# Patient Record
Sex: FEMALE | Race: WHITE | NOT HISPANIC OR LATINO | Employment: UNEMPLOYED | URBAN - METROPOLITAN AREA
[De-identification: names, ages, dates, MRNs, and addresses within clinical notes are randomized per-mention and may not be internally consistent; named-entity substitution may affect disease eponyms.]

---

## 2017-04-24 DIAGNOSIS — G25.81 RESTLESS LEG SYNDROME: Primary | ICD-10-CM

## 2017-04-24 PROBLEM — R79.0 LOW SERUM FERRITIN LEVEL: Status: ACTIVE | Noted: 2017-04-24

## 2017-04-28 ENCOUNTER — INFUSION THERAPY VISIT (OUTPATIENT)
Dept: INFUSION THERAPY | Facility: CLINIC | Age: 52
End: 2017-04-28
Attending: PSYCHIATRY & NEUROLOGY
Payer: MEDICAID

## 2017-04-28 VITALS
HEART RATE: 80 BPM | SYSTOLIC BLOOD PRESSURE: 132 MMHG | TEMPERATURE: 97.4 F | DIASTOLIC BLOOD PRESSURE: 62 MMHG | RESPIRATION RATE: 14 BRPM

## 2017-04-28 DIAGNOSIS — R79.0 LOW SERUM FERRITIN LEVEL: Primary | ICD-10-CM

## 2017-04-28 DIAGNOSIS — G25.81 RESTLESS LEG SYNDROME: ICD-10-CM

## 2017-04-28 PROCEDURE — 96366 THER/PROPH/DIAG IV INF ADDON: CPT

## 2017-04-28 PROCEDURE — 96361 HYDRATE IV INFUSION ADD-ON: CPT

## 2017-04-28 PROCEDURE — 25000128 H RX IP 250 OP 636: Performed by: PSYCHIATRY & NEUROLOGY

## 2017-04-28 PROCEDURE — 96376 TX/PRO/DX INJ SAME DRUG ADON: CPT

## 2017-04-28 PROCEDURE — 96365 THER/PROPH/DIAG IV INF INIT: CPT

## 2017-04-28 RX ADMIN — SODIUM CHLORIDE 250 ML: 9 INJECTION, SOLUTION INTRAVENOUS at 10:38

## 2017-04-28 RX ADMIN — IRON DEXTRAN 475 MG: 50 INJECTION INTRAMUSCULAR; INTRAVENOUS at 12:14

## 2017-04-28 RX ADMIN — IRON DEXTRAN 25 MG: 50 INJECTION INTRAMUSCULAR; INTRAVENOUS at 10:38

## 2017-04-28 ASSESSMENT — PAIN SCALES - GENERAL: PAINLEVEL: SEVERE PAIN (6)

## 2017-04-28 NOTE — PROGRESS NOTES
Infusion Nursing Note:  Hina A Fracisco presents today for Iron Dextran.    Patient seen by provider today: No   present during visit today: Not Applicable.    Note: N/A.    Intravenous Access:  Peripheral IV placed.    Treatment Conditions:  Not Applicable.      Post Infusion Assessment:  Patient tolerated infusion without incident.  Patient observed for 60 and 30 minutes post infed per protocol.  Blood return noted pre and post infusion.  Site patent and intact, free from redness, edema or discomfort.  No evidence of extravasations.  Access discontinued per protocol.    Discharge Plan:   Discharge instructions reviewed with: Patient.  Patient and/or family verbalized understanding of discharge instructions and all questions answered.  .  Patient will return prn for next appointment.  Patient discharged in stable condition accompanied by: self.  Departure Mode: Ambulatory.    Cherry Feliz RN

## 2017-04-28 NOTE — MR AVS SNAPSHOT
"              After Visit Summary   2017    Hina Yap    MRN: [id number]           Patient Information     Date Of Birth          1965        Visit Information        Provider Department      2017 10:00 AM  INFUSION CHAIR [facility]        Today's Diagnoses     Low serum ferritin level    -  1    Restless leg syndrome           Follow-ups after your visit        Who to contact     If you have questions or need follow up information about today's clinic visit or your schedule please contact [facility] directly at [phone].  Normal or non-critical lab and imaging results will be communicated to you by Hopscotchhart, letter or phone within 4 business days after the clinic has received the results. If you do not hear from us within 7 days, please contact the clinic through [facility] or phone. If you have a critical or abnormal lab result, we will notify you by phone as soon as possible.  Submit refill requests through Lumeta or call your pharmacy and they will forward the refill request to us. Please allow 3 business days for your refill to be completed.          Additional Information About Your Visit        MyChart Information     Lumeta lets you send messages to your doctor, view your test results, renew your prescriptions, schedule appointments and more. To sign up, go to [email or web address] . Click on \"Log in\" on the left side of the screen, which will take you to the Welcome page. Then click on \"Sign up Now\" on the right side of the page.     You will be asked to enter the access code listed below, as well as some personal information. Please follow the directions to create your username and password.     Your access code is: [email or web address]  Expires: 2017  2:51 PM     Your access code will  in 90 days. If you need help or a new code, please call your [place] clinic or [phone].        Care EveryWhere ID  "    This is your Care EveryWhere ID. This could be used by other organizations to access your [place] medical records  [id number]        Your Vitals Were     Pulse Temperature Respirations             80 97.4  F (36.3  C) (Oral) 14          Blood Pressure from Last 3 Encounters:   04/28/17 132/62   01/30/16 131/73   12/03/15 124/82    Weight from Last 3 Encounters:   01/29/16 55 kg (121 lb 3.2 oz)   12/03/15 52.4 kg (115 lb 9.6 oz)   11/30/15 48.3 kg (106 lb 7.7 oz)              We Performed the Following     Road Map Alert     Treatment Conditions     Vital signs        Primary Care Provider Office Phone # Fax #    BENNETT Marvin [phone]       [facility] [address] [place] [address]        Thank you!     Thank you for choosing [facility]  for your care. Our goal is always to provide you with excellent care. Hearing back from our patients is one way we can continue to improve our services. Please take a few minutes to complete the written survey that you may receive in the mail after your visit with us. Thank you!             Your Updated Medication List - Protect others around you: Learn how to safely use, store and throw away your medicines at www.disposemymeds.org.          This list is accurate as of: 4/28/17  2:51 PM.  Always use your most recent med list.                   Brand Name Dispense Instructions for use    ADDERALL 10 MG per tablet   Generic drug:  amphetamine-dextroamphetamine      Take 10 mg by mouth daily       albuterol 108 (90 BASE) MCG/ACT Inhaler    PROAIR HFA/PROVENTIL HFA/VENTOLIN HFA    1 Inhaler    Inhale 2 puffs into the lungs 4 times daily       ALPRAZolam 1 MG 24 hr tablet    XANAX XR     Take 1 mg by mouth 2 times daily as needed       aspirin-acetaminophen-caffeine 250-250-65 MG per tablet    EXCEDRIN MIGRAINE     Take 1 tablet by mouth every 6 hours as needed for headaches       baclofen  10 MG tablet    LIORESAL    90 tablet    Take 1 tablet (10 mg) by mouth 4 times daily       celecoxib 200 MG capsule    celeBREX    30 capsule    Take 1 capsule (200 mg) by mouth daily       esomeprazole 40 MG CR capsule    nexIUM    30 capsule    Take 1 capsule (40 mg) by mouth At Bedtime Take at night       fluticasone-salmeterol 250-50 MCG/DOSE diskus inhaler    ADVAIR    1 Inhaler    Inhale 1 puff into the lungs 2 times daily       guaiFENesin-codeine 100-10 MG/5ML Soln solution    ROBITUSSIN AC    120 mL    Take 5 mLs by mouth every 4 hours as needed for cough       ipratropium - albuterol 0.5 mg/2.5 mg/3 mL 0.5-2.5 (3) MG/3ML neb solution    DUONEB    30 vial    Take 1 vial (3 mLs) by nebulization every 6 hours as needed for shortness of breath / dyspnea or wheezing       multivitamin, therapeutic Tabs tablet      Take 1 tablet by mouth daily       * order for DME     1 Device    Equipment being ordered: Cane () Treatment Diagnosis: Multiple sclerosis       * order for DME     1 each    Equipment being ordered: Nebulizer       PERCOCET  MG per tablet   Generic drug:  oxyCODONE-acetaminophen      Take 1 tablet by mouth every 6 hours as needed.       predniSONE 10 MG tablet    DELTASONE    18 tablet    Take 30mg (iii tabs) po daily for 3 days, then 20mg (ii tabs) po daily for 3 days, then 10mg (i tab) po daily for 3 days then stop.       promethazine 25 MG tablet    PHENERGAN    56 tablet    Take 1 tablet (25 mg) by mouth every 6 hours as needed for nausea (takes with percocet to prevent nausea)       rOPINIRole 2 MG tablet    REQUIP    30 tablet    Take 1 tablet (2 mg) by mouth See Admin Instructions Take 1 tablet in the morning and 2 tablets at bedtime daily.       tiotropium 18 MCG capsule    SPIRIVA HANDIHALER    30 capsule    Inhale contents of one capsule daily.       * Notice:  This list has 2 medication(s) that are the same as other medications prescribed for you. Read the directions  carefully, and ask your doctor or other care provider to review them with you.

## 2017-05-25 ENCOUNTER — RADIANT APPOINTMENT (OUTPATIENT)
Dept: MAMMOGRAPHY | Facility: CLINIC | Age: 52
End: 2017-05-25
Attending: NURSE PRACTITIONER
Payer: MEDICAID

## 2017-05-25 ENCOUNTER — OFFICE VISIT (OUTPATIENT)
Dept: PEDIATRICS | Facility: CLINIC | Age: 52
End: 2017-05-25
Payer: MEDICAID

## 2017-05-25 VITALS
TEMPERATURE: 97.2 F | BODY MASS INDEX: 19.12 KG/M2 | HEART RATE: 72 BPM | WEIGHT: 121.8 LBS | DIASTOLIC BLOOD PRESSURE: 70 MMHG | OXYGEN SATURATION: 99 % | HEIGHT: 67 IN | SYSTOLIC BLOOD PRESSURE: 116 MMHG

## 2017-05-25 DIAGNOSIS — J44.0 CHRONIC OBSTRUCTIVE PULMONARY DISEASE WITH ACUTE LOWER RESPIRATORY INFECTION (H): ICD-10-CM

## 2017-05-25 DIAGNOSIS — Z72.0 TOBACCO ABUSE: ICD-10-CM

## 2017-05-25 DIAGNOSIS — N89.8 VAGINAL ODOR: ICD-10-CM

## 2017-05-25 DIAGNOSIS — R30.0 DYSURIA: ICD-10-CM

## 2017-05-25 DIAGNOSIS — Z00.00 ROUTINE HEALTH MAINTENANCE: Primary | ICD-10-CM

## 2017-05-25 DIAGNOSIS — Z00.00 PREVENTATIVE HEALTH CARE: ICD-10-CM

## 2017-05-25 DIAGNOSIS — J44.9 CHRONIC OBSTRUCTIVE PULMONARY DISEASE, UNSPECIFIED COPD TYPE (H): ICD-10-CM

## 2017-05-25 DIAGNOSIS — G35 MULTIPLE SCLEROSIS (H): ICD-10-CM

## 2017-05-25 DIAGNOSIS — F43.23 ADJUSTMENT DISORDER WITH MIXED ANXIETY AND DEPRESSED MOOD: ICD-10-CM

## 2017-05-25 DIAGNOSIS — N39.0 URINARY TRACT INFECTION WITHOUT HEMATURIA, SITE UNSPECIFIED: ICD-10-CM

## 2017-05-25 DIAGNOSIS — R06.02 SOB (SHORTNESS OF BREATH): ICD-10-CM

## 2017-05-25 LAB
ALBUMIN UR-MCNC: ABNORMAL MG/DL
APPEARANCE UR: ABNORMAL
BACTERIA #/AREA URNS HPF: ABNORMAL /HPF
BASOPHILS # BLD AUTO: 0.1 10E9/L (ref 0–0.2)
BASOPHILS NFR BLD AUTO: 0.4 %
BILIRUB UR QL STRIP: ABNORMAL
COLOR UR AUTO: YELLOW
DIFFERENTIAL METHOD BLD: ABNORMAL
EOSINOPHIL # BLD AUTO: 0.4 10E9/L (ref 0–0.7)
EOSINOPHIL NFR BLD AUTO: 2.9 %
ERYTHROCYTE [DISTWIDTH] IN BLOOD BY AUTOMATED COUNT: 13.8 % (ref 10–15)
GLUCOSE UR STRIP-MCNC: NEGATIVE MG/DL
HCT VFR BLD AUTO: 40.4 % (ref 35–47)
HGB BLD-MCNC: 13.2 G/DL (ref 11.7–15.7)
HGB UR QL STRIP: ABNORMAL
KETONES UR STRIP-MCNC: ABNORMAL MG/DL
LEUKOCYTE ESTERASE UR QL STRIP: NEGATIVE
LYMPHOCYTES # BLD AUTO: 2.3 10E9/L (ref 0.8–5.3)
LYMPHOCYTES NFR BLD AUTO: 19.2 %
MCH RBC QN AUTO: 30.7 PG (ref 26.5–33)
MCHC RBC AUTO-ENTMCNC: 32.7 G/DL (ref 31.5–36.5)
MCV RBC AUTO: 94 FL (ref 78–100)
MICRO REPORT STATUS: NORMAL
MONOCYTES # BLD AUTO: 0.7 10E9/L (ref 0–1.3)
MONOCYTES NFR BLD AUTO: 5.6 %
MUCOUS THREADS #/AREA URNS LPF: PRESENT /LPF
NEUTROPHILS # BLD AUTO: 8.6 10E9/L (ref 1.6–8.3)
NEUTROPHILS NFR BLD AUTO: 71.9 %
NITRATE UR QL: POSITIVE
NON-SQ EPI CELLS #/AREA URNS LPF: ABNORMAL /LPF
PH UR STRIP: 5.5 PH (ref 5–7)
PLATELET # BLD AUTO: 325 10E9/L (ref 150–450)
RBC # BLD AUTO: 4.3 10E12/L (ref 3.8–5.2)
RBC #/AREA URNS AUTO: ABNORMAL /HPF (ref 0–2)
SP GR UR STRIP: 1.02 (ref 1–1.03)
SPECIMEN SOURCE: NORMAL
URN SPEC COLLECT METH UR: ABNORMAL
UROBILINOGEN UR STRIP-ACNC: 0.2 EU/DL (ref 0.2–1)
WBC # BLD AUTO: 12 10E9/L (ref 4–11)
WBC #/AREA URNS AUTO: ABNORMAL /HPF (ref 0–2)
WET PREP SPEC: NORMAL

## 2017-05-25 PROCEDURE — 99213 OFFICE O/P EST LOW 20 MIN: CPT | Mod: 25 | Performed by: NURSE PRACTITIONER

## 2017-05-25 PROCEDURE — 80061 LIPID PANEL: CPT | Performed by: NURSE PRACTITIONER

## 2017-05-25 PROCEDURE — 81001 URINALYSIS AUTO W/SCOPE: CPT | Performed by: NURSE PRACTITIONER

## 2017-05-25 PROCEDURE — 80053 COMPREHEN METABOLIC PANEL: CPT | Performed by: NURSE PRACTITIONER

## 2017-05-25 PROCEDURE — 87086 URINE CULTURE/COLONY COUNT: CPT | Performed by: NURSE PRACTITIONER

## 2017-05-25 PROCEDURE — 90715 TDAP VACCINE 7 YRS/> IM: CPT | Performed by: NURSE PRACTITIONER

## 2017-05-25 PROCEDURE — 82728 ASSAY OF FERRITIN: CPT | Performed by: NURSE PRACTITIONER

## 2017-05-25 PROCEDURE — 87210 SMEAR WET MOUNT SALINE/INK: CPT | Performed by: NURSE PRACTITIONER

## 2017-05-25 PROCEDURE — 90471 IMMUNIZATION ADMIN: CPT | Performed by: NURSE PRACTITIONER

## 2017-05-25 PROCEDURE — 99396 PREV VISIT EST AGE 40-64: CPT | Mod: 25 | Performed by: NURSE PRACTITIONER

## 2017-05-25 PROCEDURE — 85025 COMPLETE CBC W/AUTO DIFF WBC: CPT | Performed by: NURSE PRACTITIONER

## 2017-05-25 PROCEDURE — 87624 HPV HI-RISK TYP POOLED RSLT: CPT | Performed by: NURSE PRACTITIONER

## 2017-05-25 PROCEDURE — G0145 SCR C/V CYTO,THINLAYER,RESCR: HCPCS | Performed by: NURSE PRACTITIONER

## 2017-05-25 PROCEDURE — G0202 SCR MAMMO BI INCL CAD: HCPCS | Mod: TC

## 2017-05-25 PROCEDURE — 87186 SC STD MICRODIL/AGAR DIL: CPT | Performed by: NURSE PRACTITIONER

## 2017-05-25 PROCEDURE — 36415 COLL VENOUS BLD VENIPUNCTURE: CPT | Performed by: NURSE PRACTITIONER

## 2017-05-25 PROCEDURE — 87088 URINE BACTERIA CULTURE: CPT | Performed by: NURSE PRACTITIONER

## 2017-05-25 PROCEDURE — 84443 ASSAY THYROID STIM HORMONE: CPT | Performed by: NURSE PRACTITIONER

## 2017-05-25 PROCEDURE — G0476 HPV COMBO ASSAY CA SCREEN: HCPCS | Performed by: NURSE PRACTITIONER

## 2017-05-25 RX ORDER — IPRATROPIUM BROMIDE AND ALBUTEROL SULFATE 2.5; .5 MG/3ML; MG/3ML
1 SOLUTION RESPIRATORY (INHALATION) EVERY 6 HOURS PRN
Qty: 30 VIAL | Refills: 1 | Status: ON HOLD | OUTPATIENT
Start: 2017-05-25 | End: 2018-12-05

## 2017-05-25 RX ORDER — NITROFURANTOIN 25; 75 MG/1; MG/1
100 CAPSULE ORAL 2 TIMES DAILY
Qty: 14 CAPSULE | Refills: 0 | Status: SHIPPED | OUTPATIENT
Start: 2017-05-25 | End: 2017-11-30

## 2017-05-25 RX ORDER — ALBUTEROL SULFATE 90 UG/1
2 AEROSOL, METERED RESPIRATORY (INHALATION) 4 TIMES DAILY
Qty: 1 INHALER | Refills: 1 | Status: ON HOLD | OUTPATIENT
Start: 2017-05-25 | End: 2018-12-05

## 2017-05-25 RX ORDER — TIOTROPIUM BROMIDE 18 UG/1
CAPSULE ORAL; RESPIRATORY (INHALATION)
Qty: 30 CAPSULE | Refills: 0 | Status: ON HOLD | OUTPATIENT
Start: 2017-05-25 | End: 2018-12-04

## 2017-05-25 ASSESSMENT — PATIENT HEALTH QUESTIONNAIRE - PHQ9
10. IF YOU CHECKED OFF ANY PROBLEMS, HOW DIFFICULT HAVE THESE PROBLEMS MADE IT FOR YOU TO DO YOUR WORK, TAKE CARE OF THINGS AT HOME, OR GET ALONG WITH OTHER PEOPLE: VERY DIFFICULT
SUM OF ALL RESPONSES TO PHQ QUESTIONS 1-9: 17
SUM OF ALL RESPONSES TO PHQ QUESTIONS 1-9: 17

## 2017-05-25 NOTE — MR AVS SNAPSHOT
After Visit Summary   5/25/2017    Hina Yap    MRN: [id number]           Patient Information     Date Of Birth          1965        Visit Information        Provider Department      5/25/2017 10:00 AM Sunshine Butterfield APRN [facility]        Today's Diagnoses     Routine health maintenance    -  1    Vaginal odor        Dysuria        Chronic obstructive pulmonary disease, unspecified COPD type (H)        Urinary tract infection without hematuria, site unspecified        Chronic obstructive pulmonary disease with acute lower respiratory infection (H)        SOB (shortness of breath)        Multiple sclerosis        Adjustment disorder with mixed anxiety and depressed mood          Care Instructions    -Antibiotic twice a day for 7 days  -Please see pulmonary asap  FHN: [facility] [phone]   [email or web address]  -Please send back poop test.    Preventive Health Recommendations  Female Ages 50 - 64    Yearly exam: See your health care provider every year in order to  o Review health changes.   o Discuss preventive care.    o Review your medicines if your doctor has prescribed any.      Get a Pap test every three years (unless you have an abnormal result and your provider advises testing more often).    If you get Pap tests with HPV test, you only need to test every 5 years, unless you have an abnormal result.     You do not need a Pap test if your uterus was removed (hysterectomy) and you have not had cancer.    You should be tested each year for STDs (sexually transmitted diseases) if you're at risk.     Have a mammogram every 1 to 2 years.    Have a colonoscopy at age 50, or have a yearly FIT test (stool test). These exams screen for colon cancer.      Have a cholesterol test every 5 years, or more often if advised.    Have a diabetes test (fasting glucose) every three years. If you are at risk for diabetes, you should have this test  more often.     If you are at risk for osteoporosis (brittle bone disease), think about having a bone density scan (DEXA).    Shots: Get a flu shot each year. Get a tetanus shot every 10 years.    Nutrition:     Eat at least 5 servings of fruits and vegetables each day.    Eat whole-grain bread, whole-wheat pasta and brown rice instead of white grains and rice.    Talk to your provider about Calcium and Vitamin D.     Lifestyle    Exercise at least 150 minutes a week (30 minutes a day, 5 days a week). This will help you control your weight and prevent disease.    Limit alcohol to one drink per day.    No smoking.     Wear sunscreen to prevent skin cancer.     See your dentist every six months for an exam and cleaning.    See your eye doctor every 1 to 2 years.            Follow-ups after your visit        Additional Services     PULMONARY MEDICINE REFERRAL       Your provider has referred you to: [facility]: [facility] [phone]   [email or web address]    Please be aware that coverage of these services is subject to the terms and limitations of your health insurance plan.  Call member services at your health plan with any benefit or coverage questions.      Please bring the following with you to your appointment:    (1) Any X-Rays, CTs or MRIs which have been performed.  Contact the facility where they were done to arrange for  prior to your scheduled appointment.    (2) List of current medications   (3) This referral request   (4) Any documents/labs given to you for this referral                  Future tests that were ordered for you today     Open Future Orders        Priority Expected Expires Ordered    Fecal colorectal cancer screen (FIT) Routine 6/15/2017 8/17/2017 5/25/2017            Who to contact     If you have questions or need follow up information about today's clinic visit or your schedule please contact [facility] directly at [phone].  Normal or  "non-critical lab and imaging results will be communicated to you by MyChart, letter or phone within 4 business days after the clinic has received the results. If you do not hear from us within 7 days, please contact the clinic through [employer] or phone. If you have a critical or abnormal lab result, we will notify you by phone as soon as possible.  Submit refill requests through Fengguo or call your pharmacy and they will forward the refill request to us. Please allow 3 business days for your refill to be completed.          Additional Information About Your Visit        Fengguo Information     Fengguo lets you send messages to your doctor, view your test results, renew your prescriptions, schedule appointments and more. To sign up, go to [email or web address] . Click on \"Log in\" on the left side of the screen, which will take you to the Welcome page. Then click on \"Sign up Now\" on the right side of the page.     You will be asked to enter the access code listed below, as well as some personal information. Please follow the directions to create your username and password.     Your access code is: [email or web address]  Expires: 2017  2:51 PM     Your access code will  in 90 days. If you need help or a new code, please call your [place] clinic or [phone].        Care EveryWhere ID     This is your Care EveryWhere ID. This could be used by other organizations to access your [place] medical records  [id number]        Your Vitals Were     Pulse Temperature Height Last Period Pulse Oximetry BMI (Body Mass Index)    72 97.2  F (36.2  C) (Tympanic) 5' 7\" (1.702 m) 2017 (Exact Date) 99% 19.08 kg/m2       Blood Pressure from Last 3 Encounters:   17 116/70   17 132/62   16 131/73    Weight from Last 3 Encounters:   17 121 lb 12.8 oz (55.2 kg)   16 121 lb 3.2 oz (55 kg)   12/03/15 115 lb 9.6 oz (52.4 kg)              We Performed the Following     *UA reflex to Microscopic and " Culture ([facility] (except [place] and [place])     CBC with platelets differential     Comprehensive metabolic panel     Ferritin     HPV High Risk Types DNA Cervical     LIPID REFLEX TO DIRECT LDL PANEL     Pap imaged thin layer screen with HPV - recommended age 30 - 65     PULMONARY MEDICINE REFERRAL     TDAP (ADACEL)     TSH with free T4 reflex     Urine Microscopic     VACCINE ADMINISTRATION, INITIAL     Wet prep          Today's Medication Changes          These changes are accurate as of: 5/25/17 10:44 AM.  If you have any questions, ask your nurse or doctor.               Start taking these medicines.        Dose/Directions    nitrofurantoin (macrocrystal-monohydrate) 100 MG capsule   Commonly known as:  MACROBID   Used for:  Urinary tract infection without hematuria, site unspecified   Started by:  Sunshine Butterfield APRN CNP        Dose:  100 mg   Take 1 capsule (100 mg) by mouth 2 times daily   Quantity:  14 capsule   Refills:  0         Stop taking these medicines if you haven't already. Please contact your care team if you have questions.     predniSONE 10 MG tablet   Commonly known as:  DELTASONE   Stopped by:  Sunshine Butterfield APRN CNP                Where to get your medicines      These medications were sent to [facility] D[facility] - [address] [place] [address] [address]     Phone:  [phone]     albuterol 108 (90 BASE) MCG/ACT Inhaler    fluticasone-salmeterol 250-50 MCG/DOSE diskus inhaler    ipratropium - albuterol 0.5 mg/2.5 mg/3 mL 0.5-2.5 (3) MG/3ML neb solution    nitrofurantoin (macrocrystal-monohydrate) 100 MG capsule    tiotropium 18 MCG capsule                Primary Care Provider Office Phone # Fax #    BENNETT Marvin [phone]       [facility] [place] [address]        Thank you!     Thank you for choosing [facility]  for your care. Our goal is always to provide you with excellent care. Hearing back from our patients is one way we can continue to improve our services. Please take a few minutes to complete the written survey that you may receive in the mail after your visit with us. Thank you!             Your Updated Medication List - Protect others around you: Learn how to safely use, store and throw away your medicines at www.disposemymeds.org.          This list is accurate as of: 5/25/17 10:44 AM.  Always use your most recent med list.                   Brand Name Dispense Instructions for use    ADDERALL 10 MG per tablet   Generic drug:  amphetamine-dextroamphetamine      Take 10 mg by mouth daily       albuterol 108 (90 BASE) MCG/ACT Inhaler    PROAIR HFA/PROVENTIL HFA/VENTOLIN HFA    1 Inhaler    Inhale 2 puffs into the lungs 4 times daily       ALPRAZolam 1 MG 24 hr tablet    XANAX XR     Take 1 mg by mouth 2 times daily as needed       aspirin-acetaminophen-caffeine 250-250-65 MG per tablet    EXCEDRIN MIGRAINE     Take 1 tablet by mouth every 6 hours as needed for headaches       baclofen 10 MG tablet    LIORESAL    90 tablet    Take 1 tablet (10 mg) by mouth 4 times daily       celecoxib 200 MG capsule    celeBREX    30 capsule    Take 1 capsule (200 mg) by mouth daily       esomeprazole 40 MG CR capsule    nexIUM    30 capsule    Take 1 capsule (40 mg) by mouth At Bedtime Take at night       fluticasone-salmeterol 250-50 MCG/DOSE diskus inhaler    ADVAIR    1 Inhaler    Inhale 1 puff into the lungs 2 times daily       ipratropium - albuterol 0.5 mg/2.5 mg/3 mL 0.5-2.5 (3) MG/3ML neb solution    DUONEB    30 vial    Take 1 vial (3 mLs) by nebulization every 6 hours as needed for shortness of breath / dyspnea or wheezing       multivitamin, therapeutic Tabs tablet      Take 1 tablet by mouth daily       nitrofurantoin (macrocrystal-monohydrate) 100 MG capsule    MACROBID    14 capsule    Take 1 capsule  (100 mg) by mouth 2 times daily       * order for DME     1 Device    Equipment being ordered: Cane () Treatment Diagnosis: Multiple sclerosis       * order for DME     1 each    Equipment being ordered: Nebulizer       PERCOCET  MG per tablet   Generic drug:  oxyCODONE-acetaminophen      Take 1 tablet by mouth every 6 hours as needed.       promethazine 25 MG tablet    PHENERGAN    56 tablet    Take 1 tablet (25 mg) by mouth every 6 hours as needed for nausea (takes with percocet to prevent nausea)       rOPINIRole 2 MG tablet    REQUIP    30 tablet    Take 1 tablet (2 mg) by mouth See Admin Instructions Take 1 tablet in the morning and 2 tablets at bedtime daily.       tiotropium 18 MCG capsule    SPIRIVA HANDIHALER    30 capsule    Inhale contents of one capsule daily.       * Notice:  This list has 2 medication(s) that are the same as other medications prescribed for you. Read the directions carefully, and ask your doctor or other care provider to review them with you.
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
## 2017-05-25 NOTE — NURSING NOTE
"Chief Complaint   Patient presents with     Physical       Initial /70 (Cuff Size: Adult Regular)  Pulse 72  Temp 97.2  F (36.2  C) (Tympanic)  Ht 5' 7\" (1.702 m)  Wt 121 lb 12.8 oz (55.2 kg)  LMP 05/25/2017 (Exact Date)  SpO2 99%  BMI 19.08 kg/m2 Estimated body mass index is 19.08 kg/(m^2) as calculated from the following:    Height as of this encounter: 5' 7\" (1.702 m).    Weight as of this encounter: 121 lb 12.8 oz (55.2 kg).  Medication Reconciliation: complete   Sasha Cerda CMA    "

## 2017-05-25 NOTE — LETTER
June 6, 2017    Hina Yap  [address]     [place] [address]    Dear Hina,  We are happy to inform you that your PAP smear result from 05/25/17 is normal.  We are now able to do a follow up test on PAP smears. The DNA test is for HPV (Human Papilloma Virus). Cervical cancer is closely linked with certain types of HPV. Your result showed no evidence of high risk HPV.  Therefore we recommend you return in 5 years for your next pap smear and HPV test.  You will still need to return to the clinic every year for an annual exam and other preventive tests.  Please contact the clinic at [phone] with any questions.  Sincerely,    Sunshine Butterfield, BENNETT CNP/es

## 2017-05-25 NOTE — PATIENT INSTRUCTIONS
-Antibiotic twice a day for 7 days  -Please see pulmonary asap  FHN: [facility] [place] [phone]   [email or web address]  -Please send back poop test.    Preventive Health Recommendations  Female Ages 50 - 64    Yearly exam: See your health care provider every year in order to  o Review health changes.   o Discuss preventive care.    o Review your medicines if your doctor has prescribed any.      Get a Pap test every three years (unless you have an abnormal result and your provider advises testing more often).    If you get Pap tests with HPV test, you only need to test every 5 years, unless you have an abnormal result.     You do not need a Pap test if your uterus was removed (hysterectomy) and you have not had cancer.    You should be tested each year for STDs (sexually transmitted diseases) if you're at risk.     Have a mammogram every 1 to 2 years.    Have a colonoscopy at age 50, or have a yearly FIT test (stool test). These exams screen for colon cancer.      Have a cholesterol test every 5 years, or more often if advised.    Have a diabetes test (fasting glucose) every three years. If you are at risk for diabetes, you should have this test more often.     If you are at risk for osteoporosis (brittle bone disease), think about having a bone density scan (DEXA).    Shots: Get a flu shot each year. Get a tetanus shot every 10 years.    Nutrition:     Eat at least 5 servings of fruits and vegetables each day.    Eat whole-grain bread, whole-wheat pasta and brown rice instead of white grains and rice.    Talk to your provider about Calcium and Vitamin D.     Lifestyle    Exercise at least 150 minutes a week (30 minutes a day, 5 days a week). This will help you control your weight and prevent disease.    Limit alcohol to one drink per day.    No smoking.     Wear sunscreen to prevent skin cancer.     See your dentist every six months for an exam and cleaning.    See your eye doctor every 1 to 2  years.

## 2017-05-26 LAB
ALBUMIN SERPL-MCNC: 4.1 G/DL (ref 3.4–5)
ALP SERPL-CCNC: 55 U/L (ref 40–150)
ALT SERPL W P-5'-P-CCNC: 27 U/L (ref 0–50)
ANION GAP SERPL CALCULATED.3IONS-SCNC: 12 MMOL/L (ref 3–14)
AST SERPL W P-5'-P-CCNC: 37 U/L (ref 0–45)
BILIRUB SERPL-MCNC: 0.4 MG/DL (ref 0.2–1.3)
BUN SERPL-MCNC: 15 MG/DL (ref 7–30)
CALCIUM SERPL-MCNC: 8.8 MG/DL (ref 8.5–10.1)
CHLORIDE SERPL-SCNC: 107 MMOL/L (ref 94–109)
CHOLEST SERPL-MCNC: 165 MG/DL
CO2 SERPL-SCNC: 23 MMOL/L (ref 20–32)
CREAT SERPL-MCNC: 0.76 MG/DL (ref 0.52–1.04)
FERRITIN SERPL-MCNC: 118 NG/ML (ref 8–252)
GFR SERPL CREATININE-BSD FRML MDRD: 79 ML/MIN/1.7M2
GLUCOSE SERPL-MCNC: 91 MG/DL (ref 70–99)
HDLC SERPL-MCNC: 53 MG/DL
LDLC SERPL CALC-MCNC: 89 MG/DL
NONHDLC SERPL-MCNC: 112 MG/DL
POTASSIUM SERPL-SCNC: 3.7 MMOL/L (ref 3.4–5.3)
PROT SERPL-MCNC: 6.9 G/DL (ref 6.8–8.8)
SODIUM SERPL-SCNC: 142 MMOL/L (ref 133–144)
TRIGL SERPL-MCNC: 113 MG/DL
TSH SERPL DL<=0.005 MIU/L-ACNC: 2.77 MU/L (ref 0.4–4)

## 2017-05-26 ASSESSMENT — PATIENT HEALTH QUESTIONNAIRE - PHQ9: SUM OF ALL RESPONSES TO PHQ QUESTIONS 1-9: 17

## 2017-05-28 LAB
BACTERIA SPEC CULT: ABNORMAL
MICRO REPORT STATUS: ABNORMAL
MICROORGANISM SPEC CULT: ABNORMAL
SPECIMEN SOURCE: ABNORMAL

## 2017-05-30 LAB
COPATH REPORT: NORMAL
PAP: NORMAL

## 2017-06-01 LAB
FINAL DIAGNOSIS: NORMAL
HPV HR 12 DNA CVX QL NAA+PROBE: NEGATIVE
HPV16 DNA SPEC QL NAA+PROBE: NEGATIVE
HPV18 DNA SPEC QL NAA+PROBE: NEGATIVE
SPECIMEN DESCRIPTION: NORMAL

## 2017-06-20 ENCOUNTER — TRANSFERRED RECORDS (OUTPATIENT)
Dept: HEALTH INFORMATION MANAGEMENT | Facility: CLINIC | Age: 52
End: 2017-06-20

## 2017-11-30 ENCOUNTER — OFFICE VISIT (OUTPATIENT)
Dept: PEDIATRICS | Facility: CLINIC | Age: 52
End: 2017-11-30
Payer: MEDICAID

## 2017-11-30 DIAGNOSIS — R00.1 BRADYCARDIA: ICD-10-CM

## 2017-11-30 DIAGNOSIS — R63.4 LOSS OF WEIGHT: Primary | ICD-10-CM

## 2017-11-30 DIAGNOSIS — M62.81 GENERALIZED MUSCLE WEAKNESS: ICD-10-CM

## 2017-11-30 DIAGNOSIS — R30.0 DYSURIA: ICD-10-CM

## 2017-11-30 DIAGNOSIS — R11.0 NAUSEA: ICD-10-CM

## 2017-11-30 DIAGNOSIS — F43.23 ADJUSTMENT DISORDER WITH MIXED ANXIETY AND DEPRESSED MOOD: ICD-10-CM

## 2017-11-30 LAB
ALBUMIN UR-MCNC: NEGATIVE MG/DL
APPEARANCE UR: CLEAR
BASOPHILS # BLD AUTO: 0 10E9/L (ref 0–0.2)
BASOPHILS NFR BLD AUTO: 0.4 %
BILIRUB UR QL STRIP: NEGATIVE
COLOR UR AUTO: YELLOW
DIFFERENTIAL METHOD BLD: NORMAL
EOSINOPHIL # BLD AUTO: 0.4 10E9/L (ref 0–0.7)
EOSINOPHIL NFR BLD AUTO: 3.9 %
ERYTHROCYTE [DISTWIDTH] IN BLOOD BY AUTOMATED COUNT: 13.3 % (ref 10–15)
GLUCOSE UR STRIP-MCNC: NEGATIVE MG/DL
HCT VFR BLD AUTO: 41.2 % (ref 35–47)
HGB BLD-MCNC: 13.9 G/DL (ref 11.7–15.7)
HGB UR QL STRIP: ABNORMAL
KETONES UR STRIP-MCNC: NEGATIVE MG/DL
LEUKOCYTE ESTERASE UR QL STRIP: NEGATIVE
LIPASE SERPL-CCNC: 163 U/L (ref 73–393)
LYMPHOCYTES # BLD AUTO: 3 10E9/L (ref 0.8–5.3)
LYMPHOCYTES NFR BLD AUTO: 30.7 %
MCH RBC QN AUTO: 30.9 PG (ref 26.5–33)
MCHC RBC AUTO-ENTMCNC: 33.7 G/DL (ref 31.5–36.5)
MCV RBC AUTO: 92 FL (ref 78–100)
MONOCYTES # BLD AUTO: 0.6 10E9/L (ref 0–1.3)
MONOCYTES NFR BLD AUTO: 6.5 %
NEUTROPHILS # BLD AUTO: 5.8 10E9/L (ref 1.6–8.3)
NEUTROPHILS NFR BLD AUTO: 58.5 %
NITRATE UR QL: NEGATIVE
NON-SQ EPI CELLS #/AREA URNS LPF: NORMAL /LPF
PH UR STRIP: 7 PH (ref 5–7)
PLATELET # BLD AUTO: 335 10E9/L (ref 150–450)
RBC # BLD AUTO: 4.5 10E12/L (ref 3.8–5.2)
RBC #/AREA URNS AUTO: NORMAL /HPF
SOURCE: ABNORMAL
SP GR UR STRIP: 1.01 (ref 1–1.03)
SPECIMEN SOURCE: NORMAL
UROBILINOGEN UR STRIP-ACNC: 0.2 EU/DL (ref 0.2–1)
WBC # BLD AUTO: 9.8 10E9/L (ref 4–11)
WBC #/AREA URNS AUTO: NORMAL /HPF
WET PREP SPEC: NORMAL

## 2017-11-30 PROCEDURE — 84134 ASSAY OF PREALBUMIN: CPT | Performed by: NURSE PRACTITIONER

## 2017-11-30 PROCEDURE — 87210 SMEAR WET MOUNT SALINE/INK: CPT | Performed by: NURSE PRACTITIONER

## 2017-11-30 PROCEDURE — 87389 HIV-1 AG W/HIV-1&-2 AB AG IA: CPT | Performed by: NURSE PRACTITIONER

## 2017-11-30 PROCEDURE — 80050 GENERAL HEALTH PANEL: CPT | Performed by: NURSE PRACTITIONER

## 2017-11-30 PROCEDURE — 36415 COLL VENOUS BLD VENIPUNCTURE: CPT | Performed by: NURSE PRACTITIONER

## 2017-11-30 PROCEDURE — 93000 ELECTROCARDIOGRAM COMPLETE: CPT | Performed by: NURSE PRACTITIONER

## 2017-11-30 PROCEDURE — 83690 ASSAY OF LIPASE: CPT | Performed by: NURSE PRACTITIONER

## 2017-11-30 PROCEDURE — 82728 ASSAY OF FERRITIN: CPT | Performed by: NURSE PRACTITIONER

## 2017-11-30 PROCEDURE — G0472 HEP C SCREEN HIGH RISK/OTHER: HCPCS | Performed by: NURSE PRACTITIONER

## 2017-11-30 PROCEDURE — 99214 OFFICE O/P EST MOD 30 MIN: CPT | Performed by: NURSE PRACTITIONER

## 2017-11-30 PROCEDURE — 81001 URINALYSIS AUTO W/SCOPE: CPT | Performed by: NURSE PRACTITIONER

## 2017-11-30 ASSESSMENT — PATIENT HEALTH QUESTIONNAIRE - PHQ9: SUM OF ALL RESPONSES TO PHQ QUESTIONS 1-9: 20

## 2017-11-30 NOTE — LETTER
[facility]  [address] [place] [address]  [place] [address]                  [phone]   December 1, 2017    Hina Yap  [address]  [place] [address]        Bret Santamaria,     I can't find any cause of your weight loss. I think it is most likely due to your anxiety and depression but I want you to follow up closely.     I'd like you to have an endoscopy and also send in your stool test. Gastrointestinal cancer is a common cause of weight loss and I'd like to rule this out.   Please also see your psychiatrist asap.     Prealbumin (nutrition level) is fine.   HIV, hep C, urine, kidney liver, electrolytes, thyroid, iron, pancreas function, blood cells all normal.     Your wet prep is normal. I'm not sure why Sasha had you do this test. If you are having vaginal symptoms or painful urination please return to the clinic.     Please follow up with me in 1-2 months.     Sunshine Hernandez, FNP-DNP.       Results for orders placed or performed in visit on 11/30/17   HIV Antigen Antibody Combo   Result Value Ref Range    HIV Antigen Antibody Combo Nonreactive NR^Nonreactive       Hepatitis C antibody   Result Value Ref Range    Hepatitis C Antibody Nonreactive NR^Nonreactive   Comprehensive metabolic panel   Result Value Ref Range    Sodium 142 133 - 144 mmol/L    Potassium 3.8 3.4 - 5.3 mmol/L    Chloride 105 94 - 109 mmol/L    Carbon Dioxide 30 20 - 32 mmol/L    Anion Gap 7 3 - 14 mmol/L    Glucose 99 70 - 99 mg/dL    Urea Nitrogen 17 7 - 30 mg/dL    Creatinine 0.91 0.52 - 1.04 mg/dL    GFR Estimate 65 >60 mL/min/1.7m2    GFR Estimate If Black 79 >60 mL/min/1.7m2    Calcium 8.8 8.5 - 10.1 mg/dL    Bilirubin Total 0.3 0.2 - 1.3 mg/dL    Albumin 4.0 3.4 - 5.0 g/dL    Protein Total 7.1 6.8 - 8.8 g/dL    Alkaline Phosphatase 58 40 - 150 U/L    ALT 19 0 - 50 U/L    AST 21 0 - 45 U/L   CBC with platelets differential   Result Value Ref Range    WBC 9.8 4.0 - 11.0 10e9/L     RBC Count 4.50 3.8 - 5.2 10e12/L    Hemoglobin 13.9 11.7 - 15.7 g/dL    Hematocrit 41.2 35.0 - 47.0 %    MCV 92 78 - 100 fl    MCH 30.9 26.5 - 33.0 pg    MCHC 33.7 31.5 - 36.5 g/dL    RDW 13.3 10.0 - 15.0 %    Platelet Count 335 150 - 450 10e9/L    Diff Method Automated Method     % Neutrophils 58.5 %    % Lymphocytes 30.7 %    % Monocytes 6.5 %    % Eosinophils 3.9 %    % Basophils 0.4 %    Absolute Neutrophil 5.8 1.6 - 8.3 10e9/L    Absolute Lymphocytes 3.0 0.8 - 5.3 10e9/L    Absolute Monocytes 0.6 0.0 - 1.3 10e9/L    Absolute Eosinophils 0.4 0.0 - 0.7 10e9/L    Absolute Basophils 0.0 0.0 - 0.2 10e9/L   Ferritin   Result Value Ref Range    Ferritin 38 8 - 252 ng/mL   TSH with free T4 reflex   Result Value Ref Range    TSH 3.46 0.40 - 4.00 mU/L   Lipase   Result Value Ref Range    Lipase 163 73 - 393 U/L   Prealbumin   Result Value Ref Range    Prealbumin 32 15 - 45 mg/dL   *UA reflex to Microscopic and Culture ([place] and [facility] (except [place] and [place])   Result Value Ref Range    Color Urine Yellow     Appearance Urine Clear     Glucose Urine Negative NEG^Negative mg/dL    Bilirubin Urine Negative NEG^Negative    Ketones Urine Negative NEG^Negative mg/dL    Specific Gravity Urine 1.010 1.003 - 1.035    Blood Urine Trace (A) NEG^Negative    pH Urine 7.0 5.0 - 7.0 pH    Protein Albumin Urine Negative NEG^Negative mg/dL    Urobilinogen Urine 0.2 0.2 - 1.0 EU/dL    Nitrite Urine Negative NEG^Negative    Leukocyte Esterase Urine Negative NEG^Negative    Source Midstream Urine    Urine Microscopic   Result Value Ref Range    WBC Urine O - 2 OTO2^O - 2 /HPF    RBC Urine O - 2 OTO2^O - 2 /HPF    Squamous Epithelial /LPF Urine Few FEW^Few /LPF   Wet prep   Result Value Ref Range    Specimen Description Vagina     Wet Prep No yeast seen     Wet Prep No Trichomonas seen     Wet Prep No clue cells seen     Wet Prep SELF COLLECT

## 2017-11-30 NOTE — PROGRESS NOTES
"  SUBJECTIVE:   Hina Yap is a 52 year old female who presents to clinic today for the following health issues:    Patient here today with multiple concerns: weight loss, dehydration, states she is having a lot of issues. Wondering about hormonal problem.    Weight loss  Dehydration - states she does not feel like drinking anything      Duration: 2-3 months    Description (location/character/radiation): patient states she thinks she has not been at a decent weight for months    Intensity:  moderate    Accompanying signs and symptoms: lack of appetite, nausea after eating    History (similar episodes/previous evaluation): None    Precipitating or alleviating factors: None    Therapies tried and outcome: None     Hasn't been taking Advair or Spriva for a few months. States breathing is fine. Has been doing nebs here and there.    Denies chest pain or shortness of breath. Has a history of a silent MI. Echo from 2015 showed EF ofr 45-50% with apical and lateral akinesis.      Anxiety has been awful some moving from her home recently. Hasn't been able to get Alprazolam because she has failed to follow up with neuro, who prescribes it for her. Sees him this upcoming January. The lamotrigine doesn't help with her anxiety-has been on it for over a year. 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    Nausea and weight loss started around the time she had to move. Denies any belly pain or bloating. Reports early satiety. No stool changes or blood in the stool. No vomiting. Drinks alcohol very rarely. No street drugs. Uses ibuprofen about 10 ibuprofen tabs (2000mg most days).    Not sexually active for over a year.     ROS: const/gi/heent/resp/cv/endo/psych/gu/gyn otherwise negative     OBJECTIVE:  /88 (Cuff Size: Adult Small)  Pulse (!) 42  Temp 97.5  F (36.4  C) (Tympanic)  Ht 5' 7\" (1.702 m)  Wt 112 lb 3.2 oz (50.9 kg)  SpO2 99%  BMI 17.57 kg/m2  CONSTITUTIONAL: Alert, very thin, well-groomed, NAD  RESP: Lungs " CTA. No wheeze, rhonchi, rales.  CV: HRRR S1 S2 No MRG. No peripheral edema  PSYCH: Flat affect, appropriate mentation and speech.  GI: Abdomen flat. BS x 4. No TTP. No HSM or masses. No CVAT      ASSESSMENT/PLAN:  (R63.4) Loss of weight  (primary encounter diagnosis)  Comment: Patient with nausea and weight loss (7.5%) ever since moving from her apartment where she lived for 14 years. It is my impression that her anxiety and depression have worsened to the point that it is affecting her appetite. However, cannot rule out organic cause. Her prealbumin is normal. HIV, hep C, liver, kidney, blood counts, lipase, TSH testing all within normal limits. No fever, night sweats, or lymphadenopathy to suggest lymphoma. While I do think she should have a colonoscopy for screening and to evaluate weight loss, she states she will not do this. I advised her to do a FIT test at the very least and complete the endoscopy.   Plan: HIV Antigen Antibody Combo, Hepatitis C         antibody, Comprehensive metabolic panel, CBC         with platelets differential, Ferritin, TSH with        free T4 reflex, Lipase, Fecal colorectal cancer        screen (FIT), GASTROENTEROLOGY ADULT REF         PROCEDURE ONLY, Prealbumin, Urine Microscopic        Encouraged her to schedule endoscopy and colonoscopy (or FIT).        Encouraged her to f/u with psych asap.        F/U with me 1-2 months.     (R11.0) Nausea  Plan: Lipase, GASTROENTEROLOGY ADULT REF PROCEDURE         ONLY      (R00.1) Bradycardia  Comment: Patient with bradycardia in the 40s noted with both manual and automatic checks today. However, when EKG was done she was not bradycardic but did have frequent PVCs. Patient denies any recent chest pain, shortness of breath, dizziness, or palpitations. She does have a history of hypertension and coronary spasm. She did have a normal angiogram in 2015 but was found to have a slightly reduced EF with some apical and lateral wall akinesis. She has  used nitro in the past for chest pain, but has not needed to use this in over a year. Not on any medications that could cause heart block.   Plan: EKG 12-lead complete w/read - Clinics, Holter         Monitor 48 hour - Adult, Echocardiogram         Complete        Asked her to get heart monitor and echo set up today.     (F43.23) Adjustment disorder with mixed anxiety and depressed mood  Comment: Patient seems to have severe depression and anxiety, which I think is affecting her appetite. No SI or HI. Her neurologist has been prescribing her mental health medications, which seems less than ideal given the severity and duration of her symptoms.   Plan: MENTAL HEALTH REFERRAL  - Adult; Psychiatry and        Medication Management; Psychiatry; [facility] [place]         [facility] -         [place], [place] [phone]          Medication management & future refills will be         returned to G PCP upon compl...        Referred to psych and therapy. She declines to change medications today.     (R30.0) Dysuria  Comment: Wet prep and UA negative. We did not have time to discuss this further today in clinic. She will return to discuss if sx continue.   Plan: Wet prep, *UA reflex to Microscopic and Culture        ([place] [facility] [place] [facility]        and [place])            40 minutes spent with patient, over 1/2 in counseling and coordination of care.       Sunshine Butterfield, TRIP-MARY.

## 2017-11-30 NOTE — NURSING NOTE
"Chief Complaint   Patient presents with     Weight Loss     Dehydration       Initial /88 (Cuff Size: Adult Small)  Temp 97.5  F (36.4  C) (Tympanic)  Ht 5' 7\" (1.702 m)  Wt 112 lb 3.2 oz (50.9 kg)  SpO2 99%  BMI 17.57 kg/m2 Estimated body mass index is 17.57 kg/(m^2) as calculated from the following:    Height as of this encounter: 5' 7\" (1.702 m).    Weight as of this encounter: 112 lb 3.2 oz (50.9 kg).  Medication Reconciliation: complete   Sasha Cerda, SANDIP    "

## 2017-11-30 NOTE — MR AVS SNAPSHOT
After Visit Summary   11/30/2017    Hina Yap    MRN: [id number]           Patient Information     Date Of Birth          1965        Visit Information        Provider Department      11/30/2017 11:40 AM Sunshine Butterfield APRN [facility]        Today's Diagnoses     Loss of weight    -  1    Nausea        Bradycardia        Generalized muscle weakness        Adjustment disorder with mixed anxiety and depressed mood          Care Instructions    1. Cardiac testing: Echocardiogram (ultrasound) and 48 hour heart monitor. [phone]  2. I strongly recommend an endoscopy. They will call you to set it up.   3. Please see psychiatry. It is not appropriate to receive these meds from your neurologist without also having a psychiatrist. Number below.   3. I'll get back to you about labs. Please send in your stool test  4. Please re-start your inhalers.           Follow-ups after your visit        Additional Services     GASTROENTEROLOGY ADULT REF PROCEDURE ONLY       Last Lab Result: Creatinine (mg/dL)       Date                     Value                 05/25/2017               0.76             ----------  Body mass index is 17.57 kg/(m^2).      Patient will be contacted to schedule procedure.     Please be aware that coverage of these services is subject to the terms and limitations of your health insurance plan.  Call member services at your health plan with any benefit or coverage questions.  Any procedures must be performed at a [facility] facility OR coordinated by your clinic's referral office.    Please bring the following with you to your appointment:    (1) Any X-Rays, CTs or MRIs which have been performed.  Contact the facility where they were done to arrange for  prior to your scheduled appointment.    (2) List of current medications   (3) This referral request   (4) Any documents/labs given to you for this referral            MENTAL HEALTH REFERRAL  -  Adult; Psychiatry and Medication Management; Psychiatry; [facility]: Collaborative Care Psychiatry Service   [place] [phone]  Medication management & future refills will be returned to [facility] PCP upon compl...       All scheduling is subject to the client's specific insurance plan & benefits, provider/location availability, and provider clinical specialities.  Please arrive 15 minutes early for your first appointment and bring your completed paperwork.    Please be aware that coverage of these services is subject to the terms and limitations of your health insurance plan.  Call member services at your health plan with any benefit or coverage questions.                            Future tests that were ordered for you today     Open Future Orders        Priority Expected Expires Ordered    Holter Monitor 48 hour - Adult Routine  1/14/2018 11/30/2017    Echocardiogram Complete Routine  11/30/2018 11/30/2017    Fecal colorectal cancer screen (FIT) Routine 12/21/2017 2/22/2018 11/30/2017            Who to contact     If you have questions or need follow up information about today's clinic visit or your schedule please contact [facility] directly at [phone].  Normal or non-critical lab and imaging results will be communicated to you by Paxfirehart, letter or phone within 4 business days after the clinic has received the results. If you do not hear from us within 7 days, please contact the clinic through Paxfirehart or phone. If you have a critical or abnormal lab result, we will notify you by phone as soon as possible.  Submit refill requests through [employer]works or call your pharmacy and they will forward the refill request to us. Please allow 3 business days for your refill to be completed.          Additional Information About Your Visit        Paxfirehart Information     [employer]imeworks lets you send messages to your doctor, view your test results, renew your prescriptions, schedule appointments and more. To  "sign up, go to [email or web address] . Click on \"Log in\" on the left side of the screen, which will take you to the Welcome page. Then click on \"Sign up Now\" on the right side of the page.     You will be asked to enter the access code listed below, as well as some personal information. Please follow the directions to create your username and password.     Your access code is: [email or web address]  Expires: 2018 12:25 PM     Your access code will  in 90 days. If you need help or a new code, please call your [facility] clinic or [phone].        Care EveryWhere ID     This is your Care EveryWhere ID. This could be used by other organizations to access your [facility] medical records  [id number]        Your Vitals Were     Temperature Height Pulse Oximetry BMI (Body Mass Index)          97.5  F (36.4  C) (Tympanic) 5' 7\" (1.702 m) 99% 17.57 kg/m2         Blood Pressure from Last 3 Encounters:   17 138/88   17 116/70   17 132/62    Weight from Last 3 Encounters:   17 112 lb 3.2 oz (50.9 kg)   17 121 lb 12.8 oz (55.2 kg)   16 121 lb 3.2 oz (55 kg)              We Performed the Following     CBC with platelets differential     Comprehensive metabolic panel     EKG 12-lead complete w/read - Clinics     Ferritin     GASTROENTEROLOGY ADULT REF PROCEDURE ONLY     Hepatitis C antibody     HIV Antigen Antibody Combo     Lipase     MENTAL HEALTH REFERRAL  - Adult; Psychiatry and Medication Management; Psychiatry; [facility]: Collaborative Care Psychiatry Service   [place], [place] [phone]  Medication management & future refills will be returned to [facility] PCP upon compl...     Prealbumin     TSH with free T4 reflex        Primary Care Provider Office Phone # Fax #    BENNETT Marvin [phone] [address] [place] [id number]        Equal Access to Services     TOBY HADDAD AH: Delia Castañeda, uri cohen, chavezybta " barry [place]en[place] chiquimejia james mora ah. Renee [facility] [phone].    ATENCIÓN: Si raymond roth, tiene a feliz disposición servicios gratuitos de asistencia lingüística. Senthil al [phone].    We comply with applicable federal civil rights laws and [place] laws. We do not discriminate on the basis of race, color, national origin, age, disability, sex, sexual orientation, or gender identity.            Thank you!     Thank you for choosing [facility] [place]  for your care. Our goal is always to provide you with excellent care. Hearing back from our patients is one way we can continue to improve our services. Please take a few minutes to complete the written survey that you may receive in the mail after your visit with us. Thank you!             Your Updated Medication List - Protect others around you: Learn how to safely use, store and throw away your medicines at www.disposemymeds.org.          This list is accurate as of: 11/30/17 12:25 PM.  Always use your most recent med list.                   Brand Name Dispense Instructions for use Diagnosis    ADDERALL 10 MG per tablet   Generic drug:  amphetamine-dextroamphetamine      Take 10 mg by mouth daily        albuterol 108 (90 BASE) MCG/ACT Inhaler    PROAIR HFA/PROVENTIL HFA/VENTOLIN HFA    1 Inhaler    Inhale 2 puffs into the lungs 4 times daily    Chronic obstructive pulmonary disease with acute lower respiratory infection (H)       ALPRAZolam 1 MG 24 hr tablet    XANAX XR     Take 1 mg by mouth 2 times daily as needed        aspirin-acetaminophen-caffeine 250-250-65 MG per tablet    EXCEDRIN MIGRAINE     Take 1 tablet by mouth every 6 hours as needed for headaches        baclofen 10 MG tablet    LIORESAL    90 tablet    Take 1 tablet (10 mg) by mouth 4 times daily    Multiple sclerosis (H)       celecoxib 200 MG capsule    celeBREX    30 capsule    Take 1 capsule (200 mg) by mouth daily    Multiple sclerosis (H)       esomeprazole 40 MG  CR capsule    nexIUM    30 capsule    Take 1 capsule (40 mg) by mouth At Bedtime Take at night    Gastroesophageal reflux disease, esophagitis presence not specified       FERROUS FUMARATE PO           fluticasone-salmeterol 250-50 MCG/DOSE diskus inhaler    ADVAIR    1 Inhaler    Inhale 1 puff into the lungs 2 times daily    Chronic obstructive pulmonary disease with acute lower respiratory infection (H)       ipratropium - albuterol 0.5 mg/2.5 mg/3 mL 0.5-2.5 (3) MG/3ML neb solution    DUONEB    30 vial    Take 1 vial (3 mLs) by nebulization every 6 hours as needed for shortness of breath / dyspnea or wheezing    SOB (shortness of breath)       LAMOTRIGINE PO           multivitamin, therapeutic Tabs tablet      Take 1 tablet by mouth daily        * order for DME     1 Device    Equipment being ordered: Cane () Treatment Diagnosis: Multiple sclerosis    Multiple sclerosis (H)       * order for DME     1 each    Equipment being ordered: Nebulizer    SOB (shortness of breath)       PERCOCET  MG per tablet   Generic drug:  oxyCODONE-acetaminophen      Take 1 tablet by mouth every 6 hours as needed.        promethazine 25 MG tablet    PHENERGAN    56 tablet    Take 1 tablet (25 mg) by mouth every 6 hours as needed for nausea (takes with percocet to prevent nausea)    Multiple sclerosis (H)       rOPINIRole 2 MG tablet    REQUIP    30 tablet    Take 1 tablet (2 mg) by mouth See Admin Instructions Take 1 tablet in the morning and 2 tablets at bedtime daily.    Multiple sclerosis (H)       tiotropium 18 MCG capsule    SPIRIVA HANDIHALER    30 capsule    Inhale contents of one capsule daily.    Chronic obstructive pulmonary disease with acute lower respiratory infection (H)       varenicline 0.5 MG X 11 & 1 MG X 42 tablet    CHANTIX STARTING MONTH BREEZY    53 tablet    Take 0.5 mg tab daily for 3 days, then 0.5 mg tab twice daily for 4 days, then 1 mg twice daily.    Tobacco abuse       * Notice:  This list has 2  medication(s) that are the same as other medications prescribed for you. Read the directions carefully, and ask your doctor or other care provider to review them with you.

## 2017-11-30 NOTE — PATIENT INSTRUCTIONS
1. Cardiac testing: Echocardiogram (ultrasound) and 48 hour heart monitor. [phone]  2. I strongly recommend an endoscopy. They will call you to set it up.   3. Please see psychiatry. It is not appropriate to receive these meds from your neurologist without also having a psychiatrist. Number below.   3. I'll get back to you about labs. Please send in your stool test  4. Please re-start your inhalers.

## 2017-12-01 VITALS
WEIGHT: 112.2 LBS | OXYGEN SATURATION: 99 % | SYSTOLIC BLOOD PRESSURE: 138 MMHG | DIASTOLIC BLOOD PRESSURE: 88 MMHG | HEIGHT: 67 IN | HEART RATE: 42 BPM | BODY MASS INDEX: 17.61 KG/M2 | TEMPERATURE: 97.5 F

## 2017-12-01 LAB
ALBUMIN SERPL-MCNC: 4 G/DL (ref 3.4–5)
ALP SERPL-CCNC: 58 U/L (ref 40–150)
ALT SERPL W P-5'-P-CCNC: 19 U/L (ref 0–50)
ANION GAP SERPL CALCULATED.3IONS-SCNC: 7 MMOL/L (ref 3–14)
AST SERPL W P-5'-P-CCNC: 21 U/L (ref 0–45)
BILIRUB SERPL-MCNC: 0.3 MG/DL (ref 0.2–1.3)
BUN SERPL-MCNC: 17 MG/DL (ref 7–30)
CALCIUM SERPL-MCNC: 8.8 MG/DL (ref 8.5–10.1)
CHLORIDE SERPL-SCNC: 105 MMOL/L (ref 94–109)
CO2 SERPL-SCNC: 30 MMOL/L (ref 20–32)
CREAT SERPL-MCNC: 0.91 MG/DL (ref 0.52–1.04)
FERRITIN SERPL-MCNC: 38 NG/ML (ref 8–252)
GFR SERPL CREATININE-BSD FRML MDRD: 65 ML/MIN/1.7M2
GLUCOSE SERPL-MCNC: 99 MG/DL (ref 70–99)
HCV AB SERPL QL IA: NONREACTIVE
HIV 1+2 AB+HIV1 P24 AG SERPL QL IA: NONREACTIVE
POTASSIUM SERPL-SCNC: 3.8 MMOL/L (ref 3.4–5.3)
PREALB SERPL IA-MCNC: 32 MG/DL (ref 15–45)
PROT SERPL-MCNC: 7.1 G/DL (ref 6.8–8.8)
SODIUM SERPL-SCNC: 142 MMOL/L (ref 133–144)
TSH SERPL DL<=0.005 MIU/L-ACNC: 3.46 MU/L (ref 0.4–4)

## 2018-06-05 ENCOUNTER — TELEPHONE (OUTPATIENT)
Dept: PEDIATRICS | Facility: CLINIC | Age: 53
End: 2018-06-05

## 2018-06-05 NOTE — TELEPHONE ENCOUNTER
6/5/2018    Call Regarding Preventive Health Screening Colonoscopy    Attempt 1    Message on voicemail     Comments:       Outreach   CC

## 2018-08-25 ENCOUNTER — HOSPITAL ENCOUNTER (EMERGENCY)
Facility: CLINIC | Age: 53
Discharge: HOME | End: 2018-08-25
Attending: EMERGENCY MEDICINE | Admitting: EMERGENCY MEDICINE
Payer: MEDICAID

## 2018-08-25 ENCOUNTER — APPOINTMENT (OUTPATIENT)
Dept: CT IMAGING | Facility: CLINIC | Age: 53
End: 2018-08-25
Attending: EMERGENCY MEDICINE
Payer: MEDICAID

## 2018-08-25 VITALS
OXYGEN SATURATION: 98 % | DIASTOLIC BLOOD PRESSURE: 70 MMHG | RESPIRATION RATE: 18 BRPM | SYSTOLIC BLOOD PRESSURE: 110 MMHG | HEART RATE: 70 BPM | TEMPERATURE: 97.5 F

## 2018-08-25 DIAGNOSIS — R06.02 SHORTNESS OF BREATH: ICD-10-CM

## 2018-08-25 DIAGNOSIS — R07.9 CHEST PAIN, UNSPECIFIED TYPE: ICD-10-CM

## 2018-08-25 DIAGNOSIS — J44.1 COPD EXACERBATION (H): ICD-10-CM

## 2018-08-25 LAB
ALBUMIN SERPL-MCNC: 4.4 G/DL (ref 3.4–5)
ALP SERPL-CCNC: 70 U/L (ref 40–150)
ALT SERPL W P-5'-P-CCNC: 22 U/L (ref 0–50)
ANION GAP SERPL CALCULATED.3IONS-SCNC: 12 MMOL/L (ref 3–14)
AST SERPL W P-5'-P-CCNC: 21 U/L (ref 0–45)
BASOPHILS # BLD AUTO: 0.1 10E9/L (ref 0–0.2)
BASOPHILS NFR BLD AUTO: 0.9 %
BILIRUB SERPL-MCNC: 0.5 MG/DL (ref 0.2–1.3)
BUN SERPL-MCNC: 23 MG/DL (ref 7–30)
CALCIUM SERPL-MCNC: 9.8 MG/DL (ref 8.5–10.1)
CHLORIDE SERPL-SCNC: 104 MMOL/L (ref 94–109)
CO2 SERPL-SCNC: 23 MMOL/L (ref 20–32)
CREAT SERPL-MCNC: 1.03 MG/DL (ref 0.52–1.04)
DIFFERENTIAL METHOD BLD: NORMAL
EOSINOPHIL # BLD AUTO: 0.2 10E9/L (ref 0–0.7)
EOSINOPHIL NFR BLD AUTO: 2 %
ERYTHROCYTE [DISTWIDTH] IN BLOOD BY AUTOMATED COUNT: 12.9 % (ref 10–15)
GFR SERPL CREATININE-BSD FRML MDRD: 56 ML/MIN/1.7M2
GLUCOSE SERPL-MCNC: 100 MG/DL (ref 70–99)
HCT VFR BLD AUTO: 46.4 % (ref 35–47)
HGB BLD-MCNC: 15.7 G/DL (ref 11.7–15.7)
IMM GRANULOCYTES # BLD: 0 10E9/L (ref 0–0.4)
IMM GRANULOCYTES NFR BLD: 0.2 %
INR PPP: 0.95 (ref 0.86–1.14)
LIPASE SERPL-CCNC: 125 U/L (ref 73–393)
LYMPHOCYTES # BLD AUTO: 3.6 10E9/L (ref 0.8–5.3)
LYMPHOCYTES NFR BLD AUTO: 35.9 %
MCH RBC QN AUTO: 30.5 PG (ref 26.5–33)
MCHC RBC AUTO-ENTMCNC: 33.8 G/DL (ref 31.5–36.5)
MCV RBC AUTO: 90 FL (ref 78–100)
MONOCYTES # BLD AUTO: 0.6 10E9/L (ref 0–1.3)
MONOCYTES NFR BLD AUTO: 5.6 %
NEUTROPHILS # BLD AUTO: 5.6 10E9/L (ref 1.6–8.3)
NEUTROPHILS NFR BLD AUTO: 55.4 %
NRBC # BLD AUTO: 0 10*3/UL
NRBC BLD AUTO-RTO: 0 /100
NT-PROBNP SERPL-MCNC: 227 PG/ML (ref 0–900)
PLATELET # BLD AUTO: 380 10E9/L (ref 150–450)
POTASSIUM SERPL-SCNC: 3.7 MMOL/L (ref 3.4–5.3)
PROT SERPL-MCNC: 8.3 G/DL (ref 6.8–8.8)
RBC # BLD AUTO: 5.15 10E12/L (ref 3.8–5.2)
SODIUM SERPL-SCNC: 139 MMOL/L (ref 133–144)
TROPONIN I BLD-MCNC: 0.02 UG/L (ref 0–0.1)
TROPONIN I SERPL-MCNC: <0.015 UG/L (ref 0–0.04)
WBC # BLD AUTO: 10.2 10E9/L (ref 4–11)

## 2018-08-25 PROCEDURE — 94640 AIRWAY INHALATION TREATMENT: CPT

## 2018-08-25 PROCEDURE — 71260 CT THORAX DX C+: CPT

## 2018-08-25 PROCEDURE — 96372 THER/PROPH/DIAG INJ SC/IM: CPT | Mod: 59

## 2018-08-25 PROCEDURE — 96361 HYDRATE IV INFUSION ADD-ON: CPT

## 2018-08-25 PROCEDURE — 85610 PROTHROMBIN TIME: CPT | Performed by: EMERGENCY MEDICINE

## 2018-08-25 PROCEDURE — 84484 ASSAY OF TROPONIN QUANT: CPT | Performed by: EMERGENCY MEDICINE

## 2018-08-25 PROCEDURE — 36415 COLL VENOUS BLD VENIPUNCTURE: CPT | Performed by: EMERGENCY MEDICINE

## 2018-08-25 PROCEDURE — 83690 ASSAY OF LIPASE: CPT | Performed by: EMERGENCY MEDICINE

## 2018-08-25 PROCEDURE — 96360 HYDRATION IV INFUSION INIT: CPT

## 2018-08-25 PROCEDURE — 84484 ASSAY OF TROPONIN QUANT: CPT

## 2018-08-25 PROCEDURE — 85025 COMPLETE CBC W/AUTO DIFF WBC: CPT | Performed by: EMERGENCY MEDICINE

## 2018-08-25 PROCEDURE — 25000128 H RX IP 250 OP 636: Performed by: EMERGENCY MEDICINE

## 2018-08-25 PROCEDURE — 25000125 ZZHC RX 250: Performed by: EMERGENCY MEDICINE

## 2018-08-25 PROCEDURE — 99285 EMERGENCY DEPT VISIT HI MDM: CPT | Mod: 25

## 2018-08-25 PROCEDURE — 93005 ELECTROCARDIOGRAM TRACING: CPT

## 2018-08-25 PROCEDURE — 87040 BLOOD CULTURE FOR BACTERIA: CPT | Performed by: EMERGENCY MEDICINE

## 2018-08-25 PROCEDURE — 96374 THER/PROPH/DIAG INJ IV PUSH: CPT

## 2018-08-25 PROCEDURE — 80053 COMPREHEN METABOLIC PANEL: CPT | Performed by: EMERGENCY MEDICINE

## 2018-08-25 PROCEDURE — 40000275 ZZH STATISTIC RCP TIME EA 10 MIN

## 2018-08-25 PROCEDURE — 83880 ASSAY OF NATRIURETIC PEPTIDE: CPT | Performed by: EMERGENCY MEDICINE

## 2018-08-25 PROCEDURE — 25000132 ZZH RX MED GY IP 250 OP 250 PS 637: Performed by: EMERGENCY MEDICINE

## 2018-08-25 RX ORDER — KETOROLAC TROMETHAMINE 30 MG/ML
30 INJECTION, SOLUTION INTRAMUSCULAR; INTRAVENOUS ONCE
Status: COMPLETED | OUTPATIENT
Start: 2018-08-25 | End: 2018-08-25

## 2018-08-25 RX ORDER — ASPIRIN 81 MG/1
324 TABLET, CHEWABLE ORAL ONCE
Status: COMPLETED | OUTPATIENT
Start: 2018-08-25 | End: 2018-08-25

## 2018-08-25 RX ORDER — IPRATROPIUM BROMIDE AND ALBUTEROL SULFATE 2.5; .5 MG/3ML; MG/3ML
3 SOLUTION RESPIRATORY (INHALATION)
Status: DISPENSED | OUTPATIENT
Start: 2018-08-25 | End: 2018-08-25

## 2018-08-25 RX ORDER — KETOROLAC TROMETHAMINE 30 MG/ML
30 INJECTION, SOLUTION INTRAMUSCULAR; INTRAVENOUS ONCE
Status: DISCONTINUED | OUTPATIENT
Start: 2018-08-25 | End: 2018-08-25 | Stop reason: CLARIF

## 2018-08-25 RX ORDER — ACETAMINOPHEN AND CODEINE PHOSPHATE 300; 30 MG/1; MG/1
1 TABLET ORAL EVERY 6 HOURS PRN
Qty: 8 TABLET | Refills: 0 | Status: ON HOLD | OUTPATIENT
Start: 2018-08-25 | End: 2018-12-04

## 2018-08-25 RX ORDER — SODIUM CHLORIDE 9 MG/ML
1000 INJECTION, SOLUTION INTRAVENOUS CONTINUOUS
Status: DISCONTINUED | OUTPATIENT
Start: 2018-08-25 | End: 2018-08-25

## 2018-08-25 RX ORDER — IOPAMIDOL 755 MG/ML
500 INJECTION, SOLUTION INTRAVASCULAR ONCE
Status: COMPLETED | OUTPATIENT
Start: 2018-08-25 | End: 2018-08-25

## 2018-08-25 RX ORDER — IPRATROPIUM BROMIDE AND ALBUTEROL SULFATE 2.5; .5 MG/3ML; MG/3ML
3 SOLUTION RESPIRATORY (INHALATION)
Status: COMPLETED | OUTPATIENT
Start: 2018-08-25 | End: 2018-08-25

## 2018-08-25 RX ORDER — AZITHROMYCIN 250 MG/1
TABLET, FILM COATED ORAL
Qty: 6 TABLET | Refills: 0 | Status: SHIPPED | OUTPATIENT
Start: 2018-08-25 | End: 2018-08-30

## 2018-08-25 RX ORDER — PREDNISONE 20 MG/1
TABLET ORAL
Qty: 10 TABLET | Refills: 0 | Status: SHIPPED | OUTPATIENT
Start: 2018-08-25 | End: 2018-12-03

## 2018-08-25 RX ADMIN — IPRATROPIUM BROMIDE AND ALBUTEROL SULFATE 3 ML: .5; 3 SOLUTION RESPIRATORY (INHALATION) at 15:58

## 2018-08-25 RX ADMIN — IOPAMIDOL 55 ML: 755 INJECTION, SOLUTION INTRAVENOUS at 15:17

## 2018-08-25 RX ADMIN — SODIUM CHLORIDE 1000 ML: 9 INJECTION, SOLUTION INTRAVENOUS at 14:17

## 2018-08-25 RX ADMIN — IPRATROPIUM BROMIDE AND ALBUTEROL SULFATE 3 ML: .5; 3 SOLUTION RESPIRATORY (INHALATION) at 14:00

## 2018-08-25 RX ADMIN — ASPIRIN 81 MG 324 MG: 81 TABLET ORAL at 14:17

## 2018-08-25 RX ADMIN — KETOROLAC TROMETHAMINE 30 MG: 30 INJECTION, SOLUTION INTRAMUSCULAR at 17:11

## 2018-08-25 RX ADMIN — IPRATROPIUM BROMIDE AND ALBUTEROL SULFATE 3 ML: .5; 3 SOLUTION RESPIRATORY (INHALATION) at 13:59

## 2018-08-25 RX ADMIN — SODIUM CHLORIDE 72 ML: 900 INJECTION, SOLUTION INTRAVENOUS at 15:17

## 2018-08-25 RX ADMIN — IPRATROPIUM BROMIDE AND ALBUTEROL SULFATE 3 ML: .5; 3 SOLUTION RESPIRATORY (INHALATION) at 13:40

## 2018-08-25 ASSESSMENT — ENCOUNTER SYMPTOMS
FEVER: 0
NAUSEA: 0
DIARRHEA: 0
VOMITING: 0
SHORTNESS OF BREATH: 1
COUGH: 0
ABDOMINAL PAIN: 0

## 2018-08-25 NOTE — ED AVS SNAPSHOT
[facility] Emergency Department    [address] [place] [address]    Phone:  [phone]    Fax:  [phone]                                       Hina Yap   MRN: [id number]    Department:  [facility] Emergency Department   Date of Visit:  8/25/2018           Patient Information     Date Of Birth          1965        Your diagnoses for this visit were:     Shortness of breath     Chest pain, unspecified type     COPD exacerbation (H)        You were seen by Anson Bernstein MD.      Follow-up Information     Follow up with Sunshine Butterfield APRN CNP. Schedule an appointment as soon as possible for a visit in 3 days.    Specialty:  Nurse Practitioner    Contact information:    [facility] [place] [address]  [phone]          Discharge Instructions       Discharge Instructions  Chest Pain    You have been seen today for chest pain or discomfort.  At this time, your doctor has found no signs that your chest pain is due to a serious or life-threatening condition, (or you have declined more testing and/or admission to the hospital). However, sometimes there is a serious problem that does not show up right away. Your evaluation today may not be complete and you may need further testing and evaluation.     You need to follow-up with your regular doctor within 3 days.    Return to the Emergency Department if:    Your chest pain changes, gets worse, starts to happen more often, or comes with less activity.    You are short of breath.    You get very weak or tired.    You pass out or faint.    You have any new symptoms, like fever, cough, numb legs, or you cough up blood.    You have anything else that worries you.    Until you follow-up with your regular doctor please do the following:    Take one aspirin daily unless you have an allergy or are told not to by your doctor.    If a stress test appointment has been made, go to the  appointment.    If you have questions, contact your regular doctor.    If your doctor today has told you to follow-up with your regular doctor, it is very important that you make an appointment with your clinic and go to the appointment.  If you do not follow-up with your primary doctor, it may result in missing an important development which could result in permanent injury or disability and/or lasting pain.  If there is any problem keeping your appointment, call your doctor or return to the Emergency Department.    If you were given a prescription for medicine here today, be sure to read all of the information (including the package insert) that comes with your prescription.  This will include important information about the medicine, its side effects, and any warnings that you need to know about.  The pharmacist who fills the prescription can provide more information and answer questions you may have about the medicine.  If you have questions or concerns that the pharmacist cannot address, please call or return to the Emergency Department.     Opioid Medication Information    Pain medications are among the most commonly prescribed medicines, so we are including this information for all our patients. If you did not receive pain medication or get a prescription for pain medicine, you can ignore it.     You may have been given a prescription for an opioid (narcotic) pain medicine and/or have received a pain medicine while here in the Emergency Department. These medicines can make you drowsy or impaired. You must not drive, operate dangerous equipment, or engage in any other dangerous activities while taking these medications. If you drive while taking these medications, you could be arrested for DUI, or driving under the influence. Do not drink any alcohol while you are taking these medications.     Opioid pain medications can cause addiction. If you have a history of chemical dependency of any type, you are at a  higher risk of becoming addicted to pain medications.  Only take these prescribed medications to treat your pain when all other options have been tried. Take it for as short a time and as few doses as possible. Store your pain pills in a secure place, as they are frequently stolen and provide a dangerous opportunity for children or visitors in your house to start abusing these powerful medications. We will not replace any lost or stolen medicine.  As soon as your pain is better, you should flush all your remaining medication.     Many prescription pain medications contain Tylenol  (acetaminophen), including Vicodin , Tylenol #3 , Norco , Lortab , and Percocet .  You should not take any extra pills of Tylenol  if you are using these prescription medications or you can get very sick.  Do not ever take more than 3000 mg of acetaminophen in any 24 hour period.    All opioids tend to cause constipation. Drink plenty of water and eat foods that have a lot of fiber, such as fruits, vegetables, prune juice, apple juice and high fiber cereal.  Take a laxative if you don t move your bowels at least every other day. Miralax , Milk of Magnesia, Colace , or Senna  can be used to keep you regular.      Remember that you can always come back to the Emergency Department if you are not able to see your regular doctor in the amount of time listed above, if you get any new symptoms, or if there is anything that worries you.          24 Hour Appointment Hotline       To make an appointment at any [facility], call [phone] ([phone]). If you don't have a family doctor or clinic, we will help you find one. [place] clinics are conveniently located to serve the needs of you and your family.             Review of your medicines      START taking        Dose / Directions Last dose taken    acetaminophen-codeine 300-30 MG per tablet   Commonly known as:  TYLENOL WITH CODEINE #3   Dose:  1 tablet   Quantity:  8 tablet         Take 1 tablet by mouth every 6 hours as needed for pain   Refills:  0        azithromycin 250 MG tablet   Commonly known as:  ZITHROMAX Z-BREEZY   Quantity:  6 tablet        Two tablets on the first day, then one tablet daily for the next 4 days   Refills:  0        predniSONE 20 MG tablet   Commonly known as:  DELTASONE   Quantity:  10 tablet        Take two tablets (= 40mg) each day for 5 (five) days   Refills:  0          Our records show that you are taking the medicines listed below. If these are incorrect, please call your family doctor or clinic.        Dose / Directions Last dose taken    ADDERALL 10 MG per tablet   Dose:  10 mg   Generic drug:  amphetamine-dextroamphetamine        Take 10 mg by mouth daily   Refills:  0        albuterol 108 (90 Base) MCG/ACT inhaler   Commonly known as:  PROAIR HFA/PROVENTIL HFA/VENTOLIN HFA   Dose:  2 puff   Quantity:  1 Inhaler        Inhale 2 puffs into the lungs 4 times daily   Refills:  1        ALPRAZolam 1 MG 24 hr tablet   Commonly known as:  XANAX XR   Dose:  1 mg        Take 1 mg by mouth 2 times daily as needed   Refills:  0        aspirin-acetaminophen-caffeine 250-250-65 MG per tablet   Commonly known as:  EXCEDRIN MIGRAINE   Dose:  1 tablet        Take 1 tablet by mouth every 6 hours as needed for headaches   Refills:  0        baclofen 10 MG tablet   Commonly known as:  LIORESAL   Dose:  10 mg   Quantity:  90 tablet        Take 1 tablet (10 mg) by mouth 4 times daily   Refills:  0        celecoxib 200 MG capsule   Commonly known as:  celeBREX   Dose:  200 mg   Quantity:  30 capsule        Take 1 capsule (200 mg) by mouth daily   Refills:  0        esomeprazole 40 MG CR capsule   Commonly known as:  nexIUM   Dose:  40 mg   Quantity:  30 capsule        Take 1 capsule (40 mg) by mouth At Bedtime Take at night   Refills:  0        FERROUS FUMARATE PO        Refills:  0        fluticasone-salmeterol 250-50 MCG/DOSE diskus inhaler   Commonly known as:  ADVAIR   Dose:   1 puff   Quantity:  1 Inhaler        Inhale 1 puff into the lungs 2 times daily   Refills:  2        ipratropium - albuterol 0.5 mg/2.5 mg/3 mL 0.5-2.5 (3) MG/3ML neb solution   Commonly known as:  DUONEB   Dose:  1 vial   Quantity:  30 vial        Take 1 vial (3 mLs) by nebulization every 6 hours as needed for shortness of breath / dyspnea or wheezing   Refills:  1        LAMOTRIGINE PO        Refills:  0        multivitamin, therapeutic Tabs tablet   Dose:  1 tablet        Take 1 tablet by mouth daily   Refills:  0        * order for DME   Quantity:  1 Device        Equipment being ordered: Cane () Treatment Diagnosis: Multiple sclerosis   Refills:  0        * order for DME   Quantity:  1 each        Equipment being ordered: Nebulizer   Refills:  0        PERCOCET  MG per tablet   Dose:  1 tablet   Generic drug:  oxyCODONE-acetaminophen        Take 1 tablet by mouth every 6 hours as needed.   Refills:  0        promethazine 25 MG tablet   Commonly known as:  PHENERGAN   Dose:  25 mg   Quantity:  56 tablet        Take 1 tablet (25 mg) by mouth every 6 hours as needed for nausea (takes with percocet to prevent nausea)   Refills:  0        rOPINIRole 2 MG tablet   Commonly known as:  REQUIP   Dose:  2 mg   Quantity:  30 tablet        Take 1 tablet (2 mg) by mouth See Admin Instructions Take 1 tablet in the morning and 2 tablets at bedtime daily.   Refills:  0        tiotropium 18 MCG capsule   Commonly known as:  SPIRIVA HANDIHALER   Quantity:  30 capsule        Inhale contents of one capsule daily.   Refills:  0        varenicline 0.5 MG X 11 & 1 MG X 42 tablet   Commonly known as:  CHANTIX STARTING MONTH BREEZY   Quantity:  53 tablet        Take 0.5 mg tab daily for 3 days, then 0.5 mg tab twice daily for 4 days, then 1 mg twice daily.   Refills:  0        * Notice:  This list has 2 medication(s) that are the same as other medications prescribed for you. Read the directions carefully, and ask your doctor or  other care provider to review them with you.            Information about OPIOIDS     PRESCRIPTION OPIOIDS: WHAT YOU NEED TO KNOW   We gave you an opioid (narcotic) pain medicine. It is important to manage your pain, but opioids are not always the best choice. You should first try all the other options your care team gave you. Take this medicine for as short a time (and as few doses) as possible.    Some activities can increase your pain, such as bandage changes or therapy sessions. It may help to take your pain medicine 30 to 60 minutes before these activities. Reduce your stress by getting enough sleep, working on hobbies you enjoy and practicing relaxation or meditation. Talk to your care team about ways to manage your pain beyond prescription opioids.    These medicines have risks:    DO NOT drive when on new or higher doses of pain medicine. These medicines can affect your alertness and reaction times, and you could be arrested for driving under the influence (DUI). If you need to use opioids long-term, talk to your care team about driving.    DO NOT operate heavy machinery    DO NOT do any other dangerous activities while taking these medicines.    DO NOT drink any alcohol while taking these medicines.     If the opioid prescribed includes acetaminophen, DO NOT take with any other medicines that contain acetaminophen. Read all labels carefully. Look for the word  acetaminophen  or  Tylenol.  Ask your pharmacist if you have questions or are unsure.    You can get addicted to pain medicines, especially if you have a history of addiction (chemical, alcohol or substance dependence). Talk to your care team about ways to reduce this risk.    All opioids tend to cause constipation. Drink plenty of water and eat foods that have a lot of fiber, such as fruits, vegetables, prune juice, apple juice and high-fiber cereal. Take a laxative (Miralax, milk of magnesia, Colace, Senna) if you don t move your bowels at least  every other day. Other side effects include upset stomach, sleepiness, dizziness, throwing up, tolerance (needing more of the medicine to have the same effect), physical dependence and slowed breathing.    Store your pills in a secure place, locked if possible. We will not replace any lost or stolen medicine. If you don t finish your medicine, please throw away (dispose) as directed by your pharmacist. The [place] [employer] has more information about safe disposal: https://www.pca.[email or web address].[place].[email or web address]/living-green/[email or web address]-unwanted-medications        Prescriptions were sent or printed at these locations (3 Prescriptions)                   Other Prescriptions                Printed at Department/Unit printer (3 of 3)         acetaminophen-codeine (TYLENOL WITH CODEINE #3) 300-30 MG per tablet               azithromycin (ZITHROMAX Z-BREEZY) 250 MG tablet               predniSONE (DELTASONE) 20 MG tablet                Procedures and tests performed during your visit     Procedure/Test Number of Times Performed    Blood culture 2    CBC with platelets differential 1    CT Chest Pulmonary Embolism w Contrast 1    Cardiac Continuous Monitoring 1    Comprehensive metabolic panel 1    EKG 12 lead 1    INR 1    ISTAT troponin nursing POCT 1    Lipase 1    Nt probnp inpatient (BNP) 1    Peripheral IV: Standard 1    Pulse oximetry nursing 1    Review medications with patient 1    Troponin I (now) 1    Troponin POCT 1    Vital signs 1      Orders Needing Specimen Collection     None      Pending Results     Date and Time Order Name Status Description    8/25/2018 1412 Blood culture In process     8/25/2018 1340 Blood culture Preliminary     8/25/2018 1318 EKG 12 lead Preliminary             Pending Culture Results     Date and Time Order Name Status Description    8/25/2018 1412 Blood culture In process     8/25/2018 1340 Blood culture Preliminary             Pending Results Instructions     If you had any lab results  that were not finalized at the time of your Discharge, you can call the ED Lab Result RN at [phone]. You will be contacted by this team for any positive Lab results or changes in treatment. The nurses are available 7 days a week from 10A to 6:30P.  You can leave a message 24 hours per day and they will return your call.        Test Results From Your Hospital Stay        8/25/2018  2:26 PM      Component Results     Component Value Ref Range & Units Status    WBC 10.2 4.0 - 11.0 10e9/L Final    RBC Count 5.15 3.8 - 5.2 10e12/L Final    Hemoglobin 15.7 11.7 - 15.7 g/dL Final    Hematocrit 46.4 35.0 - 47.0 % Final    MCV 90 78 - 100 fl Final    MCH 30.5 26.5 - 33.0 pg Final    MCHC 33.8 31.5 - 36.5 g/dL Final    RDW 12.9 10.0 - 15.0 % Final    Platelet Count 380 150 - 450 10e9/L Final    Diff Method Automated Method  Final    % Neutrophils 55.4 % Final    % Lymphocytes 35.9 % Final    % Monocytes 5.6 % Final    % Eosinophils 2.0 % Final    % Basophils 0.9 % Final    % Immature Granulocytes 0.2 % Final    Nucleated RBCs 0 0 /100 Final    Absolute Neutrophil 5.6 1.6 - 8.3 10e9/L Final    Absolute Lymphocytes 3.6 0.8 - 5.3 10e9/L Final    Absolute Monocytes 0.6 0.0 - 1.3 10e9/L Final    Absolute Eosinophils 0.2 0.0 - 0.7 10e9/L Final    Absolute Basophils 0.1 0.0 - 0.2 10e9/L Final    Abs Immature Granulocytes 0.0 0 - 0.4 10e9/L Final    Absolute Nucleated RBC 0.0  Final         8/25/2018  2:34 PM      Component Results     Component Value Ref Range & Units Status    INR 0.95 0.86 - 1.14 Final         8/25/2018  2:43 PM      Component Results     Component Value Ref Range & Units Status    Sodium 139 133 - 144 mmol/L Final    Potassium 3.7 3.4 - 5.3 mmol/L Final    Chloride 104 94 - 109 mmol/L Final    Carbon Dioxide 23 20 - 32 mmol/L Final    Anion Gap 12 3 - 14 mmol/L Final    Glucose 100 (H) 70 - 99 mg/dL Final    Urea Nitrogen 23 7 - 30 mg/dL Final    Creatinine 1.03 0.52 - 1.04 mg/dL Final    GFR Estimate 56 (L)  >60 mL/min/1.7m2 Final    Non  GFR Calc    GFR Estimate If Black 68 >60 mL/min/1.7m2 Final    African American GFR Calc    Calcium 9.8 8.5 - 10.1 mg/dL Final    Bilirubin Total 0.5 0.2 - 1.3 mg/dL Final    Albumin 4.4 3.4 - 5.0 g/dL Final    Protein Total 8.3 6.8 - 8.8 g/dL Final    Alkaline Phosphatase 70 40 - 150 U/L Final    ALT 22 0 - 50 U/L Final    AST 21 0 - 45 U/L Final         8/25/2018  2:43 PM      Component Results     Component Value Ref Range & Units Status    Lipase 125 73 - 393 U/L Final         8/25/2018  2:43 PM      Component Results     Component Value Ref Range & Units Status    N-Terminal Pro BNP Inpatient 227 0 - 900 pg/mL Final       Reference range shown and results flagged as abnormal are suggested inpatient   cut points for confirming diagnosis if CHF in an acute setting. Establishing a   baseline value for each individual patient is useful for follow-up. An   inpatient or emergency department NT-proPBNP <300 pg/mL effectively rules out   acute CHF, with 99% negative predictive value.  The outpatient non-acute reference range for ruling out CHF is:   0-125 pg/mL (age 18 to less than 75)   0-450 pg/mL (age 75 yrs and older)           8/25/2018  4:05 PM      Narrative     CT CHEST PULMONARY EMBOLISM WITH CONTRAST  8/25/2018 3:27 PM     HISTORY: Chest pain and shortness of breath for 2 to 3 days.    TECHNIQUE: Helical axial scans from lung apices through lung bases  with 55 mL Isovue-370 IV contrast. Radiation dose for this scan was  reduced using automated exposure control, adjustment of the mA and/or  kV according to patient size, or iterative reconstruction technique.    COMPARISON: 11/25/2015.    FINDINGS: There is no CT evidence for pulmonary embolism or other  acute vascular abnormality of the chest. Mild vascular calcifications  are seen. The mediastinal and hilar structures are unremarkable.  Minimal platelike atelectasis lung bases bilaterally. Lungs are  otherwise  clear. Visualized upper abdomen shows no abnormality.        Impression     IMPRESSION:  1. No evidence for pulmonary embolism.  2. Mild vascular calcifications.    DAMEON FUNK MD         8/25/2018  4:43 PM      Component Results     Component    Specimen Description    Blood Right Arm    Special Requests    Aerobic and anaerobic bottles received    Culture Micro    PENDING         8/25/2018  3:20 PM         8/25/2018  2:17 PM      Component Results     Component Value Ref Range & Units Status    Troponin I 0.02 0.00 - 0.10 ug/L Final         8/25/2018  5:12 PM      Component Results     Component Value Ref Range & Units Status    Troponin I ES <0.015 0.000 - 0.045 ug/L Final    The 99th percentile for upper reference range is 0.045 ug/L.  Troponin values   in the range of 0.045 - 0.120 ug/L may be associated with risks of adverse   clinical events.                  Clinical Quality Measure: Blood Pressure Screening     Your blood pressure was checked while you were in the emergency department today. The last reading we obtained was  BP: 106/78 . Please read the guidelines below about what these numbers mean and what you should do about them.  If your systolic blood pressure (the top number) is less than 120 and your diastolic blood pressure (the bottom number) is less than 80, then your blood pressure is normal. There is nothing more that you need to do about it.  If your systolic blood pressure (the top number) is 120-139 or your diastolic blood pressure (the bottom number) is 80-89, your blood pressure may be higher than it should be. You should have your blood pressure rechecked within a year by a primary care provider.  If your systolic blood pressure (the top number) is 140 or greater or your diastolic blood pressure (the bottom number) is 90 or greater, you may have high blood pressure. High blood pressure is treatable, but if left untreated over time it can put you at risk for heart attack, stroke, or  "kidney failure. You should have your blood pressure rechecked by a primary care provider within the next 4 weeks.  If your provider in the emergency department today gave you specific instructions to follow-up with your doctor or provider even sooner than that, you should follow that instruction and not wait for up to 4 weeks for your follow-up visit.        Thank you for choosing [facility]       Thank you for choosing [facility] for your care. Our goal is always to provide you with excellent care. Hearing back from our patients is one way we can continue to improve our services. Please take a few minutes to complete the written survey that you may receive in the mail after you visit with us. Thank you!        [employer] Information     [employer] lets you send messages to your doctor, view your test results, renew your prescriptions, schedule appointments and more. To sign up, go to [email or web address] . Click on \"Log in\" on the left side of the screen, which will take you to the Welcome page. Then click on \"Sign up Now\" on the right side of the page.     You will be asked to enter the access code listed below, as well as some personal information. Please follow the directions to create your username and password.     Your access code is: [email or web address]  Expires: 2018  5:31 PM     Your access code will  in 90 days. If you need help or a new code, please call your [facility] clinic or [phone].        Care EveryWhere ID     This is your Care EveryWhere ID. This could be used by other organizations to access your [facility] medical records  [id number]        Equal Access to Services     [facility] AH: Hadii haris andersono Sokurtisali, waaxda luqadaha, qaybta kaalmada adeegyarichard, barry arambula. So [facility] [phone].    ATENCIÓN: Si habla español, tiene a feliz disposición servicios gratuitos de asistencia lingüística. Llame al [phone].    We comply with applicable federal civil rights " laws and [place] laws. We do not discriminate on the basis of race, color, national origin, age, disability, sex, sexual orientation, or gender identity.            After Visit Summary       This is your record. Keep this with you and show to your community pharmacist(s) and doctor(s) at your next visit.

## 2018-08-25 NOTE — ED PROVIDER NOTES
History     Chief Complaint:  Shortness of Breath      HPI   Hina Yap is a 52 year old female who presents with shortness of breath. The has been experiencing increasing left-sided chest pain, along with shortness of breath for the past 2-3 days. The patient has pain with deep breathing. Today the pain is the worse. The patient denies any cough, nausea or vomiting, diarrhea, fever, abdominal pain, or recent travels. The pain is not positional and there is no known trauma.    Allergies:  Sulfa Drugs  Adhesive Tape  Wellbutrin [Bupropion Hydrobromide]     Medications:    Albuterol   Xanax XR  Amphetamine-dextroamphetamine   Baclofen   Celecoxib   Esomeprazole  Advair  Duoneb  Lamotrigine PO  Oxycodone-acetaminophen  Promethazine   ropinirole  Tiotropium   Varenicline     Past Medical History:    Anxiety   COPD (chronic obstructive pulmonary disease) (H)   GERD (gastroesophageal reflux disease)   Neuromuscular disorder (H)   Restless leg syndrome   Tobacco use disorder     Past Surgical History:    The patient does not have any pertinent past surgical history.    Family History:    No past pertinent family history.    Social History:  Marital Status:  Single [1]  Smoking status: Former Smoker  Alcohol use: No    Review of Systems   Constitutional: Negative for fever.   Respiratory: Positive for shortness of breath. Negative for cough.    Cardiovascular: Positive for chest pain.   Gastrointestinal: Negative for abdominal pain, diarrhea, nausea and vomiting.   All other systems reviewed and are negative.        Physical Exam   First Vitals:  BP: 132/87  Pulse: 86  Heart Rate: 84  Temp: 97.5  F (36.4  C)  Resp: 26  SpO2: 92 %      Physical Exam  General: The patient has moderate respiratory distress.    HENT: Mucous membranes moist.    Cardiovascular: Regular rate and rhythm. Good pulses in all four extremities. Normal capillary refill and skin turgor.     Respiratory: Lungs are clear. No nasal flaring. No  "retractions. No wheezing, no crackles. Tachypnic . Has a \"breathy\" voice.    Gastrointestinal: Abdomen soft. No guarding, no rebound. No palpable hernias. No abdominal tenderness.    Musculoskeletal: No gross deformity.     Skin: No rashes or petechiae.     Neurologic: The patient is alert and oriented x3. GCS 15. No testable cranial nerve deficit. Follows commands with clear and appropriate speech. Gives appropriate answers. Good strength in all extremities. No gross neurologic deficit. Gross sensation intact. Pupils are round and reactive. No meningismus.     Lymphatic: No cervical adenopathy. No lower extremity swelling.    Psychiatric: The patient is non-tearful.      Emergency Department Course   ECG:  Indication: shortness of breath  Time: 1323  Vent. Rate 96 bpm. DE interval 122. QRS duration 94. QT/QTc 380/480. P-R-T axis 74 71 -41. Normal sinus rhythm. T wave inversion. Inferior lateral present in 2016.   Read time: 1326    Imaging:  Radiographic findings were communicated with the patient who voiced understanding of the findings.  CT Chest w/ IV contrast - PE protocol:   1. No evidence for pulmonary embolism.  2. Mild vascular calcifications. As per radiology.     Laboratory:  Blood culture: (In process)    Blood culture: (In process)    CBC: WBC: 10.2, HGB: 15.7, PLT: 380    CMP: Glucose 100, GFR Estimate 56, o/w WNL (Creatinine: 1.03)    INR: 0.95    Lipase: 125    Nt probnp inpatient:227    1406 Troponin POCT: 0.02    1637 Troponin: <0.015    Interventions:  1400 Duoneb 3 mL nebulization   1340 Duoneb 3 mL nebulization  1359 Duoneb 3 mL nebulization  1558 Duoneb 3 mL nebulization   1711 Toradol 30 mg IV  1417 Aspirin 324 mg PO  1417 NS 1L IV Bolus   1517 NS 72 mL IV Bolus     Emergency Department Course:  Nursing notes and vitals reviewed.     1331 I performed an exam of the patient as documented above.     IV inserted. Medicine administered as documented above.     Blood drawn. This was sent to the lab " for further testing, results above.    EKG obtained in the ED, see results above.     The patient was sent for a chest CT while in the emergency department, findings above.     1449 I rechecked the patient and discussed the results of her workup thus far. Nebulizer helped    1640 I rechecked the patient and discussed the results of her workup thus far. Still ha some chest pain.    Findings and plan explained to the patient. Patient discharged home with instructions regarding supportive care, medications, and reasons to return. The importance of close follow-up was reviewed. The patient was prescribed Acetaminophen-codeine, Zithromax, and prednisone.     I personally reviewed the laboratory results with the patient and answered all related questions prior to discharge.       Impression & Plan      Medical Decision Making:  This patient presented complaining of shortness of breath. She has a history of COPD. I was concerned because she was complaining of left-side chest pain. I considered a broad differential including pneumothorax, dissection, pneumonia, PE, ACS, amongst other. However her symptoms have been going on since yesterday and her EKG and both troponins are both negative. Her chest CT was negative. The patient responded to nebs, though she didn't have a large amount of wheezing. The patient received Toradol for pain. This will help her as well. Partly I think this is chest wall pain, which is likely secondary to bronchospasm. I didn't discharge her with an amount of pain reliever. I ordered steroids, nebs, and antibiotic. She was discharged home in god condition. I didn't find any current signs of cardiac anemia      Diagnosis:    ICD-10-CM   1. Shortness of breath R06.02   2. Chest pain, unspecified type R07.9   3. COPD exacerbation (H) J44.1       Disposition:  Discharged to home.    Discharge Medications:   Details   acetaminophen-codeine (TYLENOL WITH CODEINE #3) 300-30 MG per tablet Take 1 tablet by  mouth every 6 hours as needed for pain, Disp-8 tablet, R-0, Local Print      azithromycin (ZITHROMAX Z-BREEZY) 250 MG tablet Two tablets on the first day, then one tablet daily for the next 4 days, Disp-6 tablet, R-0, Local Print      prednisone (DELTASONE) 20 MG tablet Take two tablets (= 40mg) each day for 5 (five) days, Disp-10 tablet, R-0, Local Print     I, Valentino Miner, am serving as a scribe on 8/25/2018 at 1:31 PM to personally document services performed by Anson Bernstein MD based on my observations and the provider's statements to me.       Valentino Miner  8/25/2018   [facility] EMERGENCY DEPARTMENT       Anson Bernstein MD  08/26/18 [id number]

## 2018-08-25 NOTE — ED TRIAGE NOTES
Patient presents to ER with SOB and chest pain on the left side. Patient breathing fast and labored. ABC's intact.

## 2018-08-25 NOTE — ED AVS SNAPSHOT
[facility] Emergency Department    [address]    [facility] [address]    Phone:  [phone]    Fax:  [phone]                                       Hina Yap   MRN: [id number]    Department:  [facility] Emergency Department   Date of Visit:  8/25/2018           After Visit Summary Signature Page     I have received my discharge instructions, and my questions have been answered. I have discussed any challenges I see with this plan with the nurse or doctor.    ..........................................................................................................................................  Patient/Patient Representative Signature      ..........................................................................................................................................  Patient Representative Print Name and Relationship to Patient    ..................................................               ................................................  Date                                            Time    ..........................................................................................................................................  Reviewed by Signature/Title    ...................................................              ..............................................  Date                                                            Time          22EPIC Rev 08/18

## 2018-08-26 LAB — INTERPRETATION ECG - MUSE: NORMAL

## 2018-08-28 ENCOUNTER — PATIENT OUTREACH (OUTPATIENT)
Dept: CARE COORDINATION | Facility: CLINIC | Age: 53
End: 2018-08-28

## 2018-08-28 NOTE — PROGRESS NOTES
Clinic Care Coordination Contact  [facility]/Voicemail    Referral Source: ED Follow-Up  Clinical Data: Care Coordinator Outreach  Outreach attempted x 1.  Left message on voicemail with call back information and requested return call.  Plan:RN CC will outreach within one week, will be available sooner if needed. Contact information given.   Azalia Pandya RN  Clinic Care Coordinator  Mobile: [phone]  [email or web address]

## 2018-08-31 LAB
BACTERIA SPEC CULT: NO GROWTH
BACTERIA SPEC CULT: NO GROWTH
Lab: NORMAL
Lab: NORMAL
SPECIMEN SOURCE: NORMAL
SPECIMEN SOURCE: NORMAL

## 2018-12-03 ENCOUNTER — HOSPITAL ENCOUNTER (INPATIENT)
Facility: CLINIC | Age: 53
LOS: 1 days | Discharge: HOME | End: 2018-12-05
Attending: EMERGENCY MEDICINE | Admitting: INTERNAL MEDICINE
Payer: MEDICAID

## 2018-12-03 DIAGNOSIS — R05.9 COUGH: ICD-10-CM

## 2018-12-03 DIAGNOSIS — J44.1 COPD EXACERBATION (H): ICD-10-CM

## 2018-12-03 DIAGNOSIS — F17.200 TOBACCO DEPENDENCE SYNDROME: Primary | ICD-10-CM

## 2018-12-03 DIAGNOSIS — R06.02 SOB (SHORTNESS OF BREATH): ICD-10-CM

## 2018-12-03 DIAGNOSIS — J44.0 CHRONIC OBSTRUCTIVE PULMONARY DISEASE WITH ACUTE LOWER RESPIRATORY INFECTION (H): ICD-10-CM

## 2018-12-03 PROCEDURE — 25000125 ZZHC RX 250

## 2018-12-03 PROCEDURE — 99285 EMERGENCY DEPT VISIT HI MDM: CPT | Mod: 25

## 2018-12-03 PROCEDURE — 94640 AIRWAY INHALATION TREATMENT: CPT

## 2018-12-03 RX ORDER — IPRATROPIUM BROMIDE AND ALBUTEROL SULFATE 2.5; .5 MG/3ML; MG/3ML
SOLUTION RESPIRATORY (INHALATION)
Status: COMPLETED
Start: 2018-12-03 | End: 2018-12-03

## 2018-12-03 RX ORDER — METHYLPREDNISOLONE SODIUM SUCCINATE 125 MG/2ML
125 INJECTION, POWDER, LYOPHILIZED, FOR SOLUTION INTRAMUSCULAR; INTRAVENOUS ONCE
Status: COMPLETED | OUTPATIENT
Start: 2018-12-03 | End: 2018-12-04

## 2018-12-03 RX ORDER — IPRATROPIUM BROMIDE AND ALBUTEROL SULFATE 2.5; .5 MG/3ML; MG/3ML
3 SOLUTION RESPIRATORY (INHALATION)
Status: COMPLETED | OUTPATIENT
Start: 2018-12-03 | End: 2018-12-04

## 2018-12-03 RX ADMIN — IPRATROPIUM BROMIDE AND ALBUTEROL SULFATE 3 ML: .5; 3 SOLUTION RESPIRATORY (INHALATION) at 23:55

## 2018-12-03 ASSESSMENT — ENCOUNTER SYMPTOMS
SORE THROAT: 1
COUGH: 1

## 2018-12-03 NOTE — LETTER
Transition Communication Hand-off for Care Transitions to Next Level of Care Provider    Name: Hina Yap  : 1965  MRN #: [id number]  Primary Care Provider: Sunshine Butterfield  Primary Care MD Name: REGLA Butterfield  Primary Clinic: [address] [place] [address] [address] [place] [address] [id number]  [facility]   Reason for Hospitalization:  Cough [R05]  COPD exacerbation (H) [J44.1]  Admit Date/Time: 12/3/2018 11:43 PM  Discharge Date: 18  Payor Source: Payor[place] / Plan: [place] / Product Type: Medicaid /     Readmission Assessment Measure (SVETLANA) Risk Score/category: AVERAGE           Reason for Communication Hand-off Referral: Admission diagnoses: COPD  Multiple providers/specialties  Non-adherence to plan of care related to:  Other COPD management    Discharge Plan:       Concern for non-adherence with plan of care:   YES  Discharge Needs Assessment:  Needs       Most Recent Value    # of Referrals Placed by [facility] Care Coordination, Specialty Providers, Scheduled Follow-up appointments          Already enrolled in Tele-monitoring program and name of program:  na  Follow-up specialty is recommended: Yes    Follow-up plan:  Future Appointments  Date Time Provider Department Center   2018 10:40 AM Sunshine Butterfield, APRN CNP [facility]       Any outstanding tests or procedures:                Reason for your hospital stay       COPD exacerbation, group A strep pharyngitis                 [facility]-Dr Nash- at 2:45  [address] [facility], [place]     [phone]    Key Recommendations:: Pt is admitted with a COPD exacerbation.  We did discuss the COPD Action care plan.  She said she occasionally sees her primary care provider.  She prefers to schedule this appt on her own.  However, she used to see a pulmonologist, but couldn't remember their name.  Pt saw Dr Nash and Dr Rodriguez with [facility] in the past. (2017).  She  would like to f/u with them.  I scheduled pt with Dr Nash in [place] as Dr Rodriguez isn't available till Feb 1st in [place].  She said in the past they had changed her medications for her COPD and then when she started her new medications she only took them sporadically.  She then had a lapse in her follow up and then no longer had any refills.  She still smokes a 1/2 PPD.  She is in contemplation about quitting.  However, she has been at this phase for awhile. She has tried chantix and other smoking cessation methods.  She would like to try nicotine gum. She has a neb machine, but she said the tubing is kinked so it's not working well.  I paged RT to give her tubing to go home with for machine.  Please verify that she received new tubing for her neb machine.  She is on RA currently and hasn't had home 02 in the past.  She has MS.  She follows with Dr Miko Brown with the [facility].            Aditi Knox    AVS/Discharge Summary is the source of truth; this is a helpful guide for improved communication of patient story

## 2018-12-03 NOTE — IP AVS SNAPSHOT
MRN:[id number]                      After Visit Summary   12/3/2018    Hina Yap    MRN: [id number]           Thank you!     Thank you for choosing [facility] for your care. Our goal is always to provide you with excellent care. Hearing back from our patients is one way we can continue to improve our services. Please take a few minutes to complete the written survey that you may receive in the mail after you visit. If you would like to speak to someone directly about your visit please contact Patient Relations at [phone]. Thank you!          Patient Information     Date Of Birth          1965        About your hospital stay     You were admitted on:  December 4, 2018 You last received care in the:  [place] [address] [facility]    You were discharged on:  December 5, 2018        Reason for your hospital stay       COPD exacerbation, group A strep pharyngitis                  Who to Call     For medical emergencies, please call 911.  For non-urgent questions about your medical care, please call your primary care provider or clinic, [phone]          Attending Provider     Provider Specialty    Yang Jimenez MD Emergency Medicine    Nehemiah, Neelam Lantigua MD Internal Medicine       Primary Care Provider Office Phone # Fax #    BENNETT [facility] [phone]      After Care Instructions     Activity       Your activity upon discharge: activity as tolerated            Diet       Follow this diet upon discharge: Orders Placed This Encounter      Regular Diet Adult                  Follow-up Appointments     Follow-up and recommended labs and tests        Follow up with primary care provider, Sunshine Butterfield, within 7 days for hospital follow- up.                  Further instructions from your care team       [facility]-Dr Nash-Monday January 7th at 2:45  [address], [place]     [phone]  [phone]      Pending Results     No orders found from 2018 to 2018.            Statement of Approval     Ordered          18 4721  I have reviewed and agree with all the recommendations and orders detailed in this document.  EFFECTIVE NOW     Approved and electronically signed by:  Osvadlo Heck MD             Admission Information     Date & Time Department Dept. Phone    12/3/2018 [place] [address] [facility] [phone]      Your Vitals Were     Blood Pressure Pulse Temperature Respirations Height Weight    129/79 (BP Location: Right arm) 72 96.7  F (35.9  C) (Oral) 20 1.702 m (5' 7\") 56.8 kg (125 lb 3.5 oz)    Pulse Oximetry BMI (Body Mass Index)                99% 19.61 kg/m2          MyChart Information     [facility] lets you send messages to your doctor, view your test results, renew your prescriptions, schedule appointments and more. To sign up, go to [email or web address] . Click on \"Log in\" on the left side of the screen, which will take you to the Welcome page. Then click on \"Sign up Now\" on the right side of the page.     You will be asked to enter the access code listed below, as well as some personal information. Please follow the directions to create your username and password.     Your access code is: [email or web address]  Expires: 3/5/2019  3:40 PM     Your access code will  in 90 days. If you need help or a new code, please call your [place] clinic or [phone].        Care EveryWhere ID     This is your Care EveryWhere ID. This could be used by other organizations to access your [place] medical records  [id number]        Equal Access to Services     [facility] : Hadii haris Castañeda, uri cohen, barry hopson. So [facility] [phone].    ATENCIÓN: Si habla español, tiene a feliz disposición servicios gratuitos de asistencia lingüística. Llame al [phone].    We comply with applicable federal " civil rights laws and [place] laws. We do not discriminate on the basis of race, color, national origin, age, disability, sex, sexual orientation, or gender identity.               Review of your medicines      START taking        Dose / Directions    azithromycin 250 MG tablet   Commonly known as:  ZITHROMAX   Indication:  COPD Exacerbation   Used for:  COPD exacerbation (H)        Dose:  250 mg   Take 1 tablet (250 mg) by mouth At Bedtime   Quantity:  3 tablet   Refills:  0       guaiFENesin 100 MG/5ML Syrp   Commonly known as:  COUGH SYRUP   Used for:  COPD exacerbation (H), Cough        Dose:  10 mL   Take 10 mLs by mouth every 4 hours as needed for cough   Quantity:  560 mL   Refills:  0       nicotine 2 MG gum   Commonly known as:  NICORETTE   Used for:  Tobacco dependence syndrome        Dose:  2 mg   Place 1 each (2 mg) inside cheek every hour as needed for smoking cessation   Quantity:  30 tablet   Refills:  1       predniSONE 10 MG tablet   Commonly known as:  DELTASONE   Used for:  COPD exacerbation (H)        4 tabs daily for 2 days, then 3 tabs daily for 2 days, then 2 tabs daily for 2 days, then 1 tab daily for 2 days, then stop   Quantity:  20 tablet   Refills:  0         CONTINUE these medicines which have NOT CHANGED        Dose / Directions    ADDERALL 10 MG tablet   Generic drug:  amphetamine-dextroamphetamine        Dose:  10 mg   Take 10 mg by mouth daily   Refills:  0       albuterol 108 (90 Base) MCG/ACT inhaler   Commonly known as:  PROAIR HFA/PROVENTIL HFA/VENTOLIN HFA   Used for:  Chronic obstructive pulmonary disease with acute lower respiratory infection (H)        Dose:  2 puff   Inhale 2 puffs into the lungs 4 times daily   Quantity:  1 Inhaler   Refills:  1       ALPRAZolam 1 MG 24 hr tablet   Commonly known as:  XANAX XR        Dose:  1 mg   Take 1 mg by mouth 2 times daily   Refills:  0       aspirin-acetaminophen-caffeine 250-250-65 MG tablet   Commonly known as:  EXCEDRIN  MIGRAINE        Dose:  1 tablet   Take 1 tablet by mouth every 6 hours as needed for headaches   Refills:  0       baclofen 20 MG tablet   Commonly known as:  LIORESAL        Dose:  20 mg   Take 20 mg by mouth 4 times daily   Refills:  0       esomeprazole 40 MG DR capsule   Commonly known as:  nexIUM   Used for:  Gastroesophageal reflux disease, esophagitis presence not specified        Dose:  40 mg   Take 1 capsule (40 mg) by mouth At Bedtime Take at night   Quantity:  30 capsule   Refills:  0       ferrous sulfate 325 (65 Fe) MG tablet   Commonly known as:  FEROSUL        Dose:  325 mg   Take 325 mg by mouth daily as needed   Refills:  0       ipratropium - albuterol 0.5 mg/2.5 mg/3 mL 0.5-2.5 (3) MG/3ML neb solution   Commonly known as:  DUONEB   Used for:  SOB (shortness of breath)        Dose:  1 vial   Take 1 vial (3 mLs) by nebulization every 6 hours as needed for shortness of breath / dyspnea or wheezing   Quantity:  30 vial   Refills:  1       meloxicam 7.5 MG tablet   Commonly known as:  MOBIC        Dose:  7.5 mg   Take 7.5 mg by mouth 2 times daily   Refills:  0       multivitamin, therapeutic Tabs tablet        Dose:  1 tablet   Take 1 tablet by mouth daily   Refills:  0       * order for DME   Used for:  Multiple sclerosis (H)        Equipment being ordered: Cane () Treatment Diagnosis: Multiple sclerosis   Quantity:  1 Device   Refills:  0       * order for DME   Used for:  SOB (shortness of breath)        Equipment being ordered: Nebulizer   Quantity:  1 each   Refills:  0       PERCOCET  MG per tablet   Generic drug:  oxyCODONE-acetaminophen        Dose:  1 tablet   Take 1 tablet by mouth every 6 hours as needed.   Refills:  0       promethazine 25 MG tablet   Commonly known as:  PHENERGAN   Used for:  Multiple sclerosis (H)        Dose:  25 mg   Take 1 tablet (25 mg) by mouth every 6 hours as needed for nausea (takes with percocet to prevent nausea)   Quantity:  56 tablet   Refills:  0        * rOPINIRole 2 MG tablet   Commonly known as:  REQUIP        Dose:  2 mg   Take 2 mg by mouth every morning   Refills:  0       * rOPINIRole 2 MG tablet   Commonly known as:  REQUIP        Dose:  4 mg   Take 4 mg by mouth At Bedtime   Refills:  0       tiotropium-olodaterol 2.5-2.5 MCG/ACT Aers        Dose:  2 puff   Inhale 2 puffs into the lungs daily   Refills:  0       * Notice:  This list has 4 medication(s) that are the same as other medications prescribed for you. Read the directions carefully, and ask your doctor or other care provider to review them with you.         Where to get your medicines      These medications were sent to [place], [place] - [address] [facility]  [address] [facility] [address]     Phone:  [phone]     albuterol 108 (90 Base) MCG/ACT inhaler    azithromycin 250 MG tablet    guaiFENesin 100 MG/5ML Syrp    ipratropium - albuterol 0.5 mg/2.5 mg/3 mL 0.5-2.5 (3) MG/3ML neb solution    nicotine 2 MG gum    predniSONE 10 MG tablet                Protect others around you: Learn how to safely use, store and throw away your medicines at www.disposemymeds.org.        ANTIBIOTIC INSTRUCTION     You've Been Prescribed an Antibiotic - Now What?  Your healthcare team thinks that you or your loved one might have an infection. Some infections can be treated with antibiotics, which are powerful, life-saving drugs. Like all medications, antibiotics have side effects and should only be used when necessary. There are some important things you should know about your antibiotic treatment.      Your healthcare team may run tests before you start taking an antibiotic.    Your team may take samples (e.g., from your blood, urine or other areas) to run tests to look for bacteria. These test can be important to determine if you need an antibiotic at all and, if you do, which antibiotic will work best.      Within a few days, your healthcare team might change  or even stop your antibiotic.    Your team may start you on an antibiotic while they are working to find out what is making you sick.    Your team might change your antibiotic because test results show that a different antibiotic would be better to treat your infection.    In some cases, once your team has more information, they learn that you do not need an antibiotic at all. They may find out that you don't have an infection, or that the antibiotic you're taking won't work against your infection. For example, an infection caused by a virus can't be treated with antibiotics. Staying on an antibiotic when you don't need it is more likely to be harmful than helpful.      You may experience side effects from your antibiotic.    Like all medications, antibiotics have side effects. Some of these can be serious.    Let you healthcare team know if you have any known allergies when you are admitted to the hospital.    One significant side effect of nearly all antibiotics is the risk of severe and sometimes deadly diarrhea caused by Clostridium difficile (C. Difficile). This occurs when a person takes antibiotics because some good germs are destroyed. Antibiotic use allows C. diificile to take over, putting patients at high risk for this serious infection.    As a patient or caregiver, it is important to understand your or your loved one's antibiotic treatment. It is especially important for caregivers to speak up when patients can't speak for themselves. Here are some important questions to ask your healthcare team.    What infection is this antibiotic treating and how do you know I have that infection?    What side effects might occur from this antibiotic?    How long will I need to take this antibiotic?    Is it safe to take this antibiotic with other medications or supplements (e.g., vitamins) that I am taking?     Are there any special directions I need to know about taking this antibiotic? For example, should I take  it with food?    How will I be monitored to know whether my infection is responding to the antibiotic?    What tests may help to make sure the right antibiotic is prescribed for me?      Information provided by:  www.cdc.gov/getsmart  U.S. Department of Health and Human Services  Centers for disease Control and Prevention  National Center for Emerging and Zoonotic Infectious Diseases  Division of Healthcare Quality Promotion             Medication List: This is a list of all your medications and when to take them. Check marks below indicate your daily home schedule. Keep this list as a reference.      Medications           Morning Afternoon Evening Bedtime As Needed    ADDERALL 10 MG tablet   Take 10 mg by mouth daily   Last time this was given:  10 mg on 12/5/2018  9:27 AM   Generic drug:  amphetamine-dextroamphetamine   Next Dose Due:  Take tomorrow morning 12/6/18                                albuterol 108 (90 Base) MCG/ACT inhaler   Commonly known as:  PROAIR HFA/PROVENTIL HFA/VENTOLIN HFA   Inhale 2 puffs into the lungs 4 times daily                                ALPRAZolam 1 MG 24 hr tablet   Commonly known as:  XANAX XR   Take 1 mg by mouth 2 times daily   Last time this was given:  1 mg on 12/5/2018  9:26 AM   Next Dose Due:  Take this evening                                  aspirin-acetaminophen-caffeine 250-250-65 MG tablet   Commonly known as:  EXCEDRIN MIGRAINE   Take 1 tablet by mouth every 6 hours as needed for headaches   Last time this was given:  1 tablet on 12/5/2018  9:29 AM                                azithromycin 250 MG tablet   Commonly known as:  ZITHROMAX   Take 1 tablet (250 mg) by mouth At Bedtime   Last time this was given:  250 mg on 12/4/2018 10:03 PM   Next Dose Due:  Take this evening 12/5/18                                baclofen 20 MG tablet   Commonly known as:  LIORESAL   Take 20 mg by mouth 4 times daily   Last time this was given:  20 mg on 12/5/2018  1:33 PM   Next Dose  Due:  Take this evening 12/5/18                                esomeprazole 40 MG DR capsule   Commonly known as:  nexIUM   Take 1 capsule (40 mg) by mouth At Bedtime Take at night   Next Dose Due:  Resume as previously prescribed                                ferrous sulfate 325 (65 Fe) MG tablet   Commonly known as:  FEROSUL   Take 325 mg by mouth daily as needed   Next Dose Due:  Resume as previously prescribed                                guaiFENesin 100 MG/5ML Syrp   Commonly known as:  COUGH SYRUP   Take 10 mLs by mouth every 4 hours as needed for cough                                ipratropium - albuterol 0.5 mg/2.5 mg/3 mL 0.5-2.5 (3) MG/3ML neb solution   Commonly known as:  DUONEB   Take 1 vial (3 mLs) by nebulization every 6 hours as needed for shortness of breath / dyspnea or wheezing   Last time this was given:  3 mLs on 12/5/2018  3:22 PM                                meloxicam 7.5 MG tablet   Commonly known as:  MOBIC   Take 7.5 mg by mouth 2 times daily   Next Dose Due:  Resume as previously prescribed                                multivitamin, therapeutic Tabs tablet   Take 1 tablet by mouth daily   Last time this was given:  1 tablet on 12/5/2018  9:29 AM   Next Dose Due:  Take tomorrow morning 12/6/18                                nicotine 2 MG gum   Commonly known as:  NICORETTE   Place 1 each (2 mg) inside cheek every hour as needed for smoking cessation   Last time this was given:  2 mg on 12/5/2018  1:36 PM                                * order for DME   Equipment being ordered: Cane () Treatment Diagnosis: Multiple sclerosis                                * order for DME   Equipment being ordered: Nebulizer                                PERCOCET  MG per tablet   Take 1 tablet by mouth every 6 hours as needed.   Generic drug:  oxyCODONE-acetaminophen                                predniSONE 10 MG tablet   Commonly known as:  DELTASONE   4 tabs daily for 2 days, then 3  tabs daily for 2 days, then 2 tabs daily for 2 days, then 1 tab daily for 2 days, then stop   Next Dose Due:  Take 4 tabs 12/6-12/7  Take 3 tabs 12/8-12/9  Take 2 tabs 12/10-12/11  Take 1 tab 12/12-12/13                                promethazine 25 MG tablet   Commonly known as:  PHENERGAN   Take 1 tablet (25 mg) by mouth every 6 hours as needed for nausea (takes with percocet to prevent nausea)   Last time this was given:  25 mg on 12/5/2018  9:28 AM                                * rOPINIRole 2 MG tablet   Commonly known as:  REQUIP   Take 2 mg by mouth every morning   Last time this was given:  2 mg on 12/5/2018  9:27 AM   Next Dose Due:  Take tomorrow morning 12/6/18                                * rOPINIRole 2 MG tablet   Commonly known as:  REQUIP   Take 4 mg by mouth At Bedtime   Last time this was given:  2 mg on 12/5/2018  9:27 AM   Next Dose Due:  Take at bedtime tonight                                tiotropium-olodaterol 2.5-2.5 MCG/ACT Aers   Inhale 2 puffs into the lungs daily                                * Notice:  This list has 4 medication(s) that are the same as other medications prescribed for you. Read the directions carefully, and ask your doctor or other care provider to review them with you.

## 2018-12-03 NOTE — LETTER
Key Recommendations:: Pt is admitted with a COPD exacerbation.  We did discuss the COPD Action care plan.  She said she occasionally sees her primary care provider.  She prefers to schedule this appt on her own.  However, she used to see a pulmonologist, but couldn't remember their name.  Pt saw Dr Nash and Dr Rodriguez with [facility] in the past. (June 2017).  She would like to f/u with them.  I scheduled pt with Dr Nash in [place] as Dr Rodriguez isn't available till Feb 1st in [place].  She said in the past they had changed her medications for her COPD and then when she started her new medications she only took them sporadically.  She then had a lapse in her follow up and then no longer had any refills.  She still smokes a 1/2 PPD.  She is in contemplation about quitting.  However, she has been at this phase for awhile. She has tried chantix and other smoking cessation methods.  She would like to try nicotine gum. She has a neb machine, but she said the tubing is kinked so it's not working well.  I paged RT to give her tubing to go home with for machine.  She is on RA currently and hasn't had home 02 in the past.  She has MS.  She follows with Dr Miko Brown with the [facility].            Aditi Knox    AVS/Discharge Summary is the source of truth; this is a helpful guide for improved communication of patient story

## 2018-12-03 NOTE — IP AVS SNAPSHOT
[place] [address] [facility]    [address]    [facility] [address]    Phone:  [phone]    Fax:  [phone]                                       After Visit Summary   12/3/2018    Hina Yap    MRN: [id number]           After Visit Summary Signature Page     I have received my discharge instructions, and my questions have been answered. I have discussed any challenges I see with this plan with the nurse or doctor.    ..........................................................................................................................................  Patient/Patient Representative Signature      ..........................................................................................................................................  Patient Representative Print Name and Relationship to Patient    ..................................................               ................................................  Date                                   Time    ..........................................................................................................................................  Reviewed by Signature/Title    ...................................................              ..............................................  Date                                               Time          22EPIC Rev 08/18

## 2018-12-04 ENCOUNTER — APPOINTMENT (OUTPATIENT)
Dept: CARDIOLOGY | Facility: CLINIC | Age: 53
End: 2018-12-04
Attending: INTERNAL MEDICINE
Payer: MEDICAID

## 2018-12-04 ENCOUNTER — APPOINTMENT (OUTPATIENT)
Dept: GENERAL RADIOLOGY | Facility: CLINIC | Age: 53
End: 2018-12-04
Attending: EMERGENCY MEDICINE
Payer: MEDICAID

## 2018-12-04 LAB
ANION GAP SERPL CALCULATED.3IONS-SCNC: 8 MMOL/L (ref 3–14)
BASE EXCESS BLDV CALC-SCNC: 2.9 MMOL/L
BASOPHILS # BLD AUTO: 0.1 10E9/L (ref 0–0.2)
BASOPHILS NFR BLD AUTO: 0.8 %
BUN SERPL-MCNC: 25 MG/DL (ref 7–30)
CALCIUM SERPL-MCNC: 8.7 MG/DL (ref 8.5–10.1)
CHLORIDE SERPL-SCNC: 108 MMOL/L (ref 94–109)
CO2 SERPL-SCNC: 25 MMOL/L (ref 20–32)
CREAT SERPL-MCNC: 0.99 MG/DL (ref 0.52–1.04)
DEPRECATED S PYO AG THROAT QL EIA: ABNORMAL
DIFFERENTIAL METHOD BLD: NORMAL
EOSINOPHIL # BLD AUTO: 0.7 10E9/L (ref 0–0.7)
EOSINOPHIL NFR BLD AUTO: 6.9 %
ERYTHROCYTE [DISTWIDTH] IN BLOOD BY AUTOMATED COUNT: 12.9 % (ref 10–15)
FLUAV+FLUBV AG SPEC QL: NEGATIVE
FLUAV+FLUBV AG SPEC QL: NEGATIVE
GFR SERPL CREATININE-BSD FRML MDRD: 59 ML/MIN/1.7M2
GLUCOSE BLDC GLUCOMTR-MCNC: 125 MG/DL (ref 70–99)
GLUCOSE BLDC GLUCOMTR-MCNC: 128 MG/DL (ref 70–99)
GLUCOSE BLDC GLUCOMTR-MCNC: 139 MG/DL (ref 70–99)
GLUCOSE BLDC GLUCOMTR-MCNC: 156 MG/DL (ref 70–99)
GLUCOSE BLDC GLUCOMTR-MCNC: 197 MG/DL (ref 70–99)
GLUCOSE SERPL-MCNC: 113 MG/DL (ref 70–99)
HCO3 BLDV-SCNC: 24 MMOL/L (ref 21–28)
HCT VFR BLD AUTO: 42.3 % (ref 35–47)
HGB BLD-MCNC: 14.1 G/DL (ref 11.7–15.7)
IMM GRANULOCYTES # BLD: 0 10E9/L (ref 0–0.4)
IMM GRANULOCYTES NFR BLD: 0.2 %
INTERPRETATION ECG - MUSE: NORMAL
LYMPHOCYTES # BLD AUTO: 2.6 10E9/L (ref 0.8–5.3)
LYMPHOCYTES NFR BLD AUTO: 24.9 %
MCH RBC QN AUTO: 30.8 PG (ref 26.5–33)
MCHC RBC AUTO-ENTMCNC: 33.3 G/DL (ref 31.5–36.5)
MCV RBC AUTO: 92 FL (ref 78–100)
MONOCYTES # BLD AUTO: 0.7 10E9/L (ref 0–1.3)
MONOCYTES NFR BLD AUTO: 6.2 %
MRSA DNA SPEC QL NAA+PROBE: NEGATIVE
NEUTROPHILS # BLD AUTO: 6.4 10E9/L (ref 1.6–8.3)
NEUTROPHILS NFR BLD AUTO: 61 %
NRBC # BLD AUTO: 0 10*3/UL
NRBC BLD AUTO-RTO: 0 /100
O2/TOTAL GAS SETTING VFR VENT: ABNORMAL %
OXYHGB MFR BLDV: 91 %
PCO2 BLDV: 27 MM HG (ref 40–50)
PH BLDV: 7.56 PH (ref 7.32–7.43)
PLATELET # BLD AUTO: 324 10E9/L (ref 150–450)
PO2 BLDV: 58 MM HG (ref 25–47)
POTASSIUM SERPL-SCNC: 3.6 MMOL/L (ref 3.4–5.3)
RBC # BLD AUTO: 4.58 10E12/L (ref 3.8–5.2)
SODIUM SERPL-SCNC: 141 MMOL/L (ref 133–144)
SPECIMEN SOURCE: ABNORMAL
SPECIMEN SOURCE: NORMAL
SPECIMEN SOURCE: NORMAL
TROPONIN I SERPL-MCNC: <0.015 UG/L (ref 0–0.04)
WBC # BLD AUTO: 10.4 10E9/L (ref 4–11)

## 2018-12-04 PROCEDURE — 96365 THER/PROPH/DIAG IV INF INIT: CPT

## 2018-12-04 PROCEDURE — 93005 ELECTROCARDIOGRAM TRACING: CPT

## 2018-12-04 PROCEDURE — 94660 CPAP INITIATION&MGMT: CPT

## 2018-12-04 PROCEDURE — 20000003 ZZH R&B ICU

## 2018-12-04 PROCEDURE — 25000132 ZZH RX MED GY IP 250 OP 250 PS 637: Performed by: INTERNAL MEDICINE

## 2018-12-04 PROCEDURE — 84484 ASSAY OF TROPONIN QUANT: CPT | Performed by: EMERGENCY MEDICINE

## 2018-12-04 PROCEDURE — 87804 INFLUENZA ASSAY W/OPTIC: CPT | Performed by: EMERGENCY MEDICINE

## 2018-12-04 PROCEDURE — 40000264 ECHO COMPLETE WITH OPTISON

## 2018-12-04 PROCEDURE — 93306 TTE W/DOPPLER COMPLETE: CPT | Mod: 26 | Performed by: INTERNAL MEDICINE

## 2018-12-04 PROCEDURE — 94640 AIRWAY INHALATION TREATMENT: CPT

## 2018-12-04 PROCEDURE — 40000275 ZZH STATISTIC RCP TIME EA 10 MIN

## 2018-12-04 PROCEDURE — 25000125 ZZHC RX 250: Performed by: INTERNAL MEDICINE

## 2018-12-04 PROCEDURE — 71046 X-RAY EXAM CHEST 2 VIEWS: CPT

## 2018-12-04 PROCEDURE — 00000146 ZZHCL STATISTIC GLUCOSE BY METER IP

## 2018-12-04 PROCEDURE — 80048 BASIC METABOLIC PNL TOTAL CA: CPT | Performed by: EMERGENCY MEDICINE

## 2018-12-04 PROCEDURE — 25000128 H RX IP 250 OP 636: Performed by: EMERGENCY MEDICINE

## 2018-12-04 PROCEDURE — 87880 STREP A ASSAY W/OPTIC: CPT | Performed by: INTERNAL MEDICINE

## 2018-12-04 PROCEDURE — 25000132 ZZH RX MED GY IP 250 OP 250 PS 637

## 2018-12-04 PROCEDURE — 25500064 ZZH RX 255 OP 636: Performed by: INTERNAL MEDICINE

## 2018-12-04 PROCEDURE — 25000125 ZZHC RX 250: Performed by: EMERGENCY MEDICINE

## 2018-12-04 PROCEDURE — 25000128 H RX IP 250 OP 636: Performed by: INTERNAL MEDICINE

## 2018-12-04 PROCEDURE — 99223 1ST HOSP IP/OBS HIGH 75: CPT | Mod: AI | Performed by: INTERNAL MEDICINE

## 2018-12-04 PROCEDURE — 82805 BLOOD GASES W/O2 SATURATION: CPT | Performed by: EMERGENCY MEDICINE

## 2018-12-04 PROCEDURE — 40000281 ZZH STATISTIC TRANSPORT TIME EA 15 MIN

## 2018-12-04 PROCEDURE — 94640 AIRWAY INHALATION TREATMENT: CPT | Mod: 76

## 2018-12-04 PROCEDURE — 85025 COMPLETE CBC W/AUTO DIFF WBC: CPT | Performed by: EMERGENCY MEDICINE

## 2018-12-04 PROCEDURE — 96375 TX/PRO/DX INJ NEW DRUG ADDON: CPT

## 2018-12-04 PROCEDURE — 87641 MR-STAPH DNA AMP PROBE: CPT | Performed by: INTERNAL MEDICINE

## 2018-12-04 PROCEDURE — 99207 ZZC APP CREDIT; MD BILLING SHARED VISIT: CPT | Performed by: INTERNAL MEDICINE

## 2018-12-04 PROCEDURE — 87640 STAPH A DNA AMP PROBE: CPT | Performed by: INTERNAL MEDICINE

## 2018-12-04 RX ORDER — ROPINIROLE 2 MG/1
2 TABLET, FILM COATED ORAL EVERY MORNING
COMMUNITY
End: 2022-09-17

## 2018-12-04 RX ORDER — ROPINIROLE 1 MG/1
2 TABLET, FILM COATED ORAL EVERY MORNING
Status: DISCONTINUED | OUTPATIENT
Start: 2018-12-04 | End: 2018-12-05 | Stop reason: HOSPADM

## 2018-12-04 RX ORDER — METHYLPREDNISOLONE SODIUM SUCCINATE 125 MG/2ML
60 INJECTION, POWDER, LYOPHILIZED, FOR SOLUTION INTRAMUSCULAR; INTRAVENOUS EVERY 12 HOURS
Status: DISCONTINUED | OUTPATIENT
Start: 2018-12-04 | End: 2018-12-05 | Stop reason: HOSPADM

## 2018-12-04 RX ORDER — CELECOXIB 100 MG/1
100 CAPSULE ORAL 2 TIMES DAILY
Status: DISCONTINUED | OUTPATIENT
Start: 2018-12-04 | End: 2018-12-05 | Stop reason: HOSPADM

## 2018-12-04 RX ORDER — FERROUS SULFATE 325(65) MG
325 TABLET ORAL DAILY PRN
COMMUNITY
End: 2020-08-10

## 2018-12-04 RX ORDER — ROPINIROLE 1 MG/1
4 TABLET, FILM COATED ORAL AT BEDTIME
Status: DISCONTINUED | OUTPATIENT
Start: 2018-12-04 | End: 2018-12-05 | Stop reason: HOSPADM

## 2018-12-04 RX ORDER — MULTIVITAMIN,THERAPEUTIC
1 TABLET ORAL DAILY
Status: DISCONTINUED | OUTPATIENT
Start: 2018-12-04 | End: 2018-12-05 | Stop reason: HOSPADM

## 2018-12-04 RX ORDER — ALPRAZOLAM 1 MG/1
1 TABLET, EXTENDED RELEASE ORAL 2 TIMES DAILY
Status: DISCONTINUED | OUTPATIENT
Start: 2018-12-04 | End: 2018-12-04 | Stop reason: CLARIF

## 2018-12-04 RX ORDER — OXYCODONE AND ACETAMINOPHEN 10; 325 MG/1; MG/1
1 TABLET ORAL EVERY 6 HOURS PRN
Status: DISCONTINUED | OUTPATIENT
Start: 2018-12-04 | End: 2018-12-05 | Stop reason: HOSPADM

## 2018-12-04 RX ORDER — MELOXICAM 7.5 MG/1
7.5 TABLET ORAL 2 TIMES DAILY
COMMUNITY
End: 2020-08-10

## 2018-12-04 RX ORDER — ONDANSETRON 2 MG/ML
4 INJECTION INTRAMUSCULAR; INTRAVENOUS EVERY 6 HOURS PRN
Status: DISCONTINUED | OUTPATIENT
Start: 2018-12-04 | End: 2018-12-05 | Stop reason: HOSPADM

## 2018-12-04 RX ORDER — DEXTROAMPHETAMINE SACCHARATE, AMPHETAMINE ASPARTATE, DEXTROAMPHETAMINE SULFATE AND AMPHETAMINE SULFATE 2.5; 2.5; 2.5; 2.5 MG/1; MG/1; MG/1; MG/1
10 TABLET ORAL DAILY
Status: DISCONTINUED | OUTPATIENT
Start: 2018-12-04 | End: 2018-12-05 | Stop reason: HOSPADM

## 2018-12-04 RX ORDER — NALOXONE HYDROCHLORIDE 0.4 MG/ML
.1-.4 INJECTION, SOLUTION INTRAMUSCULAR; INTRAVENOUS; SUBCUTANEOUS
Status: DISCONTINUED | OUTPATIENT
Start: 2018-12-04 | End: 2018-12-05 | Stop reason: HOSPADM

## 2018-12-04 RX ORDER — PROMETHAZINE HYDROCHLORIDE 25 MG/1
25 TABLET ORAL EVERY 6 HOURS PRN
Status: DISCONTINUED | OUTPATIENT
Start: 2018-12-04 | End: 2018-12-05 | Stop reason: HOSPADM

## 2018-12-04 RX ORDER — ALBUTEROL SULFATE 90 UG/1
2 AEROSOL, METERED RESPIRATORY (INHALATION) 4 TIMES DAILY
Status: DISCONTINUED | OUTPATIENT
Start: 2018-12-04 | End: 2018-12-05

## 2018-12-04 RX ORDER — ALPRAZOLAM 0.5 MG/1
1 TABLET, EXTENDED RELEASE ORAL 2 TIMES DAILY
Status: DISCONTINUED | OUTPATIENT
Start: 2018-12-04 | End: 2018-12-05 | Stop reason: HOSPADM

## 2018-12-04 RX ORDER — ALBUTEROL SULFATE 0.83 MG/ML
2.5 SOLUTION RESPIRATORY (INHALATION) ONCE
Status: COMPLETED | OUTPATIENT
Start: 2018-12-04 | End: 2018-12-04

## 2018-12-04 RX ORDER — IPRATROPIUM BROMIDE AND ALBUTEROL SULFATE 2.5; .5 MG/3ML; MG/3ML
1 SOLUTION RESPIRATORY (INHALATION) EVERY 6 HOURS PRN
Status: DISCONTINUED | OUTPATIENT
Start: 2018-12-04 | End: 2018-12-05 | Stop reason: HOSPADM

## 2018-12-04 RX ORDER — BACLOFEN 10 MG/1
20 TABLET ORAL 4 TIMES DAILY
Status: DISCONTINUED | OUTPATIENT
Start: 2018-12-04 | End: 2018-12-05 | Stop reason: HOSPADM

## 2018-12-04 RX ORDER — BACLOFEN 20 MG/1
20 TABLET ORAL 4 TIMES DAILY
COMMUNITY
End: 2020-08-10

## 2018-12-04 RX ORDER — ONDANSETRON 4 MG/1
4 TABLET, ORALLY DISINTEGRATING ORAL EVERY 6 HOURS PRN
Status: DISCONTINUED | OUTPATIENT
Start: 2018-12-04 | End: 2018-12-05 | Stop reason: HOSPADM

## 2018-12-04 RX ORDER — PROCHLORPERAZINE MALEATE 5 MG
10 TABLET ORAL EVERY 6 HOURS PRN
Status: DISCONTINUED | OUTPATIENT
Start: 2018-12-04 | End: 2018-12-05 | Stop reason: HOSPADM

## 2018-12-04 RX ORDER — IPRATROPIUM BROMIDE AND ALBUTEROL SULFATE 2.5; .5 MG/3ML; MG/3ML
3 SOLUTION RESPIRATORY (INHALATION)
Status: DISCONTINUED | OUTPATIENT
Start: 2018-12-04 | End: 2018-12-05 | Stop reason: HOSPADM

## 2018-12-04 RX ORDER — PANTOPRAZOLE SODIUM 40 MG/1
40 TABLET, DELAYED RELEASE ORAL AT BEDTIME
Status: DISCONTINUED | OUTPATIENT
Start: 2018-12-04 | End: 2018-12-05 | Stop reason: HOSPADM

## 2018-12-04 RX ORDER — FERROUS SULFATE 325(65) MG
325 TABLET ORAL DAILY PRN
Status: DISCONTINUED | OUTPATIENT
Start: 2018-12-04 | End: 2018-12-05 | Stop reason: HOSPADM

## 2018-12-04 RX ORDER — AZITHROMYCIN 250 MG/1
250 TABLET, FILM COATED ORAL AT BEDTIME
Status: DISCONTINUED | OUTPATIENT
Start: 2018-12-04 | End: 2018-12-05 | Stop reason: HOSPADM

## 2018-12-04 RX ORDER — ROPINIROLE 2 MG/1
4 TABLET, FILM COATED ORAL AT BEDTIME
COMMUNITY
End: 2020-08-10

## 2018-12-04 RX ORDER — PROCHLORPERAZINE 25 MG
25 SUPPOSITORY, RECTAL RECTAL EVERY 12 HOURS PRN
Status: DISCONTINUED | OUTPATIENT
Start: 2018-12-04 | End: 2018-12-05 | Stop reason: HOSPADM

## 2018-12-04 RX ORDER — LORAZEPAM 2 MG/ML
0.5 INJECTION INTRAMUSCULAR EVERY 6 HOURS PRN
Status: DISCONTINUED | OUTPATIENT
Start: 2018-12-04 | End: 2018-12-05 | Stop reason: HOSPADM

## 2018-12-04 RX ADMIN — PROMETHAZINE HYDROCHLORIDE 25 MG: 25 TABLET ORAL at 21:44

## 2018-12-04 RX ADMIN — METHYLPREDNISOLONE SODIUM SUCCINATE 62.5 MG: 125 INJECTION, POWDER, FOR SOLUTION INTRAMUSCULAR; INTRAVENOUS at 08:31

## 2018-12-04 RX ADMIN — IPRATROPIUM BROMIDE AND ALBUTEROL SULFATE 3 ML: .5; 3 SOLUTION RESPIRATORY (INHALATION) at 15:27

## 2018-12-04 RX ADMIN — AZITHROMYCIN 250 MG: 250 TABLET, FILM COATED ORAL at 22:03

## 2018-12-04 RX ADMIN — AZITHROMYCIN MONOHYDRATE 500 MG: 500 INJECTION, POWDER, LYOPHILIZED, FOR SOLUTION INTRAVENOUS at 02:04

## 2018-12-04 RX ADMIN — BACLOFEN 20 MG: 10 TABLET ORAL at 20:45

## 2018-12-04 RX ADMIN — IPRATROPIUM BROMIDE AND ALBUTEROL SULFATE 3 ML: .5; 3 SOLUTION RESPIRATORY (INHALATION) at 00:09

## 2018-12-04 RX ADMIN — PROMETHAZINE HYDROCHLORIDE 25 MG: 25 TABLET ORAL at 11:23

## 2018-12-04 RX ADMIN — METHYLPREDNISOLONE SODIUM SUCCINATE 125 MG: 125 INJECTION, POWDER, FOR SOLUTION INTRAMUSCULAR; INTRAVENOUS at 00:09

## 2018-12-04 RX ADMIN — DEXTROAMPHETAMINE SACCHARATE, AMPHETAMINE ASPARTATE, DEXTROAMPHETAMINE SULFATE AND AMPHETAMINE SULFATE 10 MG: 2.5; 2.5; 2.5; 2.5 TABLET ORAL at 11:23

## 2018-12-04 RX ADMIN — BACLOFEN 20 MG: 10 TABLET ORAL at 10:45

## 2018-12-04 RX ADMIN — BACLOFEN 20 MG: 10 TABLET ORAL at 18:29

## 2018-12-04 RX ADMIN — ENOXAPARIN SODIUM 40 MG: 40 INJECTION SUBCUTANEOUS at 08:31

## 2018-12-04 RX ADMIN — IPRATROPIUM BROMIDE AND ALBUTEROL SULFATE 3 ML: .5; 3 SOLUTION RESPIRATORY (INHALATION) at 07:21

## 2018-12-04 RX ADMIN — ROPINIROLE HYDROCHLORIDE 4 MG: 1 TABLET, FILM COATED ORAL at 20:45

## 2018-12-04 RX ADMIN — PANTOPRAZOLE SODIUM 40 MG: 40 TABLET, DELAYED RELEASE ORAL at 20:46

## 2018-12-04 RX ADMIN — OXYCODONE AND ACETAMINOPHEN 1 TABLET: 10; 325 TABLET ORAL at 21:44

## 2018-12-04 RX ADMIN — ROPINIROLE HYDROCHLORIDE 2 MG: 1 TABLET, FILM COATED ORAL at 10:45

## 2018-12-04 RX ADMIN — IPRATROPIUM BROMIDE AND ALBUTEROL SULFATE 3 ML: .5; 3 SOLUTION RESPIRATORY (INHALATION) at 19:39

## 2018-12-04 RX ADMIN — HUMAN ALBUMIN MICROSPHERES AND PERFLUTREN 2 ML: 10; .22 INJECTION, SOLUTION INTRAVENOUS at 08:22

## 2018-12-04 RX ADMIN — IPRATROPIUM BROMIDE AND ALBUTEROL SULFATE 3 ML: .5; 3 SOLUTION RESPIRATORY (INHALATION) at 00:04

## 2018-12-04 RX ADMIN — ACETAMINOPHEN, ASPIRIN AND CAFFEINE 1 TABLET: 250; 250; 65 TABLET, FILM COATED ORAL at 13:30

## 2018-12-04 RX ADMIN — CELECOXIB 100 MG: 100 CAPSULE ORAL at 20:47

## 2018-12-04 RX ADMIN — METHYLPREDNISOLONE SODIUM SUCCINATE 62.5 MG: 125 INJECTION, POWDER, FOR SOLUTION INTRAMUSCULAR; INTRAVENOUS at 20:46

## 2018-12-04 RX ADMIN — THERA TABS 1 TABLET: TAB at 10:45

## 2018-12-04 RX ADMIN — OXYCODONE AND ACETAMINOPHEN 1 TABLET: 10; 325 TABLET ORAL at 11:23

## 2018-12-04 RX ADMIN — ALBUTEROL SULFATE 2.5 MG: 2.5 SOLUTION RESPIRATORY (INHALATION) at 01:09

## 2018-12-04 RX ADMIN — ALPRAZOLAM 1 MG: 0.5 TABLET, EXTENDED RELEASE ORAL at 20:46

## 2018-12-04 RX ADMIN — CELECOXIB 100 MG: 100 CAPSULE ORAL at 11:24

## 2018-12-04 RX ADMIN — ALPRAZOLAM 1 MG: 0.5 TABLET, EXTENDED RELEASE ORAL at 11:23

## 2018-12-04 ASSESSMENT — ENCOUNTER SYMPTOMS
CHILLS: 0
FEVER: 0

## 2018-12-04 ASSESSMENT — PAIN DESCRIPTION - DESCRIPTORS
DESCRIPTORS: SORE
DESCRIPTORS: ACHING

## 2018-12-04 ASSESSMENT — ACTIVITIES OF DAILY LIVING (ADL)
ADLS_ACUITY_SCORE: 16

## 2018-12-04 NOTE — PROGRESS NOTES
Pt is resting on BIPAP settings with RR 26-34 and diminished breath sounds bilaterally. Pt has infrequent cough but it is non-productive so far. No significant changes in pt status but tachypnea appears to be closer to normal range than before placed on BIPAP. Respiratory will continue to follow.

## 2018-12-04 NOTE — PHARMACY-ADMISSION MEDICATION HISTORY
Admission medication history interview status for this patient is complete. See [facility] admission navigator for allergy information, prior to admission medications and immunization status.     Medication history interview source(s):Patient  Medication history resources (including written lists, pill bottles, clinic record):[facility] list  Primary pharmacy:[facility]    Changes made to \[facility] [place] [facility]\"" medication list:  Added: mobic, stiolto  Deleted: lamotrigine, spiriva, advair  Changed: baclofen, requip    Actions taken by pharmacist (provider contacted, etc):called [facility]     Additional medication history information:None    Medication reconciliation/reorder completed by provider prior to medication history? No    Do you take OTC medications (eg tylenol, ibuprofen, fish oil, eye/ear drops, etc)? Y(Y/N)    For patients on insulin therapy: N (Y/N)      Prior to Admission medications    Medication Sig Last Dose Taking? Auth Provider   albuterol (PROAIR HFA/PROVENTIL HFA/VENTOLIN HFA) 108 (90 BASE) MCG/ACT Inhaler Inhale 2 puffs into the lungs 4 times daily 12/3/2018 at Unknown time Yes Sunshine Butterfield APRN CNP   ALPRAZolam (XANAX XR) 1 MG 24 hr tablet Take 1 mg by mouth 2 times daily  12/3/2018 at Unknown time Yes Reported, Patient   amphetamine-dextroamphetamine (ADDERALL) 10 MG tablet Take 10 mg by mouth daily  12/3/2018 at Unknown time Yes Reported, Patient   aspirin-acetaminophen-caffeine (EXCEDRIN MIGRAINE) 250-250-65 MG per tablet Take 1 tablet by mouth every 6 hours as needed for headaches  Yes Unknown, Entered By History   baclofen (LIORESAL) 20 MG tablet Take 20 mg by mouth 4 times daily 12/3/2018 at Unknown time Yes Unknown, Entered By History   esomeprazole (NEXIUM) 40 MG capsule Take 1 capsule (40 mg) by mouth At Bedtime Take at night 12/2/2018 at Unknown time Yes Lui Vora MD   ipratropium - albuterol 0.5 mg/2.5 mg/3 mL (DUONEB) 0.5-2.5 (3) MG/3ML neb solution Take 1 vial (3 mLs) by nebulization  every 6 hours as needed for shortness of breath / dyspnea or wheezing 12/3/2018 at Unknown time Yes Sunshine Butterfield APRN CNP   meloxicam (MOBIC) 7.5 MG tablet Take 7.5 mg by mouth 2 times daily Past Week at Unknown time Yes Unknown, Entered By History   multivitamin, therapeutic (THERA-VIT) TABS Take 1 tablet by mouth daily 12/3/2018 at Unknown time Yes Unknown, Entered By History   oxyCODONE-acetaminophen (PERCOCET)  MG per tablet Take 1 tablet by mouth every 6 hours as needed. 12/3/2018 at Unknown time Yes Reported, Patient   promethazine (PHENERGAN) 25 MG tablet Take 1 tablet (25 mg) by mouth every 6 hours as needed for nausea (takes with percocet to prevent nausea) 12/3/2018 at Unknown time Yes Lui Vora MD   rOPINIRole (REQUIP) 2 MG tablet Take 2 mg by mouth every morning 12/3/2018 at Unknown time Yes Unknown, Entered By History   rOPINIRole (REQUIP) 2 MG tablet Take 4 mg by mouth At Bedtime 12/2/2018 at Unknown time Yes Unknown, Entered By History   tiotropium-olodaterol 2.5-2.5 MCG/ACT AERS Inhale 2 puffs into the lungs daily  Yes Unknown, Entered By History   order for DME Equipment being ordered: Nebulizer  Patient not taking: Reported on 11/30/2017   Sunshine Butterfield APRN CNP   order for DME Equipment being ordered: Cane ()  Treatment Diagnosis: Multiple sclerosis   Lui Vora MD

## 2018-12-04 NOTE — ED TRIAGE NOTES
Pt presents with cough and SOB for 2 days worsening significantly in past 2 hours per pt and family. Hx of COPD, no nebs today.

## 2018-12-04 NOTE — PROGRESS NOTES
Pt has been transported from ED to ICU room 365 on BIPAP. SPO2 has been maintained at 100% throughout transport. No complications were encountered. Respiratory will continue to follow.

## 2018-12-04 NOTE — PROGRESS NOTES
"[facility]  Hospitalist Progress Note  Osvaldo Heck MD 12/04/2018    Reason for Stay (Diagnosis): Respiratory failure         Assessment and Plan:      Summary of Stay: Hina Yap is a 53 year old female with history of COPD, anxiety, multiple sclerosis who presented with worsening shortness of breath and cough found to have increased work of breathing in the emergency room and placed on BiPAP, admitted to intensive care unit on 12/3/2018.    Problem List:   1. Acute respiratory failure secondary to COPD exacerbation.  -Continue bronchodilators, steroids and antibiotics Zithromax  -Patient throat swab came back positive for group A strep, Zithromax will cover.  -She is off BiPAP currently not requiring oxygen  -Ambulate the patient as tolerated.  -Smoking cessation counseled  2. Multiple sclerosis no exacerbation.  -Patient ambulatory at the baseline  -Continue her home medications as ordered  3. Anxiety depression  -Continue her PTA home medications  4. Restless leg syndrome.  -Continue her home medications    DVT Prophylaxis: Enoxaparin (Lovenox) SQ  Code Status: Full Code  Discharge Dispo: Be transferred to general medical floor  Estimated Disch Date / # of Days until Disch: 2 more days in the hospital.        Interval History (Subjective):      Patient seen and examined, admitted early this morning, assumed care today, H&P reviewed, no new issues, feels better, breathing improved, continue to have cough                  Physical Exam:      Last Vital Signs:  /80  Temp 98.1  F (36.7  C) (Oral)  Resp 28  Ht 1.702 m (5' 7\")  Wt 56.8 kg (125 lb 3.5 oz)  SpO2 99%  BMI 19.61 kg/m2       Wt Readings from Last 1 Encounters:   12/04/18 56.8 kg (125 lb 3.5 oz)     Current Facility-Administered Medications   Medication     albuterol (PROAIR HFA/PROVENTIL HFA/VENTOLIN HFA) 108 (90 Base) MCG/ACT inhaler 2 puff     ALPRAZolam (XANAX XR) 24 hr tablet 1 mg     " amphetamine-dextroamphetamine (ADDERALL) per tablet 10 mg     aspirin-acetaminophen-caffeine (EXCEDRIN MIGRAINE) per tablet 1 tablet     azithromycin (ZITHROMAX) tablet 250 mg     baclofen (LIORESAL) tablet 20 mg     celecoxib (celeBREX) capsule 100 mg     enoxaparin (LOVENOX) injection 40 mg     esomeprazole (nexIUM) CR capsule 40 mg     ferrous sulfate (FEROSUL) tablet 325 mg     ipratropium - albuterol 0.5 mg/2.5 mg/3 mL (DUONEB) neb solution 3 mL     ipratropium - albuterol 0.5 mg/2.5 mg/3 mL (DUONEB) neb solution 3 mL     LORazepam (ATIVAN) injection 0.5 mg     methylPREDNISolone sodium succinate (solu-MEDROL) injection 62.5 mg     multivitamin, therapeutic (THERA-VIT) tablet 1 tablet     naloxone (NARCAN) injection 0.1-0.4 mg     ondansetron (ZOFRAN-ODT) ODT tab 4 mg    Or     ondansetron (ZOFRAN) injection 4 mg     oxyCODONE-acetaminophen (PERCOCET)  MG per tablet 1 tablet     prochlorperazine (COMPAZINE) injection 10 mg    Or     prochlorperazine (COMPAZINE) tablet 10 mg    Or     prochlorperazine (COMPAZINE) Suppository 25 mg     promethazine (PHENERGAN) tablet 25 mg     rOPINIRole (REQUIP) tablet 2 mg     rOPINIRole (REQUIP) tablet 4 mg     umeclidinium-vilanterol (ANORO ELLIPTA) 62.5-25 MCG/INH oral inhaler 1 puff       Constitutional: Awake, alert, cooperative, no apparent distress   Respiratory:  Good air entry bilaterally, scattered wheezing.   Cardiovascular: Regular rate and rhythm, normal S1 and S2, and no murmur noted   Abdomen: Normal bowel sounds, soft, non-distended, non-tender   Skin: No rashes, no cyanosis, dry to touch   Neuro: Alert and oriented x3, no weakness, numbness, memory loss   Extremities: No edema, normal range of motion   Other(s):HEENT  Pink, nonicteric, moist oral mucosa       All other systems: Negative          Medications:      All current medications were reviewed with changes reflected in problem list.         Data:      All new lab and imaging data was reviewed.    Labs:    Recent Labs  Lab 12/04/18  0002      POTASSIUM 3.6   CHLORIDE 108   CO2 25   ANIONGAP 8   *   BUN 25   CR 0.99   GFRESTIMATED 59*   GFRESTBLACK 71   MARIO 8.7       Recent Labs  Lab 12/04/18  0002   WBC 10.4   HGB 14.1   HCT 42.3   MCV 92         Imaging:   Results for orders placed or performed during the hospital encounter of 12/03/18   XR Chest 2 Views    Narrative    XR CHEST 2 VW  12/4/2018 1:04 AM     HISTORY: Cough, shortness of breath.    COMPARISON: 1/26/2016.    FINDINGS: The heart size is normal. The lungs are clear. No  pneumothorax or pleural effusion.      Impression    IMPRESSION: No acute abnormality.    RON RUIZ MD

## 2018-12-04 NOTE — PLAN OF CARE
Problem: Patient Care Overview  Goal: Plan of Care/Patient Progress Review  Outcome: No Change  .ICU End of Shift Summary.  For vital signs and complete assessments, please see documentation flowsheets.     Pertinent assessments: New admission from ED around 0330.  Patient able to stand from cart to bed.  Patient was on bipap 50% FiO2.  LS diminished; some wheezes along with nonproductive cough.  Patient is alert/oriented; seems anxious at first but patient was able to relax after getting her settled.  VSS.  Right hand PIV S.L.  Patient is getting scheduled nebs along with IV Solumedrol and Zithromax.  Currently is NPO.  Unable to do a full skin assessment as patient did not want to take her jeans off yet.  Patient was able to sleep since admission.    Major Shift Events: O2 weaned down to 40% on Bipap.    Plan (Upcoming Events): Patient concerned about home medications.    Discharge/Transfer Needs: TBD.      Bedside Shift Report Completed :   Bedside Safety Check Completed:

## 2018-12-04 NOTE — ED PROVIDER NOTES
History     Chief Complaint:  Cough    HPI   Hina Yap is a 53 year old female with a history of COPD exacerbations and acute respiratory failure with hypoxia who presents with concern for cough. The patient reports that she has been congested and wheezing for about one week. She denies any sputum or productive cough. She also notes sore throat, sneezing and ear pain over this time. She notes that over the last two days, her cough and chest discomfort have increased. She states that this chest pain has worsened and is constant. Her cough was worsening tonight and prompted her evaluation in the emergency department. She denies any sick contacts, recent travel, or receiving a flu vaccination. She states she smokes a half a pack per day.    Allergies:  Sulfa drugs  Wellbutrin    Medications:    Chantix  Spiriva  Requip  Phenergan  Deltasone  Percocet  Lamotrigine  Nexium  Celebrex  Lioresal  Adderall  Xanax  Albuterol  Duoneb    Past Medical History:    Anxiety  Anemia  Acute respiratory failure with hypoxia  Restless leg  GERD  MS  COPD    Past Surgical History:    The patient does not have any pertinent past surgical history.    Family History:    No past pertinent family history.    Social History:    Marital Status:  Single [1]  Former smoker: 0.5 ppd, 35 years, quit 2013  Positive for alcohol use.     Review of Systems   Constitutional: Negative for chills and fever.   HENT: Positive for congestion, ear pain, sneezing and sore throat.    Respiratory: Positive for cough.    Cardiovascular: Positive for chest pain.   All other systems reviewed and are negative.        Physical Exam     Patient Vitals for the past 24 hrs:   BP Temp Temp src Heart Rate Resp SpO2   12/04/18 0200 125/73 - - 73 28 99 %   12/04/18 0154 - - - 72 28 100 %   12/04/18 0145 127/84 - - 72 18 -   12/04/18 0130 122/68 - - 75 (!) 36 -   12/04/18 0116 - - - 75 (!) 38 -   12/04/18 0115 140/85 - - - - -   12/04/18 0046 - - - - - 95 %    12/04/18 0045 132/74 - - 70 (!) 35 -   12/04/18 0036 - - - - (!) 31 -   12/04/18 0030 136/78 - - - - 95 %   12/04/18 0015 139/82 - - - - 94 %   12/03/18 2349 (!) 151/128 97.7  F (36.5  C) Oral 91 24 98 %         Physical Exam    General:                        Well-nourished                        Speaking in full sentences                        Intermittent, harsh sounding cough             Tachypneic  Eyes:                        Conjunctiva without injection or scleral icterus  ENT:                        Moist mucous membranes                        Nares patent                        Pinnae normal  Neck:                        Full ROM                        No stiffness appreciated  Resp:                        Diffuse expiratory wheezing                        Prolonged expiratory phase                        No focal crackles  CV:                                        Normal rate, regular rhythm                        S1 and S2 present                        No murmur, gallop or rub  GI:                        BS present                        Abdomen soft without distention                        Non-tender to light and deep palpation                        No guarding or rebound tenderness  Skin:                        Warm, dry, well perfused                        No rashes or open wounds on exposed skin  MSK:                        Moves all extremities                        No focal deformities or swelling                        No calf tenderness  Neuro:                        Alert                        Answers questions appropriately                        Moves all extremities equally                        Gait stable  Psych:                        Normal affect, normal mood    Emergency Department Course   ECG:  Indication: cough  Time: 0005  Vent. Rate 81 bpm. NH interval 138. QRS duration 94. QT/QTc 416/483. P-R-T axis 80 63 35.  Normal sinus rhythm. Nonspecific St and T wave abnormality.  Abnormal ECG. Read time: 0000      Imaging:  Radiographic findings were communicated with the patient who voiced understanding of the findings.    XR Chest 2 views:   No acute abnormality As per radiology.     Laboratory:    CBC: WBC: 10.4, HGB: 14.1, PLT: 324  BMP: Glucose 113 (H), GFR: 59 (L), o/w WNL (Creatinine: 0.99)    0002 Troponin: <0.015  Influenza: Negative    Interventions:    0009 Solu-Medrol 125 mg IV   DUONEB 3 mL nebulization  0109 Proventil 2.5 mg Nebulization  0204 Zithromax 500 mg IV    Emergency Department Course:    Nursing notes and vitals reviewed. (2350) I performed an exam of the patient as documented above.     IV inserted. Medicine administered as documented above. Blood drawn. This was sent to the lab for further testing, results above.    The patient was sent for a CXR while in the emergency department, findings above.     (0049) I rechecked the patient and discussed the results of her workup thus far.     (0052) Order for BIPAP given.    (0133) Recheck.    (0206)  I consulted with Dr. Cerna of the hospitalist services. They are in agreement to accept the patient for admission.    Findings and plan explained to the Patient who consents to admission. Discussed the patient with Dr. Cerna, who will admit the patient to a ICU bed for further monitoring, evaluation, and treatment.      Impression & Plan      Medical Decision Making:  Hina Yap is a 53-year-old female with a past medical history significant for COPD, presenting to the emergency department for evaluation of a cough and shortness of breath.  Her symptoms are present are most consistent with acute bronchitis, and COPD exacerbation.  Her initial pulmonary exam revealed tachypnea, diffuse expiratory wheezing, and prolonged expiratory phase.  She was provided IV Solu-Medrol, 3 duo nebs, and a single albuterol neb with improvement in her degree of wheezing.  Influenza swab returned negative.  Of note, patient's work of  breathing was noted to be increased with tachypnea.  She was placed on BiPAP, with further improvement in her symptoms.  VBG reveals mild alkalosis, with low PCO2 which is likely 2/2 hyperventilation.  Chest x-ray is negative for acute infiltrate, though given patient's acute exacerbation of underlying bronchitis, she was provided 1 dose of azithromycin.  I do not feel her current presentation is consistent with PE nor ACS.  EKG demonstrates sinus rhythm with nonspecific ST-T wave changes.  Troponin is undetectable.  No other focal evidence of acute otitis media, otitis externa or mastoiditis.  Patient will be admitted to the hospitalist service for management of her COPD exacerbation.  Questions answered prior to admission.    Diagnosis:    ICD-10-CM    1. Cough R05 CBC with platelets differential     Basic metabolic panel     Troponin I     Influenza A/B antigen     Blood gas venous and oxyhgb   2. COPD exacerbation (H) J44.1        Disposition:  Admitted to ICU bed under care of Dr. Cerna.    Scribe Disclosure:  ILawrence, am serving as a scribe on 12/3/2018 at 11:45 PM to personally document services performed by Yang Jimenez MD based on my observations and the provider's statements to me.       Lawrence Newell  12/3/2018   [facility] EMERGENCY DEPARTMENT       Yang Jimenez MD  12/04/18 0701

## 2018-12-04 NOTE — ED NOTES
Pt placed on Bipap. Still with harsh cough frequently. Pt states her fingers are becoming numb. Educated pt that it is due to rapid breathing. RR 36-40.

## 2018-12-04 NOTE — PLAN OF CARE
Problem: Chronic Obstructive Pulmonary Disease (Adult)  Goal: Signs and Symptoms of Listed Potential Problems Will be Absent, Minimized or Managed (Chronic Obstructive Pulmonary Disease)  Signs and symptoms of listed potential problems will be absent, minimized or managed by discharge/transition of care (reference Chronic Obstructive Pulmonary Disease (Adult) CPG).   Outcome: Improving  ICU End of Shift Summary.  For vital signs and complete assessments, please see documentation flowsheets.     Pertinent assessments: pt was very anxious and tachypneic on bipap.  bipap removed. Pt doing well on RA. No SOB. Lungs dim. Nebs helped a lot pt states. IV steroids given. Pt positive for Strep A. On Zithromax PO. Does complain of sore throat. Non productive cough occasionally. Afebrile. Pt feeling much better.   Pt doing much better.  Major Shift Events:  none  Plan (Upcoming Events): continue nebs and abx.   Discharge/Transfer Needs:  Care coordinator ordered.     Bedside Shift Report Completed : yes  Bedside Safety Check Completed: yes

## 2018-12-04 NOTE — H&P
[facility]    History and Physical  Hospitalist    Name: Hina Yap    MRN: [id number]  YOB: 1965    Age: 53 year old  Primary care provider: Sunshine Butterfield       Date of Admission:  12/3/2018  Date of Service (when I saw the patient): 12/04/18    Assessment & Plan   Hina Yap is a 53 year old female with a past medical history significant for COPD, anxiety, multiple sclerosis who presents with shortness of breath.  She was found to have increased work of breathing in the ER and tachypnea, placed on BiPAP and being admitted to the ICU.    #Acute respiratory failure requiring BiPAP treatment.  Admit the patient to ICU.  Suspect this is due to COPD exacerbation.  No obvious signs of pneumonia.  Appears quite comfortable at present.  Continue supportive cares, BiPAP.  Hopefully she could be weaned off of that soon.    #Concern for COPD exacerbation.  Her description of symptoms suggest a recent viral URI. Patient used to be on Spiriva and Advair, although states she has not taken her inhalers for many months now.  She continues to smoke cigarettes as well.  We will continue her on scheduled nebulizers, IV steroids.  Will use azithromycin which was started in the ER.  No evidence of pneumonia.  Probably benefit from follow-up with pulmonary medicine.  She did have some signs of bronchiectasis seen on previous imaging.  Counseled on the importance of smoking cessation    #Previous history of cardiomyopathy.  Patient had an echo in 2015 which showed EF of 45-50% and some wall motion abnormalities.  She had an angiogram which was reportedly negative for significant ischemic disease.  There was some thought that this was due to vasospastic episode, or stress cardiomyopathy.  Her current symptoms do not appear to be of cardiac etiology, however I will proceed with a echocardiogram to further evaluate.    #Multiple sclerosis.  Appears to be stable, no acute issues at  present.  #Anxiety and depression  #Restless leg syndrome  #Esophageal reflux    Patient's baseline home medications will need to be resumed in the morning once the medication list has been confirmed by the pharmacy    DVT Prophylaxis: Enoxaparin (Lovenox) SQ  Code Status: Full Code    Disposition: Expected discharge in 3 days once respiratory status improved     Plan of care was discussed with the patient in great detail. All of the questions were answered extensively. Patient is comfortable with the plan and agrees with it.     Neelam Cerna    Chief Complaint   SOB    History is obtained from the patient and her SO at bedside    Case discussed with ER provider Dr. Jimenez    History of Present Illness   Hina Yap is a 53 year old female who presents with cough and shortness of breath.  Patient has been having symptoms present for about 1 week now.  She has been having feeling of congestion.  Also has been having some wheezing.  Cough is not much productive.  Denies any sick contacts.  Also has been having some throat pain.  With worsening cough she is also noted some chest discomfort.  Her cough became a lot more worse today and she came to the ER.  In the ER she was noticed to have a frequent harsh cough and increased respiratory rate.  It does not seem like she was markedly hypoxemic.  She was placed on BiPAP therapy with improvement.  At the time of my exam she appears comfortable and able to speak in sentences, does not like the BiPAP mask     Past Medical History    I have reviewed this patient's medical history and updated it with pertinent information if needed.   Past Medical History:   Diagnosis Date     Anxiety      COPD (chronic obstructive pulmonary disease) (H)      GERD (gastroesophageal reflux disease)      Neuromuscular disorder (H)      Restless leg syndrome      Tobacco use disorder        Past Surgical History   I have reviewed this patient's surgical history and updated it with  pertinent information if needed.  No past surgical history on file.    Prior to Admission Medications   Prior to Admission Medications   Prescriptions Last Dose Informant Patient Reported? Taking?   ALPRAZolam (XANAX XR) 1 MG 24 hr tablet  Self Yes No   Sig: Take 1 mg by mouth 2 times daily as needed    FERROUS FUMARATE PO   Yes No   LAMOTRIGINE PO   Yes No   acetaminophen-codeine (TYLENOL WITH CODEINE #3) 300-30 MG per tablet   No No   Sig: Take 1 tablet by mouth every 6 hours as needed for pain   albuterol (PROAIR HFA/PROVENTIL HFA/VENTOLIN HFA) 108 (90 BASE) MCG/ACT Inhaler   No No   Sig: Inhale 2 puffs into the lungs 4 times daily   amphetamine-dextroamphetamine (ADDERALL) 10 MG tablet  Self Yes No   Sig: Take 10 mg by mouth daily    aspirin-acetaminophen-caffeine (EXCEDRIN MIGRAINE) 250-250-65 MG per tablet  Self Yes No   Sig: Take 1 tablet by mouth every 6 hours as needed for headaches   baclofen (LIORESAL) 10 MG tablet  Self No No   Sig: Take 1 tablet (10 mg) by mouth 4 times daily   celecoxib (CELEBREX) 200 MG capsule  Self No No   Sig: Take 1 capsule (200 mg) by mouth daily   esomeprazole (NEXIUM) 40 MG capsule  Self No No   Sig: Take 1 capsule (40 mg) by mouth At Bedtime Take at night   fluticasone-salmeterol (ADVAIR) 250-50 MCG/DOSE diskus inhaler   No No   Sig: Inhale 1 puff into the lungs 2 times daily   Patient not taking: Reported on 11/30/2017   ipratropium - albuterol 0.5 mg/2.5 mg/3 mL (DUONEB) 0.5-2.5 (3) MG/3ML neb solution   No No   Sig: Take 1 vial (3 mLs) by nebulization every 6 hours as needed for shortness of breath / dyspnea or wheezing   multivitamin, therapeutic (THERA-VIT) TABS  Self Yes No   Sig: Take 1 tablet by mouth daily   order for DME   No No   Sig: Equipment being ordered: Cane ()  Treatment Diagnosis: Multiple sclerosis   order for DME   No No   Sig: Equipment being ordered: Nebulizer   Patient not taking: Reported on 11/30/2017   oxyCODONE-acetaminophen (PERCOCET)   MG per tablet  Self Yes No   Sig: Take 1 tablet by mouth every 6 hours as needed.   promethazine (PHENERGAN) 25 MG tablet  Self No No   Sig: Take 1 tablet (25 mg) by mouth every 6 hours as needed for nausea (takes with percocet to prevent nausea)   rOPINIRole (REQUIP) 2 MG tablet  Self No No   Sig: Take 1 tablet (2 mg) by mouth See Admin Instructions Take 1 tablet in the morning and 2 tablets at bedtime daily.   tiotropium (SPIRIVA HANDIHALER) 18 MCG capsule   No No   Sig: Inhale contents of one capsule daily.   Patient not taking: Reported on 11/30/2017      Facility-Administered Medications: None     Allergies   Allergies   Allergen Reactions     Sulfa Drugs Rash     Adhesive Tape Rash     Reacts to adhesive on fentanyl patch     Wellbutrin [Bupropion Hydrobromide] Rash       Social History   I have reviewed this patient's social history and updated it with pertinent information if needed.   Social History     Social History     Marital status: Single     Spouse name: N/A     Number of children: N/A     Years of education: N/A     Occupational History     Not on file.     Social History Main Topics     Smoking status: Current Every Day Smoker     Packs/day: 0.50     Years: 35.00     Types: Cigarettes     Last attempt to quit: 7/8/2013     Smokeless tobacco: Never Used     Alcohol use 0.0 oz/week     0 Standard drinks or equivalent per week      Comment: 1 time per week, 3 per time     Drug use: No     Sexual activity: Not Currently     Other Topics Concern     Not on file     Social History Narrative         Family History   Reviewed and non-contributory of admission     Review of Systems   The 10 point Review of Systems is negative other than noted in the HPI or here.     Physical Exam   Temp: 96.9  F (36.1  C) Temp src: Axillary BP: 123/81   Heart Rate: 70 Resp: 28 SpO2: 100 % O2 Device: BiPAP/CPAP    Vital Signs with Ranges  Temp:  [96.9  F (36.1  C)-97.7  F (36.5  C)] 96.9  F (36.1  C)  Heart Rate:  [70-91]  70  Resp:  [18-38] 28  BP: (110-151)/() 123/81  FiO2 (%):  [50 %] 50 %  SpO2:  [94 %-100 %] 100 %  125 lbs 3.54 oz    GENERAL:  Awake and alert, Comfortable appearing, No acute distress. BiPAP mask on face  PSYCH: Cooperative, no hallucinations   EYES: EOMI, conjunctiva clear  HEENT:  Head is normocephalic, atraumatic, Neck is Supple, trachea is midline   CARDIOVASCULAR: Regular rate and rhythm, Normal S1, S2, no loud murmurs, no rubs or gallops.   PULMONARY:  Decent air entry, mild wheezing   CHEST: Good inspiratory effort bilaterally   GI: Abdomen is soft, non tender, non-distended, no masses palpated, normal bowel sounds. No rebound or guarding   SKIN:  Dry, warm to touch. No obvious exanthems on exposed areas  EXTREMITIES:  Good capillary refill with signs of adequate peripheral perfusion. No peripheral edema   Neuro: Awake and oriented x 3. Moving all extremities   MSK: Good range of motion in all major joints of upper and lower extremity, No acute joint synovitis of the major joints is noted.     Data   Data reviewed today:  I personally reviewed .    All lab data and imaging results from today have been reviewed.       Recent Labs  Lab 12/04/18  0002   WBC 10.4   HGB 14.1   MCV 92         POTASSIUM 3.6   CHLORIDE 108   CO2 25   BUN 25   CR 0.99   ANIONGAP 8   MARIO 8.7   *   TROPI <0.015       Recent Results (from the past 24 hour(s))   XR Chest 2 Views    Narrative    XR CHEST 2 VW  12/4/2018 1:04 AM     HISTORY: Cough, shortness of breath.    COMPARISON: 1/26/2016.    FINDINGS: The heart size is normal. The lungs are clear. No  pneumothorax or pleural effusion.      Impression    IMPRESSION: No acute abnormality.

## 2018-12-05 VITALS
DIASTOLIC BLOOD PRESSURE: 79 MMHG | BODY MASS INDEX: 19.65 KG/M2 | SYSTOLIC BLOOD PRESSURE: 129 MMHG | OXYGEN SATURATION: 99 % | HEART RATE: 72 BPM | TEMPERATURE: 96.7 F | WEIGHT: 125.22 LBS | RESPIRATION RATE: 20 BRPM | HEIGHT: 67 IN

## 2018-12-05 LAB
GLUCOSE BLDC GLUCOMTR-MCNC: 127 MG/DL (ref 70–99)
GLUCOSE BLDC GLUCOMTR-MCNC: 131 MG/DL (ref 70–99)
GLUCOSE BLDC GLUCOMTR-MCNC: 172 MG/DL (ref 70–99)
GLUCOSE BLDC GLUCOMTR-MCNC: 88 MG/DL (ref 70–99)

## 2018-12-05 PROCEDURE — 25000132 ZZH RX MED GY IP 250 OP 250 PS 637: Performed by: INTERNAL MEDICINE

## 2018-12-05 PROCEDURE — 90682 RIV4 VACC RECOMBINANT DNA IM: CPT | Performed by: INTERNAL MEDICINE

## 2018-12-05 PROCEDURE — 94640 AIRWAY INHALATION TREATMENT: CPT | Mod: 76

## 2018-12-05 PROCEDURE — 99239 HOSP IP/OBS DSCHRG MGMT >30: CPT | Performed by: INTERNAL MEDICINE

## 2018-12-05 PROCEDURE — 25000125 ZZHC RX 250: Performed by: INTERNAL MEDICINE

## 2018-12-05 PROCEDURE — 25000128 H RX IP 250 OP 636: Performed by: INTERNAL MEDICINE

## 2018-12-05 PROCEDURE — 94640 AIRWAY INHALATION TREATMENT: CPT

## 2018-12-05 PROCEDURE — 00000146 ZZHCL STATISTIC GLUCOSE BY METER IP

## 2018-12-05 PROCEDURE — 25000132 ZZH RX MED GY IP 250 OP 250 PS 637

## 2018-12-05 PROCEDURE — 40000275 ZZH STATISTIC RCP TIME EA 10 MIN

## 2018-12-05 RX ORDER — ALBUTEROL SULFATE 90 UG/1
2 AEROSOL, METERED RESPIRATORY (INHALATION) 4 TIMES DAILY
Qty: 1 INHALER | Refills: 1 | Status: SHIPPED | OUTPATIENT
Start: 2018-12-05 | End: 2020-04-06

## 2018-12-05 RX ORDER — PREDNISONE 10 MG/1
TABLET ORAL
Qty: 20 TABLET | Refills: 0 | Status: SHIPPED | OUTPATIENT
Start: 2018-12-05 | End: 2018-12-13

## 2018-12-05 RX ORDER — IPRATROPIUM BROMIDE AND ALBUTEROL SULFATE 2.5; .5 MG/3ML; MG/3ML
1 SOLUTION RESPIRATORY (INHALATION) EVERY 6 HOURS PRN
Qty: 30 VIAL | Refills: 1 | Status: SHIPPED | OUTPATIENT
Start: 2018-12-05 | End: 2020-04-06

## 2018-12-05 RX ORDER — AZITHROMYCIN 250 MG/1
250 TABLET, FILM COATED ORAL AT BEDTIME
Qty: 3 TABLET | Refills: 0 | Status: SHIPPED | OUTPATIENT
Start: 2018-12-05 | End: 2018-12-13

## 2018-12-05 RX ORDER — ALBUTEROL SULFATE 90 UG/1
2 AEROSOL, METERED RESPIRATORY (INHALATION) EVERY 4 HOURS PRN
Status: DISCONTINUED | OUTPATIENT
Start: 2018-12-05 | End: 2018-12-05 | Stop reason: HOSPADM

## 2018-12-05 RX ADMIN — METHYLPREDNISOLONE SODIUM SUCCINATE 62.5 MG: 125 INJECTION, POWDER, FOR SOLUTION INTRAMUSCULAR; INTRAVENOUS at 08:30

## 2018-12-05 RX ADMIN — BACLOFEN 20 MG: 10 TABLET ORAL at 13:33

## 2018-12-05 RX ADMIN — ALPRAZOLAM 1 MG: 0.5 TABLET, EXTENDED RELEASE ORAL at 09:26

## 2018-12-05 RX ADMIN — OXYCODONE AND ACETAMINOPHEN 1 TABLET: 10; 325 TABLET ORAL at 09:28

## 2018-12-05 RX ADMIN — INFLUENZA A VIRUS A/MICHIGAN/45/2015 (H1N1) RECOMBINANT HEMAGGLUTININ ANTIGEN, INFLUENZA A VIRUS A/SINGAPORE/INFIMH-16-0019/2016 (H3N2) RECOMBINANT HEMAGGLUTININ ANTIGEN, INFLUENZA B VIRUS B/MARYLAND/15/2016 RECOMBINANT HEMAGGLUTININ ANTIGEN, AND INFLUENZA B VIRUS B/PHUKET/3073/2013 RECOMBINANT HEMAGGLUTININ ANTIGEN 0.5 ML: 45; 45; 45; 45 INJECTION INTRAMUSCULAR at 17:10

## 2018-12-05 RX ADMIN — NICOTINE POLACRILEX 2 MG: 2 GUM, CHEWING ORAL at 13:36

## 2018-12-05 RX ADMIN — IPRATROPIUM BROMIDE AND ALBUTEROL SULFATE 3 ML: .5; 3 SOLUTION RESPIRATORY (INHALATION) at 11:15

## 2018-12-05 RX ADMIN — ACETAMINOPHEN, ASPIRIN AND CAFFEINE 1 TABLET: 250; 250; 65 TABLET, FILM COATED ORAL at 09:29

## 2018-12-05 RX ADMIN — ROPINIROLE HYDROCHLORIDE 2 MG: 1 TABLET, FILM COATED ORAL at 09:27

## 2018-12-05 RX ADMIN — THERA TABS 1 TABLET: TAB at 09:29

## 2018-12-05 RX ADMIN — PROMETHAZINE HYDROCHLORIDE 25 MG: 25 TABLET ORAL at 09:28

## 2018-12-05 RX ADMIN — CELECOXIB 100 MG: 100 CAPSULE ORAL at 09:28

## 2018-12-05 RX ADMIN — IPRATROPIUM BROMIDE AND ALBUTEROL SULFATE 3 ML: .5; 3 SOLUTION RESPIRATORY (INHALATION) at 15:22

## 2018-12-05 RX ADMIN — Medication 1 LOZENGE: at 08:44

## 2018-12-05 RX ADMIN — IPRATROPIUM BROMIDE AND ALBUTEROL SULFATE 3 ML: .5; 3 SOLUTION RESPIRATORY (INHALATION) at 05:50

## 2018-12-05 RX ADMIN — DEXTROAMPHETAMINE SACCHARATE, AMPHETAMINE ASPARTATE, DEXTROAMPHETAMINE SULFATE AND AMPHETAMINE SULFATE 10 MG: 2.5; 2.5; 2.5; 2.5 TABLET ORAL at 09:27

## 2018-12-05 RX ADMIN — ENOXAPARIN SODIUM 40 MG: 40 INJECTION SUBCUTANEOUS at 09:30

## 2018-12-05 RX ADMIN — BACLOFEN 20 MG: 10 TABLET ORAL at 09:27

## 2018-12-05 ASSESSMENT — ACTIVITIES OF DAILY LIVING (ADL)
ADLS_ACUITY_SCORE: 16

## 2018-12-05 NOTE — PROGRESS NOTES
Discharge Planner   Discharge Plans in progress: yes  Barriers to discharge plan: none  Follow up plan: scheduled for f/u with [facility].        Entered by: Aditi Knox 12/05/2018 10:58 AM

## 2018-12-05 NOTE — PROGRESS NOTES
Care Transition Initial Assessment - RN    Reason For Consult: care coordination/care conference, discharge planning   Met with: Patient.  DATA   Active Problems:    COPD exacerbation (H)       Cognitive Status: awake, alert and oriented.  [facility]   Primary Care MD Name: REGLA [place]  Contact information and PCP information verified: Yes  Lives With: significant other                    Insurance concerns: No Insurance issues identified  ASSESSMENT  Patient currently receives the following services:  none        Identified issues/concerns regarding health management: Pt is admitted with a COPD exacerbation.  We did discuss the COPD Action care plan.  She said she occasionally sees her primary care provider.  She prefers to schedule this appt on her own.  However, she used to see a pulmonologist, but couldn't remember their name.  Pt saw Dr Nash and Dr Rodriguez with [facility] in the past. (June 2017).  She would like to f/u with them.  I scheduled pt with Dr Nash in [place] as Dr Rodriguez isn't available till Feb 1st in [place].  She said in the past they had changed her medications for her COPD and then when she started her new medications she only took them sporadically.  She then had a lapse in her follow up and then no longer had any refills.  She still smokes a 1/2 PPD.  She is in contemplation about quitting.  However, she has been at this phase for awhile. She has tried chantix and other smoking cessation methods.  She would like to try nicotine gum.  Bedside RN notified.  She has a neb machine, but she said the tubing is kinked so it's not working well.  I paged RT to give her tubing to go home with for machine.  She is on RA currently and hasn't had home 02 in the past.  She has MS.  She follows with Dr Miko Brown with the [facility].        PLAN  Patient given options and choices for discharge YES .  Patient/family is agreeable to the plan?  Yes:   Patient anticipates  discharging to home .        Patient anticipates needs for home equipment: Yes, nebulizer machine tubing.  RT notified of request  Plan/Disposition: Home   Appointments:  She prefers to make her own f/u appt with primary.      [facility]-Dr Nash-Monday January 7th at 2:45  [address], [place]     [phone]  Care  (CTS) will continue to follow as needed.    Sarah PRADO CTS [id number]

## 2018-12-05 NOTE — DISCHARGE INSTRUCTIONS
[facility]-Dr Nash-Monday January 7th at 2:45  [address], [place]     [phone]

## 2018-12-05 NOTE — DISCHARGE SUMMARY
Admit Date:     12/03/2018   Discharge Date:     12/05/2018      PRIMARY CARE PHYSICIAN:  Sunshine [place].      DISCHARGE DIAGNOSES:   1.  Acute respiratory failure secondary to chronic obstructive pulmonary disease exacerbation.   2.  Multiple sclerosis without flare.   3.  Anxiety.   4.  Restless leg syndrome.   5.  Group A Strep pharyngitis.      DISPOSITION:  Home.      DISCHARGE MEDICATIONS:  Reviewed and reconciled as in electronic medical record.        Review of your medicines      START taking      Dose / Directions   nicotine 2 MG gum  Commonly known as:  NICORETTE  Used for:  Tobacco dependence syndrome      Dose:  2 mg  Place 1 each (2 mg) inside cheek every hour as needed for smoking cessation  Quantity:  30 tablet  Refills:  1        CONTINUE these medicines which have NOT CHANGED      Dose / Directions   ADDERALL 10 MG tablet  Generic drug:  amphetamine-dextroamphetamine      Dose:  10 mg  Take 10 mg by mouth daily  Refills:  0     albuterol 108 (90 Base) MCG/ACT inhaler  Commonly known as:  PROAIR HFA/PROVENTIL HFA/VENTOLIN HFA  Used for:  Chronic obstructive pulmonary disease with acute lower respiratory infection (H)      Dose:  2 puff  Inhale 2 puffs into the lungs 4 times daily  Quantity:  1 Inhaler  Refills:  1     ALPRAZolam 1 MG 24 hr tablet  Commonly known as:  XANAX XR      Dose:  1 mg  Take 1 mg by mouth 2 times daily  Refills:  0     baclofen 20 MG tablet  Commonly known as:  LIORESAL      Dose:  20 mg  Take 20 mg by mouth 4 times daily  Refills:  0     esomeprazole 40 MG DR capsule  Commonly known as:  nexIUM  Used for:  Gastroesophageal reflux disease, esophagitis presence not specified      Dose:  40 mg  Take 1 capsule (40 mg) by mouth At Bedtime Take at night  Quantity:  30 capsule  Refills:  0     ferrous sulfate 325 (65 Fe) MG tablet  Commonly known as:  FEROSUL      Dose:  325 mg  Take 325 mg by mouth daily as needed  Refills:  0     ipratropium - albuterol 0.5 mg/2.5 mg/3 mL  0.5-2.5 (3) MG/3ML neb solution  Commonly known as:  DUONEB  Used for:  SOB (shortness of breath)      Dose:  1 vial  Take 1 vial (3 mLs) by nebulization every 6 hours as needed for shortness of breath / dyspnea or wheezing  Quantity:  30 vial  Refills:  1     meloxicam 7.5 MG tablet  Commonly known as:  MOBIC      Dose:  7.5 mg  Take 7.5 mg by mouth 2 times daily  Refills:  0     multivitamin, therapeutic Tabs tablet      Dose:  1 tablet  Take 1 tablet by mouth daily  Refills:  0     * order for DME  Used for:  Multiple sclerosis      Equipment being ordered: Cane ()  Treatment Diagnosis: Multiple sclerosis  Quantity:  1 Device  Refills:  0     * order for DME  Used for:  SOB (shortness of breath)      Equipment being ordered: Nebulizer  Quantity:  1 each  Refills:  0     PERCOCET  MG per tablet  Generic drug:  oxyCODONE-acetaminophen      Dose:  1 tablet  Take 1 tablet by mouth every 6 hours as needed.  Refills:  0     promethazine 25 MG tablet  Commonly known as:  PHENERGAN  Used for:  Multiple sclerosis      Dose:  25 mg  Take 1 tablet (25 mg) by mouth every 6 hours as needed for nausea (takes with percocet to prevent nausea)  Quantity:  56 tablet  Refills:  0     * rOPINIRole 2 MG tablet  Commonly known as:  REQUIP      Dose:  2 mg  Take 2 mg by mouth every morning  Refills:  0     * rOPINIRole 2 MG tablet  Commonly known as:  REQUIP      Dose:  4 mg  Take 4 mg by mouth At Bedtime  Refills:  0     tiotropium-olodaterol 2.5-2.5 MCG/ACT Aers      Dose:  2 puff  Inhale 2 puffs into the lungs daily  Refills:  0         * This list has 4 medication(s) that are the same as other medications prescribed for you. Read the directions carefully, and ask your doctor or other care provider to review them with you.               Where to get your medicines      These medications were sent to [facility] [place], [place] - [address] [address]  [address] [address] [address]     Phone:  [phone]     albuterol 108 (90 Base) MCG/ACT inhaler    ipratropium - albuterol 0.5 mg/2.5 mg/3 mL 0.5-2.5 (3) MG/3ML neb solution    nicotine 2 MG gum       DISCHARGE CONDITION:  Stable.      Reason for your hospital stay    COPD exacerbation, group A strep pharyngitis     Follow-up and recommended labs and tests     Follow up with primary care provider, Sunshine [place], within 7 days for hospital follow- up.     Activity    Your activity upon discharge: activity as tolerated     Full Code     Diet    Follow this diet upon discharge: Orders Placed This Encounter      Regular Diet Adult     DISCHARGE VITAL SIGNS:  Blood pressure 120/70, pulse is 77, temperature 96, oxygen saturation 99% on room air.   HEENT:  Pink, nonicteric.  Extraocular muscle movement intact.   NECK:  Supple, no JVD, no thyromegaly.   CHEST:  Good air entry bilaterally.  No wheezing, crackles or rales.   CARDIOVASCULAR:  S1, S2 were heard.  No gallop or murmur.   ABDOMEN:  Soft, nontender, nondistended, positive bowel sounds.   EXTREMITIES:  No edema, cyanosis or clubbing.   NEUROLOGIC:  No focal neurologic deficit.  Cranial nerves grossly intact.   PSYCHIATRIC:  Normal mood and affect, a little anxious sometimes, not in distress.      PROCEDURES DONE:  None.      CONSULTATIONS:  None.      DISCHARGE DIET:  Regular.      DISCHARGE ACTIVITY:  As tolerated.      DISCHARGE FOLLOWUP:  Primary care provider in 1 week.  The patient has followup with pulmonologist as an outpatient already scheduled for January.      BRIEF HISTORY AND HOSPITAL COURSE:  Hina Yap is a 53-year-old female with past medical history of chronic obstructive pulmonary disease, anxiety, multiple sclerosis without complication or flare who presented with worsening shortness of breath, cough and found to have increased work of breathing in the emergency room, placed on BiPAP and admitted to the Intensive Care Unit on 12/03/2018.  After arrival to ICU, the  patient was weaned off BiPAP to nasal cannula and subsequently also weaned off nasal cannula.  The patient is on bronchodilators, steroids, Solu-Medrol 40 twice a day and also started on Zithromax.  The patient's throat swab was done, which came back positive for group A strep.  The patient was already on Zithromax, which she will continue.  The patient advised on smoking cessation.  She will be on her bronchodilators and nebulizers.  She was discharged on tapering dose of steroids.  All her questions and concerns addressed, showed understanding and being discharged.      Total time spent coordinating her discharge face-to-face was over 30 minutes.         JUANA HERRERA MD             D: 2018   T: 2018   MT: SHAWN      Name:     REY TENORIO   MRN:      [id number]        Account:        [id number]   :      1965           Admit Date:     2018                                  Discharge Date: 2018      Document: [id number]       cc: Sunshine GUAJARDO CNP

## 2018-12-05 NOTE — PLAN OF CARE
Problem: Patient Care Overview  Goal: Plan of Care/Patient Progress Review  Outcome: Adequate for Discharge Date Met: 12/05/18  Pt hospitalized for strep and COPD exacerbation.  Discharge education provided to patient and patient verbalized understanding of medications and orders.  Medications filled at [facility] pharmacy.   Pt verbalized will follow up with primary care provider and lung doctor, appointment scheduled.  Patient discharging to home  And transportation provided by boyfriend. No further questions at this time.

## 2018-12-05 NOTE — PLAN OF CARE
Problem: Patient Care Overview  Goal: Plan of Care/Patient Progress Review  Pt transferred from ICU at 0545.   Vital signs stable.  Pt c\o SOB, O2 sats 88-90% on RA, currently on 2L NC and PRN neb given.   Up with SBA, A&O, calls approp.  PIV saline locked. Iv solu-medrol and oral zithromax.  Pt c/o chest pain, not new pain, declined pain medication at that time.   Possible discharge home today.

## 2018-12-06 ENCOUNTER — PATIENT OUTREACH (OUTPATIENT)
Dept: CARE COORDINATION | Facility: CLINIC | Age: 53
End: 2018-12-06

## 2018-12-06 ENCOUNTER — TELEPHONE (OUTPATIENT)
Dept: PEDIATRICS | Facility: CLINIC | Age: 53
End: 2018-12-06

## 2018-12-06 ASSESSMENT — ACTIVITIES OF DAILY LIVING (ADL): DEPENDENT_IADLS:: INDEPENDENT

## 2018-12-06 NOTE — TELEPHONE ENCOUNTER
Please contact patient for In-patient follow up.  [phone] (home) NONE (work)    Visit date: 12/3 - D/C'd 12/5/2018  Diagnosis listed:Cough, Tobacco Dependence Syndrome  Number of visits in past 12 months:ED 1/ IP 1

## 2018-12-06 NOTE — PROGRESS NOTES
"Clinic Care Coordination Contact    Clinic Care Coordination Contact  OUTREACH    Referral Information:  Referral Source: IP Handoff  RN CC received and reviewed hand off communication letter from inpatient Care Transitions Team:  \"Key Recommendations:: Pt is admitted with a COPD exacerbation.  We did discuss the COPD Action care plan.  She said she occasionally sees her primary care provider.  She prefers to schedule this appt on her own.  However, she used to see a pulmonologist, but couldn't remember their name.  Pt saw Dr Nash and Dr Rodriguez with [facility] in the past. (June 2017).  She would like to f/u with them.  I scheduled pt with Dr aNsh in [place] as Dr Rodriguez isn't available till Feb 1st in [place].  She said in the past they had changed her medications for her COPD and then when she started her new medications she only took them sporadically.  She then had a lapse in her follow up and then no longer had any refills.  She still smokes a 1/2 PPD.  She is in contemplation about quitting.  However, she has been at this phase for awhile. She has tried chantix and other smoking cessation methods.  She would like to try nicotine gum. She has a neb machine, but she said the tubing is kinked so it's not working well.  I paged RT to give her tubing to go home with for machine.  She is on RA currently and hasn't had home 02 in the past. She has MS.  She follows with Dr Miko Brown with the [facility].      Aditi Knox\"    Primary Diagnosis: COPD    Chief Complaint   Patient presents with     Clinic Care Coordination - Post Hospital     COPD Exacerbation        Universal Utilization: No Utilization Concerns  Clinic Utilization  Difficulty keeping appointments: No  Compliance Concerns: Yes  No-Show Concerns: No  No PCP office visit in Past Year: No  Utilization    Last refreshed: 12/6/2018  8:20 AM:  No Show Count (past year) 0       Last refreshed: 12/6/2018  8:20 AM:  ED visits 1       Last refreshed: " "12/6/2018  8:20 AM:  Hospital admissions 1          Current as of: 12/6/2018  8:20 AM             Clinical Concerns:  Current Medical Concerns:  Patient was admitted to inpatient 12/03/18-12/05/18 for acute respiratory failure (requiring ICU stay and BiPap care); also positive strep pharyngitis.  Today, the patient has a notable harsh, productive sounding cough; she reports she just used her nebulizer prior to our outreach.  She offers that she was feeling \"pretty good\" the day of discharge, but has since started to feel a bit more congested again: RN CC reviewed and encouraged patient to use her inhalers and nebulizer regimen as prescribed.     Current Behavioral Concerns:  Alert, oriented; a good historian of recent hospital discharge recommendations; no behavioral concerns identified this outreach encounter        Clinical Pathway: Clinic Care Coordination COPD Assessment  Symptom Review:  How have you been feeling now that you are home? I was feeling better the day that I went home but now I feel a little more short of breath again  Are you having any increased shortness of breath? No  Symptoms  Anxiety: Yes  Chest pain: : No  Chills: No  Cough: Yes  Is your cough:: Productive  Fever: No  Fatigue: Yes  Increased sputum: No  Night sweats: No  Weight change: : No  Wheezing: No  COPD symptoms limiting activities?: Yes  What COPD zone are you currently in?: Yellow  Taking COPD medications as prescribed: : Yes  Took steroids (by mouth) for COPD: Yes      Medication Management:  RN CC reviewed entire medication list; patient reports she is has filled and is taking all of her medications, no new questions or concerns     Functional Status:  Dependent ADLs: Independent  Dependent IADLs: Independent  Bed or wheelchair confined: No  Mobility Status: Independent    Living Situation:  Current living arrangement: I live alone  Patient lives alone, but reports her son and her significant other live nearby and are helpful if " needed    Diet/Exercise/Sleep:  Food Insecurity: No  Tube Feeding: No  Inadequate activity/exercise (GOAL): No    Transportation:  Transportation concerns (GOAL): No  Transportation means:Regular car  If not feeling up to driving, will     Psychosocial:  Mental health DX: Yes  Mental health DX how managed: Medication  Informal Support system:Children, Significant other     Financial/Insurance:   Financial/Insurance concerns (GOAL): No       Resources and Interventions:  Current Resources:      Community Resources: None  Supplies used at home: None  Equipment Currently Used at Home: none    Advance Care Plan/Directive  Advanced Care Plans/Directives on file: No    Referrals Placed: None (discussed pulmonary rehabiliation) RN CC discussed Pulmonary Rehabilitation with patient; she is interested in learning more about this, but does not wish to have the referral placed at this time.         Patient/Caregiver understanding: Patient verbalized understanding, engaged in AIDET communication behavior during encounter.  RN CC noted no post-hospital follow up with PCP was made, and patient does not have pulmonology follow up until 01/07/19; RN CC offered to assist in this, patient agreeable-RN CC transferred patient to [facility] to make follow up with PCP.           Plan:   RN CC will mail patient Introduction to Care Coordination letter; will include supplemental information regarding COPD as well as pulmonary rehabilitation.     RN CC will outreach to patient in follow up in 3-4 weeks.     Ariana Sol RN  Clinic Care Coordinator  [phone]

## 2018-12-06 NOTE — PROGRESS NOTES
Transition Communication Hand-off for Care Transitions to Next Level of Care Provider    Name: Hina Yap  : 1965  MRN #: [id number]  Primary Care Provider: Sunshine [place]  Primary Care MD Name: REGLA [place]  Primary Clinic: [address] [place] [address] [address] [address]  [facility]   Reason for Hospitalization:  Cough [R05]  COPD exacerbation (H) [J44.1]  Admit Date/Time: 12/3/2018 11:43 PM  Discharge Date: 18  Payor Source: Payor: MEDICAID [place] / Plan: [place]ICA[place] / Product Type: Medicaid /     Readmission Assessment Measure (SVETLANA) Risk Score/category: AVERAGE           Reason for Communication Hand-off Referral: Admission diagnoses: COPD  Multiple providers/specialties  Non-adherence to plan of care related to:  Other COPD management    Discharge Plan:       Concern for non-adherence with plan of care:   YES  Discharge Needs Assessment:  Needs       Most Recent Value    # of Referrals Placed by [facility] Internal Clinic Care Coordination, Specialty Providers, Scheduled Follow-up appointments          Already enrolled in Tele-monitoring program and name of program:  na  Follow-up specialty is recommended: Yes    Follow-up plan:  Future Appointments  Date Time Provider Department Center   2018 10:40 AM Sunshine [place], APRN CNP [facility]       Any outstanding tests or procedures:                Reason for your hospital stay       COPD exacerbation, group A strep pharyngitis                 [facility]-Dr Nash- at 2:45  [address] [facility] [place]     [phone]    Key Recommendations:: Pt is admitted with a COPD exacerbation.  We did discuss the COPD Action care plan.  She said she occasionally sees her primary care provider.  She prefers to schedule this appt on her own.  However, she used to see a pulmonologist, but couldn't remember their name.  Pt saw Dr Nahs and Dr Rodriguez with [facility] in the past. (2017).  She  would like to f/u with them.  I scheduled pt with Dr Nash in [place] as Dr Rodriguez isn't available till Feb 1st in [place].  She said in the past they had changed her medications for her COPD and then when she started her new medications she only took them sporadically.  She then had a lapse in her follow up and then no longer had any refills.  She still smokes a 1/2 PPD.  She is in contemplation about quitting.  However, she has been at this phase for awhile. She has tried chantix and other smoking cessation methods.  She would like to try nicotine gum. She has a neb machine, but she said the tubing is kinked so it's not working well.  I paged RT to give her tubing to go home with for machine.  Please verify that she received new tubing for her neb machine.  She is on RA currently and hasn't had home 02 in the past.  She has MS.  She follows with Dr Miko Brown with the [facility].            Aditi Knox    AVS/Discharge Summary is the source of truth; this is a helpful guide for improved communication of patient story

## 2018-12-06 NOTE — TELEPHONE ENCOUNTER
"Pt has an upcoming appointment with pcp on 12/13.     ED Notes:  DISCHARGE FOLLOWUP:  With primary care provider in 1 week.  The patient has followup with pulmonologist as an outpatient already scheduled for January.     ED/Discharge Protocol    \"Hi, my name is Vanessa Healy, a registered nurse, and I am calling on behalf of Sunshine [place] NP's office at [facility].  I am calling to follow up and see how things are going for you after your recent visit.\"    \"I see that you were in the (ER/UC/IP) on 12/4/18.    How are you doing now that you are home?\" Feeling little better, continuing abx & trying to push fluids. Denies any new concerns or trouble breathing.     Is patient experiencing symptoms that may require a hospital visit?  No    Discharge Instructions    \"Let's review your discharge instructions.  What is/are the follow-up recommendations?  Pt. Response: f/u with pcp     \"Were you instructed to make a follow-up appointment?\"  Pt. Response: Yes.  Has appointment been made?   Yes      \"When you see the provider, I would recommend that you bring your discharge instructions with you.    Medications    \"How many new medications are you on since your hospitalization/ED visit?\"    0-1  \"How many of your current medicines changed (dose, timing, name, etc.) while you were in the hospital/ED visit?\"   0-1  \"Do you have questions about your medications?\"   No  \"Were you newly diagnosed with heart failure, COPD, diabetes or did you have a heart attack?\"   No  For patients on insulin: \"Did you start on insulin in the hospital or did you have your insulin dose changed?\"   No    Medication reconciliation completed? Yes    Was MTM referral placed (*Make sure to put transitions as reason for referral)?   No    Call Summary    \"Do you have any questions or concerns about your condition or care plan at the moment?\"    No  Triage nurse advice given: Call us back with any questions.    Patient was in ER 1 in the past year " "(assess appropriateness of ER visits.)      \"If you have questions or things don't continue to improve, we encourage you contact us through the main clinic number,  [phone].  Even if the clinic is not open, triage nurses are available 24/7 to help you.     We would like you to know that our clinic has extended hours (provide information).  We also have urgent care (provide details on closest location and hours/contact info)\"      \"Thank you for your time and take care!\"    Yesenia, RN  Triage Nurse                  "

## 2018-12-06 NOTE — LETTER
[facility] CARE COORDINATION  [address] [place] [address]  December 6, 2018    Hina Yap  [address]  [place] [address]      Dear Hina,    I am a clinic care coordinator who works with Sunshine [place], APRN CNP.I wanted to thank you for spending the time to talk with me.  I wanted to introduce myself and provide you with my contact information so that you can call me with questions or concerns about your health care. Below is a description of clinic care coordination and how I can further assist you.     The clinic care coordinator is a registered nurse and/or  who understand the health care system. The goal of clinic care coordination is to help you manage your health and improve access to the [facility] system in the most efficient manner. The registered nurse can assist you in meeting your health care goals by providing education, coordinating services, and strengthening the communication among your providers. The  can assist you with financial, behavioral, psychosocial, chemical dependency, counseling, and/or psychiatric resources.    Please feel free to contact me at [phone], with any questions or concerns. We at [facility] are focused on providing you with the highest-quality healthcare experience possible and that all starts with you.     Sincerely,     Ariana Sol    Enclosed: I have enclosed a copy of a 24 Hour Access Plan. This has helpful phone numbers for you to call when needed. Please keep this in an easy to access place to use as needed. and I have enclosed helpful educational material. Please review and call me with any questions.

## 2018-12-06 NOTE — LETTER
Health Care Home - Access Care Plan    About Me  Patient Name:  Hina Yap    YOB: 1965  Age:                             53 year old   Tashi MRN:            [id number] Telephone Information:     Home Phone [phone]   Mobile [phone]       Address:    [address] [place] [address] Email address:  No e-mail address on record      Emergency Contact(s)  Name Relationship Lgl Grd Work Phone Home Phone Mobile Phone   SUE SNIDER Mother No   [phone]             Health Maintenance:      My Access Plan  Medical Emergency 911   Questions or concerns during clinic hours Primary Clinic Line, I will call the clinic directly: [facility] [place] - [phone]   24 Hour Appointment Line [phone] or  [phone] [facility] [phone]) (toll free)   24 Hour Nurse Line [phone] (toll free)   Questions or concerns outside clinic hours 24 Hour Appointment Line, I will call the after-hours on-call line:   [facility] [phone] or [phone]) (toll-free)   Preferred Urgent Care [facility] [place], [phone]   Preferred Hospital [facility]  [phone]   Preferred Pharmacy [facility] [place] - [address]     Behavioral Health Crisis Line The National Suicide Prevention Lifeline at 1-950.204.6085 or 911     My Care Team Members  Patient Care Team       Relationship Specialty Notifications Start End    Sunshine Butterfield APRN CNP PCP - General Nurse Practitioner  12/1/15     Phone: [phone] Fax: [phone] [address] [place] [address]    Ariana Sol, RN Clinic Care Coordinator Primary Care - CC  12/6/18            My Medical and Care Information  Problem List   Patient Active Problem List   Diagnosis     Multiple sclerosis (H)     Iron deficiency anemia     Acute respiratory failure, unspecified whether with hypoxia or hypercapnia (H)      Acute respiratory failure with hypoxia (H)     Stress-induced cardiomyopathy     COPD exacerbation (H)     Esophageal reflux     Anxiety     Low serum ferritin level     Restless leg syndrome      Current Medications and Allergies:  See printed Medication Report

## 2018-12-13 ENCOUNTER — OFFICE VISIT (OUTPATIENT)
Dept: PEDIATRICS | Facility: CLINIC | Age: 53
End: 2018-12-13
Payer: MEDICAID

## 2018-12-13 VITALS
WEIGHT: 128 LBS | DIASTOLIC BLOOD PRESSURE: 62 MMHG | TEMPERATURE: 97.3 F | OXYGEN SATURATION: 98 % | SYSTOLIC BLOOD PRESSURE: 100 MMHG | BODY MASS INDEX: 20.09 KG/M2 | HEART RATE: 88 BPM | HEIGHT: 67 IN

## 2018-12-13 DIAGNOSIS — J44.1 COPD EXACERBATION (H): Primary | ICD-10-CM

## 2018-12-13 DIAGNOSIS — R53.83 OTHER FATIGUE: ICD-10-CM

## 2018-12-13 DIAGNOSIS — J02.9 SORE THROAT: ICD-10-CM

## 2018-12-13 DIAGNOSIS — K21.9 GASTROESOPHAGEAL REFLUX DISEASE, ESOPHAGITIS PRESENCE NOT SPECIFIED: ICD-10-CM

## 2018-12-13 DIAGNOSIS — G35 MULTIPLE SCLEROSIS (H): ICD-10-CM

## 2018-12-13 DIAGNOSIS — F33.1 MODERATE EPISODE OF RECURRENT MAJOR DEPRESSIVE DISORDER (H): ICD-10-CM

## 2018-12-13 DIAGNOSIS — R52 PAIN: ICD-10-CM

## 2018-12-13 DIAGNOSIS — R05.9 COUGH: ICD-10-CM

## 2018-12-13 LAB
DEPRECATED S PYO AG THROAT QL EIA: NORMAL
SPECIMEN SOURCE: NORMAL

## 2018-12-13 PROCEDURE — 99495 TRANSJ CARE MGMT MOD F2F 14D: CPT | Performed by: NURSE PRACTITIONER

## 2018-12-13 PROCEDURE — 87880 STREP A ASSAY W/OPTIC: CPT | Performed by: NURSE PRACTITIONER

## 2018-12-13 PROCEDURE — 87081 CULTURE SCREEN ONLY: CPT | Performed by: NURSE PRACTITIONER

## 2018-12-13 RX ORDER — PREDNISONE 10 MG/1
10 TABLET ORAL DAILY
Qty: 3 TABLET | Refills: 0 | Status: SHIPPED | OUTPATIENT
Start: 2018-12-13 | End: 2018-12-16

## 2018-12-13 RX ORDER — ESOMEPRAZOLE MAGNESIUM 40 MG/1
40 CAPSULE, DELAYED RELEASE ORAL AT BEDTIME
Qty: 30 CAPSULE | Refills: 0 | Status: CANCELLED | OUTPATIENT
Start: 2018-12-13

## 2018-12-13 ASSESSMENT — MIFFLIN-ST. JEOR: SCORE: 1218.23

## 2018-12-13 ASSESSMENT — ANXIETY QUESTIONNAIRES
2. NOT BEING ABLE TO STOP OR CONTROL WORRYING: NEARLY EVERY DAY
3. WORRYING TOO MUCH ABOUT DIFFERENT THINGS: NEARLY EVERY DAY
IF YOU CHECKED OFF ANY PROBLEMS ON THIS QUESTIONNAIRE, HOW DIFFICULT HAVE THESE PROBLEMS MADE IT FOR YOU TO DO YOUR WORK, TAKE CARE OF THINGS AT HOME, OR GET ALONG WITH OTHER PEOPLE: VERY DIFFICULT
6. BECOMING EASILY ANNOYED OR IRRITABLE: SEVERAL DAYS
7. FEELING AFRAID AS IF SOMETHING AWFUL MIGHT HAPPEN: NOT AT ALL
1. FEELING NERVOUS, ANXIOUS, OR ON EDGE: NEARLY EVERY DAY
5. BEING SO RESTLESS THAT IT IS HARD TO SIT STILL: NEARLY EVERY DAY
GAD7 TOTAL SCORE: 16

## 2018-12-13 ASSESSMENT — PATIENT HEALTH QUESTIONNAIRE - PHQ9
SUM OF ALL RESPONSES TO PHQ QUESTIONS 1-9: 17
5. POOR APPETITE OR OVEREATING: NEARLY EVERY DAY

## 2018-12-13 NOTE — PROGRESS NOTES
"  SUBJECTIVE:   Hina Yap is a 53 year old female who presents to clinic today for the following health issues:    Hospital Follow-up Visit:    Hospital/Nursing Home/IP Rehab Facility: [facility]  Date of Admission: 12/3/18  Date of Discharge: 12/5/18  Reason(s) for Admission: COPD exacerbation            Problems taking medications regularly:  None       Medication changes since discharge: None       Problems adhering to non-medication therapy:  None  Patient states finished antibiotics and took last prednisone this morning    Summary of hospitalization:  [facility] discharge summary reviewed  Diagnostic Tests/Treatments reviewed.  Follow up needed: none  Other Healthcare Providers Involved in Patient s Care:         None  Update since discharge: fluctuating course.     Post Discharge Medication Reconciliation: discharge medications reconciled, continue medications without change.  Plan of care communicated with patient     Coding guidelines for this visit:  Type of Medical   Decision Making Face-to-Face Visit       within 7 Days of discharge Face-to-Face Visit        within 14 days of discharge   Moderate Complexity 94131 [id number]   High Complexity 58259 [id number]          Has never really tried tiotropium-olodaterol  ROS: const/msk/resp/heent/psych/neuro otherwise negative     OBJECTIVE:  /62 (BP Location: Right arm, Cuff Size: Adult Regular)   Pulse 88   Temp 97.3  F (36.3  C) (Tympanic)   Ht 1.702 m (5' 7\")   Wt 58.1 kg (128 lb)   SpO2 98%   BMI 20.05 kg/m    CONSTITUTIONAL: Alert, well-nourished, well-groomed, NAD  RESP: Lungs CTA. No wheeze, rhonchi, rales.  CV: HRRR S1 S2 No MRG. No peripheral edema  PSYCH: Flat affect. Appropriate mentation and speech.       ASSESSMENT/PLAN:  (J44.1) COPD exacerbation (H)  (primary encounter diagnosis)  Comment: Patient with a history of COPD, current smoker, and MS. Most recently saw pulm 6/17 and PFTs showed a mild obstructive pattern. " She was very symptomatic. Stopped previous meds and started on tiotropium-olodaterol. Has not been taking this and continues to be symptomatic. Since her hospitalization she reports feeling overall better, but breathing is slightly worse again today.  Feels the prednisone worked well. This was the last day of steroid. No signs of PE such as tachycardia or hypoxia, and no risk factors for PE.   Plan: guaiFENesin (COUGH SYRUP) 100 MG/5ML SYRP,         DISCONTINUED: guaiFENesin (COUGH SYRUP) 100         MG/5ML SYRP          -Agrees to start tiotropium-olodaterol  -Albuterol prn-discussed dosing  -Extended small amount of prednisone for th next 3 days  -Agrees to see pulm  -See me tomorrow if worsening. Discussed reasons to seek care urgently.       J02.9Sore Throat  Comment: Still having sore throat, likely from cough but will re-check for strep. Completed course of azith.   Plan: Strep, Rapid Screen, predniSONE (DELTASONE) 10         MG tablet, guaiFENesin (COUGH SYRUP) 100 MG/5ML        SYRP, Beta strep group A culture, DISCONTINUED:        guaiFENesin (COUGH SYRUP) 100 MG/5ML SYRP            (K21.9) Gastroesophageal reflux disease, esophagitis presence not specified  Comment: Ongoing, hasn't had EGD  Plan:   -Discussed risks of PPI. Declines to switch  -Declines EGD. Discussed risks of not investigating further with EGD    (F33.1) Moderate episode of recurrent major depressive disorder (H)  Comment: Likely contributes to her poor compliance.   Plan:   -Discussion regarding mutual expectations  -She agrees to see me once a year excluding any acute visits so I can better care for her  -Discussed other options for depression including TMS, other meds, therapy, etc. She declines for now      (G35) Multiple sclerosis (H)  Comment: Stable, early. Her neurologist has her on adderall for MS related fatigue, oxycodone for MS related pain, and Alprazolam for her anxiety. Discussed my concerns with being on oxycodone and  benzos, especially in combination.  Plan:   -C2C signed for her neurologist  -LVM for neurologist asking for a call back. I'd like to discuss oxy and alprazolam. MS neurologists managing depression is common, but at this point I feel this is enabling her to avoid fully addressing the depression, and I'm concerned that the alprazolam and oxycodone could be contributing to mental health issues.     (R53.83) Other fatigue  Comment: On adderall per neuro      (R52) Pain  Comment: Reportedly has pain from the MS. She seems to have very early stage MS so I'm not sure if the oxycodone is appropriate. In addition, I think that the combination with benzos is dangerous. I think she would be better treated through pain clinic plus psychiatry/therapy as her pain is inseparable from her depression, and could potentially benefit from a more comprehensive plan. In addition, opioids not generally a good option for chronic pain. That being said, I'm sure her neurologist knows her much better than I do and has much more experience treating the pain and depression of MS patients. Would like to discuss further with neuro.   Plan:   -LVM for neurology to call me back.     50 minutes spent with patient, over 1/2 in counseling and coordination of care regarding pain, cough, MS, anxiety, depression      RTIP Barajas-MARY.

## 2018-12-13 NOTE — PATIENT INSTRUCTIONS
-To the ER for worsening shortness of breath  -3 more days of prednisone ordered  -Start daily inhaler and follow up with pulmonology

## 2018-12-13 NOTE — LETTER
My Depression Action Plan  Name: Hina Yap   Date of Birth 1965  Date: 12/13/2018    My doctor: Sunshine Butterfield   My clinic: [address]  [address]  [facility] [address] [phone]          GREEN    ZONE   Good Control    What it looks like:     Things are going generally well. You have normal up s and down s. You may even feel depressed from time to time, but bad moods usually last less than a day.   What you need to do:  1. Continue to care for yourself (see self care plan)  2. Check your depression survival kit and update it as needed  3. Follow your physician s recommendations including any medication.  4. Do not stop taking medication unless you consult with your physician first.           YELLOW         ZONE Getting Worse    What it looks like:     Depression is starting to interfere with your life.     It may be hard to get out of bed; you may be starting to isolate yourself from others.    Symptoms of depression are starting to last most all day and this has happened for several days.     You may have suicidal thoughts but they are not constant.   What you need to do:     1. Call your care team, your response to treatment will improve if you keep your care team informed of your progress. Yellow periods are signs an adjustment may need to be made.     2. Continue your self-care, even if you have to fake it!    3. Talk to someone in your support network    4. Open up your depression survival kit           RED    ZONE Medical Alert - Get Help    What it looks like:     Depression is seriously interfering with your life.     You may experience these or other symptoms: You can t get out of bed most days, can t work or engage in other necessary activities, you have trouble taking care of basic hygiene, or basic responsibilities, thoughts of suicide or death that will not go away, self-injurious behavior.     What you need to  do:  1. Call your care team and request a same-day appointment. If they are not available (weekends or after hours) call your local crisis line, emergency room or 911.            Depression Self Care Plan / Survival Kit    Self-Care for Depression  Here s the deal. Your body and mind are really not as separate as most people think.  What you do and think affects how you feel and how you feel influences what you do and think. This means if you do things that people who feel good do, it will help you feel better.  Sometimes this is all it takes.  There is also a place for medication and therapy depending on how severe your depression is, so be sure to consult with your medical provider and/ or Behavioral Health Consultant if your symptoms are worsening or not improving.     In order to better manage my stress, I will:    Exercise  Get some form of exercise, every day. This will help reduce pain and release endorphins, the  feel good  chemicals in your brain. This is almost as good as taking antidepressants!  This is not the same as joining a gym and then never going! (they count on that by the way ) It can be as simple as just going for a walk or doing some gardening, anything that will get you moving.      Hygiene   Maintain good hygiene (Get out of bed in the morning, Make your bed, Brush your teeth, Take a shower, and Get dressed like you were going to work, even if you are unemployed).  If your clothes don't fit try to get ones that do.    Diet  I will strive to eat foods that are good for me, drink plenty of water, and avoid excessive sugar, caffeine, alcohol, and other mood-altering substances.  Some foods that are helpful in depression are: complex carbohydrates, B vitamins, flaxseed, fish or fish oil, fresh fruits and vegetables.    Psychotherapy  I agree to participate in Individual Therapy (if recommended).    Medication  If prescribed medications, I agree to take them.  Missing doses can result in serious  side effects.  I understand that drinking alcohol, or other illicit drug use, may cause potential side effects.  I will not stop my medication abruptly without first discussing it with my provider.    Staying Connected With Others  I will stay in touch with my friends, family members, and my primary care provider/team.    Use your imagination  Be creative.  We all have a creative side; it doesn t matter if it s oil painting, sand castles, or mud pies! This will also kick up the endorphins.    Witness Beauty  (AKA stop and smell the roses) Take a look outside, even in mid-winter. Notice colors, textures. Watch the squirrels and birds.     Service to others  Be of service to others.  There is always someone else in need.  By helping others we can  get out of ourselves  and remember the really important things.  This also provides opportunities for practicing all the other parts of the program.    Humor  Laugh and be silly!  Adjust your TV habits for less news and crime-drama and more comedy.    Control your stress  Try breathing deep, massage therapy, biofeedback, and meditation. Find time to relax each day.     My support system    Clinic Contact:  Phone number:    Contact 1:  Phone number:    Contact 2:  Phone number:    [facility]/:  Phone number:    Therapist:  Phone number:    Local crisis center:    Phone number:    Other community support:  Phone number:

## 2018-12-14 PROBLEM — F33.1 MODERATE EPISODE OF RECURRENT MAJOR DEPRESSIVE DISORDER (H): Chronic | Status: ACTIVE | Noted: 2018-12-14

## 2018-12-14 PROBLEM — F33.1 MODERATE EPISODE OF RECURRENT MAJOR DEPRESSIVE DISORDER (H): Status: ACTIVE | Noted: 2018-12-14

## 2018-12-14 LAB
BACTERIA SPEC CULT: NORMAL
SPECIMEN SOURCE: NORMAL

## 2018-12-14 ASSESSMENT — ANXIETY QUESTIONNAIRES: GAD7 TOTAL SCORE: 16

## 2019-01-16 ENCOUNTER — PATIENT OUTREACH (OUTPATIENT)
Dept: CARE COORDINATION | Facility: CLINIC | Age: 54
End: 2019-01-16

## 2019-01-16 NOTE — PROGRESS NOTES
Clinic Care Coordination Contact  [facility]/Voicemail    RN CC follow-up outreach: patient was recently admitted to the hospital for COPD exacerbation; RN CC initially outreached 12/06/2018.  Patient agreed to 1 month follow up (was scheduled to see pulmonologist in early January, and willing to consider further discussions about smoking cessation and pulmonary rehabilitation at that time.     Clinical Data: Care Coordinator Outreach  Outreach attempted x 1.  Left message on voicemail with call back information and requested return call.  Plan: Care Coordinator will try to reach patient again in 3-5 business days.    Ariana Sol RN   Clinic Care Coordinator-[facility]  PH: [phone]

## 2019-01-16 NOTE — LETTER
[place] CARE COORDINATION  [address] [place] [address]  January 25, 2019    Hina Yap  [address]  [place] [address]      Dear Hina,    I am a clinic care coordinator who works with BENNETT Marvin CNP. I recently tried to call and was unable to reach you. I wanted to introduce myself and provide you with my contact information so that you can call me with questions or concerns about your health care. Below is a description of clinic care coordination and how I can further assist you.     The clinic care coordinator is a registered nurse and/or  who understand the health care system. The goal of clinic care coordination is to help you manage your health and improve access to the [facility] system in the most efficient manner. The registered nurse can assist you in meeting your health care goals by providing education, coordinating services, and strengthening the communication among your providers. The  can assist you with financial, behavioral, psychosocial, chemical dependency, counseling, and/or psychiatric resources.    Please feel free to contact me at [phone], with any questions or concerns. We at [facility] are focused on providing you with the highest-quality healthcare experience possible and that all starts with you.     Sincerely,     Ariana Sol RN   Clinic Care Coordinator-[facility]  PH: [phone]

## 2019-01-25 NOTE — PROGRESS NOTES
Clinic Care Coordination Contact  [facility]/Voicemail       Clinical Data: Care Coordinator Outreach  Outreach attempted x 2.  Left message on voicemail with call back information and requested return call.  Plan: Care Coordinator will mail out care coordination introduction letter with care coordinator contact information and explanation of care coordination services. Care Coordinator will do no further outreaches at this time.    Ariana Sol RN   Clinic Care Coordinator-[facility]  PH: [phone]

## 2019-02-06 ENCOUNTER — TRANSFERRED RECORDS (OUTPATIENT)
Dept: HEALTH INFORMATION MANAGEMENT | Facility: CLINIC | Age: 54
End: 2019-02-06

## 2019-04-01 ENCOUNTER — TRANSFERRED RECORDS (OUTPATIENT)
Dept: HEALTH INFORMATION MANAGEMENT | Facility: CLINIC | Age: 54
End: 2019-04-01

## 2019-04-04 ENCOUNTER — TRANSFERRED RECORDS (OUTPATIENT)
Dept: HEALTH INFORMATION MANAGEMENT | Facility: CLINIC | Age: 54
End: 2019-04-04

## 2019-06-17 ENCOUNTER — HOSPITAL ENCOUNTER (EMERGENCY)
Facility: CLINIC | Age: 54
Discharge: HOME | End: 2019-06-17
Attending: PHYSICIAN ASSISTANT | Admitting: PHYSICIAN ASSISTANT
Payer: MEDICAID

## 2019-06-17 ENCOUNTER — APPOINTMENT (OUTPATIENT)
Dept: CT IMAGING | Facility: CLINIC | Age: 54
End: 2019-06-17
Attending: PHYSICIAN ASSISTANT
Payer: MEDICAID

## 2019-06-17 VITALS
TEMPERATURE: 97.9 F | SYSTOLIC BLOOD PRESSURE: 116 MMHG | HEART RATE: 57 BPM | RESPIRATION RATE: 16 BRPM | DIASTOLIC BLOOD PRESSURE: 86 MMHG | OXYGEN SATURATION: 99 %

## 2019-06-17 DIAGNOSIS — M54.81 OCCIPITAL NEURALGIA OF LEFT SIDE: ICD-10-CM

## 2019-06-17 PROCEDURE — 96374 THER/PROPH/DIAG INJ IV PUSH: CPT

## 2019-06-17 PROCEDURE — 99285 EMERGENCY DEPT VISIT HI MDM: CPT | Mod: 25

## 2019-06-17 PROCEDURE — 96375 TX/PRO/DX INJ NEW DRUG ADDON: CPT

## 2019-06-17 PROCEDURE — 96376 TX/PRO/DX INJ SAME DRUG ADON: CPT

## 2019-06-17 PROCEDURE — 70450 CT HEAD/BRAIN W/O DYE: CPT

## 2019-06-17 PROCEDURE — 25000128 H RX IP 250 OP 636: Performed by: PHYSICIAN ASSISTANT

## 2019-06-17 RX ORDER — GABAPENTIN 300 MG/1
300 CAPSULE ORAL 2 TIMES DAILY
Qty: 6 CAPSULE | Refills: 0 | Status: SHIPPED | OUTPATIENT
Start: 2019-06-17 | End: 2020-02-26

## 2019-06-17 RX ORDER — METOCLOPRAMIDE HYDROCHLORIDE 5 MG/ML
10 INJECTION INTRAMUSCULAR; INTRAVENOUS ONCE
Status: COMPLETED | OUTPATIENT
Start: 2019-06-17 | End: 2019-06-17

## 2019-06-17 RX ORDER — GABAPENTIN 300 MG/1
300 CAPSULE ORAL 2 TIMES DAILY
Qty: 6 CAPSULE | Refills: 0 | Status: SHIPPED | OUTPATIENT
Start: 2019-06-17 | End: 2019-06-17

## 2019-06-17 RX ORDER — HYDROMORPHONE HYDROCHLORIDE 1 MG/ML
0.5 INJECTION, SOLUTION INTRAMUSCULAR; INTRAVENOUS; SUBCUTANEOUS ONCE
Status: COMPLETED | OUTPATIENT
Start: 2019-06-17 | End: 2019-06-17

## 2019-06-17 RX ORDER — DIPHENHYDRAMINE HYDROCHLORIDE 50 MG/ML
25 INJECTION INTRAMUSCULAR; INTRAVENOUS ONCE
Status: COMPLETED | OUTPATIENT
Start: 2019-06-17 | End: 2019-06-17

## 2019-06-17 RX ORDER — KETOROLAC TROMETHAMINE 15 MG/ML
15 INJECTION, SOLUTION INTRAMUSCULAR; INTRAVENOUS ONCE
Status: COMPLETED | OUTPATIENT
Start: 2019-06-17 | End: 2019-06-17

## 2019-06-17 RX ADMIN — KETOROLAC TROMETHAMINE 15 MG: 15 INJECTION, SOLUTION INTRAMUSCULAR; INTRAVENOUS at 22:33

## 2019-06-17 RX ADMIN — DIPHENHYDRAMINE HYDROCHLORIDE 25 MG: 50 INJECTION, SOLUTION INTRAMUSCULAR; INTRAVENOUS at 20:29

## 2019-06-17 RX ADMIN — HYDROMORPHONE HYDROCHLORIDE 0.5 MG: 1 INJECTION, SOLUTION INTRAMUSCULAR; INTRAVENOUS; SUBCUTANEOUS at 20:29

## 2019-06-17 RX ADMIN — HYDROMORPHONE HYDROCHLORIDE 0.5 MG: 1 INJECTION, SOLUTION INTRAMUSCULAR; INTRAVENOUS; SUBCUTANEOUS at 22:34

## 2019-06-17 RX ADMIN — METOCLOPRAMIDE 10 MG: 5 INJECTION, SOLUTION INTRAMUSCULAR; INTRAVENOUS at 20:29

## 2019-06-17 RX ADMIN — HYDROMORPHONE HYDROCHLORIDE 0.5 MG: 1 INJECTION, SOLUTION INTRAMUSCULAR; INTRAVENOUS; SUBCUTANEOUS at 21:39

## 2019-06-17 ASSESSMENT — ENCOUNTER SYMPTOMS
NAUSEA: 0
CONFUSION: 0
VOMITING: 0
HEADACHES: 1

## 2019-06-17 NOTE — ED AVS SNAPSHOT
[facility] Emergency Department  [address]  [facility] [address]  Phone:  [phone]  Fax:  [phone]                                    Hina Yap   MRN: [id number]    Department:  [facility] Emergency Department   Date of Visit:  6/17/2019           After Visit Summary Signature Page    I have received my discharge instructions, and my questions have been answered. I have discussed any challenges I see with this plan with the nurse or doctor.    ..........................................................................................................................................  Patient/Patient Representative Signature      ..........................................................................................................................................  Patient Representative Print Name and Relationship to Patient    ..................................................               ................................................  Date                                   Time    ..........................................................................................................................................  Reviewed by Signature/Title    ...................................................              ..............................................  Date                                               Time          22EPIC Rev 08/18

## 2019-06-18 NOTE — ED TRIAGE NOTES
Patient presents with headache that started this morning which has been getting worse. Patient feels like something stabbing her in the head. Patient has not taken anything for pain.

## 2019-06-18 NOTE — ED PROVIDER NOTES
"  History     Chief Complaint:  Headache    HPI   Hina Yap is a 53 year old female with a history of MS and COPD who presents with left sided headache. The patient reports that the pain started this morning and describes the pain as \"sharp\" and like \"someone keeps poking you.\" She reports taking multiple Excedrin doses today, which has provided her no relief. The patient denies any nausea, vomiting, confusion, vision changes, or history of headaches.     Allergies:  Sulfa drugs  Adhesive Tape  Wellbutrin     Medications:    Albuterol inhaler  Xanax  Adderall  Lioresal  Nexium  Ferosul  Duoneb  Mobic  Nicorette Gum  Requip    Past Medical History:    Anxiety  COPD  GERD  Neuromuscular disorder  Restless leg syndrome  Tobacco use disorder  Depression  Multiple Sclerosis    Past Surgical History:    The patient does not have any pertinent past surgical history.    Family History:    No past pertinent family history.    Social History:  The patient was accompanied to the ED by significant other.  Smoking Status: Current every day smoker, 0.5 ppd  Smokeless Tobacco: Negative  Alcohol Use: Positive, 3 drinks/week  Drug Use: Negative  Marital Status:  Single [1]     Review of Systems   Eyes: Negative for visual disturbance.   Gastrointestinal: Negative for nausea and vomiting.   Neurological: Positive for headaches.   Psychiatric/Behavioral: Negative for confusion.   All other systems reviewed and are negative.      Physical Exam     Patient Vitals for the past 24 hrs:   BP Temp Temp src Pulse Resp SpO2   06/17/19 2147 99/61 -- -- 60 16 93 %   06/17/19 1918 129/89 97.9  F (36.6  C) Temporal 90 20 99 %       Physical Exam  General: Alert, interactive. GCS 15  Head:  Scalp is atraumatic.  Eyes:  EOM intact. The pupils are equal, round, and reactive to light. No scleral icterus.  ENT:                                      Ears:  The external ears are normal.  Nose:  The external nose is normal.  Throat:  The " oropharynx is normal. Mucus membranes are moist.                 Neck:  Normal range of motion. There is no rigidity.   CV:  Regular rate and rhythm. No murmur. 2+ radial pulses  Resp:  Breath sounds are clear bilaterally. Non-labored, no retractions or accessory muscle use.  GI:  Abdomen is soft, no distension, no tenderness.   MS:  Normal range of motion. Unable to reproduce the pain with palpation to the occipital region.   Skin:  Warm and dry.   Neuro:  Cranial nerves 2-12 intact; 5/5 strength throughout the upper and lower extremities; sensation intact to light touch throughout the upper and lower extremities;  2+ DTRs to the bilateral upper and lower extremities (biceps, BRs, patellar, achilles); normal finger to nose; normal gait, No meningismus   Psych:  Awake. Alert & orientedx3.  Appropriate interactions.     Emergency Department Course   Imaging:  Radiographic findings were communicated with the patient who voiced understanding of the findings.    CT Head w/o contrast:   1. No acute abnormality.  2. Mild white matter changes in the cerebral hemispheres. These are  likely related to multiple sclerosis given the history per radiology.    Interventions:  2019 Dilaudid 0.5 mg IV   Reglan 10 mg IV   Benadryl 25 mg IV  2139 Dilaudid 0.5 IV  2233 Toradol 15 mg IV  2234 Dilaudid 0.5 mg IV    Emergency Department Course:  Nursing notes and vitals reviewed. (1959) I performed an exam of the patient as documented above.      IV inserted. Medicine administered as documented above.      The patient was sent for a CT head while in the emergency department, findings above.      (2110) I rechecked the patient and discussed the results of her workup thus far.     (2133) I rechecked the patient as her pain has not seemed to improved.    (2213) I rechecked the pain status of the patient, which has still not improved.     (2241) I rechecked the patient and discussed the plan for discharge. Pain is improved and she feels  appropriate for discharge home.      Findings and plan explained to the Patient. Patient discharged home with instructions regarding supportive care, medications, and reasons to return. The importance of close follow-up was reviewed. The patient was prescribed gabapentin.     I personally reviewed the imaging results with the Patient and answered all related questions prior to discharge.      Impression & Plan      Medical Decision Making:  Hina Yap is a 53 year old female with medical history including multiple sclerosis who presents with left sided occipital headache described as intermittent shooting/electric pain.  I considered a broad differential diagnosis for this patient including tension, migraine, analgesic rebound, occipital neuralgia, etc.  I also considered other less common but serious causes considered included meningitis, encephalitis, subarachnoid bleed, temporal arteritis, stroke, tumor, etc.  The patient has no signs of serious headache etiologies at this point.  CT completed as the patient describes worse headache of her life and has no history of similar headaches.  Fortunately, this was negative for acute findings.      Signs and symptoms most consistent with this occipital neuralgia.  Patient's pain was eventually controlled in the emergency department and she felt appropriate for discharge home.  Discussed with patient that if she should have return of severe pain, or other focal neurologic symptoms including fevers, numbness/tingling, confusion, seizures, or any other new/concerning symptoms she is needs to return to the emergency department.  Otherwise, can follow-up with [facility] tomorrow morning as occipital nerve block may be indicated.  Prescribed short prescription of gabapentin for nerve pain and recommended ibuprofen every 6 hours.  Patient and  agree with this plan all questions and concerns addressed prior to discharge home.    Diagnosis:    ICD-10-CM    1.  Occipital neuralgia of left side M54.81        Disposition:  discharged to home    Discharge Medications:     Medication List      Started    gabapentin 300 MG capsule  Commonly known as:  NEURONTIN  300 mg, Oral, 2 TIMES DAILY          Scribe Disclosure:  I, Dominique Hutson, am serving as a scribe on 6/17/2019 at 7:25 PM to personally document services performed by Sasha Boss PA-C based on my observations and the provider's statements to me.     Dominique Hutson  6/17/2019   [facility] EMERGENCY DEPARTMENT       Sasha Boss PA-C  06/17/19 3261

## 2019-06-18 NOTE — DISCHARGE INSTRUCTIONS
*You may resume diet and activities as tolerated.  Drink plenty of fluids and get plenty of rest.  *Take ibuprofen 600 mg 3x per day with food. This will provide both pain control and fight against inflammation. Gabapentin as prescribed.     *Follow-up with your doctor or neurologist for a recheck in 1-2 days.  *Return if you develop fever, neck stiffness, sudden onset headache, numbness, weakness, problems with speech/vision/coordination, faint or feel like you will faint or become worse in any way.       Discharge Instructions  Headache    You were seen today for a headache. Headaches may be caused by many different things such as muscle tension, sinus inflammation, anxiety and stress, having too little sleep, too much alcohol, some medical conditions or injury. You may have a migraine, which is caused by changes in the blood vessels in your head.  At this time your provider does not find that your headache is a sign of anything dangerous or life-threatening.  However, sometimes the signs of serious illness do not show up right away.      Generally, every Emergency Department visit should have a follow-up clinic visit with either a primary or a specialty clinic/provider. Please follow-up as instructed by your emergency provider today.    Return to the Emergency Department if:  You get a new fever of 100.4 F or higher.  Your headache gets much worse.  You get a stiff neck with your headache.  You get a new headache that is significantly different or worse than headaches you have had before.  You are vomiting (throwing up) and cannot keep food or water down.  You have blurry or double vision or other problems with your eyes.  You have a new weakness on one side of your body.  You have difficulty with balance which is new.  You or your family thinks you are confused.  You have a seizure.    What can I do to help myself?  Pain medications - You may take a pain medication such as Tylenol  (acetaminophen), Advil ,  Motrin  (ibuprofen) or Aleve  (naproxen).  Take a pain reliever as soon as you notice symptoms.  Starting medications as soon as you start to have symptoms may lessen the amount of pain you have.  Relaxing in a quiet, dark room may help.  Get enough sleep and eat meals regularly.  You may need to watch for certain foods or other things which may trigger your headaches.  Keeping a journal of your headaches and possible triggers may help you and your primary provider to identify things which you should avoid which may be causing your headaches.  If you were given a prescription for medicine here today, be sure to read all of the information (including the package insert) that comes with your prescription.  This will include important information about the medicine, its side effects, and any warnings that you need to know about.  The pharmacist who fills the prescription can provide more information and answer questions you may have about the medicine.  If you have questions or concerns that the pharmacist cannot address, please call or return to the Emergency Department.   Remember that you can always come back to the Emergency Department if you are not able to see your regular provider in the amount of time listed above, if you get any new symptoms, or if there is anything that worries you.

## 2019-10-23 ENCOUNTER — TRANSFERRED RECORDS (OUTPATIENT)
Dept: HEALTH INFORMATION MANAGEMENT | Facility: CLINIC | Age: 54
End: 2019-10-23

## 2019-11-13 ENCOUNTER — TELEPHONE (OUTPATIENT)
Dept: PEDIATRICS | Facility: CLINIC | Age: 54
End: 2019-11-13

## 2019-11-13 NOTE — TELEPHONE ENCOUNTER
Reason for call:  Other   Patient called regarding (reason for call): prescription  Additional comments: Pt calling to speak with PCP or nurse. Pt needs refill (asap) for pain meds/medical marijuana that were prescribed per Neurologist. Pt's neurologist is no longer available (retiring) to refill medications.     Pt would like to speak and meet with PCP to discuss treatment follow up and prescription refills. There were no appt available soon.     Pt is running out of meds.     Phone number to reach patient:  Home number on file [phone] (home)    Best Time:  ANY TIME    Can we leave a detailed message on this number?  YES

## 2019-11-13 NOTE — TELEPHONE ENCOUNTER
Left message for patient to call back.    Please transfer to any available RN    RN's: Please find out what medications patient is looking to refill. Please route to provider accordingly.   Aneta SESAY RN, BSN

## 2019-11-13 NOTE — TELEPHONE ENCOUNTER
"Chad Murillo called back. Her neurologist Dr. Miko Brown (at [facility]) has left his practice. She said they told her no one there would be filling her medications. They told her she needed to contact someone else for refills.     oxyCODONE-acetaminophen 325mg tablets 5 tabs daily    amphetamine-dextroamphetamine XR 30mg daily    alprazolam 10mg twice daily    Medical marijuana she has not used this yet. She never went through the pharmacy to get it. She was \"certified\" but never used it. She needs to get certified again.     What do you recommend patient do about getting these meds?  Eboni Mulligan RN on 11/13/2019 at 3:01 PM          "

## 2019-11-14 NOTE — TELEPHONE ENCOUNTER
Called and spoke with patient.  Scheduled for next Thursday 11/21/19 for appt to discuss medications.  Patient was told provider will not do medical marijuana certification.  Sasha Cerda, CMA

## 2019-11-14 NOTE — TELEPHONE ENCOUNTER
I do not certify for medical marijuana. Pls let patient know.    Sasha, please find slot for patient. Could do 20 minute phone visit.

## 2019-11-14 NOTE — TELEPHONE ENCOUNTER
Triage: I am not comfortable with this regimen.    I will take over the medications until she gets another neurologist but I will be weaning them quickly. I will only be prescribing them in order to avoid withdrawal. It is possible that her new neurologist would want her on this regimen but I doubt it.     She also needs to establish with a new neurologist at that clinic immediately.     Triage, please relay this message to her and ask exactly what the dosing is (10mg alprazolam was listed but that's not an accurate dose).     MA/TC: Please help find her an appointment with me asap

## 2019-11-14 NOTE — TELEPHONE ENCOUNTER
Checked  for correct dosage and last fill dates:    Alprazolam 1 Mg Tablet  Last filled: 11/04/19 for Qty 50 for 25 day supply    Dextroamp-Amphet Er 30 Mg Cap  Last filled: 11/01/19 for Qty 30 for 30 day supply    Oxycodone-Acetaminophen   Last filled: 11/01/19 for Qty 150 for 30 day supply    Consistent fill dates, Filled by 4 different providers at two different locations since August. Filled by one provider consistently before August.    Patient not due for a refill on Alprazolam until 11/29/19    Adderall and Oxycodone due December 1st.     Also spoke to patient and she needs to be certified before the end of the month for the medical marijuana. Please advise of next steps for patient. She is requesting a call back in regards to this. Thanks!  Aneta SESAY RN, BSN

## 2019-11-18 ENCOUNTER — TELEPHONE (OUTPATIENT)
Dept: PEDIATRICS | Facility: CLINIC | Age: 54
End: 2019-11-18

## 2019-11-18 NOTE — TELEPHONE ENCOUNTER
Panel Management Review      Patient has the following on her problem list:     Depression / Dysthymia review    Measure:  Needs PHQ-9 score of 4 or less during index window.  Administer PHQ-9 and if score is 5 or more, send encounter to provider for next steps.    5 - 7 month window range: last seen 12/13/18    PHQ-9 SCORE 5/25/2017 11/30/2017 12/13/2018   PHQ-9 Total Score MyChart 17 (Moderately severe depression) - -   PHQ-9 Total Score 17 20 17       If PHQ-9 recheck is 5 or more, route to provider for next steps.    Patient is due for:  PHQ9     COPD  Diagnosis date: 2015?   Is this diagnosis new within the last year?   NO   Was spirometry completed?  YES      Composite cancer screening  Chart review shows that this patient is due/due soon for the following Mammogram and Colonoscopy  Summary:    Patient is due/failing the following:   COPD f/u, COLONOSCOPY, MAMMOGRAM, PHQ9 and PHYSICAL    Action needed:   Patient needs office visit for phys, mammo, colon, COPD f/u. and Patient needs to do PHQ9.    Type of outreach:    Sent [employer] message.    Questions for provider review:    None                                                                                                                                  Sasha Cerda CMA    Chart routed to Care Team .

## 2019-11-19 NOTE — PROGRESS NOTES
"Subjective     Hina Yap is a 54 year old female who presents to clinic today for the following health issues:    HPI   Patient here today to discuss having PCP take over neurology medications.    Medication Followup of oxycodone, adderall, xanax    Taking Medication as prescribed: yes    Side Effects:  None    Medication Helping Symptoms:  yes     ROS: const/psych/msk/neuro otherwise negative     OBJECTIVE:  /64 (BP Location: Right arm, Cuff Size: Adult Regular)   Pulse 87   Temp 97.8  F (36.6  C) (Tympanic)   Resp 14   Ht 1.702 m (5' 7\")   Wt 53.8 kg (118 lb 9.6 oz)   SpO2 98%   BMI 18.58 kg/m    CONSTITUTIONAL: Alert, well-nourished, well-groomed, NAD  RESP: Lungs CTA. No wheeze, rhonchi, rales.  CV: HRRR S1 S2 No MRG. No peripheral edema  PSYCH: Bright affect. Appropriate mentation and speech.       ASSESSMENT/PLAN:  (G35) MS (multiple sclerosis) (H)  Comment: Patient with a history of MS. Previously seeing neurology who was prescribing Adderall for MS related fatigue. That provider is on a sudden indefinite leave and has to see a new provider.   Plan: amphetamine-dextroamphetamine (ADDERALL) 30 MG         tablet, amphetamine-dextroamphetamine         (ADDERALL) 30 MG tablet,         amphetamine-dextroamphetamine (ADDERALL) 30 MG         tablet          -I agreed to start taking this over. Would prefer if it came from neuro but she isn't sure if her neuro in the future will agree to this.     (G89.29) Other chronic pain  Comment: Has chronic low back pain with radiculopathy and multiple joint pain. Per patient, saw [facility] pain clinic years ago and saw a pain psychologist. Has tried many medications. Has not tried Lyrica due to insurance issues. Her neurologist has been prescribing her Percocet 10/325 150 tabs per month along with medical marijuana (patient never actually picked up the marijuana) for pain and xanax for anxiety.  I have not evaluated her for the source of her pain.  However, I'm " concerned about the doses she is on as well as the use of Xanax with these medications. I believe her depression influences per pain and vice verse and she doesn't seem to have addressed the depression/anxiety piece. She states that nothing has helped, including many SSRIs.   Plan: PAIN MANAGEMENT REFERRAL,         oxyCODONE-acetaminophen (PERCOCET)  MG         per tablet, naloxone (NARCAN) 4 MG/0.1ML nasal         spray          -I agreed to take over oxycodone for now but only if she establishes with pain clinic. I will follow their recommendations.  -I told her I'm not willing to prescribe both oxycodone and alprazolam long term. Will initiate a taper.   -Narcan ordered and risk of overdose with oxy/alprazolam discussed    (F41.9) Anxiety  (F32.89) Other depression  (G47.00) Insomnia, unspecified type  Comment: Ongoing for many years. Poorly controlled. Has tried multiple SSRIs without benefit per patient but is interested in [employer]. She doesn't seem to acknowledge that her mood could influence her pain. Doesn't feel therapy has been helpful.   Plan: mirtazapine (REMERON) 15 MG tablet, ALPRAZolam         (XANAX) 0.5 MG tablet  -Pt requesting [employer]. I contacted the company and got set up to be a certified provider. They will send me a kit. I will call her when the kit comes in. Will need lab only appt to get swab (diego alvarado, Sasha Cerda, or Yudith can administer this). A patient facesheet will need to be mailed with the kit. I will have to place the order ahead of time on the [employer] website.   -Start Remeron at 15, up to 30 after 1 week. Could go as high as 45  -Wean alprazolam. Currently at 1mg BID. Will taper to 0.75mg BID. Next month will do 0.5 morning, 0.75 evening (70, 0.5mg tabs December 31 or January 1)      (N18.2) CKD (chronic kidney disease) stage 2, GFR 60-89 ml/min  Comment: Will monitor at physical. Avoid NSAIDs.     50 minutes spent with patient, over 1/2 in counseling and  coordination of care regarding the above diagnoses    Sunshine Butterfield, FNP-DNP.

## 2019-11-21 ENCOUNTER — OFFICE VISIT (OUTPATIENT)
Dept: PEDIATRICS | Facility: CLINIC | Age: 54
End: 2019-11-21
Payer: MEDICAID

## 2019-11-21 ENCOUNTER — TELEPHONE (OUTPATIENT)
Dept: PEDIATRICS | Facility: CLINIC | Age: 54
End: 2019-11-21

## 2019-11-21 VITALS
HEIGHT: 67 IN | HEART RATE: 87 BPM | BODY MASS INDEX: 18.62 KG/M2 | WEIGHT: 118.6 LBS | OXYGEN SATURATION: 98 % | DIASTOLIC BLOOD PRESSURE: 64 MMHG | RESPIRATION RATE: 14 BRPM | TEMPERATURE: 97.8 F | SYSTOLIC BLOOD PRESSURE: 108 MMHG

## 2019-11-21 DIAGNOSIS — G89.29 OTHER CHRONIC PAIN: ICD-10-CM

## 2019-11-21 DIAGNOSIS — N18.2 CKD (CHRONIC KIDNEY DISEASE) STAGE 2, GFR 60-89 ML/MIN: ICD-10-CM

## 2019-11-21 DIAGNOSIS — G35 MS (MULTIPLE SCLEROSIS) (H): Chronic | ICD-10-CM

## 2019-11-21 DIAGNOSIS — G47.00 INSOMNIA, UNSPECIFIED TYPE: ICD-10-CM

## 2019-11-21 DIAGNOSIS — F41.9 ANXIETY: Primary | ICD-10-CM

## 2019-11-21 DIAGNOSIS — F41.9 ANXIETY: ICD-10-CM

## 2019-11-21 DIAGNOSIS — F32.89 OTHER DEPRESSION: ICD-10-CM

## 2019-11-21 DIAGNOSIS — Z23 NEED FOR PROPHYLACTIC VACCINATION AND INOCULATION AGAINST INFLUENZA: Primary | ICD-10-CM

## 2019-11-21 DIAGNOSIS — G35 MULTIPLE SCLEROSIS (H): ICD-10-CM

## 2019-11-21 PROCEDURE — 90682 RIV4 VACC RECOMBINANT DNA IM: CPT | Performed by: NURSE PRACTITIONER

## 2019-11-21 PROCEDURE — 96127 BRIEF EMOTIONAL/BEHAV ASSMT: CPT | Mod: 59 | Performed by: NURSE PRACTITIONER

## 2019-11-21 PROCEDURE — 90471 IMMUNIZATION ADMIN: CPT | Performed by: NURSE PRACTITIONER

## 2019-11-21 PROCEDURE — 99215 OFFICE O/P EST HI 40 MIN: CPT | Mod: 25 | Performed by: NURSE PRACTITIONER

## 2019-11-21 RX ORDER — HYDROXYZINE HYDROCHLORIDE 25 MG/1
25-50 TABLET, FILM COATED ORAL 3 TIMES DAILY PRN
Qty: 60 TABLET | Refills: 0 | Status: SHIPPED | OUTPATIENT
Start: 2019-11-21 | End: 2020-02-13

## 2019-11-21 RX ORDER — DEXTROAMPHETAMINE SACCHARATE, AMPHETAMINE ASPARTATE, DEXTROAMPHETAMINE SULFATE AND AMPHETAMINE SULFATE 7.5; 7.5; 7.5; 7.5 MG/1; MG/1; MG/1; MG/1
30 TABLET ORAL DAILY
Qty: 30 TABLET | Refills: 0 | Status: SHIPPED | OUTPATIENT
Start: 2020-01-29 | End: 2020-02-26

## 2019-11-21 RX ORDER — MIRTAZAPINE 15 MG/1
TABLET, FILM COATED ORAL
Qty: 173 TABLET | Refills: 0 | Status: SHIPPED | OUTPATIENT
Start: 2019-11-21 | End: 2020-02-17

## 2019-11-21 RX ORDER — DEXTROAMPHETAMINE SACCHARATE, AMPHETAMINE ASPARTATE, DEXTROAMPHETAMINE SULFATE AND AMPHETAMINE SULFATE 7.5; 7.5; 7.5; 7.5 MG/1; MG/1; MG/1; MG/1
30 TABLET ORAL DAILY
Qty: 30 TABLET | Refills: 0 | Status: SHIPPED | OUTPATIENT
Start: 2019-12-30 | End: 2020-02-26

## 2019-11-21 RX ORDER — ALPRAZOLAM 0.5 MG
TABLET ORAL
Qty: 90 TABLET | Refills: 0 | Status: SHIPPED | OUTPATIENT
Start: 2019-12-01 | End: 2019-12-31

## 2019-11-21 RX ORDER — DEXTROAMPHETAMINE SACCHARATE, AMPHETAMINE ASPARTATE, DEXTROAMPHETAMINE SULFATE AND AMPHETAMINE SULFATE 7.5; 7.5; 7.5; 7.5 MG/1; MG/1; MG/1; MG/1
30 TABLET ORAL DAILY
Qty: 30 TABLET | Refills: 0 | Status: SHIPPED | OUTPATIENT
Start: 2019-12-01 | End: 2020-02-26

## 2019-11-21 RX ORDER — OXYCODONE AND ACETAMINOPHEN 10; 325 MG/1; MG/1
1 TABLET ORAL EVERY 6 HOURS PRN
Qty: 150 TABLET | Refills: 0 | Status: SHIPPED | OUTPATIENT
Start: 2019-12-01 | End: 2019-12-31

## 2019-11-21 ASSESSMENT — PATIENT HEALTH QUESTIONNAIRE - PHQ9: SUM OF ALL RESPONSES TO PHQ QUESTIONS 1-9: 19

## 2019-11-21 ASSESSMENT — MIFFLIN-ST. JEOR: SCORE: 1170.6

## 2019-11-21 NOTE — LETTER
November 21, 2019      Hina Yap  [address]  [place] [address]          Bret Santamaria,    Good to see you today. I'm sorry you're going through this.    I wanted to let you know I've ordered hydroxyzine. It's a medication for anxiety. You use it the same way you use xanax but it is not addictive and is safer. It may not be quite as strong but it can definitely take the edge off. You can use this in place of the Xanax as we wean it.    I also think you will have less anxiety and trouble sleeping once you are on the Remeron for a month or so.    The next step of weaning the xanxax will start January 1. We will be doing 0.5mg (1 tab) morning, 0.75mg evening.    I will see you at your already scheduled appointment on 2/13/2020 at 9:50 am.    Sunshine Hernandez, SHYP-DNP.

## 2019-11-21 NOTE — PATIENT INSTRUCTIONS
Please re-schedule with Naty  See pain clinic  Remeron (depression, anxiety, sleep). 1 per night for a week then 2.  Decrease xanax dose but still ok to take twice daily  I ordered Adderall and will continue that.

## 2019-11-21 NOTE — TELEPHONE ENCOUNTER
Bret Santamaria,    Good to see you today. I'm sorry you're going through this.    I wanted to let you know I've ordered hydroxyzine. It's a medication for anxiety. You use it the same way you use xanax but it is not addictive and is safer. It may not be quite as strong but it can definitely take the edge off. You can use this in place of the Xanax as we wean it.    I also think you will have less anxiety and trouble sleeping once you are on the Remeron for a month or so.    The next step of weaning the xanxax will start January 1. We will be doing 0.5mg (1 tab) morning, 0.75mg evening.    I will see you in February.    Sunshine Hernandez, FNP-DNP.

## 2019-11-22 ENCOUNTER — MYC MEDICAL ADVICE (OUTPATIENT)
Dept: PEDIATRICS | Facility: CLINIC | Age: 54
End: 2019-11-22

## 2019-11-22 ENCOUNTER — TELEPHONE (OUTPATIENT)
Dept: PEDIATRICS | Facility: CLINIC | Age: 54
End: 2019-11-22

## 2019-11-22 NOTE — TELEPHONE ENCOUNTER
Please call patient.  I called the med marijuana program  She does NOT need to worry about her certification lapsing. It's not true that she cannot recertify if her certification has lapsed.    I'd like her to let it lapse  I'd like her to cancel her appointment with the other clinic she scheduled with on her own  I'd like her to get certified for med marijuana with pain clinic instead.

## 2019-11-22 NOTE — TELEPHONE ENCOUNTER
"Called the pt. No answer. LVM to call back.    Sent [employer] message as well with messaging, awaiting for response.    - Tim \"Graham\" JOHAN Stewart  [facility]  "

## 2019-11-22 NOTE — TELEPHONE ENCOUNTER
"Sent pt [employer] message.    Referred to last OV notes:  (G89.29) Other chronic pain  Comment: Has chronic low back pain with radiculopathy and multiple joint pain. Per patient, saw [facility] pain clinic years ago and saw a pain psychologist. Has tried many medications. Has not tried Lyrica due to insurance issues. Her neurologist has been prescribing her Percocet 10/325 150 tabs per month along with medical marijuana (patient never actually picked up the marijuana) for pain and xanax for anxiety.  I have not evaluated her for the source of her pain.  However, I'm concerned about the doses she is on as well as the use of Xanax with these medications. I believe her depression influences per pain and vice verse and she doesn't seem to have addressed the depression/anxiety piece. She states that nothing has helped, including many SSRIs.   Plan: PAIN MANAGEMENT REFERRAL,         oxyCODONE-acetaminophen (PERCOCET)  MG         per tablet, naloxone (NARCAN) 4 MG/0.1ML nasal         spray          -I agreed to take over oxycodone for now but only if she establishes with pain clinic. I will follow their recommendations.  -I told her I'm not willing to prescribe both oxycodone and alprazolam long term. Will initiate a taper.   -Narcan ordered and risk of overdose with oxy/alprazolam discussed    - Tim \"Graham\" JOHAN Stewart  Triage [facility]    "

## 2019-11-25 ENCOUNTER — TELEPHONE (OUTPATIENT)
Dept: PEDIATRICS | Facility: CLINIC | Age: 54
End: 2019-11-25

## 2019-11-25 NOTE — TELEPHONE ENCOUNTER
Hi there,    Regarding genesight (the test to find out which antidepressants work for you): I called the company to get signed up to administer this test. They should be e-mailing me within the next few weeks to let me know that I'm officially certified. Then they will mail me testing kits. As soon as I hear back from them I'll let you know. If you haven't heard back from me in 2 weeks please reach out to me.    Sunshine Hernandez, FNP-DNP.

## 2019-11-25 NOTE — LETTER
November 25, 2019      Hina Yap  [address]  [place] [address]          Hi there,    Regarding genesight (the test to find out which antidepressants work for you): I called the company to get signed up to administer this test. They should be e-mailing me within the next few weeks to let me know that I'm officially certified. Then they will mail me testing kits. As soon as I hear back from them I'll let you know. If you haven't heard back from me in 2 weeks please reach out to me.    Sunshine Hernandez, FNP-DNP.

## 2019-11-26 ENCOUNTER — TELEPHONE (OUTPATIENT)
Dept: PEDIATRICS | Facility: CLINIC | Age: 54
End: 2019-11-26

## 2019-11-26 ENCOUNTER — MEDICAL CORRESPONDENCE (OUTPATIENT)
Dept: HEALTH INFORMATION MANAGEMENT | Facility: CLINIC | Age: 54
End: 2019-11-26

## 2019-11-26 DIAGNOSIS — R53.83 OTHER FATIGUE: Primary | ICD-10-CM

## 2019-11-26 NOTE — TELEPHONE ENCOUNTER
Current kits in clinic are . Angela is checking on this.  Please do not call patient until we have new kits in.  Sasha Cerda CMA

## 2019-11-26 NOTE — TELEPHONE ENCOUNTER
Please let patient know we are now ready to do genesight testing    1. Please call her and schedule nurse visit on a day that Sunshine/Sasha are here (in case someone has questions)  2. Please double check with Angela Rios that we have non- kits  3. Please look to see if I, personally, need to sign the envelope or if the MA doing the swab can do it.       When pt comes in will need to photocopy a face sheet/copy of insurance with it.

## 2019-12-02 ENCOUNTER — MYC REFILL (OUTPATIENT)
Dept: PEDIATRICS | Facility: CLINIC | Age: 54
End: 2019-12-02

## 2019-12-02 DIAGNOSIS — G89.29 OTHER CHRONIC PAIN: ICD-10-CM

## 2019-12-02 DIAGNOSIS — F41.9 ANXIETY: ICD-10-CM

## 2019-12-02 DIAGNOSIS — G35 MS (MULTIPLE SCLEROSIS) (H): Chronic | ICD-10-CM

## 2019-12-02 RX ORDER — OXYCODONE AND ACETAMINOPHEN 10; 325 MG/1; MG/1
1 TABLET ORAL EVERY 6 HOURS PRN
Qty: 150 TABLET | Refills: 0 | Status: CANCELLED | OUTPATIENT
Start: 2019-12-02

## 2019-12-02 RX ORDER — ALPRAZOLAM 0.5 MG
TABLET ORAL
Qty: 90 TABLET | Refills: 0 | Status: CANCELLED | OUTPATIENT
Start: 2019-12-02

## 2019-12-02 RX ORDER — DEXTROAMPHETAMINE SACCHARATE, AMPHETAMINE ASPARTATE, DEXTROAMPHETAMINE SULFATE AND AMPHETAMINE SULFATE 7.5; 7.5; 7.5; 7.5 MG/1; MG/1; MG/1; MG/1
30 TABLET ORAL DAILY
Qty: 30 TABLET | Refills: 0 | Status: CANCELLED | OUTPATIENT
Start: 2019-12-02

## 2019-12-03 RX ORDER — DEXTROAMPHETAMINE SACCHARATE, AMPHETAMINE ASPARTATE MONOHYDRATE, DEXTROAMPHETAMINE SULFATE AND AMPHETAMINE SULFATE 7.5; 7.5; 7.5; 7.5 MG/1; MG/1; MG/1; MG/1
30 CAPSULE, EXTENDED RELEASE ORAL DAILY
Qty: 30 CAPSULE | Refills: 0 | Status: SHIPPED | OUTPATIENT
Start: 2019-12-03 | End: 2020-02-03

## 2019-12-03 NOTE — TELEPHONE ENCOUNTER
New kit received.  Called and spoke with patient.     Patient wants to have gene site testing done for all her medications not just depressant meds. Is this possible?    Also - Adderall Rx sent at appt was for immediate release - should have been XR per patient. Can you please change Rx to XR. Pended med.    Patient coming Friday at 11:30 for Gene site testing.    Sasha Cerda, CMA

## 2019-12-03 NOTE — TELEPHONE ENCOUNTER
Duplicate- sent - removed rx's  Azalia LYNNRN  [facility]  [phone]    Requested Prescriptions   Pending Prescriptions Disp Refills     oxyCODONE-acetaminophen (PERCOCET)  MG per tablet- Duplicate  Last Written Prescription Date:  12/1/2019  Last Fill Quantity: 150,  # refills: 0   Last office visit: 11/21/2019 with prescribing provider:     Future Office Visit:   Next 5 appointments (look out 90 days)    Dec 06, 2019 11:30 AM CST  Nurse Only with [facility] [address] [place] [address]  [address]  [facility] [address]  [phone]   Feb 13, 2020 10:15 AM CST  (Arrive by 9:50 AM)  Annual Wellness Visit with BENNETT Marvin CNP  [facility] [address] [place] [address]  [address]  [facility] [address]  [phone]          150 tablet 0     Sig: Take 1 tablet by mouth every 6 hours as needed for severe pain       There is no refill protocol information for this order        amphetamine-dextroamphetamine (ADDERALL) 30 MG tablet   30 tablet 0     Sig: Take 1 tablet (30 mg) by mouth daily       There is no refill protocol information for this order        ALPRAZolam (XANAX) 0.5 MG tablet 90 tablet 0     Sig: Take 0.75mg (1.5 tabs) twice daily as needed for anxiety       There is no refill protocol information for this order        Controlled Substance Refill Request for see above  Problem List Complete:  Yes  Noted:  11/21/2019   Priority:  Medium   Overview Addendum 11/21/2019 10:29 AM by Sunshine Butterfield APRN CNP   CURRENTLY IN THE PROCESS OF TRANSFERRING TO PAIN CLINIC. I inherited her from her neurologist who suddenly quit. He was prescribing her adderall, medical marijuana, phenergan, xanax, and percocet.     Currently at 1mg BID. Will taper to 0.75mg BID. Next month will do 0.5 morning, 0.75 evening (70, 0.5mg tabs December 31 or January 1, 2020)     Patient is followed by Sunshine  BENNETT Yeager CNP for ongoing prescription of pain medication.  All refills should only be approved by this provider, or covering partner.     Medication(s): Percocet 10/325.   Maximum quantity per month: 150 tabs  Clinic visit frequency required: Q 6  months      Controlled substance agreement:  Encounter-Level CSA:    There are no encounter-level csa.      Patient-Level CSA:    There are no patient-level csa.         Pain Clinic evaluation in the past: Yes       Date/Location:    [facility]. Over 10 years ago  DIRE Total Score(s):  No flowsheet data found.     Last [facility] website verification:  done on 11/21/19   [email or web address]        checked in past 3 months?  Yes 11/21/19

## 2019-12-06 ENCOUNTER — TRANSFERRED RECORDS (OUTPATIENT)
Dept: HEALTH INFORMATION MANAGEMENT | Facility: CLINIC | Age: 54
End: 2019-12-06

## 2019-12-06 ENCOUNTER — ALLIED HEALTH/NURSE VISIT (OUTPATIENT)
Dept: NURSING | Facility: CLINIC | Age: 54
End: 2019-12-06
Payer: MEDICAID

## 2019-12-06 DIAGNOSIS — F32.89 OTHER DEPRESSION: Primary | ICD-10-CM

## 2019-12-06 PROCEDURE — 99207 ZZC NO CHARGE NURSE ONLY: CPT

## 2019-12-06 NOTE — PROGRESS NOTES
Gene Sight testing done by Angela Rios CMA and   Sasha Cerda CMA.  [employer] Tracking number [id number].  Called [employer] to  by 9 pm today.    Sasha Cerda CMA

## 2019-12-10 ENCOUNTER — TRANSFERRED RECORDS (OUTPATIENT)
Dept: HEALTH INFORMATION MANAGEMENT | Facility: CLINIC | Age: 54
End: 2019-12-10

## 2019-12-16 ENCOUNTER — HEALTH MAINTENANCE LETTER (OUTPATIENT)
Age: 54
End: 2019-12-16

## 2019-12-19 ENCOUNTER — TELEPHONE (OUTPATIENT)
Dept: PEDIATRICS | Facility: CLINIC | Age: 54
End: 2019-12-19

## 2019-12-19 NOTE — TELEPHONE ENCOUNTER
Please send report which is placed in [employer].        Hi there,    Here is your genesight. Please bring this to psychiatry to help interpret for you.     All the best,      KARI Barajas.

## 2019-12-19 NOTE — LETTER
[facility]  [address] [place] [address]  D[place]OTHY Babin [address]  [phone]      December 19, 2019    Hina Yap                                                                                                                                                       [address]  [place] [address]              Dear Hina,      Here is your genesight. Please bring this to psychiatry to help interpret for you.         Sincerely,  Sunshine Butterfield, CNP

## 2019-12-23 ENCOUNTER — OFFICE VISIT (OUTPATIENT)
Dept: PALLIATIVE MEDICINE | Facility: CLINIC | Age: 54
End: 2019-12-23
Payer: MEDICAID

## 2019-12-23 VITALS — DIASTOLIC BLOOD PRESSURE: 73 MMHG | OXYGEN SATURATION: 98 % | HEART RATE: 78 BPM | SYSTOLIC BLOOD PRESSURE: 146 MMHG

## 2019-12-23 DIAGNOSIS — G89.29 OTHER CHRONIC PAIN: ICD-10-CM

## 2019-12-23 DIAGNOSIS — M54.2 CHRONIC NECK PAIN: Primary | ICD-10-CM

## 2019-12-23 DIAGNOSIS — M54.50 CHRONIC BILATERAL LOW BACK PAIN WITHOUT SCIATICA: ICD-10-CM

## 2019-12-23 DIAGNOSIS — G89.29 CHRONIC NECK PAIN: Primary | ICD-10-CM

## 2019-12-23 DIAGNOSIS — G89.29 CHRONIC BILATERAL LOW BACK PAIN WITHOUT SCIATICA: ICD-10-CM

## 2019-12-23 PROCEDURE — 99244 OFF/OP CNSLTJ NEW/EST MOD 40: CPT | Performed by: PHYSICAL MEDICINE & REHABILITATION

## 2019-12-23 ASSESSMENT — PAIN SCALES - GENERAL: PAINLEVEL: EXTREME PAIN (8)

## 2019-12-23 NOTE — PROGRESS NOTES
[facility] Consultation    Date of visit: 12/23/2019    Reason for consultation:    Hina Yap is a 54 year old female who is seen in consultation today at the request of her primary care provider, Sunshine Butterfield.     Consultation and Evaluation for: chronic pain    Please see the [facility] health questionnaire which the patient completed and reviewed with me in detail.    Review of [place] Prescription Monitoring Program (): No concern for abuse or misuse of controlled medications based on this report.     Pain medications are being prescribed by PCP.     Hina Yap has been seen at a pain clinic in the past.  She was seen at [facility] years ago.     Chief Complaint:    Chief Complaint   Patient presents with     Pain     Pain history:  Hina Yap is a 54 year old female who presents for initial evaluation of chief pain complaint of multiple areas of pain. She has a hx of MS and states she has long standing hx of bilateral arm and leg pain which has been going on for about 20 years. This is the most painful areas for her. The pain is constant, sharp and shooting in quality. Reports having some weakness in the arms and legs due to MS. She was being seen by Neurology for her MS and her pain was managed with oxycodone  mg, 5 tabs per day for many years. Along with this she was prescribed xanax 1 mg BID for her anxiety. Her neurologist recently was on extended leave and her medicine is now being prescribed by PCP. She is going to establish with a new neurologist but doesn't plan on doing that for about 6 months because she prefers not to drive that far to the clinic during the winter months.      She also reports having chronic neck and low back pain. The neck pain is more intermittent. She has difficulty describing the quality of the pain. Low back pain is greater than the neck pain. It is located in the back and  buttocks. Pain is fairly constant. She is unsure what aggravates the pain or helps relieve it.     Overall severity/Intensity: 7/10 at best, 9/10 at worst, 9/10 on average    Pain Treatments:  (H--helped; HI--Helped initially; SWH--Somewhat helpful; NH--No help; W--worse; SE--side effects; ?--Unsure if helpful)   1. Medications:       Current pain medications:   Percocet  mg q 6 hours prn - was taking 5 tabs per day, 4 tabs per day now.    Baclofen 20 mg QID - H   Has been certified for medical cannabis but has not completed the registration process yet   Ibuprofen 1600 to 2,400 mg per day - ?     Xanax 0.75 mg BID prn - H  Narcan    Current calculated MME:     1. Previous Pain Relevant Medications:  NOTE: This medication information taken from patient's intake form, not medical records.    Opiates: tylenol #3 - NH, fentanyl patches - NH, hydrocodone - NH, hydromorphone - NH, methadone - NH, morphine - NH, tramadol - NH   NSAIDS: celebrex - NH, toradol - NH, relafen - NH, aleve- NH, daypro- NH, sulindac - NH    Muscle Relaxants: methocarbamol - NH, norflex - NH, tizanidine - NH   Anti-migraine mediations: none   Anti-depressants: amitriptyline - NH, Cymbalta - NH, Nortriptyline - NH, venlafaxine - NH, savella - NH   Sleep aids:    Anxiolytics: valium - H, klonopin - NH, lorazepam - NH   Neuropathics:  Gabapentin - NH, Tompax - NH   Topicals: none   Other medications not covered above: Tylenol - NH    2. Physical Therapy: Yes - states can't have more PT due to insurance.   3. Pain Psychology: None  4. Surgery: None  5. Injections: Has had neck injections but unsure of specifics. Done at [facility]. Unsure how long ago.   6. Chiropractic: Yes - many years ago  7. Acupuncture: No  8. TENS Unit: Yes - ?  9. Other: none    Diagnostic tests:  Has had MRI of neck and potentially low back through an outside facility.    EMG/Testing:  Does not remember if she has had one done.     Labs:   No recent labs    Past Medical  History:  Past Medical History:   Diagnosis Date     Anxiety      COPD (chronic obstructive pulmonary disease) (H)      GERD (gastroesophageal reflux disease)      Neuromuscular disorder (H)      Restless leg syndrome      Tobacco use disorder      Past Surgical History:  No past surgical history on file.    Medications:  Current Outpatient Medications   Medication Sig Dispense Refill     albuterol (PROAIR HFA/PROVENTIL HFA/VENTOLIN HFA) 108 (90 Base) MCG/ACT inhaler Inhale 2 puffs into the lungs 4 times daily 1 Inhaler 1     ALPRAZolam (XANAX XR) 1 MG 24 hr tablet Take 1 mg by mouth 2 times daily        ALPRAZolam (XANAX) 0.5 MG tablet Take 0.75mg (1.5 tabs) twice daily as needed for anxiety 90 tablet 0     amphetamine-dextroamphetamine (ADDERALL XR) 30 MG 24 hr capsule Take 1 capsule (30 mg) by mouth daily 30 capsule 0     amphetamine-dextroamphetamine (ADDERALL) 10 MG tablet Take 10 mg by mouth daily        amphetamine-dextroamphetamine (ADDERALL) 30 MG tablet Take 1 tablet (30 mg) by mouth daily 30 tablet 0     [START ON 12/30/2019] amphetamine-dextroamphetamine (ADDERALL) 30 MG tablet Take 1 tablet (30 mg) by mouth daily 30 tablet 0     [START ON 1/29/2020] amphetamine-dextroamphetamine (ADDERALL) 30 MG tablet Take 1 tablet (30 mg) by mouth daily 30 tablet 0     baclofen (LIORESAL) 20 MG tablet Take 20 mg by mouth 4 times daily       esomeprazole (NEXIUM) 40 MG capsule Take 1 capsule (40 mg) by mouth At Bedtime Take at night 30 capsule 0     ferrous sulfate (FEROSUL) 325 (65 Fe) MG tablet Take 325 mg by mouth daily as needed       gabapentin (NEURONTIN) 300 MG capsule Take 1 capsule (300 mg) by mouth 2 times daily 6 capsule 0     guaiFENesin (COUGH SYRUP) 100 MG/5ML SYRP Take 10 mLs by mouth every 4 hours as needed for cough 560 mL 0     hydrOXYzine (ATARAX) 25 MG tablet Take 1-2 tablets (25-50 mg) by mouth 3 times daily as needed for anxiety 60 tablet 0     ipratropium - albuterol 0.5 mg/2.5 mg/3 mL (DUONEB)  0.5-2.5 (3) MG/3ML neb solution Take 1 vial (3 mLs) by nebulization every 6 hours as needed for shortness of breath / dyspnea or wheezing 30 vial 1     meloxicam (MOBIC) 7.5 MG tablet Take 7.5 mg by mouth 2 times daily       mirtazapine (REMERON) 15 MG tablet Take 1 tablet (15 mg) by mouth At Bedtime for 7 days, THEN 2 tablets (30 mg) At Bedtime. 173 tablet 0     multivitamin, therapeutic (THERA-VIT) TABS Take 1 tablet by mouth daily       naloxone (NARCAN) 4 MG/0.1ML nasal spray Spray 1 spray (4 mg) into one nostril alternating nostrils as needed for opioid reversal every 2-3 minutes until assistance arrives 0.2 mL 11     nicotine (NICORETTE) 2 MG gum Place 1 each (2 mg) inside cheek every hour as needed for smoking cessation 30 tablet 1     order for DME Equipment being ordered: Nebulizer 1 each 0     order for DME Equipment being ordered: Cane ()  Treatment Diagnosis: Multiple sclerosis 1 Device 0     oxyCODONE-acetaminophen (PERCOCET)  MG per tablet Take 1 tablet by mouth every 6 hours as needed for severe pain 150 tablet 0     oxyCODONE-acetaminophen (PERCOCET)  MG per tablet Take 1 tablet by mouth every 6 hours as needed.       promethazine (PHENERGAN) 25 MG tablet Take 1 tablet (25 mg) by mouth every 6 hours as needed for nausea (takes with percocet to prevent nausea) 56 tablet 0     rOPINIRole (REQUIP) 2 MG tablet Take 2 mg by mouth every morning       rOPINIRole (REQUIP) 2 MG tablet Take 4 mg by mouth At Bedtime       tiotropium-olodaterol 2.5-2.5 MCG/ACT AERS Inhale 2 puffs into the lungs daily         Allergies:     Allergies   Allergen Reactions     Sulfa Drugs Rash     Adhesive Tape Rash     Reacts to adhesive on fentanyl patch     Wellbutrin [Bupropion Hydrobromide] Rash       Social History:  Home situation: Lives [place], [place].   Occupation/Schooling: Not working  Tobacco use: 0.5 ppd  Drug use: none  Alcohol use: none    Family history:  No family history on file.    Review of  Systems:    POSTIVE IN BOLD  GENERAL: fever/chills, fatigue, general unwell feeling, weight gain/loss.  HEAD/EYES:  headache, dizziness, or vision changes.    EARS/NOSE/THROAT:  Nosebleeds, hearing loss, sinus infection, earache, tinnitus.  IMMUNE:  Allergies, cancer, immune deficiency, or infections.  SKIN:  Urticaria, rash, hives  HEME/Lymphatic:   anemia, easy bruising, easy bleeding.  RESPIRATORY:  cough, wheezing, or shortness of breath  CARDIOVASCULAR/Circulation:  Extremity edema, syncope, hypertension, tachycardia, or angina.  GASTROINTESTINAL:  abdominal pain, nausea/emesis, diarrhea, constipation,  hematochezia, or melena.  ENDOCRINE:  Diabetes, steroid use,  thyroid disease or osteoporosis.  MUSCULOSKELETAL: neck pain, back pain, arthralgia, arthritis, or gout.  GENITOURINARY:  frequency, urgency, dysuria, difficulty voiding, hematuria or incontinence.  NEUROLOGIC:  weakness, numbness, paresthesias, seizure, tremor, stroke or memory loss.  PSYCHIATRIC:  depression, anxiety, stress, suicidal thoughts or mood swings.     Physical Exam:  Vitals:    12/23/19 1443   BP: (!) 146/73   Pulse: 78   SpO2: 98%     Exam:  Constitutional: Very thin appearing. appears stated age. Appears comfortable upon entering exam room.   HEENT: Head atraumatic, normocephalic. Eyes without conjunctival injection or jaundice. Oropharynx clear. Neck supple. No obvious neck masses.  Respiratory: Breathing unlabored on room air.    Gastrointestinal: Abdomen soft, non-tender, without guarding/rebound.  Skin: No rash, lesions, or petechiae of exposed skin.   Extremities:  No clubbing, cyanosis, or edema.  Psychiatric/mental status: Alert, without lethargy or stupor. Speech fluent. Appropriate affect. Mood normal. Able to follow commands without difficulty.     Musculoskeletal exam:  Gait/Station/Posture: Walks with an antalgic gait which is improved when using a SEC.   Decreased generalized muscle bulk. Normal tone noted. Unremarkable  spinal curvature.     Cervical spine:  Range of motion mildly restricted with lateral rotation to both sides.   Myofascial tenderness: Bilateral cervical paraspinals and trapezius tenderness  No focal spinous process tenderness.   Spurling's negative bilaterally.      Lumbar spine:  Range of motion moderately restricted in flexion and mildly restricted with extension. Lateral rotation within normal limits  Myofascial tenderness:  There is hypertonicity and pain with palpation of the bilateral lumbar paraspinals. There is pain with palpation of gluteal musculature bilaterally.    SLR: negative b/l    Hip exam:   normal internal and external range of motion bilaterally. YESICA negative. Scour negative.     Shoulder exam:   No shoulder girdle wasting or scapular dyskinesis. No palmar muscle wasting.. Shoulder range of motion within normal limits.     Neurologic exam:  CN:  Cranial nerves 2-12 are grossly intact  Motor Strength:  5/5 symmetric UE and LE strength    Reflexes:       Biceps C5:     R:  2/4 L: 2/4   Brachioradialis  C6: R:  2/4 L: 2/4   Triceps C7:  R:  2/4 L: 2/4   Patella L4:  R:  2/4 L: 2/4   Achilles S1:  R:  1/4 L: 1/4     No ankle clonus    Sensory:  (upper and lower extremities):   Light touch: normal    Allodynia: absent    Hyperalgesia: absent     DIRE Score for ongoing opioid management is calculated as follows:   Diagnosis = 2 pts (slowly progressive; moderate pain/objective findings)   Intractability = 1 pt (few therapies tried; passive patient role)   Risk    Psych = 2 pts (personality dysfunction/mental illness that moderately interferes with care)    Chem Hlth = 2 pts (use of medications to cope with stress; chemical dependency in remission)   Reliability = 2 pts (occasional difficulties with compliance; generally reliable)   Social = 2 pts (reduction in some relationships/life rolls)   (Psych + Chem hlth + Reliability + Social) = 11     Efficacy = 1 pt (poor function; minimal pain relief  despite mod/high med dose)         DIRE Score = 12        7-13: likely NOT suitable candidate for long-term opioid analgesia       14-21: may be a suitable candidate for long-term opioid analgesia     Assessment:  Hina Yap is a 54 year old female with a past medical history significant for MS, stress-induced cardiomyopathy, restless leg syndrome, CDK stage 2, depression, anxiety who presents with complaints of multiple areas of pain.     1. Pain in both arms and legs: Etiology unclear. She reports this is due to MS. No neurology records available to review. On exam there is no spasticity noted.     2. Chronic neck pain: No imaging available. No red flag symptoms. Neuro exam is normal. Differential includes cervical spondylosis, facet arthropathy, myofascial pain.     3. Chronic low back pain: No imaging available. No red flag symptoms. Neuro exam is normal. Differential includes deconditioning, lumbar spondylosis, facet arthropathy, myofascial pain.     4. Mental Health - the patient's mental health concerns, specifically depression and anxiety, affect her experience of pain and contribute to her clinically significant distress.    5. Chronic opioid use: She has been on 75 OME for many years. Prescriptions were through her previous neurologist. This was in addition to being on a benzodiazepine as well.     Plan:  The following recommendations were given to the patient. Diagnosis, treatment options, risks, benefits, and alternatives were discussed, and all questions were answered. The patient expressed understanding of the plan for management.     I am recommending a multidisciplinary treatment plan to help this patient better manage her pain. She has not participated a multidisciplinary program previously. At this time, however, she does not have interest in participating and feels that it will not be helpful for her.     1. Therapy: Recommend participating in pain PT. She is currently not doing any  physical activity. Physical Therapy at the [facility] focuses on 3 main things.  The first is exercise--consistency vs intensity with a home exercise program consisting of stretching, strengthening exercises, and aerobic activity. The second is self-care --focusing on learning how to pace your activities, energy conservation, relaxation, stress management, mindfulness. The last one is body awareness--which includes looking at posture and body mechanics. Focuses on education and things that you can do to take care of yourself and assist you with managing your pain.   2. Clinical Health Pain Psychologist: Recommend starting pain psychology. Therapy can help reduce physical and psychosocial triggers or reinforcers of pain by adapting thoughts, feelings and behaviors to reduce symptoms and increase quality of life.  Evidence indicates that the practice of relaxation, meditation, and mindfulness techniques can significantly affect pain levels and overall well being. In addition to this recommend that she establish with a general mental health provider to address depression and anxiety. Previously this was attempted to be managed through medications by her neurologist.      3. Diagnostic Studies: There is no imaging available to review today. She states she has had imaging done by her neurologist. Would need to request information.     4. Medication Management:   We discussed the use of long term opioids for chronic pain. We talked about the development of tolerance over time, opioid induced hyperalgesia, sedation and cognitive impairment, sleep disordered breathing and risk of unintentional overdose and death. It is concerning that she is on both opioids and benzodiazepines. She has been on moderately high doses for many years. In terms of efficacy she is not getting good pain relief as evidenced by her poor function and ongoing severity of pain. Discussed that I agree with her PCP that she should  not be on both benzodiazepines and opioids. Recommend tapering off of xanax and seeing a mental health provider for better management of her mood. She is currently on 60 OME and not having good pain relief. Recommend tapering down on opioids since it is not providing significant pain relief or improvement in function (DIRE score is 12). Recommend tapering down to at least 30 OME and if able to tapering off. This can be done slowly.  Medical cannabis is a good option to help manage her pain. She has already been certified for this but has not completed the registration process.   5. Further procedures recommended: None  6. Discussed that acupuncture is a good option to consider.   7. Follow up: None scheduled. If she is interested in participating in our program she may schedule a follow up with me.     Review of Electronic Chart: Today I have also reviewed available medical information in the patient's medical record at [place] ([facility] including relevant provider notes, laboratory work, and imaging.     I spent 60 minutes of time face to face with the patient.  Greater than 50% of this time was spent in patient counseling and/or coordination of care regarding principles of multidisciplinary care, medication management, and treatment options as discussed above.     Rehana Ferrrai MD  [facility]

## 2019-12-23 NOTE — PATIENT INSTRUCTIONS
Thank you for coming to [facility] for your care. It is my goal to partner with you to help you reach your optimal state of health.     You were seen today for your chronic pain. As discussed during your visit these are the recommendations:    1. Medications:   - Do not recommend opioids to manage chronic pain, especially, when used in combination with benzodiazepines. Recommend tapering down on dose of opioids. Ideally would be able to taper off but would at least want to get below 30 oral morphine equivalents.  - Medical cannabis is a reasonable option to try     2. . Physical Therapy at the [facility] focuses on 3 main things.      1. Exercise--consistency vs intensity with a home exercise program consisting of stretching, strengthening exercises, and aerobic activity.   2. Self-care is important--focusing on learning how to pace your activities, energy conservation, relaxation, stress management, mindfulness.   3. The last one is body awareness--which includes looking at posture and body mechanics. Focuses on education and things that you can do to take care of yourself and assist you with managing your pain.     3. Therapy can help reduce physical and psychosocial triggers or reinforcers of pain by adapting thoughts, feelings and behaviors to reduce symptoms and increase quality of life.  Evidence indicates that the practice of relaxation, meditation, and mindfulness techniques can significantly affect pain levels and overall well being.    4. Acupuncture can be a reasonable option to help manage pain.     5. Follow up: No follow up scheduled with me today. If you are interested in trying some of the therapies listed above you can schedule follow up with me.     ----------------------------------------------------------------  Clinic Number:  [phone]     Call with any questions about your care and for scheduling assistance.     Calls are returned Monday through  Friday between 8 AM and 4:30 PM. We usually get back to you within 2 business days depending on the issue/request.    If we are prescribing your medications:    For opioid medication refills, call the clinic or send a [employer] message 7 days in advance.  Please include:    Name of requested medication    Name of the pharmacy.    For non-opioid medications, call your pharmacy directly to request a refill. Please allow 3-4 days to be processed.     Per [place] State Law:    All controlled substance prescriptions must be filled within 30 days of being written.      For those controlled substances allowing refills, pickup must occur within 30 days of last fill.      We believe regular attendance is key to your success in our program!      Any time you are unable to keep your appointment we ask that you call us at least 24 hours in advance to cancel.This will allow us to offer the appointment time to another patient.     Multiple missed appointments may lead to dismissal from the clinic.

## 2020-02-11 RX ORDER — OXYCODONE AND ACETAMINOPHEN 10; 325 MG/1; MG/1
1 TABLET ORAL EVERY 6 HOURS PRN
Qty: 150 TABLET | Refills: 0 | Status: CANCELLED | OUTPATIENT
Start: 2020-02-11

## 2020-02-11 NOTE — PATIENT INSTRUCTIONS
1. Xanax will be going down to 0.5mg twice a day. We can keep it there for a couple of months if needed.    2. Percocet (10mg oxycodone plus tylenol) is being switched to 5mg oxycodone tabs so that we can wean at smaller intervals. We will go down by 10% this month. We will go down by 10% next month as well.     3. Adderall: We can keep the adderall where it is. Ideally neurology would take this over but I can continue to fill it if they aren't comfortable with this.     4. Need to re-establish with a neurologist.      5. Colonoscopy or stool test, mammogram    6. Shingles vaccine      Preventive Health Recommendations  Female Ages 50 - 64    Yearly exam: See your health care provider every year in order to  o Review health changes.   o Discuss preventive care.    o Review your medicines if your doctor has prescribed any.      Get a Pap test every three years (unless you have an abnormal result and your provider advises testing more often).    If you get Pap tests with HPV test, you only need to test every 5 years, unless you have an abnormal result.     You do not need a Pap test if your uterus was removed (hysterectomy) and you have not had cancer.    You should be tested each year for STDs (sexually transmitted diseases) if you're at risk.     Have a mammogram every 1 to 2 years.    Have a colonoscopy at age 50, or have a yearly FIT test (stool test). These exams screen for colon cancer.      Have a cholesterol test every 5 years, or more often if advised.    Have a diabetes test (fasting glucose) every three years. If you are at risk for diabetes, you should have this test more often.     If you are at risk for osteoporosis (brittle bone disease), think about having a bone density scan (DEXA).    Shots: Get a flu shot each year. Get a tetanus shot every 10 years.    Nutrition:     Eat at least 5 servings of fruits and vegetables each day.    Eat whole-grain bread, whole-wheat pasta and brown rice instead of  white grains and rice.    Get adequate Calcium and Vitamin D.     Lifestyle    Exercise at least 150 minutes a week (30 minutes a day, 5 days a week). This will help you control your weight and prevent disease.    Limit alcohol to one drink per day.    No smoking.     Wear sunscreen to prevent skin cancer.     See your dentist every six months for an exam and cleaning.    See your eye doctor every 1 to 2 years.

## 2020-02-11 NOTE — PROGRESS NOTES
SUBJECTIVE:   CC: Hina Yap is an 54 year old woman who presents for preventive health visit.     Healthy Habits:     Getting at least 3 servings of Calcium per day:  NO    Bi-annual eye exam:  NO    Dental care twice a year:  NO    Sleep apnea or symptoms of sleep apnea:  Daytime drowsiness    Diet:  Regular (no restrictions)    Frequency of exercise:  4-5 days/week    Duration of exercise:  15-30 minutes    Taking medications regularly:  Yes    Medication side effects:  Not applicable    PHQ-2 Total Score: 1    Additional concerns today:  No    Concerns today: Pt states she saw on Mychart that she has kidney disease and she was not aware   Follow up of issues  Discuss medication refill expectations      Please find out why she was only given #135 when the script was for 150  Suboxone  Treatment resistant depression clinic  Blood pressure        -------------------------------------    Today's PHQ-2 Score:   PHQ-2 (  [employer]) 2020   Q1: Little interest or pleasure in doing things 0   Q2: Feeling down, depressed or hopeless 1   PHQ-2 Score 1   Q1: Little interest or pleasure in doing things Not at all   Q2: Feeling down, depressed or hopeless Several days   PHQ-2 Score 1   Answers for HPI/ROS submitted by the patient on 2020   If you checked off any problems, how difficult have these problems made it for you to do your work, take care of things at home, or get along with other people?: Very difficult  PHQ9 TOTAL SCORE: 16  KAMALA 7 TOTAL SCORE: 15    Abuse: Current or Past(Physical, Sexual or Emotional)- No  Do you feel safe in your environment? Yes    Social History     Tobacco Use     Smoking status: Current Every Day Smoker     Packs/day: 0.50     Years: 35.00     Pack years: 17.50     Types: Cigarettes     Last attempt to quit: 2013     Years since quittin.6     Smokeless tobacco: Never Used   Substance Use Topics     Alcohol use: Yes     Alcohol/week: 0.0 standard drinks      Comment: 1 time per week, 3 per time       Alcohol Use 2/13/2020   Prescreen: >3 drinks/day or >7 drinks/week? Not Applicable   Prescreen: >3 drinks/day or >7 drinks/week? -     Reviewed orders with patient.  Reviewed health maintenance and updated orders accordingly - Yes  Lab work is in process    Mammogram Screening: Patient over age 50, mutual decision to screen reflected in health maintenance.    Pertinent mammograms are reviewed under the imaging tab.  History of abnormal Pap smear: NO - age 30-65 PAP every 5 years with negative HPV co-testing recommended  PAP / HPV Latest Ref Rng & Units 5/25/2017   PAP - NIL   HPV 16 DNA NEG Negative   HPV 18 DNA NEG Negative   OTHER HR HPV NEG Negative     Reviewed and updated as needed this visit by clinical staff  Tobacco  Allergies  Med Hx  Surg Hx  Fam Hx  Soc Hx        Reviewed and updated as needed this visit by Provider            Review of Systems   Constitutional: Negative for chills and fever.   HENT: Positive for congestion and hearing loss. Negative for ear pain and sore throat.    Eyes: Negative for pain.   Respiratory: Negative for cough and shortness of breath.    Cardiovascular: Negative for chest pain, palpitations and peripheral edema.   Gastrointestinal: Positive for constipation, heartburn and nausea. Negative for abdominal pain, diarrhea and hematochezia.   Breasts:  Negative for tenderness, breast mass and discharge.   Genitourinary: Positive for urgency. Negative for dysuria, frequency, genital sores, hematuria, pelvic pain, vaginal bleeding and vaginal discharge.   Musculoskeletal: Positive for arthralgias, joint swelling and myalgias.   Skin: Negative for rash.   Neurological: Positive for weakness, headaches and paresthesias. Negative for dizziness.   Psychiatric/Behavioral: Positive for mood changes. The patient is nervous/anxious.         OBJECTIVE:   BP (!) 142/78 (BP Location: Right arm, Patient Position: Sitting, Cuff Size: Adult Regular)  "  Pulse 83   Temp 97.7  F (36.5  C) (Tympanic)   Resp 18   Ht 1.676 m (5' 6\")   Wt 53.3 kg (117 lb 6.4 oz)   SpO2 98%   BMI 18.95 kg/m    Physical Exam  GENERAL APPEARANCE: healthy, alert and no distress  EYES: Eyes grossly normal to inspection, PERRL and conjunctivae and sclerae normal  HENT: ear canals and TM's normal, nose and mouth without ulcers or lesions, oropharynx clear and oral mucous membranes moist  NECK: no adenopathy, no asymmetry, masses, or scars and thyroid normal to palpation  RESP: lungs clear to auscultation - no rales, rhonchi or wheezes  BREAST: normal without masses, tenderness or nipple discharge and no palpable axillary masses or adenopathy  CV: regular rate and rhythm, normal S1 S2, no S3 or S4, no murmur, click or rub, no peripheral edema and peripheral pulses strong  ABDOMEN: soft, nontender, no hepatosplenomegaly, no masses and bowel sounds normal  MS: no musculoskeletal defects are noted and gait is age appropriate without ataxia  SKIN: no suspicious lesions or rashes  NEURO: Normal strength and tone, sensory exam grossly normal, mentation intact and speech normal  PSYCH: mentation appears normal and affect normal/bright    Diagnostic Test Results:  Labs reviewed in Epic    ASSESSMENT/PLAN:   1. Routine general medical examination at a health care facility  - *MA Screening Digital Bilateral; Future  - Fecal colorectal cancer screen (FIT); Future  - **Comprehensive metabolic panel FUTURE 1yr  - Lipid panel reflex to direct LDL Fasting  - Vitamin D Deficiency  - CBC with platelets differential  - Ferritin    2. Visit for screening mammogram  Has scheduled    3. Screening for colon cancer  FIT ordered    4. Recurrent major depression resistant to treatment (H)  Ongoing. Chronic pain plays a role. States she has \"tried everything\" including multiple antidepressants.   - MENTAL HEALTH REFERRAL  - Adult; Psychiatry and Medication Management; Psychiatry; [facility]: Psychiatry Clinic [phone] " [phone]; We will contact you to schedule the appointment or please call with any questions  -Declines therapy but I did refer her to the treatment resistant depression clinic.  -I do hope that being on less opioids helps with depression. However, we discussed that sometimes weaning opioids makes people feel hopeless and need to watch for worsening depression or any SI/HI    5. Anxiety  Ongoing. Weaning alprazolam because the neurologist who prescribed it to her suddenly stopped practicing, and I do not agree with the high doses. States hydroxyzine did not work for her.   -Declined therapy  -Referred her to the treatment resistant depression clinic.  -Slow alprazolam wean. May need to switch to equivalent valium to reduce withdrawal sx.   - ALPRAZolam (XANAX) 0.5 MG tablet; Take 1 tablet (0.5 mg) by mouth 2 times daily  Dispense: 60 tablet; Refill: 0    6. Other insomnia  Ongoing. States related to pain. I believe depression/anxiety are contributing.  -Start trazodone to aid with sleep. It's possible this may also help with depression. Reviewed r/b/se  - traZODone (DESYREL) 50 MG tablet; Take 1-3 tablets ( mg) by mouth At Bedtime  Dispense: 90 tablet; Refill: 3    7. Other chronic pain  See previous visit notes. Patient is very upset that we are weaning but I have been clear with her that this is non negotiable if she is seeing me. She has declined to work with the pain clinic or any of their ancillary services. I touched based with addiction medicine, who doesn't believe that she would qualify for suboxone from an addiction perspective since she is physically dependent but not showing addictive behaviors.   -Slow wean. We are switching to straight oxycodone and switching to smaller mg tabs so that we can wean more slowly. The plan is to decrease by 10% every 1-2 months.   - oxyCODONE (ROXICODONE) 5 MG tablet; Take 1-2 tabs at a time, up to 5 times a day. No more than 9 tabs per day. Must give at least 4 hours  between dosing.  Dispense: 270 tablet; Refill: 0  -Reduce by 10% every 1-2 months  -Again reviewed the interplay of chronic pain and depression. Referred to the treatment resistant depression clinic  -Again encouraged participation in pain program, but she declines  -Discussed watching for worsening depression/SI with weaning opioids. I'm concerned because she seems very frustrated and resistant to the wean. However, she denies any active SI/HI  -Will have close f/u    8. Other fatigue  Was apparently prescribed by her neurologist for fatigue related to MS.   - amphetamine-dextroamphetamine (ADDERALL XR) 30 MG 24 hr capsule; Take 1 capsule (30 mg) by mouth daily  Dispense: 30 capsule; Refill: 0  - amphetamine-dextroamphetamine (ADDERALL) 10 MG tablet; Take 1 tablet (10 mg) by mouth daily  Dispense: 30 tablet; Refill: 0  -I am ok continuing this as long as there aren't any dose changes.     9. Tobacco abuse  Ongoing. Chantix has worked for her in the past without any adverse effects. She understands risk of increased suicidality.   - varenicline (CHANTIX BREEZY) 0.5 MG X 11 & 1 MG X 42 tablet; Take 0.5 mg tab daily for 3 days, THEN 0.5 mg tab twice daily for 4 days, THEN 1 mg twice daily.  Dispense: 53 tablet; Refill: 0  - varenicline (CHANTIX CONTINUING MONTH BREEZY) 1 MG tablet; Take 1 tablet (1 mg) by mouth 2 times daily  Dispense: 60 tablet; Refill: 1  - nicotine polacrilex (NICORETTE) 4 MG gum; Place 1 each (4 mg) inside cheek as needed for smoking cessation  Dispense: 100 each; Refill: 11    10. CKD (chronic kidney disease) stage 2, GFR 60-89 ml/min  Stable.     11. Elevated blood pressure reading without diagnosis of hypertension  Did not notice when she was in clinic today. No CHEST PAIN or shortness of breath.   -I mycharted her asking her to come into the pharmacy for recheck.       COUNSELING:  Reviewed preventive health counseling, as reflected in patient instructions    Estimated body mass index is 18.95 kg/m   "as calculated from the following:    Height as of this encounter: 1.676 m (5' 6\").    Weight as of this encounter: 53.3 kg (117 lb 6.4 oz).         reports that she has been smoking cigarettes. She has a 17.50 pack-year smoking history. She has never used smokeless tobacco.  Tobacco Cessation Action Plan: See notes    Counseling Resources:  ATP IV Guidelines  Pooled Cohorts Equation Calculator  Breast Cancer Risk Calculator  FRAX Risk Assessment  ICSI Preventive Guidelines  Dietary Guidelines for Americans, 2010  USDA's MyPlate  ASA Prophylaxis  Lung CA Screening    Sunshine Butterfield, BENNETT [facility] SANDEEP      "

## 2020-02-13 ENCOUNTER — ANCILLARY PROCEDURE (OUTPATIENT)
Dept: MAMMOGRAPHY | Facility: CLINIC | Age: 55
End: 2020-02-13
Payer: MEDICAID

## 2020-02-13 ENCOUNTER — OFFICE VISIT (OUTPATIENT)
Dept: PEDIATRICS | Facility: CLINIC | Age: 55
End: 2020-02-13
Payer: MEDICAID

## 2020-02-13 VITALS
HEART RATE: 83 BPM | DIASTOLIC BLOOD PRESSURE: 78 MMHG | OXYGEN SATURATION: 98 % | SYSTOLIC BLOOD PRESSURE: 142 MMHG | TEMPERATURE: 97.7 F | BODY MASS INDEX: 18.87 KG/M2 | RESPIRATION RATE: 18 BRPM | HEIGHT: 66 IN | WEIGHT: 117.4 LBS

## 2020-02-13 DIAGNOSIS — R53.83 OTHER FATIGUE: ICD-10-CM

## 2020-02-13 DIAGNOSIS — Z12.31 VISIT FOR SCREENING MAMMOGRAM: ICD-10-CM

## 2020-02-13 DIAGNOSIS — G47.09 OTHER INSOMNIA: ICD-10-CM

## 2020-02-13 DIAGNOSIS — F33.9 RECURRENT MAJOR DEPRESSION RESISTANT TO TREATMENT (H): ICD-10-CM

## 2020-02-13 DIAGNOSIS — F41.9 ANXIETY: ICD-10-CM

## 2020-02-13 DIAGNOSIS — N18.2 CKD (CHRONIC KIDNEY DISEASE) STAGE 2, GFR 60-89 ML/MIN: ICD-10-CM

## 2020-02-13 DIAGNOSIS — G89.29 OTHER CHRONIC PAIN: ICD-10-CM

## 2020-02-13 DIAGNOSIS — Z72.0 TOBACCO ABUSE: ICD-10-CM

## 2020-02-13 DIAGNOSIS — R03.0 ELEVATED BLOOD PRESSURE READING WITHOUT DIAGNOSIS OF HYPERTENSION: ICD-10-CM

## 2020-02-13 DIAGNOSIS — Z00.00 ROUTINE GENERAL MEDICAL EXAMINATION AT A HEALTH CARE FACILITY: Primary | ICD-10-CM

## 2020-02-13 DIAGNOSIS — Z12.11 SCREENING FOR COLON CANCER: ICD-10-CM

## 2020-02-13 LAB
BASOPHILS # BLD AUTO: 0 10E9/L (ref 0–0.2)
BASOPHILS NFR BLD AUTO: 0.4 %
DIFFERENTIAL METHOD BLD: NORMAL
EOSINOPHIL # BLD AUTO: 0.2 10E9/L (ref 0–0.7)
EOSINOPHIL NFR BLD AUTO: 3 %
ERYTHROCYTE [DISTWIDTH] IN BLOOD BY AUTOMATED COUNT: 13.2 % (ref 10–15)
HCT VFR BLD AUTO: 38.3 % (ref 35–47)
HGB BLD-MCNC: 12.5 G/DL (ref 11.7–15.7)
LYMPHOCYTES # BLD AUTO: 2.6 10E9/L (ref 0.8–5.3)
LYMPHOCYTES NFR BLD AUTO: 33.4 %
MCH RBC QN AUTO: 29.8 PG (ref 26.5–33)
MCHC RBC AUTO-ENTMCNC: 32.6 G/DL (ref 31.5–36.5)
MCV RBC AUTO: 91 FL (ref 78–100)
MONOCYTES # BLD AUTO: 0.7 10E9/L (ref 0–1.3)
MONOCYTES NFR BLD AUTO: 8.6 %
NEUTROPHILS # BLD AUTO: 4.2 10E9/L (ref 1.6–8.3)
NEUTROPHILS NFR BLD AUTO: 54.6 %
PLATELET # BLD AUTO: 280 10E9/L (ref 150–450)
RBC # BLD AUTO: 4.2 10E12/L (ref 3.8–5.2)
WBC # BLD AUTO: 7.8 10E9/L (ref 4–11)

## 2020-02-13 PROCEDURE — 99396 PREV VISIT EST AGE 40-64: CPT | Performed by: NURSE PRACTITIONER

## 2020-02-13 PROCEDURE — 80061 LIPID PANEL: CPT | Performed by: NURSE PRACTITIONER

## 2020-02-13 PROCEDURE — 36415 COLL VENOUS BLD VENIPUNCTURE: CPT | Performed by: NURSE PRACTITIONER

## 2020-02-13 PROCEDURE — 85025 COMPLETE CBC W/AUTO DIFF WBC: CPT | Performed by: NURSE PRACTITIONER

## 2020-02-13 PROCEDURE — 77067 SCR MAMMO BI INCL CAD: CPT | Mod: TC

## 2020-02-13 PROCEDURE — 80053 COMPREHEN METABOLIC PANEL: CPT | Performed by: NURSE PRACTITIONER

## 2020-02-13 PROCEDURE — 99214 OFFICE O/P EST MOD 30 MIN: CPT | Mod: 25 | Performed by: NURSE PRACTITIONER

## 2020-02-13 PROCEDURE — 82306 VITAMIN D 25 HYDROXY: CPT | Performed by: NURSE PRACTITIONER

## 2020-02-13 PROCEDURE — 82728 ASSAY OF FERRITIN: CPT | Performed by: NURSE PRACTITIONER

## 2020-02-13 RX ORDER — OXYCODONE HYDROCHLORIDE 5 MG/1
TABLET ORAL
Qty: 270 TABLET | Refills: 0 | Status: SHIPPED | OUTPATIENT
Start: 2020-03-04 | End: 2020-03-04

## 2020-02-13 RX ORDER — ALPRAZOLAM 0.5 MG
0.5 TABLET ORAL 2 TIMES DAILY
Qty: 60 TABLET | Refills: 0 | Status: SHIPPED | OUTPATIENT
Start: 2020-03-04 | End: 2020-04-09

## 2020-02-13 RX ORDER — VARENICLINE TARTRATE 1 MG/1
1 TABLET, FILM COATED ORAL 2 TIMES DAILY
Qty: 60 TABLET | Refills: 1 | Status: SHIPPED | OUTPATIENT
Start: 2020-03-13 | End: 2020-07-09

## 2020-02-13 RX ORDER — DEXTROAMPHETAMINE SACCHARATE, AMPHETAMINE ASPARTATE, DEXTROAMPHETAMINE SULFATE AND AMPHETAMINE SULFATE 2.5; 2.5; 2.5; 2.5 MG/1; MG/1; MG/1; MG/1
10 TABLET ORAL DAILY
Qty: 30 TABLET | Refills: 0 | Status: SHIPPED | OUTPATIENT
Start: 2020-03-04 | End: 2020-04-09

## 2020-02-13 RX ORDER — TRAZODONE HYDROCHLORIDE 50 MG/1
50-150 TABLET, FILM COATED ORAL AT BEDTIME
Qty: 90 TABLET | Refills: 3 | Status: SHIPPED | OUTPATIENT
Start: 2020-02-13 | End: 2020-04-21

## 2020-02-13 RX ORDER — DEXTROAMPHETAMINE SACCHARATE, AMPHETAMINE ASPARTATE MONOHYDRATE, DEXTROAMPHETAMINE SULFATE AND AMPHETAMINE SULFATE 7.5; 7.5; 7.5; 7.5 MG/1; MG/1; MG/1; MG/1
30 CAPSULE, EXTENDED RELEASE ORAL DAILY
Qty: 30 CAPSULE | Refills: 0 | Status: SHIPPED | OUTPATIENT
Start: 2020-03-04 | End: 2020-04-09

## 2020-02-13 ASSESSMENT — ANXIETY QUESTIONNAIRES
GAD7 TOTAL SCORE: 15
7. FEELING AFRAID AS IF SOMETHING AWFUL MIGHT HAPPEN: NOT AT ALL
1. FEELING NERVOUS, ANXIOUS, OR ON EDGE: NEARLY EVERY DAY
6. BECOMING EASILY ANNOYED OR IRRITABLE: NOT AT ALL
5. BEING SO RESTLESS THAT IT IS HARD TO SIT STILL: NEARLY EVERY DAY
GAD7 TOTAL SCORE: 15
2. NOT BEING ABLE TO STOP OR CONTROL WORRYING: NEARLY EVERY DAY
7. FEELING AFRAID AS IF SOMETHING AWFUL MIGHT HAPPEN: NOT AT ALL
3. WORRYING TOO MUCH ABOUT DIFFERENT THINGS: NEARLY EVERY DAY
4. TROUBLE RELAXING: NEARLY EVERY DAY
GAD7 TOTAL SCORE: 15

## 2020-02-13 ASSESSMENT — MIFFLIN-ST. JEOR: SCORE: 1149.27

## 2020-02-13 ASSESSMENT — ENCOUNTER SYMPTOMS
FREQUENCY: 1
HEARTBURN: 1
SORE THROAT: 0
PALPITATIONS: 0
FEVER: 0
ARTHRALGIAS: 1
SHORTNESS OF BREATH: 1
DYSURIA: 0
HEADACHES: 1
COUGH: 1
ABDOMINAL PAIN: 0
PARESTHESIAS: 1
HEMATOCHEZIA: 0
WEAKNESS: 1
NERVOUS/ANXIOUS: 1
DIZZINESS: 0
CHILLS: 0
EYE PAIN: 0
NAUSEA: 1
MYALGIAS: 1
CONSTIPATION: 1
DIARRHEA: 0
JOINT SWELLING: 1
HEMATURIA: 0
BREAST MASS: 0

## 2020-02-13 ASSESSMENT — PATIENT HEALTH QUESTIONNAIRE - PHQ9
SUM OF ALL RESPONSES TO PHQ QUESTIONS 1-9: 16
SUM OF ALL RESPONSES TO PHQ QUESTIONS 1-9: 16
10. IF YOU CHECKED OFF ANY PROBLEMS, HOW DIFFICULT HAVE THESE PROBLEMS MADE IT FOR YOU TO DO YOUR WORK, TAKE CARE OF THINGS AT HOME, OR GET ALONG WITH OTHER PEOPLE: VERY DIFFICULT

## 2020-02-14 LAB
ALBUMIN SERPL-MCNC: 3.7 G/DL (ref 3.4–5)
ALP SERPL-CCNC: 86 U/L (ref 40–150)
ALT SERPL W P-5'-P-CCNC: 27 U/L (ref 0–50)
ANION GAP SERPL CALCULATED.3IONS-SCNC: 7 MMOL/L (ref 3–14)
AST SERPL W P-5'-P-CCNC: 21 U/L (ref 0–45)
BILIRUB SERPL-MCNC: 0.2 MG/DL (ref 0.2–1.3)
BUN SERPL-MCNC: 32 MG/DL (ref 7–30)
CALCIUM SERPL-MCNC: 8.8 MG/DL (ref 8.5–10.1)
CHLORIDE SERPL-SCNC: 108 MMOL/L (ref 94–109)
CHOLEST SERPL-MCNC: 190 MG/DL
CO2 SERPL-SCNC: 27 MMOL/L (ref 20–32)
CREAT SERPL-MCNC: 0.75 MG/DL (ref 0.52–1.04)
FERRITIN SERPL-MCNC: 136 NG/ML (ref 8–252)
GFR SERPL CREATININE-BSD FRML MDRD: >90 ML/MIN/{1.73_M2}
GLUCOSE SERPL-MCNC: 94 MG/DL (ref 70–99)
HDLC SERPL-MCNC: 60 MG/DL
LDLC SERPL CALC-MCNC: 110 MG/DL
NONHDLC SERPL-MCNC: 130 MG/DL
POTASSIUM SERPL-SCNC: 3.9 MMOL/L (ref 3.4–5.3)
PROT SERPL-MCNC: 6.8 G/DL (ref 6.8–8.8)
SODIUM SERPL-SCNC: 142 MMOL/L (ref 133–144)
TRIGL SERPL-MCNC: 102 MG/DL

## 2020-02-14 ASSESSMENT — PATIENT HEALTH QUESTIONNAIRE - PHQ9: SUM OF ALL RESPONSES TO PHQ QUESTIONS 1-9: 16

## 2020-02-14 ASSESSMENT — ANXIETY QUESTIONNAIRES: GAD7 TOTAL SCORE: 15

## 2020-02-16 LAB — DEPRECATED CALCIDIOL+CALCIFEROL SERPL-MC: 79 UG/L (ref 20–75)

## 2020-02-18 ENCOUNTER — TELEPHONE (OUTPATIENT)
Dept: PEDIATRICS | Facility: CLINIC | Age: 55
End: 2020-02-18

## 2020-02-18 NOTE — TELEPHONE ENCOUNTER
Please call this patient's pharmacy.   I prescribed oxycodone #150 tabs and the patient states she was told she could only get #135, not this fill, but the last 2 fills.

## 2020-02-18 NOTE — TELEPHONE ENCOUNTER
[facility] and spoke with pharmacist.  Inquired about medication refills - see below.  Was told insurance restricts how many can be given.  Amount exceeds what is needed per directions - 1 tab every 6 hours for 30 days - Max is 135 pills per insurance.    Sasha Cerda, CMA

## 2020-02-19 ASSESSMENT — ENCOUNTER SYMPTOMS
SHORTNESS OF BREATH: 0
FREQUENCY: 0
COUGH: 0

## 2020-02-26 ENCOUNTER — OFFICE VISIT (OUTPATIENT)
Dept: URGENT CARE | Facility: URGENT CARE | Age: 55
End: 2020-02-26
Payer: MEDICAID

## 2020-02-26 VITALS
OXYGEN SATURATION: 99 % | HEART RATE: 78 BPM | WEIGHT: 117 LBS | BODY MASS INDEX: 18.88 KG/M2 | DIASTOLIC BLOOD PRESSURE: 91 MMHG | TEMPERATURE: 98 F | SYSTOLIC BLOOD PRESSURE: 130 MMHG | RESPIRATION RATE: 20 BRPM

## 2020-02-26 DIAGNOSIS — K21.9 GASTROESOPHAGEAL REFLUX DISEASE, ESOPHAGITIS PRESENCE NOT SPECIFIED: ICD-10-CM

## 2020-02-26 DIAGNOSIS — L08.9 FINGER INFECTION: Primary | ICD-10-CM

## 2020-02-26 PROCEDURE — 99213 OFFICE O/P EST LOW 20 MIN: CPT | Performed by: PHYSICIAN ASSISTANT

## 2020-02-26 RX ORDER — CEPHALEXIN 500 MG/1
500 CAPSULE ORAL 3 TIMES DAILY
Qty: 21 CAPSULE | Refills: 0 | Status: SHIPPED | OUTPATIENT
Start: 2020-02-26 | End: 2020-04-09

## 2020-02-26 NOTE — PROGRESS NOTES
SUBJECTIVE:  Hina Yap is a 54 year old female who presents complaining of right fifth finger pain.  She has noted some redness and swelling along the DIP joint and then scratched it got worse.  Stated that there was a small raised area that poked and had some clear drainage came out.  Soaked in peroxide.  Still with red area that is a little tender and worried that still infected.  Symptoms began 3-4 days ago.   Severity: mild..  No fevers or chills noted.  No migration of redness or swelling proximally.    Past Medical History:   Diagnosis Date     Anxiety      COPD (chronic obstructive pulmonary disease) (H)      GERD (gastroesophageal reflux disease)      Neuromuscular disorder (H)      Restless leg syndrome      Tobacco use disorder      Current Outpatient Medications   Medication Sig Dispense Refill     albuterol (PROAIR HFA/PROVENTIL HFA/VENTOLIN HFA) 108 (90 Base) MCG/ACT inhaler Inhale 2 puffs into the lungs 4 times daily 1 Inhaler 1     [START ON 3/4/2020] ALPRAZolam (XANAX) 0.5 MG tablet Take 1 tablet (0.5 mg) by mouth 2 times daily 60 tablet 0     [START ON 3/4/2020] amphetamine-dextroamphetamine (ADDERALL XR) 30 MG 24 hr capsule Take 1 capsule (30 mg) by mouth daily 30 capsule 0     [START ON 3/4/2020] amphetamine-dextroamphetamine (ADDERALL) 10 MG tablet Take 1 tablet (10 mg) by mouth daily 30 tablet 0     baclofen (LIORESAL) 20 MG tablet Take 20 mg by mouth 4 times daily       cephALEXin (KEFLEX) 500 MG capsule Take 1 capsule (500 mg) by mouth 3 times daily for 7 days 21 capsule 0     ipratropium - albuterol 0.5 mg/2.5 mg/3 mL (DUONEB) 0.5-2.5 (3) MG/3ML neb solution Take 1 vial (3 mLs) by nebulization every 6 hours as needed for shortness of breath / dyspnea or wheezing 30 vial 1     meloxicam (MOBIC) 7.5 MG tablet Take 7.5 mg by mouth 2 times daily       mirtazapine (REMERON) 15 MG tablet Take 2 tablets (30 mg) by mouth At Bedtime 180 tablet 0     multivitamin, therapeutic (THERA-VIT) TABS  Take 1 tablet by mouth daily       nicotine (NICORETTE) 2 MG gum Place 1 each (2 mg) inside cheek every hour as needed for smoking cessation 30 tablet 1     nicotine polacrilex (NICORETTE) 4 MG gum Place 1 each (4 mg) inside cheek as needed for smoking cessation 100 each 11     order for DME Equipment being ordered: Nebulizer 1 each 0     order for DME Equipment being ordered: Cane ()  Treatment Diagnosis: Multiple sclerosis 1 Device 0     [START ON 3/4/2020] oxyCODONE (ROXICODONE) 5 MG tablet Take 1-2 tabs at a time, up to 5 times a day. No more than 9 tabs per day. Must give at least 4 hours between dosing. 270 tablet 0     oxyCODONE-acetaminophen (PERCOCET)  MG per tablet Take 1 tablet by mouth every 6 hours as needed for severe pain May fill on or after 1/2/20 150 tablet 0     oxyCODONE-acetaminophen (PERCOCET)  MG per tablet Take 1 tablet by mouth every 6 hours as needed.       promethazine (PHENERGAN) 25 MG tablet Take 1 tablet (25 mg) by mouth every 6 hours as needed for nausea (takes with percocet to prevent nausea) 56 tablet 0     rOPINIRole (REQUIP) 2 MG tablet Take 2 mg by mouth every morning       rOPINIRole (REQUIP) 2 MG tablet Take 4 mg by mouth At Bedtime       tiotropium-olodaterol 2.5-2.5 MCG/ACT AERS Inhale 2 puffs into the lungs daily       traZODone (DESYREL) 50 MG tablet Take 1-3 tablets ( mg) by mouth At Bedtime 90 tablet 3     [START ON 3/13/2020] varenicline (CHANTIX CONTINUING MONTH BREEZY) 1 MG tablet Take 1 tablet (1 mg) by mouth 2 times daily 60 tablet 1     varenicline (CHANTIX BREEZY) 0.5 MG X 11 & 1 MG X 42 tablet Take 0.5 mg tab daily for 3 days, THEN 0.5 mg tab twice daily for 4 days, THEN 1 mg twice daily. 53 tablet 0     ferrous sulfate (FEROSUL) 325 (65 Fe) MG tablet Take 325 mg by mouth daily as needed       Social History     Tobacco Use     Smoking status: Current Every Day Smoker     Packs/day: 0.50     Years: 35.00     Pack years: 17.50     Types: Cigarettes      Last attempt to quit: 2013     Years since quittin.6     Smokeless tobacco: Never Used   Substance Use Topics     Alcohol use: Yes     Alcohol/week: 0.0 standard drinks     Comment: 1 time per week, 3 per time       ROS:  Review of Systems  Complete ROS negative except as stated above    OBJECTIVE:  BP (!) 130/91   Pulse 78   Temp 98  F (36.7  C) (Tympanic)   Resp 20   Wt 53.1 kg (117 lb)   SpO2 99%   BMI 18.88 kg/m    Hand exam:  examination of fifth finger reveals with redness, tenderness and swelling along DIP joint and small puncture wound noted where she poked.  No spreading of redness and no abscess or drainage noted. No streaking  Tendon function intact     assessment/plan:  (L08.9) Finger infection  (primary encounter diagnosis)  Comment:   Plan: cephALEXin (KEFLEX) 500 MG capsule         Continue to soak and OTC med for pain and sx relief.  Keflex as directed and if not improved to Follow-up with PCP  Signs of spreading infection discussed  Patient with MS and Follow-up as needed

## 2020-03-04 DIAGNOSIS — G89.29 OTHER CHRONIC PAIN: ICD-10-CM

## 2020-03-04 RX ORDER — OXYCODONE HYDROCHLORIDE 5 MG/1
TABLET ORAL
Qty: 270 TABLET | Refills: 0 | Status: SHIPPED | OUTPATIENT
Start: 2020-03-11 | End: 2020-04-09

## 2020-03-04 RX ORDER — OXYCODONE AND ACETAMINOPHEN 10; 325 MG/1; MG/1
1 TABLET ORAL EVERY 6 HOURS PRN
Qty: 63 TABLET | Refills: 0 | Status: SHIPPED | OUTPATIENT
Start: 2020-03-04 | End: 2020-03-05

## 2020-03-04 NOTE — TELEPHONE ENCOUNTER
Nicole from [facility] calling stating that patient's insurance will only allow 7 days of oxyCODONE (ROXICODONE) 5 MG tablet the first time. So we will have to send over another prescription. Please call Nicole with questions at [phone].    Sarah Gunn,

## 2020-03-04 NOTE — TELEPHONE ENCOUNTER
**Provider, see note below. Pended medication. Please review/sign if appropriate.  Thanks!  Aneta SESAY RN, BSN

## 2020-03-04 NOTE — TELEPHONE ENCOUNTER
I have sent a script for 1 week (63 tabs) to the pharmacy.    I have also sent a script, future dated for 1 week from now, to the pharmacy for 1 month (270 tabs).    Please let the patient know that, since this is technically a med change (from percocet to oxycodone), this new medicare rule applies. Please be sure she knows she will get one a script for 1 week then a week later, will get script for 1 month.

## 2020-03-05 RX ORDER — OXYCODONE HYDROCHLORIDE 5 MG/1
TABLET ORAL
Qty: 63 TABLET | Refills: 0 | Status: SHIPPED | OUTPATIENT
Start: 2020-03-05 | End: 2020-04-09

## 2020-03-05 NOTE — TELEPHONE ENCOUNTER
I believe the new scripts are correct. One for a week, the next for a month. Pls be sure pharmacy is aware of the accidental percocet script. Pls also let pt know what is going on.

## 2020-03-05 NOTE — TELEPHONE ENCOUNTER
"Called pt. No answer. LVMTCB. Also sent a  [employer] message.    If the pt calls back: Please review the [employer] message that was sent to them. Purpose of call was to clarify pt's refill of oxycodone (insurance problem). Issue has been resolved, but pt will have to  a 7-day refill, then on 03/11/20 the patient can  a 30 day refill.      - Tim \"Graham\" JOHAN Stewart - Patient Advocate Liason (PAL)  [facility]    "

## 2020-03-05 NOTE — TELEPHONE ENCOUNTER
"Routing back to PCP to review refill of Percocet.    - Tim \"Graham\" Terry, JOHAN - Patient Advocate Liason (PAL)  [facility]    "

## 2020-04-06 ENCOUNTER — APPOINTMENT (OUTPATIENT)
Dept: GENERAL RADIOLOGY | Facility: CLINIC | Age: 55
End: 2020-04-06
Attending: EMERGENCY MEDICINE
Payer: MEDICAID

## 2020-04-06 ENCOUNTER — HOSPITAL ENCOUNTER (EMERGENCY)
Facility: CLINIC | Age: 55
Discharge: HOME | End: 2020-04-06
Attending: EMERGENCY MEDICINE | Admitting: EMERGENCY MEDICINE
Payer: MEDICAID

## 2020-04-06 VITALS
WEIGHT: 117 LBS | RESPIRATION RATE: 8 BRPM | DIASTOLIC BLOOD PRESSURE: 100 MMHG | TEMPERATURE: 98.4 F | HEART RATE: 64 BPM | OXYGEN SATURATION: 97 % | SYSTOLIC BLOOD PRESSURE: 142 MMHG | BODY MASS INDEX: 18.88 KG/M2

## 2020-04-06 DIAGNOSIS — J44.1 COPD EXACERBATION (H): ICD-10-CM

## 2020-04-06 DIAGNOSIS — R06.02 SOB (SHORTNESS OF BREATH): ICD-10-CM

## 2020-04-06 DIAGNOSIS — R06.02 SHORTNESS OF BREATH: ICD-10-CM

## 2020-04-06 DIAGNOSIS — R07.9 CHEST PAIN, UNSPECIFIED TYPE: ICD-10-CM

## 2020-04-06 DIAGNOSIS — J44.0 CHRONIC OBSTRUCTIVE PULMONARY DISEASE WITH ACUTE LOWER RESPIRATORY INFECTION (H): ICD-10-CM

## 2020-04-06 LAB
ANION GAP SERPL CALCULATED.3IONS-SCNC: 4 MMOL/L (ref 3–14)
BASOPHILS # BLD AUTO: 0.1 10E9/L (ref 0–0.2)
BASOPHILS NFR BLD AUTO: 0.6 %
BUN SERPL-MCNC: 32 MG/DL (ref 7–30)
CALCIUM SERPL-MCNC: 9 MG/DL (ref 8.5–10.1)
CHLORIDE SERPL-SCNC: 105 MMOL/L (ref 94–109)
CO2 SERPL-SCNC: 28 MMOL/L (ref 20–32)
CREAT SERPL-MCNC: 0.97 MG/DL (ref 0.52–1.04)
D DIMER PPP FEU-MCNC: 0.3 UG/ML FEU (ref 0–0.5)
DIFFERENTIAL METHOD BLD: NORMAL
EOSINOPHIL # BLD AUTO: 0.3 10E9/L (ref 0–0.7)
EOSINOPHIL NFR BLD AUTO: 3.4 %
ERYTHROCYTE [DISTWIDTH] IN BLOOD BY AUTOMATED COUNT: 12.2 % (ref 10–15)
GFR SERPL CREATININE-BSD FRML MDRD: 66 ML/MIN/{1.73_M2}
GLUCOSE SERPL-MCNC: 103 MG/DL (ref 70–99)
HCT VFR BLD AUTO: 41.4 % (ref 35–47)
HGB BLD-MCNC: 13.3 G/DL (ref 11.7–15.7)
IMM GRANULOCYTES # BLD: 0 10E9/L (ref 0–0.4)
IMM GRANULOCYTES NFR BLD: 0.2 %
LYMPHOCYTES # BLD AUTO: 3.7 10E9/L (ref 0.8–5.3)
LYMPHOCYTES NFR BLD AUTO: 37.2 %
MCH RBC QN AUTO: 28.9 PG (ref 26.5–33)
MCHC RBC AUTO-ENTMCNC: 32.1 G/DL (ref 31.5–36.5)
MCV RBC AUTO: 90 FL (ref 78–100)
MONOCYTES # BLD AUTO: 0.6 10E9/L (ref 0–1.3)
MONOCYTES NFR BLD AUTO: 6.5 %
NEUTROPHILS # BLD AUTO: 5.1 10E9/L (ref 1.6–8.3)
NEUTROPHILS NFR BLD AUTO: 52.1 %
NRBC # BLD AUTO: 0 10*3/UL
NRBC BLD AUTO-RTO: 0 /100
PLATELET # BLD AUTO: 411 10E9/L (ref 150–450)
POTASSIUM SERPL-SCNC: 3.7 MMOL/L (ref 3.4–5.3)
RBC # BLD AUTO: 4.6 10E12/L (ref 3.8–5.2)
SODIUM SERPL-SCNC: 137 MMOL/L (ref 133–144)
T4 FREE SERPL-MCNC: 1.26 NG/DL (ref 0.76–1.46)
TROPONIN I SERPL-MCNC: <0.015 UG/L (ref 0–0.04)
TSH SERPL DL<=0.005 MIU/L-ACNC: 5.98 MU/L (ref 0.4–4)
WBC # BLD AUTO: 9.8 10E9/L (ref 4–11)

## 2020-04-06 PROCEDURE — 94640 AIRWAY INHALATION TREATMENT: CPT

## 2020-04-06 PROCEDURE — 96361 HYDRATE IV INFUSION ADD-ON: CPT

## 2020-04-06 PROCEDURE — 84439 ASSAY OF FREE THYROXINE: CPT | Performed by: EMERGENCY MEDICINE

## 2020-04-06 PROCEDURE — 93005 ELECTROCARDIOGRAM TRACING: CPT

## 2020-04-06 PROCEDURE — 25000131 ZZH RX MED GY IP 250 OP 636 PS 637: Performed by: EMERGENCY MEDICINE

## 2020-04-06 PROCEDURE — 25000132 ZZH RX MED GY IP 250 OP 250 PS 637: Performed by: EMERGENCY MEDICINE

## 2020-04-06 PROCEDURE — 99285 EMERGENCY DEPT VISIT HI MDM: CPT | Mod: 25

## 2020-04-06 PROCEDURE — 25800030 ZZH RX IP 258 OP 636: Performed by: EMERGENCY MEDICINE

## 2020-04-06 PROCEDURE — 80048 BASIC METABOLIC PNL TOTAL CA: CPT | Performed by: EMERGENCY MEDICINE

## 2020-04-06 PROCEDURE — 71045 X-RAY EXAM CHEST 1 VIEW: CPT

## 2020-04-06 PROCEDURE — 96360 HYDRATION IV INFUSION INIT: CPT

## 2020-04-06 PROCEDURE — 84484 ASSAY OF TROPONIN QUANT: CPT | Performed by: EMERGENCY MEDICINE

## 2020-04-06 PROCEDURE — 85025 COMPLETE CBC W/AUTO DIFF WBC: CPT | Performed by: EMERGENCY MEDICINE

## 2020-04-06 PROCEDURE — 85379 FIBRIN DEGRADATION QUANT: CPT | Performed by: EMERGENCY MEDICINE

## 2020-04-06 PROCEDURE — 84443 ASSAY THYROID STIM HORMONE: CPT | Performed by: EMERGENCY MEDICINE

## 2020-04-06 RX ORDER — AZITHROMYCIN 250 MG/1
250 TABLET, FILM COATED ORAL DAILY
Qty: 4 TABLET | Refills: 0 | Status: SHIPPED | OUTPATIENT
Start: 2020-04-07 | End: 2020-04-22

## 2020-04-06 RX ORDER — PREDNISONE 20 MG/1
60 TABLET ORAL ONCE
Status: COMPLETED | OUTPATIENT
Start: 2020-04-06 | End: 2020-04-06

## 2020-04-06 RX ORDER — ALBUTEROL SULFATE 90 UG/1
2 AEROSOL, METERED RESPIRATORY (INHALATION) EVERY 4 HOURS PRN
Qty: 1 INHALER | Refills: 1 | Status: SHIPPED | OUTPATIENT
Start: 2020-04-06 | End: 2021-06-08

## 2020-04-06 RX ORDER — IPRATROPIUM BROMIDE AND ALBUTEROL SULFATE 2.5; .5 MG/3ML; MG/3ML
1 SOLUTION RESPIRATORY (INHALATION) EVERY 6 HOURS PRN
Qty: 30 VIAL | Refills: 1 | Status: SHIPPED | OUTPATIENT
Start: 2020-04-06 | End: 2021-06-08

## 2020-04-06 RX ORDER — AZITHROMYCIN 250 MG/1
500 TABLET, FILM COATED ORAL ONCE
Status: COMPLETED | OUTPATIENT
Start: 2020-04-06 | End: 2020-04-06

## 2020-04-06 RX ORDER — ALBUTEROL SULFATE 90 UG/1
6 AEROSOL, METERED RESPIRATORY (INHALATION) ONCE
Status: COMPLETED | OUTPATIENT
Start: 2020-04-06 | End: 2020-04-06

## 2020-04-06 RX ORDER — PREDNISONE 20 MG/1
TABLET ORAL
Qty: 10 TABLET | Refills: 0 | Status: SHIPPED | OUTPATIENT
Start: 2020-04-06 | End: 2020-04-22

## 2020-04-06 RX ADMIN — SODIUM CHLORIDE 1000 ML: 9 INJECTION, SOLUTION INTRAVENOUS at 19:57

## 2020-04-06 RX ADMIN — AZITHROMYCIN MONOHYDRATE 500 MG: 250 TABLET ORAL at 22:38

## 2020-04-06 RX ADMIN — PREDNISONE 60 MG: 20 TABLET ORAL at 22:38

## 2020-04-06 RX ADMIN — ALBUTEROL SULFATE 6 PUFF: 90 AEROSOL, METERED RESPIRATORY (INHALATION) at 22:39

## 2020-04-06 ASSESSMENT — ENCOUNTER SYMPTOMS
FEVER: 0
WHEEZING: 0
COUGH: 1
APPETITE CHANGE: 1
CHILLS: 1
SORE THROAT: 1
NAUSEA: 1
FATIGUE: 1
DIARRHEA: 0
VOMITING: 0
FREQUENCY: 1
WEAKNESS: 1
MYALGIAS: 0
RHINORRHEA: 0
SHORTNESS OF BREATH: 1
NERVOUS/ANXIOUS: 1

## 2020-04-07 LAB — INTERPRETATION ECG - MUSE: NORMAL

## 2020-04-07 NOTE — ED TRIAGE NOTES
54 year old patient complains of cough, SOB, anxiety, fever/chills x1 week. ABCs intact without need for intervention at this time.

## 2020-04-07 NOTE — ED PROVIDER NOTES
"  History     Chief Complaint:  Shortness of Breath    HPI   Hina Yap is a 54 year old female with a history of chronic obstructive pulmonary disease, stress induced cardiomyopathy, chronic kidney disease, and multiple sclerosis who presents to the emergency department today for evaluation of shortness of breath. The patient reports approximately two weeks of chills, fatigue, a decreased appetite, nausea, weakness, difficulty thinking straight, and \"frequent emotional breakdowns,\" as well as 4-5 days of a cough, sore throat, shortness of breath, and intermittent right upper chest pain. In addition to the above symptoms, the patient endorses urinary urgency and frequency but states this is baseline for her. She denies any fever, wheezing, rhinorrhea, body aches, vomiting, diarrhea, and known sick contacts.     Cardiac/PE/DVT Risk Factors:  History of hypertension - negative  History of hyperlipidemia - negative  History of diabetes - negative  History of smoking - positive  Family history of heart complications - negative    Allergies:  Sulfa Drugs  Adhesive tape  Wellbutrin     Medications:    Albuterol  Xanax  Adderall  Lioresal  Ferosul  Duoneb  Mobic  Remeron  Oxycodone  Phenergan  Requip  Desyrel  Tiotropium olodaterol  Chantix    Past Medical History:    Anxiety  Chronic obstructive pulmonary disease  Gastroesophageal reflux disease  Neuromuscular disorder  Restless leg syndrome  Tobacco use disorder  Multiple sclerosis  Iron deficiency anemia  Stress induced cardiomyopathy  Recurrent major depressive disorder  Chronic pain  Chronic kidney disease  Insomnia     Past Surgical History:    Surgical history reviewed. No pertinent surgical history.    Family History:    Family history reviewed. No pertinent family history.    Social History:  The patient presents to the ED alone.  Smoking Status: Current Every Day Smoker   Packs per day: 0.50   Years: 35   Types: Cigarettes  Smokeless Tobacco: Never " Used  Alcohol Use: Positive  Drug Use: Negative  PCP: Sunshine Butterfield  Marital Status:  Single      Review of Systems   Constitutional: Positive for appetite change, chills and fatigue. Negative for fever.   HENT: Positive for sore throat. Negative for rhinorrhea.    Respiratory: Positive for cough and shortness of breath. Negative for wheezing.    Cardiovascular: Positive for chest pain.   Gastrointestinal: Positive for nausea. Negative for diarrhea and vomiting.   Genitourinary: Positive for frequency and urgency.   Musculoskeletal: Negative for myalgias.   Neurological: Positive for weakness.   Psychiatric/Behavioral: The patient is nervous/anxious.         Difficulty thinking straight   All other systems reviewed and are negative.      Physical Exam     Patient Vitals for the past 24 hrs:   BP Temp Temp src Pulse Heart Rate Resp SpO2 Weight   04/06/20 2245 -- -- -- -- 67 8 -- --   04/06/20 2230 (!) 142/100 -- -- 64 64 11 97 % --   04/06/20 2215 (!) 144/94 -- -- 58 65 16 99 % --   04/06/20 2200 (!) 141/92 -- -- 61 60 15 99 % --   04/06/20 2145 112/77 -- -- 67 75 29 96 % --   04/06/20 2130 137/79 -- -- 64 58 14 97 % --   04/06/20 2115 (!) 134/91 -- -- 58 57 8 99 % --   04/06/20 2100 127/88 -- -- 61 64 17 100 % --   04/06/20 2045 138/84 -- -- 53 57 12 100 % --   04/06/20 2030 (!) 143/88 -- -- 51 54 18 100 % --   04/06/20 2015 (!) 143/88 -- -- 53 56 13 99 % --   04/06/20 2000 127/79 -- -- 58 56 13 99 % --   04/06/20 1945 (!) 143/107 -- -- 74 66 19 96 % --   04/06/20 1930 (!) 133/90 -- -- 72 67 15 99 % --   04/06/20 1915 (!) 133/101 -- -- -- -- -- 98 % --   04/06/20 1913 (!) 133/101 98.4  F (36.9  C) Oral 81 -- 20 98 % 53.1 kg (117 lb)     Physical Exam  General: Alert, no acute distress; nontoxic appearing; speaking in full sentences  HEENT:  Moist mucous membranes. Conjunctiva normal.   CV:  RRR, no m/r/g, skin warm and well perfused  Pulm:  Faint expiratory wheeze; no rales. No acute distress, breathing  comfortably  GI:  Soft, nontender, nondistended.  No rebound or guarding.  Normal bowel sounds  MSK:  Moving all extremities.  No focal areas of edema, erythema, or tenderness  Skin:  WWP, no rashes, no lower extremity edema, skin color normal, no diaphoresis  Psych:  Well-appearing, normal affect, regular speech    Emergency Department Course     ECG:  ECG taken at 1924  Sinus rhythm with occasional premature ventricular complexes  Otherwise normal ECG  Rate 68 bpm. NY interval 140 ms. QRS duration 100 ms. QT/QTc 430/457 ms. P-R-T axes 69 69 51.    Imaging:  Radiology findings were communicated with the patient who voiced understanding of the findings.    XR Chest Port 1 View  ET tube on prior removed. Lungs clear. Remainder stable.   Reading per radiology    Laboratory:  Laboratory findings were communicated with the patient who voiced understanding of the findings.    CBC: WBC 9.8, HGB 13.3,   BMP: Glucose 103 (H), BUN 32 (H) o/w WNL (Creatinine 0.97)  Troponin (Collected: 1928): <0.015  D Dimer Quantitative: 0.3  TSH with Free T4 Reflex: 5.98  T4 Free: 1.26     Interventions:  1957 NS 1000 ml IV    Emergency Department Course:    1909 Nursing notes and vitals reviewed.    1924 EKG obtained as noted above.    1928 IV was inserted and blood was drawn for laboratory testing, results above.    1944 I performed an exam of the patient as documented above.     2146 The patient was sent for a portable chest xray while in the emergency department, results above.     Findings and plan explained to the patient. Patient discharged home with instructions regarding supportive care, medications, and reasons to return. The importance of close follow-up was reviewed. The patient was prescribed Zithromax and prednisone.     Impression & Plan      Medical Decision Making:  Hina Yap is a 54 year old female who presents to the emergency department today for evaluation of cough, shortness of breath, fatigue.  She  denies any fevers and she is afebrile and otherwise hemodynamically stable here.  Exam remarkable for faint expiratory wheeze, otherwise patient not in any respiratory distress and is not hypoxic.  Broad differential was considered including ACS, PE, pneumonia, CHF, anemia, hypothyroidism.  Work-up thus far is reassuring.  EKG shows no acute ischemic changes.  Troponin is within normal limits given symptoms have been ongoing for greater than 6 hours, I do not feel any further work-up is indicated.  She does not appear fluid overloaded and therefore I have low suspicion for decompensated heart failure.  CBC, BMP are normal.  No signs of hypothyroidism.  She is low risk for PE and d-dimer is negative.  Chest x-ray shows no pneumonia, pneumothorax, effusion.  Suspect mild COPD exacerbation causing her symptoms given exam findings.  I will start her on steroid, Z-Chandu and refill her rescue inhaler and DuoNeb which she ran out of.  I do not feel that she requires hospital admission at this time and she agrees.  I considered the possibility of COVID19 given pandemic, and recommend she self isolate/left quarantine until she is 3 days symptom-free with no coughing or fevers.  She was agreeable to this.  She understands the limitations and guidelines currently for testing.  I feel she is safe to discharge home.  Follow-up closely with primary care doctor was discussed.  I also discussed signs symptoms of prompt her urgent ER return.  All questions were answered.    Diagnosis:    ICD-10-CM    1. Shortness of breath  R06.02    2. COPD exacerbation (H)  J44.1    3. SOB (shortness of breath)  R06.02 ipratropium - albuterol 0.5 mg/2.5 mg/3 mL (DUONEB) 0.5-2.5 (3) MG/3ML neb solution   4. Chronic obstructive pulmonary disease with acute lower respiratory infection (H)  J44.0 albuterol (PROAIR HFA/PROVENTIL HFA/VENTOLIN HFA) 108 (90 Base) MCG/ACT inhaler   5. Chest pain, unspecified type  R07.9      Disposition:   The patient is  discharged to home.    Discharge Medications:  Discharge Medication List as of 4/6/2020 10:31 PM      START taking these medications    Details   azithromycin (ZITHROMAX) 250 MG tablet Take 1 tablet (250 mg) by mouth daily for 4 days, Disp-4 tablet,R-0, Local Print      predniSONE (DELTASONE) 20 MG tablet Take two tablets (= 40mg) each day for 5 (five) days, Disp-10 tablet,R-0, Local Print           Scribe Disclosure:  I, Annabel Russ, am serving as a scribe at 7:17 PM on 4/6/2020 to document services personally performed by Luis Ferrari MD based on my observations and the provider's statements to me.    [facility] EMERGENCY DEPARTMENT       Luis Ferrari MD  04/07/20 0026

## 2020-04-17 ENCOUNTER — TELEPHONE (OUTPATIENT)
Dept: PSYCHIATRY | Facility: CLINIC | Age: 55
End: 2020-04-17

## 2020-04-17 ENCOUNTER — VIRTUAL VISIT (OUTPATIENT)
Dept: PEDIATRICS | Facility: CLINIC | Age: 55
End: 2020-04-17
Payer: MEDICAID

## 2020-04-17 DIAGNOSIS — R10.84 ABDOMINAL PAIN, GENERALIZED: ICD-10-CM

## 2020-04-17 DIAGNOSIS — F33.1 MODERATE EPISODE OF RECURRENT MAJOR DEPRESSIVE DISORDER (H): ICD-10-CM

## 2020-04-17 DIAGNOSIS — F41.9 ANXIETY: ICD-10-CM

## 2020-04-17 DIAGNOSIS — G89.29 OTHER CHRONIC PAIN: ICD-10-CM

## 2020-04-17 DIAGNOSIS — N39.41 URGE INCONTINENCE OF URINE: ICD-10-CM

## 2020-04-17 DIAGNOSIS — G47.00 INSOMNIA, UNSPECIFIED TYPE: ICD-10-CM

## 2020-04-17 DIAGNOSIS — R06.02 SOB (SHORTNESS OF BREATH): Primary | ICD-10-CM

## 2020-04-17 PROCEDURE — 99214 OFFICE O/P EST MOD 30 MIN: CPT | Mod: 95 | Performed by: NURSE PRACTITIONER

## 2020-04-17 NOTE — TELEPHONE ENCOUNTER
"PSYCHIATRY CLINIC PHONE INTAKE     SERVICES REQUESTED / INTERESTED IN          Med Management    Presenting Problem and Brief History                              What would you like to be seen for? Anxiety and Depression     Patient reports that she had suffered from depression all her life.  She has tried multiple medications in the past but feels like they just do not work.  Patient states that she feels like she is \"cracking up.\"  Patient reports that she has not been taking psychiatric medications for a couple of years.  Patient is interested in discussing alternative options for treatment  Is there any history of developmental delay?  No     Do you have current thoughts of self-harm?      Do you currently have thoughts of harming others?           Treatment History     Are you currently seeing a mental health provider?  No            Who / month last seen:    Do you have mental health records elsewhere?  No  Contact information:   Do you have a current Primary Care Provider? Yes  Who/Location?  Sunshine Butterfield APRN, CNP     Substance Use History     Do you have any history of alcohol / illicit drug use?    Describe:    Have you ever received treatment for this?      Describe:       Social History     Does the patient have a guardian?  No    Name / number: NA  Have you had an ACT team in last 12 months?    Describe:    Do you have any current or past legal issues?    Describe:    OK to leave a detailed voicemail?  Yes     Medications             Current Outpatient Medications   Medication Sig Dispense Refill     albuterol (PROAIR HFA/PROVENTIL HFA/VENTOLIN HFA) 108 (90 Base) MCG/ACT inhaler Inhale 2 puffs into the lungs every 4 hours as needed for shortness of breath / dyspnea or wheezing 1 Inhaler 1     ALPRAZolam (XANAX) 0.5 MG tablet Take 1 tablet (0.5 mg) by mouth 2 times daily 60 tablet 0     amphetamine-dextroamphetamine (ADDERALL XR) 30 MG 24 hr capsule Take 1 capsule (30 mg) by mouth daily 30 " capsule 0     amphetamine-dextroamphetamine (ADDERALL) 10 MG tablet Take 1 tablet (10 mg) by mouth daily 30 tablet 0     baclofen (LIORESAL) 20 MG tablet Take 20 mg by mouth 4 times daily       ferrous sulfate (FEROSUL) 325 (65 Fe) MG tablet Take 325 mg by mouth daily as needed       ipratropium - albuterol 0.5 mg/2.5 mg/3 mL (DUONEB) 0.5-2.5 (3) MG/3ML neb solution Take 1 vial (3 mLs) by nebulization every 6 hours as needed for shortness of breath / dyspnea or wheezing 30 vial 1     meloxicam (MOBIC) 7.5 MG tablet Take 7.5 mg by mouth 2 times daily       mirtazapine (REMERON) 15 MG tablet Take 2 tablets (30 mg) by mouth At Bedtime 180 tablet 0     multivitamin, therapeutic (THERA-VIT) TABS Take 1 tablet by mouth daily       nicotine (NICORETTE) 2 MG gum Place 1 each (2 mg) inside cheek every hour as needed for smoking cessation 30 tablet 1     order for DME Equipment being ordered: Nebulizer 1 each 0     order for DME Equipment being ordered: Cane ()  Treatment Diagnosis: Multiple sclerosis 1 Device 0     oxyCODONE (ROXICODONE) 5 MG tablet Take 1-2 tabs at a time, up to 5 times a day. No more than 9 tabs per day. Must give at least 4 hours between dosing. 270 tablet 0     oxyCODONE-acetaminophen (PERCOCET)  MG per tablet Take 1 tablet by mouth every 6 hours as needed.       predniSONE (DELTASONE) 20 MG tablet Take two tablets (= 40mg) each day for 5 (five) days 10 tablet 0     promethazine (PHENERGAN) 25 MG tablet Take 1 tablet (25 mg) by mouth every 6 hours as needed for nausea (takes with percocet to prevent nausea) 56 tablet 0     rOPINIRole (REQUIP) 2 MG tablet Take 2 mg by mouth every morning       rOPINIRole (REQUIP) 2 MG tablet Take 4 mg by mouth At Bedtime       tiotropium-olodaterol 2.5-2.5 MCG/ACT AERS Inhale 2 puffs into the lungs daily       traZODone (DESYREL) 50 MG tablet Take 1-3 tablets ( mg) by mouth At Bedtime 90 tablet 3     varenicline (CHANTIX CONTINUING MONTH BREEZY) 1 MG tablet  Take 1 tablet (1 mg) by mouth 2 times daily 60 tablet 1     varenicline (CHANTIX BREEZY) 0.5 MG X 11 & 1 MG X 42 tablet Take 0.5 mg tab daily for 3 days, THEN 0.5 mg tab twice daily for 4 days, THEN 1 mg twice daily. 53 tablet 0     varenicline (CHANTIX) 1 MG tablet Take 1 tablet (1 mg) by mouth 2 times daily 60 tablet 3         DISPOSITION      Began phone screen with patient.  Based on patient's answers, and information from Mental Health Referral, patient was likely referred to the Treatment Resistant Depression clinic at [facility].  Writer reached out to that location and was told a member of their intake staff would contact the patient. Relayed message to patient and provided direct number to that location: [phone].  Phone screen left partially complete for reference.    Amna Stephenson,

## 2020-04-17 NOTE — PROGRESS NOTES
Scheduled pt for phone appt Tuesday 4/21/2020 for 45 mins.    Reiterated information below that I would call her Monday to f/u with psych and pain clinic appts. Pt agreed and verbalized understanding.    Reji Rahman, EMT at 11:29 AM on April 17, 2020   [facility] Guide   [phone]

## 2020-04-17 NOTE — PROGRESS NOTES
"Hina Yap is a 54 year old female who is being evaluated via a billable telephone visit.       The patient has been notified of following:      \"This telephone visit will be conducted via a call between you and your physician/provider. We have found that certain health care needs can be provided without the need for a physical exam.  This service lets us provide the care you need with a short phone conversation.  If a prescription is necessary we can send it directly to your pharmacy.  If lab work is needed we can place an order for that and you can then stop by our lab to have the test done at a later time.     Telephone visits are billed at different rates depending on your insurance coverage. During this emergency period, for some insurers they may be billed the same as an in-person visit.  Please reach out to your insurance provider with any questions.     If during the course of the call the physician/provider feels a telephone visit is not appropriate, you will not be charged for this service.\"     Patient has given verbal consent for Telephone visit?  Yes     How would you like to obtain your AVS? Jelani Freed         Hina Yap is a 54 year old female who presents to clinic today for the following health issues:     Recheck on everything. Patient does not think she needs any medications.    Mental health:  States she came to the realization that all of her meds (referring to oxycodone and xanax) are making the mental health issues worse. States she has \"a problem.\" Doesn't think she is abusing them, but thinks they have been masking the mental health issues.      Was having anxiety attacks twice a day. \"My whole mind and body\" were going crazy. States that thinking about weaning xanax and oxy is increasing stress.     Trazodone hasn't helped much. Helping \"a little.\" Has been taking 3 tabs.    GI issues:   -Worse with stress. Ongoing for a few months, worse in the last few weeks. " "Feels it is near her belly button, both sides. No diarrhea or blood in the stool.   -Has a hard time describing the character of the pain: Feels like a \"cutting\" pain sometimes. Seems to come and go multiple ties a day with emotional panic attacks.  -Doesn't eat much in general, but this has gotten worse lately which she attributes to stress.   -No vomiting but feels nauseated.   -Used to have heartburn and take nexium. Stopped taking it because she was worried about side effects. Hasn't had heartburn anymore. Not temporally related to eating.     Incontinence  -Has baseline stress incontinence but has started having more urge incontinence  -No dysuria, but sometimes feels urine smells foul.    -------------------------------------         Patient Active Problem List   Diagnosis     MS (multiple sclerosis) (H)     Iron deficiency anemia     Stress-induced cardiomyopathy     Esophageal reflux     Anxiety     Low serum ferritin level     Restless leg syndrome     Moderate episode of recurrent major depressive disorder (H)     Other chronic pain     Other depression     CKD (chronic kidney disease) stage 2, GFR 60-89 ml/min     Insomnia, unspecified type     SOB (shortness of breath)     Past Surgical History   History reviewed. No pertinent surgical history.       Social History            Tobacco Use     Smoking status: Current Every Day Smoker       Packs/day: 0.50       Years: 35.00       Pack years: 17.50       Types: Cigarettes       Last attempt to quit: 2013       Years since quittin.7     Smokeless tobacco: Never Used   Substance Use Topics     Alcohol use: Yes       Alcohol/week: 0.0 standard drinks       Comment: 1 time per week, 3 per time     Family History   History reviewed. No pertinent family history.             Reviewed and updated as needed this visit by Provider          Review of Systems   ROS COMP: Constitutional, HEENT, cardiovascular, pulmonary, gi and gu systems are negative, except as " "otherwise noted.        Objective:  Does not sound short of breath over the phone.     Assessment/Plan:  1. SOB (shortness of breath)  Feels that this was all related to \"emotional breakdowns.\" States she felt that the ER was overly focused on your breathing but her main issue was the emotional breakdown. Feels her breathing is actually fine, but when she has a panic attack she breathes fast. See below for anxiety  -Monitor for worsening breathing sx. Discussed reasons to seek care in the ED. I think her panic attacks are causing breathing sx, but we discussed the possibility of the breathing issues causing panic, but she is sure this isn't the case.     2. Moderate episode of recurrent major depressive disorder (H)  Severe. No SI/HI. This has been ongoing for years. Feels it has worsened since COVID and since starting to wean the Alprazolam and Oxycocone. Feels like she already had a hard time coping with pain and anxiety, and doesn't know how she will cope once those are taken away. Does feel she has become more open to the fact that the meds contribute to the depression, and that the depression contributes to the pain.   -She wants to seek care at the pain clinic again. Understands they will also recommend continuing to wean the xanax and oxycodone  -Agrees to set up with treatment resistant depression clinic  -Consider therapy referral at next visit (touching base with her next week)      3. Anxiety  As above.   -Continue xanax wean  -Treatment resistant depression clinic, pain clinic    4. Insomnia, unspecified type  Trazodone only helping minimally.  -Treatment resistant depression clinic and pain clinic    5. Other chronic pain  As above. See previous notes. More open now to going back to the [facility] and exploring how her mood impacts her pain.   -treatment resistant depression clinic  -pain clinic    6. Urge incontinence of urine  Has baseline stress incontinence, but now having urge.  - *UA reflex to " "Microscopic and Culture ([facility] (except [place] and [place]); Future  -If normal will consider meds for urge incontinence. Could consider urology/pelvic floor PT post COVID    (R10.84) Abdominal pain, generalized  Comment: Reports intermittent jackeline-umbilical abdominal pain that comes when she has her emotional \"breakdowns.: States it is not temporally related to eating. No stool changes or weight loss, although I cannot confirm this today. It sounds like possible IBS or panic sx. Did not have time to fully address today. Will discuss at appt next week.          No follow-ups on file.        Phone call duration: 47  minutes     BENNETT Marvin CNP      "

## 2020-04-17 NOTE — PROGRESS NOTES
Please schedule 45 minute phone visit next Tuesday. Please call patient to schedule. She may be resistant to scheduling, but please attempt    Please also call her Monday (I've switched her to SB4) and ask her if she has scheduled with both psych and pain cilnic. Please ask her the date and time of those appointments, and follow up the day after each to be sure she went.

## 2020-04-20 ENCOUNTER — PATIENT OUTREACH (OUTPATIENT)
Dept: PEDIATRICS | Facility: CLINIC | Age: 55
End: 2020-04-20

## 2020-04-20 NOTE — TELEPHONE ENCOUNTER
Called pt. No answer, LVM to call back on my personal extension.    If pt calls back:  Remind her of phone appt schedule tomorrow 4/21.  Ask if she has scheduled with pain clinic and psychiatry - if yes, when? (Record and f/up with pt the day after those appts to make sure she went)    If pt doesn't call back - can ask these questions during rooming of visit on 4/21/2020.    Reji Rahman, EMT at 10:00 AM on April 20, 2020   [facility] Health Guide   [phone]

## 2020-04-20 NOTE — PROGRESS NOTES
"Hina Yap is a 54 year old female who is being evaluated via a billable telephone visit.      The patient has been notified of following:     \"This telephone visit will be conducted via a call between you and your physician/provider. We have found that certain health care needs can be provided without the need for a physical exam.  This service lets us provide the care you need with a short phone conversation.  If a prescription is necessary we can send it directly to your pharmacy.  If lab work is needed we can place an order for that and you can then stop by our lab to have the test done at a later time.    Telephone visits are billed at different rates depending on your insurance coverage. During this emergency period, for some insurers they may be billed the same as an in-person visit.  Please reach out to your insurance provider with any questions.    If during the course of the call the physician/provider feels a telephone visit is not appropriate, you will not be charged for this service.\"    Patient has given verbal consent for Telephone visit?  Yes    How would you like to obtain your AVS? Jelani Freed     Hina Yap is a 54 year old female who presents to clinic today for the following health issues:    Depression and Anxiety Follow-Up    How are you doing with your depression since your last visit? No change    How are you doing with your anxiety since your last visit?  Worsened    Are you having other symptoms that might be associated with depression or anxiety? Yes:  shortness of breath, panic/anxiety attacks, headaches, nausea    Have you had a significant life event? Relationship Concerns     Do you have any concerns with your use of alcohol or other drugs? No    Pt does not like being put on the spot she notes.    Psych med history   Effexor  Lexapro  Prozac  Zoloft  Celexa  Paxil  Cymbalta    Pristique-Unknown  Wellbutrin-allergic  Seroquel-maybe      Social History "     Tobacco Use     Smoking status: Current Every Day Smoker     Packs/day: 0.50     Years: 35.00     Pack years: 17.50     Types: Cigarettes     Last attempt to quit: 2013     Years since quittin.7     Smokeless tobacco: Never Used   Substance Use Topics     Alcohol use: Yes     Alcohol/week: 0.0 standard drinks     Comment: 1 time per week, 3 per time     Drug use: No     PHQ 2019   PHQ-9 Total Score 19 16 19   Q9: Thoughts of better off dead/self-harm past 2 weeks Not at all Not at all Not at all     KAMALA-7 SCORE 2018   Total Score - 15 (severe anxiety) -   Total Score 16 15 18     Last PHQ-9 2020   1.  Little interest or pleasure in doing things 3   2.  Feeling down, depressed, or hopeless 1   3.  Trouble falling or staying asleep, or sleeping too much 3   4.  Feeling tired or having little energy 3   5.  Poor appetite or overeating 3   6.  Feeling bad about yourself 0   7.  Trouble concentrating 3   8.  Moving slowly or restless 3   Q9: Thoughts of better off dead/self-harm past 2 weeks 0   PHQ-9 Total Score 19   Difficulty at work, home, or with people Very difficult     In the past two weeks have you had thoughts of suicide or self-harm?  No.    Do you have concerns about your personal safety or the safety of others?   No    Suicide Assessment Five-step Evaluation and Treatment (SAFE-T)      How many servings of fruits and vegetables do you eat daily?  0-1    On average, how many sweetened beverages do you drink each day (Examples: soda, juice, sweet tea, etc.  Do NOT count diet or artificially sweetened beverages)?   0    How many days per week do you exercise enough to make your heart beat faster? 3 or less    How many minutes a day do you exercise enough to make your heart beat faster? 9 or less  How many days per week do you miss taking your medication? Here and there    What makes it hard for you to take your medications?  remembering to  "take      Reviewed and updated as needed this visit by Provider         Review of Systems   ROS COMP: Constitutional, HEENT, cardiovascular, pulmonary, gi and gu systems are negative, except as otherwise noted.         Assessment/Plan:  1. Depression, unspecified depression type  4. Anxiety  Clearly very poor controlled. Has been having \"emotional breakdowns\" ever since I started weaning her Xanax and Oxycodone. I think this is partially due to the uncertainty of the wean and how she will tolerate it, but also due to withdrawal symptoms and the fact that these meds have been covering up her emotions for years. At last visit, she showed some insight and volunteered the thought that these meds are a problem and acknowledged that her mood contributes to her low quality of life and pain.  But this visit she sounds more hopeless and feels victimized by me for taking away her meds, which are working for her. She did reference some suicidal thinking but states she hasn't had any recently and doesn't plan to harm herself. She does worry that she will have these thoughts if she can't cope with the wean.  -She did schedule with the treatment resistant depression clinic. [facility] is working on getting med list from her insurance company to see which meds she has taken in the past. I have also listed some meds above.   -Declines therapy. I did recommend this strongly.   - mirtazapine (REMERON) 15 MG tablet; Take 3 tablets (45 mg) by mouth At Bedtime  Dispense: 180 tablet; Refill: 3  -Trazodone is only helping mildly. Reduce trazodone from 150 to 100 and increase Remeron from 30 to 45, as this may improve sleep and will have added benefit of increasing appetite and possibly improving depression. Reviewed risk of serotonin syndrome with MTM, who thought this combination would be fine as long as she reduced trazodone to 100. I discussed the possible interaction with the patient.   -Crisis resources discussed. She denies any active " "suicidal thoughts.   -[facility] to reach out to her in 4 weeks to assess how she is doing. She declines f/u with me right now as she has too many appointments to keep track of.     2. Other chronic pain  Worsened with weaning.   -See opioid weaning schedule on problem list. Will go about 10% per week, sometimes less  -She has f/u scheduled with pain clinic tomorrow  -I also think seeing psychiatry will be beneficial to her pain if they can improve her depression.    3. Abdominal pain, generalized  See previous notes. Again discussed today. Patient feels this is anxiety related as it only comes when she is having an \"emotional breakdown.\" She says her weight is stable  -Will monitor weight at next in person visit  -She is to call me if worsening or happening outside of panic moments. Did not want further intervention today.       No follow-ups on file.    SB4 pal to f/u 4 weeks including suicidality assessment    Phone call duration: 40 minutes    BENNETT Marvin CNP    "

## 2020-04-21 ENCOUNTER — TELEPHONE (OUTPATIENT)
Dept: PEDIATRICS | Facility: CLINIC | Age: 55
End: 2020-04-21

## 2020-04-21 ENCOUNTER — VIRTUAL VISIT (OUTPATIENT)
Dept: PEDIATRICS | Facility: CLINIC | Age: 55
End: 2020-04-21
Payer: MEDICAID

## 2020-04-21 ENCOUNTER — PATIENT OUTREACH (OUTPATIENT)
Dept: PEDIATRICS | Facility: CLINIC | Age: 55
End: 2020-04-21

## 2020-04-21 DIAGNOSIS — G89.29 OTHER CHRONIC PAIN: ICD-10-CM

## 2020-04-21 DIAGNOSIS — G47.09 OTHER INSOMNIA: ICD-10-CM

## 2020-04-21 DIAGNOSIS — F32.89 OTHER DEPRESSION: ICD-10-CM

## 2020-04-21 DIAGNOSIS — G47.00 INSOMNIA, UNSPECIFIED TYPE: ICD-10-CM

## 2020-04-21 DIAGNOSIS — F41.9 ANXIETY: ICD-10-CM

## 2020-04-21 DIAGNOSIS — F32.A DEPRESSION, UNSPECIFIED DEPRESSION TYPE: Primary | ICD-10-CM

## 2020-04-21 DIAGNOSIS — R10.84 ABDOMINAL PAIN, GENERALIZED: ICD-10-CM

## 2020-04-21 PROCEDURE — 99443 ZZC PHYSICIAN TELEPHONE EVALUATION 21-30 MIN: CPT | Performed by: NURSE PRACTITIONER

## 2020-04-21 RX ORDER — TRAZODONE HYDROCHLORIDE 50 MG/1
100 TABLET, FILM COATED ORAL AT BEDTIME
Qty: 90 TABLET | Refills: 3 | COMMUNITY
Start: 2020-04-21 | End: 2020-06-05

## 2020-04-21 RX ORDER — MIRTAZAPINE 15 MG/1
45 TABLET, FILM COATED ORAL AT BEDTIME
Qty: 180 TABLET | Refills: 3 | Status: SHIPPED | OUTPATIENT
Start: 2020-04-21 | End: 2020-09-09

## 2020-04-21 ASSESSMENT — ANXIETY QUESTIONNAIRES
7. FEELING AFRAID AS IF SOMETHING AWFUL MIGHT HAPPEN: NOT AT ALL
GAD7 TOTAL SCORE: 18
1. FEELING NERVOUS, ANXIOUS, OR ON EDGE: NEARLY EVERY DAY
IF YOU CHECKED OFF ANY PROBLEMS ON THIS QUESTIONNAIRE, HOW DIFFICULT HAVE THESE PROBLEMS MADE IT FOR YOU TO DO YOUR WORK, TAKE CARE OF THINGS AT HOME, OR GET ALONG WITH OTHER PEOPLE: VERY DIFFICULT
2. NOT BEING ABLE TO STOP OR CONTROL WORRYING: NEARLY EVERY DAY
5. BEING SO RESTLESS THAT IT IS HARD TO SIT STILL: NEARLY EVERY DAY
6. BECOMING EASILY ANNOYED OR IRRITABLE: NEARLY EVERY DAY
3. WORRYING TOO MUCH ABOUT DIFFERENT THINGS: NEARLY EVERY DAY

## 2020-04-21 ASSESSMENT — PATIENT HEALTH QUESTIONNAIRE - PHQ9
SUM OF ALL RESPONSES TO PHQ QUESTIONS 1-9: 19
5. POOR APPETITE OR OVEREATING: NEARLY EVERY DAY

## 2020-04-21 NOTE — TELEPHONE ENCOUNTER
Letter printed and sent.     Reji Rahman, EMT at 11:24 AM on April 21, 2020   [facility]   [phone]

## 2020-04-21 NOTE — TELEPHONE ENCOUNTER
Called pt. No answer. LVM to call back on my personal extension.    If pt calls back:  Inform that I am in touch with her psychiatrist and that I faxed her [facility] report to them as well as informed them of their historical medications from the pharmacy and as given today. Working on getting more historical medications from her pharmacy.    Also inform pt the pharmacist gave us the ok to go up to 45mg on the Remeron (3 tabs), but she should only take 100mg trazodone (2 tabs).     Reji Rahman, EMT at 2:04 PM on April 21, 2020   [facility] Guide   [phone]

## 2020-04-21 NOTE — TELEPHONE ENCOUNTER
Please mail as a letter:    Dear Hina,    I wanted to send you a copy of my plan for weaning your medications so that you know what to expect.     My plan is NOT to wean the xanax any further right now. We can let psychiatry decide if and how they want to continue weaning. I will continue to prescribe 1 tablet (0.5mg) twice a day for anxiety.  It looks like you're not seeing the psychiatrist himself until 5/20. I don't think he will necessarily change your xanax at that time, but it's possible. I think one likely possibility is that he will switch you from Xanax to something that stays in your system longer like Clonazepam. It's a very similar medication, but it lasts longer. This will make it more effective and also easier to wean because it stays in your body longer.     For the oxycodone. Here is the plan.    5/9: Take 8 tabs daily  6/8: Take 7 tabs daily  7/8: 6.5 tabs daily  8/7: Take 6 tabs daily  9/6: Take 5.5 tabs daily  10/6: Take 5 tabs daily  11/5: Take 4.5 tabs daily  5/5 : Take 4 tabs daily and HOLD.    I don't believe it's realistic to get you completely off of the oxycodone, but I think taking 20mg per day (4, 5mg tabs per day) is reasonable. I'm fine keeping you there forever.    I want you to know that I deeply care about your wellbeing. I can't imagine how hard this is for you. I have a lot of hope that you will find a lot of medications and other tools to help you feel less anxious, less depressed, and more able to cope with pain. It's understandable that you don't have a lot of hope right now because you have tried many things in the past that have not helped. Please do your best to remain somewhat open minded about this plan even though you feel like it's being done against your will. I feel confident that you will find a lot of these things beneficial and that you will have an overall better quality of life.    Please don't hesitate to reach out to either [place] or myself.    Thank you!    Sunshine  KARI Butterfield.

## 2020-04-21 NOTE — PROGRESS NOTES
Hi there,  This patient is on 150mg of trazodone. I'd like to increase her Remeron to 45. Do you think this is too high in terms of concern for serotonin syndrome?

## 2020-04-21 NOTE — LETTER
[facility]  [address]  [place] [address]  [phone]      April 21, 2020    Hina Yap                                                                                                                                                       [id number]  [place] [address]              Dear Hina,         I wanted to send you a copy of my plan for weaning your medications so that you know what to expect.      My plan is NOT to wean the xanax any further right now. We can let psychiatry decide if and how they want to continue weaning. I will continue to prescribe 1 tablet (0.5mg) twice a day for anxiety.  It looks like you're not seeing the psychiatrist himself until 5/20. I don't think he will necessarily change your xanax at that time, but it's possible. I think one likely possibility is that he will switch you from Xanax to something that stays in your system longer like Clonazepam. It's a very similar medication, but it lasts longer. This will make it more effective and also easier to wean because it stays in your body longer.      For the oxycodone. Here is the plan.     5/9: Take 8 tabs daily  6/8: Take 7 tabs daily  7/8: 6.5 tabs daily  8/7: Take 6 tabs daily  9/6: Take 5.5 tabs daily  10/6: Take 5 tabs daily  11/5: Take 4.5 tabs daily  5/5 : Take 4 tabs daily and HOLD.     I don't believe it's realistic to get you completely off of the oxycodone, but I think taking 20mg per day (4, 5mg tabs per day) is reasonable. I'm fine keeping you there forever.     I want you to know that I deeply care about your wellbeing. I can't imagine how hard this is for you. I have a lot of hope that you will find a lot of medications and other tools to help you feel less anxious, less depressed, and more able to cope with pain. It's understandable that you don't have a lot of hope right now because you have tried many things in the past that have not helped. Please do your  best to remain somewhat open minded about this plan even though you feel like it's being done against your will. I feel confident that you will find a lot of these things beneficial and that you will have an overall better quality of life.     Please don't hesitate to reach out to either [place] or myself.         Thank you!     Sunshine Butterfield, TRIP-MARY and Reji Rahman, [facility] Guide   [phone]

## 2020-04-22 ENCOUNTER — VIRTUAL VISIT (OUTPATIENT)
Dept: PALLIATIVE MEDICINE | Facility: CLINIC | Age: 55
End: 2020-04-22
Payer: MEDICAID

## 2020-04-22 DIAGNOSIS — M54.50 CHRONIC BILATERAL LOW BACK PAIN WITHOUT SCIATICA: ICD-10-CM

## 2020-04-22 DIAGNOSIS — G89.29 OTHER CHRONIC PAIN: ICD-10-CM

## 2020-04-22 DIAGNOSIS — G89.29 CHRONIC NECK PAIN: Primary | ICD-10-CM

## 2020-04-22 DIAGNOSIS — G89.29 CHRONIC BILATERAL LOW BACK PAIN WITHOUT SCIATICA: ICD-10-CM

## 2020-04-22 DIAGNOSIS — M54.2 CHRONIC NECK PAIN: Primary | ICD-10-CM

## 2020-04-22 PROCEDURE — 99442 ZZC PHYSICIAN TELEPHONE EVALUATION 11-20 MIN: CPT | Performed by: PHYSICAL MEDICINE & REHABILITATION

## 2020-04-22 RX ORDER — GABAPENTIN 100 MG/1
300 CAPSULE ORAL 3 TIMES DAILY
Qty: 270 CAPSULE | Refills: 0 | Status: SHIPPED | OUTPATIENT
Start: 2020-04-22 | End: 2020-05-27

## 2020-04-22 ASSESSMENT — PAIN SCALES - GENERAL: PAINLEVEL: MODERATE PAIN (5)

## 2020-04-22 ASSESSMENT — ANXIETY QUESTIONNAIRES: GAD7 TOTAL SCORE: 18

## 2020-04-22 NOTE — PROGRESS NOTES
"Hina Yap is a 54 year old female who is being evaluated via a billable telephone visit.      The patient has been notified of following:     \"This telephone visit will be conducted via a call between you and your physician/provider. We have found that certain health care needs can be provided without the need for a physical exam.  This service lets us provide the care you need with a short phone conversation.  If a prescription is necessary we can send it directly to your pharmacy.  If lab work is needed we can place an order for that and you can then stop by our lab to have the test done at a later time.    Telephone visits are billed at different rates depending on your insurance coverage. During this emergency period, for some insurers they may be billed the same as an in-person visit.  Please reach out to your insurance provider with any questions.    If during the course of the call the physician/provider feels a telephone visit is not appropriate, you will not be charged for this service.\"    Patient has given verbal consent for Telephone visit?  Yes    How would you like to obtain your AVS? Jelani James, [facility]   Pain Management Center    Chief Complaint: Pain    Interval History:  Last seen on 12/23/2019 .        Recommendations/plan at the last visit included:  1. Therapy: Recommend participating in pain PT. She is currently not doing any physical activity. Physical Therapy at the [facility] focuses on 3 main things.  The first is exercise--consistency vs intensity with a home exercise program consisting of stretching, strengthening exercises, and aerobic activity. The second is self-care --focusing on learning how to pace your activities, energy conservation, relaxation, stress management, mindfulness. The last one is body awareness--which includes looking at posture and body mechanics. Focuses on education and things that you can do to take care of " yourself and assist you with managing your pain.   2. Clinical Health Pain Psychologist: Recommend starting pain psychology. Therapy can help reduce physical and psychosocial triggers or reinforcers of pain by adapting thoughts, feelings and behaviors to reduce symptoms and increase quality of life.  Evidence indicates that the practice of relaxation, meditation, and mindfulness techniques can significantly affect pain levels and overall well being. In addition to this recommend that she establish with a general mental health provider to address depression and anxiety. Previously this was attempted to be managed through medications by her neurologist.      3. Diagnostic Studies: There is no imaging available to review today. She states she has had imaging done by her neurologist. Would need to request information.      4. Medication Management:   1. We discussed the use of long term opioids for chronic pain. We talked about the development of tolerance over time, opioid induced hyperalgesia, sedation and cognitive impairment, sleep disordered breathing and risk of unintentional overdose and death. It is concerning that she is on both opioids and benzodiazepines. She has been on moderately high doses for many years. In terms of efficacy she is not getting good pain relief as evidenced by her poor function and ongoing severity of pain. Discussed that I agree with her PCP that she should not be on both benzodiazepines and opioids. Recommend tapering off of xanax and seeing a mental health provider for better management of her mood. She is currently on 60 OME and not having good pain relief. Recommend tapering down on opioids since it is not providing significant pain relief or improvement in function (DIRE score is 12). Recommend tapering down to at least 30 OME and if able to tapering off. This can be done slowly.  2. Medical cannabis is a good option to help manage her pain. She has already been certified for this  but has not completed the registration process.   5. Further procedures recommended: None  6. Discussed that acupuncture is a good option to consider.   7. Follow up: None scheduled. If she is interested in participating in our program she may schedule a follow up with me.     Since her last visit, Hina SALAZAR Fracisco reports:  - Pain has been about the same since the last visit. Still located in neck, back, arms and legs.  - Mood has been worse. She does have appointments set up with mental health provider.   - She does not have a regular exercise routine but does try to walk some everyday.   - She is working on tapering oxycodone with her PCP. This is stressful for her since she has been on these medications for a long time. She is currently at 67.5 OME. She does state that she does not have pain relief with this.      Current Relevant Pain Medications:       Oxycodone 5 mg 1-2 tabs up to 5 times per day, max #9 tabs per day. Taper plan given by PCP. - [place]              Baclofen 20 mg QID - [facility]       Meloxicam 7.5 mg BID - ?   Bengay - [place]   Lidocaine patches - [facility]    Other Medications:  Mirtazapine  Trazodone  Xanax 0.5 mg BID  Adderall  Chantix  Ropinirole    Narcan      Previous Medications: ([facility]--helped; [facility]--Helped initially; [facility]-- somewhat helpful, [facility]--No help; W--worse; SE--side effects)     Opiates: tylenol #3 - [facility], fentanyl patches - [facility], hydrocodone - NH, hydromorphone - NH,  methadone - NH, morphine - NH, tramadol - [facility]              NSAIDS: celebrex - NH, toradol - NH, relafen - NH, aleve- NH, daypro- NH, sulindac - NH              Muscle Relaxants: methocarbamol - [facility], norflex - NH, tizanidine - [facility]              Anti-migraine mediations: none              Anti-depressants: amitriptyline - NH, Cymbalta - NH, Nortriptyline - NH, venlafaxine - [facility],  savella - [facility]              Sleep aids:               Anxiolytics: valium - H, klonopin - [facility], lorazepam - [facility]              Neuropathics:  Gabapentin - [facility], Tompax - [facility]               Topicals: none              Other medications not covered above: Tylenol - [place], medical cannabis - briefly tried but was too expensive - ?    Past Pain Treatments:  1. Physical Therapy: Yes - [facility]  2. Pain Psychology: None  3. Surgery: None  4. Injections: Has had neck injections but unsure of specifics. Done at [facility]. Unsure how long ago.   5. Chiropractic: Yes - many years ago  6. Acupuncture: No  7. TENS Unit: Yes - ?  8. Other: none    [place] Board of Pharmacy Data Base Reviewed:    YES; As expected, no concern for misuse/abuse of controlled medications based on this report.    Is Narcan prescribed for opiate use >50 MME daily? YES    Daily MME: 67.5    Medications:  Current Outpatient Medications   Medication Sig Dispense Refill     ALPRAZolam (XANAX) 0.5 MG tablet Take 1 tablet (0.5 mg) by mouth 2 times daily 60 tablet 0     amphetamine-dextroamphetamine (ADDERALL XR) 30 MG 24 hr capsule Take 1 capsule (30 mg) by mouth daily 30 capsule 0     amphetamine-dextroamphetamine (ADDERALL) 10 MG tablet Take 1 tablet (10 mg) by mouth daily 30 tablet 0     baclofen (LIORESAL) 20 MG tablet Take 20 mg by mouth 4 times daily       meloxicam (MOBIC) 7.5 MG tablet Take 7.5 mg by mouth 2 times daily       mirtazapine (REMERON) 15 MG tablet Take 3 tablets (45 mg) by mouth At Bedtime 180 tablet 3     oxyCODONE-acetaminophen (PERCOCET)  MG per tablet Take 1 tablet by mouth every 6 hours as needed.       varenicline (CHANTIX BREEZY) 0.5 MG X 11 & 1 MG X 42 tablet Take 0.5 mg tab daily for 3 days, THEN 0.5 mg tab twice daily for 4 days, THEN 1 mg twice daily. 53 tablet 0     varenicline (CHANTIX) 1 MG tablet Take 1 tablet (1 mg) by mouth 2 times daily 60 tablet 3     albuterol (PROAIR HFA/PROVENTIL HFA/VENTOLIN HFA) 108 (90 Base) MCG/ACT inhaler Inhale 2 puffs into the lungs every 4 hours as needed for shortness of breath / dyspnea or wheezing 1 Inhaler 1     ferrous sulfate (FEROSUL) 325 (65 Fe) MG tablet Take 325 mg by  mouth daily as needed       ipratropium - albuterol 0.5 mg/2.5 mg/3 mL (DUONEB) 0.5-2.5 (3) MG/3ML neb solution Take 1 vial (3 mLs) by nebulization every 6 hours as needed for shortness of breath / dyspnea or wheezing 30 vial 1     multivitamin, therapeutic (THERA-VIT) TABS Take 1 tablet by mouth daily       nicotine (NICORETTE) 2 MG gum Place 1 each (2 mg) inside cheek every hour as needed for smoking cessation 30 tablet 1     order for DME Equipment being ordered: Nebulizer 1 each 0     order for DME Equipment being ordered: Cane ()  Treatment Diagnosis: Multiple sclerosis 1 Device 0     oxyCODONE (ROXICODONE) 5 MG tablet Take 1-2 tabs at a time, up to 5 times a day. No more than 9 tabs per day. Must give at least 4 hours between dosing. 270 tablet 0     promethazine (PHENERGAN) 25 MG tablet Take 1 tablet (25 mg) by mouth every 6 hours as needed for nausea (takes with percocet to prevent nausea) 56 tablet 0     rOPINIRole (REQUIP) 2 MG tablet Take 2 mg by mouth every morning       rOPINIRole (REQUIP) 2 MG tablet Take 4 mg by mouth At Bedtime       tiotropium-olodaterol 2.5-2.5 MCG/ACT AERS Inhale 2 puffs into the lungs daily       traZODone (DESYREL) 50 MG tablet Take 2 tablets (100 mg) by mouth At Bedtime 90 tablet 3     varenicline (CHANTIX CONTINUING MONTH BREEZY) 1 MG tablet Take 1 tablet (1 mg) by mouth 2 times daily (Patient not taking: Reported on 4/21/2020) 60 tablet 1       Review of Systems: A 10-point review of systems was negative, with the exception of chronic pain issues and depressed mood and anxiety.    Assessment:  Hina Yap is a 54 year old female with a past medical history significant for MS, stress-induced cardiomyopathy, restless leg syndrome, CDK stage 2, depression, anxiety who presents with complaints of multiple areas of pain.      1. Pain in both arms and legs: Etiology unclear. She reports this is due to MS. No neurology records available to review. On exam there is no  spasticity noted. She will be establishing care with a new Neurologist.     2. Chronic neck pain: No imaging available. No red flag symptoms. Neuro exam is normal. Differential includes cervical spondylosis, facet arthropathy, myofascial pain.      3. Chronic low back pain: No imaging available. No red flag symptoms. Neuro exam is normal. Differential includes deconditioning, lumbar spondylosis, facet arthropathy, myofascial pain.      4. Mental Health - the patient's mental health concerns, specifically depression and anxiety, affect her experience of pain and contribute to her clinically significant distress.     5. Chronic opioid use: Currently on 67.5 OME, previously on 75 OME. Prescriptions were through her previous neurologist. This was in addition to being on a benzodiazepine as well. Currently, prescriptions are through PCP who is working on tapering down on the dose.    Plan:  1. Therapy: Order placed to start physical therapy through the pain clinic.    2. Clinical Health Pain Psychologist: Order placed to start pain psychology. Therapy can help reduce physical and psychosocial triggers or reinforcers of pain by adapting thoughts, feelings and behaviors to reduce symptoms and increase quality of life.  Evidence indicates that the practice of relaxation, meditation, and mindfulness techniques can significantly affect pain levels and overall well being. This can also be helpful as she continues to wean down on dose on oxycodone. She will also be establishing with a general mental health provider.     3. Diagnostic Studies: None at this time.      4. Medication Management:   1. Agree with tapering down on opioids. She has been on moderately high doses for many years. In terms of efficacy she is not getting good pain relief as evidenced by her poor function and ongoing severity of pain. Agree with the taper plan that has been established by PCP.   2. Start Gabapentin 100 mg q hs and titrate up to 300 mg TID if  well tolerated. Instructions for titration listed in AVS. This is a reasonable option to re-try since Hina does not remember being on this before and is not sure what dose she got to.   5. Further procedures recommended: None  6. Acupuncture is an option to consider in the future.   7. Discussed purchasing a TENS unit online to see if that provides some benefit.   8. Follow up: 4-6 weeks    Rehana Ferrari MD  [facility]     Phone call duration: 20  minutes

## 2020-04-22 NOTE — PATIENT INSTRUCTIONS
1. Try over the counter CBD/Hemp products to see if they provide some benefit. Some places to look are are [employer], [employer], [employer], [employer]. This is not an all-inclusive list but gives you a starting point.    2. Start Gabapentin following the schedule listed here:  Start gabapentin as follows (slow titration)   Gabapentin 1 tab= 100mg    AM   PM   Bedtime  0   0   100mg (1 tab).  After 3-5 days, increase as tolerated   100mg (1 tab)  0   100mg (1 tab).  After 3-5 days, increase as tolerated   100mg (1 tab)  100mg (1 tab)  100mg (1 tab).  After 3-5 days, increase as tolerated   100mg (1 tab)  100mg (1 tab)  200mg (2 tabs). After 3-5 days, increase as tolerated   200mg (2 tabs)  100mg (1 tab)  200mg (2 tabs). After 3-5 days, increase as tolerated   200mg (2 tabs)  200mg (2 tabs)  200mg (2 tabs). After 3-5 days, increase as tolerated   200mg (2 tabs)  200mg (2 tabs)  300mg (3 tabs). After 3-5 days, increase as tolerated   300mg (3 tabs)  200mg (2 tabs)  300mg (3 tabs). After 3-5 days, increase as tolerated   300mg (3 tabs)  300mg (3 tabs)  300mg (3 tabs).     Call with any problems.  Caution for sedation.    Do not drive until you know how the medication affects you.   You can go slower if you need to or increasing only one dose at a time.  Do not stop abruptly once at higher doses.  This medication must be tapered off.    3. Orders placed for pain psychology and physical therapy through the pain clinic.     4. Consider purchasing a TENS unit online to see if that can provide some benefit with neck and back pain.     5. Follow up with me in 4-6 weeks.  ----------------------------------------------------------------  Clinic Number:  [phone]     Call with any questions about your care and for scheduling assistance.     Calls are returned Monday through Friday between 8 AM and 4:30 PM. We usually get back to you within 2 business days depending on the issue/request.    If we are prescribing  your medications:    For opioid medication refills, call the clinic or send a [employer]t message 7 days in advance.  Please include:    Name of requested medication    Name of the pharmacy.    For non-opioid medications, call your pharmacy directly to request a refill. Please allow 3-4 days to be processed.     Per [place] State Law:    All controlled substance prescriptions must be filled within 30 days of being written.      For those controlled substances allowing refills, pickup must occur within 30 days of last fill.      We believe regular attendance is key to your success in our program!      Any time you are unable to keep your appointment we ask that you call us at least 24 hours in advance to cancel.This will allow us to offer the appointment time to another patient.     Multiple missed appointments may lead to dismissal from the clinic.

## 2020-04-23 ENCOUNTER — MYC MEDICAL ADVICE (OUTPATIENT)
Dept: PALLIATIVE MEDICINE | Facility: CLINIC | Age: 55
End: 2020-04-23

## 2020-04-23 NOTE — TELEPHONE ENCOUNTER
"Will leave encounter open for patient response/chart review by nursing.       Clarissa Santamaria,   I can see it was attached in your after visit summary. If that did not come through to you let me know and I can copy the whole thing for you. Below is how you will start the Gabapentin. Please let me know if this does not make sense. You will start with the bedtime dose of 1 tab/100mg. If you tolerate this dose well after 3-5 days, you will increase by starting a morning dose of one tab/100mg as well. You would take a morning and nighttime dose for 3-5 days and if tolerated increase again by adding a mid day dose of one tab/100mg. You would continue this pattern until you get to the end dosing of 300mg three times a day.  Once you are to that dosing we can provide a prescription for 300mg capsules so that you would be taking less quantity of pills at a time but still get the same dose. Common side effects would be sleepiness/grogginess or \"foggy\" thinking but as you take this and your body adjusts it should subside.  This is why you have some flexibility in increasing by 3-5 days. Please let us know if you have any questions/concerns along the way!    Start gabapentin as follows (slow titration)   Gabapentin 1 tab= 100mg     AM                                       PM                                       Bedtime  0                                           0                                           100mg (1 tab).  After 3-5 days, increase as tolerated   100mg (1 tab)                      0                                           100mg (1 tab).  After 3-5 days, increase as tolerated   100mg (1 tab)                      100mg (1 tab)                      100mg (1 tab).  After 3-5 days, increase as tolerated   100mg (1 tab)                      100mg (1 tab)                      200mg (2 tabs). After 3-5 days, increase as tolerated   200mg (2 tabs)                    100mg (1 tab)                      200mg (2 tabs). " After 3-5 days, increase as tolerated   200mg (2 tabs)                    200mg (2 tabs)                    200mg (2 tabs). After 3-5 days, increase as tolerated   200mg (2 tabs)                    200mg (2 tabs)                    300mg (3 tabs). After 3-5 days, increase as tolerated   300mg (3 tabs)                    200mg (2 tabs)                    300mg (3 tabs). After 3-5 days, increase as tolerated   300mg (3 tabs)                    300mg (3 tabs)                    300mg (3 tabs).      Call with any problems.  Caution for sedation.    Do not drive until you know how the medication affects you.   You can go slower if you need to or increasing only one dose at a time.  Do not stop abruptly once at higher doses.  This medication must be tapered off.        Sarah HOLT, RN Care Coordinator  [facility]  Pain Management

## 2020-04-23 NOTE — TELEPHONE ENCOUNTER
Called pt. Informed of the list of past medications being sent to the psychiatrist, the [employer] report being mailed to her and faxed to them as well as the note from the pharmacist: the pharmacist gave us the ok to go up to 45mg on the Remeron (3 tabs), but she should only take 100mg trazodone (2 tabs).     Also discussed pain management appt yesterday. She did attend. Pt notes that it sounds like it is starting of a process to figure out what is covered with her insurance. She has a f/u appt scheduled in May.    Encouraged pt to reach out with any questions and concerns. Provided personal extension for pt to call. Pt verbalized understanding.    Reji Rahman, EMT at 10:16 AM on April 23, 2020   [facility] Health Guide   [phone]

## 2020-04-24 ENCOUNTER — PATIENT OUTREACH (OUTPATIENT)
Dept: PEDIATRICS | Facility: CLINIC | Age: 55
End: 2020-04-24

## 2020-04-24 NOTE — TELEPHONE ENCOUNTER
Called pt to remind her of upcoming appt at 10 AM on Monday 4/27/2020. Also sent pt MyChart regarding video visits and instructed pt to download AW touchpoint romelia. Encouraged pt to reach out with any questions or concerns. Pt verbalized understanding.    Reji Rahman, EMT at 9:37 AM on April 24, 2020   [facility] Health Guide   [phone]

## 2020-04-27 ENCOUNTER — VIRTUAL VISIT (OUTPATIENT)
Dept: PSYCHIATRY | Facility: CLINIC | Age: 55
End: 2020-04-27
Payer: MEDICAID

## 2020-04-27 DIAGNOSIS — F33.2 SEVERE EPISODE OF RECURRENT MAJOR DEPRESSIVE DISORDER, WITHOUT PSYCHOTIC FEATURES (H): Primary | ICD-10-CM
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
## 2020-04-28 ASSESSMENT — ANXIETY QUESTIONNAIRES
7. FEELING AFRAID AS IF SOMETHING AWFUL MIGHT HAPPEN: NOT AT ALL
6. BECOMING EASILY ANNOYED OR IRRITABLE: NEARLY EVERY DAY
3. WORRYING TOO MUCH ABOUT DIFFERENT THINGS: NEARLY EVERY DAY
1. FEELING NERVOUS, ANXIOUS, OR ON EDGE: NEARLY EVERY DAY
2. NOT BEING ABLE TO STOP OR CONTROL WORRYING: NEARLY EVERY DAY
5. BEING SO RESTLESS THAT IT IS HARD TO SIT STILL: NEARLY EVERY DAY
GAD7 TOTAL SCORE: 18

## 2020-04-28 ASSESSMENT — PATIENT HEALTH QUESTIONNAIRE - PHQ9
SUM OF ALL RESPONSES TO PHQ QUESTIONS 1-9: 25
5. POOR APPETITE OR OVEREATING: NEARLY EVERY DAY

## 2020-04-29 ENCOUNTER — TELEPHONE (OUTPATIENT)
Dept: PALLIATIVE MEDICINE | Facility: CLINIC | Age: 55
End: 2020-04-29

## 2020-04-29 ASSESSMENT — ANXIETY QUESTIONNAIRES: GAD7 TOTAL SCORE: 18

## 2020-04-29 NOTE — TELEPHONE ENCOUNTER
Pt reviewed message 4/23.     SERGEI LiuN, RN-BC  Patient Care Supervisor  [facility] [place]

## 2020-04-29 NOTE — TELEPHONE ENCOUNTER
Pt declined to schedule New Ph.D. Evaluation: chronic pain syndrome, chronic neck pain, chronic back pain, hx of MS. Pt states she already sees a psychologist and did not recall discussing this with Dr. Ferrari.    Shaneka BROWN    [facility]

## 2020-05-07 ENCOUNTER — TELEPHONE (OUTPATIENT)
Dept: PALLIATIVE MEDICINE | Facility: CLINIC | Age: 55
End: 2020-05-07

## 2020-05-07 ENCOUNTER — VIRTUAL VISIT (OUTPATIENT)
Dept: PALLIATIVE MEDICINE | Facility: CLINIC | Age: 55
End: 2020-05-07
Payer: MEDICAID

## 2020-05-07 ENCOUNTER — MYC MEDICAL ADVICE (OUTPATIENT)
Dept: PEDIATRICS | Facility: CLINIC | Age: 55
End: 2020-05-07

## 2020-05-07 DIAGNOSIS — G89.29 CHRONIC NECK PAIN: Primary | ICD-10-CM

## 2020-05-07 DIAGNOSIS — M54.50 CHRONIC BILATERAL LOW BACK PAIN WITHOUT SCIATICA: ICD-10-CM

## 2020-05-07 DIAGNOSIS — M54.2 CHRONIC NECK PAIN: Primary | ICD-10-CM

## 2020-05-07 DIAGNOSIS — G89.29 OTHER CHRONIC PAIN: ICD-10-CM

## 2020-05-07 DIAGNOSIS — G89.29 CHRONIC BILATERAL LOW BACK PAIN WITHOUT SCIATICA: ICD-10-CM

## 2020-05-07 PROCEDURE — 98968 PH1 ASSMT&MGMT NQHP 21-30: CPT | Performed by: PSYCHOLOGIST

## 2020-05-07 NOTE — TELEPHONE ENCOUNTER
LVM to schedule New PT Evaluation and Treat: Chronic widespread pain due to chronic neck pain, chronic back pain and MS related pain as a virtual visit.    Shaneka BROWN    [facility]
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
## 2020-05-14 NOTE — TELEPHONE ENCOUNTER
Pt submitted separate refill requests. Closing encounter.    Reji Rahman, EMT at 9:30 AM on May 14, 2020   [facility] Guide   [phone]

## 2020-05-14 NOTE — TELEPHONE ENCOUNTER
They can prescribe antibiotics if needed.    Hatchet Flap Text: The defect edges were debeveled with a #15 scalpel blade.  Given the location of the defect, shape of the defect and the proximity to free margins a hatchet flap was deemed most appropriate.  Using a sterile surgical marker, an appropriate hatchet flap was drawn incorporating the defect and placing the expected incisions within the relaxed skin tension lines where possible.    The area thus outlined was incised deep to adipose tissue with a #15 scalpel blade.  The skin margins were undermined to an appropriate distance in all directions utilizing iris scissors.

## 2020-05-14 NOTE — TELEPHONE ENCOUNTER
Pt scheduled 5/29 for video visit.    Shaneka BROWN    [facility]

## 2020-05-14 NOTE — TELEPHONE ENCOUNTER
Informed pt of below. Encouraged pt to reach out with further questions or concerns. Pt verbalized understanding.    Reji Rahman, EMT at 9:57 AM on May 14, 2020   [facility] Health Guide   [phone]

## 2020-05-15 ENCOUNTER — VIRTUAL VISIT (OUTPATIENT)
Dept: PSYCHIATRY | Facility: CLINIC | Age: 55
End: 2020-05-15
Payer: MEDICAID

## 2020-05-15 DIAGNOSIS — F33.2 SEVERE EPISODE OF RECURRENT MAJOR DEPRESSIVE DISORDER, WITHOUT PSYCHOTIC FEATURES (H): Primary | ICD-10-CM

## 2020-05-15 NOTE — PROGRESS NOTES
"Hina Yap is a 54 year old female who is being evaluated via a billable video visit.      The patient has been notified of following:     \"This video visit will be conducted via a call between you and your physician/provider. We have found that certain health care needs can be provided without the need for an in-person physical exam.  This service lets us provide the care you need with a video conversation.  If a prescription is necessary we can send it directly to your pharmacy.  If lab work is needed we can place an order for that and you can then stop by our lab to have the test done at a later time.    Video visits are billed at different rates depending on your insurance coverage.  Please reach out to your insurance provider with any questions.    If during the course of the call the physician/provider feels a video visit is not appropriate, you will not be charged for this service.\"    Patient has given verbal consent for Video visit? Yes    How would you like to obtain your AVS? [facility]    Patient would like the video invitation sent by: Send link via text [phone]  Will anyone else be joining your video visit? No        Video-Visit Details    Type of service:  Video Visit    Video Start Time: 10:21 am  Video End Time: 11:00 am    Originating Location (pt. Location): Home    Distant Location (provider location):  [facility]     Platform used for Video Visit: [facility]      "

## 2020-05-19 NOTE — TELEPHONE ENCOUNTER
Called pt. Reminded pt of psychiatry appt tomorrow. Pt is aware of appt.    Pt notes she saw psychiatry on 5/15 which went well and she also saw dental too; pt notes she got her tooth pulled. No other updates per pt.    Encouraged pt to reach out with any further questions or concerns. Pt verbalized understanding.    Reji Rahman, EMT at 2:13 PM on May 19, 2020   [facility] Health Guide   [phone]
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
## 2020-05-20 ENCOUNTER — VIRTUAL VISIT (OUTPATIENT)
Dept: PSYCHIATRY | Facility: CLINIC | Age: 55
End: 2020-05-20
Payer: MEDICAID

## 2020-05-20 DIAGNOSIS — F33.2 SEVERE EPISODE OF RECURRENT MAJOR DEPRESSIVE DISORDER, WITHOUT PSYCHOTIC FEATURES (H): Primary | ICD-10-CM

## 2020-05-20 ASSESSMENT — PATIENT HEALTH QUESTIONNAIRE - PHQ9: SUM OF ALL RESPONSES TO PHQ QUESTIONS 1-9: 22
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
## 2020-05-26 ENCOUNTER — VIRTUAL VISIT (OUTPATIENT)
Dept: PSYCHIATRY | Facility: CLINIC | Age: 55
End: 2020-05-26
Payer: MEDICAID

## 2020-05-26 DIAGNOSIS — F33.2 SEVERE EPISODE OF RECURRENT MAJOR DEPRESSIVE DISORDER, WITHOUT PSYCHOTIC FEATURES (H): Primary | ICD-10-CM

## 2020-05-26 NOTE — PROGRESS NOTES
Social Work Consultation  [facility]      Patient Name:  Hina Yap  /Age:  1965 (54 year old)    Presenting SW Need(s)/ Reason for visit:  Patient would benefit from a referral to DBT group.    Collaborated With:    -Patient    Intervention:  Writer and patient discussed Dr. Tai's recommendation for her to do DBT to help her mood and chronic suicidal ideation.    Resources Provided:  -Writer described the theory and objectives of DBT  -Writer provided referrals to clinics near patient's home  -Writer provided some motivational interviewing around patient's willingness to follow up.    Social Work Assessment:  Patient would likely benefit from DBT. She struggles with low mood, emotional dysregulation, and chronic suicidal ideation without plan. She assured of safety today.    Plan:    Patient will follow up with referrals, and writer will reach out to her on  to follow up.    Will route to patient's psychiatric provider(s) as an FYI.    Yanira Baez, [facility]    This is a non-billable encounter as it was solely for the purposes of outreach and/or care coordination.

## 2020-05-26 NOTE — PROGRESS NOTES
The patient has been notified of following:     This telephone visit will be conducted via a call between you and your provider. We have found that certain health care needs can be provided without the need for a physical exam.  This service lets us provide the care you need with a phone conversation.  If a prescription is necessary we can send it directly to your pharmacy.  If lab work is needed we can place an order for that and you can then stop by our lab to have the test done at a later time. This is a billable service but we do not know the cost at this time.     Patient has given verbal consent for Telephone visit?  Yes    Patient would like AVS sent to [facility]    [facility]  [place]    Chief Complaint: Back pain    Interval History:  Last seen on 4/22/2020.        Recommendations/plan at the last visit included:  1. Therapy: Order placed to start physical therapy through the pain clinic.     2. Clinical Health Pain Psychologist: Order placed to start pain psychology. Therapy can help reduce physical and psychosocial triggers or reinforcers of pain by adapting thoughts, feelings and behaviors to reduce symptoms and increase quality of life.  Evidence indicates that the practice of relaxation, meditation, and mindfulness techniques can significantly affect pain levels and overall well being. This can also be helpful as she continues to wean down on dose on oxycodone. She will also be establishing with a general mental health provider.      3. Diagnostic Studies: None at this time.      4. Medication Management:   1. Agree with tapering down on opioids. She has been on moderately high doses for many years. In terms of efficacy she is not getting good pain relief as evidenced by her poor function and ongoing severity of pain. Agree with the taper plan that has been established by PCP.   2. Start Gabapentin 100 mg q hs and titrate up to 300 mg TID if well  tolerated. Instructions for titration listed in AVS. This is a reasonable option to re-try since Hina does not remember being on this before and is not sure what dose she got to.   5. Further procedures recommended: None  6. Acupuncture is an option to consider in the future.   7. Discussed purchasing a TENS unit online to see if that provides some benefit.   8. Follow up: 4-6 weeks     Since her last visit, Hina Yap reports:  - No change in pain since the last visit.   - She reports having a lot of stress and anxiety related to tapering down on her pain medications. This in turn contributes to her depressed mood.   - She had one appointment with pain psychology but has recently started working with a new mental health provider.   - She did not start pain PT yet. Has an appointment scheduled for 5/29.   - She established care with a new neurologist.   - She is currently taking gabapentin 300 mg TID but has not noticed any benefit. No side effects from the medication.     Current Relevant Pain Medications:       Oxycodone 5 mg 1-2 tabs up to 5 times per day, max #8 tabs per day. Taper plan given by PCP. - [facility]              Baclofen 20 mg QID - [facility]                  Meloxicam 7.5 mg BID - ?              Bengay - [place]              Lidocaine patches - [facility]    Other Medications:  Mirtazapine  Trazodone  Xanax 0.5 mg BID  Adderall  Chantix  Ropinirole    Narcan      Current PainTreatments:  - Mental health treatment  - starting pain PT on 5/29    Previous Medications: (H--helped; [facility]--Helped initially; [facility]-- somewhat helpful, [facility]--No help; W--worse; SE--side effects)        Opiates: tylenol #3 - [facility], fentanyl patches - [facility], hydrocodone - NH, hydromorphone - NH, methadone - NH,  morphine - NH, tramadol - [facility]              NSAIDS: celebrex - NH, toradol - NH, relafen - NH, aleve- NH, daypro- NH, sulindac - NH              Muscle Relaxants: methocarbamol - NH, norflex - NH, tizanidine - NH              Anti-migraine  mediations: none              Anti-depressants: amitriptyline - [facility], Cymbalta - NH, Nortriptyline - NH, venlafaxine - [facility],     savella - [facility]              Sleep aids: na              Anxiolytics: valium - H, klonopin - NH, lorazepam - [facility]              Neuropathics:  Gabapentin - [facility], Tompax - [facility]              Topicals: none              Other medications not covered above: Tylenol - [place], medical cannabis - briefly tried but was too expensive - ?    Past Pain Treatments:  1. Physical Therapy: Yes - [facility]  2. Pain Psychology: None  3. Surgery: None  4. Injections: Has had neck injections but unsure of specifics. Done at [facility]. Unsure how long ago.   5. Chiropractic: Yes - many years ago  6. Acupuncture: No  7. TENS Unit: Yes - ?  8. Other: none    [place] Board of Pharmacy Data Base Reviewed:    YES; As expected, no concern for misuse/abuse of controlled medications based on this report.    THE 4 As OF OPIOID MAINTENANCE ANALGESIA    Analgesia: Is pain relief clinically significant? NO   Activity: Is patient functional and able to perform Activities of Daily Living? YES   Adverse effects: Is patient free from adverse side effects from opiates? NO   Adherence to Rx protocol: Is patient adhering to Controlled Substance Agreement and taking medications ONLY as ordered? YES     Is Narcan prescribed for opiate use >50 MME daily? YES    Daily MME: 60    Medications:  Current Outpatient Medications   Medication Sig Dispense Refill     albuterol (PROAIR HFA/PROVENTIL HFA/VENTOLIN HFA) 108 (90 Base) MCG/ACT inhaler Inhale 2 puffs into the lungs every 4 hours as needed for shortness of breath / dyspnea or wheezing 1 Inhaler 1     ALPRAZolam (XANAX) 0.5 MG tablet Take 1 tablet (0.5 mg) by mouth 2 times daily 60 tablet 0     amphetamine-dextroamphetamine (ADDERALL XR) 30 MG 24 hr capsule Take 1 capsule (30 mg) by mouth daily 30 capsule 0     amphetamine-dextroamphetamine (ADDERALL) 10 MG tablet Take 1 tablet (10 mg) by mouth daily 30  tablet 0     baclofen (LIORESAL) 20 MG tablet Take 20 mg by mouth 4 times daily       ferrous sulfate (FEROSUL) 325 (65 Fe) MG tablet Take 325 mg by mouth daily as needed       gabapentin (NEURONTIN) 100 MG capsule Take 3 capsules (300 mg) by mouth 3 times daily (start with dose listed in clinic instructions) 270 capsule 0     ipratropium - albuterol 0.5 mg/2.5 mg/3 mL (DUONEB) 0.5-2.5 (3) MG/3ML neb solution Take 1 vial (3 mLs) by nebulization every 6 hours as needed for shortness of breath / dyspnea or wheezing 30 vial 1     meloxicam (MOBIC) 7.5 MG tablet Take 7.5 mg by mouth 2 times daily       mirtazapine (REMERON) 15 MG tablet Take 3 tablets (45 mg) by mouth At Bedtime 180 tablet 3     multivitamin, therapeutic (THERA-VIT) TABS Take 1 tablet by mouth daily       nicotine (NICORETTE) 2 MG gum Place 1 each (2 mg) inside cheek every hour as needed for smoking cessation 30 tablet 1     order for DME Equipment being ordered: Nebulizer 1 each 0     order for DME Equipment being ordered: Cane ()  Treatment Diagnosis: Multiple sclerosis 1 Device 0     oxyCODONE (ROXICODONE) 5 MG tablet Take 1-2 tabs at a time, up to 5 times a day. No more than 8 tabs per day. Must give at least 4 hours between dosing. 240 tablet 0     oxyCODONE-acetaminophen (PERCOCET)  MG per tablet Take 1 tablet by mouth every 6 hours as needed.       promethazine (PHENERGAN) 25 MG tablet Take 1 tablet (25 mg) by mouth every 6 hours as needed for nausea (takes with percocet to prevent nausea) 56 tablet 0     rOPINIRole (REQUIP) 2 MG tablet Take 2 mg by mouth every morning Patient is taking 1 tab in the morning and 2 tab at night       rOPINIRole (REQUIP) 2 MG tablet Take 4 mg by mouth At Bedtime       tiotropium-olodaterol 2.5-2.5 MCG/ACT AERS Inhale 2 puffs into the lungs daily       traZODone (DESYREL) 50 MG tablet Take 2 tablets (100 mg) by mouth At Bedtime 90 tablet 3     varenicline (CHANTIX CONTINUING MONTH BREEZY) 1 MG tablet Take 1  tablet (1 mg) by mouth 2 times daily (Patient not taking: Reported on 5/20/2020) 60 tablet 1     varenicline (CHANTIX BREEZY) 0.5 MG X 11 & 1 MG X 42 tablet Take 0.5 mg tab daily for 3 days, THEN 0.5 mg tab twice daily for 4 days, THEN 1 mg twice daily. (Patient not taking: Reported on 5/15/2020) 53 tablet 0     varenicline (CHANTIX) 1 MG tablet Take 1 tablet (1 mg) by mouth 2 times daily (Patient not taking: Reported on 5/20/2020) 60 tablet 3       Review of Systems: A 10-point review of systems was negative, with the exception of chronic pain issues.      DIRE Score for ongoing opioid management is calculated as follows:    Diagnosis = 2 pts (slowly progressive; moderate pain/objective findings)    Intractability = 1 pt (few therapies tried; passive patient role)    Risk        Psych = 1 pt (serious personality dysfunction/mental illness that significantly interferes with care)         Chem Hlth = 2 pts (use of medications to cope with stress; chemical dependency in remission)       Reliability = 2 pts (occasional difficulties with compliance; generally reliable)       Social = 2 pts (reduction in some relationships/life rolls)       (Psych + Chem hlth + Reliability + Social) = 10    Efficacy = 1 pt (poor function; minimal pain relief despite mod/high med dose)      DIRE Score = 11        7-13: likely NOT suitable candidate for long-term opioid analgesia       14-21: may be a suitable candidate for long-term opioid analgesia       DIAGNOSTIC TESTS:  Imaging Studies:   MRI of Cervical spine completed on 2/6/2019 at [facility]:     Assessment:  Hina Yap is a 54 year old female with a past medical history significant for MS, stress-induced cardiomyopathy, restless leg syndrome, CDK stage 2, depression, anxiety who presents with complaints of multiple areas of pain.      1. Pain in both arms and legs: Etiology unclear. She reports this is due to MS. No neurology records available to review. On exam there is no  spasticity noted. She will be establishing care with a new Neurologist.     2. Chronic neck pain: No imaging available. No red flag symptoms. Neuro exam is normal. Differential includes cervical spondylosis, facet arthropathy, myofascial pain.      3. Chronic low back pain: No imaging available. No red flag symptoms. Neuro exam is normal. Differential includes deconditioning, lumbar spondylosis, facet arthropathy, myofascial pain.      4. Mental Health - the patient's mental health concerns, specifically depression and anxiety, affect her experience of pain and contribute to her clinically significant distress.     5. Chronic opioid use: Currently on 67.5 OME, previously on 75 OME. Prescriptions were through her previous neurologist. This was in addition to being on a benzodiazepine as well. Currently, prescriptions are through PCP who is working on tapering down on the dose.     Plan:  Diagnosis reviewed, treatment option addressed, and risk/benifits discussed.  Self-care instructions given.  I am recommending a multidisciplinary treatment plan to help this patient better manage pain.      1. Therapy: Order placed to start physical therapy through the pain clinic. First appointment scheduled for 5/29.     2. Clinical Health Pain Psychologist: Will hold on pain psychology at this time. She is starting to work with a mental health provider. Can resume pain psychology when overall mental health is improving.      3. Diagnostic Studies: None at this time.      4. Medication Management:   1. Agree with tapering down on opioids. She has been on moderately high doses for many years. In terms of efficacy she is not getting good pain relief as evidenced by her poor function and ongoing severity of pain. The current taper plan is causing her a lot of anxiety and stress. It would be reasonable to slow down the taper while she is starting to work with mental health and physical therapy. Will discuss with her PCP who is  prescribing.    2. Increase Gabapentin to 600 mg TID.  Instructions for titration listed in AVS. At initial visit we discussed that this is a reasonable option to re-try since Hina does not remember being on this before and is not sure what dose she got to.   3. Try OTC CBD/hemp products to see if they provide some benefit.   5. Further procedures recommended: None  6. Acupuncture is an option to consider in the future if finances allow   7. At last visit discussed purchasing a TENS unit online to see if that provides some benefit.   8. Follow up: 6 weeks     Rehana Ferrari MD  [facility]    Phone call duration: 20 minutes

## 2020-05-27 ENCOUNTER — VIRTUAL VISIT (OUTPATIENT)
Dept: PALLIATIVE MEDICINE | Facility: CLINIC | Age: 55
End: 2020-05-27
Payer: MEDICAID

## 2020-05-27 DIAGNOSIS — G89.4 CHRONIC PAIN SYNDROME: Primary | ICD-10-CM

## 2020-05-27 DIAGNOSIS — G89.29 CHRONIC BILATERAL LOW BACK PAIN WITHOUT SCIATICA: ICD-10-CM

## 2020-05-27 DIAGNOSIS — G89.29 CHRONIC NECK PAIN: ICD-10-CM

## 2020-05-27 DIAGNOSIS — G89.29 OTHER CHRONIC PAIN: ICD-10-CM

## 2020-05-27 DIAGNOSIS — M54.50 CHRONIC BILATERAL LOW BACK PAIN WITHOUT SCIATICA: ICD-10-CM

## 2020-05-27 DIAGNOSIS — M54.2 CHRONIC NECK PAIN: ICD-10-CM

## 2020-05-27 PROCEDURE — 99442: CPT | Performed by: PHYSICAL MEDICINE & REHABILITATION

## 2020-05-27 RX ORDER — GABAPENTIN 300 MG/1
600 CAPSULE ORAL 3 TIMES DAILY
Qty: 180 CAPSULE | Refills: 1 | Status: SHIPPED | OUTPATIENT
Start: 2020-05-27 | End: 2020-08-14

## 2020-05-27 ASSESSMENT — PAIN SCALES - GENERAL: PAINLEVEL: SEVERE PAIN (6)

## 2020-05-27 NOTE — PATIENT INSTRUCTIONS
1. Start physical therapy with the pain clinic.    2. Continue working with mental health provider. Since you are seeing a new mental health provider you do not need to continue with pain psychology.     3. Increase gabapentin as follows:  1 tab= 300mg    AM   PM   Bedtime  300mg (1 tabs)  300mg (1 tabs)  600mg (2 tabs).  After 5 days, increase as tolerated to the next line  600mg (2 tabs)  300mg (1 tab)  600mg (2 tabs).  After 5 days, increase as tolerated to the next line  600mg (2 tabs)  600 mg (2 tabs)  600 mg (2 tabs).  Call with any problems or when you are at this dose.    Caution for sedation.    Do not drive until you know how the medication affects you. Avoid activities that require mental alertness or coordination until you realize the effects of the medication as it can cause dizziness, sleepiness, fatigue and incoordination.    You can go slower if you need to or increasing only one dose at a time.    Do not stop abruptly once at higher doses.  This medication must be tapered off. Speak with your pain provider prior to discontinuing    Although very uncommon, If you experience changes in your mood, personality, worsening depression, suicidal or homicidal thoughts seek medical care immediately.    Avoid use of alcohol with this medication as it can cause worsening sedation.    4. You can also try over the counter CBD/Hemp products to see if they provide some benefit. Some placed to look at are Nothing But Hemp, [employer], [employer], Rejuv. You can also look at [employer]. This is not an all inclusive list.    5. Follow up with me in 6 weeks.     -------------------------------------------------  Clinic Number:  [phone]     Call with any questions about your care and for scheduling assistance.     Calls are returned Monday through Friday between 8 AM and 4:30 PM. We usually get back to you within 2 business days depending on the issue/request.    If we are prescribing your medications:    For  opioid medication refills, call the clinic or send a [facility] message 7 days in advance.  Please include:    Name of requested medication    Name of the pharmacy.    For non-opioid medications, call your pharmacy directly to request a refill. Please allow 3-4 days to be processed.     Per [place] State Law:    All controlled substance prescriptions must be filled within 30 days of being written.      For those controlled substances allowing refills, pickup must occur within 30 days of last fill.      We believe regular attendance is key to your success in our program!      Any time you are unable to keep your appointment we ask that you call us at least 24 hours in advance to cancel.This will allow us to offer the appointment time to another patient.     Multiple missed appointments may lead to dismissal from the clinic.

## 2020-05-27 NOTE — Clinical Note
I saw Hina in follow up today. We have not made progress thus far. She is going to start PT in our clinic on the 29th. Since she is starting to work with mental health we are pausing pain psychology until she gets some stabilization of her mental health overall. She has been having a lot of stress and anxiety relating to the opioid taper. I wanted to discuss with you if you were comfortable pausing the taper for now and letting her really start engaging in mental health and PT services. I am also re-trying gabapentin to see if it can provide some benefit since she did not know what dose she tried in the past.     I am out of the office until 6/2. Please let me know if you want to discuss further.     Rehana

## 2020-05-28 ENCOUNTER — PATIENT OUTREACH (OUTPATIENT)
Dept: PEDIATRICS | Facility: CLINIC | Age: 55
End: 2020-05-28

## 2020-05-28 DIAGNOSIS — G89.29 OTHER CHRONIC PAIN: ICD-10-CM

## 2020-05-28 DIAGNOSIS — R53.83 OTHER FATIGUE: ICD-10-CM

## 2020-05-28 DIAGNOSIS — N39.41 URGE INCONTINENCE OF URINE: Primary | ICD-10-CM

## 2020-05-28 DIAGNOSIS — F41.9 ANXIETY: ICD-10-CM

## 2020-05-28 NOTE — TELEPHONE ENCOUNTER
Called pt. No answer, LVM to call back on my personal extension.    If pt calls back:  See how she is doing with the appointments and see how she is sleeping (we made some med changes)     Reji Rahman, EMT at 11:39 AM on May 28, 2020   [facility] Health Guide   [phone]

## 2020-05-29 ENCOUNTER — VIRTUAL VISIT (OUTPATIENT)
Dept: PSYCHIATRY | Facility: CLINIC | Age: 55
End: 2020-05-29
Payer: MEDICAID

## 2020-05-29 ENCOUNTER — VIRTUAL VISIT (OUTPATIENT)
Dept: PALLIATIVE MEDICINE | Facility: CLINIC | Age: 55
End: 2020-05-29
Payer: MEDICAID

## 2020-05-29 DIAGNOSIS — F32.9 MDD (MAJOR DEPRESSIVE DISORDER): Primary | ICD-10-CM

## 2020-05-29 DIAGNOSIS — M54.50 CHRONIC BILATERAL LOW BACK PAIN WITHOUT SCIATICA: ICD-10-CM

## 2020-05-29 DIAGNOSIS — M54.2 CHRONIC NECK PAIN: Primary | ICD-10-CM

## 2020-05-29 DIAGNOSIS — G89.29 CHRONIC NECK PAIN: Primary | ICD-10-CM

## 2020-05-29 DIAGNOSIS — G89.29 OTHER CHRONIC PAIN: ICD-10-CM

## 2020-05-29 DIAGNOSIS — G89.29 CHRONIC BILATERAL LOW BACK PAIN WITHOUT SCIATICA: ICD-10-CM

## 2020-05-29 DIAGNOSIS — G89.4 CHRONIC PAIN SYNDROME: ICD-10-CM

## 2020-05-29 PROCEDURE — 97110 THERAPEUTIC EXERCISES: CPT | Mod: 95 | Performed by: PHYSICAL THERAPIST

## 2020-05-29 PROCEDURE — 97162 PT EVAL MOD COMPLEX 30 MIN: CPT | Mod: 95 | Performed by: PHYSICAL THERAPIST

## 2020-05-29 NOTE — PROGRESS NOTES
"PHYSICAL THERAPY INITIAL EVALUATION and PLAN OF CARE    Patient Name: Hina Yap     : 1965    MRN: [id number]   Pain Management Provider:  Rehana Ferrari MD    Diagnosis:    Chronic neck pain  Chronic bilateral low back pain without sciatica  Other chronic pain  Chronic pain syndrome    The patient has been notified of the following:  \"This virtual visit will be conducted between you and your provider.  We have found that certain health care needs can be provided without the need for physical presence.  This service lets us provide the care you need with a virtual visit.\"    Due to external, as well as internal [facility] management of the COVID 19 Virus patient was not seen in our clinic.  As a substitution, we implemented a virtual visit to manage this patient's condition utilizing the [employer] virtual visit platform.  The provider reviewed the patient's chart and spoke with the patient to determine the following telemedicine visit is appropriate and effective for the patient's care.    The following type of visit was completed:  Video Visit:  The [employer] platform uses a synchronous HIPAA compliant video stream for this encounter. Patient has given verbal consent for video visit? Yes    SUBJECTIVE:    PRESENTATION AND ETIOLOGY    Chief Complaint: multiple areas of pain, nerve pain in legs and arms, neck and back pain  Per provider note: Hina Yap is a 54 year old female with a past medical history significant for MS, stress-induced cardiomyopathy, restless leg syndrome, CDK stage 2, depression, anxiety who presents with complaints of multiple areas of pain.   1. Pain in both arms and legs: Etiology unclear. She reports this is due to MS. No neurology records available to review. On exam there is no spasticity noted. She will be establishing care with a new Neurologist.   2. Chronic neck pain: No imaging available. No red flag symptoms. Neuro exam is normal. Differential includes " cervical spondylosis, facet arthropathy, myofascial pain.   3. Chronic low back pain: No imaging available. No red flag symptoms. Neuro exam is normal. Differential includes deconditioning, lumbar spondylosis, facet arthropathy, myofascial pain.      Onset / Etiology: 20 years  Pain is described as constant, sharp, shooting   Intensity: Average 9/10  Fatigue: 10/10 level    LEVEL OF FUNCTION AT START OF CARE  Walking tolerance: 10-15'   Sitting tolerance: 15'   Standing tolerance: 5'  Housework tolerance: 10-15'  Sleep: struggles with sleep due to insomnia; not more than 3 hours  HEP: does stretching using a ball and theraband for arms  Current Aggrevating Activities / Functional Limitations: nothing makes legs/arms worse; not sure what makes back and neck worse    CURRENT / PREVIOUS INTERVENTION(S):   Relieving Activities / Self Care: medication helps nerve pain in arms/legs; heat, laying down helps neck and back  Previous / Current therapies for current chief complaint: PT: at [facility] pain clinic about 10 yrs ago, not helpful    DEMOGRAPHICS  Employment Status: not working, lives in [place]  Pertinent Medical  / Surgical History: Epic Snapshot Reviewed, See provider's note    Patient's goals for physical therapy: none stated;     ===============================================================    ===============================================================  Today's Treatment:  Initial evaluation  Therapeutic activity:   For 20 minutes including Pt educated on the concept of the nervous system as a hypersensitive and hyperactive alarm system and the role of physical therapy in desensitizing the nerves and reducing pain.  Pt states she learned the needed tools to manage her pain while attending PT at [facility].  She does regular stretching using a ball and theraband strengthening.  Discussed with patient to consider adding a daily walking program, pacing skills and strategies for sensory calming. Pt stated she will be  starting to work with a psychologist for her depression and will plan to focus on sensory calming tools during those sessions.  Plan to hold on Pain PT at this time allowing patient time to focus on mental health/depression.  Instructed patient to schedule follow up appointment with Dr. Ferrari and will consider readiness for Pain PT at that time   ===============================================================  ASSESSMENT:  Physical Therapy Diagnosis:Impaired Posture and Impaired Muscle Performance    Patient requires PT intervention for the following impairments: Limited knowledge of condition and / or self care - inability to control symptoms, Impaired functional mobility, Pain and Deconditioning    Anticipated Goals and Expected Outcomes:  8 weeks  Patient will report the use of 2 self care practices during their day.  Patient will report the participation in 20 minutes of aerobic activity daily and practicing stretching daily.  Patient will demonstrate the ability to find core strength in neutral posture.  Patient will demonstrate the ability to relax muscle group before stretching.  16 weeks  Patient will be independent with a home exercise program.  Patient will be independent with posture correction.  Patient will report independence with a self care/flare management program.  Patient will demonstrate improved functional strength and endurance as reports by increased tolerance for IADLs and more consistent participation in daily activity.     Rehab potential for achieving goals: fair due to mental health/depression.    ===============================================================  PLAN:   Patient will benefit from skilled physical therapy consisting of:  neuromuscular reeducation of: kinesthetic sense and posture for sitting and/or standing activities, education in self care / home management training to include instruction in: symptom control techniques, therapeutic activities to achieve improved  functional performance in: daily actvities and therapeutic exercise to develop: strength and endurance, flexibility and core stability    Assessment will be ongoing with changes in treatment as indicated.  Benefits/risks/alternatives to treatment have been reviewed and the patient has been instructed to contact this office if they have any questions or concerns.  This plan of care has been discussed with the patient and the patient is in agreement.     Frequency / Duration:  Patient will be seen for a total of 2-4 visits; 16 weeks    Total Visit Time: 40  minutes            Shanel Hernandez, PT, PT                                      Date:  5/29/2020      =====================================================  **  Referring Provider Certification: Referring Provider reviewing certifies that the above treatment / plan of care is required and authorized, and that the patient's plan will be reviewed every thirty (30) days **.   ======================================================     PRESENT:  NA    MULTIDISCIPLINARY PATIENT / FAMILY EDUCATION RECORD  Department:  Physical Therapy    Readiness to Learn: Ability to understand verbal instructions, Ability to understand written instructions, Knowledge of educational needs / treatment plan  Specific Barriers to Learning: None  Referrals: None  Learning Needs: Rehabilitation techniques to improve functional independence Pain management education to improve daily activity tolerance.  Who: Patient  How: Demonstration, Verbal instructions, Written instructions  Response: Appropriate verbal response, Asked questions, Demonstrated ability, Verbalized recall / understanding

## 2020-06-05 DIAGNOSIS — G47.09 OTHER INSOMNIA: ICD-10-CM

## 2020-06-05 RX ORDER — TRAZODONE HYDROCHLORIDE 50 MG/1
TABLET, FILM COATED ORAL
Qty: 90 TABLET | Refills: 3 | Status: SHIPPED | OUTPATIENT
Start: 2020-06-05 | End: 2020-08-14

## 2020-06-10 NOTE — TELEPHONE ENCOUNTER
"Called pt.    Pt notes she was able to attend her appointments. She notes the pain clinic said they couldn't really do too much for her.    Pt notes has been sleeping poorly about 3-4 hours of sleep a night.    She has been taking her medications as prescribed without side effects with minimal relief.    She is wondering if she can get a medication for \"losing her urine\" that she noted you had discussed with her before.    Pt is also wondering if she is able to take Oxycodone with Tylenol.    Reji Rahman, EMT at 2:08 PM on Amanda 10, 2020   [facility] Health Guide   [phone]  "

## 2020-06-11 RX ORDER — OXYBUTYNIN CHLORIDE 5 MG/1
5 TABLET, EXTENDED RELEASE ORAL DAILY
Qty: 90 TABLET | Refills: 3 | Status: SHIPPED | OUTPATIENT
Start: 2020-06-11 | End: 2021-07-06

## 2020-06-11 NOTE — TELEPHONE ENCOUNTER
Called pt. No answer, LVM to call back on my personal extension.    If pt calls back:  Read recommendations below verbatim to pt.    Reji Rahman, EMT at 9:03 AM on June 11, 2020   [facility] Health Guide   [phone]

## 2020-06-11 NOTE — TELEPHONE ENCOUNTER
Oxybutynin prescribed. Start with one pill. If that doesn't help increase to 2 tabs.     Watch for dizziness and dry mouth. Rare but possible side effects.    Please check in with her in 2-3 weeks to see if it was effective.     She can take up to 2 extra strength tylenol, 3 times a day. This boosts the effectiveness of the oxycodone.

## 2020-06-12 ENCOUNTER — TELEPHONE (OUTPATIENT)
Dept: PEDIATRICS | Facility: CLINIC | Age: 55
End: 2020-06-12

## 2020-06-12 DIAGNOSIS — G89.29 OTHER CHRONIC PAIN: ICD-10-CM

## 2020-06-12 RX ORDER — DEXTROAMPHETAMINE SACCHARATE, AMPHETAMINE ASPARTATE, DEXTROAMPHETAMINE SULFATE AND AMPHETAMINE SULFATE 2.5; 2.5; 2.5; 2.5 MG/1; MG/1; MG/1; MG/1
10 TABLET ORAL DAILY
Qty: 30 TABLET | Refills: 0 | Status: SHIPPED | OUTPATIENT
Start: 2020-06-12 | End: 2020-07-09

## 2020-06-12 RX ORDER — ALPRAZOLAM 0.5 MG
0.5 TABLET ORAL 2 TIMES DAILY
Qty: 60 TABLET | Refills: 0 | Status: SHIPPED | OUTPATIENT
Start: 2020-06-12 | End: 2020-07-09

## 2020-06-12 RX ORDER — OXYCODONE HYDROCHLORIDE 5 MG/1
TABLET ORAL
Qty: 240 TABLET | Refills: 0 | Status: CANCELLED
Start: 2020-06-12

## 2020-06-12 RX ORDER — OXYCODONE AND ACETAMINOPHEN 5; 325 MG/1; MG/1
TABLET ORAL
Qty: 240 TABLET | Refills: 0 | Status: SHIPPED | OUTPATIENT
Start: 2020-06-12 | End: 2020-06-12

## 2020-06-12 RX ORDER — OXYCODONE AND ACETAMINOPHEN 5; 325 MG/1; MG/1
TABLET ORAL
Qty: 240 TABLET | Refills: 0 | Status: SHIPPED | OUTPATIENT
Start: 2020-06-19 | End: 2020-07-10

## 2020-06-12 RX ORDER — DEXTROAMPHETAMINE SACCHARATE, AMPHETAMINE ASPARTATE MONOHYDRATE, DEXTROAMPHETAMINE SULFATE AND AMPHETAMINE SULFATE 7.5; 7.5; 7.5; 7.5 MG/1; MG/1; MG/1; MG/1
30 CAPSULE, EXTENDED RELEASE ORAL DAILY
Qty: 30 CAPSULE | Refills: 0 | Status: SHIPPED | OUTPATIENT
Start: 2020-06-12 | End: 2020-07-09

## 2020-06-12 NOTE — TELEPHONE ENCOUNTER
Called pt. Relayed recommendations below verbatim to pt.    Pt wanting to know if she is able to get prescribed Percocet instead of the Oxycodone as she notes it was more effective. If not she notes she is out of the Oxycodone and would need a refill.    She also needs refill on the:  Alprazolam (Xanax)  Adderall XR 30 mg  Adderall 10 mg    Pended medications and pharmacy.    Reji Rahman, EMT at 10:39 AM on June 12, 2020   [facility] Health Guide   [phone]

## 2020-06-12 NOTE — TELEPHONE ENCOUNTER
ASYAI, the pharmacy was calling and they stated that because the pt hasn't been on Percocet within the last 90 days she can only initial get a week fill of 56(The pt did receive 56 today) and then another rx would need to be sent in next week for the remaining 184 tablets.  Aury Foote on 6/12/2020 at 12:53 PM

## 2020-06-12 NOTE — TELEPHONE ENCOUNTER
Please help the patient understand why the pharmacy is doing this (new rules where new opioids only get 1 week for the first week, then a normal rx)    Pls inform the pharmacy I just sent in 1 month for starting for 1 week from now, instead of a partial script.

## 2020-06-12 NOTE — TELEPHONE ENCOUNTER
Called pt and informed of below. Pt agreeable to plan and verbalized understanding.    Reji Rahman, EMT at 11:27 AM on June 12, 2020   [facility] Health Guide   [phone]

## 2020-06-12 NOTE — TELEPHONE ENCOUNTER
Please let pt know, as promised, we are weaning the xanax any further right now. We will let psychiatry eventually decide what to do with the xanax. Hopefully they can get her on other meds that will get her feeling bettter so she doesn't need the xanax in the future, but same dose for now.    Same oxy dose this month, will decrease next month from 8 tabs per day to 7.

## 2020-06-12 NOTE — TELEPHONE ENCOUNTER
Called pt to inform. Pt verbalized understanding.    Called pharmacy to inform message below verbatim. Staff member verbalized understanding.    Reji Rahman, EMT at 2:21 PM on June 12, 2020   [facility] Health Guide   [phone]

## 2020-06-15 ENCOUNTER — TELEPHONE (OUTPATIENT)
Dept: PEDIATRICS | Facility: CLINIC | Age: 55
End: 2020-06-15

## 2020-06-15 NOTE — TELEPHONE ENCOUNTER
Prior Authorization Retail Medication Request    Medication/Dose: oxyCODONE-acetaminophen (PERCOCET) 5-325 MG tablet   ICD code (if different than what is on RX): Other chronic pain [G89.29]   Previously Tried and Failed: NA  Rationale: NA    Insurance Name: Medicaid [place]  Insurance ID: [id number]     Pharmacy Information (if different than what is on RX)  Name: [facility]   Phone: [phone]

## 2020-06-15 NOTE — TELEPHONE ENCOUNTER
Central Prior Authorization Team  Phone: [phone]    Hello,   1) Please provide a copy of PDMP   2) A copy of pain contract.   Please route encounter back to pa team. Thanks

## 2020-06-22 NOTE — TELEPHONE ENCOUNTER
Central Prior Authorization Team  Phone: [phone]    PA Initiation    Medication: oxyCODONE-acetaminophen (PERCOCET) 5-325 MG tablet   Insurance Company: [place] Medicaid ([facility]) - Phone [phone] Fax [phone]  Pharmacy Filling the Rx: [facility] #[id number] [place], [place] - [address] AT [facility] [address]  Filling Pharmacy Phone: [phone]  Filling Pharmacy Fax:    Start Date: 6/22/2020

## 2020-06-22 NOTE — TELEPHONE ENCOUNTER
No pa needed. This medication is covered without needing a pa and pharmacy received a paid claim (full 240 tablets on 06/19).

## 2020-06-22 NOTE — TELEPHONE ENCOUNTER
See copy of PDMP below.  No pain contract on file.  Have not been able to complete since we are doing virtual visits.     6/22/2020   Hina Yap                                    Risk Indicators   NARX SCORES   Narcotic   571   Sedative   511   Stimulant   282   Explanation and Guidance   OVERDOSE RISK SCORE   450   (Range 000-999)   Explanation and Guidance   ADDITIONAL RISK INDICATORS ( 2 )   >= 5 opioid or sedative providers in any year in the last 2 years   > 100 MME total and 40 MME/day average   Explanation and Guidance   This NarxCare report is based on search criteria supplied and the data entered by the dispensing pharmacy. For more information about any prescription, please contact the dispensing pharmacy or the prescriber. NarxCare scores and reports are intended to aid, not replace, medical decision making. None of the information presented should be used as sole justification for providing or refusing to provide medications. The information on this report is not warranted as accurate or complete.   Graphs   RX GRAPH   Narcotic Buprenorphine Sedative Stimulant Other   All PrescribersPrescribers8 - Sumaya Mann, Cn7 - Sunshine Butterfield, Dn6 - Rehana Ferrari, M5 - Floyd Romero4 - Matthias Cintron3 - Luis M Mcginnis MD2 - Miko Lynn1 - Juan HOSKINS [id number]                       Buprenorphine mg   [id number]/[id number]   Morphine MgEq (MME)   [id number]/[id number]   Lorazepam MgEq (LME)   [id number]/[id number]   *Per CDC guidance, the MME conversion factors prescribed or provided as part of the medication-assisted treatment for opioid use disorder should not be used to benchmark against dosage thresholds meant for opioids prescribed for pain. Buprenorphine products have no agreed upon morphine equivalency, and as partial opioid agonists, are not expected to be associated with overdose risk in the same dose-dependent manner as doses for full agonist opioids. MME =  morphine milligram equivalents. LME = Lorazepam milligram equivalents. mg = dose in milligrams.   Summary   Summary  Total Prescriptions: 45   Total Prescribers: 8   Total Pharmacies: 2   Narcotics* (excluding Buprenorphine)  Current Qty: 208   Current MME/day: 60.00   30 Day Avg MME/day: 56.00   Sedatives*  Current Qty: 38   Current LME/day: 2.00   30 Day Avg LME/day: 1.87   Buprenorphine*  Current Qty: 0   Current mg/day: 0.00   30 Day Avg mg/day: 0.00   Rx Data   PRESCRIPTIONS   Total Prescriptions: 45   Total Private Pay: 0     Fill Date ID Written Drug Qty Days Prescriber Rx # Pharmacy Refill Daily Dose * Pymt Type    06/19/2020  2  06/12/2020  Oxycodone-Acetaminophen 5-325    240.00 30 An Mol  [id number]  [facility] ([facility]90)  0 60.00 [place]   06/12/2020  2  06/12/2020  Dextroamp-Amphet Er 30 Mg Cap    30.00 30 An Mol  [id number]  [facility] (4590)  0  [place]   06/12/2020  2  06/12/2020  Oxycodone-Acetaminophen 5-325    56.00 7 An Mol  [id number]  [facility] (4590)  0 60.[place]   06/12/2020  2  06/12/2020  Alprazolam 0.5 Mg Tablet    60.00 30 An Mol  [id number]  [facility] (4590)  0 2.00 [place]   06/12/2020  2  06/12/2020  Dextroamp-Amphetamin 10 Mg Tab    30.00 30 An Mol  [id number]  [facility] (4590)  0  [place]   05/27/2020  2  05/27/2020  Gabapentin 300 Mg Capsule    180.00 30 Ru Aus  [id number]  [facility] (4590)  0  [place]   05/15/2020  2  05/15/2020  Acetaminophen-Cod #3 Tablet    10.00 3 Am She  [id number]  [facility] (7009)  0 15.00 [place]   05/11/2020  2  05/11/2020  Oxycodone Hcl 5 Mg Tablet    240.00 30 An Mol  [id number]  [facility] (4590)  0 60.[place]   05/11/2020  2  05/11/2020  Dextroamp-Amphet Er 30 Mg Cap    30.00 30 An Mol  [id number]  [facility] (4590)  0  [place]   05/11/2020  2  05/11/2020  Alprazolam 0.5 Mg Tablet    60.00 30 An Mol  [id number]  [facility] (0810)  0 2.00 E [place]   05/11/2020  2  05/11/2020  Dextroamp-Amphetamin 10 Mg Tab    30.00 30 An Mol  [id number]  [facility] (3904)  0  [place]    04/22/2020  2  04/22/2020  Gabapentin 100 Mg Capsule    270.00 30 Ru Aus  [id number]  [facility] (4590)  0  [place]   04/12/2020  2  04/09/2020  Oxycodone Hcl 5 Mg Tablet    270.00 30 An Mol  [id number]  [facility] (4590)  0 67.50 [place]   04/10/2020  2  04/09/2020  Dextroamp-Amphetamin 10 Mg Tab    30.00 30 An Mol  [id number]  [facility] (4590)  0  [place]   04/09/2020  2  04/09/2020  Alprazolam 0.5 Mg Tablet    60.00 30 An Mol  [id number]  [facility] (4590)  [place] 2.00 [place]   04/09/2020  2  04/09/2020  Dextroamp-Amphet Er 30 Mg Cap    30.00 30 An Mol  [id number]  [facility] (4590)  0  [place]   03/16/2020  2  03/04/2020  Oxycodone Hcl 5 Mg Tablet    270.00 30 An Mol  [id number]  [facility] (4590)  0 67.50 [place]   03/10/2020  2  03/05/2020  Oxycodone Hcl 5 Mg Tablet    63.00 7 An Mol  [id number]  [facility] (4590)  0 67.50 [place]   03/06/2020  2  02/13/2020  Dextroamp-Amphet Er 30 Mg Cap    30.00 30 An Mol  [id number]  [facility] (4590)  0  [place]   03/04/2020  2  03/04/2020  Oxycodone-Acetaminophen     63.00 15 An Mol  [id number]  [facility] (4590)  [place] 63.00 [place]   03/04/2020  2  02/13/2020  Alprazolam 0.5 Mg Tablet    60.00 30 An Mol  [id number]  [facility] (4590)  [place] 2.[place]   03/04/2020  2  02/13/2020  Oxycodone Hcl 5 Mg Tablet    63.00 7 An Mol  [id number]  [facility] (4590)  0 67.50 [place]   03/04/2020  2  02/13/2020  Dextroamp-Amphetamin 10 Mg Tab    30.00 30 An Mol  [id number]  [facility] (4590)  0  [place]   02/03/2020  2  02/03/2020  Alprazolam 0.5 Mg Tablet    75.00 30 An Mol  [id number]  [facility] (4590)  0 2.50 [place]   02/03/2020  2  02/03/2020  Oxycodone-Acetaminophen     136.00 34 An Mol  [id number]  [facility] (1844)  [place] 60.00 [place]   02/03/2020  2  02/03/2020  Dextroamp-Amphet Er 30 Mg Cap    30.00 30 An Mol  [id number]  [facility] (7025)  0  [place]   02/03/2020  2  02/03/2020  Dextroamp-Amphetamin 10 Mg Tab    30.00 30 An Mol  [id number]  [facility] (0390)  0  [place]   01/04/2020  2  09/03/2019   Dextroamp-Amphet Er 30 Mg Cap    30.00 30 Ch Britney  [id number]  [facility] (4590)  0  [place]   01/02/2020  2  12/31/2019  Oxycodone-Acetaminophen     136.00 34 Br Ker  [id number]  [facility] (4590)  0 60.00 [place]   01/02/2020  2  12/31/2019  Alprazolam 0.5 Mg Tablet    75.00 30 Br Ker  [id number]  [facility] (4590)  0 2.50 [place]   12/03/2019  2  12/03/2019  Dextroamp-Amphet Er 30 Mg Cap    30.00 30 An Mol  [id number]  [facility] (4590)  0  [place]   12/03/2019  2  11/21/2019  Oxycodone-Acetaminophen     136.00 34 An Mol  [id number]  [facility] (4590)  [place] 60.[place]   12/03/2019  2  11/21/2019  Alprazolam 0.5 Mg Tablet    90.00 30 An Mol  [id number]  [facility] (4590)  [place] 3.00 [place]   11/04/2019  1  11/04/2019  Alprazolam 1 Mg Tablet    50.00 25 Ch Britney  [id number]  [facility] (4590)  0 4.00 [place]   11/01/2019  1  11/01/2019  Oxycodone-Acetaminophen     150.00 30 Da Ashok  [id number]  [facility] (4590)  0 75.00 [place]   11/01/2019  1  11/01/2019  Dextroamp-Amphet Er 30 Mg Cap    30.00 30 Da Ashok  [id number]  [facility] (4590)  0  [place]   10/01/2019  1  10/01/2019  Dextroamp-Amphet Er 30 Mg Cap    30.00 30 Ro Shane  [id number]  [facility] (4590)  0  [place]   10/01/2019  1  10/01/2019  Oxycodone-Acetaminophen     150.00 30 Ro Shane  [id number]  [facility] (4590)  0 75.00 [place]   09/03/2019  1  08/06/2019  Dextroamp-Amphet Er 30 Mg Cap    30.00 30 Ta Fri  [id number]  [facility] (4590)  0  [place]   09/03/2019  1  08/06/2019  Oxycodone-Acetaminophen     150.00 30 Ta Fri  [id number]  [facility] (4590)  0 75.00 [place]   08/02/2019  1  08/02/2019  Alprazolam 1 Mg Tablet    60.00 30 Ch Britney  [id number]  [facility] (2982)  0 4.00 [place]   08/01/2019  1  07/09/2019  Dextroamp-Amphet Er 30 Mg Cap    30.00 30 Ta Fri  [id number]  [facility] (6841)  0  [place]   08/01/2019  1  07/09/2019  Oxycodone-Acetaminophen     150.00 30 Ta Fri  [id number]  [facility] (5699)  0 75.00 [place]   07/02/2019  1  06/11/2019  Dextroamp-Amphet Er  30 Mg Cap    30.00 30 Ro Shane  [id number]  [facility] (7990)  0  [place]   07/02/2019  1  06/11/2019  Oxycodone-Acetaminophen     150.00 30 Ro Shane  [id number]  [facility] (4590)  0 75.00 [place]   *Per CDC guidance, the MME conversion factors prescribed or provided as part of the medication-assisted treatment for opioid use disorder should not be used to benchmark against dosage thresholds meant for opioids prescribed for pain. Buprenorphine products have no agreed upon morphine equivalency, and as partial opioid agonists, are not expected to be associated with overdose risk in the same dose-dependent manner as doses for full agonist opioids. MME = morphine milligram equivalents. LME = Lorazepam milligram equivalents. mg = dose in milligrams.   Providers  Total Providers: 8   Name  Address  City  State  Zipcode  Phone    Juan HOSKINS FriMD mekhi [address]  [facility] [phone] -   Miko Brown MD [address]  [facility] [phone] -   Luis M Mcginnis MD [address]  [place] [phone] -   Matthias Cintron [address]  [place] [address] -   Floyd Romero [address]  [facility] [address] -   Rehana Ferrari  [facility] [address] -   Sunshine Butterfield, Dnp [address] [place] [address] -   Sumaya Mann, Cnp [address] [facility] [address] -   Pharmacies  Total Pharmacies: 2   Name  Address  City  State  Zipcode  Phone    [employer] [phone]  [facility] [address] [phone]   [facility] [employer] [phone]) [address]  [facility] [address] [phone]     : 6/22/2020   Hina Yap                                    Risk Indicators   NARX SCORES   Narcotic   571   Sedative   511   Stimulant   282   Explanation and Guidance   OVERDOSE RISK SCORE   450   (Range 000-999)   Explanation and Guidance   ADDITIONAL RISK INDICATORS ( 2 )   >= 5 opioid or sedative providers in any year in the last 2  years   > 100 MME total and 40 MME/day average   Explanation and Guidance   This NarxCare report is based on search criteria supplied and the data entered by the dispensing pharmacy. For more information about any prescription, please contact the dispensing pharmacy or the prescriber. NarxCare scores and reports are intended to aid, not replace, medical decision making. None of the information presented should be used as sole justification for providing or refusing to provide medications. The information on this report is not warranted as accurate or complete.   Graphs   RX GRAPH   Narcotic Buprenorphine Sedative Stimulant Other   All PrescribersPrescribers8 - Sumaya Mann, Cn7 - Sunshine Butterfield, Dn6 - Rehana Ferrari, M5 - Floyd Romero4 - Matthias Cintron3 - Luis M Mcginnis MD2 - Miko Lynn1 - Juan HOSKINS [id number]                       Buprenorphine mg   [id number]/[id number]   Morphine MgEq (MME)   [id number]   Lorazepam MgEq (LME)   [id number]/198g2p4u3f   *Per CDC guidance, the MME conversion factors prescribed or provided as part of the medication-assisted treatment for opioid use disorder should not be used to benchmark against dosage thresholds meant for opioids prescribed for pain. Buprenorphine products have no agreed upon morphine equivalency, and as partial opioid agonists, are not expected to be associated with overdose risk in the same dose-dependent manner as doses for full agonist opioids. MME = morphine milligram equivalents. LME = Lorazepam milligram equivalents. mg = dose in milligrams.   Summary   Summary  Total Prescriptions: 45   Total Prescribers: 8   Total Pharmacies: 2   Narcotics* (excluding Buprenorphine)  Current Qty: 208   Current MME/day: 60.00   30 Day Avg MME/day: 56.00   Sedatives*  Current Qty: 38   Current LME/day: 2.00   30 Day Avg LME/day: 1.87   Buprenorphine*  Current Qty: 0   Current mg/day: 0.00   30 Day Avg mg/day: 0.00   Rx  Data   PRESCRIPTIONS   Total Prescriptions: 45   Total Private Pay: 0     Fill Date ID Written Drug Qty Days Prescriber Rx # Pharmacy Refill Daily Dose * Pymt Type    06/19/2020  2  06/12/2020  Oxycodone-Acetaminophen 5-325    240.00 30 An Mol  [id number]  [facility] ([facility]90)  [place] 60.[place]   06/12/2020  2  06/12/2020  Dextroamp-Amphet Er 30 Mg Cap    30.00 30 An Mol  [id number]  [facility] (4590)  0  [place]   06/12/2020  2  06/12/2020  Oxycodone-Acetaminophen 5-325    56.00 7 An Mol  [id number]  [facility] (4590)  0 60.00 [place]   06/12/2020  2  06/12/2020  Alprazolam 0.5 Mg Tablet    60.00 30 An Mol  [id number]  [facility] (4590)  0 2.00 [place]   06/12/2020  2  06/12/2020  Dextroamp-Amphetamin 10 Mg Tab    30.00 30 An Mol  [id number]  [facility] (4590)  [place]   05/27/2020  2  05/27/2020  Gabapentin 300 Mg Capsule    180.00 30 Ru Aus  [id number]  [facility] (4590)  [place]   05/15/2020  2  05/15/2020  Acetaminophen-Cod #3 Tablet    10.00 3 Am She  [id number]  [facility] (6057)  0 15.00 [place]   05/11/2020  2  05/11/2020  Oxycodone Hcl 5 Mg Tablet    240.00 30 An Mol  [id number]  [facility] (4590)  0 60.[place]   05/11/2020  2  05/11/2020  Dextroamp-Amphet Er 30 Mg Cap    30.00 30 An Mol  [id number]  [facility] (4590)  [place]   05/11/2020  2  05/11/2020  Alprazolam 0.5 Mg Tablet    60.00 30 An Mol  [id number]  [facility] (4590)  0 2.00 [place]   05/11/2020  2  05/11/2020  Dextroamp-Amphetamin 10 Mg Tab    30.00 30 An Mol  [id number]  [facility] (4590)  [place]   04/22/2020  2  04/22/2020  Gabapentin 100 Mg Capsule    270.00 30 Ru Aus  [id number]  [facility] (4590)  [place]   04/12/2020  2  04/09/2020  Oxycodone Hcl 5 Mg Tablet    270.00 30 An Mol  [id number]  [facility] (4357)  0 67.50 [place]   04/10/2020  2  04/09/2020  Dextroamp-Amphetamin 10 Mg Tab    30.00 30 An Mol  [id number]  [facility] (1580)  0  [place]   04/09/2020  2  04/09/2020  Alprazolam 0.5 Mg Tablet    60.00 30 An Mol  [id number]  [facility] (5419)  0 2.[place]    04/09/2020  2  04/09/2020  Dextroamp-Amphet Er 30 Mg Cap    30.00 30 An Mol  [id number]  [facility] (4590)  [place]   03/16/2020  2  03/04/2020  Oxycodone Hcl 5 Mg Tablet    270.00 30 An Mol  [id number]  [facility] (4590)  0 67.50 [place]   03/10/2020  2  03/05/2020  Oxycodone Hcl 5 Mg Tablet    63.00 7 An Mol  [id number]  [facility] (4590)  0 67.50 [place]   03/06/2020  2  02/13/2020  Dextroamp-Amphet Er 30 Mg Cap    30.00 30 An Mol  [id number]  [facility] (4590)  [place]   03/04/2020  2  03/04/2020  Oxycodone-Acetaminophen     63.00 15 An Mol  [id number]  [facility] (4590)  0 63.[place]   03/04/2020  2  02/13/2020  Alprazolam 0.5 Mg Tablet    60.00 30 An Mol  [id number]  [facility] (4590)  [place] 2.[place]   03/04/2020  2  02/13/2020  Oxycodone Hcl 5 Mg Tablet    63.00 7 An Mol  [id number]  [facility] (4590)  0 67.50 [place]   03/04/2020  2  02/13/2020  Dextroamp-Amphetamin 10 Mg Tab    30.00 30 An Mol  [id number]  [facility] (4590)  [place]   02/03/2020  2  02/03/2020  Alprazolam 0.5 Mg Tablet    75.00 30 An Mol  [id number]  [facility] (4590)  0 2.50 [place]   02/03/2020  2  02/03/2020  Oxycodone-Acetaminophen     136.00 34 An Mol  [id number]  [facility] (4590)  0 60.[place]   02/03/2020  2  02/03/2020  Dextroamp-Amphet Er 30 Mg Cap    30.00 30 An Mol  [id number]  [facility] (4590)  [place]   02/03/2020  2  02/03/2020  Dextroamp-Amphetamin 10 Mg Tab    30.00 30 An Mol  [id number]  [facility] (4590)  [place]   01/04/2020  2  09/03/2019  Dextroamp-Amphet Er 30 Mg Cap    30.00 30 Ch Britney  [id number]  [facility] (6951)  0  [place]   01/02/2020  2  12/31/2019  Oxycodone-Acetaminophen     136.00 34 Br Ker  [id number]  [facility] (6066)  0 60.00 [place]   01/02/2020  2  12/31/2019  Alprazolam 0.5 Mg Tablet    75.00 30 Br Ker  [id number]  [facility] (4286)  0 2.50 [place]   12/03/2019  2  12/03/2019  Dextroamp-Amphet Er 30 Mg Cap    30.00 30 An Mol  [id number]  [facility] (0046)  [place]   12/03/2019  2  11/21/2019   Oxycodone-Acetaminophen     136.00 34 An Mol  [id number]  [facility] (4590)  [place] 60.[place]   12/03/2019  2  11/21/2019  Alprazolam 0.5 Mg Tablet    90.00 30 An Mol  [id number]  [facility] (4590)  0 3.00 [place]   11/04/2019  1  11/04/2019  Alprazolam 1 Mg Tablet    50.00 25 Ch Britney  [id number]  [facility] (4590)  0 4.00 [place]   11/01/2019  1  11/01/2019  Oxycodone-Acetaminophen     150.00 30 Da Ashok  [id number]  [facility] (4590)  0 75.00 [place]   11/01/2019  1  11/01/2019  Dextroamp-Amphet Er 30 Mg Cap    30.00 30 Da Ashok  [id number]  [facility] (4590)  [place]   10/01/2019  1  10/01/2019  Dextroamp-Amphet Er 30 Mg Cap    30.00 30 Ro Shane  [id number]  [facility] (4590)  [place]   10/01/2019  1  10/01/2019  Oxycodone-Acetaminophen     150.00 30 Ro Shane  [id number]  [facility] (4590)  0 75.[place]   09/03/2019  1  08/06/2019  Dextroamp-Amphet Er 30 Mg Cap    30.00 30 Ta Fri  [id number]  [facility] (4590)  [place]   09/03/2019  1  08/06/2019  Oxycodone-Acetaminophen     150.00 30 Ta Fri  [id number]  [facility] (4590)  0 75.00 [place]   08/02/2019  1  08/02/2019  Alprazolam 1 Mg Tablet    60.00 30 Ch Britney  [id number]  [facility] (4590)  0 4.00 [place]   08/01/2019  1  07/09/2019  Dextroamp-Amphet Er 30 Mg Cap    30.00 30 Ta Fri  [id number]  [facility] (4590)  0  [place]   08/01/2019  1  07/09/2019  Oxycodone-Acetaminophen     150.00 30 Ta Fri  [id number]  [facility] (4590)  0 75.00 [place]   07/02/2019  1  06/11/2019  Dextroamp-Amphet Er 30 Mg Cap    30.00 30 Ro Shane  [id number]  [facility] (4590)  [place]  [place]   07/02/2019  1  06/11/2019  Oxycodone-Acetaminophen     150.00 30 Allie Lynn  [id number]  [facility] (4590)  0 75.00 MME [place]   *Per CDC guidance, the MME conversion factors prescribed or provided as part of the medication-assisted treatment for opioid use disorder should not be used to benchmark against dosage thresholds meant for opioids prescribed for pain. Buprenorphine products have no agreed upon  morphine equivalency, and as partial opioid agonists, are not expected to be associated with overdose risk in the same dose-dependent manner as doses for full agonist opioids. MME = morphine milligram equivalents. LME = Lorazepam milligram equivalents. mg = dose in milligrams.   Providers  Total Providers: 8   Name  Address  City  State  Zipcode  Phone    Juan HOSKINS Friday, MD [address] [facility] [address]  [facility] [phone] -   Miko Brown MD [address]  [facility] [phone] -   Luis M Mcginnis MD [address]  [facility] [phone] -   Matthias Cintron [address]  [facility] [address] -   Floyd Romero [address] [facility] [address] [facility] [address] -   Rehana Ferrari  [facility] [address] -   Sunshine Butterfield, Dnp [address] [place] [address] -   Sumaya Mann, Cnp [address] [facility] [address] -   Pharmacies  Total Pharmacies: 2   Name  Address  City  State  Zipcode  Phone    [facility] [phone] [facility] [address] [phone]   [facility] [phone]) [phone] [facility] [address] [phone]

## 2020-06-26 NOTE — PROGRESS NOTES
Social Work Consultation  [facility]    This visit took place via phone due to Covid-19 risks.  Patient Name:  Hina Yap  /Age:  1965 (54 year old)    Presenting SW Need(s)/ Reason for visit:  Patient's psychiatrist recommended that she start a DBT program.    Collaborated With:    -Patient    Intervention:  Writer spoke to patient about the referral and followed up with her about whether she called any of the clinics that writer referred to.    Writer provided psychoeducation about DBT.    Writer provided some motivational interviewing.    Resources Provided:  -psychoeducation and motivational interviewing    Social Work Assessment:  She would likely benefit from a course of DBT. Patient remains anxious and ambivalent about calling a clinic for an assessment.     Plan:  Patient will contact writer if she needs additional referrals or support to call DBT providers.      Will route to patient's psychiatric provider(s) as an FYI.    Yanira Baez, [facility]    This is a non-billable encounter as it was solely for the purposes of outreach and/or care coordination.

## 2020-07-20 ENCOUNTER — HOSPITAL ENCOUNTER (EMERGENCY)
Facility: CLINIC | Age: 55
Discharge: HOME | End: 2020-07-20
Attending: EMERGENCY MEDICINE | Admitting: EMERGENCY MEDICINE
Payer: MEDICAID

## 2020-07-20 ENCOUNTER — TELEPHONE (OUTPATIENT)
Dept: PEDIATRICS | Facility: CLINIC | Age: 55
End: 2020-07-20

## 2020-07-20 ENCOUNTER — APPOINTMENT (OUTPATIENT)
Dept: GENERAL RADIOLOGY | Facility: CLINIC | Age: 55
End: 2020-07-20
Attending: EMERGENCY MEDICINE
Payer: MEDICAID

## 2020-07-20 VITALS
OXYGEN SATURATION: 100 % | SYSTOLIC BLOOD PRESSURE: 136 MMHG | TEMPERATURE: 98 F | HEART RATE: 62 BPM | RESPIRATION RATE: 20 BRPM | DIASTOLIC BLOOD PRESSURE: 90 MMHG

## 2020-07-20 DIAGNOSIS — J44.9 CHRONIC OBSTRUCTIVE PULMONARY DISEASE, UNSPECIFIED COPD TYPE (H): ICD-10-CM

## 2020-07-20 DIAGNOSIS — R11.0 NAUSEA: ICD-10-CM

## 2020-07-20 DIAGNOSIS — R63.0 ANOREXIA: ICD-10-CM

## 2020-07-20 LAB
ANION GAP SERPL CALCULATED.3IONS-SCNC: 8 MMOL/L (ref 3–14)
BUN SERPL-MCNC: 17 MG/DL (ref 7–30)
CALCIUM SERPL-MCNC: 9.5 MG/DL (ref 8.5–10.1)
CHLORIDE SERPL-SCNC: 104 MMOL/L (ref 94–109)
CO2 SERPL-SCNC: 26 MMOL/L (ref 20–32)
CREAT SERPL-MCNC: 0.77 MG/DL (ref 0.52–1.04)
ERYTHROCYTE [DISTWIDTH] IN BLOOD BY AUTOMATED COUNT: 12.8 % (ref 10–15)
GFR SERPL CREATININE-BSD FRML MDRD: 87 ML/MIN/{1.73_M2}
GLUCOSE SERPL-MCNC: 119 MG/DL (ref 70–99)
HCT VFR BLD AUTO: 41.4 % (ref 35–47)
HGB BLD-MCNC: 13.8 G/DL (ref 11.7–15.7)
MCH RBC QN AUTO: 29.1 PG (ref 26.5–33)
MCHC RBC AUTO-ENTMCNC: 33.3 G/DL (ref 31.5–36.5)
MCV RBC AUTO: 87 FL (ref 78–100)
PLATELET # BLD AUTO: 342 10E9/L (ref 150–450)
POTASSIUM SERPL-SCNC: 3.5 MMOL/L (ref 3.4–5.3)
RBC # BLD AUTO: 4.75 10E12/L (ref 3.8–5.2)
SODIUM SERPL-SCNC: 138 MMOL/L (ref 133–144)
WBC # BLD AUTO: 7.5 10E9/L (ref 4–11)

## 2020-07-20 PROCEDURE — 96374 THER/PROPH/DIAG INJ IV PUSH: CPT

## 2020-07-20 PROCEDURE — 85027 COMPLETE CBC AUTOMATED: CPT | Performed by: EMERGENCY MEDICINE

## 2020-07-20 PROCEDURE — 25800030 ZZH RX IP 258 OP 636: Performed by: EMERGENCY MEDICINE

## 2020-07-20 PROCEDURE — 25000128 H RX IP 250 OP 636: Performed by: EMERGENCY MEDICINE

## 2020-07-20 PROCEDURE — 80048 BASIC METABOLIC PNL TOTAL CA: CPT | Performed by: EMERGENCY MEDICINE

## 2020-07-20 PROCEDURE — U0003 INFECTIOUS AGENT DETECTION BY NUCLEIC ACID (DNA OR RNA); SEVERE ACUTE RESPIRATORY SYNDROME CORONAVIRUS 2 (SARS-COV-2) (CORONAVIRUS DISEASE [COVID-19]), AMPLIFIED PROBE TECHNIQUE, MAKING USE OF HIGH THROUGHPUT TECHNOLOGIES AS DESCRIBED BY CMS-2020-01-R: HCPCS | Performed by: EMERGENCY MEDICINE

## 2020-07-20 PROCEDURE — 71045 X-RAY EXAM CHEST 1 VIEW: CPT

## 2020-07-20 PROCEDURE — 99284 EMERGENCY DEPT VISIT MOD MDM: CPT | Mod: 25

## 2020-07-20 PROCEDURE — C9803 HOPD COVID-19 SPEC COLLECT: HCPCS

## 2020-07-20 PROCEDURE — 96361 HYDRATE IV INFUSION ADD-ON: CPT

## 2020-07-20 RX ORDER — ONDANSETRON 4 MG/1
4 TABLET, ORALLY DISINTEGRATING ORAL EVERY 8 HOURS PRN
Qty: 9 TABLET | Refills: 0 | Status: SHIPPED | OUTPATIENT
Start: 2020-07-20 | End: 2020-08-10

## 2020-07-20 RX ORDER — ONDANSETRON 2 MG/ML
4 INJECTION INTRAMUSCULAR; INTRAVENOUS EVERY 30 MIN PRN
Status: DISCONTINUED | OUTPATIENT
Start: 2020-07-20 | End: 2020-07-20 | Stop reason: HOSPADM

## 2020-07-20 RX ORDER — SODIUM CHLORIDE 9 MG/ML
INJECTION, SOLUTION INTRAVENOUS CONTINUOUS
Status: DISCONTINUED | OUTPATIENT
Start: 2020-07-20 | End: 2020-07-20 | Stop reason: HOSPADM

## 2020-07-20 RX ADMIN — SODIUM CHLORIDE 1000 ML: 9 INJECTION, SOLUTION INTRAVENOUS at 11:25

## 2020-07-20 RX ADMIN — ONDANSETRON 4 MG: 2 INJECTION INTRAMUSCULAR; INTRAVENOUS at 11:25

## 2020-07-20 ASSESSMENT — ENCOUNTER SYMPTOMS
APPETITE CHANGE: 1
FEVER: 0
SHORTNESS OF BREATH: 1
COUGH: 1
DIARRHEA: 0

## 2020-07-20 NOTE — LETTER
July 23, 2020        Hina Mckeongemmaelvira  [address]  [place] [address]    This letter provides a written record that you were tested for COVID-19 on 7/20/20.       Your result was negative. This means that we didn t find the virus that causes COVID-19 in your sample. A test may show negative when you do actually have the virus. This can happen when the virus is in the early stages of infection, before you feel illness symptoms.    If you have symptoms   Stay home and away from others (self-isolate) until you meet ALL of the guidelines below:    You ve had no fever--and no medicine that reduces fever--for 3 full days (72 hours). And      Your other symptoms have gotten better. For example, your cough or breathing has improved. And     At least 10 days have passed since your symptoms started.    During this time:    Stay home. Don t go to work, school or anywhere else.     Stay in your own room, including for meals. Use your own bathroom if you can.    Stay away from others in your home. No hugging, kissing or shaking hands. No visitors.    Clean  high touch  surfaces often (doorknobs, counters, handles, etc.). Use a household cleaning spray or wipes. You can find a full list on the EPA website at www.epa.gov/pesticide-registration/list-n-disinfectants-use-against-sars-cov-2.    Cover your mouth and nose with a mask, tissue or washcloth to avoid spreading germs.    Wash your hands and face often with soap and water.    Going back to work  Check with your employer for any guidelines to follow for going back to work.    Employers: This document serves as formal notice that your employee tested negative for COVID-19, as of the testing date shown above.

## 2020-07-20 NOTE — ED PROVIDER NOTES
History     Chief Complaint:  Cough      The history is provided by the patient.      Hina Yap is a 54 year old female who presents with shortness of breath and cough for the last several weeks or so, which began to worsen on Friday 7/17. The patient reports that it is hard for her to catch her breath, and she has pain in her chest. She also mentions that her appetite has dropped drastically, and though she tried to get herself to eat here and there, she has not been eating much. The patient denies any fever or diarrhea. She lives alone but reports regular visits to her boyfriend's house.      Allergies:  Sulfa drugs  Wellbutrin  Adhesive tape    Medications:    Albuterol inhaler  Alprazolam  Adderall  Baclofen  Gabapentin  Meloxicam  Mirtazapine  Oxybutynin  Promethazine  Ropinirole  Tiotropium-olodaterol  Trazodone    Past Medical History:    Shortness of breath  Chronic pain  Chronic Kidney Disease  Depression  Insomnia  Restless Legs Syndrome  Esophageal reflux  Iron deficiency anemia  Stress-induced cardiomyopathy  Multiple Sclerosis  Anxiety  Low serum ferritin level  Chronic Obstructive Pulmonary Disease  Neuromuscular disorder  Restless Legs Syndrome  Tobacco use disorder    Past Surgical History:    History reviewed. No pertinent surgical history.    Family History:    History reviewed. No pertinent family history.     Social History:  The patient was not accompanied to the ED.  Smoking Status: Current every day smoker  Smokeless Tobacco: Never used  Alcohol Use: Not currently  Drug Use: No  Marital Status: Single    Review of Systems   Constitutional: Positive for appetite change (decreased appetite). Negative for fever.   Respiratory: Positive for cough and shortness of breath.    Cardiovascular: Positive for chest pain.   Gastrointestinal: Negative for diarrhea.   All other systems reviewed and are negative.    Physical Exam     Patient Vitals for the past 24 hrs:   BP Temp Temp src Pulse Resp  SpO2   07/20/20 1050 (!) 144/95 98  F (36.7  C) Oral 87 20 99 %       Physical Exam    General: Patient is alert and cooperative.  HENT:  Normal nose, oropharynx. Moist oral mucosa.  Eyes: EOMI. Normal conjunctiva.  Neck:  Normal range of motion and appearance.   Cardiovascular:  Normal rate, regular rhythm and normal heart sounds.   Pulmonary/Chest:  Effort normal. No wheezing or crackles.  Abdominal: Soft. No distension or tenderness.     Musculoskeletal: Normal range of motion. No edema or tenderness.   Neurological: oriented, normal strength, sensation, and coordination.   Skin: Warm and dry. No rash or bruising.   Psychiatric: Normal mood and affect. Normal behavior and judgement.    Emergency Department Course     Imaging:  Radiology findings were communicated with the patient who voiced understanding of the findings.    XR Chest Port 1 View:  Negative exam.  Report per radiology.    Laboratory:  Laboratory findings were communicated with the patient who voiced understanding of the findings.    CBC: WBC 7.5, HGB 13.8,   BMP: Glucose 119 (H), o/w WNL (Creatinine 0.77)    COVID-19 Virus (Coronavirus) by Nasopharyngeal (NP) Swab: Pending     Procedures  None.    Interventions:  1125 NS 1L IV Bolus  1125 Zofran 4 mg IV    Medications   0.9% sodium chloride BOLUS (1,000 mLs Intravenous New Bag 7/20/20 1125)     Followed by   sodium chloride 0.9% infusion (has no administration in time range)   ondansetron (ZOFRAN) injection 4 mg (4 mg Intravenous Given 7/20/20 1125)       Emergency Department Course:  Past medical records, nursing notes, and vitals reviewed.    1106 I performed an exam of the patient as documented above.     IV was inserted and blood was drawn for laboratory testing, results above.    The patient was swabbed for COVID-19, noted above.    The patient had a  Chest X-ray while in the emergency department, results above.     1246 I rechecked the patient and discussed the results of her workup  thus far.     Findings and plan explained to the Patient. Patient discharged home with instructions regarding supportive care, medications, and reasons to return. The importance of close follow-up was reviewed. The patient was prescribed Zofran.    I personally reviewed the laboratory and imaging results with the Patient and answered all related questions prior to discharge.     Impression & Plan     Covid-19  Hina Yap was evaluated during a global COVID-19 pandemic, which necessitated consideration that the patient might be at risk for infection with the SARS-CoV-2 virus that causes COVID-19.   Applicable protocols for evaluation were followed during the patient's care.   COVID-19 was considered as part of the patient's evaluation. A test was obtained during this visit.    Medical Decision Making:  Afebrile 54-year-old female with a history of tobacco abuse and COPD presents with complaints of several weeks of nonproductive cough and some shortness of breath.  She is also describing some anorexia and nausea.  She said no vomiting or diarrhea.  She has no known COVID-19 ill contacts.  Her physical examination is unremarkable.  She has a normal cardiopulmonary examination, respiratory rate, and oxygen saturations.  There is no detectable wheezing.  Emergency department testing has been reassuring as well.  This is included a normal portable chest x-ray and unremarkable screening laboratory tests.  Her white blood cell count of 7.5.  COVID-19 PCR test was ordered and pending.  She was hydrated with some normal saline and treated with Zofran with improvement in her nausea.  Suspicion for COVID 19 is low.  There is no evidence of pneumonia.  Even with her COPD I do not believe this represents a true bronchitis picture.  There is no role for antibiotics.  One of her main complaints is that of nausea and difficulty eating and drinking.  Therefore she will be prescribed some Zofran and I have recommended that she  follow-up in a clinic setting for recheck if symptoms do not gradually improve.  Diagnosis:    ICD-10-CM    1. Nausea  R11.0    2. Anorexia  R63.0    3. Chronic obstructive pulmonary disease, unspecified COPD type (H)  J44.9        Disposition:  Discharged to home.    Discharge Medications:  New Prescriptions    ONDANSETRON (ZOFRAN ODT) 4 MG ODT TAB    Take 1 tablet (4 mg) by mouth every 8 hours as needed for nausea       Scribe Disclosure:  Jumana PALMER, am serving as a scribe at 11:06 AM on 7/20/2020 to document services personally performed by Yang Andino MD based on my observations and the provider's statements to me.       Jumana Haas  7/20/2020   [facility] EMERGENCY DEPARTMENT       Yang Andino MD  07/23/20 0932

## 2020-07-20 NOTE — TELEPHONE ENCOUNTER
Per PCP, called pt to schedule ED follow up. Attempt #1. No answer, LVM to call back on my personal extension or to call the clinic back.    If pt calls back:  Schedule VV ED follow up with PCP. First available 8/7/2020 in the morning AM OKayed taking huddle time to make longer visit.    Reji Rahman, EMT at 2:05 PM on July 20, 2020   Clinic Health Guide   [phone]

## 2020-07-20 NOTE — ED AVS SNAPSHOT
[facility] Emergency Department  [address]  [facility] [address]  Phone:  [phone]  Fax:  [phone]                                    Hina Yap   MRN: [id number]    Department:  [facility] Emergency Department   Date of Visit:  7/20/2020           After Visit Summary Signature Page    I have received my discharge instructions, and my questions have been answered. I have discussed any challenges I see with this plan with the nurse or doctor.    ..........................................................................................................................................  Patient/Patient Representative Signature      ..........................................................................................................................................  Patient Representative Print Name and Relationship to Patient    ..................................................               ................................................  Date                                   Time    ..........................................................................................................................................  Reviewed by Signature/Title    ...................................................              ..............................................  Date                                               Time          22EPIC Rev 08/18

## 2020-07-20 NOTE — ED NOTES
Bed: ED10  Expected date: 7/20/20  Expected time: 10:37 AM  Means of arrival: Ambulance  Comments:  [id number]

## 2020-07-20 NOTE — ED TRIAGE NOTES
Presents to the ED reporting intermittent episodes of SOB over the past few weeks. States has also had a cough. Additionally reports poor PO intake due to lack of appetite.

## 2020-07-21 LAB
SARS-COV-2 RNA SPEC QL NAA+PROBE: NOT DETECTED
SPECIMEN SOURCE: NORMAL

## 2020-07-27 NOTE — TELEPHONE ENCOUNTER
Called pt. Scheduled VV ED follow up with PCP on 8/11/2020.    Encouraged pt to reach out with any questions or concerns. Pt verbalized understanding.    Reji Rahman, EMT at 11:45 AM on July 27, 2020   [facility] Health Guide   [phone]

## 2020-08-10 NOTE — PROGRESS NOTES
Depression/Anxiety:  -In May psych recommended DBT and starting TMS. Consider MAOI if needing alternate agent. Has not followed up  --Trazodone is only helping mildly. Reduce trazodone from 150 to 100 and increase Remeron from 30 to 45, as this may improve sleep and will have added benefit of increasing appetite and possibly improving depression. Reviewed risk of serotonin syndrome with MTM, who thought this combination would be fine as long as she reduced trazodone to 100. I discussed the possible interaction with the patient.   -Crisis resources discussed. She denies any active suicidal thoughts.     Nausea/Anorexia/SOB  -Seen in ED 7/20/2020  -COVID negative.

## 2020-08-11 ENCOUNTER — TELEPHONE (OUTPATIENT)
Dept: PEDIATRICS | Facility: CLINIC | Age: 55
End: 2020-08-11

## 2020-08-11 ENCOUNTER — VIRTUAL VISIT (OUTPATIENT)
Dept: PEDIATRICS | Facility: CLINIC | Age: 55
End: 2020-08-11
Payer: MEDICAID

## 2020-08-11 DIAGNOSIS — F41.9 ANXIETY: ICD-10-CM

## 2020-08-11 DIAGNOSIS — G35 MULTIPLE SCLEROSIS (H): ICD-10-CM

## 2020-08-11 DIAGNOSIS — R63.0 ANOREXIA: ICD-10-CM

## 2020-08-11 DIAGNOSIS — G89.29 OTHER CHRONIC PAIN: ICD-10-CM

## 2020-08-11 DIAGNOSIS — R11.0 NAUSEA: Primary | ICD-10-CM

## 2020-08-11 DIAGNOSIS — F32.89 OTHER DEPRESSION: ICD-10-CM

## 2020-08-11 PROCEDURE — 99214 OFFICE O/P EST MOD 30 MIN: CPT | Mod: GT | Performed by: NURSE PRACTITIONER

## 2020-08-11 RX ORDER — ONDANSETRON 4 MG/1
4 TABLET, ORALLY DISINTEGRATING ORAL EVERY 8 HOURS PRN
Qty: 30 TABLET | Refills: 1 | Status: SHIPPED | OUTPATIENT
Start: 2020-08-11 | End: 2020-11-30 | Stop reason: ALTCHOICE

## 2020-08-11 RX ORDER — OXYCODONE AND ACETAMINOPHEN 5; 325 MG/1; MG/1
TABLET ORAL
Qty: 210 TABLET | Refills: 0 | Status: SHIPPED | OUTPATIENT
Start: 2020-08-11 | End: 2020-09-09

## 2020-08-11 NOTE — TELEPHONE ENCOUNTER
Pal 4     Please reach out to patient Monday 17 Aug and assist her in calling at least 1 DBT program recommended by psych to see if they have availably. Also pls help her call insurance to see which DBT programs they cover of the ones listed by psych. This may take a lot of time.     From check-note on 8/11/2020.    Briana Anthony, EMT at 10:59 AM on August 11, 2020  [facility] Guide   [phone]

## 2020-08-11 NOTE — PROGRESS NOTES
"Hina Yap is a 54 year old female who is being evaluated via a billable video visit.      The patient has been notified of following:     \"This video visit will be conducted via a call between you and your physician/provider. We have found that certain health care needs can be provided without the need for an in-person physical exam.  This service lets us provide the care you need with a video conversation.  If a prescription is necessary we can send it directly to your pharmacy.  If lab work is needed we can place an order for that and you can then stop by our lab to have the test done at a later time.    Video visits are billed at different rates depending on your insurance coverage.  Please reach out to your insurance provider with any questions.    If during the course of the call the physician/provider feels a video visit is not appropriate, you will not be charged for this service.\"    Patient has given verbal consent for Video visit? Yes  How would you like to obtain your AVS? MyChart  If you are dropped from the video visit, the video invite should be resent to: Send to e-mail at: [email or web address]  Will anyone else be joining your video visit? No    Subjective     Hina Yap is a 54 year old female who presents today via video visit for the following health issues:    HPI    ED/UC Followup:  Facility:  [facility]  Date of visit: 07/20/2020  Reason for visit: SOB, Cough and loss of appetite   Current Status: improvement  of some symptoms.    Video Start Time: 8:29 AM    When she gets upset she has low appetite and sometimes nausea.    Reviewed and updated as needed this visit by Provider       Review of Systems   Constitutional, HEENT, cardiovascular, pulmonary, gi and gu systems are negative, except as otherwise noted.      Objective       Physical Exam   GENERAL: Healthy, alert and no distress  RESP: Occasional cough but doesn't appear dyspneic  PSYCH: affect flat, tearful and judgement and " insight intact    Assessment/Plan:  (R11.0) Nausea  (primary encounter diagnosis)  (R63.0) Anorexia  Comment: Seems to be secondary to anxiety. She thinks the whole ED visit was a panic attack. States it's common for her not to eat when she is anxious.  Plan:  -Start Zofran. Had some after ED visit and was helpful  -Encouraged her small meals/bites q1-2 hours. She agrees  -Will reach out to psych.     (G89.29) Other chronic pain  Comment: Worsened pain with our oxy wean. This seems to be destabilizing her mental health  Plan:  -Continue with pain clinic  -Hold on oxy wean for now. She is certainly down to safer levels    (F41.9) Anxiety  (F32.89) Other depression  Comment: Severely uncontrolled. No current SI is passive. No plan or intent to harm herself. Feels the oxy and xanax wean have destabilized her already poor mental health. Doesn't feel the pain clinic is helping, but hasn't followed through on their programs. Is seeing psych, but is on TMS waiting list. Hasn't reached out to DBT clinics because phone calls worsen her anxiety.   Plan:   -Hold on oxy/xanax wean until March 2021  -I reached out to psych to reach out to her  -I'll have pal help her call DBT clinics  -Contracted for safety, crisis resources discussed.       Video-Visit Details  Type of service:  Video Visit    Video End Time:8:54 AM    Originating Location (pt. Location): Home    Distant Location (provider location):  [facility]     Platform used for Video Visit: BENNETT Henry CNP

## 2020-08-11 NOTE — PROGRESS NOTES
"Hina Yap is a 54 year old female who is being evaluated via a billable video visit.      The patient has been notified of following:     \"This video visit will be conducted via a call between you and your physician/provider. We have found that certain health care needs can be provided without the need for an in-person physical exam.  This service lets us provide the care you need with a video conversation.  If a prescription is necessary we can send it directly to your pharmacy.  If lab work is needed we can place an order for that and you can then stop by our lab to have the test done at a later time.    Video visits are billed at different rates depending on your insurance coverage.  Please reach out to your insurance provider with any questions.    If during the course of the call the physician/provider feels a video visit is not appropriate, you will not be charged for this service.\"    Patient has given verbal consent for Video visit? Yes  How would you like to obtain your AVS? MyChart  If you are dropped from the video visit, the video invite should be resent to: Send to e-mail at: [email or web address]  Will anyone else be joining your video visit? No  {If patient encounters technical issues they should call [phone] :696906}    Subjective     Hina Yap is a 54 year old female who presents today via video visit for the following health issues:    HPI      Hospital Follow-up Visit:    Hospital/Nursing Home/IP Rehab Facility: {INPT AND SNF DISCHARGE:833035}  Date of Admission: ***  Date of Discharge: ***  Reason(s) for Admission: ***      Was your hospitalization related to COVID-19? {Yes add questions_No:661755}  Problems taking medications regularly:  {NONE DEFAULTED:634932::\"None\"}  Medication changes since discharge: {NONE DEFAULTED:245227::\"None\"}  Problems adhering to non-medication therapy:  {NONE DEFAULTED:327882::\"None\"}    Summary of hospitalization:  {HOSPITAL DISCHARGE " "SUMMARY INFO:980080::\"[facility] discharge summary reviewed\"}  Diagnostic Tests/Treatments reviewed.  Follow up needed: {NONE DEFAULTED:562160::\"none\"}  Other Healthcare Providers Involved in Patient s Care:         {those currently involved after discharge:413544::\"None\"}  Update since discharge: {IMPROVED DEFAULT:952002::\"improved.\"} {TIP  Include information from family/caregivers, SNF, Care Coordination :577374}      Post Discharge Medication Reconciliation: {ACO Med Rec (Provider):675577}.  Plan of care communicated with {Communicate Plan to:540443::\"patient\"}     {Reference  Coding guidelines- Moderate Complexity F2F/Video within 7 - 14 days of discharge 10194, High Complexity F2F/Video within 7 days 15682 or [id number] days 80623 :739170}         {PEDS Chronic and Acute Problems (Optional):974062}     Video Start Time: {video visit start/end time for provider to select:077515}    {additonal problems for provider to add (Optional):115251}    {HIST REVIEW/ LINKS 2 (Optional):481182}    Reviewed and updated as needed this visit by Provider         Review of Systems   {ROS COMP (Optional):642035}      Objective             Physical Exam     {video visit exam brief selected:251828::\"GENERAL: Healthy, alert and no distress\",\"EYES: Eyes grossly normal to inspection.  No discharge or erythema, or obvious scleral/conjunctival abnormalities.\",\"RESP: No audible wheeze, cough, or visible cyanosis.  No visible retractions or increased work of breathing.  \",\"SKIN: Visible skin clear. No significant rash, abnormal pigmentation or lesions.\",\"NEURO: Cranial nerves grossly intact.  Mentation and speech appropriate for age.\",\"PSYCH: Mentation appears normal, affect normal/bright, judgement and insight intact, normal speech and appearance well-groomed.\"}      {Diagnostic Test Results (Optional):860410::\"Diagnostic Test Results:\",\"Labs reviewed in Epic\"}        {PROVIDER CHARTING PREFERENCE:346813}      Video-Visit " "Details    Type of service:  Video Visit    Video End Time:{video visit start/end time for provider to select:669873}    Originating Location (pt. Location): {video visit patient location:140135::\"Home\"}    Distant Location (provider location):  [facility]     Platform used for Video Visit: {Virtual Visit Platforms:284499::\"JustBook\"}    No follow-ups on file.       {signature options:269547}            "

## 2020-08-11 NOTE — Clinical Note
Hi Dr. Tai,  I saw Jayla today and I think her mental health is out of control. She is so overwhelmed that she has not been able to reach out to DBT programs. We will help her with this, unless you have a care coordinator who can do so. We have decided to hold on further weaning the opioids until her mental health is under better control.    My question for you is, could you reach out to her to discuss possible med changes? You had mentioned MAOI in your notes. I think she could also use something for panic, although hydroxyzine didn't work at all for her.    Please let me know your thoughts. I'm at a total loss here.    Thanks!    Sunshine Butterfield, FNP-DNP.

## 2020-08-14 ENCOUNTER — MYC REFILL (OUTPATIENT)
Dept: PALLIATIVE MEDICINE | Facility: CLINIC | Age: 55
End: 2020-08-14

## 2020-08-14 DIAGNOSIS — G89.29 CHRONIC NECK PAIN: ICD-10-CM

## 2020-08-14 DIAGNOSIS — M54.50 CHRONIC BILATERAL LOW BACK PAIN WITHOUT SCIATICA: ICD-10-CM

## 2020-08-14 DIAGNOSIS — G89.29 OTHER CHRONIC PAIN: ICD-10-CM

## 2020-08-14 DIAGNOSIS — G89.29 CHRONIC BILATERAL LOW BACK PAIN WITHOUT SCIATICA: ICD-10-CM

## 2020-08-14 DIAGNOSIS — M54.2 CHRONIC NECK PAIN: ICD-10-CM

## 2020-08-14 RX ORDER — GABAPENTIN 300 MG/1
600 CAPSULE ORAL 3 TIMES DAILY
Qty: 180 CAPSULE | Refills: 1 | Status: SHIPPED | OUTPATIENT
Start: 2020-08-14 | End: 2020-11-05

## 2020-08-14 RX ORDER — GABAPENTIN 300 MG/1
600 CAPSULE ORAL 3 TIMES DAILY
Qty: 180 CAPSULE | Refills: 1 | Status: CANCELLED | OUTPATIENT
Start: 2020-08-14

## 2020-08-14 NOTE — TELEPHONE ENCOUNTER
Received request from patient requesting refill(s) for gabapentin (NEURONTIN) 300 MG capsule    Last refilled on 06/28/20    Pt last seen on 05/27/20-virtual visit  Next appt scheduled for : none    Will facilitate refill.

## 2020-08-14 NOTE — TELEPHONE ENCOUNTER
Signed Prescriptions:                        Disp   Refills    gabapentin (NEURONTIN) 300 MG capsule      180 ca*1        Sig: Take 2 capsules (600 mg) by mouth 3 times daily           (start with dose listed in clinic instructions)  Authorizing Provider: SANIYA FERRARI      Patient has not followed up with me or scheduled further pain PT visits. Please call to see if she is still interested in following up? If not she should discuss further gabapentin prescriptions with PCP.    Saniya Ferrari MD  [facility] Pain Management

## 2020-08-17 NOTE — TELEPHONE ENCOUNTER
Called Jolanta Zamorano informing her we did fill her gabapentin this time, but she has not been seen in clinic for a follow up with Dr Ferrari and she has not followed up with physical therapy.  If she is still interested in being a part of our program, she needs to call and set up a follow up with Dr Ferrari, but if she is not interested she can follow up with her PCP to take over refilling the gabapentin for her.    Sharri Stephenson RN  Care Coordinator  [facility] Pain Management

## 2020-08-21 NOTE — TELEPHONE ENCOUNTER
Called pt. Attempt #1. No answer, LVM to call back on my personal extension.    If pt calls back:  Work to help pt call DBT programs recommended by psych to see availability.    Reji Rahman, EMT at 2:28 PM on August 21, 2020   [facility] Health Guide   [phone]

## 2020-09-09 ENCOUNTER — MYC REFILL (OUTPATIENT)
Dept: PEDIATRICS | Facility: CLINIC | Age: 55
End: 2020-09-09

## 2020-09-09 DIAGNOSIS — F32.89 OTHER DEPRESSION: ICD-10-CM

## 2020-09-09 DIAGNOSIS — G47.00 INSOMNIA, UNSPECIFIED TYPE: ICD-10-CM

## 2020-09-09 DIAGNOSIS — F41.9 ANXIETY: ICD-10-CM

## 2020-09-09 DIAGNOSIS — R53.83 OTHER FATIGUE: ICD-10-CM

## 2020-09-09 DIAGNOSIS — N39.41 URGE INCONTINENCE OF URINE: ICD-10-CM

## 2020-09-09 DIAGNOSIS — G89.29 OTHER CHRONIC PAIN: ICD-10-CM

## 2020-09-10 RX ORDER — DEXTROAMPHETAMINE SACCHARATE, AMPHETAMINE ASPARTATE, DEXTROAMPHETAMINE SULFATE AND AMPHETAMINE SULFATE 2.5; 2.5; 2.5; 2.5 MG/1; MG/1; MG/1; MG/1
10 TABLET ORAL DAILY
Qty: 30 TABLET | Refills: 0 | Status: SHIPPED | OUTPATIENT
Start: 2020-09-12 | End: 2020-10-09

## 2020-09-10 RX ORDER — DEXTROAMPHETAMINE SACCHARATE, AMPHETAMINE ASPARTATE MONOHYDRATE, DEXTROAMPHETAMINE SULFATE AND AMPHETAMINE SULFATE 7.5; 7.5; 7.5; 7.5 MG/1; MG/1; MG/1; MG/1
30 CAPSULE, EXTENDED RELEASE ORAL DAILY
Qty: 30 CAPSULE | Refills: 0 | Status: SHIPPED | OUTPATIENT
Start: 2020-09-12 | End: 2020-10-09

## 2020-09-10 RX ORDER — MIRTAZAPINE 15 MG/1
45 TABLET, FILM COATED ORAL AT BEDTIME
Qty: 180 TABLET | Refills: 0 | Status: SHIPPED | OUTPATIENT
Start: 2020-09-10 | End: 2020-10-09

## 2020-09-10 RX ORDER — OXYBUTYNIN CHLORIDE 5 MG/1
5 TABLET, EXTENDED RELEASE ORAL DAILY
Qty: 90 TABLET | Refills: 3 | OUTPATIENT
Start: 2020-09-10

## 2020-09-10 RX ORDER — OXYCODONE AND ACETAMINOPHEN 5; 325 MG/1; MG/1
TABLET ORAL
Qty: 210 TABLET | Refills: 0 | Status: SHIPPED | OUTPATIENT
Start: 2020-09-12 | End: 2020-10-09

## 2020-09-10 NOTE — TELEPHONE ENCOUNTER
Last filled 8/14.  Refill tomorrow.  Will need to wait for PCP for benzo given on narcotic.    PDMP Review       Value Time User    State PDMP site checked  Yes 9/10/2020  1:27 PM Katie Nunes MD           PAl - please follow-up with patient to make sure continues with therapy.  Should schedule follow-up with PCP virtually in October.

## 2020-09-10 NOTE — TELEPHONE ENCOUNTER
Called pt. No answer, LVM to call back on my personal extension.    If pt calls back:  Ask if she is continuing with therapy  Schedule follow up with PCP virtually in October.    Reji Rahman, EMT at 1:50 PM on September 10, 2020   [facility] Health Guide   [phone]

## 2020-09-10 NOTE — TELEPHONE ENCOUNTER
Routing refill request to provider for review/approval because:  Drug not on the [facility] refill protocol   Labs out of range:  PHQ    PHQ 4/21/2020 4/28/2020 5/20/2020   PHQ-9 Total Score 19 25 22   Q9: Thoughts of better off dead/self-harm past 2 weeks Not at all Several days Several days     Solomon VILLA RN, BSN

## 2020-09-10 NOTE — TELEPHONE ENCOUNTER
Called pt. Attempt #2. No answer, LVM to call back on my personal extension.     If pt calls back:  Work to help pt call DBT programs recommended by psych to see availability.    Reji Rahman, EMT at 1:52 PM on September 10, 2020   [facility] Health Guide   [phone]

## 2020-09-13 ENCOUNTER — TELEPHONE (OUTPATIENT)
Dept: PEDIATRICS | Facility: CLINIC | Age: 55
End: 2020-09-13

## 2020-09-13 RX ORDER — ALPRAZOLAM 0.5 MG
0.5 TABLET ORAL 2 TIMES DAILY
Qty: 60 TABLET | Refills: 0 | Status: SHIPPED | OUTPATIENT
Start: 2020-09-13 | End: 2020-10-09

## 2020-09-13 NOTE — TELEPHONE ENCOUNTER
Patient is overdue for the following follow up:  -Pain clinic  -Pain physical therapy  -Psychiatry  -Signing up for a DBT program.    I have agreed to halt her oxy and xanax weaning. But please let her know that her end of the bargain is to be engaged in her care. I cannot continue to fill meds if she doesn't continue with psych and pain interventions. Pls let her know that I understand that her depression/anxiety makes it difficult, and that we are here to help.     Also should schedule f/u visit with me, at least 30 minutes early November.     If you cannot get ahold of her by phone, please send information via letter and [employer]. Please include ALL details.

## 2020-09-13 NOTE — LETTER
"[facility]  [address]  [place] [address]  [phone]      September 18, 2020    Hina Yap                                                                                                                                                       [address]  [place] [address]              Dear Hina,        Your provider, Sunshine Butterfield has the following message for you:    \"You are overdue for the following follow up:  -Pain clinic  -Pain physical therapy  -Psychiatry  -Signing up for a DBT program.    I have agreed to halt your oxy and xanax weaning. But please know that your end of the bargain is to be engaged in your care. I cannot continue to fill meds if you don't continue with psych and pain interventions. Please know that I understand that your depression/anxiety makes it difficult, and that we are here to help.\"     She would also like you to schedule a follow up with her in early November. I can schedule you through [employer] or you can give me a call at [phone].    Let us know what questions or concerns you have. Thank you for choosing [facility]!        Sincerely,    Reji Rahman, EMT at 2:28 PM on September 18, 2020   Clinic Health Guide   [phone]  "

## 2020-09-18 ENCOUNTER — MYC MEDICAL ADVICE (OUTPATIENT)
Dept: PEDIATRICS | Facility: CLINIC | Age: 55
End: 2020-09-18

## 2020-09-18 NOTE — TELEPHONE ENCOUNTER
Called pt. No answer, LVM to call back on my personal extension. Sent [employer] message. Also sent letter.    If pt calls back:  Go over information below.  Schedule follow up visit with AM for at least 30 mins in early November.    Reji Rahman, EMT at 2:24 PM on September 18, 2020   [facility] Health Guide   [phone]

## 2020-09-18 NOTE — TELEPHONE ENCOUNTER
Called pt. No answer, LVM to call back on my personal extension. Also sent [employer] message and sent letter. No further outreaches will be made at this time.    If pt calls back:  Ask if she is continuing with therapy  Schedule follow up with PCP virtually in October/early November.    Reji Rahman, EMT at 2:33 PM on September 18, 2020   [facility] Health Guide   [phone]

## 2020-09-18 NOTE — TELEPHONE ENCOUNTER
Called pt. Attempt #3. No answer, LVM to call back on my personal extension. Sent [employer] message and sent letter. No further outreaches will be made at this time.    If pt calls back:  Work to help pt call DBT programs recommended by psych to see availability.    Reji Rahman, EMT at 2:31 PM on September 18, 2020   [facility] Health Guide   [phone]

## 2020-09-21 NOTE — TELEPHONE ENCOUNTER
Called pt. Attempt #2. No answer, LVM to call back on my personal extension.    Pt was sent letter on 9/18/2020 with the below information.    If pt calls back:  Go over information below: To schedule with psych with Dr. Tai [phone]; to schedule with pain and Dr. Ferrari [phone]. Ask if pt has found DBT (Dialectical Behavior Therapy) program  Schedule follow up visit with AM for at least 30 minutes in early November.    Reji Rahman, EMT at 8:45 AM on September 21, 2020   Clinic Health Guide   [phone]

## 2020-10-09 ENCOUNTER — MYC REFILL (OUTPATIENT)
Dept: PEDIATRICS | Facility: CLINIC | Age: 55
End: 2020-10-09

## 2020-10-09 DIAGNOSIS — F32.89 OTHER DEPRESSION: ICD-10-CM

## 2020-10-09 DIAGNOSIS — N39.41 URGE INCONTINENCE OF URINE: ICD-10-CM

## 2020-10-09 DIAGNOSIS — F41.9 ANXIETY: ICD-10-CM

## 2020-10-09 DIAGNOSIS — R53.83 OTHER FATIGUE: ICD-10-CM

## 2020-10-09 DIAGNOSIS — Z72.0 TOBACCO ABUSE: ICD-10-CM

## 2020-10-09 DIAGNOSIS — G47.09 OTHER INSOMNIA: ICD-10-CM

## 2020-10-09 DIAGNOSIS — G47.00 INSOMNIA, UNSPECIFIED TYPE: ICD-10-CM

## 2020-10-09 DIAGNOSIS — G89.29 OTHER CHRONIC PAIN: ICD-10-CM

## 2020-10-09 RX ORDER — OXYBUTYNIN CHLORIDE 5 MG/1
5 TABLET, EXTENDED RELEASE ORAL DAILY
Qty: 90 TABLET | Refills: 3 | Status: CANCELLED | OUTPATIENT
Start: 2020-10-09

## 2020-10-09 RX ORDER — DEXTROAMPHETAMINE SACCHARATE, AMPHETAMINE ASPARTATE, DEXTROAMPHETAMINE SULFATE AND AMPHETAMINE SULFATE 2.5; 2.5; 2.5; 2.5 MG/1; MG/1; MG/1; MG/1
10 TABLET ORAL DAILY
Qty: 30 TABLET | Refills: 0 | Status: SHIPPED | OUTPATIENT
Start: 2020-10-09 | End: 2020-11-11

## 2020-10-09 RX ORDER — MIRTAZAPINE 15 MG/1
45 TABLET, FILM COATED ORAL AT BEDTIME
Qty: 180 TABLET | Refills: 0 | Status: SHIPPED | OUTPATIENT
Start: 2020-10-09 | End: 2020-11-05

## 2020-10-09 RX ORDER — DEXTROAMPHETAMINE SACCHARATE, AMPHETAMINE ASPARTATE MONOHYDRATE, DEXTROAMPHETAMINE SULFATE AND AMPHETAMINE SULFATE 7.5; 7.5; 7.5; 7.5 MG/1; MG/1; MG/1; MG/1
30 CAPSULE, EXTENDED RELEASE ORAL DAILY
Qty: 30 CAPSULE | Refills: 0 | Status: SHIPPED | OUTPATIENT
Start: 2020-10-09 | End: 2020-11-11

## 2020-10-09 RX ORDER — OXYCODONE AND ACETAMINOPHEN 5; 325 MG/1; MG/1
TABLET ORAL
Qty: 210 TABLET | Refills: 0 | Status: SHIPPED | OUTPATIENT
Start: 2020-10-09 | End: 2020-11-05

## 2020-10-09 RX ORDER — VARENICLINE TARTRATE 1 MG/1
1 TABLET, FILM COATED ORAL 2 TIMES DAILY
Qty: 60 TABLET | Refills: 1 | Status: SHIPPED | OUTPATIENT
Start: 2020-10-09 | End: 2020-11-05

## 2020-10-09 RX ORDER — TRAZODONE HYDROCHLORIDE 50 MG/1
TABLET, FILM COATED ORAL
Qty: 90 TABLET | Refills: 3 | Status: CANCELLED | OUTPATIENT
Start: 2020-10-09

## 2020-10-09 NOTE — TELEPHONE ENCOUNTER
Routing refill request to provider for review/approval because:  Drug not on the [facility] refill protocol   Failing PHQ9    Tamia Biggs RN

## 2020-10-09 NOTE — TELEPHONE ENCOUNTER
chekced, last adderall fill 10 mg #30 filled 9/14, adderall 30 mg ER #30 filled 9/12.     Last oxycodone filled 5/325 #30 on 9/12. prob list notes: Currenly at 35mg per day (210, 5mg tabs per month) (7, 5mg oxycodone-acetaminophen) tabs per day. We are HOLDING until March 2021 as she is now at safer doses, and the wean was destabilizing her mental health.    All prescription's filled

## 2020-10-09 NOTE — TELEPHONE ENCOUNTER
Routing refill request to provider for review/approval because:  Drug not on the [facility] refill protocol   Failing bp    Tamia Biggs RN    Trazodone and Oxybutynin have refills.   Tamia Biggs RN

## 2020-10-09 NOTE — TELEPHONE ENCOUNTER
Is on prob list for #60 xanax/mo and last visit noted a hold on the wean.  checked and last prescription fill for xanax was 9/13/20.    Ok to wait for xanax fill on Monday. chantix filled today

## 2020-10-12 RX ORDER — ALPRAZOLAM 0.5 MG
0.5 TABLET ORAL 2 TIMES DAILY
Qty: 60 TABLET | Refills: 0 | Status: SHIPPED | OUTPATIENT
Start: 2020-10-12 | End: 2020-11-11

## 2020-10-21 ENCOUNTER — MYC MEDICAL ADVICE (OUTPATIENT)
Dept: PEDIATRICS | Facility: CLINIC | Age: 55
End: 2020-10-21

## 2020-10-21 ENCOUNTER — TRANSFERRED RECORDS (OUTPATIENT)
Dept: HEALTH INFORMATION MANAGEMENT | Facility: CLINIC | Age: 55
End: 2020-10-21

## 2020-10-29 ENCOUNTER — TELEPHONE (OUTPATIENT)
Dept: PEDIATRICS | Facility: CLINIC | Age: 55
End: 2020-10-29

## 2020-10-29 ENCOUNTER — HOSPITAL ENCOUNTER (EMERGENCY)
Facility: CLINIC | Age: 55
Discharge: HOME | End: 2020-10-29
Attending: EMERGENCY MEDICINE | Admitting: EMERGENCY MEDICINE
Payer: MEDICAID

## 2020-10-29 ENCOUNTER — APPOINTMENT (OUTPATIENT)
Dept: GENERAL RADIOLOGY | Facility: CLINIC | Age: 55
End: 2020-10-29
Attending: EMERGENCY MEDICINE
Payer: MEDICAID

## 2020-10-29 VITALS
RESPIRATION RATE: 16 BRPM | SYSTOLIC BLOOD PRESSURE: 144 MMHG | HEART RATE: 78 BPM | WEIGHT: 110 LBS | BODY MASS INDEX: 17.75 KG/M2 | TEMPERATURE: 97.4 F | OXYGEN SATURATION: 99 % | DIASTOLIC BLOOD PRESSURE: 84 MMHG

## 2020-10-29 DIAGNOSIS — R94.31 PROLONGED Q-T INTERVAL ON ECG: ICD-10-CM

## 2020-10-29 DIAGNOSIS — J44.1 COPD EXACERBATION (H): ICD-10-CM

## 2020-10-29 LAB
ALBUMIN SERPL-MCNC: 3.8 G/DL (ref 3.4–5)
ALP SERPL-CCNC: 65 U/L (ref 40–150)
ALT SERPL W P-5'-P-CCNC: 22 U/L (ref 0–50)
ANION GAP SERPL CALCULATED.3IONS-SCNC: 5 MMOL/L (ref 3–14)
AST SERPL W P-5'-P-CCNC: 17 U/L (ref 0–45)
BASOPHILS # BLD AUTO: 0.1 10E9/L (ref 0–0.2)
BASOPHILS NFR BLD AUTO: 1 %
BILIRUB SERPL-MCNC: 0.4 MG/DL (ref 0.2–1.3)
BUN SERPL-MCNC: 16 MG/DL (ref 7–30)
CALCIUM SERPL-MCNC: 8.6 MG/DL (ref 8.5–10.1)
CHLORIDE SERPL-SCNC: 113 MMOL/L (ref 94–109)
CO2 SERPL-SCNC: 25 MMOL/L (ref 20–32)
CREAT SERPL-MCNC: 0.75 MG/DL (ref 0.52–1.04)
DIFFERENTIAL METHOD BLD: NORMAL
EOSINOPHIL # BLD AUTO: 0.4 10E9/L (ref 0–0.7)
EOSINOPHIL NFR BLD AUTO: 6.3 %
ERYTHROCYTE [DISTWIDTH] IN BLOOD BY AUTOMATED COUNT: 13.4 % (ref 10–15)
GFR SERPL CREATININE-BSD FRML MDRD: 89 ML/MIN/{1.73_M2}
GLUCOSE SERPL-MCNC: 83 MG/DL (ref 70–99)
HCT VFR BLD AUTO: 40 % (ref 35–47)
HGB BLD-MCNC: 13.1 G/DL (ref 11.7–15.7)
IMM GRANULOCYTES # BLD: 0 10E9/L (ref 0–0.4)
IMM GRANULOCYTES NFR BLD: 0.2 %
INTERPRETATION ECG - MUSE: NORMAL
LACTATE BLD-SCNC: 1.2 MMOL/L (ref 0.7–2)
LYMPHOCYTES # BLD AUTO: 2.4 10E9/L (ref 0.8–5.3)
LYMPHOCYTES NFR BLD AUTO: 39.3 %
MCH RBC QN AUTO: 29.4 PG (ref 26.5–33)
MCHC RBC AUTO-ENTMCNC: 32.8 G/DL (ref 31.5–36.5)
MCV RBC AUTO: 90 FL (ref 78–100)
MONOCYTES # BLD AUTO: 0.4 10E9/L (ref 0–1.3)
MONOCYTES NFR BLD AUTO: 7.3 %
NEUTROPHILS # BLD AUTO: 2.8 10E9/L (ref 1.6–8.3)
NEUTROPHILS NFR BLD AUTO: 45.9 %
NRBC # BLD AUTO: 0 10*3/UL
NRBC BLD AUTO-RTO: 0 /100
PLATELET # BLD AUTO: 284 10E9/L (ref 150–450)
POTASSIUM SERPL-SCNC: 4.4 MMOL/L (ref 3.4–5.3)
PROT SERPL-MCNC: 7.1 G/DL (ref 6.8–8.8)
RBC # BLD AUTO: 4.45 10E12/L (ref 3.8–5.2)
SODIUM SERPL-SCNC: 143 MMOL/L (ref 133–144)
TROPONIN I SERPL-MCNC: <0.015 UG/L (ref 0–0.04)
WBC # BLD AUTO: 6 10E9/L (ref 4–11)

## 2020-10-29 PROCEDURE — 258N000003 HC RX IP 258 OP 636: Performed by: EMERGENCY MEDICINE

## 2020-10-29 PROCEDURE — 96374 THER/PROPH/DIAG INJ IV PUSH: CPT

## 2020-10-29 PROCEDURE — 99285 EMERGENCY DEPT VISIT HI MDM: CPT | Mod: 25

## 2020-10-29 PROCEDURE — 36415 COLL VENOUS BLD VENIPUNCTURE: CPT | Performed by: EMERGENCY MEDICINE

## 2020-10-29 PROCEDURE — 94640 AIRWAY INHALATION TREATMENT: CPT

## 2020-10-29 PROCEDURE — 83605 ASSAY OF LACTIC ACID: CPT | Performed by: EMERGENCY MEDICINE

## 2020-10-29 PROCEDURE — 80053 COMPREHEN METABOLIC PANEL: CPT | Performed by: EMERGENCY MEDICINE

## 2020-10-29 PROCEDURE — 85025 COMPLETE CBC W/AUTO DIFF WBC: CPT | Performed by: EMERGENCY MEDICINE

## 2020-10-29 PROCEDURE — 999N000157 HC STATISTIC RCP TIME EA 10 MIN

## 2020-10-29 PROCEDURE — 250N000011 HC RX IP 250 OP 636: Performed by: EMERGENCY MEDICINE

## 2020-10-29 PROCEDURE — 71045 X-RAY EXAM CHEST 1 VIEW: CPT

## 2020-10-29 PROCEDURE — 93005 ELECTROCARDIOGRAM TRACING: CPT

## 2020-10-29 PROCEDURE — 96361 HYDRATE IV INFUSION ADD-ON: CPT

## 2020-10-29 PROCEDURE — 84484 ASSAY OF TROPONIN QUANT: CPT | Performed by: EMERGENCY MEDICINE

## 2020-10-29 PROCEDURE — 250N000009 HC RX 250: Performed by: EMERGENCY MEDICINE

## 2020-10-29 RX ORDER — METHYLPREDNISOLONE SODIUM SUCCINATE 125 MG/2ML
125 INJECTION, POWDER, LYOPHILIZED, FOR SOLUTION INTRAMUSCULAR; INTRAVENOUS ONCE
Status: COMPLETED | OUTPATIENT
Start: 2020-10-29 | End: 2020-10-29

## 2020-10-29 RX ORDER — SODIUM CHLORIDE 9 MG/ML
INJECTION, SOLUTION INTRAVENOUS CONTINUOUS
Status: DISCONTINUED | OUTPATIENT
Start: 2020-10-29 | End: 2020-10-29 | Stop reason: HOSPADM

## 2020-10-29 RX ORDER — IPRATROPIUM BROMIDE AND ALBUTEROL SULFATE 2.5; .5 MG/3ML; MG/3ML
6 SOLUTION RESPIRATORY (INHALATION) ONCE
Status: COMPLETED | OUTPATIENT
Start: 2020-10-29 | End: 2020-10-29

## 2020-10-29 RX ORDER — PREDNISONE 20 MG/1
TABLET ORAL
Qty: 10 TABLET | Refills: 0 | Status: SHIPPED | OUTPATIENT
Start: 2020-10-29 | End: 2021-03-22

## 2020-10-29 RX ORDER — ALBUTEROL SULFATE 0.83 MG/ML
2.5 SOLUTION RESPIRATORY (INHALATION) EVERY 4 HOURS PRN
Qty: 1 BOX | Refills: 0 | Status: SHIPPED | OUTPATIENT
Start: 2020-10-29 | End: 2021-07-06

## 2020-10-29 RX ORDER — CODEINE PHOSPHATE AND GUAIFENESIN 10; 100 MG/5ML; MG/5ML
5 SOLUTION ORAL EVERY 4 HOURS PRN
Qty: 118 ML | Refills: 0 | Status: SHIPPED | OUTPATIENT
Start: 2020-10-29 | End: 2020-11-03

## 2020-10-29 RX ORDER — IPRATROPIUM BROMIDE AND ALBUTEROL SULFATE 2.5; .5 MG/3ML; MG/3ML
3 SOLUTION RESPIRATORY (INHALATION) ONCE
Status: COMPLETED | OUTPATIENT
Start: 2020-10-29 | End: 2020-10-29

## 2020-10-29 RX ADMIN — METHYLPREDNISOLONE SODIUM SUCCINATE 125 MG: 125 INJECTION, POWDER, FOR SOLUTION INTRAMUSCULAR; INTRAVENOUS at 12:54

## 2020-10-29 RX ADMIN — IPRATROPIUM BROMIDE AND ALBUTEROL SULFATE 3 ML: .5; 3 SOLUTION RESPIRATORY (INHALATION) at 15:12

## 2020-10-29 RX ADMIN — SODIUM CHLORIDE 1000 ML: 9 INJECTION, SOLUTION INTRAVENOUS at 12:53

## 2020-10-29 RX ADMIN — IPRATROPIUM BROMIDE AND ALBUTEROL SULFATE 6 ML: .5; 3 SOLUTION RESPIRATORY (INHALATION) at 14:07

## 2020-10-29 ASSESSMENT — ENCOUNTER SYMPTOMS
HEADACHES: 1
CHILLS: 1
SHORTNESS OF BREATH: 1
COUGH: 1
FATIGUE: 1

## 2020-10-29 NOTE — TELEPHONE ENCOUNTER
Hi there,    I see this pt is in the ED.  Could you postpone this message for a few days and be sure she schedules follow-up. I've been trying to get ahold of her for quite some times. In addition, she has new prolonged QT based on EKG and I'd like to make sure an RN goes over this with her after her ED visit and also encourages her to schedule.    Thanks!    Sunshine Butterfield, FNP-DNP.

## 2020-10-29 NOTE — ED TRIAGE NOTES
Patient is here for shortness of breath, cough, chills, fatigue, and headache.  She states the these symptoms have been worsening over the past two weeks.  History of COPD and MS.  ABCs intact.  Patient is alert and oriented x3.

## 2020-10-29 NOTE — ED PROVIDER NOTES
History   Chief Complaint  Shortness of Breath, Headache, and Fatigue    The history is provided by the patient.      Hina Yap is a 55 year old female with a history of COPD, MS, and CKD who presents for evaluation of worsening shortness of breath, cough, chills, fatigue, and headache the past two days. She has been using her inhaler every 30 minutes but states that it is  and may not be working. No chest pain. She feels like her current symptoms are similar to prevous COPD exacerbations in character, onset, and associated symptoms. No known COVID contact.     Allergies  Sulfa Drugs  Adhesive Tape  Wellbutrin [Bupropion Hydrobromide]    Medications  Albuterol inhaler  Adderall   Gabapentin  Duoneb  Remeron  Requip  Trazodone     Past Medical History:    Chronic pain  Chronic Kidney Disease  Depression  Insomnia  Restless Legs Syndrome  Esophageal reflux  Iron deficiency anemia  Stress-induced cardiomyopathy  Multiple Sclerosis  Anxiety  Low serum ferritin level  Chronic Obstructive Pulmonary Disease  Neuromuscular disorder  Restless Legs Syndrome  Tobacco use disorder     Past Surgical History:    History reviewed. No pertinent surgical history.     Family History:    History reviewed. No pertinent family history.      Social History:  The patient was not accompanied to the ED.  Smoking Status: Current every day smoker  Smokeless Tobacco: Never used  Alcohol Use: Not currently  Drug Use: No  Marital Status: Single    Review of Systems   Constitutional: Positive for chills and fatigue.   Respiratory: Positive for cough and shortness of breath.    Neurological: Positive for headaches.   All other systems reviewed and are negative.      Physical Exam     Patient Vitals for the past 24 hrs:   BP Temp Temp src Pulse Resp SpO2 Weight   10/29/20 1430 (!) 142/92 -- -- 80 -- 94 % --   10/29/20 1415 135/83 -- -- 80 -- 97 % --   10/29/20 1407 -- -- -- 84 16 97 % --   10/29/20 1400 (!) 138/94 -- -- 76 -- 97 %  --   10/29/20 1315 (!) 150/100 -- -- 73 -- 98 % --   10/29/20 1300 (!) 144/104 -- -- 71 -- 93 % --   10/29/20 1130 (!) 157/111 97.4  F (36.3  C) Temporal 97 20 97 % --   10/29/20 1129 -- -- -- -- -- -- 49.9 kg (110 lb)       Physical Exam  Constitutional: Alert, frequent dry cough, SpO2 98% on room air.  HENT:    Nose: Nose normal.    Mouth/Throat: Oropharynx is clear, mucous membranes are moist  Eyes: EOM are normal. Pupils are equal, round, and reactive to light.   CV: Regular rate and rhythm, no murmurs, rubs or gallops.  Resp: Diffuse expiratory wheezes. Normal respiratory effort.   GI: Soft, non-distended. There is no tenderness. No rebound or guarding.   MSK: Normal range of motion. No deformity.   Neurological:   A/Ox3  5/5 strength is symmetric to the upper and lower extremities;   Sensation intact to light touch throughout the upper and lower extremities;   Skin: Skin is warm and dry.    Emergency Department Course   ECG:  ECG taken at 1155, ECG read at 1200  Normal sinus rhythm with PVCs  Prolonged QT  Abnormal ECG  No ST elevation or depression   No T wave inversion   Rate 88 bpm. NM interval 132 ms. QRS duration 96 ms. QT/QTc 404/488 ms. P-R-T axes 76 66 56.     Imaging:  Radiology findings were communicated with the patient who voiced understanding of the findings.    XR chest port 1 view:  IMPRESSION: Normal single view of the chest.  Readings per Radiology    Laboratory:  Laboratory findings were communicated with the patient who voiced understanding of the findings.    Troponin: < 0.015  Lactic acid whole blood: 1.2  CMP: Chloride 113 high, o/w WNL (Creatinine 0.75)  CBC: AWNL (WBC 6.0, HGB 13.1, )    Interventions:  1253 NS 1L IV Bolus  1254 sol-Medrol 125 mg IV  Duoneb 6 ml  Duoneb 3 ml    Emergency Department Course:  Past medical records, nursing notes, and vitals reviewed.    1140 I physically examined the patient as documented above.    EKG obtained in the ED, see results above.     IV  "was inserted and blood was drawn for laboratory testing, results above.    The patient was sent for radiographs while in the emergency department, results above.     1450 I rechecked the patient and discussed the findings of their workup thus far.     Findings and plan explained to the Patient. Patient discharged home with instructions regarding supportive care, medications, and reasons to return. The importance of close follow-up was reviewed.    I personally reviewed the laboratory and imaging results with the Patient and answered all related questions prior to discharge.     Impression & Plan   Covid-19  Hina Yap was evaluated during a global COVID-19 pandemic, which necessitated consideration that the patient might be at risk for infection with the SARS-CoV-2 virus that causes COVID-19.   Applicable protocols for evaluation were followed during the patient's care.   COVID-19 was considered as part of the patient's evaluation. Patient declined COVID testing.    Medical Decision Makin year old female who comes in with increasing shortness of breath. Exam as above. Labs and CXR obtained. CXR without opacity. White count normal. Duoneb x 9 mL given and patient had resolution of symptoms and was requesting discharge home. Scripts written for prednisone and robitussin AC, refills of albuterol solution as well as her tiotropium inhaler. Findings at this time are most consistent with acute COPD exacerbation. Clinical suspicion of ACS, dissection, PE, pneumonia is low at this time. Patient declined COVID testing stating \"I definitely dont have that\".    Diagnosis:    ICD-10-CM    1. COPD exacerbation (H)  J44.1        Disposition:  Discharged to home.    Discharge Medications:  New Prescriptions    ALBUTEROL (PROVENTIL) (2.5 MG/3ML) 0.083% NEB SOLUTION    Take 1 vial (2.5 mg) by nebulization every 4 hours as needed for shortness of breath / dyspnea    GUAIFENESIN-CODEINE (ROBITUSSIN AC) 100-10 MG/5ML " SOLUTION    Take 5 mLs by mouth every 4 hours as needed for cough    PREDNISONE (DELTASONE) 20 MG TABLET    Take two tablets (= 40mg) each day for 5 (five) days    TIOTROPIUM-OLODATEROL 2.5-2.5 MCG/ACT AERS    Inhale 2 puffs into the lungs daily for 14 days       Scribe Disclosure:  I, Terrie Wells, am serving as a scribe at 11:40 AM on 10/29/2020 to document services personally performed by Luis M Akbar DO based on my observations and the provider's statements to me.      Luis M Akbar DO  10/29/20 0564

## 2020-10-29 NOTE — ED AVS SNAPSHOT
[facility] Emergency Dept  [address]  [facility] [address]  Phone: [phone]  Fax: [phone]                                    Hina Yap   MRN: [id number]    Department: [facility] Emergency Dept   Date of Visit: 10/29/2020           After Visit Summary Signature Page    I have received my discharge instructions, and my questions have been answered. I have discussed any challenges I see with this plan with the nurse or doctor.    ..........................................................................................................................................  Patient/Patient Representative Signature      ..........................................................................................................................................  Patient Representative Print Name and Relationship to Patient    ..................................................               ................................................  Date                                   Time    ..........................................................................................................................................  Reviewed by Signature/Title    ...................................................              ..............................................  Date                                               Time          22EPIC Rev 08/18

## 2020-11-02 NOTE — TELEPHONE ENCOUNTER
Prolonged QT was seen on her EKG.  This means that the electricity is moving slowly through a certain part of her heart. It typically doesn't cause problems, but can occasionally cause a dangerous electricity pattern/arrhythmia in the heart. It can be caused by certain medications, so we should review her meds at the visit to see which meds could be contributing then repeat EKG.

## 2020-11-02 NOTE — TELEPHONE ENCOUNTER
I think cards appt would be appropriate. Referral signed.     But moreso wanting to be sure she follows up so I can let her know about it and maybe get a repeat EKG. I was afraid she wouldn't schedule follow-up and nobody would then tell her about the EKG, thus falling through the cracks. Please also let her know we can't do med refills until she calls us back!

## 2020-11-02 NOTE — TELEPHONE ENCOUNTER
Bret Gonsalez-    Can you let nursing know what to relay to pt about QT? Follow up with Cardiology?     I did send a [employer] message to her to contact us and left her a voicemail as well. Eboni Mulligan RN on 11/2/2020 at 9:23 AM

## 2020-11-02 NOTE — TELEPHONE ENCOUNTER
Routing back to Sunshine to review.     Please provide specific feedback on abnormal EKG as the Pt will need education on this. RN can relay this information to the Pt as well as referral information.     Amna Yarbrough RN   [employer] -- Triage Nurse

## 2020-11-02 NOTE — TELEPHONE ENCOUNTER
Message sent for pt to call us. Voicemail was left earlier. Eboni Mulligan RN on 11/2/2020 at 11:05 AM

## 2020-11-03 NOTE — TELEPHONE ENCOUNTER
"Called and spoke with patient. She states that she has scheduled with Sunshine Izaguirreitor on 11/5/20. Patient has also scheduled with Cardiology.     Reviewed new prolonged QT on EKG in the ED. 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

## 2020-11-05 ENCOUNTER — TELEPHONE (OUTPATIENT)
Dept: PEDIATRICS | Facility: CLINIC | Age: 55
End: 2020-11-05

## 2020-11-05 ENCOUNTER — VIRTUAL VISIT (OUTPATIENT)
Dept: PEDIATRICS | Facility: CLINIC | Age: 55
End: 2020-11-05
Payer: MEDICAID

## 2020-11-05 DIAGNOSIS — G89.29 OTHER CHRONIC PAIN: ICD-10-CM

## 2020-11-05 DIAGNOSIS — F32.89 OTHER DEPRESSION: ICD-10-CM

## 2020-11-05 DIAGNOSIS — J44.1 COPD EXACERBATION (H): Primary | ICD-10-CM

## 2020-11-05 DIAGNOSIS — F41.9 ANXIETY: ICD-10-CM

## 2020-11-05 DIAGNOSIS — G47.00 INSOMNIA, UNSPECIFIED TYPE: ICD-10-CM

## 2020-11-05 PROCEDURE — 99443 PR PHYSICIAN TELEPHONE EVALUATION 21-30 MIN: CPT | Performed by: NURSE PRACTITIONER

## 2020-11-05 RX ORDER — OXYCODONE AND ACETAMINOPHEN 5; 325 MG/1; MG/1
TABLET ORAL
Qty: 210 TABLET | Refills: 0 | Status: SHIPPED | OUTPATIENT
Start: 2020-11-06 | End: 2020-12-08

## 2020-11-05 RX ORDER — MIRTAZAPINE 15 MG/1
45 TABLET, FILM COATED ORAL AT BEDTIME
Qty: 180 TABLET | Refills: 3 | Status: SHIPPED | OUTPATIENT
Start: 2020-11-05 | End: 2021-03-22

## 2020-11-05 RX ORDER — DEXTROAMPHETAMINE SACCHARATE, AMPHETAMINE ASPARTATE MONOHYDRATE, DEXTROAMPHETAMINE SULFATE AND AMPHETAMINE SULFATE 7.5; 7.5; 7.5; 7.5 MG/1; MG/1; MG/1; MG/1
30 CAPSULE, EXTENDED RELEASE ORAL DAILY
Qty: 30 CAPSULE | Refills: 0 | Status: CANCELLED | OUTPATIENT
Start: 2020-11-05

## 2020-11-05 RX ORDER — GABAPENTIN 300 MG/1
600 CAPSULE ORAL 3 TIMES DAILY
Qty: 180 CAPSULE | Refills: 1 | Status: SHIPPED | OUTPATIENT
Start: 2020-11-05 | End: 2021-01-21

## 2020-11-05 RX ORDER — ALPRAZOLAM 0.5 MG
0.5 TABLET ORAL 2 TIMES DAILY
Qty: 60 TABLET | Refills: 0 | Status: CANCELLED | OUTPATIENT
Start: 2020-11-05

## 2020-11-05 RX ORDER — DEXTROAMPHETAMINE SACCHARATE, AMPHETAMINE ASPARTATE, DEXTROAMPHETAMINE SULFATE AND AMPHETAMINE SULFATE 2.5; 2.5; 2.5; 2.5 MG/1; MG/1; MG/1; MG/1
10 TABLET ORAL DAILY
Qty: 30 TABLET | Refills: 0 | Status: CANCELLED | OUTPATIENT
Start: 2020-11-05

## 2020-11-05 RX ORDER — PREDNISONE 20 MG/1
TABLET ORAL
Qty: 20 TABLET | Refills: 0 | Status: SHIPPED | OUTPATIENT
Start: 2020-11-05 | End: 2021-03-22

## 2020-11-05 ASSESSMENT — ANXIETY QUESTIONNAIRES
GAD7 TOTAL SCORE: 14
4. TROUBLE RELAXING: NEARLY EVERY DAY
7. FEELING AFRAID AS IF SOMETHING AWFUL MIGHT HAPPEN: NOT AT ALL
1. FEELING NERVOUS, ANXIOUS, OR ON EDGE: MORE THAN HALF THE DAYS
3. WORRYING TOO MUCH ABOUT DIFFERENT THINGS: MORE THAN HALF THE DAYS
GAD7 TOTAL SCORE: 14
5. BEING SO RESTLESS THAT IT IS HARD TO SIT STILL: NEARLY EVERY DAY
7. FEELING AFRAID AS IF SOMETHING AWFUL MIGHT HAPPEN: NOT AT ALL
GAD7 TOTAL SCORE: 14
2. NOT BEING ABLE TO STOP OR CONTROL WORRYING: MORE THAN HALF THE DAYS
6. BECOMING EASILY ANNOYED OR IRRITABLE: MORE THAN HALF THE DAYS

## 2020-11-05 ASSESSMENT — PATIENT HEALTH QUESTIONNAIRE - PHQ9
SUM OF ALL RESPONSES TO PHQ QUESTIONS 1-9: 16
SUM OF ALL RESPONSES TO PHQ QUESTIONS 1-9: 16
10. IF YOU CHECKED OFF ANY PROBLEMS, HOW DIFFICULT HAVE THESE PROBLEMS MADE IT FOR YOU TO DO YOUR WORK, TAKE CARE OF THINGS AT HOME, OR GET ALONG WITH OTHER PEOPLE: SOMEWHAT DIFFICULT

## 2020-11-05 NOTE — TELEPHONE ENCOUNTER
Per checkout note from [facility] on 11/5/20:    Please call her neurologist and get the information for which pain clinic they referred her to. Please get her that info through [employer].     Please check in in 2 weeks to be sure she has scheduled with psych.     Sasha Cerda, CMA

## 2020-11-05 NOTE — PROGRESS NOTES
"Hina Yap is a 55 year old female who is being evaluated via a billable telephone visit.      The patient has been notified of following:     \"This telephone visit will be conducted via a call between you and your physician/provider. We have found that certain health care needs can be provided without the need for a physical exam.  This service lets us provide the care you need with a short phone conversation.  If a prescription is necessary we can send it directly to your pharmacy.  If lab work is needed we can place an order for that and you can then stop by our lab to have the test done at a later time.    Telephone visits are billed at different rates depending on your insurance coverage. During this emergency period, for some insurers they may be billed the same as an in-person visit.  Please reach out to your insurance provider with any questions.    If during the course of the call the physician/provider feels a telephone visit is not appropriate, you will not be charged for this service.\"    Patient has given verbal consent for Telephone visit?  Yes    What phone number would you like to be contacted at? [phone]    How would you like to obtain your AVS? Jelani Freed     Hina Yap is a 55 year old female who presents via phone visit today for the following health issues:    History of Present Illness       Back Pain:  She presents for follow up of back pain. Patient's back pain is a chronic problem.          She eats 0-1 servings of fruits and vegetables daily.She consumes 0 sweetened beverage(s) daily.She exercises with enough effort to increase her heart rate 30 to 60 minutes per day.  She exercises with enough effort to increase her heart rate 7 days per week. She is missing 1 dose(s) of medications per week.  She is not taking prescribed medications regularly due to remembering to take.       Go over test results from per triage telephone encounter on 11-2-20.        Review " of Systems   Constitutional, HEENT, cardiovascular, pulmonary, gi and gu systems are negative, except as otherwise noted.       Objective          Resp: Did not sound shortness of breath over the phone.         Assessment/Plan:    Assessment & Plan     Other chronic pain  Ongoing. We are currently on a pause with weaning her Oxycodone until she can get her mental health under control. However, she has failed to follow-up with psych or therapy. In addition, she has stopped seeing the pain clinic  - oxyCODONE-acetaminophen (PERCOCET) 5-325 MG tablet; Take 1-2 tabs at a time, up to 5 times a day. No more than 7 tabs per day. Must give at least 4 hours between dosing. Will hold at this dose until at least March 2021.  - gabapentin (NEURONTIN) 300 MG capsule; Take 2 capsules (600 mg) by mouth 3 times daily (start with dose listed in clinic instructions)  -Asked her to re-engaged with psych and therapy. She is going to consider TMS for depression  -PAL to follow-up on this in a few weeks.   -If not engaging with psych, will continue to wean the oxy  -States her new neurologist said she should be on the pain meds. I told her I'm happy to speak with her neurologist if she wants to reach out to me. Will need C2C    Anxiety  Other depression  Insomnia, unspecified type  Poor control, but better than this summer. No SI/HI. Sounds birghter.   - mirtazapine (REMERON) 15 MG tablet; Take 3 tablets (45 mg) by mouth At Bedtime  -Re-engaged with psych and therapy  -SB4 pal to follow-up in this.       COPD exacerbation (H)  See ED note. Seen in ED for shortness of breath. CXR normal. WBC normal. No signs of PNA. Declined covid test in the ED. No major risk factors for PE. No chest pain, diaphoresis, increase in fatigue to suggest ACS. Troponin negative. Likely COPD exacerbation  - predniSONE (DELTASONE) 20 MG tablet; Take 3 tabs by mouth daily x 3 days, then 2 tabs daily x 3 days, then 1 tab daily x 3 days, then 1/2 tab daily x 3  days.  -Asked her to follow-up with pulm asap given recent exacerbations  -Call if not improving by Monday significantly  -Discussed reasons to seek care urgently.   -Again declined covid test. Discussed isolation.         Return in about 6 months (around 5/5/2021) for pain FU.    BENNETT Marvin CNP  [facility]    Phone call duration: 31 minutes

## 2020-11-05 NOTE — LETTER
[facility]  [address]  [place] [address]  [phone]      November 25, 2020    Hina Yap                                                                                                                                                       [address]  [place] [address]              Dear Hina,         We are concerned about your health. Your provider, Sunshine Butterfield wanted to make sure you got scheduled with pain clinic and with psychiatry.      To schedule with [facility], please call [phone].  To schedule with psychiatry, please call [phone]       Let us know if you have further questions or concerns. Thank you for choosing [facility]!         Sincerely,        Reji Rahman, EMT at 1:20 PM on November 25, 2020   Clinic Health Guide   [phone]

## 2020-11-06 ASSESSMENT — ANXIETY QUESTIONNAIRES: GAD7 TOTAL SCORE: 14

## 2020-11-06 ASSESSMENT — PATIENT HEALTH QUESTIONNAIRE - PHQ9: SUM OF ALL RESPONSES TO PHQ QUESTIONS 1-9: 16

## 2020-11-06 NOTE — TELEPHONE ENCOUNTER
Called neurologist's office.     Her pain referral was for [facility]: [phone]    Sent via [employer] as requested. Will postpone this encounter 2 weeks to follow up to see if pt scheduled with psych.    Reji Rahman, EMT at 10:14 AM on November 6, 2020   [facility] Health Guide   [phone]

## 2020-11-09 ENCOUNTER — TELEPHONE (OUTPATIENT)
Dept: PEDIATRICS | Facility: CLINIC | Age: 55
End: 2020-11-09

## 2020-11-09 NOTE — TELEPHONE ENCOUNTER
===View-only below this line===  ----- Message -----  From: Sunshine Butterfield APRN CNP  Sent: 11/9/2020   3:10 PM CST  To: Raz Sb2/3/4 Team A (Gloria)    Please let pt know pain clinic does think she would benefit from pain PT (only went to one appt. I know she's tried PT in the past but not with this team), TENS, acupuncutre, and possibly injections. He wants to see her back in clinic for injection eval. Needs to be in person.    Pls remind her that she has to be actively engaged with psych and pain (either with this team or another one) as part of our agreement in me prescribing her meds  ----- Message -----  From: Rehana Ferrari MD  Sent: 11/6/2020   3:38 PM CST  To: BENNETT Marvin CNP

## 2020-11-12 ENCOUNTER — TELEPHONE (OUTPATIENT)
Dept: PEDIATRICS | Facility: CLINIC | Age: 55
End: 2020-11-12

## 2020-11-12 NOTE — TELEPHONE ENCOUNTER
Central Prior Authorization Team   Phone: [phone]      PA Initiation    Medication: adderall xr-Initiated  Insurance Company: [place] Medicaid ([facility]) - Phone [phone] Fax [phone]  Pharmacy Filling the Rx: [id number] [place], [place] - [address] [place] [facility] [id number]  Filling Pharmacy Phone: [phone]  Filling Pharmacy Fax:    Start Date: 11/12/2020

## 2020-11-12 NOTE — TELEPHONE ENCOUNTER
Pt has been receiving this med for years , Sunshine said you could also use MS as part of the reasoning, due to her MS is what causes her fatigue. Francisca Hanks LPN

## 2020-11-12 NOTE — TELEPHONE ENCOUNTER
Prior Authorization Retail Medication Request    Medication/Dose:Adderall 10mg and Adderall XR 30mg  ICD code (if different than what is on RX):    Previously Tried and Failed:  Been on medication for quite a while  Rationale:  Pt has MS    Insurance Name:    Coverage information:     Subscriber: [id number] REY TENORIO     Rel to sub: 01 - Self     Member ID: [id number]     Payor: 16-[employer]ICAID [place] Ph: [phone]     Benefit plan: [facility]9-[place]ICAID [place] Ph: [phone]     Group number: Not given     Member effective dates: from 02/01/14              Pharmacy Information (if different than what is on RX)  Name:  [facility]  Phone:  [phone]

## 2020-11-12 NOTE — TELEPHONE ENCOUNTER
Central Prior Authorization Team   Phone: [phone]      PA Initiation    Medication: adderall IR-Initiated  Insurance Company: [place] Medicaid ([facility]) - Phone [phone] Fax [phone]  Pharmacy Filling the Rx: [id number] - [place], [place] - [address] AT [facility] [address]  Filling Pharmacy Phone: [phone]  Filling Pharmacy Fax:    Start Date: 11/12/2020

## 2020-11-13 ENCOUNTER — TELEPHONE (OUTPATIENT)
Dept: PEDIATRICS | Facility: CLINIC | Age: 55
End: 2020-11-13

## 2020-11-13 ENCOUNTER — TELEPHONE (OUTPATIENT)
Dept: CARDIOLOGY | Facility: CLINIC | Age: 55
End: 2020-11-13

## 2020-11-13 DIAGNOSIS — G35 MULTIPLE SCLEROSIS (H): Primary | ICD-10-CM

## 2020-11-13 DIAGNOSIS — R53.83 OTHER FATIGUE: ICD-10-CM

## 2020-11-13 DIAGNOSIS — R94.31 PROLONGED Q-T INTERVAL ON ECG: Primary | ICD-10-CM

## 2020-11-13 RX ORDER — DEXTROAMPHETAMINE SACCHARATE, AMPHETAMINE ASPARTATE, DEXTROAMPHETAMINE SULFATE AND AMPHETAMINE SULFATE 2.5; 2.5; 2.5; 2.5 MG/1; MG/1; MG/1; MG/1
10 TABLET ORAL DAILY
Qty: 30 TABLET | Refills: 0 | Status: SHIPPED | OUTPATIENT
Start: 2020-11-13 | End: 2020-12-12

## 2020-11-13 RX ORDER — DEXTROAMPHETAMINE SACCHARATE, AMPHETAMINE ASPARTATE MONOHYDRATE, DEXTROAMPHETAMINE SULFATE AND AMPHETAMINE SULFATE 7.5; 7.5; 7.5; 7.5 MG/1; MG/1; MG/1; MG/1
30 CAPSULE, EXTENDED RELEASE ORAL DAILY
Qty: 30 CAPSULE | Refills: 0 | Status: SHIPPED | OUTPATIENT
Start: 2020-11-13 | End: 2020-12-12

## 2020-11-13 NOTE — TELEPHONE ENCOUNTER
Order placed for EKG, appointment note updated for 11/30/20 to reflect need for EKG on arrival.       ----- Message from Carin Escamilla RN sent at 11/13/2020  9:58 AM CST -----    ----- Message -----  From: Rashaad Modi MD  Sent: 11/12/2020   5:34 PM CST  To: [facility] Heart Ep Nurse    Please make sure she has an ECG on arrival  ----- Message -----  From: Sunshine Butterfield APRN CNP  Sent: 11/5/2020   3:43 PM CST  To: Rashaad Modi MD    Hi,    Hina plans to see you for general cardiology check in.  She has an appointment scheduled. Could you also address her prolonged QT syndrome? It was just noted in the ED.    Thanks!    BENNETT Marvin CNP on 11/5/2020 at 3:44 PM

## 2020-11-13 NOTE — TELEPHONE ENCOUNTER
Patient does not have that diagnosis to my knowledge.  Can you look into this? Please ask the pt if she has adhd. Please also call insurance. It has been covered for years, so not sure why it's not covered now. Also, I've re-sent the med with the diagnosis of MS, which is why she takes it (fatigue due to MS). Please ask if that will work as well.

## 2020-11-13 NOTE — TELEPHONE ENCOUNTER
Prior Authorization Not Needed per Insurance    Medication: adderall IR-PA NOT NEEDED  Insurance Company: [place] Medicaid ([facility]) - Phone [phone] Fax [phone]  Expected CoPay:      Pharmacy Filling the Rx: [id number] [place], [place] - [address] AT [facility] [address]  Pharmacy Notified: Yes  Patient Notified: No    Called pharmacy, they will try to call the help desk since they are having a problem with the ICD 10 code.  They will call back if still having issues.    Pharmacy called back, they need new scripts with different ICD 10 code.

## 2020-11-13 NOTE — TELEPHONE ENCOUNTER
Call placed to pharmacy.  Spoke with [email or web address]  She states the medication has been approved by the pt's insurance and will be filled today    Thank you  Abel Salmeron RN on 11/13/2020 at 10:14 AM

## 2020-11-13 NOTE — TELEPHONE ENCOUNTER
Prior Authorization Not Needed per Insurance    Medication: adderall xr-PA NOT NEEDED  Insurance Company: [place] Medicaid ([facility]) - Phone [phone] Fax [phone]  Expected CoPay:      Pharmacy Filling the Rx: [id number] - [place], [place] - [address] AT [facility] [address]  Pharmacy Notified: Yes  Patient Notified: No    Called pharmacy, they will try to call the help desk since they are having a problem with the ICD 10 code.  They will call back if still having issues.

## 2020-11-13 NOTE — TELEPHONE ENCOUNTER
The pharmacy was calling and the pt's insurance won't cover the two rx's for Adderall for the pt with the dx code of Other fatigue [R53.83] . It needs to be resent with the ADD/ADHD dx code or it won't be covered for the pt. Thank you.   Aury Foote on 11/13/2020 at 9:00 AM

## 2020-11-16 ENCOUNTER — PRE VISIT (OUTPATIENT)
Dept: CARDIOLOGY | Facility: CLINIC | Age: 55
End: 2020-11-16

## 2020-11-17 NOTE — TELEPHONE ENCOUNTER
See message below, Adderall is not being covered with diagnosis codes of MS and fatigue, advise on how to proceed.

## 2020-11-17 NOTE — TELEPHONE ENCOUNTER
MTM    Do you have any idea how to manage this? Patient is on adderall for MS and fatigue. Adderall has been covered for years by her insurance, and is now getting denied. She does not have ADHD so I can't use that code.    Any ideas?      MA/TC: The letter says that there is either missing or inaccurate information. Please call them and find out what information is missing. Please also ask them why this is no longer covered, when it was covered for the last several years? Please ask as many questions as possible so we can get to the bottom of this.

## 2020-11-20 NOTE — TELEPHONE ENCOUNTER
Pt read [employer] message on 11/11/2020.    Called pt. Outreach attempt #2. No answer, LVM to call back on my personal extension or if I am unavailable to call clinic back. Also noted pt could respond via [employer] message.    If pt calls back:  Ask if she has been able to schedule with pain and with psych    Reji Rahman, EMT at 9:31 AM on November 20, 2020   Clinic Health Guide   [phone]

## 2020-11-25 NOTE — TELEPHONE ENCOUNTER
Called pt. Outreach attempt #3. No answer, LVM to call back on my personal extension or if I am unavailable to call clinic back. Also noted pt could respond via [employer] message. Sent letter. No further outreaches will be made at this time.     If pt calls back:  Ask if she has been able to schedule with pain and with psych    Reji Rahman, EMT at 1:18 PM on November 25, 2020   [facility] Health Guide   [phone]

## 2020-11-30 ENCOUNTER — OFFICE VISIT (OUTPATIENT)
Dept: CARDIOLOGY | Facility: CLINIC | Age: 55
End: 2020-11-30
Payer: MEDICAID

## 2020-11-30 VITALS
HEART RATE: 86 BPM | WEIGHT: 114 LBS | SYSTOLIC BLOOD PRESSURE: 143 MMHG | BODY MASS INDEX: 18.4 KG/M2 | DIASTOLIC BLOOD PRESSURE: 91 MMHG

## 2020-11-30 DIAGNOSIS — I10 ESSENTIAL HYPERTENSION: ICD-10-CM

## 2020-11-30 DIAGNOSIS — I25.5 ISCHEMIC CARDIOMYOPATHY: Primary | ICD-10-CM

## 2020-11-30 DIAGNOSIS — R94.31 PROLONGED Q-T INTERVAL ON ECG: ICD-10-CM

## 2020-11-30 DIAGNOSIS — I49.3 PVC'S (PREMATURE VENTRICULAR CONTRACTIONS): ICD-10-CM

## 2020-11-30 DIAGNOSIS — R07.2 PRECORDIAL PAIN: ICD-10-CM

## 2020-11-30 DIAGNOSIS — I34.0 NONRHEUMATIC MITRAL VALVE REGURGITATION: ICD-10-CM

## 2020-11-30 PROCEDURE — 99204 OFFICE O/P NEW MOD 45 MIN: CPT | Performed by: INTERNAL MEDICINE

## 2020-11-30 PROCEDURE — 93000 ELECTROCARDIOGRAM COMPLETE: CPT | Performed by: INTERNAL MEDICINE

## 2020-11-30 RX ORDER — AMLODIPINE BESYLATE 2.5 MG/1
2.5 TABLET ORAL DAILY
Qty: 30 TABLET | Refills: 11 | Status: SHIPPED | OUTPATIENT
Start: 2020-11-30 | End: 2022-04-22

## 2020-11-30 RX ORDER — MELOXICAM 7.5 MG/1
7.5 TABLET ORAL DAILY
COMMUNITY
End: 2021-03-22

## 2020-11-30 RX ORDER — PROMETHAZINE HYDROCHLORIDE 25 MG/1
25 TABLET ORAL EVERY 6 HOURS PRN
COMMUNITY

## 2020-11-30 RX ORDER — BACLOFEN 20 MG/1
20 TABLET ORAL 4 TIMES DAILY
COMMUNITY

## 2020-11-30 RX ORDER — TIZANIDINE 2 MG/1
2 TABLET ORAL 3 TIMES DAILY
Status: ON HOLD | COMMUNITY
End: 2021-07-23

## 2020-11-30 NOTE — LETTER
11/30/2020      Sunshine Butterfield, APRN CNP  [address] [place] [address]      RE: Hina A Fracisco       Dear Colleague,    I had the pleasure of seeing Hina Yap in the [facility].    Service Date: 11/30/2020      HISTORY OF PRESENT ILLNESS:  Hina is a very nice 55-year-old woman who I have seen intermittently over the years; last time 10 years ago.  Previous to that 8 years prior.      Her past medical history is significant for mitral valve prolapse, PVCs, multiple sclerosis, mild mitral regurgitation.  I met her in 2001 when she was referred because of a new anterior wall motion abnormality noted on echocardiogram.  A nuclear scan also appeared to demonstrate a fixed anterior defect.  We brought her to the Cardiac Catheterization Lab, where she was found to have normal coronary arteries.  As this was a persistent defect, I did not think it was takotsubo cardiomyopathy.  Ejection fraction was initially 35% and did improve to as high as 50%-55%.  My suspicion was that it was a vasospastic heart attack.      More recently in 2015, she was hospitalized at the [place] after a COPD exacerbation, where she was intubated.  She had a mild troponin rise of 1.9.  Echocardiogram again demonstrated a distal anterior and inferior wall motion abnormality.  She was taken to the Cath Lab, where she was again found to have normal coronary arteries and her diagnosis at that time was a stress cardiomyopathy.      Her most recent evaluation of her heart was an echocardiogram in 2018 that describes an ejection fraction of 50%-55% with mild inferolateral hypokinesia, normal pulmonary pressures, mild mitral regurgitation with mitral valve thickening.      She unfortunately smokes less than a half a pack of cigarettes.  She does try to exercise on a regular basis.  She does not know her family history very well, but does not think she has any cardiac history.  Review of  the chart shows that she has had elevated blood pressure on 4 of her last 5 visits.  She does not have hyperlipidemia.      She also suffers with depression and has had suicidal ideation; she states as recently as yesterday.  She does not have a plan.  She states she has promised her neurologist it would never be by a pill overdose.  She states she is feeling quite good today.      She is now referred after an ER visit where EKG demonstrated a prolonged QT.  Review of the chart shows the vast majority of her QTs are not prolonged.  Clearly, she was having quite a bit of breathing problems that day and frequent PVCs.  To her knowledge, she has no family history of sudden death.  There is also no family history of cerebrovascular accidents.      Hina does state she has chest discomfort from time to time.  She states it is most closely associated with stress.  She states she recently had a very acrimonious breakup with her partner and this has caused some of her depression.  She states stress causes the chest pain.  She never gets the chest pain with activity of daily living, carrying groceries, walking, climbing stairs, etc.  She also states that she feels cold most of the time and this exacerbates her multiple sclerosis and she states she tends to have pain all over when exposed to cold.        ASSESSMENT AND PLAN:  I think the Hina's chest discomfort is not ischemic in origin, but most likely related to stress.      Her echocardiogram from 12/2018 shows only mild mitral regurgitation, a mildly decreased left ventricular function, possibly on an ischemic basis from a distant myocardial infarction.  Her heart failure appears to be very well compensated.      A 12-lead EKG demonstrates a normal sinus rhythm without PVCs and a normal QT interval.  I do not think she has a prolonged QT syndrome.  I suspect she was hyperventilating in the emergency room and this caused prolongation of her QT temporarily.  She does  not appear to be on any medicines that are contributing.      Her main issue today is her blood pressure at 143/91 and as stated, in looking at the chart, she is fairly consistent hypertensive.  I will start her on amlodipine 2.5 mg.  This may help with her cold extremities.  I have told her to watch for ankle swelling.  I think it is important that we get her blood pressure down.  Given the fact that she is thin and there is no obvious reason, I think we should consider a renal ultrasound.  I will have her follow up with my ELIDA in 2 weeks to see how she is tolerating the amlodipine.  We can set up her renal ultrasound at that time to look for renal artery stenosis.      Fasting lipid profile from 2020 shows total cholesterol 190, HDL 60,  and triglycerides 102.  This is excellent given her normal coronary arteries on her 2015 angiogram.      Electrolytes are within normal limits with a potassium of 4.4, creatinine 0.75, BUN of 16 from 10/2020.        I will see her back in 6 months to follow up on her blood pressure.  Down the road, we can relook at her mitral regurgitation, although at mild, I do not think we need to follow this closely.      Thank you for allowing me to participate in her care.         JOSE DAVID PAGAN MD, [facility]             D: 2020   T: 2020   MT: [place]      Name:     REY TENORIO   MRN:      [id number]        Account:      [id number]   :      1965           Service Date: 2020      Document: [id number]      Outpatient Encounter Medications as of 2020   Medication Sig Dispense Refill     ALPRAZolam (XANAX) 0.5 MG tablet Take 1 tablet (0.5 mg) by mouth 2 times daily 60 tablet 0     amLODIPine (NORVASC) 2.5 MG tablet Take 1 tablet (2.5 mg) by mouth daily 30 tablet 11     amphetamine-dextroamphetamine (ADDERALL XR) 30 MG 24 hr capsule Take 1 capsule (30 mg) by mouth daily 30 capsule 0     amphetamine-dextroamphetamine (ADDERALL) 10 MG tablet Take 1  tablet (10 mg) by mouth daily 30 tablet 0     baclofen (LIORESAL) 20 MG tablet Take 20 mg by mouth 3 times daily       gabapentin (NEURONTIN) 300 MG capsule Take 2 capsules (600 mg) by mouth 3 times daily (start with dose listed in clinic instructions) 180 capsule 1     ipratropium - albuterol 0.5 mg/2.5 mg/3 mL (DUONEB) 0.5-2.5 (3) MG/3ML neb solution Take 1 vial (3 mLs) by nebulization every 6 hours as needed for shortness of breath / dyspnea or wheezing 30 vial 1     meloxicam (MOBIC) 7.5 MG tablet Take 7.5 mg by mouth daily       OXcarbazepine ER 24hour (OXTELLAR XR) 150 MG 24 hr tablet Take by mouth daily Do not cut, crush, or chew the tablets before swallowing       oxybutynin ER (DITROPAN-XL) 5 MG 24 hr tablet Take 1 tablet (5 mg) by mouth daily 90 tablet 3     oxyCODONE-acetaminophen (PERCOCET) 5-325 MG tablet Take 1-2 tabs at a time, up to 5 times a day. No more than 7 tabs per day. Must give at least 4 hours between dosing. Will hold at this dose until at least March 2021. 210 tablet 0     promethazine (PHENERGAN) 25 MG tablet Take 25 mg by mouth every 6 hours as needed for nausea       rOPINIRole (REQUIP) 2 MG tablet Take 2 mg by mouth every morning Patient is taking 1 tab in the morning and 2 tab at night       tiotropium-olodaterol 2.5-2.5 MCG/ACT AERS Inhale 2 puffs into the lungs daily       tiZANidine (ZANAFLEX) 2 MG tablet Take 2 mg by mouth 3 times daily       traZODone (DESYREL) 50 MG tablet TAKE 1 TO 3 TABLETS(50  MG) BY MOUTH AT BEDTIME 90 tablet 3     albuterol (PROAIR HFA/PROVENTIL HFA/VENTOLIN HFA) 108 (90 Base) MCG/ACT inhaler Inhale 2 puffs into the lungs every 4 hours as needed for shortness of breath / dyspnea or wheezing (Patient not taking: Reported on 11/30/2020) 1 Inhaler 1     albuterol (PROVENTIL) (2.5 MG/3ML) 0.083% neb solution Take 1 vial (2.5 mg) by nebulization every 4 hours as needed for shortness of breath / dyspnea 1 Box 0     mirtazapine (REMERON) 15 MG tablet Take 3  tablets (45 mg) by mouth At Bedtime (Patient not taking: Reported on 11/30/2020) 180 tablet 3     predniSONE (DELTASONE) 20 MG tablet Take 3 tabs by mouth daily x 3 days, then 2 tabs daily x 3 days, then 1 tab daily x 3 days, then 1/2 tab daily x 3 days. (Patient not taking: Reported on 11/30/2020) 20 tablet 0     predniSONE (DELTASONE) 20 MG tablet Take two tablets (= 40mg) each day for 5 (five) days (Patient not taking: Reported on 11/30/2020) 10 tablet 0     [DISCONTINUED] ondansetron (ZOFRAN ODT) 4 MG ODT tab Take 1 tablet (4 mg) by mouth every 8 hours as needed for nausea 30 tablet 1     No facility-administered encounter medications on file as of 11/30/2020.        Again, thank you for allowing me to participate in the care of your patient.      Sincerely,    Rashaad Modi MD     [facility]

## 2020-11-30 NOTE — LETTER
11/30/2020    Sunshine Butterfield, APRN CNP  [address] [place] [address]    RE: Hina Yap       Dear Colleague,    I had the pleasure of seeing Hina Yap in the [facility].    HPI and Plan:   See dictation    Orders Placed This Encounter   Procedures     Basic metabolic panel     Follow-Up with Cardiac Advanced Practice Provider     Follow-Up with Cardiologist     EKG 12-lead complete w/read - Clinics (performed today)       Orders Placed This Encounter   Medications     baclofen (LIORESAL) 20 MG tablet     Sig: Take 20 mg by mouth 3 times daily     promethazine (PHENERGAN) 25 MG tablet     Sig: Take 25 mg by mouth every 6 hours as needed for nausea     OXcarbazepine ER 24hour (OXTELLAR XR) 150 MG 24 hr tablet     Sig: Take by mouth daily Do not cut, crush, or chew the tablets before swallowing     meloxicam (MOBIC) 7.5 MG tablet     Sig: Take 7.5 mg by mouth daily     tiZANidine (ZANAFLEX) 2 MG tablet     Sig: Take 2 mg by mouth 3 times daily     amLODIPine (NORVASC) 2.5 MG tablet     Sig: Take 1 tablet (2.5 mg) by mouth daily     Dispense:  30 tablet     Refill:  11       Medications Discontinued During This Encounter   Medication Reason     ondansetron (ZOFRAN ODT) 4 MG ODT tab Alternate therapy         Encounter Diagnoses   Name Primary?     Ischemic cardiomyopathy Yes     Prolonged Q-T interval on ECG      Essential hypertension      PVC's (premature ventricular contractions)      Nonrheumatic mitral valve regurgitation        CURRENT MEDICATIONS:  Current Outpatient Medications   Medication Sig Dispense Refill     ALPRAZolam (XANAX) 0.5 MG tablet Take 1 tablet (0.5 mg) by mouth 2 times daily 60 tablet 0     amLODIPine (NORVASC) 2.5 MG tablet Take 1 tablet (2.5 mg) by mouth daily 30 tablet 11     amphetamine-dextroamphetamine (ADDERALL XR) 30 MG 24 hr capsule Take 1 capsule (30 mg) by mouth daily 30 capsule 0     amphetamine-dextroamphetamine  (ADDERALL) 10 MG tablet Take 1 tablet (10 mg) by mouth daily 30 tablet 0     baclofen (LIORESAL) 20 MG tablet Take 20 mg by mouth 3 times daily       gabapentin (NEURONTIN) 300 MG capsule Take 2 capsules (600 mg) by mouth 3 times daily (start with dose listed in clinic instructions) 180 capsule 1     ipratropium - albuterol 0.5 mg/2.5 mg/3 mL (DUONEB) 0.5-2.5 (3) MG/3ML neb solution Take 1 vial (3 mLs) by nebulization every 6 hours as needed for shortness of breath / dyspnea or wheezing 30 vial 1     meloxicam (MOBIC) 7.5 MG tablet Take 7.5 mg by mouth daily       OXcarbazepine ER 24hour (OXTELLAR XR) 150 MG 24 hr tablet Take by mouth daily Do not cut, crush, or chew the tablets before swallowing       oxybutynin ER (DITROPAN-XL) 5 MG 24 hr tablet Take 1 tablet (5 mg) by mouth daily 90 tablet 3     oxyCODONE-acetaminophen (PERCOCET) 5-325 MG tablet Take 1-2 tabs at a time, up to 5 times a day. No more than 7 tabs per day. Must give at least 4 hours between dosing. Will hold at this dose until at least March 2021. 210 tablet 0     promethazine (PHENERGAN) 25 MG tablet Take 25 mg by mouth every 6 hours as needed for nausea       rOPINIRole (REQUIP) 2 MG tablet Take 2 mg by mouth every morning Patient is taking 1 tab in the morning and 2 tab at night       tiotropium-olodaterol 2.5-2.5 MCG/ACT AERS Inhale 2 puffs into the lungs daily       tiZANidine (ZANAFLEX) 2 MG tablet Take 2 mg by mouth 3 times daily       traZODone (DESYREL) 50 MG tablet TAKE 1 TO 3 TABLETS(50  MG) BY MOUTH AT BEDTIME 90 tablet 3     albuterol (PROAIR HFA/PROVENTIL HFA/VENTOLIN HFA) 108 (90 Base) MCG/ACT inhaler Inhale 2 puffs into the lungs every 4 hours as needed for shortness of breath / dyspnea or wheezing (Patient not taking: Reported on 11/30/2020) 1 Inhaler 1     albuterol (PROVENTIL) (2.5 MG/3ML) 0.083% neb solution Take 1 vial (2.5 mg) by nebulization every 4 hours as needed for shortness of breath / dyspnea 1 Box 0     mirtazapine  (REMERON) 15 MG tablet Take 3 tablets (45 mg) by mouth At Bedtime (Patient not taking: Reported on 2020) 180 tablet 3     predniSONE (DELTASONE) 20 MG tablet Take 3 tabs by mouth daily x 3 days, then 2 tabs daily x 3 days, then 1 tab daily x 3 days, then 1/2 tab daily x 3 days. (Patient not taking: Reported on 2020) 20 tablet 0     predniSONE (DELTASONE) 20 MG tablet Take two tablets (= 40mg) each day for 5 (five) days (Patient not taking: Reported on 2020) 10 tablet 0       ALLERGIES     Allergies   Allergen Reactions     Sulfa Drugs Rash     Adhesive Tape Rash     Reacts to adhesive on fentanyl patch     Wellbutrin [Bupropion Hydrobromide] Rash       PAST MEDICAL HISTORY:  Past Medical History:   Diagnosis Date     Anxiety      COPD (chronic obstructive pulmonary disease) (H)      GERD (gastroesophageal reflux disease)      Neuromuscular disorder (H)      Restless leg syndrome      Tobacco use disorder        PAST SURGICAL HISTORY:  History reviewed. No pertinent surgical history.    FAMILY HISTORY:  History reviewed. No pertinent family history.    SOCIAL HISTORY:  Social History     Socioeconomic History     Marital status: Single     Spouse name: None     Number of children: None     Years of education: None     Highest education level: None   Occupational History     None   Social Needs     Financial resource strain: None     Food insecurity     Worry: None     Inability: None     Transportation needs     Medical: None     Non-medical: None   Tobacco Use     Smoking status: Current Every Day Smoker     Packs/day: 0.50     Years: 35.00     Pack years: 17.50     Types: Cigarettes     Last attempt to quit: 2013     Years since quittin.4     Smokeless tobacco: Never Used   Substance and Sexual Activity     Alcohol use: Not Currently     Alcohol/week: 0.0 standard drinks     Drug use: No     Sexual activity: Not Currently   Lifestyle     Physical activity     Days per week: None      Minutes per session: None     Stress: None   Relationships     Social connections     Talks on phone: None     Gets together: None     Attends Anabaptism service: None     Active member of club or organization: None     Attends meetings of clubs or organizations: None     Relationship status: None     Intimate partner violence     Fear of current or ex partner: None     Emotionally abused: None     Physically abused: None     Forced sexual activity: None   Other Topics Concern     Parent/sibling w/ CABG, MI or angioplasty before 65F 55M? Not Asked   Social History Narrative     None       Review of Systems:  Skin:  Negative       Eyes:  Negative      ENT:  Negative      Respiratory:  Positive for dyspnea on exertion;shortness of breath COPD   Cardiovascular:  Negative;palpitations chest pain;fatigue;Positive for    Gastroenterology: Negative poor appetite doesn't want to eat  Genitourinary:  not assessed      Musculoskeletal:  not assessed      Neurologic:  Positive for headaches has MS  Psychiatric:  Positive for sleep disturbances;depression;excessive stress insomnia  Heme/Lymph/Imm:  Negative      Endocrine:  not assessed        Physical Exam:  Vitals: BP (!) 143/91   Pulse 86   Wt 51.7 kg (114 lb)   BMI 18.40 kg/m      Constitutional:  cooperative, alert and oriented, well developed, well nourished, in no acute distress thin      Skin:  warm and dry to the touch, no apparent skin lesions or masses noted          Head:  normocephalic, no masses or lesions        Eyes:  pupils equal and round, conjunctivae and lids unremarkable, sclera white, no xanthalasma, EOMS intact, no nystagmus        Lymph:      ENT:  no pallor or cyanosis, dentition good        Neck:  carotid pulses are full and equal bilaterally;no carotid bruit        Respiratory:  normal breath sounds, clear to auscultation, normal A-P diameter, normal symmetry, normal respiratory excursion, no use of accessory muscles         Cardiac: regular  rhythm;normal S1 and S2;no murmurs, gallops or rubs detected                                                         GI:           Extremities and Muscular Skeletal:  no edema;no spinal abnormalities noted;normal muscle strength and tone              Neurological:  no gross motor deficits        Psych:  affect appropriate, oriented to time, person and place          CC  BENNETT Marvin [facility]  [address] [place] [address]                      Thank you for allowing me to participate in the care of your patient.      Sincerely,     Rashaad Modi MD     [facility]    cc:   BENNETT Marvin [facility]  [address] [place] [address]

## 2020-11-30 NOTE — PROGRESS NOTES
HPI and Plan:   See dictation    Orders Placed This Encounter   Procedures     Basic metabolic panel     Follow-Up with Cardiac Advanced Practice Provider     Follow-Up with Cardiologist     EKG 12-lead complete w/read - Clinics (performed today)       Orders Placed This Encounter   Medications     baclofen (LIORESAL) 20 MG tablet     Sig: Take 20 mg by mouth 3 times daily     promethazine (PHENERGAN) 25 MG tablet     Sig: Take 25 mg by mouth every 6 hours as needed for nausea     OXcarbazepine ER 24hour (OXTELLAR XR) 150 MG 24 hr tablet     Sig: Take by mouth daily Do not cut, crush, or chew the tablets before swallowing     meloxicam (MOBIC) 7.5 MG tablet     Sig: Take 7.5 mg by mouth daily     tiZANidine (ZANAFLEX) 2 MG tablet     Sig: Take 2 mg by mouth 3 times daily     amLODIPine (NORVASC) 2.5 MG tablet     Sig: Take 1 tablet (2.5 mg) by mouth daily     Dispense:  30 tablet     Refill:  11       Medications Discontinued During This Encounter   Medication Reason     ondansetron (ZOFRAN ODT) 4 MG ODT tab Alternate therapy         Encounter Diagnoses   Name Primary?     Ischemic cardiomyopathy Yes     Prolonged Q-T interval on ECG      Essential hypertension      PVC's (premature ventricular contractions)      Nonrheumatic mitral valve regurgitation        CURRENT MEDICATIONS:  Current Outpatient Medications   Medication Sig Dispense Refill     ALPRAZolam (XANAX) 0.5 MG tablet Take 1 tablet (0.5 mg) by mouth 2 times daily 60 tablet 0     amLODIPine (NORVASC) 2.5 MG tablet Take 1 tablet (2.5 mg) by mouth daily 30 tablet 11     amphetamine-dextroamphetamine (ADDERALL XR) 30 MG 24 hr capsule Take 1 capsule (30 mg) by mouth daily 30 capsule 0     amphetamine-dextroamphetamine (ADDERALL) 10 MG tablet Take 1 tablet (10 mg) by mouth daily 30 tablet 0     baclofen (LIORESAL) 20 MG tablet Take 20 mg by mouth 3 times daily       gabapentin (NEURONTIN) 300 MG capsule Take 2 capsules (600 mg) by mouth 3 times daily (start  with dose listed in clinic instructions) 180 capsule 1     ipratropium - albuterol 0.5 mg/2.5 mg/3 mL (DUONEB) 0.5-2.5 (3) MG/3ML neb solution Take 1 vial (3 mLs) by nebulization every 6 hours as needed for shortness of breath / dyspnea or wheezing 30 vial 1     meloxicam (MOBIC) 7.5 MG tablet Take 7.5 mg by mouth daily       OXcarbazepine ER 24hour (OXTELLAR XR) 150 MG 24 hr tablet Take by mouth daily Do not cut, crush, or chew the tablets before swallowing       oxybutynin ER (DITROPAN-XL) 5 MG 24 hr tablet Take 1 tablet (5 mg) by mouth daily 90 tablet 3     oxyCODONE-acetaminophen (PERCOCET) 5-325 MG tablet Take 1-2 tabs at a time, up to 5 times a day. No more than 7 tabs per day. Must give at least 4 hours between dosing. Will hold at this dose until at least March 2021. 210 tablet 0     promethazine (PHENERGAN) 25 MG tablet Take 25 mg by mouth every 6 hours as needed for nausea       rOPINIRole (REQUIP) 2 MG tablet Take 2 mg by mouth every morning Patient is taking 1 tab in the morning and 2 tab at night       tiotropium-olodaterol 2.5-2.5 MCG/ACT AERS Inhale 2 puffs into the lungs daily       tiZANidine (ZANAFLEX) 2 MG tablet Take 2 mg by mouth 3 times daily       traZODone (DESYREL) 50 MG tablet TAKE 1 TO 3 TABLETS(50  MG) BY MOUTH AT BEDTIME 90 tablet 3     albuterol (PROAIR HFA/PROVENTIL HFA/VENTOLIN HFA) 108 (90 Base) MCG/ACT inhaler Inhale 2 puffs into the lungs every 4 hours as needed for shortness of breath / dyspnea or wheezing (Patient not taking: Reported on 11/30/2020) 1 Inhaler 1     albuterol (PROVENTIL) (2.5 MG/3ML) 0.083% neb solution Take 1 vial (2.5 mg) by nebulization every 4 hours as needed for shortness of breath / dyspnea 1 Box 0     mirtazapine (REMERON) 15 MG tablet Take 3 tablets (45 mg) by mouth At Bedtime (Patient not taking: Reported on 11/30/2020) 180 tablet 3     predniSONE (DELTASONE) 20 MG tablet Take 3 tabs by mouth daily x 3 days, then 2 tabs daily x 3 days, then 1 tab  daily x 3 days, then 1/2 tab daily x 3 days. (Patient not taking: Reported on 2020) 20 tablet 0     predniSONE (DELTASONE) 20 MG tablet Take two tablets (= 40mg) each day for 5 (five) days (Patient not taking: Reported on 2020) 10 tablet 0       ALLERGIES     Allergies   Allergen Reactions     Sulfa Drugs Rash     Adhesive Tape Rash     Reacts to adhesive on fentanyl patch     Wellbutrin [Bupropion Hydrobromide] Rash       PAST MEDICAL HISTORY:  Past Medical History:   Diagnosis Date     Anxiety      COPD (chronic obstructive pulmonary disease) (H)      GERD (gastroesophageal reflux disease)      Neuromuscular disorder (H)      Restless leg syndrome      Tobacco use disorder        PAST SURGICAL HISTORY:  History reviewed. No pertinent surgical history.    FAMILY HISTORY:  History reviewed. No pertinent family history.    SOCIAL HISTORY:  Social History     Socioeconomic History     Marital status: Single     Spouse name: None     Number of children: None     Years of education: None     Highest education level: None   Occupational History     None   Social Needs     Financial resource strain: None     Food insecurity     Worry: None     Inability: None     Transportation needs     Medical: None     Non-medical: None   Tobacco Use     Smoking status: Current Every Day Smoker     Packs/day: 0.50     Years: 35.00     Pack years: 17.50     Types: Cigarettes     Last attempt to quit: 2013     Years since quittin.4     Smokeless tobacco: Never Used   Substance and Sexual Activity     Alcohol use: Not Currently     Alcohol/week: 0.0 standard drinks     Drug use: No     Sexual activity: Not Currently   Lifestyle     Physical activity     Days per week: None     Minutes per session: None     Stress: None   Relationships     Social connections     Talks on phone: None     Gets together: None     Attends [employer] service: None     Active member of club or organization: None     Attends meetings of clubs  or organizations: None     Relationship status: None     Intimate partner violence     Fear of current or ex partner: None     Emotionally abused: None     Physically abused: None     Forced sexual activity: None   Other Topics Concern     Parent/sibling w/ CABG, MI or angioplasty before 65F 55M? Not Asked   Social History Narrative     None       Review of Systems:  Skin:  Negative       Eyes:  Negative      ENT:  Negative      Respiratory:  Positive for dyspnea on exertion;shortness of breath COPD   Cardiovascular:  Negative;palpitations chest pain;fatigue;Positive for    Gastroenterology: Negative poor appetite doesn't want to eat  Genitourinary:  not assessed      Musculoskeletal:  not assessed      Neurologic:  Positive for headaches has MS  Psychiatric:  Positive for sleep disturbances;depression;excessive stress insomnia  Heme/Lymph/Imm:  Negative      Endocrine:  not assessed        Physical Exam:  Vitals: BP (!) 143/91   Pulse 86   Wt 51.7 kg (114 lb)   BMI 18.40 kg/m      Constitutional:  cooperative, alert and oriented, well developed, well nourished, in no acute distress thin      Skin:  warm and dry to the touch, no apparent skin lesions or masses noted          Head:  normocephalic, no masses or lesions        Eyes:  pupils equal and round, conjunctivae and lids unremarkable, sclera white, no xanthalasma, EOMS intact, no nystagmus        Lymph:      ENT:  no pallor or cyanosis, dentition good        Neck:  carotid pulses are full and equal bilaterally;no carotid bruit        Respiratory:  normal breath sounds, clear to auscultation, normal A-P diameter, normal symmetry, normal respiratory excursion, no use of accessory muscles         Cardiac: regular rhythm;normal S1 and S2;no murmurs, gallops or rubs detected                                                         GI:           Extremities and Muscular Skeletal:  no edema;no spinal abnormalities noted;normal muscle strength and tone               Neurological:  no gross motor deficits        Psych:  affect appropriate, oriented to time, person and place          CC  Sunshine Butterfield, BENNETT CNP  [address] [address]

## 2020-11-30 NOTE — PROGRESS NOTES
Service Date: 11/30/2020      HISTORY OF PRESENT ILLNESS:  Hina is a very nice 55-year-old woman who I have seen intermittently over the years; last time 10 years ago.  Previous to that 8 years prior.      Her past medical history is significant for mitral valve prolapse, PVCs, multiple sclerosis, mild mitral regurgitation.  I met her in 2001 when she was referred because of a new anterior wall motion abnormality noted on echocardiogram.  A nuclear scan also appeared to demonstrate a fixed anterior defect.  We brought her to the Cardiac Catheterization Lab, where she was found to have normal coronary arteries.  As this was a persistent defect, I did not think it was takotsubo cardiomyopathy.  Ejection fraction was initially 35% and did improve to as high as 50%-55%.  My suspicion was that it was a vasospastic heart attack.      More recently in 2015, she was hospitalized at the [facility] after a COPD exacerbation, where she was intubated.  She had a mild troponin rise of 1.9.  Echocardiogram again demonstrated a distal anterior and inferior wall motion abnormality.  She was taken to the Cath Lab, where she was again found to have normal coronary arteries and her diagnosis at that time was a stress cardiomyopathy.      Her most recent evaluation of her heart was an echocardiogram in 2018 that describes an ejection fraction of 50%-55% with mild inferolateral hypokinesia, normal pulmonary pressures, mild mitral regurgitation with mitral valve thickening.      She unfortunately smokes less than a half a pack of cigarettes.  She does try to exercise on a regular basis.  She does not know her family history very well, but does not think she has any cardiac history.  Review of the chart shows that she has had elevated blood pressure on 4 of her last 5 visits.  She does not have hyperlipidemia.      She also suffers with depression and has had suicidal ideation; she states as recently as yesterday.  She does not have a  plan.  She states she has promised her neurologist it would never be by a pill overdose.  She states she is feeling quite good today.      She is now referred after an ER visit where EKG demonstrated a prolonged QT.  Review of the chart shows the vast majority of her QTs are not prolonged.  Clearly, she was having quite a bit of breathing problems that day and frequent PVCs.  To her knowledge, she has no family history of sudden death.  There is also no family history of cerebrovascular accidents.      Hina does state she has chest discomfort from time to time.  She states it is most closely associated with stress.  She states she recently had a very acrimonious breakup with her partner and this has caused some of her depression.  She states stress causes the chest pain.  She never gets the chest pain with activity of daily living, carrying groceries, walking, climbing stairs, etc.  She also states that she feels cold most of the time and this exacerbates her multiple sclerosis and she states she tends to have pain all over when exposed to cold.        ASSESSMENT AND PLAN:  I think the Hina's chest discomfort is not ischemic in origin, but most likely related to stress.      Her echocardiogram from 12/2018 shows only mild mitral regurgitation, a mildly decreased left ventricular function, possibly on an ischemic basis from a distant myocardial infarction.  Her heart failure appears to be very well compensated.      A 12-lead EKG demonstrates a normal sinus rhythm without PVCs and a normal QT interval.  I do not think she has a prolonged QT syndrome.  I suspect she was hyperventilating in the emergency room and this caused prolongation of her QT temporarily.  She does not appear to be on any medicines that are contributing.      Her main issue today is her blood pressure at 143/91 and as stated, in looking at the chart, she is fairly consistent hypertensive.  I will start her on amlodipine 2.5 mg.  This may  help with her cold extremities.  I have told her to watch for ankle swelling.  I think it is important that we get her blood pressure down.  Given the fact that she is thin and there is no obvious reason, I think we should consider a renal ultrasound.  I will have her follow up with my ELIDA in 2 weeks to see how she is tolerating the amlodipine.  We can set up her renal ultrasound at that time to look for renal artery stenosis.      Fasting lipid profile from 2020 shows total cholesterol 190, HDL 60,  and triglycerides 102.  This is excellent given her normal coronary arteries on her 2015 angiogram.      Electrolytes are within normal limits with a potassium of 4.4, creatinine 0.75, BUN of 16 from 10/2020.        I will see her back in 6 months to follow up on her blood pressure.  Down the road, we can relook at her mitral regurgitation, although at mild, I do not think we need to follow this closely.      Thank you for allowing me to participate in her care.         JOSE DAVID PAGAN MD, [facility]             D: 2020   T: 2020   MT: [place]      Name:     REY TENORIO   MRN:      [id number]        Account:      [id number]   :      1965           Service Date: 2020      Document: [id number]

## 2020-12-08 ENCOUNTER — MYC REFILL (OUTPATIENT)
Dept: PEDIATRICS | Facility: CLINIC | Age: 55
End: 2020-12-08

## 2020-12-08 DIAGNOSIS — G89.29 OTHER CHRONIC PAIN: ICD-10-CM

## 2020-12-08 RX ORDER — OXYCODONE AND ACETAMINOPHEN 5; 325 MG/1; MG/1
TABLET ORAL
Qty: 210 TABLET | Refills: 0 | Status: SHIPPED | OUTPATIENT
Start: 2020-12-08 | End: 2021-01-13

## 2020-12-08 NOTE — TELEPHONE ENCOUNTER
Routing refill request to provider for review/approval because:  Drug not on the [facility] refill protocol     Amna Yarbrough RN   [facility] -- Triage Nurse

## 2020-12-11 ENCOUNTER — OFFICE VISIT (OUTPATIENT)
Dept: CARDIOLOGY | Facility: CLINIC | Age: 55
End: 2020-12-11
Attending: INTERNAL MEDICINE
Payer: MEDICAID

## 2020-12-11 VITALS
WEIGHT: 114 LBS | SYSTOLIC BLOOD PRESSURE: 108 MMHG | HEART RATE: 92 BPM | HEIGHT: 67 IN | OXYGEN SATURATION: 100 % | DIASTOLIC BLOOD PRESSURE: 74 MMHG | BODY MASS INDEX: 17.89 KG/M2

## 2020-12-11 DIAGNOSIS — I10 ESSENTIAL HYPERTENSION: ICD-10-CM

## 2020-12-11 PROCEDURE — 99213 OFFICE O/P EST LOW 20 MIN: CPT | Performed by: PHYSICIAN ASSISTANT

## 2020-12-11 RX ORDER — INTERFERON BETA-1A 30MCG/.5ML
30 KIT INTRAMUSCULAR
COMMUNITY
Start: 2020-11-12

## 2020-12-11 ASSESSMENT — MIFFLIN-ST. JEOR: SCORE: 1144.73

## 2020-12-11 NOTE — LETTER
12/11/2020    Sunshine Butterfield, APRN CNP  [address] [place] [address]    RE: Hina Yap       Dear Colleague,    I had the pleasure of seeing Hina Yap in the [facility].    Primary Cardiologist: Dr. Modi     Reason for Visit: follow up after starting amlodipine    History of Present Illness:   Hina is a very pleasant 55 year old female who was referred to Dr. Modi recently with concerns for possible prolonged QT syndrome but this was felt to be not a true prolongation. She was noted to be hypertensive during his appointment and he added amlodipine 2.5 mg daily. She uses tobacco, has been dealing with depression with suicidal ideation but no attempt (has not seen her psychiatrist; she knows she needs to call them; she reports they did not offer phone visit which is something she could do but they only do video visit; in the notes I see that there were attempts to contact patient to schedule follow up; she tells me at one point they are discussing ECT therapy for the patient- she is not sure if she is interested in this).     Assessment and Plan:  Hina is a very pleasant 55 year old female with history of hypertension and significant depression. She denies attempt so I did not think she needed to be committed in the ED. She does have frequent suicidal ideation. I emphasized importance in following up with her psychiatric team. Her blood pressure is better with amlodipine. She tells me she does not eat very much as she has no appetite. If she has lightheadedness, she will call us and we will stop amlodipine. She will see us in cardiology clinic as needed.       This note was completed in part using Dragon voice recognition software. Although reviewed after completion, some word and grammatical errors may occur.    Orders this Visit:  No orders of the defined types were placed in this encounter.    Orders Placed This Encounter    Medications     AVONEX PEN 30 MCG/0.5ML auto-injector kit     There are no discontinued medications.      Encounter Diagnosis   Name Primary?     Essential hypertension        CURRENT MEDICATIONS:  Current Outpatient Medications   Medication Sig Dispense Refill     albuterol (PROAIR HFA/PROVENTIL HFA/VENTOLIN HFA) 108 (90 Base) MCG/ACT inhaler Inhale 2 puffs into the lungs every 4 hours as needed for shortness of breath / dyspnea or wheezing 1 Inhaler 1     ALPRAZolam (XANAX) 0.5 MG tablet Take 1 tablet (0.5 mg) by mouth 2 times daily 60 tablet 0     amLODIPine (NORVASC) 2.5 MG tablet Take 1 tablet (2.5 mg) by mouth daily 30 tablet 11     amphetamine-dextroamphetamine (ADDERALL XR) 30 MG 24 hr capsule Take 1 capsule (30 mg) by mouth daily 30 capsule 0     amphetamine-dextroamphetamine (ADDERALL) 10 MG tablet Take 1 tablet (10 mg) by mouth daily 30 tablet 0     AVONEX PEN 30 MCG/0.5ML auto-injector kit        baclofen (LIORESAL) 20 MG tablet Take 20 mg by mouth 3 times daily       gabapentin (NEURONTIN) 300 MG capsule Take 2 capsules (600 mg) by mouth 3 times daily (start with dose listed in clinic instructions) 180 capsule 1     ipratropium - albuterol 0.5 mg/2.5 mg/3 mL (DUONEB) 0.5-2.5 (3) MG/3ML neb solution Take 1 vial (3 mLs) by nebulization every 6 hours as needed for shortness of breath / dyspnea or wheezing 30 vial 1     meloxicam (MOBIC) 7.5 MG tablet Take 7.5 mg by mouth daily       OXcarbazepine ER 24hour (OXTELLAR XR) 150 MG 24 hr tablet Take by mouth daily Do not cut, crush, or chew the tablets before swallowing       oxybutynin ER (DITROPAN-XL) 5 MG 24 hr tablet Take 1 tablet (5 mg) by mouth daily 90 tablet 3     oxyCODONE-acetaminophen (PERCOCET) 5-325 MG tablet Take 1-2 tabs at a time, up to 5 times a day. No more than 7 tabs per day. Must give at least 4 hours between dosing. Will hold at this dose until at least March 2021. 210 tablet 0     promethazine (PHENERGAN) 25 MG tablet Take 25 mg by  mouth every 6 hours as needed for nausea       rOPINIRole (REQUIP) 2 MG tablet Take 2 mg by mouth every morning Patient is taking 1 tab in the morning and 2 tab at night       tiotropium-olodaterol 2.5-2.5 MCG/ACT AERS Inhale 2 puffs into the lungs daily       tiZANidine (ZANAFLEX) 2 MG tablet Take 2 mg by mouth 3 times daily       traZODone (DESYREL) 50 MG tablet TAKE 1 TO 3 TABLETS(50  MG) BY MOUTH AT BEDTIME 90 tablet 3     albuterol (PROVENTIL) (2.5 MG/3ML) 0.083% neb solution Take 1 vial (2.5 mg) by nebulization every 4 hours as needed for shortness of breath / dyspnea 1 Box 0     mirtazapine (REMERON) 15 MG tablet Take 3 tablets (45 mg) by mouth At Bedtime (Patient not taking: Reported on 11/30/2020) 180 tablet 3     predniSONE (DELTASONE) 20 MG tablet Take 3 tabs by mouth daily x 3 days, then 2 tabs daily x 3 days, then 1 tab daily x 3 days, then 1/2 tab daily x 3 days. (Patient not taking: Reported on 11/30/2020) 20 tablet 0     predniSONE (DELTASONE) 20 MG tablet Take two tablets (= 40mg) each day for 5 (five) days (Patient not taking: Reported on 11/30/2020) 10 tablet 0       ALLERGIES     Allergies   Allergen Reactions     Sulfa Drugs Rash     Adhesive Tape Rash     Reacts to adhesive on fentanyl patch     Wellbutrin [Bupropion Hydrobromide] Rash       PAST MEDICAL HISTORY:  Past Medical History:   Diagnosis Date     Anxiety      COPD (chronic obstructive pulmonary disease) (H)      GERD (gastroesophageal reflux disease)      Neuromuscular disorder (H)      Restless leg syndrome      Tobacco use disorder        PAST SURGICAL HISTORY:  No past surgical history on file.    FAMILY HISTORY:  No family history on file.    SOCIAL HISTORY:  Social History     Socioeconomic History     Marital status: Single     Spouse name: None     Number of children: None     Years of education: None     Highest education level: None   Occupational History     None   Social Needs     Financial resource strain: None      "Food insecurity     Worry: None     Inability: None     Transportation needs     Medical: None     Non-medical: None   Tobacco Use     Smoking status: Current Every Day Smoker     Packs/day: 0.50     Years: 35.00     Pack years: 17.50     Types: Cigarettes     Last attempt to quit: 2013     Years since quittin.4     Smokeless tobacco: Never Used   Substance and Sexual Activity     Alcohol use: Not Currently     Alcohol/week: 0.0 standard drinks     Drug use: No     Sexual activity: Not Currently   Lifestyle     Physical activity     Days per week: None     Minutes per session: None     Stress: None   Relationships     Social connections     Talks on phone: None     Gets together: None     Attends Anabaptism service: None     Active member of club or organization: None     Attends meetings of clubs or organizations: None     Relationship status: None     Intimate partner violence     Fear of current or ex partner: None     Emotionally abused: None     Physically abused: None     Forced sexual activity: None   Other Topics Concern     Parent/sibling w/ CABG, MI or angioplasty before 65F 55M? Not Asked   Social History Narrative     None       Review of Systems:  Skin:  Negative     Eyes:  Negative    ENT:  Negative    Respiratory:  Positive for dyspnea on exertion;shortness of breath  Cardiovascular:  Negative;palpitations chest pain;fatigue;Positive for  Gastroenterology: Negative poor appetite  Genitourinary:  not assessed    Musculoskeletal:  not assessed    Neurologic:  Positive for headaches  Psychiatric:  Positive for sleep disturbances;excessive stress;anxiety;depression  Heme/Lymph/Imm:  Negative    Endocrine:  not assessed      Physical Exam:  Vitals: /74 (BP Location: Left arm, Patient Position: Sitting, Cuff Size: Adult Regular)   Pulse 92   Ht 1.702 m (5' 7\")   Wt 51.7 kg (114 lb)   SpO2 100%   BMI 17.85 kg/m       GEN:  NAD. Very thin.  NECK: No JVD  C/V:  Regular rate and rhythm, no " murmur, rub or gallop.  RESP: Clear to auscultation bilaterally without wheezing, rales, or rhonchi.  GI: Abdomen soft, nontender, nondistended.   EXTREM: No LE edema.   NEURO: Alert and oriented, cooperative. No obvious focal deficits.   PSYCH: Slowed psychomotor activity. Flat affect.   SKIN: Warm and dry.       Recent Lab Results:  LIPID RESULTS:  Lab Results   Component Value Date    CHOL 190 02/13/2020    HDL 60 02/13/2020     (H) 02/13/2020    TRIG 102 02/13/2020    CHOLHDLRATIO 3.7 12/07/2010       LIVER ENZYME RESULTS:  Lab Results   Component Value Date    AST 17 10/29/2020    ALT 22 10/29/2020       CBC RESULTS:  Lab Results   Component Value Date    WBC 6.0 10/29/2020    RBC 4.45 10/29/2020    HGB 13.1 10/29/2020    HCT 40.0 10/29/2020    MCV 90 10/29/2020    MCH 29.4 10/29/2020    MCHC 32.8 10/29/2020    RDW 13.4 10/29/2020     10/29/2020       BMP RESULTS:  Lab Results   Component Value Date     10/29/2020    POTASSIUM 4.4 10/29/2020    CHLORIDE 113 (H) 10/29/2020    CO2 25 10/29/2020    ANIONGAP 5 10/29/2020    GLC 83 10/29/2020    BUN 16 10/29/2020    CR 0.75 10/29/2020    GFRESTIMATED 89 10/29/2020    GFRESTBLACK >90 10/29/2020    MARIO 8.6 10/29/2020        A1C RESULTS:  Lab Results   Component Value Date    A1C 5.2 09/29/2015       INR RESULTS:  Lab Results   Component Value Date    INR 0.95 08/25/2018           Malik Lee PA-C  December 11, 2020         Thank you for allowing me to participate in the care of your patient.      Sincerely,     Malik Lee PA-C     [facility]    cc:   Sunshine Butterfield, APRN CNP  [address] [place] [address]

## 2020-12-11 NOTE — PATIENT INSTRUCTIONS
Today's Plan:   1) You are doing well from heart standpoint.   2) Please call your psychiatrist for further evaluation and therapy. Maybe see if they can offer phone visit.     If you have questions or concerns please call my nurse team at [phone].     Scheduling phone number: [phone]  Reminder: Please bring in all current medications, over the counter supplements and vitamin bottles to your next appointment.    It was a pleasure seeing you today!     Malik Lee PA-C  12/11/2020

## 2020-12-11 NOTE — LETTER
12/11/2020    Sunshine Butterfield, APRN CNP  [address] [place] [address]    RE: Hina Yap       Dear Colleague,    I had the pleasure of seeing Hina Yap in the [facility].    Primary Cardiologist: Dr. Modi     Reason for Visit: follow up after starting amlodipine    History of Present Illness:   Hina is a very pleasant 55 year old female who was referred to Dr. Modi recently with concerns for possible prolonged QT syndrome but this was felt to be not a true prolongation. She was noted to be hypertensive during his appointment and he added amlodipine 2.5 mg daily. She uses tobacco, has been dealing with depression with suicidal ideation but no attempt (has not seen her psychiatrist; she knows she needs to call them; she reports they did not offer phone visit which is something she could do but they only do video visit; in the notes I see that there were attempts to contact patient to schedule follow up; she tells me at one point they are discussing ECT therapy for the patient- she is not sure if she is interested in this).     Assessment and Plan:  Hina is a very pleasant 55 year old female with history of hypertension and significant depression. She denies attempt so I did not think she needed to be committed in the ED. She does have frequent suicidal ideation. I emphasized importance in following up with her psychiatric team. Her blood pressure is better with amlodipine. She tells me she does not eat very much as she has no appetite. If she has lightheadedness, she will call us and we will stop amlodipine. She will see us in cardiology clinic as needed.       This note was completed in part using Dragon voice recognition software. Although reviewed after completion, some word and grammatical errors may occur.    Orders this Visit:  No orders of the defined types were placed in this encounter.    Orders Placed This Encounter    Medications     AVONEX PEN 30 MCG/0.5ML auto-injector kit     There are no discontinued medications.      Encounter Diagnosis   Name Primary?     Essential hypertension        CURRENT MEDICATIONS:  Current Outpatient Medications   Medication Sig Dispense Refill     albuterol (PROAIR HFA/PROVENTIL HFA/VENTOLIN HFA) 108 (90 Base) MCG/ACT inhaler Inhale 2 puffs into the lungs every 4 hours as needed for shortness of breath / dyspnea or wheezing 1 Inhaler 1     ALPRAZolam (XANAX) 0.5 MG tablet Take 1 tablet (0.5 mg) by mouth 2 times daily 60 tablet 0     amLODIPine (NORVASC) 2.5 MG tablet Take 1 tablet (2.5 mg) by mouth daily 30 tablet 11     amphetamine-dextroamphetamine (ADDERALL XR) 30 MG 24 hr capsule Take 1 capsule (30 mg) by mouth daily 30 capsule 0     amphetamine-dextroamphetamine (ADDERALL) 10 MG tablet Take 1 tablet (10 mg) by mouth daily 30 tablet 0     AVONEX PEN 30 MCG/0.5ML auto-injector kit        baclofen (LIORESAL) 20 MG tablet Take 20 mg by mouth 3 times daily       gabapentin (NEURONTIN) 300 MG capsule Take 2 capsules (600 mg) by mouth 3 times daily (start with dose listed in clinic instructions) 180 capsule 1     ipratropium - albuterol 0.5 mg/2.5 mg/3 mL (DUONEB) 0.5-2.5 (3) MG/3ML neb solution Take 1 vial (3 mLs) by nebulization every 6 hours as needed for shortness of breath / dyspnea or wheezing 30 vial 1     meloxicam (MOBIC) 7.5 MG tablet Take 7.5 mg by mouth daily       OXcarbazepine ER 24hour (OXTELLAR XR) 150 MG 24 hr tablet Take by mouth daily Do not cut, crush, or chew the tablets before swallowing       oxybutynin ER (DITROPAN-XL) 5 MG 24 hr tablet Take 1 tablet (5 mg) by mouth daily 90 tablet 3     oxyCODONE-acetaminophen (PERCOCET) 5-325 MG tablet Take 1-2 tabs at a time, up to 5 times a day. No more than 7 tabs per day. Must give at least 4 hours between dosing. Will hold at this dose until at least March 2021. 210 tablet 0     promethazine (PHENERGAN) 25 MG tablet Take 25 mg by  mouth every 6 hours as needed for nausea       rOPINIRole (REQUIP) 2 MG tablet Take 2 mg by mouth every morning Patient is taking 1 tab in the morning and 2 tab at night       tiotropium-olodaterol 2.5-2.5 MCG/ACT AERS Inhale 2 puffs into the lungs daily       tiZANidine (ZANAFLEX) 2 MG tablet Take 2 mg by mouth 3 times daily       traZODone (DESYREL) 50 MG tablet TAKE 1 TO 3 TABLETS(50  MG) BY MOUTH AT BEDTIME 90 tablet 3     albuterol (PROVENTIL) (2.5 MG/3ML) 0.083% neb solution Take 1 vial (2.5 mg) by nebulization every 4 hours as needed for shortness of breath / dyspnea 1 Box 0     mirtazapine (REMERON) 15 MG tablet Take 3 tablets (45 mg) by mouth At Bedtime (Patient not taking: Reported on 11/30/2020) 180 tablet 3     predniSONE (DELTASONE) 20 MG tablet Take 3 tabs by mouth daily x 3 days, then 2 tabs daily x 3 days, then 1 tab daily x 3 days, then 1/2 tab daily x 3 days. (Patient not taking: Reported on 11/30/2020) 20 tablet 0     predniSONE (DELTASONE) 20 MG tablet Take two tablets (= 40mg) each day for 5 (five) days (Patient not taking: Reported on 11/30/2020) 10 tablet 0       ALLERGIES     Allergies   Allergen Reactions     Sulfa Drugs Rash     Adhesive Tape Rash     Reacts to adhesive on fentanyl patch     Wellbutrin [Bupropion Hydrobromide] Rash       PAST MEDICAL HISTORY:  Past Medical History:   Diagnosis Date     Anxiety      COPD (chronic obstructive pulmonary disease) (H)      GERD (gastroesophageal reflux disease)      Neuromuscular disorder (H)      Restless leg syndrome      Tobacco use disorder        PAST SURGICAL HISTORY:  No past surgical history on file.    FAMILY HISTORY:  No family history on file.    SOCIAL HISTORY:  Social History     Socioeconomic History     Marital status: Single     Spouse name: None     Number of children: None     Years of education: None     Highest education level: None   Occupational History     None   Social Needs     Financial resource strain: None      "Food insecurity     Worry: None     Inability: None     Transportation needs     Medical: None     Non-medical: None   Tobacco Use     Smoking status: Current Every Day Smoker     Packs/day: 0.50     Years: 35.00     Pack years: 17.50     Types: Cigarettes     Last attempt to quit: 2013     Years since quittin.4     Smokeless tobacco: Never Used   Substance and Sexual Activity     Alcohol use: Not Currently     Alcohol/week: 0.0 standard drinks     Drug use: No     Sexual activity: Not Currently   Lifestyle     Physical activity     Days per week: None     Minutes per session: None     Stress: None   Relationships     Social connections     Talks on phone: None     Gets together: None     Attends Shinto service: None     Active member of club or organization: None     Attends meetings of clubs or organizations: None     Relationship status: None     Intimate partner violence     Fear of current or ex partner: None     Emotionally abused: None     Physically abused: None     Forced sexual activity: None   Other Topics Concern     Parent/sibling w/ CABG, MI or angioplasty before 65F 55M? Not Asked   Social History Narrative     None       Review of Systems:  Skin:  Negative     Eyes:  Negative    ENT:  Negative    Respiratory:  Positive for dyspnea on exertion;shortness of breath  Cardiovascular:  Negative;palpitations chest pain;fatigue;Positive for  Gastroenterology: Negative poor appetite  Genitourinary:  not assessed    Musculoskeletal:  not assessed    Neurologic:  Positive for headaches  Psychiatric:  Positive for sleep disturbances;excessive stress;anxiety;depression  Heme/Lymph/Imm:  Negative    Endocrine:  not assessed      Physical Exam:  Vitals: /74 (BP Location: Left arm, Patient Position: Sitting, Cuff Size: Adult Regular)   Pulse 92   Ht 1.702 m (5' 7\")   Wt 51.7 kg (114 lb)   SpO2 100%   BMI 17.85 kg/m       GEN:  NAD. Very thin.  NECK: No JVD  C/V:  Regular rate and rhythm, no " murmur, rub or gallop.  RESP: Clear to auscultation bilaterally without wheezing, rales, or rhonchi.  GI: Abdomen soft, nontender, nondistended.   EXTREM: No LE edema.   NEURO: Alert and oriented, cooperative. No obvious focal deficits.   PSYCH: Slowed psychomotor activity. Flat affect.   SKIN: Warm and dry.       Recent Lab Results:  LIPID RESULTS:  Lab Results   Component Value Date    CHOL 190 02/13/2020    HDL 60 02/13/2020     (H) 02/13/2020    TRIG 102 02/13/2020    CHOLHDLRATIO 3.7 12/07/2010       LIVER ENZYME RESULTS:  Lab Results   Component Value Date    AST 17 10/29/2020    ALT 22 10/29/2020       CBC RESULTS:  Lab Results   Component Value Date    WBC 6.0 10/29/2020    RBC 4.45 10/29/2020    HGB 13.1 10/29/2020    HCT 40.0 10/29/2020    MCV 90 10/29/2020    MCH 29.4 10/29/2020    MCHC 32.8 10/29/2020    RDW 13.4 10/29/2020     10/29/2020       BMP RESULTS:  Lab Results   Component Value Date     10/29/2020    POTASSIUM 4.4 10/29/2020    CHLORIDE 113 (H) 10/29/2020    CO2 25 10/29/2020    ANIONGAP 5 10/29/2020    GLC 83 10/29/2020    BUN 16 10/29/2020    CR 0.75 10/29/2020    GFRESTIMATED 89 10/29/2020    GFRESTBLACK >90 10/29/2020    MARIO 8.6 10/29/2020        A1C RESULTS:  Lab Results   Component Value Date    A1C 5.2 09/29/2015       INR RESULTS:  Lab Results   Component Value Date    INR 0.95 08/25/2018         Thank you for allowing me to participate in the care of your patient.    Sincerely,     Malik Lee PA-C     [facility]

## 2020-12-11 NOTE — PROGRESS NOTES
Primary Cardiologist: Dr. Modi     Reason for Visit: follow up after starting amlodipine    History of Present Illness:   Hina is a very pleasant 55 year old female who was referred to Dr. Modi recently with concerns for possible prolonged QT syndrome but this was felt to be not a true prolongation. She was noted to be hypertensive during his appointment and he added amlodipine 2.5 mg daily. She uses tobacco, has been dealing with depression with suicidal ideation but no attempt (has not seen her psychiatrist; she knows she needs to call them; she reports they did not offer phone visit which is something she could do but they only do video visit; in the notes I see that there were attempts to contact patient to schedule follow up; she tells me at one point they are discussing ECT therapy for the patient- she is not sure if she is interested in this).     Assessment and Plan:  Hina is a very pleasant 55 year old female with history of hypertension and significant depression. She denies attempt so I did not think she needed to be committed in the ED. She does have frequent suicidal ideation. I emphasized importance in following up with her psychiatric team. Her blood pressure is better with amlodipine. She tells me she does not eat very much as she has no appetite. If she has lightheadedness, she will call us and we will stop amlodipine. She will see us in cardiology clinic as needed.       This note was completed in part using Dragon voice recognition software. Although reviewed after completion, some word and grammatical errors may occur.    Orders this Visit:  No orders of the defined types were placed in this encounter.    Orders Placed This Encounter   Medications     AVONEX PEN 30 MCG/0.5ML auto-injector kit     There are no discontinued medications.      Encounter Diagnosis   Name Primary?     Essential hypertension        CURRENT MEDICATIONS:  Current Outpatient Medications   Medication Sig  Dispense Refill     albuterol (PROAIR HFA/PROVENTIL HFA/VENTOLIN HFA) 108 (90 Base) MCG/ACT inhaler Inhale 2 puffs into the lungs every 4 hours as needed for shortness of breath / dyspnea or wheezing 1 Inhaler 1     ALPRAZolam (XANAX) 0.5 MG tablet Take 1 tablet (0.5 mg) by mouth 2 times daily 60 tablet 0     amLODIPine (NORVASC) 2.5 MG tablet Take 1 tablet (2.5 mg) by mouth daily 30 tablet 11     amphetamine-dextroamphetamine (ADDERALL XR) 30 MG 24 hr capsule Take 1 capsule (30 mg) by mouth daily 30 capsule 0     amphetamine-dextroamphetamine (ADDERALL) 10 MG tablet Take 1 tablet (10 mg) by mouth daily 30 tablet 0     AVONEX PEN 30 MCG/0.5ML auto-injector kit        baclofen (LIORESAL) 20 MG tablet Take 20 mg by mouth 3 times daily       gabapentin (NEURONTIN) 300 MG capsule Take 2 capsules (600 mg) by mouth 3 times daily (start with dose listed in clinic instructions) 180 capsule 1     ipratropium - albuterol 0.5 mg/2.5 mg/3 mL (DUONEB) 0.5-2.5 (3) MG/3ML neb solution Take 1 vial (3 mLs) by nebulization every 6 hours as needed for shortness of breath / dyspnea or wheezing 30 vial 1     meloxicam (MOBIC) 7.5 MG tablet Take 7.5 mg by mouth daily       OXcarbazepine ER 24hour (OXTELLAR XR) 150 MG 24 hr tablet Take by mouth daily Do not cut, crush, or chew the tablets before swallowing       oxybutynin ER (DITROPAN-XL) 5 MG 24 hr tablet Take 1 tablet (5 mg) by mouth daily 90 tablet 3     oxyCODONE-acetaminophen (PERCOCET) 5-325 MG tablet Take 1-2 tabs at a time, up to 5 times a day. No more than 7 tabs per day. Must give at least 4 hours between dosing. Will hold at this dose until at least March 2021. 210 tablet 0     promethazine (PHENERGAN) 25 MG tablet Take 25 mg by mouth every 6 hours as needed for nausea       rOPINIRole (REQUIP) 2 MG tablet Take 2 mg by mouth every morning Patient is taking 1 tab in the morning and 2 tab at night       tiotropium-olodaterol 2.5-2.5 MCG/ACT AERS Inhale 2 puffs into the lungs  daily       tiZANidine (ZANAFLEX) 2 MG tablet Take 2 mg by mouth 3 times daily       traZODone (DESYREL) 50 MG tablet TAKE 1 TO 3 TABLETS(50  MG) BY MOUTH AT BEDTIME 90 tablet 3     albuterol (PROVENTIL) (2.5 MG/3ML) 0.083% neb solution Take 1 vial (2.5 mg) by nebulization every 4 hours as needed for shortness of breath / dyspnea 1 Box 0     mirtazapine (REMERON) 15 MG tablet Take 3 tablets (45 mg) by mouth At Bedtime (Patient not taking: Reported on 11/30/2020) 180 tablet 3     predniSONE (DELTASONE) 20 MG tablet Take 3 tabs by mouth daily x 3 days, then 2 tabs daily x 3 days, then 1 tab daily x 3 days, then 1/2 tab daily x 3 days. (Patient not taking: Reported on 11/30/2020) 20 tablet 0     predniSONE (DELTASONE) 20 MG tablet Take two tablets (= 40mg) each day for 5 (five) days (Patient not taking: Reported on 11/30/2020) 10 tablet 0       ALLERGIES     Allergies   Allergen Reactions     Sulfa Drugs Rash     Adhesive Tape Rash     Reacts to adhesive on fentanyl patch     Wellbutrin [Bupropion Hydrobromide] Rash       PAST MEDICAL HISTORY:  Past Medical History:   Diagnosis Date     Anxiety      COPD (chronic obstructive pulmonary disease) (H)      GERD (gastroesophageal reflux disease)      Neuromuscular disorder (H)      Restless leg syndrome      Tobacco use disorder        PAST SURGICAL HISTORY:  No past surgical history on file.    FAMILY HISTORY:  No family history on file.    SOCIAL HISTORY:  Social History     Socioeconomic History     Marital status: Single     Spouse name: None     Number of children: None     Years of education: None     Highest education level: None   Occupational History     None   Social Needs     Financial resource strain: None     Food insecurity     Worry: None     Inability: None     Transportation needs     Medical: None     Non-medical: None   Tobacco Use     Smoking status: Current Every Day Smoker     Packs/day: 0.50     Years: 35.00     Pack years: 17.50     Types:  "Cigarettes     Last attempt to quit: 2013     Years since quittin.4     Smokeless tobacco: Never Used   Substance and Sexual Activity     Alcohol use: Not Currently     Alcohol/week: 0.0 standard drinks     Drug use: No     Sexual activity: Not Currently   Lifestyle     Physical activity     Days per week: None     Minutes per session: None     Stress: None   Relationships     Social connections     Talks on phone: None     Gets together: None     Attends Judaism service: None     Active member of club or organization: None     Attends meetings of clubs or organizations: None     Relationship status: None     Intimate partner violence     Fear of current or ex partner: None     Emotionally abused: None     Physically abused: None     Forced sexual activity: None   Other Topics Concern     Parent/sibling w/ CABG, MI or angioplasty before 65F 55M? Not Asked   Social History Narrative     None       Review of Systems:  Skin:  Negative     Eyes:  Negative    ENT:  Negative    Respiratory:  Positive for dyspnea on exertion;shortness of breath  Cardiovascular:  Negative;palpitations chest pain;fatigue;Positive for  Gastroenterology: Negative poor appetite  Genitourinary:  not assessed    Musculoskeletal:  not assessed    Neurologic:  Positive for headaches  Psychiatric:  Positive for sleep disturbances;excessive stress;anxiety;depression  Heme/Lymph/Imm:  Negative    Endocrine:  not assessed      Physical Exam:  Vitals: /74 (BP Location: Left arm, Patient Position: Sitting, Cuff Size: Adult Regular)   Pulse 92   Ht 1.702 m (5' 7\")   Wt 51.7 kg (114 lb)   SpO2 100%   BMI 17.85 kg/m       GEN:  NAD. Very thin.  NECK: No JVD  C/V:  Regular rate and rhythm, no murmur, rub or gallop.  RESP: Clear to auscultation bilaterally without wheezing, rales, or rhonchi.  GI: Abdomen soft, nontender, nondistended.   EXTREM: No LE edema.   NEURO: Alert and oriented, cooperative. No obvious focal deficits.   PSYCH: " Slowed psychomotor activity. Flat affect.   SKIN: Warm and dry.       Recent Lab Results:  LIPID RESULTS:  Lab Results   Component Value Date    CHOL 190 02/13/2020    HDL 60 02/13/2020     (H) 02/13/2020    TRIG 102 02/13/2020    CHOLHDLRATIO 3.7 12/07/2010       LIVER ENZYME RESULTS:  Lab Results   Component Value Date    AST 17 10/29/2020    ALT 22 10/29/2020       CBC RESULTS:  Lab Results   Component Value Date    WBC 6.0 10/29/2020    RBC 4.45 10/29/2020    HGB 13.1 10/29/2020    HCT 40.0 10/29/2020    MCV 90 10/29/2020    MCH 29.4 10/29/2020    MCHC 32.8 10/29/2020    RDW 13.4 10/29/2020     10/29/2020       BMP RESULTS:  Lab Results   Component Value Date     10/29/2020    POTASSIUM 4.4 10/29/2020    CHLORIDE 113 (H) 10/29/2020    CO2 25 10/29/2020    ANIONGAP 5 10/29/2020    GLC 83 10/29/2020    BUN 16 10/29/2020    CR 0.75 10/29/2020    GFRESTIMATED 89 10/29/2020    GFRESTBLACK >90 10/29/2020    MARIO 8.6 10/29/2020        A1C RESULTS:  Lab Results   Component Value Date    A1C 5.2 09/29/2015       INR RESULTS:  Lab Results   Component Value Date    INR 0.95 08/25/2018           Malik Lee PA-C  December 11, 2020

## 2020-12-12 ENCOUNTER — MYC REFILL (OUTPATIENT)
Dept: PEDIATRICS | Facility: CLINIC | Age: 55
End: 2020-12-12

## 2020-12-12 DIAGNOSIS — G35 MULTIPLE SCLEROSIS (H): ICD-10-CM

## 2020-12-12 DIAGNOSIS — F41.9 ANXIETY: ICD-10-CM

## 2020-12-12 DIAGNOSIS — N39.41 URGE INCONTINENCE OF URINE: ICD-10-CM

## 2020-12-12 DIAGNOSIS — G47.09 OTHER INSOMNIA: ICD-10-CM

## 2020-12-12 DIAGNOSIS — G89.29 OTHER CHRONIC PAIN: ICD-10-CM

## 2020-12-12 DIAGNOSIS — F32.89 OTHER DEPRESSION: ICD-10-CM

## 2020-12-12 DIAGNOSIS — R53.83 OTHER FATIGUE: ICD-10-CM

## 2020-12-12 DIAGNOSIS — G47.00 INSOMNIA, UNSPECIFIED TYPE: ICD-10-CM

## 2020-12-15 RX ORDER — ALPRAZOLAM 0.5 MG
0.5 TABLET ORAL 2 TIMES DAILY
Qty: 60 TABLET | Refills: 0 | Status: SHIPPED | OUTPATIENT
Start: 2020-12-15 | End: 2021-01-20

## 2020-12-15 RX ORDER — DEXTROAMPHETAMINE SACCHARATE, AMPHETAMINE ASPARTATE, DEXTROAMPHETAMINE SULFATE AND AMPHETAMINE SULFATE 2.5; 2.5; 2.5; 2.5 MG/1; MG/1; MG/1; MG/1
10 TABLET ORAL DAILY
Qty: 30 TABLET | Refills: 0 | Status: SHIPPED | OUTPATIENT
Start: 2020-12-15 | End: 2021-01-13

## 2020-12-15 RX ORDER — GABAPENTIN 300 MG/1
600 CAPSULE ORAL 3 TIMES DAILY
Qty: 180 CAPSULE | Refills: 1 | OUTPATIENT
Start: 2020-12-15

## 2020-12-15 RX ORDER — OXYBUTYNIN CHLORIDE 5 MG/1
5 TABLET, EXTENDED RELEASE ORAL DAILY
Qty: 90 TABLET | Refills: 3 | OUTPATIENT
Start: 2020-12-15

## 2020-12-15 RX ORDER — MIRTAZAPINE 15 MG/1
45 TABLET, FILM COATED ORAL AT BEDTIME
Qty: 180 TABLET | Refills: 3 | OUTPATIENT
Start: 2020-12-15

## 2020-12-15 RX ORDER — DEXTROAMPHETAMINE SACCHARATE, AMPHETAMINE ASPARTATE MONOHYDRATE, DEXTROAMPHETAMINE SULFATE AND AMPHETAMINE SULFATE 7.5; 7.5; 7.5; 7.5 MG/1; MG/1; MG/1; MG/1
30 CAPSULE, EXTENDED RELEASE ORAL DAILY
Qty: 30 CAPSULE | Refills: 0 | Status: SHIPPED | OUTPATIENT
Start: 2020-12-15 | End: 2021-01-13

## 2020-12-15 RX ORDER — TRAZODONE HYDROCHLORIDE 50 MG/1
TABLET, FILM COATED ORAL
Qty: 90 TABLET | Refills: 3 | OUTPATIENT
Start: 2020-12-15

## 2020-12-15 NOTE — TELEPHONE ENCOUNTER
Routing refill request to provider for review/approval because:  Drug not on the [facility] refill protocol   Elevated PHQ9    Mady Ennis RN on 12/15/2020 at 11:17 AM

## 2020-12-15 NOTE — TELEPHONE ENCOUNTER
Called pt. No answer, LVM that medication refills were ready and that she had refills on her other medications at the pharmacy; also noted if she had questions or concerns to call back on my personal extension or if I am unavailable to call clinic back.    If pt calls back:  Reiterate that her Xanax and Adderall were refilled but she should still have refills on file for the other medications - she should contact her pharmacy regarding these.    Reji Rahman, EMT at 1:39 PM on December 15, 2020   [facility] Health Guide   [phone]

## 2020-12-28 ENCOUNTER — MYC MEDICAL ADVICE (OUTPATIENT)
Dept: PEDIATRICS | Facility: CLINIC | Age: 55
End: 2020-12-28

## 2020-12-28 ENCOUNTER — PATIENT OUTREACH (OUTPATIENT)
Dept: PEDIATRICS | Facility: CLINIC | Age: 55
End: 2020-12-28

## 2020-12-28 NOTE — TELEPHONE ENCOUNTER
"PCP message:   This message was written 11/11 and postponed!     If Jayla doesn't actively engage with psychiatry, some sort of therapy, and pain, I advise that she should begin weaning xanax and oxy again. We decided to hold on further weaning last summer until she was able to get her mental health under control.     If she is NOT follow up with all 3 by the time you receive this message, please forward to all providers on the team so they know she needs to be seen by one of them to discuss continuation of the wean.\"    Called pt to ascertain status of:  1) Psych  2) Therapy  3) Pain    No answer, LVM to call back on my personal extension or if I am unavailable to call clinic back. Also sent [facility] message.    If pt calls back:  Figure out status of psych, therapy and pain. Inform team if she has not followed up with all 3 to discuss continuation of wean.    Reji Rahman, EMT at 1:31 PM on December 28, 2020   [facility] Guide   [phone]  "

## 2021-01-07 NOTE — TELEPHONE ENCOUNTER
"Called pt to ascertain status of:  1) Psych  2) Therapy  3) Pain    Pt has been out of town and notes she has been \"going through a lot in [her] personal life\" and so hasn't set up appointments to follow up with them yet. She wanted to thank her team for being understanding of her position.     Pt reports she  will call psychiatry and pain clinic to set up appts after she returns to town tomorrow.     Pt notes she is not sure who to contact about talk therapy as she hasn't seen someone in a long time. She wants to talk to psych first about medications before considering therapy.     Reji Rahman, EMT at 12:31 PM on January 7, 2021   [facility] Guide   [phone]       "

## 2021-01-08 NOTE — TELEPHONE ENCOUNTER
Called pt. No answer, LVM to call back on my personal extension or if I am unavailable to call clinic back.    If pt calls back:  Inform pt of message below.    Reji Rahman, EMT at 2:02 PM on January 8, 2021   [facility] Health Guide   [phone]

## 2021-01-14 NOTE — TELEPHONE ENCOUNTER
Called pt. Attempt #2. No answer, LVM to call back on my personal extension or if I am unavailable to call clinic back.     If pt calls back:  Inform of below.  Verify that appt for psych AND therapy AND pain clinic have all been made and ask when they are.  If not all scheduled, schedule virtual visit to discuss wean plan     Reji Rahman, EMT at 12:49 PM on January 14, 2021   Clinic Health Guide   [phone]

## 2021-01-15 ENCOUNTER — MYC REFILL (OUTPATIENT)
Dept: PEDIATRICS | Facility: CLINIC | Age: 56
End: 2021-01-15

## 2021-01-15 DIAGNOSIS — R53.83 OTHER FATIGUE: ICD-10-CM

## 2021-01-15 DIAGNOSIS — G89.29 OTHER CHRONIC PAIN: ICD-10-CM

## 2021-01-15 DIAGNOSIS — F41.9 ANXIETY: ICD-10-CM

## 2021-01-15 DIAGNOSIS — G35 MULTIPLE SCLEROSIS (H): ICD-10-CM

## 2021-01-15 RX ORDER — ALPRAZOLAM 0.5 MG
0.5 TABLET ORAL 2 TIMES DAILY
Qty: 60 TABLET | Refills: 0 | OUTPATIENT
Start: 2021-01-15

## 2021-01-15 RX ORDER — OXYCODONE AND ACETAMINOPHEN 5; 325 MG/1; MG/1
TABLET ORAL
Qty: 210 TABLET | Refills: 0 | OUTPATIENT
Start: 2021-01-15

## 2021-01-15 RX ORDER — DEXTROAMPHETAMINE SACCHARATE, AMPHETAMINE ASPARTATE MONOHYDRATE, DEXTROAMPHETAMINE SULFATE AND AMPHETAMINE SULFATE 7.5; 7.5; 7.5; 7.5 MG/1; MG/1; MG/1; MG/1
30 CAPSULE, EXTENDED RELEASE ORAL DAILY
Qty: 30 CAPSULE | Refills: 0 | OUTPATIENT
Start: 2021-01-15

## 2021-01-15 RX ORDER — DEXTROAMPHETAMINE SACCHARATE, AMPHETAMINE ASPARTATE, DEXTROAMPHETAMINE SULFATE AND AMPHETAMINE SULFATE 2.5; 2.5; 2.5; 2.5 MG/1; MG/1; MG/1; MG/1
10 TABLET ORAL DAILY
Qty: 30 TABLET | Refills: 0 | OUTPATIENT
Start: 2021-01-15

## 2021-01-15 NOTE — TELEPHONE ENCOUNTER
Documented on encounter dated 1/13/2021. Closing this encounter.    Reji Rahman, EMT at 3:01 PM on January 15, 2021   [facility] Health Guide   [phone]

## 2021-01-15 NOTE — TELEPHONE ENCOUNTER
Routing refill request to provider for review/approval because:  Drug not on the [facility] refill protocol     Mady Ennis RN on 1/15/2021 at 10:41 AM

## 2021-01-18 NOTE — TELEPHONE ENCOUNTER
Pt was informed of below in [facility] Refill encounter dating 1/13/2021. See that encounter for details. Pt was scheduled for virtual visit on 1/27/2021 to discuss wean plan. Closing this encounter.    Reji Rahman, EMT at 9:14 AM on January 18, 2021   [facility] Health Guide   [phone]

## 2021-01-20 ENCOUNTER — MYC REFILL (OUTPATIENT)
Dept: PEDIATRICS | Facility: CLINIC | Age: 56
End: 2021-01-20

## 2021-01-20 DIAGNOSIS — F41.9 ANXIETY: ICD-10-CM

## 2021-01-20 NOTE — TELEPHONE ENCOUNTER
Routing refill request to provider for review/approval because:  Drug not on the [facility] refill protocol     Amna Yarbrough RN   [facility] -- Triage Nurse

## 2021-01-21 DIAGNOSIS — G89.29 OTHER CHRONIC PAIN: ICD-10-CM

## 2021-01-21 RX ORDER — GABAPENTIN 300 MG/1
600 CAPSULE ORAL 3 TIMES DAILY
Qty: 180 CAPSULE | Refills: 0 | Status: SHIPPED | OUTPATIENT
Start: 2021-01-21 | End: 2021-02-16

## 2021-01-21 RX ORDER — ALPRAZOLAM 0.5 MG
0.5 TABLET ORAL 2 TIMES DAILY
Qty: 60 TABLET | Refills: 0 | Status: SHIPPED | OUTPATIENT
Start: 2021-01-21 | End: 2021-02-16

## 2021-01-21 NOTE — TELEPHONE ENCOUNTER
Routing refill request to provider for review/approval because:  Drug not on the [facility] refill protocol     Tamia Biggs RN

## 2021-01-27 ENCOUNTER — VIRTUAL VISIT (OUTPATIENT)
Dept: PSYCHIATRY | Facility: CLINIC | Age: 56
End: 2021-01-27
Payer: MEDICAID

## 2021-01-27 ENCOUNTER — VIRTUAL VISIT (OUTPATIENT)
Dept: PEDIATRICS | Facility: CLINIC | Age: 56
End: 2021-01-27
Payer: MEDICAID

## 2021-01-27 DIAGNOSIS — F33.2 SEVERE EPISODE OF RECURRENT MAJOR DEPRESSIVE DISORDER, WITHOUT PSYCHOTIC FEATURES (H): ICD-10-CM

## 2021-01-27 DIAGNOSIS — G35 MULTIPLE SCLEROSIS (H): ICD-10-CM

## 2021-01-27 DIAGNOSIS — F33.2 SEVERE EPISODE OF RECURRENT MAJOR DEPRESSIVE DISORDER, WITHOUT PSYCHOTIC FEATURES (H): Primary | ICD-10-CM

## 2021-01-27 PROCEDURE — 99214 OFFICE O/P EST MOD 30 MIN: CPT | Mod: 95 | Performed by: NURSE PRACTITIONER

## 2021-01-27 NOTE — PROGRESS NOTES
Geno is a 55 year old who is being evaluated via a billable video visit.      How would you like to obtain your AVS? MyChart  If the video visit is dropped, the invitation should be resent by: Text to cell phone: [phone]  Will anyone else be joining your video visit? No      Video Start Time: 3pm  Video-Visit Details    Type of service:  Video Visit    Video End [id number]    Originating Location (pt. Location): Home    Distant Location (provider location):  [facility] PSYCHIATRY     Platform used for Video Visit: [facility]  Psychiatry Clinic  PSYCHIATRIC PROGRESS NOTE             PCP- Sunshine Butterfield  Specialty Providers- yes, neurology  Therapist- no  Psychiatrist- no  Other Mental Health Providers- no    Hina SALAZAR Fracisco is a 54 year old female who prefers the name Hina & pronouns she, her, hers.    Referred by:  PCP  Referred for evaluation of:  depression and anxiety.    Interim History     [4, 4]     Has been feeling worse, pain from MS a problem. Seeing pain specialist on Monday. PCP wants to wean off opioids. Has not discussed ketamine.     Recent Symptoms:   Depression: Positive for depressive symptoms, sadness , irritability, anhedonia, social isolation, loss of appetite, insomnia,psychomotor agitation,  fatigue, feeling worthless, guilt, poor concentration, indecisiveness, passive thoughts of death.      Recent Substance Use:  tobacco        Social/ Family History      [1ea,1ea]            [per patient report]               No changes    Medical / Surgical History                                 Patient Active Problem List   Diagnosis     MS (multiple sclerosis) (H)     Iron deficiency anemia     Ischemic cardiomyopathy     Esophageal reflux     Anxiety     Low serum ferritin level     Restless leg syndrome     Moderate episode of recurrent major depressive disorder (H)     Other chronic pain     Other depression     CKD (chronic kidney disease)  stage 2, GFR 60-89 ml/min     Insomnia, unspecified type     SOB (shortness of breath)     Prolonged Q-T interval on ECG     Essential hypertension     PVC's (premature ventricular contractions)     Nonrheumatic mitral valve regurgitation     Precordial pain       History reviewed. No pertinent surgical history.     Medical Review of Systems         [2,10]   The remainder of the review of systems is noncontributory  Allergy    Sulfa drugs, Adhesive tape, and Wellbutrin [bupropion hydrobromide]  Current Medications        Current Outpatient Medications   Medication Sig Dispense Refill     albuterol (PROAIR HFA/PROVENTIL HFA/VENTOLIN HFA) 108 (90 Base) MCG/ACT inhaler Inhale 2 puffs into the lungs every 4 hours as needed for shortness of breath / dyspnea or wheezing 1 Inhaler 1     ALPRAZolam (XANAX) 0.5 MG tablet Take 1 tablet (0.5 mg) by mouth 2 times daily 60 tablet 0     amLODIPine (NORVASC) 2.5 MG tablet Take 1 tablet (2.5 mg) by mouth daily 30 tablet 11     amphetamine-dextroamphetamine (ADDERALL XR) 30 MG 24 hr capsule Take 1 capsule (30 mg) by mouth daily 30 capsule 0     amphetamine-dextroamphetamine (ADDERALL) 10 MG tablet Take 1 tablet (10 mg) by mouth daily 30 tablet 0     AVONEX PEN 30 MCG/0.5ML auto-injector kit        baclofen (LIORESAL) 20 MG tablet Take 20 mg by mouth 3 times daily       gabapentin (NEURONTIN) 300 MG capsule Take 2 capsules (600 mg) by mouth 3 times daily (start with dose listed in clinic instructions) 180 capsule 0     ipratropium - albuterol 0.5 mg/2.5 mg/3 mL (DUONEB) 0.5-2.5 (3) MG/3ML neb solution Take 1 vial (3 mLs) by nebulization every 6 hours as needed for shortness of breath / dyspnea or wheezing 30 vial 1     meloxicam (MOBIC) 7.5 MG tablet Take 7.5 mg by mouth daily       OXcarbazepine ER 24hour (OXTELLAR XR) 150 MG 24 hr tablet Take by mouth daily Do not cut, crush, or chew the tablets before swallowing       oxybutynin ER (DITROPAN-XL) 5 MG 24 hr tablet Take 1 tablet (5  mg) by mouth daily 90 tablet 3     oxyCODONE-acetaminophen (PERCOCET) 5-325 MG tablet Take 1-2 tabs at a time, up to 5 times a day. No more than 7 tabs per day. Must give at least 4 hours between dosing. Will hold at this dose until at least March 2021. 210 tablet 0     promethazine (PHENERGAN) 25 MG tablet Take 25 mg by mouth every 6 hours as needed for nausea       rOPINIRole (REQUIP) 2 MG tablet Take 2 mg by mouth every morning Patient is taking 1 tab in the morning and 2 tab at night       tiZANidine (ZANAFLEX) 2 MG tablet Take 2 mg by mouth 3 times daily       traZODone (DESYREL) 50 MG tablet TAKE 1 TO 3 TABLETS(50  MG) BY MOUTH AT BEDTIME 90 tablet 3     albuterol (PROVENTIL) (2.5 MG/3ML) 0.083% neb solution Take 1 vial (2.5 mg) by nebulization every 4 hours as needed for shortness of breath / dyspnea 1 Box 0     mirtazapine (REMERON) 15 MG tablet Take 3 tablets (45 mg) by mouth At Bedtime (Patient not taking: Reported on 11/30/2020) 180 tablet 3     predniSONE (DELTASONE) 20 MG tablet Take 3 tabs by mouth daily x 3 days, then 2 tabs daily x 3 days, then 1 tab daily x 3 days, then 1/2 tab daily x 3 days. (Patient not taking: Reported on 11/30/2020) 20 tablet 0     predniSONE (DELTASONE) 20 MG tablet Take two tablets (= 40mg) each day for 5 (five) days (Patient not taking: Reported on 11/30/2020) 10 tablet 0     tiotropium-olodaterol 2.5-2.5 MCG/ACT AERS Inhale 2 puffs into the lungs daily       Vitals         [3, 3]   There were no vitals taken for this visit.   Mental Status Exam        [9, 14 cog gs]     Alertness: alert  and oriented  Appearance: casually groomed  Behavior/Demeanor: cooperative, with fair  eye contact   Speech: normal and regular rate and rhythm  Language: intact  Psychomotor: normal or unremarkable  Mood: depressed  Affect: restricted; was congruent to mood; was congruent to content  Thought Process/Associations: unremarkable  Thought Content:  Reports passive death thoughts;  Denies  violent ideation  Perception:  Reports none;  Denies auditory hallucinations  Insight: adequate  Judgment: good  Cognition: (6) does  appear grossly intact; formal cognitive testing was not done      Labs and Data                              PHQ9 Today:  16  PHQ 4/28/2020 5/20/2020 11/5/2020   PHQ-9 Total Score 25 22 16   Q9: Thoughts of better off dead/self-harm past 2 weeks Several days Several days Not at all         Diagnosis      Impression of MDD, severe, recurrent. Recommend TMS, consider MAOI if refractory. Ropinerole for RLS has not helped with mood or anhedonia/motivation.      Assessment      [m2, h3]     TODAY: Reports that mood is worse, says she is inrterested in TMS but apparantally has not responded to scheduling.       Plan                                                                                                                     m2, h3     -rTMS       RTC: 2wks after starting tms      CRISIS NUMBERS:   Provided routinely in AVS.    Treatment Risk Statement:  The patient understands the risks, benefits, adverse effects and alternatives. Agrees to treatment with the capacity to do so. No medical contraindications to treatment. Agrees to call clinic for any problems. The patient understands to call 911 or go to the nearest ED if life threatening or urgent symptoms occur.     Jose Tai

## 2021-01-27 NOTE — PROGRESS NOTES
Hina is a 55 year old who is being evaluated via a billable video visit.      How would you like to obtain your AVS? MyChart  If the video visit is dropped, the invitation should be resent by: Text to cell phone: [phone]  Will anyone else be joining your video visit? No    Video Start Time: 10:25 AM  Assessment & Plan     Multiple sclerosis (H)  - Follows with neurology. Due for follow up 4-6 months post October visit. Reviewed neurology notes. Will continue with same dose of controlled substances for now. Until primary care provider returns from maternity leave. She appears to now be following with specialties as advised. Has upcoming appt with new pain team. Advised to have those records faxed to primary care provider. Advised they will likely not prescribe controlled substances at first visit so primary care provider will continue prescribing in the mean time. Advised to schedule f/u with primary care provider in March. Offered to have my team call to schedule and she declines. Explained that she will likely need f/u with primary care provider for future refills  - She is frustrated about ever having medications weaned. Discussed risks of controlled substances, especially combined use of opioids and benzodiazepines.    Severe episode of recurrent major depressive disorder, without psychotic features (H)  - Appt with psychiatry today. I don't see any previous visits from Dr. Tai in her chart. Will get expected timeline for waitlist for TMS. Previous psychiatrist had recommended DBT. Appreciate their input    More than 30minutes spent in patients chart for chart review, patient visit and documentation          Return in about 2 months (around 3/27/2021) for schedule follow up with pcp .    BENNETT Anderson [facility] SANDEEP Santamaria is a 55 year old who presents to clinic today for the following health issues     HPI       She normally follows with Sunshine Butterfield, MARY  who is on maternity leave  Has history of MS and chronic pain  Previously on oxycodone, alprazolam and Adderall prescribed by neurologist who then suddenly left practice and primary care provider now prescribing, while trying to wean  Plan from primary care provider to continue with same medications (and not consider further wean) until she returns from maternity leave  Primary care provider left instructions that patient must continue to follow with psychiatry, enter DBT treatment and follow with pain team in order to continue current dosages without weaning    PAL reached out to patient earlier this month to monitor her progress    Has appointment with psychiatrist today  Dr. Jose Tai  Only had 1 other visit   At last visit, had suggested getting on waiting list for transcranial magnetic stimulation  This would be once daily for 6 weeks    Has not enrolled in DBT. Doesn't think she has the time for it    Has appt with [facility] pain clinic 2/1/21  Hoping they will take over prescribing medications    She follows at [facility], Dr. Dairon Fan  States her neurologist agreed with medications she is currently on but they can't prescribed controlled substances at [facility]  Was referred to  Pain clinic by neurologist  Note reviewed from last visit, October 2020  Does not specifically say that Dr. Darion Fan agreed with prescribed medications, but does say pain management discussed and referred to pain team for second opinion          Review of Systems   Constitutional, HEENT, cardiovascular, pulmonary, gi and gu systems are negative, except as otherwise noted.      Objective           Vitals:Answers for HPI/ROS submitted by the patient on 1/27/2021   Chronic problems general questions HPI Form  How many servings of fruits and vegetables do you eat daily?: 0-1  On average, how many sweetened beverages do you drink each day (Examples: soda, juice, sweet tea, etc.  Do NOT count diet or artificially sweetened  beverages)?: 0  How many minutes a day do you exercise enough to make your heart beat faster?: 9 or less  How many days a week do you exercise enough to make your heart beat faster?: 7  How many days per week do you miss taking your medication?: 1  What makes it hard for you to take your medication every day?: remembering to take  ue to virtual visit.    Physical Exam   GENERAL: Healthy, alert and no distress  EYES: Eyes grossly normal to inspection.  No discharge or erythema, or obvious scleral/conjunctival abnormalities.  RESP: No audible wheeze, cough, or visible cyanosis.  No visible retractions or increased work of breathing.    SKIN: Visible skin clear. No significant rash, abnormal pigmentation or lesions.  NEURO: Cranial nerves grossly intact.  Mentation and speech appropriate for age.  PSYCH: Mentation appears normal, affect normal/bright, judgement and insight intact, normal speech and appearance well-groomed.            Video-Visit Details    Type of service:  Video Visit    Video End Time:10:45 AM    Originating Location (pt. Location): Home    Distant Location (provider location):  [facility] SANDEEP     Platform used for Video Visit: [employer]

## 2021-02-08 ENCOUNTER — TELEPHONE (OUTPATIENT)
Dept: PSYCHIATRY | Facility: CLINIC | Age: 56
End: 2021-02-08

## 2021-02-16 NOTE — TELEPHONE ENCOUNTER
Left VM re: TMS. - AB 2/16/21   dietlow carbs exercise prescription for 30 minutes of brisk walking 5 days a week or more        5 obesity   see #4     6polymyalgias  Hold crestor for 10 day

## 2021-02-26 NOTE — TELEPHONE ENCOUNTER
Patient is unsure about what she wants to do about treatment. She wants to have treatment somewhere closer to home. Patient was alerted that she will be moved off the waitlist but should call back in the future if she decides she would like to pursue TMS with our clinic - AB 2/26/21

## 2021-03-01 ENCOUNTER — TRANSFERRED RECORDS (OUTPATIENT)
Dept: HEALTH INFORMATION MANAGEMENT | Facility: CLINIC | Age: 56
End: 2021-03-01

## 2021-03-14 DIAGNOSIS — G47.09 OTHER INSOMNIA: ICD-10-CM

## 2021-03-16 RX ORDER — TRAZODONE HYDROCHLORIDE 50 MG/1
TABLET, FILM COATED ORAL
Qty: 90 TABLET | Refills: 0 | Status: SHIPPED | OUTPATIENT
Start: 2021-03-16 | End: 2021-03-22

## 2021-03-21 ENCOUNTER — HEALTH MAINTENANCE LETTER (OUTPATIENT)
Age: 56
End: 2021-03-21

## 2021-03-22 ENCOUNTER — VIRTUAL VISIT (OUTPATIENT)
Dept: PEDIATRICS | Facility: CLINIC | Age: 56
End: 2021-03-22
Payer: MEDICAID

## 2021-03-22 DIAGNOSIS — Z87.898 HISTORY OF PROLONGED Q-T INTERVAL ON ECG: ICD-10-CM

## 2021-03-22 DIAGNOSIS — G89.29 OTHER CHRONIC PAIN: ICD-10-CM

## 2021-03-22 DIAGNOSIS — G35 MULTIPLE SCLEROSIS (H): ICD-10-CM

## 2021-03-22 DIAGNOSIS — Z12.11 SPECIAL SCREENING FOR MALIGNANT NEOPLASMS, COLON: ICD-10-CM

## 2021-03-22 DIAGNOSIS — G47.00 INSOMNIA, UNSPECIFIED TYPE: ICD-10-CM

## 2021-03-22 DIAGNOSIS — R94.31 PROLONGED Q-T INTERVAL ON ECG: ICD-10-CM

## 2021-03-22 DIAGNOSIS — F32.89 OTHER DEPRESSION: ICD-10-CM

## 2021-03-22 DIAGNOSIS — R53.83 OTHER FATIGUE: ICD-10-CM

## 2021-03-22 DIAGNOSIS — Z72.0 TOBACCO ABUSE: ICD-10-CM

## 2021-03-22 DIAGNOSIS — F41.9 ANXIETY: Primary | ICD-10-CM

## 2021-03-22 PROBLEM — R06.02 SOB (SHORTNESS OF BREATH): Status: RESOLVED | Noted: 2020-04-17 | Resolved: 2021-03-22

## 2021-03-22 PROBLEM — R79.0 LOW SERUM FERRITIN LEVEL: Status: RESOLVED | Noted: 2017-04-24 | Resolved: 2021-03-22

## 2021-03-22 PROBLEM — R07.2 PRECORDIAL PAIN: Status: RESOLVED | Noted: 2020-11-30 | Resolved: 2021-03-22

## 2021-03-22 PROCEDURE — 99442 PR PHYSICIAN TELEPHONE EVALUATION 11-20 MIN: CPT | Performed by: NURSE PRACTITIONER

## 2021-03-22 RX ORDER — MIRTAZAPINE 15 MG/1
TABLET, FILM COATED ORAL
Qty: 180 TABLET | Refills: 3 | Status: SHIPPED | OUTPATIENT
Start: 2021-03-22 | End: 2021-07-01

## 2021-03-22 RX ORDER — DEXTROAMPHETAMINE SACCHARATE, AMPHETAMINE ASPARTATE MONOHYDRATE, DEXTROAMPHETAMINE SULFATE AND AMPHETAMINE SULFATE 7.5; 7.5; 7.5; 7.5 MG/1; MG/1; MG/1; MG/1
30 CAPSULE, EXTENDED RELEASE ORAL DAILY
Qty: 30 CAPSULE | Refills: 0 | Status: SHIPPED | OUTPATIENT
Start: 2021-03-22 | End: 2021-04-20

## 2021-03-22 RX ORDER — MELOXICAM 7.5 MG/1
7.5 TABLET ORAL DAILY
Qty: 60 TABLET | Refills: 11 | Status: SHIPPED | OUTPATIENT
Start: 2021-03-22 | End: 2021-04-22

## 2021-03-22 RX ORDER — ALPRAZOLAM 0.5 MG
0.5 TABLET ORAL 2 TIMES DAILY
Qty: 60 TABLET | Refills: 0 | Status: SHIPPED | OUTPATIENT
Start: 2021-03-22 | End: 2021-04-20

## 2021-03-22 RX ORDER — DEXTROAMPHETAMINE SACCHARATE, AMPHETAMINE ASPARTATE, DEXTROAMPHETAMINE SULFATE AND AMPHETAMINE SULFATE 2.5; 2.5; 2.5; 2.5 MG/1; MG/1; MG/1; MG/1
10 TABLET ORAL DAILY
Qty: 30 TABLET | Refills: 0 | Status: SHIPPED | OUTPATIENT
Start: 2021-03-22 | End: 2021-04-20

## 2021-03-22 NOTE — PROGRESS NOTES
Hina is a 55 year old who is being evaluated via a billable telephone visit.      What phone number would you like to be contacted at? [phone]  How would you like to obtain your AVS? Jelani    Assessment & Plan     (F41.9) Anxiety  (primary encounter diagnosis)  Comment: Feels much better now that we are not weaning her opioids. Seeing pain clinic. We have reduced the alprazolam by half. Still has trouble sleeping-her mind races. But feels anxiety is well controlled during the day  Plan: mirtazapine (REMERON) 15 MG tablet, ALPRAZolam         (XANAX) 0.5 MG tablet  -Ok to continue alprazolam for now. She has a lot going on right now, but I would like to eventually get her to see psychiatry and have them take over the alprazolam. Will revisit 9/2021  -Again reviewed the potentially lethal risks of combining opioids and alprazolam. I am mildly comforted that both of her doses are much lower than when I inherited her, but the risk is still great. However, weaning further may destabilize her mental health. As we have just somewhat stabilized, she feels the benefit outweighs the risk  -Follow-up 9/2021 and reconsider wean. May consider TMS vs ketamine through psych.     (F32.89) Other depression  Comment: Much better since stopping the oxy wean. No SI/HI  Plan: mirtazapine (REMERON) 15 MG tablet           (G47.00) Insomnia, unspecified type  Comment: Takes xanax, which is not appropriate for insomnia. States she has too much going on right now with specialists and caring for her mother to see psych right now.   Plan: mirtazapine (REMERON) 15 MG tablet  -Re-start Remeron. Pal to follow-up in 1 week. If no improvement, she should do a VRT MTM visit to discuss other options.   -Reduce caffeine, increase physical activity  -She states adderall does not affect her sleep.     (G89.29) Other chronic pain  Comment: Had been taking ibuprofen and mobic  Plan: meloxicam (MOBIC) 7.5 MG tablet        Advised an either or.  Plan for repeat BASIC METABOLIC PANEL (ELECTROLYTES AND KIDNEY FUNCTION). Already futured by other provider.      (R53.83) Other fatigue  Comment: Due to MS. Chronic adderall.  Plan: amphetamine-dextroamphetamine (ADDERALL XR) 30         MG 24 hr capsule, amphetamine-dextroamphetamine        (ADDERALL) 10 MG tablet            (G35) Multiple sclerosis (H)  Plan: amphetamine-dextroamphetamine (ADDERALL XR) 30         MG 24 hr capsule, amphetamine-dextroamphetamine        (ADDERALL) 10 MG tablet            (Z12.11) Special screening for malignant neoplasms, colon  Plan: Fecal colorectal cancer screen (FIT)            (Z72.0) Tobacco abuse  Comment: Not interested in other meds right now  Plan: nicotine polacrilex (NICORETTE) 4 MG gum           (Z87.898) History of prolonged Q-T interval on ECG  Comment: Resolved.   956}     Tobacco Cessation:   reports that she has been smoking cigarettes. She has a 17.50 pack-year smoking history. She has never used smokeless tobacco.  Tobacco Cessation Action Plan: Information offered: Patient not interested at this time    See Patient Instructions    No follow-ups on file.    BENNETT Marvin [facility] SANDEEP Santamaria is a 55 year old who presents for the following health issues    HPI     Depression and Anxiety Follow-Up    How are you doing with your depression since your last visit? Improved    How are you doing with your anxiety since your last visit?  Improved    Are you having other symptoms that might be associated with depression or anxiety? No    Have you had a significant life event? No     Do you have any concerns with your use of alcohol or other drugs? No    Pt reports she is out of Meloxicam and Mirtazapine and would like refills.  Pt notes stress with taking care of mom with dementia.  Pt also wants to discuss Alprazolam. Need medication for sleep.  Pain clinic is prescribing her Oxycodone. Notes she can take up to 1 times  a day.    Pt wants to know if there is recommendations for helping gain weight that is covered by her insurance.    Pt would also like nicotine gum prescription.    Social History     Tobacco Use     Smoking status: Heavy Tobacco Smoker     Packs/day: 0.50     Years: 35.00     Pack years: 17.50     Types: Cigarettes     Last attempt to quit: 2013     Years since quittin.7     Smokeless tobacco: Never Used   Substance Use Topics     Alcohol use: Not Currently     Alcohol/week: 0.0 standard drinks     Drug use: No     PHQ 2020   PHQ-9 Total Score 25 22 16   Q9: Thoughts of better off dead/self-harm past 2 weeks Several days Several days Not at all     KAMALA-7 SCORE 2020   Total Score - - 14 (moderate anxiety)   Total Score 18 18 14       Suicide Assessment Five-step Evaluation and Treatment (SAFE-T)      How many servings of fruits and vegetables do you eat daily?  4 or more    On average, how many sweetened beverages do you drink each day (Examples: soda, juice, sweet tea, etc.  Do NOT count diet or artificially sweetened beverages)?   0    How many days per week do you exercise enough to make your heart beat faster? 7    How many minutes a day do you exercise enough to make your heart beat faster? 20 - 29  How many days per week do you miss taking your medication? 1    What makes it hard for you to take your medications?  Lots of medications to keep track of so sometimes forgets.    Review of Systems   Constitutional, HEENT, cardiovascular, pulmonary, gi and gu systems are negative, except as otherwise noted.      Phone call duration: 20 minutes  30 minutes spent in chart review and documentation

## 2021-03-28 PROCEDURE — 82274 ASSAY TEST FOR BLOOD FECAL: CPT | Performed by: NURSE PRACTITIONER

## 2021-03-29 ENCOUNTER — PATIENT OUTREACH (OUTPATIENT)
Dept: PEDIATRICS | Facility: CLINIC | Age: 56
End: 2021-03-29

## 2021-03-29 ENCOUNTER — MYC MEDICAL ADVICE (OUTPATIENT)
Dept: PEDIATRICS | Facility: CLINIC | Age: 56
End: 2021-03-29

## 2021-03-29 DIAGNOSIS — G89.29 OTHER CHRONIC PAIN: ICD-10-CM

## 2021-03-29 DIAGNOSIS — Z12.11 SPECIAL SCREENING FOR MALIGNANT NEOPLASMS, COLON: ICD-10-CM

## 2021-03-29 LAB — HEMOCCULT STL QL IA: NEGATIVE

## 2021-03-29 NOTE — LETTER
Dear Hina,         We are concerned about your health. Sunshine Butterfield wanted to follow up and ask if the Remeron was helping with your sleep? Additionally, she would like you to complete the included questionnaire. You can mail it back in the provided envelope. If you prefer, you can also log into your [employer] and answer these questions as well!     Let us know if you have any questions or concerns. Thank you for choosing [email or web address] [facility]!         Sincerely,         Your Team at [facility]

## 2021-03-29 NOTE — TELEPHONE ENCOUNTER
"Per PCP: \"Please call her in a week to see if the Remeron is helping with sleep.     Do PHQ at that time.\"    Called pt. No answer, LVM to call back on my personal extension. Also sent [employer] message.    If pt calls back:  Ask if Remeron is helping with sleep  Complete PHQ-9    Reji Rahman, EMT at 11:14 AM on March 29, 2021   Clinic Health Guide   [phone]  "

## 2021-03-31 ENCOUNTER — TRANSFERRED RECORDS (OUTPATIENT)
Dept: HEALTH INFORMATION MANAGEMENT | Facility: CLINIC | Age: 56
End: 2021-03-31

## 2021-04-06 NOTE — TELEPHONE ENCOUNTER
Called pt. Attempt #2. No answer, LVM to call back on my personal extension. Pt has not read [employer] message at the time of this documentation.     If pt calls back:  Ask if Remeron is helping with sleep  Complete PHQ-9    Reji Rahman, EMT at 2:31 PM on April 6, 2021   [facility] Health Guide   [phone]

## 2021-04-13 NOTE — TELEPHONE ENCOUNTER
Called pt. Attempt #3. No answer, LVM to call back on my personal extension. Pt has not read [employer] message at the time of this documentation. Also sent letter. No further outreaches will be made at this time.     If pt calls back:  Ask if Remeron is helping with sleep  Complete PHQ-9    Reji Rahman, EMT at 9:22 AM on April 13, 2021   [facility] Health Guide   [phone]

## 2021-04-14 DIAGNOSIS — G89.29 OTHER CHRONIC PAIN: ICD-10-CM

## 2021-04-15 RX ORDER — GABAPENTIN 300 MG/1
CAPSULE ORAL
Qty: 180 CAPSULE | Refills: 3 | Status: SHIPPED | OUTPATIENT
Start: 2021-04-15 | End: 2021-06-24

## 2021-04-15 ASSESSMENT — PATIENT HEALTH QUESTIONNAIRE - PHQ9
10. IF YOU CHECKED OFF ANY PROBLEMS, HOW DIFFICULT HAVE THESE PROBLEMS MADE IT FOR YOU TO DO YOUR WORK, TAKE CARE OF THINGS AT HOME, OR GET ALONG WITH OTHER PEOPLE: SOMEWHAT DIFFICULT
SUM OF ALL RESPONSES TO PHQ QUESTIONS 1-9: 11
SUM OF ALL RESPONSES TO PHQ QUESTIONS 1-9: 11

## 2021-04-15 NOTE — TELEPHONE ENCOUNTER
"PHQ 5/20/2020 11/5/2020 4/15/2021   PHQ-9 Total Score 22 16 11   Q9: Thoughts of better off dead/self-harm past 2 weeks Several days Not at all Not at all     Pt notes Remeron and Xanax has been helping her.    She also asks if she is able to increase Meloxicam as it isn't as therapeutic as the Ibuprofen she was taking before.    Pt also requests prescription for \"protein drinks covered by her insurance\" to possibly be \"delivered through [facility].\"    Reji Rahman, EMT at 3:06 PM on April 15, 2021   [facility] Health Guide   [phone]    "

## 2021-04-15 NOTE — TELEPHONE ENCOUNTER
Kitty,    Please call specialty pharmacy and find out what orders need to be placed to get protein drinks delivered    Please let her know we can either increase the mobic to 10mg which is the highest dose OR she can go back to taking ibuprofen. If she decides on ibuprofen, does she want me to rx? If so, what dose and frequency was she on?

## 2021-04-15 NOTE — TELEPHONE ENCOUNTER
Call placed to pt     She takes Neurontin 300 mg capsule 2 capsules (600mg) TID    Routing to PCP    Thank you  Abel Salmeron RN on 4/15/2021 at 3:23 PM

## 2021-04-16 ENCOUNTER — TRANSFERRED RECORDS (OUTPATIENT)
Dept: HEALTH INFORMATION MANAGEMENT | Facility: CLINIC | Age: 56
End: 2021-04-16

## 2021-04-16 ASSESSMENT — PATIENT HEALTH QUESTIONNAIRE - PHQ9: SUM OF ALL RESPONSES TO PHQ QUESTIONS 1-9: 11

## 2021-04-16 NOTE — TELEPHONE ENCOUNTER
"Regarding protein drinks, when order is placed, they will deliver. Pended specialty pharmacy. The two protein drinks they carry are \"Boost\" with 14 g protein or \"Ensure\" with 13 g protein. The pharmacy needs the order to specify how many drinks per day. Most likely her insurance will cover per pharmacist but they will run once prescription is received.    Sent pt [employer] message regarding increasing mobic OR switching to Ibuprofen. Will await response.    Reji Rahman, EMT at 8:15 AM on April 16, 2021   [facility] Health Guide   [phone]  "

## 2021-04-19 NOTE — TELEPHONE ENCOUNTER
Sent [employer] message informing of below. Will await response.    Reji Rahman, EMT at 9:02 AM on April 19, 2021   [facility] Health Guide   [phone]

## 2021-04-20 NOTE — TELEPHONE ENCOUNTER
Called pt. Outreach attempt #2. No answer, LVM to call back on my personal extension or if I am unavailable to call clinic back. Also encouraged pt to log on to [employer] to reply that way. Will await response.    If pt responds:  Ask how pt would like to receive her orders. In CHG in-basket on desk in Station [address] currently (e.g. mail or  downstairs)  Ask if pt would prefer to increase mobic or change medication to ibuprofen.    Reji Rahman, EMT at 9:35 AM on April 20, 2021   [facility] Health Guide   [phone]

## 2021-04-22 NOTE — TELEPHONE ENCOUNTER
Faxed protein drink prescription to [facility]. Mailed protein drink prescription to pt per pt request. Routed message to PCP for Mobic dosing increase. Pended [facility] pharmacy.    Reji Rahman, EMT at 12:11 PM on April 22, 2021   [facility] Health Guide   [phone]

## 2021-04-22 NOTE — TELEPHONE ENCOUNTER
"Hi there    Do you think 15mg Mobic (as opposed to 7.5mg) is appropriate for \"widespread chronic pain\" of unknown origin. I see that is the dosing for RA, but the dosing is lower for other conditions. Pt wants to increase.    Kidney function is excellent. No gastric ulcers or gastritis history.    Thanks!    Sunshine Butterfield, FNP-DNP.    "

## 2021-04-23 RX ORDER — MELOXICAM 15 MG/1
15 TABLET ORAL DAILY
Qty: 90 TABLET | Refills: 3 | Status: SHIPPED | OUTPATIENT
Start: 2021-04-23 | End: 2021-06-24

## 2021-04-29 ENCOUNTER — TRANSFERRED RECORDS (OUTPATIENT)
Dept: HEALTH INFORMATION MANAGEMENT | Facility: CLINIC | Age: 56
End: 2021-04-29

## 2021-05-19 ENCOUNTER — MYC REFILL (OUTPATIENT)
Dept: PEDIATRICS | Facility: CLINIC | Age: 56
End: 2021-05-19

## 2021-05-19 DIAGNOSIS — G35 MULTIPLE SCLEROSIS (H): ICD-10-CM

## 2021-05-19 DIAGNOSIS — R53.83 OTHER FATIGUE: ICD-10-CM

## 2021-05-19 DIAGNOSIS — F41.9 ANXIETY: ICD-10-CM

## 2021-05-21 RX ORDER — ALPRAZOLAM 0.5 MG
0.5 TABLET ORAL 2 TIMES DAILY
Qty: 60 TABLET | Refills: 0 | Status: SHIPPED | OUTPATIENT
Start: 2021-05-21 | End: 2021-06-20

## 2021-05-21 RX ORDER — DEXTROAMPHETAMINE SACCHARATE, AMPHETAMINE ASPARTATE MONOHYDRATE, DEXTROAMPHETAMINE SULFATE AND AMPHETAMINE SULFATE 7.5; 7.5; 7.5; 7.5 MG/1; MG/1; MG/1; MG/1
30 CAPSULE, EXTENDED RELEASE ORAL DAILY
Qty: 30 CAPSULE | Refills: 0 | Status: SHIPPED | OUTPATIENT
Start: 2021-05-21 | End: 2021-06-20

## 2021-05-21 RX ORDER — DEXTROAMPHETAMINE SACCHARATE, AMPHETAMINE ASPARTATE, DEXTROAMPHETAMINE SULFATE AND AMPHETAMINE SULFATE 2.5; 2.5; 2.5; 2.5 MG/1; MG/1; MG/1; MG/1
10 TABLET ORAL DAILY
Qty: 30 TABLET | Refills: 0 | Status: SHIPPED | OUTPATIENT
Start: 2021-05-21 | End: 2021-06-20

## 2021-05-27 ENCOUNTER — TRANSFERRED RECORDS (OUTPATIENT)
Dept: HEALTH INFORMATION MANAGEMENT | Facility: CLINIC | Age: 56
End: 2021-05-27

## 2021-05-27 ENCOUNTER — TELEPHONE (OUTPATIENT)
Dept: PEDIATRICS | Facility: CLINIC | Age: 56
End: 2021-05-27

## 2021-05-27 NOTE — TELEPHONE ENCOUNTER
Received call from pharmacy they need a dx code for the Adderall prescription  Provided pharmacy with dx code G35- which was listed on the med list    Thank you  Abel Salmeron RN on 5/27/2021 at 8:20 AM

## 2021-06-04 NOTE — TELEPHONE ENCOUNTER
PT states she has been waiting on a RX for Boost Plus Shakes for almost 2 weeks and we have not received anything yet. Need new RX please.

## 2021-06-08 ENCOUNTER — OFFICE VISIT (OUTPATIENT)
Dept: URGENT CARE | Facility: URGENT CARE | Age: 56
End: 2021-06-08
Payer: MEDICAID

## 2021-06-08 ENCOUNTER — TELEPHONE (OUTPATIENT)
Dept: PEDIATRICS | Facility: CLINIC | Age: 56
End: 2021-06-08

## 2021-06-08 VITALS
SYSTOLIC BLOOD PRESSURE: 132 MMHG | HEART RATE: 98 BPM | OXYGEN SATURATION: 95 % | TEMPERATURE: 97.8 F | BODY MASS INDEX: 17.85 KG/M2 | WEIGHT: 114 LBS | RESPIRATION RATE: 28 BRPM | DIASTOLIC BLOOD PRESSURE: 92 MMHG

## 2021-06-08 DIAGNOSIS — R05.9 COUGH: ICD-10-CM

## 2021-06-08 DIAGNOSIS — J44.9 CHRONIC OBSTRUCTIVE PULMONARY DISEASE, UNSPECIFIED COPD TYPE (H): ICD-10-CM

## 2021-06-08 DIAGNOSIS — Z20.822 SUSPECTED 2019 NOVEL CORONAVIRUS INFECTION: Primary | ICD-10-CM

## 2021-06-08 DIAGNOSIS — J44.0 CHRONIC OBSTRUCTIVE PULMONARY DISEASE WITH ACUTE LOWER RESPIRATORY INFECTION (H): ICD-10-CM

## 2021-06-08 DIAGNOSIS — R06.02 SOB (SHORTNESS OF BREATH): ICD-10-CM

## 2021-06-08 LAB
LABORATORY COMMENT REPORT: NORMAL
SARS-COV-2 RNA RESP QL NAA+PROBE: NEGATIVE
SARS-COV-2 RNA RESP QL NAA+PROBE: NORMAL
SPECIMEN SOURCE: NORMAL
SPECIMEN SOURCE: NORMAL

## 2021-06-08 PROCEDURE — 94640 AIRWAY INHALATION TREATMENT: CPT | Mod: ZZRT | Performed by: PHYSICIAN ASSISTANT

## 2021-06-08 PROCEDURE — 99417 PROLNG OP E/M EACH 15 MIN: CPT | Performed by: PHYSICIAN ASSISTANT

## 2021-06-08 PROCEDURE — U0003 INFECTIOUS AGENT DETECTION BY NUCLEIC ACID (DNA OR RNA); SEVERE ACUTE RESPIRATORY SYNDROME CORONAVIRUS 2 (SARS-COV-2) (CORONAVIRUS DISEASE [COVID-19]), AMPLIFIED PROBE TECHNIQUE, MAKING USE OF HIGH THROUGHPUT TECHNOLOGIES AS DESCRIBED BY CMS-2020-01-R: HCPCS | Performed by: FAMILY MEDICINE

## 2021-06-08 PROCEDURE — U0005 INFEC AGEN DETEC AMPLI PROBE: HCPCS | Performed by: FAMILY MEDICINE

## 2021-06-08 PROCEDURE — 99215 OFFICE O/P EST HI 40 MIN: CPT | Mod: 25 | Performed by: PHYSICIAN ASSISTANT

## 2021-06-08 RX ORDER — CODEINE PHOSPHATE AND GUAIFENESIN 10; 100 MG/5ML; MG/5ML
1-2 SOLUTION ORAL EVERY 6 HOURS PRN
Qty: 118 ML | Refills: 0 | Status: SHIPPED | OUTPATIENT
Start: 2021-06-08 | End: 2021-07-06

## 2021-06-08 RX ORDER — PREDNISONE 20 MG/1
TABLET ORAL
Qty: 10 TABLET | Refills: 0 | Status: ON HOLD | OUTPATIENT
Start: 2021-06-08 | End: 2021-07-23

## 2021-06-08 RX ORDER — IPRATROPIUM BROMIDE AND ALBUTEROL SULFATE 2.5; .5 MG/3ML; MG/3ML
3 SOLUTION RESPIRATORY (INHALATION) ONCE
Status: COMPLETED | OUTPATIENT
Start: 2021-06-08 | End: 2021-06-08

## 2021-06-08 RX ORDER — ALBUTEROL SULFATE 90 UG/1
2 AEROSOL, METERED RESPIRATORY (INHALATION) EVERY 4 HOURS PRN
Qty: 18 G | Refills: 3 | Status: SHIPPED | OUTPATIENT
Start: 2021-06-08 | End: 2022-09-26

## 2021-06-08 RX ORDER — IPRATROPIUM BROMIDE AND ALBUTEROL SULFATE 2.5; .5 MG/3ML; MG/3ML
1 SOLUTION RESPIRATORY (INHALATION) EVERY 6 HOURS PRN
Qty: 30 ML | Refills: 3 | Status: SHIPPED | OUTPATIENT
Start: 2021-06-08 | End: 2022-02-26

## 2021-06-08 RX ORDER — AZITHROMYCIN 250 MG/1
TABLET, FILM COATED ORAL
Qty: 6 TABLET | Refills: 0 | Status: SHIPPED | OUTPATIENT
Start: 2021-06-08 | End: 2021-06-13

## 2021-06-08 RX ORDER — PREDNISONE 10 MG/1
60 TABLET ORAL ONCE
Status: COMPLETED | OUTPATIENT
Start: 2021-06-08 | End: 2021-06-08

## 2021-06-08 RX ADMIN — IPRATROPIUM BROMIDE AND ALBUTEROL SULFATE 3 ML: 2.5; .5 SOLUTION RESPIRATORY (INHALATION) at 10:42

## 2021-06-08 RX ADMIN — IPRATROPIUM BROMIDE AND ALBUTEROL SULFATE 3 ML: 2.5; .5 SOLUTION RESPIRATORY (INHALATION) at 11:02

## 2021-06-08 RX ADMIN — PREDNISONE 60 MG: 10 TABLET ORAL at 11:03

## 2021-06-08 NOTE — TELEPHONE ENCOUNTER
"Called the [facility].    They state that they did not receive order as noted in previous encounter. Provided verbal order for Boost Protein Shakes, two per day, PRN.    Pt informed. No further action needed.      - Tim \"Graham\" JOHAN Stewart - Patient Advocate Liason (PAL)  [facility]    "

## 2021-06-08 NOTE — TELEPHONE ENCOUNTER
"Pt was called and informed of DME in separate encounter.    Pt informed me that they were seen in urgent care today for URI and prescribed prednisone, but when they went to pick it up at the pharmacy they would not dispense it as the quantity did not match the sig order. Quantity was for 10 tabs, but the sig calls for 15 tabs.    Carlos Eduardo Madsen: Can you please review the medication order for the prescribed prednisone and sign new order. Thank you.      - Tim \"Graham\" JOHAN Stewart - Patient Advocate Liason (PAL)  [facility]    "

## 2021-06-08 NOTE — PROGRESS NOTES
Assessment & Plan     Suspected 2019 novel coronavirus infection  Covid pending  - Symptomatic COVID-19 Virus (Coronavirus) by PCR  - SARS-CoV-2 COVID-19 Virus (Coronavirus) by PCR    Chronic obstructive pulmonary disease, unspecified COPD type (H)  COPD exacerbation  2 different nebulizers given in clinic, 60 mg given PO in clinic  Patient declines ED at this time. It has been discussed that if any symptoms worsen she needs to be seen in the ED  Smoking cessation encouraged  - INHALATION/NEBULIZER TREATMENT, INITIAL  - ipratropium - albuterol 0.5 mg/2.5 mg/3 mL (DUONEB) neb solution 3 mL  - tiotropium-olodaterol 2.5-2.5 MCG/ACT AERS; Inhale 2 puffs into the lungs daily  - predniSONE (DELTASONE) 20 MG tablet; 3 tabs po every day for 2 days, then 2 tabs po every day for 3 days, then 1 tab po every day for 2 days, then 1/2 tab po every day for 2 days  - ipratropium - albuterol 0.5 mg/2.5 mg/3 mL (DUONEB) neb solution 3 mL    SOB (shortness of breath)  Discussed going to the ED with patient, she does not want to do that, would like to   Try medications  Refill of albuterol and duoneb given in clinic  - INHALATION/NEBULIZER TREATMENT, INITIAL  - ipratropium - albuterol 0.5 mg/2.5 mg/3 mL (DUONEB) neb solution 3 mL  - ipratropium - albuterol 0.5 mg/2.5 mg/3 mL (DUONEB) 0.5-2.5 (3) MG/3ML neb solution; Take 1 vial (3 mLs) by nebulization every 6 hours as needed for shortness of breath / dyspnea or wheezing  - tiotropium-olodaterol 2.5-2.5 MCG/ACT AERS; Inhale 2 puffs into the lungs daily  - predniSONE (DELTASONE) 20 MG tablet; 3 tabs po every day for 2 days, then 2 tabs po every day for 3 days, then 1 tab po every day for 2 days, then 1/2 tab po every day for 2 days  - predniSONE (DELTASONE) tablet 60 mg  - ipratropium - albuterol 0.5 mg/2.5 mg/3 mL (DUONEB) neb solution 3 mL    Chronic obstructive pulmonary disease with acute lower respiratory infection (H)  Albuterol  Prednisone  Duoneb  - albuterol (PROAIR  HFA/PROVENTIL HFA/VENTOLIN HFA) 108 (90 Base) MCG/ACT inhaler; Inhale 2 puffs into the lungs every 4 hours as needed for shortness of breath / dyspnea or wheezing  - tiotropium-olodaterol 2.5-2.5 MCG/ACT AERS; Inhale 2 puffs into the lungs daily  - predniSONE (DELTASONE) 20 MG tablet; 3 tabs po every day for 2 days, then 2 tabs po every day for 3 days, then 1 tab po every day for 2 days, then 1/2 tab po every day for 2 days  - ipratropium - albuterol 0.5 mg/2.5 mg/3 mL (DUONEB) neb solution 3 mL  - azithromycin (ZITHROMAX) 250 MG tablet; Take 2 tablets (500 mg) by mouth daily for 1 day, THEN 1 tablet (250 mg) daily for 4 days.    Cough    - guaiFENesin-codeine (ROBITUSSIN AC) 100-10 MG/5ML solution; Take 5-10 mLs by mouth every 6 hours as needed for cough    Review of external notes as documented elsewhere in note     > 75 minutes spent on the date of the encounter doing chart review, review of outside records, review of test results, interpretation of tests, patient visit, documentation and discussion with other provider(s)      Advised to have follow up with PCP  Go to the ED if symptoms worsen    Carlos Eduardo Madsen PA-C  [facility] SANDEEP Santamaria is a 55 year old who presents for the following health issues     HPI     COPD exacerbation  Coughing, wheezing  Over 1 week, worsening    Review of Systems   Constitutional, HEENT, cardiovascular, pulmonary, GI, , musculoskeletal, neuro, skin, endocrine and psych systems are negative, except as otherwise noted.      Objective    BP (!) 132/92   Pulse 98   Temp 97.8  F (36.6  C) (Tympanic)   Resp 28   Wt 51.7 kg (114 lb)   SpO2 95%   BMI 17.85 kg/m    Body mass index is 17.85 kg/m .  Physical Exam   GENERAL: healthy, alert and no distress  EYES: Eyes grossly normal to inspection, PERRL and conjunctivae and sclerae normal  HENT: ear canals and TM's normal, nose and mouth without ulcers or lesions  NECK: no adenopathy, no asymmetry,  masses, or scars and thyroid normal to palpation  RESP: Positive for wheezing, COPD exacerbation, coughing  CV: regular rate and rhythm, normal S1 S2, no S3 or S4, no murmur, click or rub, no peripheral edema and peripheral pulses strong  ABDOMEN: soft, nontender, no hepatosplenomegaly, no masses and bowel sounds normal  MS: no gross musculoskeletal defects noted, no edema  SKIN: no suspicious lesions or rashes  NEURO: Normal strength and tone, mentation intact and speech normal  PSYCH: mentation appears normal, affect normal/bright

## 2021-06-08 NOTE — PATIENT INSTRUCTIONS
Patient Education     COPD Flare-Up    You have had a flare-up of your COPD.  COPD (chronic obstructive pulmonary disease) is a common lung disease. It causes your airways to get irritated and narrower. This makes it harder for you to breathe. Emphysema and chronic bronchitis are both types of COPD. This is a long-term (chronic) condition. This means you always have it. Sometimes it gets worse. When this happens, it's called a flare-up.   Symptoms of COPD  People with COPD may have symptoms most of the time. In a flare-up, your symptoms get worse. These symptoms may mean you are having a flare-up:     Shortness of breath, shallow or rapid breathing, or wheezing that gets worse    Lung infection    Cough that gets worse    More mucus (or sputum), thicker mucus, or mucus of a different color    Tiredness, less energy, or trouble doing your normal activities    Fever    Chest tightness    Your symptoms don t get better even when you use your normal medicines, inhalers, and nebulizer    Trouble talking    You feel confused  Causes of flare-ups  Unfortunately, a flare-up can happen even if you did everything right. And even if you followed your healthcare provider s instructions. Some causes of flare-ups are:     Cold weather    Smoking or secondhand smoke    Use of e-cigarettes or vaping products    Colds, the flu, or respiratory infections    Air pollution    Sudden change in the weather    Dust, vapors, gases, irritating chemicals, or strong fumes    Not taking your medicines as prescribed    Indoor pollution such as burning wood, smoke from home cooking, or heating fuels  Home care  Here are some things you can do at home to treat a flare-up:    Keep calm and try not to panic. This makes it harder to breathe, and keeps you from doing the right things.    Don t smoke or be around others who are smoking. If you smoke, quit. Smoking is the main cause of COPD. Quitting will help you be able to better manage your COPD.  Don't use e-cigarettes or vaping products either. Ask your healthcare provider about ways to help you quit smoking.    Try to drink more fluids than normal during a flare-up, unless your healthcare provider has told you not to because of heart and kidney problems. More fluids can help loosen the mucus.    Eat a healthy, balanced diet. This is important to staying as healthy as possible. So is trying to stay at your ideal weight. Being overweight or underweight can affect your health. Make sure you have a lot of fruits and vegetables every day. And also eat balanced portions of whole grains, lean meats and fish, and low-fat dairy products.    Use your inhalers and nebulizer, if you have one, as you have been told to. When using a metered dose inhaler or nebulizer, it's very important to use the proper techniques. If you have any questions about how to use your device, contact your healthcare provider or refer to the user manual.    If you were given antibiotics, take them until they are used up or your provider tells you to stop. It s important to finish the antibiotics, even though you feel better. This will make sure the infection has cleared.    If you were given a steroid, finish it even if you feel better.    Learn the names of your medicines, as well as how and when to use them. Talk with your provider about other conditions you have and their treatment and how it may affect your COPD.    Oxygen may be prescribed if tests show that your blood contains too little oxygen. Ask your provider about long-term oxygen therapy.    Coping tips for shortness of breath include:  ? Exercise. Try to be as active as possible. This will improve energy levels and strengthen your muscles, so you can do more.  ? Breathing methods. Ask your healthcare provider or nurse show you how to do pursed-lip breathing.  ? Balance rest and activity. Each day, try to balance rest periods with activity. For example, you might start the day  with getting dressed and eating breakfast. Then you can relax and read the paper. After that, take a brief walk. And then sit with your feet up for a while.  ? Pulmonary rehab (rehabilitation).  Community-based and home-based programs work as well as hospital-based programs as long as they are as often and as intense. Standard home-based pulmonary rehab programs help shortness of breath in people with COPD. Supervised, traditional pulmonary rehab remains the best option for people with COPD. These programs help with managing your disease, and also help with breathing methods, exercise, support, and counseling. To find one, ask your provider or call your local hospital. Also talk with your healthcare provider about which rehab or self-management program is best for you.  Preventing a flare-up  Flare-ups happen. But the best way to treat one is to prevent it before it starts. Here are some pointers:     Don t smoke or be around others who are smoking. Avoid using e-cigarettes due to their harmful side effects.    Take your medicines as discussed with your healthcare provider.    Talk with your provider about getting a flu shot every year. Also find out if you need a pneumonia shot.    If there is a weather advisory warning to stay indoors, try to stay inside when possible.    Try to eat healthy, exercise, and get plenty of sleep.    Try to stay away from things that normally set you off. These include dust, chemical fumes, hairsprays, or strong perfumes.  Follow-up care  Follow up with your healthcare provider, or as advised.   If a culture was done, you will be told if your treatment needs to be changed. You can call as directed for the results.   If X-rays were done, you will be told of any new findings that may affect your care.   During each appointment, talk with your healthcare provider about your ability to:     Raysal in your normal environment    Correctly use inhaler (or your medicine delivery systems)    Raysal  with other conditions you have and their treatments and how they may affect your COPD  Call 911  Call 911 if any of these occur:     Wheezing or shortness or breath does not get better with treatment    Chest pain or chest tightness    Feeling lightheaded or dizzy    You have trouble breathing    You feel confused or it s hard to wake you up    You faint or lose consciousness    You have a rapid heart rate    You have new pain in your chest, arm, shoulder, neck, or upper back  When to seek medical advice  Call your healthcare provider right away if any of these occur:    Fever of 100.4 F (38 C) or higher, or as directed by your healthcare provider    Coughing up lots of dark-colored or bloody mucus (sputum)    You don't start to get better within 24 hours    Swelling of your ankles gets worse    Weakness  [employer] last reviewed this educational content on 4/1/2019 2000-2021 [employer]. All rights reserved. This information is not intended as a substitute for professional medical care. Always follow your healthcare professional's instructions.           Patient Education     COPD: Chronic Coughing  When you have COPD, you may have a cough that lasts for 3 months or longer. It can last for 2 years in a row. This is called a chronic cough. A chronic cough with COPD is usually caused by irritation of the airways.   What is a chronic cough?  Coughing is a normal function of your body. It plays an important role in your breathing system. Coughing helps protect your body. It removes germs, mucus, dust, and other irritants from your lungs.   In people who don't have COPD, a cough that lasts longer than 8 weeks is also called a chronic cough. But when you have COPD, a cough can last 3 months or ,make a lot of mucus. Because symptoms of the condition can vary or may look like another disease, your healthcare provider will need to confirm that you have COPD.   How does COPD cause a chronic cough?  COPD is a  condition that keeps your lungs from working as they should. The lungs  job is to get air in and out of the body. Inside the lungs, air moves through tubes called airways. In healthy airways, air moves in and out easily. With COPD, some airways are blocked because of swelling to the lining of the airway and an increase in mucus This makes breathing harder. It can also cause a chronic cough.   COPD is a term for two main conditions. These are chronic bronchitis and emphysema. In both of these conditions, the airways and lungs are damaged. The damage is usually from breathing in irritants over a long period of time. Irritants are particles that irritate the lungs and airways. The main irritant that causes COPD is cigarette smoke. Other irritants are pollution, dust, fumes, and chemicals. When the lungs are always irritated, the body coughs a lot. It does this to try to remove the irritants. This chronic cough is often called a smoker s cough.   With chronic bronchitis, the damaged airways swell. They also make more mucus than normal. Mucus is a thick, sticky fluid. It traps smoke and other harmful irritants you breathe in. This helps protect the airways. But too much mucus can block the airways. And that makes it harder to breathe. The body tries to remove this excess mucus by coughing it up.   Treatment for chronic cough  There is no cure for COPD. But certain treatments help control a chronic cough:    Bronchodilators. These medicines help open the airways. This makes it easier to breathe. Most bronchodilators are taken with an inhaler. This allows the medicine to go straight to the lungs.    Combination medicines. These include a bronchodilator and a steroid. Steroids reduce swelling and mucus production. This helps keep the airways from getting irritated.    Antibiotics. A respiratory infection can lead to more coughing and mucus. Antibiotics are medicines that help treat infections.    Pulmonary rehab  (rehabilitation). This program teaches ways to ease COPD symptoms. It includes tips on eating well and exercising to feel better.    Oxygen therapy. If your oxygen levels are low, oxygen helps make breathing easier . Oxygen is breathed in through prongs in the nose or a face mask. Oxygen can be used only at certain times. Or it can be used most of the time.  Your healthcare provider will work with you to come up with the best treatment plan for you.   Self-care tips for chronic cough  You can take steps to reduce your coughing:    Stop smoking. Smoking is the main cause of COPD. Stopping smoking is the best thing you can do to treat COPD. If you need help stopping smoking, talk with your healthcare provider. There are medicines that help you quit. There are also programs, such as the American [employer] Association s Freedom From Smoking program.    Stay away from secondhand smoke and other irritants. Try to stay away from smoke, chemicals, fumes, pollen, and dust. Don t let anyone smoke in your home. Stay indoors on smoggy days.    Prevent lung infections. Having COPD increases your risk for flu and pneumonia. Ask your healthcare provider about the flu and pneumonia vaccines. Take steps to prevent colds and other lung infections.    Practice correct handwashing.  Wash your hands often with soap and water. Use hand  when you can t wash your hands. Stay away from crowds during cold and flu season.    Limit dairy products. Dairy products can increase mucus. So have milk, ice cream, and cheese in small amounts.    Drink plenty of water. This helps make mucus thinner and easier to cough up. Ask your healthcare provider how much water you should drink. For many people, 6 to 8 glasses (8 ounces each) a day is a good goal.    Do breathing exercises. Learn how to do belly breathing and pursed lip breathing. These exercises can help you breathe better. Try to do each exercise for 5 to 10 minutes every day.    Don t be  afraid to be active. Being active may make you short of breath. Even so, it is good for your lungs. Exercise can strengthen the muscles that help you breathe. Ask your healthcare provider about safe exercises for you.  When to call your healthcare provider   Call your healthcare provider right away if you have any of these problems:    Fever of 100.4 F (38 C) or higher, or as directed by your healthcare provider    Symptoms that don t get better, or get worse    New symptoms  [employer] last reviewed this educational content on 11/1/2019 2000-2021 [employer]. All rights reserved. This information is not intended as a substitute for professional medical care. Always follow your healthcare professional's instructions.           Patient Education     COPD: Wheezing and Chest Tightness  When you have COPD, wheezing, shortness of breath, and chest tightness are common symptoms. Wheezing is a whistling or squeaking sound when you breathe in or out. Chest tightness may feel like it is hard to take a deep breath or it's painful to breathe. It can cause a feeling of shortness of breath.  How does COPD cause wheezing and chest tightness?  COPD is a condition that keeps your lungs from working as they should. The lungs  job is to get air in and out of the body. Inside the lungs, air moves through tubes called airways. In healthy airways, air moves in and out easily. With COPD, lungs and airways become damaged. This damage causes the lining of airways to swell and become clogged with mucus. The airways can also collapse. Then air does not move in and out of the body normally. This can lead to wheezing and chest tightness.  COPD is a term for two main conditions. These are chronic bronchitis and emphysema. In both of these conditions, the airways and lungs become damaged. The damage is usually due to breathing in irritants over a long period of time. The main irritant that causes COPD is cigarette smoke. Other  irritants are pollution, dust, fumes, and chemicals.  With chronic bronchitis, the damaged airways make more mucus than normal. Mucus is a thick, sticky fluid. It traps smoke and other harmful irritants breathed in. This helps protect the airways. But too much mucus can make the airways narrow. Bronchitis can also cause the airways to swell. The muscles that surround the airways may tighten. These problems cause the airways to narrow even more. This means less air moves in and out of the lungs.  Air contains oxygen. This is a gas the body needs to breathe. In healthy lungs, air moves to the air sacs (alveoli). These are bunches of round sacs at the end of the airways. Oxygen passes from the air sacs into the bloodstream. Then it is carried to the rest of the body. As the body uses oxygen, a gas called carbon dioxide is left over. This gas goes back to the air sacs. Then it is breathed out of the body. This process is called gas exchange.  With emphysema, gas exchange does not work well. This is because the air sacs -become damaged. The airways are also damaged. They are not as stretchy as they should be. They become floppy and may collapse when you breathe out. This traps stale air in the air sacs. Then not as much fresh air can be breathed in. This makes it harder to take a deep breath.  Treatment for wheezing and chest tightness  There is no cure for COPD. But certain treatments can help wheezing and chest tightness:    Bronchodilators. These medicines help open the airways to improve breathing.    Combination medicines. These include a bronchodilator and a steroid. Steroids help keep the lining of the airways from getting swollen or inflamed. This helps reduce swelling and mucus production.    Antibiotics. A respiratory infection can make COPD symptoms worse. Antibiotics are medicines that help treat infections.    Pulmonary rehab (rehabilitation). This program teaches ways to ease COPD symptoms. It includes tips  on exercising, correct posture, how to conserve energy, and eating right to improve breathing.    Oxygen therapy. When the level of oxygen in the blood is too low, your healthcare provider may prescribe oxygen. Or, when lungs can t get enough oxygen to the blood, you may get extra oxygen. Oxygen can be used some of the time or most of the time.    Surgery. This may be done for severe symptoms when other treatments have not helped. Surgery removes the most damaged parts of the lungs.  Your healthcare provider will work with you to decide on the best treatment for you.  Self-care tips for wheezing and chest tightness  There are ways you can get relief from your COPD symptoms:    Stop smoking. Cigarette smoking is the main cause of COPD. Stopping smoking is the most important step you can take to treat COPD. If you need help stopping smoking, talk with your healthcare provider.    Stay away from secondhand smoke and other irritants. Try to stay away from smoke, chemicals, fumes, and dust. Don t let anyone smoke in your home. Stay indoors on smoggy days.    Prevent lung infections. Having COPD increases your risk for flu and pneumonia. Ask your healthcare provider about the flu and pneumonia vaccines. Take steps to prevent colds and other lung infections.    Practice correct handwashing. Wash your hands often with soap and water. Use hand  when you can t wash your hands. Stay away from crowds during cold and flu season.    Do breathing exercises. Learn how to do belly breathing and pursed lip breathing. These two exercises can help you breathe better. Try to do each exercise for 5 to 10 minutes every day.    Don t be afraid to be active. Being active may make you short of breath. Even so, it is good for your lungs. Exercise can strengthen the muscles that help you breathe. Ask your healthcare provider about safe exercises for you.    Eat small meals throughout the day. Your stomach won t get as full. Then your  lungs will have more room to expand.    Drink plenty of water. This can make mucus thinner and easier to cough up. Ask your healthcare provider how much water you should drink.  When to call your healthcare provider  Call your healthcare provider right away if you have any of these problems:    Fever of 100.4 F (38 C) or higher, or as directed by your healthcare provider    Symptoms that don t get better, or get worse    New symptoms  [employer] last reviewed this educational content on 4/1/2019 2000-2021 [employer]. All rights reserved. This information is not intended as a substitute for professional medical care. Always follow your healthcare professional's instructions.

## 2021-06-20 ENCOUNTER — MYC REFILL (OUTPATIENT)
Dept: PEDIATRICS | Facility: CLINIC | Age: 56
End: 2021-06-20

## 2021-06-20 DIAGNOSIS — R53.83 OTHER FATIGUE: ICD-10-CM

## 2021-06-20 DIAGNOSIS — F41.9 ANXIETY: ICD-10-CM

## 2021-06-20 DIAGNOSIS — N39.3 FEMALE STRESS INCONTINENCE: Primary | ICD-10-CM

## 2021-06-20 DIAGNOSIS — R30.0 DYSURIA: ICD-10-CM

## 2021-06-20 DIAGNOSIS — R05.9 COUGH: ICD-10-CM

## 2021-06-20 DIAGNOSIS — G89.29 OTHER CHRONIC PAIN: ICD-10-CM

## 2021-06-20 DIAGNOSIS — G35 MULTIPLE SCLEROSIS (H): ICD-10-CM

## 2021-06-21 RX ORDER — DEXTROAMPHETAMINE SACCHARATE, AMPHETAMINE ASPARTATE MONOHYDRATE, DEXTROAMPHETAMINE SULFATE AND AMPHETAMINE SULFATE 7.5; 7.5; 7.5; 7.5 MG/1; MG/1; MG/1; MG/1
30 CAPSULE, EXTENDED RELEASE ORAL DAILY
Qty: 30 CAPSULE | Refills: 0 | Status: SHIPPED | OUTPATIENT
Start: 2021-06-21 | End: 2021-08-02

## 2021-06-21 RX ORDER — DEXTROAMPHETAMINE SACCHARATE, AMPHETAMINE ASPARTATE, DEXTROAMPHETAMINE SULFATE AND AMPHETAMINE SULFATE 2.5; 2.5; 2.5; 2.5 MG/1; MG/1; MG/1; MG/1
10 TABLET ORAL DAILY
Qty: 30 TABLET | Refills: 0 | Status: SHIPPED | OUTPATIENT
Start: 2021-06-21 | End: 2021-08-02

## 2021-06-21 RX ORDER — ALPRAZOLAM 0.5 MG
0.5 TABLET ORAL 2 TIMES DAILY
Qty: 60 TABLET | Refills: 0 | Status: SHIPPED | OUTPATIENT
Start: 2021-06-21 | End: 2021-08-02

## 2021-06-21 NOTE — TELEPHONE ENCOUNTER
What is the status of psychiatry and therapy? I'd like her to have a regular therapist, and for psych to take over her meds (as discussed previously).

## 2021-06-22 RX ORDER — CODEINE PHOSPHATE AND GUAIFENESIN 10; 100 MG/5ML; MG/5ML
1-2 SOLUTION ORAL EVERY 6 HOURS PRN
Qty: 118 ML | Refills: 0 | Status: CANCELLED | OUTPATIENT
Start: 2021-06-22

## 2021-06-22 NOTE — TELEPHONE ENCOUNTER
Called pt. No answer, LVM to call back on my personal extension or if I am unavailable to call clinic back.    If pt calls back:  Go over below.  Route answers back to PCP.  Can schedule with PCP for COPD/cough.  Schedule lab visit.    Reji Rahman, EMT at 11:47 AM on June 22, 2021   [facility]   [phone]

## 2021-06-22 NOTE — TELEPHONE ENCOUNTER
1. If the oxybutynin doesn't work she should see urology to be evaluated. What are her sx? Incontinence or frequency?    2. I can't fill the robitussin with [facility]inton. She is already on high dose opiates and benzos. Can't add another benzo. Pls schedule her if she wants to be seen for her COPD/cough.     3. UA. Please ask her to do a visit in the future for this new medical issue. For now, I'll order a UA and you can help her schedule a lab visit.

## 2021-06-22 NOTE — TELEPHONE ENCOUNTER
Called pt. She notes she hasn't had time to set up psychiatry or therapy as she is taking care of her mother and has other issues she needs addressed first.    1) Pt notes medication for bladder control (Oxybutynin) not working and would like that changed.  2) Pt would also like robitusson with codeine refilled due to persistent cough. Medication pended. Pharmacy verified and pended.  3) She also reports pain exacerbated after going to the bathroom and especially while wiping. Pt is concerned about a potential infection.    After addressing these issues, pt notes she is willing to figure out scheduling with psychiatry and therapy.    Reji Rahman, EMT at 9:31 AM on June 22, 2021   [facility]   [phone]

## 2021-06-23 ENCOUNTER — TRANSFERRED RECORDS (OUTPATIENT)
Dept: HEALTH INFORMATION MANAGEMENT | Facility: CLINIC | Age: 56
End: 2021-06-23

## 2021-06-24 NOTE — TELEPHONE ENCOUNTER
-We can discuss COPD/cough at the visit but we can't do codeine cough syrup    -Did she stop her controller inhaler/ Stiolto I think it was called?    -She is due for pulmonology visit    -Needs to be cleared by cards before surgery. Please help her get a cards appt first available for surgical clearance ASAP in addition to our preop

## 2021-06-24 NOTE — TELEPHONE ENCOUNTER
1) Pt reports increased frequency and incontinence. Informed would likely need to see Urology. Please sign urology referral if appropriate.    2) Informed pt that PCP not able to prescribe robitussin with codeine. On a separate note, she notes she saw a spine specialist yesterday and /23 for Left L4,5 Transforaminal Lumbar Interbody Fusion surgery scheduled for 7/23 and needs a pre-op physical scheduled with PCP. Scheduled Pre-Op on 7/1/21 which pt would like to discuss cough/COPD at this visit.    3) Saw pain clinic doctor, and was started on generic Lyrica; pain clinic advised weaning off of gabapentin and gave wean instructions. Please discontinue gabapentin.    4) Pt also thinks Meloxicam prescription too strong and affecting skin around genitals which she thinks is causing her pain. Denies dysuria. Want discontinue this medication as well. Declines scheduling lab visit for UA as she believes it could be a side effect from the Meloxicam and would like to discuss potential labs needed at upcoming Pre-Op visit.    Reji Rahman, EMT at 1:25 PM on June 24, 2021   [facility] Guide   [phone]

## 2021-06-24 NOTE — TELEPHONE ENCOUNTER
Called pt. Informed of below that can talk about COPD/cough but codeine cough syrup will not be prescribed. Pt notes she is aware she is due for a pulmonology visit and will call to schedule. She will also call to schedule with cardiologist. Pt requested [employer] message sent with scheduling numbers. Sent [employer] message.    - Pt notes still using controller inhaler/Stiolto with minimal relief.    - Pt also requesting referral to rheumatologist for evaluation of arthritis. Pended referral    Encouraged pt to reach out with further questions or concerns. Pt agreeable to plan and verbalized understanding.    Reji Rahman, EMT at 4:27 PM on June 24, 2021   [facility] Guide   [phone]

## 2021-06-25 DIAGNOSIS — Z11.59 ENCOUNTER FOR SCREENING FOR OTHER VIRAL DISEASES: ICD-10-CM

## 2021-06-25 RX ORDER — TIOTROPIUM BROMIDE AND OLODATEROL 3.124; 2.736 UG/1; UG/1
2 SPRAY, METERED RESPIRATORY (INHALATION) DAILY
Qty: 4 G | Refills: 11 | COMMUNITY
Start: 2021-06-25 | End: 2021-07-06

## 2021-06-25 NOTE — TELEPHONE ENCOUNTER
Called pt to inform. Pt requested referral scheduling number to be sent via [employer]. Pt has no questions or concerns at this time.    Encouraged pt to reach out with further questions or concerns. Pt agreeable to plan and verbalized understanding.    Reji Rahman, EMT at 12:42 PM on June 25, 2021   [facility] Guide   [phone]

## 2021-07-01 ENCOUNTER — OFFICE VISIT (OUTPATIENT)
Dept: PEDIATRICS | Facility: CLINIC | Age: 56
End: 2021-07-01
Payer: MEDICAID

## 2021-07-01 ENCOUNTER — ANCILLARY PROCEDURE (OUTPATIENT)
Dept: GENERAL RADIOLOGY | Facility: CLINIC | Age: 56
End: 2021-07-01
Attending: NURSE PRACTITIONER
Payer: MEDICAID

## 2021-07-01 ENCOUNTER — TELEPHONE (OUTPATIENT)
Dept: PEDIATRICS | Facility: CLINIC | Age: 56
End: 2021-07-01

## 2021-07-01 VITALS
TEMPERATURE: 97.9 F | OXYGEN SATURATION: 98 % | WEIGHT: 111.8 LBS | SYSTOLIC BLOOD PRESSURE: 108 MMHG | HEIGHT: 65 IN | HEART RATE: 78 BPM | DIASTOLIC BLOOD PRESSURE: 78 MMHG | BODY MASS INDEX: 18.63 KG/M2

## 2021-07-01 DIAGNOSIS — R94.31 PROLONGED Q-T INTERVAL ON ECG: ICD-10-CM

## 2021-07-01 DIAGNOSIS — N18.2 CKD (CHRONIC KIDNEY DISEASE) STAGE 2, GFR 60-89 ML/MIN: ICD-10-CM

## 2021-07-01 DIAGNOSIS — I25.5 ISCHEMIC CARDIOMYOPATHY: ICD-10-CM

## 2021-07-01 DIAGNOSIS — J44.1 COPD EXACERBATION (H): ICD-10-CM

## 2021-07-01 DIAGNOSIS — Z01.818 PREOP GENERAL PHYSICAL EXAM: Primary | ICD-10-CM

## 2021-07-01 DIAGNOSIS — F41.9 ANXIETY: ICD-10-CM

## 2021-07-01 DIAGNOSIS — G47.00 INSOMNIA, UNSPECIFIED TYPE: ICD-10-CM

## 2021-07-01 DIAGNOSIS — R63.4 WEIGHT LOSS: ICD-10-CM

## 2021-07-01 DIAGNOSIS — R93.89 ABNORMAL CXR: Primary | ICD-10-CM

## 2021-07-01 DIAGNOSIS — F32.89 OTHER DEPRESSION: ICD-10-CM

## 2021-07-01 DIAGNOSIS — N39.41 URGENCY INCONTINENCE: ICD-10-CM

## 2021-07-01 DIAGNOSIS — M79.642 BILATERAL HAND PAIN: ICD-10-CM

## 2021-07-01 DIAGNOSIS — M79.641 BILATERAL HAND PAIN: ICD-10-CM

## 2021-07-01 DIAGNOSIS — K21.9 GASTROESOPHAGEAL REFLUX DISEASE, UNSPECIFIED WHETHER ESOPHAGITIS PRESENT: ICD-10-CM

## 2021-07-01 DIAGNOSIS — G89.29 OTHER CHRONIC PAIN: ICD-10-CM

## 2021-07-01 DIAGNOSIS — G35 MS (MULTIPLE SCLEROSIS) (H): ICD-10-CM

## 2021-07-01 DIAGNOSIS — I10 ESSENTIAL HYPERTENSION: ICD-10-CM

## 2021-07-01 LAB
CRP SERPL-MCNC: 3.7 MG/L (ref 0–8)
ERYTHROCYTE [SEDIMENTATION RATE] IN BLOOD BY WESTERGREN METHOD: 17 MM/H (ref 0–30)

## 2021-07-01 PROCEDURE — 86140 C-REACTIVE PROTEIN: CPT | Performed by: NURSE PRACTITIONER

## 2021-07-01 PROCEDURE — 84443 ASSAY THYROID STIM HORMONE: CPT | Performed by: NURSE PRACTITIONER

## 2021-07-01 PROCEDURE — 71046 X-RAY EXAM CHEST 2 VIEWS: CPT | Performed by: RADIOLOGY

## 2021-07-01 PROCEDURE — 36415 COLL VENOUS BLD VENIPUNCTURE: CPT | Performed by: NURSE PRACTITIONER

## 2021-07-01 PROCEDURE — 85652 RBC SED RATE AUTOMATED: CPT | Performed by: NURSE PRACTITIONER

## 2021-07-01 PROCEDURE — 86431 RHEUMATOID FACTOR QUANT: CPT | Performed by: NURSE PRACTITIONER

## 2021-07-01 PROCEDURE — 99214 OFFICE O/P EST MOD 30 MIN: CPT | Performed by: NURSE PRACTITIONER

## 2021-07-01 PROCEDURE — 80053 COMPREHEN METABOLIC PANEL: CPT | Performed by: NURSE PRACTITIONER

## 2021-07-01 PROCEDURE — 86200 CCP ANTIBODY: CPT | Performed by: NURSE PRACTITIONER

## 2021-07-01 RX ORDER — ESOMEPRAZOLE MAGNESIUM 40 MG/1
40 CAPSULE, DELAYED RELEASE ORAL
Qty: 90 CAPSULE | Refills: 3 | Status: SHIPPED | OUTPATIENT
Start: 2021-07-01 | End: 2022-07-29

## 2021-07-01 RX ORDER — MIRTAZAPINE 15 MG/1
30 TABLET, FILM COATED ORAL AT BEDTIME
Qty: 4 TABLET | Refills: 0 | COMMUNITY
Start: 2021-07-01 | End: 2021-08-02

## 2021-07-01 RX ORDER — MIRABEGRON 25 MG/1
25 TABLET, FILM COATED, EXTENDED RELEASE ORAL DAILY
Qty: 90 TABLET | Refills: 3 | Status: SHIPPED | OUTPATIENT
Start: 2021-07-01 | End: 2022-04-22

## 2021-07-01 RX ORDER — PREGABALIN 75 MG/1
75 CAPSULE ORAL DAILY
COMMUNITY
Start: 2021-06-23 | End: 2021-07-01

## 2021-07-01 RX ORDER — PREGABALIN 75 MG/1
75 CAPSULE ORAL 2 TIMES DAILY
Qty: 180 CAPSULE | Refills: 3 | COMMUNITY
Start: 2021-07-01 | End: 2022-08-26

## 2021-07-01 ASSESSMENT — MIFFLIN-ST. JEOR: SCORE: 1106.74

## 2021-07-01 NOTE — PATIENT INSTRUCTIONS
-Re-start Nexium  -Increase your Stiolto to 2 puff per day  -Antibiotics for 5 days  -Stop iron    Preparing for Your Surgery  Getting started  A nurse will call you to review your health history and instructions. They will give you an arrival time based on your scheduled surgery time.  Please be ready to share the following:    Your doctor's clinic name and phone number    Your medical, surgical and anesthesia history    A list of allergies and sensitivities    A list of medicines, including herbal treatments and over-the-counter drugs    Whether the patient has a legal guardian (ask how to send us the papers in advance)  If you have a child who's having surgery, please ask for a copy of Preparing for Your Child's Surgery.    Preparing for surgery    Within 30 days of surgery: Have a pre-op exam (sometimes called an H&P, or History and Physical). This can be done at a clinic or pre-operative center.  ? If you're having a , you may not need this exam. Talk to your care team    At your pre-op exam, talk to your care team about all medicines you take. If you need to stop any medicines before surgery, ask when to start taking them again.  ? We do this for your safety. Many medicines can make you bleed too much during surgery. Some change how well surgery (anesthesia) drugs work.    Call your insurance company to let them know you're having surgery. (If you don't have insurance, call [phone].)    Call your clinic if there's any change in your health. This includes signs of a cold or flu (sore throat, runny nose, cough, rash, fever). It also includes a scrape or scratch near the surgery site.    If you have questions on the day of surgery, call your hospital or surgery center.  Eating and drinking guidelines  For your safety: Unless your surgeon tells you otherwise, follow the guidelines below.    Eat and drink as usual until 8 hours before surgery. After that, no food or milk.    Drink clear liquids until  2 hours before surgery. These are liquids you can see through, like water, Gatorade and Propel Water. You may also have black coffee and tea (no cream or milk).    Nothing by mouth within 2 hours of surgery. This includes gum, candy and breath mints.    If you drink, stop drinking alcohol the night before surgery.    If your care team tells you to take medicine on the morning of surgery, it's okay to take it with a sip of water.  Preventing infection    Shower or bathe the night before and morning of your surgery. Follow the instructions your clinic gave you. (If no instructions, use regular soap.)    Don't shave or clip hair near your surgery site. We'll remove the hair if needed.    Don't smoke or vape the morning of surgery. You may chew nicotine gum up to 2 hours before surgery. A nicotine patch is okay.  ? Note: Some surgeries require you to completely quit smoking and nicotine. Check with your surgeon.    Your care team will make every effort to keep you safe from infection. We will:  ? Clean our hands often with soap and water (or an alcohol-based hand rub).  ? Clean the skin at your surgery site with a special soap that kills germs.  ? Give you a special gown to keep you warm. (Cold raises the risk of infection.)  ? Wear special hair covers, masks, gowns and gloves during surgery.  ? Give antibiotic medicine, if prescribed. Not all surgeries need antibiotics.  What to bring on the day of surgery    Photo ID and insurance card    Copy of your health care directive, if you have one    Glasses and hearing aides (bring cases)  ? You can't wear contacts during surgery    Inhaler and eye drops, if you use them (tell us about these when you arrive)    CPAP machine or breathing device, if you use them    A few personal items, if spending the night    If you have . . .  ? A pacemaker or ICD (cardiac defibrillator): Bring the ID card.  ? An implanted stimulator: Bring the remote control.  ? A legal guardian: Bring a  copy of the certified (court-stamped) guardianship papers.  Please remove any jewelry, including body piercings. Leave jewelry and other valuables at home.  If you're going home the day of surgery  Important: If you don't follow the rules below, we must cancel your surgery.     Arrange for someone to drive you home after surgery. You may not drive, take a taxi or take public transportation by yourself (unless you'll have local anesthesia only).    Arrange for a responsible adult to stay with you overnight. If you don't, we may keep you in the hospital overnight, and you may need to pay the costs yourself.  Questions?   If you have any questions for your care team, list them here: _________________________________________________________________________________________________________________________________________________________________________________________________________________________________________________________________________________________________________________________  For informational purposes only. Not to replace the advice of your health care provider. Copyright   2003, 2019 [employer]. All rights reserved. Clinically reviewed by Jayla Mcwilliams MD. [place] 215123 - REV 4/20.

## 2021-07-01 NOTE — PROGRESS NOTES
[facility]  [address]  [address]  [place] [address]  Phone: [phone]  Fax: [phone]  Primary Provider: Herb Bobby  Pre-op Performing Provider: HERB BOBBY      PREOPERATIVE EVALUATION:  Today's date: 7/1/2021    Hina Yap is a 55 year old female who presents for a preoperative evaluation.    Surgical Information:  Surgery/Procedure: Left L4,5 Transforaminal Lumbar Interbody Fusion  Surgery Location: [facility]  Surgeon: Dr. Flores  Surgery Date: 7/23/2021  Time of Surgery: TBD  Where patient plans to recover: Other: At home / significant others house  Fax number for surgical facility: Note does not need to be faxed, will be available electronically in Epic.    Type of Anesthesia Anticipated: to be determined    Assessment & Plan     The proposed surgical procedure is considered INTERMEDIATE risk.    (Z01.818) Preop general physical exam  (primary encounter diagnosis)  (G89.29) Other chronic pain  Comment:   -On chronic opiates    (G35) MS (multiple sclerosis) (H)  Comment: Follows with neuro. Stable.     (K21.9) Gastroesophageal reflux disease, unspecified whether esophagitis present  Comment: Stopped Nexium in the past due to concern for safety: kidney, dementia, bone. Wants to re-start as she's having sx again. Visit too complex to discuss other options. Will discuss at a different visit trying to switch to H2 blocker.   Plan: esomeprazole (NEXIUM) 40 MG DR capsule          (I25.5) Ischemic cardiomyopathy  Comment: Stable. Follows with cardiology. Seeing them next week for cardiac surgical clearance.     (I10) Essential hypertension  Comment: Stable.     (N18.2) CKD (chronic kidney disease) stage 2, GFR 60-89 ml/min  Comment: Stable.     (R94.31) Prolonged Q-T interval on ECG  Comment: Resolved. See recent cardiology notes.     (M79.641,  M79.642) Bilateral hand pain  Comment: With knuckle deformity on all 10  DIP joints. Not worse in morning. Wants rheum workup.   Plan: ESR: Erythrocyte sedimentation rate, CRP,         inflammation, Rheumatoid factor, Cyclic         Citrullinated Peptide Antibody IgG           (J44.1) COPD exacerbation (H)  Comment: Slightly improved with steroids and zpack but still with cough and increased sputum. Shortness of breath has resolved.   Plan: XR Chest 2 Views, amoxicillin-clavulanate         (AUGMENTIN) 875-125 MG tablet          -Was taking Stiolto wrong-only 1 puff. Increase to 2  -Augmentin  -XR  -PAL to follow-up next week.     (R63.4) Weight loss  Comment: Reports some weight loss. Historically this has been up and down. Recent FIT negative. No abdominal pain. I think this is stress related. Will check a few things and ask her to follow-up if normal  Plan: TSH with free T4 reflex, Comprehensive         metabolic panel (BMP + Alb, Alk Phos, ALT, AST,        Total. Bili, TP), CANCELED: TSH with free T4         reflex          -Continue protein shakes  -Labs today  -CXR    (F41.9) Anxiety  Comment: Poorly controlled.   Plan: mirtazapine (REMERON) 15 MG tablet        Needs to follow-up with psych    (F32.89) Other depression  Comment: Poorly controlled.   Plan: mirtazapine (REMERON) 15 MG tablet        Follow-up with psych     (G47.00) Insomnia, unspecified type  Comment: Stable  Plan: mirtazapine (REMERON) 15 MG tablet          (N39.41) Urgency incontinence  Comment: No improvement with oxybutynin   Plan: mirabegron (MYRBETRIQ) 25 MG 24 hr tablet  Asked her to schedule with urology           Risks and Recommendations:  The patient has the following additional risks and recommendations for perioperative complications:  Cardiovascular:   - Cardiology consulted, will be seen next week  Pulmonary:    - Incentive spirometry post-op   - Consider Respiratory Therapy (Respiratory Care IP Consult) post-op   - Recently treated pulmonary infection   - Active nicotine user, advised smoking  "cessation  Social and Substance:    - High tolerance to opioid analgesics due to chronic opioid use  -Daily benzos, please keep in mind postop    RECOMMENDATION:  APPROVAL GIVEN to proceed with proposed procedure, without further diagnostic evaluation other than cardiology.    Subjective     HPI related to upcoming procedure: Cardiology scheduled on 7/5/21    Hx of COPD and has worsening daily cough worse at night x couple weeks.     Preop Questions 7/1/2021   1. Have you ever had a heart attack or stroke? YES - Pt had \"silent MI\" from infection 10 + years ago   2. Have you ever had surgery on your heart or blood vessels, such as a stent placement, a coronary artery bypass, or surgery on an artery in your head, neck, heart, or legs? No   3. Do you have chest pain with activity? UNKNOWN - Hx of COPD and has chest pain with activity   4. Do you have a history of  heart failure? No   5. Do you currently have a cold, bronchitis or symptoms of other infection? No   6. Do you have a cough, shortness of breath, or wheezing? YES - Cough and hx of COPD   7. Do you or anyone in your family have previous history of blood clots? No   8. Do you or does anyone in your family have a serious bleeding problem such as prolonged bleeding following surgeries or cuts? No   9. Have you ever had problems with anemia or been told to take iron pills? UNKNOWN - Pt was told to take iron pills and is currently iron pills 65 mg   10. Have you had any abnormal blood loss such as black, tarry or bloody stools, or abnormal vaginal bleeding? No   11. Have you ever had a blood transfusion? UNKNOWN - Believe she had transfusion at tubal ligation over 15 years ago   12. Are you willing to have a blood transfusion if it is medically needed before, during, or after your surgery? Yes   13. Have you or any of your relatives ever had problems with anesthesia? No   14. Do you have sleep apnea, excessive snoring or daytime drowsiness? No   15. Do you have " any artifical heart valves or other implanted medical devices like a pacemaker, defibrillator, or continuous glucose monitor? No   16. Do you have artificial joints? No   17. Are you allergic to latex? No   18. Is there any chance that you may be pregnant? No     Health Care Directive:  Patient does not have a Health Care Directive or Living Will: Discussed advance care planning with patient; information given to patient to review.    Preoperative Review of :   reviewed - controlled substances prescribed by other outside provider(s).    Review of Systems  Constitutional, neuro, ENT, endocrine, pulmonary, cardiac, gastrointestinal, genitourinary, musculoskeletal, integument and psychiatric systems are negative, except as otherwise noted.    Patient Active Problem List    Diagnosis Date Noted     Essential hypertension 11/30/2020     Priority: Medium     PVC's (premature ventricular contractions) 11/30/2020     Priority: Medium     Nonrheumatic mitral valve regurgitation 11/30/2020     Priority: Medium     Prolonged Q-T interval on ECG 10/29/2020     Priority: Medium     Seen on ED visit October 29, 2020  Resolved on latest EKG. March 22, 2021           Other chronic pain 11/21/2019     Priority: Medium     CURRENTLY IN THE PROCESS OF WEANING OR TRANSFERRING TO PAIN CLINIC.  Have cut down by about half. This has destabilized her mental health so we are holding on further wean until 3/2021 so she can get mental health under control. Seeing psych and pain clinic.  I inherited her from her neurologist who suddenly quit. He was prescribing her adderall, medical marijuana, phenergan, xanax, and percocet.     Has pain appt 4/22/2020  Is establishing with the treatment resistant depression clinic 4/2020.    Currenly at 35mg per day (210, 5mg tabs per month) (7, 5mg oxycodone-acetaminophen) tabs per day. We are HOLDING until March 2021 as she is now at safer doses, and the wean was destabilizing her mental  health.      Pain Clinic evaluation in the past: Yes       Date/Location:    [facility]. Over 10 years ago  DIRE Total Score(s):  No flowsheet data found.    Last [facility] website verification:  done on 11/21/19   [email or web address]       CKD (chronic kidney disease) stage 2, GFR 60-89 ml/min 11/21/2019     Priority: Medium     Insomnia, unspecified type 11/21/2019     Priority: Medium     Moderate episode of recurrent major depressive disorder (H) 12/14/2018     Priority: Medium     Restless leg syndrome 04/24/2017     Priority: Medium     Esophageal reflux 01/26/2016     Priority: Medium     Anxiety 01/26/2016     Priority: Medium        (F41.9) Anxiety  (primary encounter diagnosis)  Comment: Feels much better now that we are not weaning her opioids. Seeing pain clinic. We have reduced the alprazolam by half. Still has trouble sleeping-her mind races. But feels anxiety is well controlled during the day  Plan: mirtazapine (REMERON) 15 MG tablet, ALPRAZolam         (XANAX) 0.5 MG tablet  -Ok to continue alprazolam for now. She has a lot going on right now, but I would like to eventually get her to see psychiatry and have them take over the alprazolam. Will revisit 9/2021  -Again reviewed the potentially lethal risks of combining opioids and alprazolam. I am mildly comforted that both of her doses are much lower than when I inherited her, but the risk is still great. However, weaning further may destabilize her mental health. As we have just somewhat stabilized, she feels the benefit outweighs the risk  -Follow-up 9/2021 and reconsider wean. May consider TMS vs ketamine through psych.        Ischemic cardiomyopathy 12/03/2015     Priority: Medium     Iron deficiency anemia 08/15/2014     Priority: Medium     Problem list name updated by automated process. Provider to review       MS (multiple sclerosis) (H) 12/17/2012     Priority: Medium     Controlled Substance Refill Request for Adderall 30 xr 30 tabs  per month    Last refill: 12/1/10    Last clinic visit: 11/21/19     Clinic visit frequency required: Q 6  months  Next appt: TBD    Controlled substance agreement on file: No.    Documentation in problem list reviewed:  Yes    Processing:  Rx to be electronically transmitted to pharmacy by provider     RX [place] reviewed: Reviewed  [place] profile:  [email or web address]          Past Medical History:   Diagnosis Date     Anxiety      Arthritis Years ago     COPD (chronic obstructive pulmonary disease) (H)      Depressive disorder Years ago     GERD (gastroesophageal reflux disease)      Hypertension      Ischemic cardiomyopathy      Neuromuscular disorder (H)      Nonrheumatic mitral valve regurgitation      PVC's (premature ventricular contractions)      Restless leg syndrome      Tobacco use disorder      No past surgical history on file.  Current Outpatient Medications   Medication Sig Dispense Refill     albuterol (PROAIR HFA/PROVENTIL HFA/VENTOLIN HFA) 108 (90 Base) MCG/ACT inhaler Inhale 2 puffs into the lungs every 4 hours as needed for shortness of breath / dyspnea or wheezing 18 g 3     albuterol (PROVENTIL) (2.5 MG/3ML) 0.083% neb solution Take 1 vial (2.5 mg) by nebulization every 4 hours as needed for shortness of breath / dyspnea 1 Box 0     ALPRAZolam (XANAX) 0.5 MG tablet Take 1 tablet (0.5 mg) by mouth 2 times daily 60 tablet 0     amLODIPine (NORVASC) 2.5 MG tablet Take 1 tablet (2.5 mg) by mouth daily 30 tablet 11     amphetamine-dextroamphetamine (ADDERALL XR) 30 MG 24 hr capsule Take 1 capsule (30 mg) by mouth daily 30 capsule 0     amphetamine-dextroamphetamine (ADDERALL) 10 MG tablet Take 1 tablet (10 mg) by mouth daily 30 tablet 0     AVONEX PEN 30 MCG/0.5ML auto-injector kit        baclofen (LIORESAL) 20 MG tablet Take 20 mg by mouth 3 times daily       guaiFENesin-codeine (ROBITUSSIN AC) 100-10 MG/5ML solution Take 5-10 mLs by mouth every 6 hours as needed  for cough 118 mL 0     ipratropium - albuterol 0.5 mg/2.5 mg/3 mL (DUONEB) 0.5-2.5 (3) MG/3ML neb solution Take 1 vial (3 mLs) by nebulization every 6 hours as needed for shortness of breath / dyspnea or wheezing 30 mL 3     mirtazapine (REMERON) 15 MG tablet Take 1 tablet (15 mg) by mouth At Bedtime for 2 days, THEN 2 tablets (30 mg) At Bedtime for 28 days. 180 tablet 3     nicotine polacrilex (NICORETTE) 4 MG gum Place 1 each (4 mg) inside cheek as needed for smoking cessation 100 each 3     oxybutynin ER (DITROPAN-XL) 5 MG 24 hr tablet Take 1 tablet (5 mg) by mouth daily 90 tablet 3     oxyCODONE-acetaminophen (PERCOCET) 5-325 MG tablet Take 1-2 tabs at a time, up to 5 times a day. No more than 7 tabs per day. Must give at least 4 hours between dosing. Will hold at this dose until at least 2021. 210 tablet 0     predniSONE (DELTASONE) 20 MG tablet 3 tabs po every day for 2 days, then 2 tabs po every day for 3 days, then 1 tab po every day for 2 days, then 1/2 tab po every day for 2 days 10 tablet 0     promethazine (PHENERGAN) 25 MG tablet Take 25 mg by mouth every 6 hours as needed for nausea       rOPINIRole (REQUIP) 2 MG tablet Take 2 mg by mouth every morning Patient is taking 1 tab in the morning and 2 tab at night       tiotropium-olodaterol (STIOLTO RESPIMAT) 2.5-2.5 MCG/ACT AERS Inhale 2 puffs into the lungs daily 4 g 11     tiotropium-olodaterol 2.5-2.5 MCG/ACT AERS Inhale 2 puffs into the lungs daily 4 g 3     tiZANidine (ZANAFLEX) 2 MG tablet Take 2 mg by mouth 3 times daily         Allergies   Allergen Reactions     Sulfa Drugs Rash     Adhesive Tape Rash     Reacts to adhesive on fentanyl patch     Wellbutrin [Bupropion Hydrobromide] Rash        Social History     Tobacco Use     Smoking status: Heavy Tobacco Smoker     Packs/day: 0.50     Years: 35.00     Pack years: 17.50     Types: Cigarettes     Last attempt to quit: 2013     Years since quittin.9     Smokeless tobacco: Never Used    Substance Use Topics     Alcohol use: Not Currently     Alcohol/week: 0.0 standard drinks     Family History   Problem Relation Age of Onset     Diabetes Maternal Grandmother      Breast Cancer Mother      Osteoporosis Mother      Other Cancer Father      Depression Brother      Anxiety Disorder Brother      Substance Abuse Brother      History   Drug Use No         Objective     There were no vitals taken for this visit.    Physical Exam    GENERAL APPEARANCE: healthy, alert and no distress     EYES: EOMI, PERRL     HENT: ear canals and TM's normal and nose and mouth without ulcers or lesions     NECK: no adenopathy, no asymmetry, masses, or scars and thyroid normal to palpation     RESP: lungs clear to auscultation - no rales, rhonchi or wheezes     CV: regular rates and rhythm, normal S1 S2, no S3 or S4 and no murmur, click or rub     ABDOMEN:  soft, nontender, no HSM or masses and bowel sounds normal     MS: extremities normal- no gross deformities noted, no evidence of inflammation in joints, FROM in all extremities.     SKIN: no suspicious lesions or rashes     NEURO: Normal strength and tone, sensory exam grossly normal, mentation intact and speech normal     PSYCH: mentation appears normal. and affect normal/bright     LYMPHATICS: No cervical adenopathy    Recent Labs   Lab Test 10/29/20  1341 07/20/20  1054   HGB 13.1 13.8    342    138   POTASSIUM 4.4 3.5   CR 0.75 0.77        Diagnostics:  Labs pending at this time.  Results will be reviewed when available.   EKG: To be done by cardiology    Revised Cardiac Risk Index (RCRI):  The patient has the following serious cardiovascular risks for perioperative complications:   - Coronary Artery Disease (MI, positive stress test, angina, Qs on EKG) = 1 point          Signed Electronically by: BENNETT Marvin CNP  Copy of this evaluation report is provided to requesting physician.

## 2021-07-02 ENCOUNTER — TELEPHONE (OUTPATIENT)
Dept: PEDIATRICS | Facility: CLINIC | Age: 56
End: 2021-07-02
Payer: MEDICAID

## 2021-07-02 LAB
ALBUMIN SERPL-MCNC: 3.5 G/DL (ref 3.4–5)
ALP SERPL-CCNC: 68 U/L (ref 40–150)
ALT SERPL W P-5'-P-CCNC: 31 U/L (ref 0–50)
ANION GAP SERPL CALCULATED.3IONS-SCNC: 3 MMOL/L (ref 3–14)
AST SERPL W P-5'-P-CCNC: 26 U/L (ref 0–45)
BILIRUB SERPL-MCNC: 0.3 MG/DL (ref 0.2–1.3)
BUN SERPL-MCNC: 21 MG/DL (ref 7–30)
CALCIUM SERPL-MCNC: 8.9 MG/DL (ref 8.5–10.1)
CCP AB SER IA-ACNC: <1 U/ML
CHLORIDE SERPL-SCNC: 109 MMOL/L (ref 94–109)
CO2 SERPL-SCNC: 28 MMOL/L (ref 20–32)
CREAT SERPL-MCNC: 0.86 MG/DL (ref 0.52–1.04)
GFR SERPL CREATININE-BSD FRML MDRD: 75 ML/MIN/{1.73_M2}
GLUCOSE SERPL-MCNC: 77 MG/DL (ref 70–99)
POTASSIUM SERPL-SCNC: 4.1 MMOL/L (ref 3.4–5.3)
PROT SERPL-MCNC: 7 G/DL (ref 6.8–8.8)
RHEUMATOID FACT SER NEPH-ACNC: <7 IU/ML (ref 0–20)
SODIUM SERPL-SCNC: 140 MMOL/L (ref 133–144)
TSH SERPL DL<=0.005 MIU/L-ACNC: 2.01 MU/L (ref 0.4–4)

## 2021-07-02 NOTE — TELEPHONE ENCOUNTER
Prior Authorization Retail Medication Request    Medication/Dose: mirabegron (MYRBETRIQ) 25 MG 24 hr tablet  ICD code (if different than what is on RX):  Urgency incontinence [N39.41  Previously Tried and Failed:    Rationale:      Insurance Name:  [facility]   Insurance ID:  [id number]      Pharmacy Information (if different than what is on RX)  [facility]  [facility]   Phone:  [phone]    Yudith Corona MA 3:32 PM 7/2/2021

## 2021-07-05 ENCOUNTER — OFFICE VISIT (OUTPATIENT)
Dept: CARDIOLOGY | Facility: CLINIC | Age: 56
End: 2021-07-05
Payer: MEDICAID

## 2021-07-05 VITALS
DIASTOLIC BLOOD PRESSURE: 68 MMHG | WEIGHT: 112 LBS | BODY MASS INDEX: 17.58 KG/M2 | HEART RATE: 80 BPM | SYSTOLIC BLOOD PRESSURE: 125 MMHG | HEIGHT: 67 IN

## 2021-07-05 DIAGNOSIS — I34.0 NONRHEUMATIC MITRAL VALVE REGURGITATION: Primary | ICD-10-CM

## 2021-07-05 DIAGNOSIS — Z01.810 PRE-OPERATIVE CARDIOVASCULAR EXAMINATION: ICD-10-CM

## 2021-07-05 PROCEDURE — 99203 OFFICE O/P NEW LOW 30 MIN: CPT | Performed by: INTERNAL MEDICINE

## 2021-07-05 ASSESSMENT — MIFFLIN-ST. JEOR: SCORE: 1135.66

## 2021-07-05 NOTE — TELEPHONE ENCOUNTER
PRIOR AUTHORIZATION DENIED    Medication: mirabegron (MYRBETRIQ) 25 MG 24 hr tablet    Denial Date: 7/5/2021    Denial Rational:  Patient must have a history of trial & failure to the formulary alternative(s) or have a contraindication or intolerance to the formulary alternatives:            Appeal Information:    If you would like to appeal, please supply P/A team with a letter of medical necessity with clinical reason.

## 2021-07-05 NOTE — PROGRESS NOTES
HPI and Plan:   Today, I had the pleasure of seeing Hina Yap at [facility]. She is a pleasant 55 year old patient with a past medical history of multiple sclerosis, hypertension, anxiety, depression, GERD, mildly reduced ejection fraction of 50-55% of unclear etiology who presents to the clinic for preoperative cardiovascular risk stratification before undergoing spinal fusion.    As far as cardiac work-up is concerned she had a normal coronary angiogram in 2001.  Transthoracic echocardiogram in 2018 showed mild inferolateral hypokinesis, unchanged from 2001, EF of 50 to 55%.  Mitral valve was noted with thickened but there was no hemodynamically significant MR.  Most recent EKG shows normal sinus rhythm without ST changes or Q waves.     Today, she tells me that she has been having some chest discomfort as well as shortness of breath.  She thinks it is because of COPD exacerbation.  She was recently given a course of antibiotics which has improved but not resolved her symptoms.  As far as physical activity is concerned she is not able to do much because of severe lower back pain for which the surgery is planned for 7/24/2021.  The maximum amount of exertion she recently did was moving stuff at her mother's place and light cleaning which did not lead to stenting of the symptoms.    Assessment and plan  1.  Preoperative cardiovascular stratification  2.  Mild reduced ejection fraction of 50-55%-unclear etiology, thought to be secondary to ICM in the presence of inferior wall motion abnormality but no CAD on angiogram in 2001  3.  Hypertension  4.  Anxiety/depression  5.  Multiple sclerosis  6.  GERD.      The patient is currently doing well from cardiovascular standpoint.  She does not have any symptoms worrisome of angina or heart failure.  She has some atypical chest symptoms.  I am not convinced that these represent angina.  However, I would perform nuclear stress test to rule out  ischemia.  Unless there is a large reversible defect she should be able to undergo surgery and be able to tolerate it well.  In addition, I would recommend standard cardiovascular care such as maintaining adequate blood pressure and avoiding hypotension in the perioperative..  She is a patient of one of my partners, Dr. Elizondo, and can return to seeing him on as-needed basis.    Thank you for allowing me to participate in the care of Hina Yap    This note was completed in part using Dragon voice recognition software. Although reviewed after completion, some word and grammatical errors may occur.    Pollo Kovacs MD  Cardiology    No orders of the defined types were placed in this encounter.      No orders of the defined types were placed in this encounter.      There are no discontinued medications.    Encounter Diagnoses   Name Primary?     Nonrheumatic mitral valve regurgitation Yes     Pre-operative cardiovascular examination        CURRENT MEDICATIONS:  Current Outpatient Medications   Medication Sig Dispense Refill     ALPRAZolam (XANAX) 0.5 MG tablet Take 1 tablet (0.5 mg) by mouth 2 times daily 60 tablet 0     amLODIPine (NORVASC) 2.5 MG tablet Take 1 tablet (2.5 mg) by mouth daily 30 tablet 11     amoxicillin-clavulanate (AUGMENTIN) 875-125 MG tablet Take 1 tablet by mouth 2 times daily for 5 days 10 tablet 0     amphetamine-dextroamphetamine (ADDERALL XR) 30 MG 24 hr capsule Take 1 capsule (30 mg) by mouth daily 30 capsule 0     amphetamine-dextroamphetamine (ADDERALL) 10 MG tablet Take 1 tablet (10 mg) by mouth daily 30 tablet 0     AVONEX PEN 30 MCG/0.5ML auto-injector kit        baclofen (LIORESAL) 20 MG tablet Take 20 mg by mouth 3 times daily       esomeprazole (NEXIUM) 40 MG DR capsule Take 1 capsule (40 mg) by mouth every morning (before breakfast) Take 30-60 minutes before eating. 90 capsule 3     ipratropium - albuterol 0.5 mg/2.5 mg/3 mL (DUONEB) 0.5-2.5 (3) MG/3ML neb solution Take 1  vial (3 mLs) by nebulization every 6 hours as needed for shortness of breath / dyspnea or wheezing 30 mL 3     mirabegron (MYRBETRIQ) 25 MG 24 hr tablet Take 1 tablet (25 mg) by mouth daily 90 tablet 3     mirtazapine (REMERON) 15 MG tablet Take 2 tablets (30 mg) by mouth At Bedtime 4 tablet 0     nicotine polacrilex (NICORETTE) 4 MG gum Place 1 each (4 mg) inside cheek as needed for smoking cessation 100 each 3     oxyCODONE-acetaminophen (PERCOCET) 5-325 MG tablet Take 1-2 tabs at a time, up to 5 times a day. No more than 7 tabs per day. Must give at least 4 hours between dosing. Will hold at this dose until at least March 2021. 210 tablet 0     pregabalin (LYRICA) 75 MG capsule Take 1 capsule (75 mg) by mouth 2 times daily 180 capsule 3     promethazine (PHENERGAN) 25 MG tablet Take 25 mg by mouth every 6 hours as needed for nausea       rOPINIRole (REQUIP) 2 MG tablet Take 2 mg by mouth every morning Patient is taking 1 tab in the morning and 2 tab at night       tiotropium-olodaterol (STIOLTO RESPIMAT) 2.5-2.5 MCG/ACT AERS Inhale 2 puffs into the lungs daily 4 g 11     tiotropium-olodaterol 2.5-2.5 MCG/ACT AERS Inhale 2 puffs into the lungs daily 4 g 3     tiZANidine (ZANAFLEX) 2 MG tablet Take 2 mg by mouth 3 times daily       albuterol (PROAIR HFA/PROVENTIL HFA/VENTOLIN HFA) 108 (90 Base) MCG/ACT inhaler Inhale 2 puffs into the lungs every 4 hours as needed for shortness of breath / dyspnea or wheezing (Patient not taking: Reported on 7/5/2021) 18 g 3     albuterol (PROVENTIL) (2.5 MG/3ML) 0.083% neb solution Take 1 vial (2.5 mg) by nebulization every 4 hours as needed for shortness of breath / dyspnea 1 Box 0     guaiFENesin-codeine (ROBITUSSIN AC) 100-10 MG/5ML solution Take 5-10 mLs by mouth every 6 hours as needed for cough (Patient not taking: Reported on 7/5/2021) 118 mL 0     oxybutynin ER (DITROPAN-XL) 5 MG 24 hr tablet Take 1 tablet (5 mg) by mouth daily (Patient not taking: Reported on 7/5/2021) 90  tablet 3     predniSONE (DELTASONE) 20 MG tablet 3 tabs po every day for 2 days, then 2 tabs po every day for 3 days, then 1 tab po every day for 2 days, then 1/2 tab po every day for 2 days (Patient not taking: Reported on 2021) 10 tablet 0       ALLERGIES     Allergies   Allergen Reactions     Sulfa Drugs Rash     Adhesive Tape Rash     Reacts to adhesive on fentanyl patch     Wellbutrin [Bupropion Hydrobromide] Rash       PAST MEDICAL HISTORY:  Past Medical History:   Diagnosis Date     Anxiety      Arthritis Years ago     COPD (chronic obstructive pulmonary disease) (H)      Depressive disorder Years ago     GERD (gastroesophageal reflux disease)      Hypertension      Ischemic cardiomyopathy      Neuromuscular disorder (H)      Nonrheumatic mitral valve regurgitation      PVC's (premature ventricular contractions)      Restless leg syndrome      Tobacco use disorder        PAST SURGICAL HISTORY:  History reviewed. No pertinent surgical history.    FAMILY HISTORY:  Family History   Problem Relation Age of Onset     Diabetes Maternal Grandmother      Breast Cancer Mother      Osteoporosis Mother      Other Cancer Father      Depression Brother      Anxiety Disorder Brother      Substance Abuse Brother        SOCIAL HISTORY:  Social History     Socioeconomic History     Marital status: Single     Spouse name: None     Number of children: None     Years of education: None     Highest education level: None   Occupational History     None   Social Needs     Financial resource strain: None     Food insecurity     Worry: None     Inability: None     Transportation needs     Medical: None     Non-medical: None   Tobacco Use     Smoking status: Heavy Tobacco Smoker     Packs/day: 0.00     Years: 35.00     Pack years: 0.00     Types: Cigarettes     Last attempt to quit: 2013     Years since quittin.9     Smokeless tobacco: Never Used     Tobacco comment: 3/4 pack per day   Substance and Sexual Activity      "Alcohol use: Not Currently     Alcohol/week: 0.0 standard drinks     Drug use: No     Sexual activity: Yes   Lifestyle     Physical activity     Days per week: None     Minutes per session: None     Stress: None   Relationships     Social connections     Talks on phone: None     Gets together: None     Attends Yazidi service: None     Active member of club or organization: None     Attends meetings of clubs or organizations: None     Relationship status: None     Intimate partner violence     Fear of current or ex partner: None     Emotionally abused: None     Physically abused: None     Forced sexual activity: None   Other Topics Concern     Parent/sibling w/ CABG, MI or angioplasty before 65F 55M? No   Social History Narrative     None       Review of Systems:  Skin:  Negative       Eyes:  Negative      ENT:  Negative      Respiratory:  Negative       Cardiovascular:  Negative      Gastroenterology: Negative      Genitourinary:  Positive for incontinence chronic kidney disease  Musculoskeletal:  Positive for back pain scheduled for surgery 7/23/2021  Neurologic:  Negative      Psychiatric:  Positive for anxiety;depression    Heme/Lymph/Imm:  Positive for allergies    Endocrine:  Negative        Physical Exam:  Vitals: /68 (BP Location: Left arm, Cuff Size: Adult Regular)   Pulse 80   Ht 1.702 m (5' 7\")   Wt 50.8 kg (112 lb)   BMI 17.54 kg/m    Eyes: No icterus.  Pulmonary: Chest symmetric, lungs clear bilaterally and no crackles, wheezes or rales.  Cardiovascular: RRR with normal S1 and S2, no murmur, JVP normal.  Musculoskeletal: Edema of the lower extremities: None.  Neurologic: Oriented and appropriate without obvious focal deficits.   Psychiatric: Normal affect.     Recent Lab Results:  LIPID RESULTS:  Lab Results   Component Value Date    CHOL 190 02/13/2020    HDL 60 02/13/2020     (H) 02/13/2020    TRIG 102 02/13/2020    CHOLHDLRATIO 3.7 12/07/2010       LIVER ENZYME RESULTS:  Lab " Results   Component Value Date    AST 26 07/01/2021    ALT 31 07/01/2021       CBC RESULTS:  Lab Results   Component Value Date    WBC 6.0 10/29/2020    RBC 4.45 10/29/2020    HGB 13.1 10/29/2020    HCT 40.0 10/29/2020    MCV 90 10/29/2020    MCH 29.4 10/29/2020    MCHC 32.8 10/29/2020    RDW 13.4 10/29/2020     10/29/2020       BMP RESULTS:  Lab Results   Component Value Date     07/01/2021    POTASSIUM 4.1 07/01/2021    CHLORIDE 109 07/01/2021    CO2 28 07/01/2021    ANIONGAP 3 07/01/2021    GLC 77 07/01/2021    BUN 21 07/01/2021    CR 0.86 07/01/2021    GFRESTIMATED 75 07/01/2021    GFRESTBLACK 87 07/01/2021    MARIO 8.9 07/01/2021        A1C RESULTS:  Lab Results   Component Value Date    A1C 5.2 09/29/2015       INR RESULTS:  Lab Results   Component Value Date    INR 0.95 08/25/2018       CC  No referring provider defined for this encounter.    All medical records were reviewed in detail and discussed with the patient. Greater than 30 mins were spent with the patient, 50% of this time was spent on counseling and coordination of care.  After visit summary was printed and given to the patient.

## 2021-07-05 NOTE — TELEPHONE ENCOUNTER
Central Prior Authorization Team   Phone: [phone]      PA Initiation    Medication: mirabegron (MYRBETRIQ) 25 MG 24 hr tablet  Insurance Company: [place] Medicaid ([facility]) - Phone [phone] Fax [phone]  Pharmacy Filling the Rx: [id number] - [place], [place] - [address] AT [facility] [id number]  Filling Pharmacy Phone: [phone]  Filling Pharmacy Fax:    Start Date: 7/5/2021

## 2021-07-05 NOTE — LETTER
7/5/2021    Sunshine Butterfield, APRN CNP  [address] [place] [address]    RE: Hina A Fracisco       Dear Colleague,    I had the pleasure of seeing Hina Yap in the [facility].    HPI and Plan:   Today, I had the pleasure of seeing Hina Yap at [facility]. She is a pleasant 55 year old patient with a past medical history of multiple sclerosis, hypertension, anxiety, depression, GERD, mildly reduced ejection fraction of 50-55% of unclear etiology who presents to the clinic for preoperative cardiovascular risk stratification before undergoing spinal fusion.    As far as cardiac work-up is concerned she had a normal coronary angiogram in 2001.  Transthoracic echocardiogram in 2018 showed mild inferolateral hypokinesis, unchanged from 2001, EF of 50 to 55%.  Mitral valve was noted with thickened but there was no hemodynamically significant MR.  Most recent EKG shows normal sinus rhythm without ST changes or Q waves.     Today, she tells me that she has been having some chest discomfort as well as shortness of breath.  She thinks it is because of COPD exacerbation.  She was recently given a course of antibiotics which has improved but not resolved her symptoms.  As far as physical activity is concerned she is not able to do much because of severe lower back pain for which the surgery is planned for 7/24/2021.  The maximum amount of exertion she recently did was moving stuff at her mother's place and light cleaning which did not lead to stenting of the symptoms.    Assessment and plan  1.  Preoperative cardiovascular stratification  2.  Mild reduced ejection fraction of 50-55%-unclear etiology, thought to be secondary to ICM in the presence of inferior wall motion abnormality but no CAD on angiogram in 2001  3.  Hypertension  4.  Anxiety/depression  5.  Multiple sclerosis  6.  GERD.      The patient is  currently doing well from cardiovascular standpoint.  She does not have any symptoms worrisome of angina or heart failure.  She has some atypical chest symptoms.  I am not convinced that these represent angina.  However, I would perform nuclear stress test to rule out ischemia.  Unless there is a large reversible defect she should be able to undergo surgery and be able to tolerate it well.  In addition, I would recommend standard cardiovascular care such as maintaining adequate blood pressure and avoiding hypotension in the perioperative..  She is a patient of one of my partners, Dr. Elizondo, and can return to seeing him on as-needed basis.    Thank you for allowing me to participate in the care of Hina Yap    This note was completed in part using Dragon voice recognition software. Although reviewed after completion, some word and grammatical errors may occur.    Pollo Kovacs MD  Cardiology    No orders of the defined types were placed in this encounter.      No orders of the defined types were placed in this encounter.      There are no discontinued medications.    Encounter Diagnoses   Name Primary?     Nonrheumatic mitral valve regurgitation Yes     Pre-operative cardiovascular examination        CURRENT MEDICATIONS:  Current Outpatient Medications   Medication Sig Dispense Refill     ALPRAZolam (XANAX) 0.5 MG tablet Take 1 tablet (0.5 mg) by mouth 2 times daily 60 tablet 0     amLODIPine (NORVASC) 2.5 MG tablet Take 1 tablet (2.5 mg) by mouth daily 30 tablet 11     amoxicillin-clavulanate (AUGMENTIN) 875-125 MG tablet Take 1 tablet by mouth 2 times daily for 5 days 10 tablet 0     amphetamine-dextroamphetamine (ADDERALL XR) 30 MG 24 hr capsule Take 1 capsule (30 mg) by mouth daily 30 capsule 0     amphetamine-dextroamphetamine (ADDERALL) 10 MG tablet Take 1 tablet (10 mg) by mouth daily 30 tablet 0     AVONEX PEN 30 MCG/0.5ML auto-injector kit        baclofen (LIORESAL) 20 MG tablet Take 20 mg by  mouth 3 times daily       esomeprazole (NEXIUM) 40 MG DR capsule Take 1 capsule (40 mg) by mouth every morning (before breakfast) Take 30-60 minutes before eating. 90 capsule 3     ipratropium - albuterol 0.5 mg/2.5 mg/3 mL (DUONEB) 0.5-2.5 (3) MG/3ML neb solution Take 1 vial (3 mLs) by nebulization every 6 hours as needed for shortness of breath / dyspnea or wheezing 30 mL 3     mirabegron (MYRBETRIQ) 25 MG 24 hr tablet Take 1 tablet (25 mg) by mouth daily 90 tablet 3     mirtazapine (REMERON) 15 MG tablet Take 2 tablets (30 mg) by mouth At Bedtime 4 tablet 0     nicotine polacrilex (NICORETTE) 4 MG gum Place 1 each (4 mg) inside cheek as needed for smoking cessation 100 each 3     oxyCODONE-acetaminophen (PERCOCET) 5-325 MG tablet Take 1-2 tabs at a time, up to 5 times a day. No more than 7 tabs per day. Must give at least 4 hours between dosing. Will hold at this dose until at least March 2021. 210 tablet 0     pregabalin (LYRICA) 75 MG capsule Take 1 capsule (75 mg) by mouth 2 times daily 180 capsule 3     promethazine (PHENERGAN) 25 MG tablet Take 25 mg by mouth every 6 hours as needed for nausea       rOPINIRole (REQUIP) 2 MG tablet Take 2 mg by mouth every morning Patient is taking 1 tab in the morning and 2 tab at night       tiotropium-olodaterol (STIOLTO RESPIMAT) 2.5-2.5 MCG/ACT AERS Inhale 2 puffs into the lungs daily 4 g 11     tiotropium-olodaterol 2.5-2.5 MCG/ACT AERS Inhale 2 puffs into the lungs daily 4 g 3     tiZANidine (ZANAFLEX) 2 MG tablet Take 2 mg by mouth 3 times daily       albuterol (PROAIR HFA/PROVENTIL HFA/VENTOLIN HFA) 108 (90 Base) MCG/ACT inhaler Inhale 2 puffs into the lungs every 4 hours as needed for shortness of breath / dyspnea or wheezing (Patient not taking: Reported on 7/5/2021) 18 g 3     albuterol (PROVENTIL) (2.5 MG/3ML) 0.083% neb solution Take 1 vial (2.5 mg) by nebulization every 4 hours as needed for shortness of breath / dyspnea 1 Box 0     guaiFENesin-codeine  (ROBITUSSIN AC) 100-10 MG/5ML solution Take 5-10 mLs by mouth every 6 hours as needed for cough (Patient not taking: Reported on 7/5/2021) 118 mL 0     oxybutynin ER (DITROPAN-XL) 5 MG 24 hr tablet Take 1 tablet (5 mg) by mouth daily (Patient not taking: Reported on 7/5/2021) 90 tablet 3     predniSONE (DELTASONE) 20 MG tablet 3 tabs po every day for 2 days, then 2 tabs po every day for 3 days, then 1 tab po every day for 2 days, then 1/2 tab po every day for 2 days (Patient not taking: Reported on 7/5/2021) 10 tablet 0       ALLERGIES     Allergies   Allergen Reactions     Sulfa Drugs Rash     Adhesive Tape Rash     Reacts to adhesive on fentanyl patch     Wellbutrin [Bupropion Hydrobromide] Rash       PAST MEDICAL HISTORY:  Past Medical History:   Diagnosis Date     Anxiety      Arthritis Years ago     COPD (chronic obstructive pulmonary disease) (H)      Depressive disorder Years ago     GERD (gastroesophageal reflux disease)      Hypertension      Ischemic cardiomyopathy      Neuromuscular disorder (H)      Nonrheumatic mitral valve regurgitation      PVC's (premature ventricular contractions)      Restless leg syndrome      Tobacco use disorder        PAST SURGICAL HISTORY:  History reviewed. No pertinent surgical history.    FAMILY HISTORY:  Family History   Problem Relation Age of Onset     Diabetes Maternal Grandmother      Breast Cancer Mother      Osteoporosis Mother      Other Cancer Father      Depression Brother      Anxiety Disorder Brother      Substance Abuse Brother        SOCIAL HISTORY:  Social History     Socioeconomic History     Marital status: Single     Spouse name: None     Number of children: None     Years of education: None     Highest education level: None   Occupational History     None   Social Needs     Financial resource strain: None     Food insecurity     Worry: None     Inability: None     Transportation needs     Medical: None     Non-medical: None   Tobacco Use     Smoking  "status: Heavy Tobacco Smoker     Packs/day: 0.00     Years: 35.00     Pack years: 0.00     Types: Cigarettes     Last attempt to quit: 2013     Years since quittin.9     Smokeless tobacco: Never Used     Tobacco comment: 3/4 pack per day   Substance and Sexual Activity     Alcohol use: Not Currently     Alcohol/week: 0.0 standard drinks     Drug use: No     Sexual activity: Yes   Lifestyle     Physical activity     Days per week: None     Minutes per session: None     Stress: None   Relationships     Social connections     Talks on phone: None     Gets together: None     Attends Sabianist service: None     Active member of club or organization: None     Attends meetings of clubs or organizations: None     Relationship status: None     Intimate partner violence     Fear of current or ex partner: None     Emotionally abused: None     Physically abused: None     Forced sexual activity: None   Other Topics Concern     Parent/sibling w/ CABG, MI or angioplasty before 65F 55M? No   Social History Narrative     None       Review of Systems:  Skin:  Negative       Eyes:  Negative      ENT:  Negative      Respiratory:  Negative       Cardiovascular:  Negative      Gastroenterology: Negative      Genitourinary:  Positive for incontinence chronic kidney disease  Musculoskeletal:  Positive for back pain scheduled for surgery 2021  Neurologic:  Negative      Psychiatric:  Positive for anxiety;depression    Heme/Lymph/Imm:  Positive for allergies    Endocrine:  Negative        Physical Exam:  Vitals: /68 (BP Location: Left arm, Cuff Size: Adult Regular)   Pulse 80   Ht 1.702 m (5' 7\")   Wt 50.8 kg (112 lb)   BMI 17.54 kg/m    Eyes: No icterus.  Pulmonary: Chest symmetric, lungs clear bilaterally and no crackles, wheezes or rales.  Cardiovascular: RRR with normal S1 and S2, no murmur, JVP normal.  Musculoskeletal: Edema of the lower extremities: None.  Neurologic: Oriented and appropriate without obvious " focal deficits.   Psychiatric: Normal affect.     Recent Lab Results:  LIPID RESULTS:  Lab Results   Component Value Date    CHOL 190 02/13/2020    HDL 60 02/13/2020     (H) 02/13/2020    TRIG 102 02/13/2020    CHOLHDLRATIO 3.7 12/07/2010       LIVER ENZYME RESULTS:  Lab Results   Component Value Date    AST 26 07/01/2021    ALT 31 07/01/2021       CBC RESULTS:  Lab Results   Component Value Date    WBC 6.0 10/29/2020    RBC 4.45 10/29/2020    HGB 13.1 10/29/2020    HCT 40.0 10/29/2020    MCV 90 10/29/2020    MCH 29.4 10/29/2020    MCHC 32.8 10/29/2020    RDW 13.4 10/29/2020     10/29/2020       BMP RESULTS:  Lab Results   Component Value Date     07/01/2021    POTASSIUM 4.1 07/01/2021    CHLORIDE 109 07/01/2021    CO2 28 07/01/2021    ANIONGAP 3 07/01/2021    GLC 77 07/01/2021    BUN 21 07/01/2021    CR 0.86 07/01/2021    GFRESTIMATED 75 07/01/2021    GFRESTBLACK 87 07/01/2021    MARIO 8.9 07/01/2021        A1C RESULTS:  Lab Results   Component Value Date    A1C 5.2 09/29/2015       INR RESULTS:  Lab Results   Component Value Date    INR 0.95 08/25/2018       CC  No referring provider defined for this encounter.    All medical records were reviewed in detail and discussed with the patient. Greater than 30 mins were spent with the patient, 50% of this time was spent on counseling and coordination of care.  After visit summary was printed and given to the patient.        Thank you for allowing me to participate in the care of your patient.      Sincerely,     Pollo Kovacs MD     [facility]  cc:   No referring provider defined for this encounter.

## 2021-07-06 ENCOUNTER — TELEPHONE (OUTPATIENT)
Dept: PEDIATRICS | Facility: CLINIC | Age: 56
End: 2021-07-06

## 2021-07-06 ENCOUNTER — ANCILLARY PROCEDURE (OUTPATIENT)
Dept: GENERAL RADIOLOGY | Facility: CLINIC | Age: 56
End: 2021-07-06
Payer: MEDICAID

## 2021-07-06 DIAGNOSIS — G47.00 INSOMNIA, UNSPECIFIED TYPE: ICD-10-CM

## 2021-07-06 DIAGNOSIS — R53.83 OTHER FATIGUE: Primary | ICD-10-CM

## 2021-07-06 DIAGNOSIS — F41.9 ANXIETY: ICD-10-CM

## 2021-07-06 DIAGNOSIS — F32.89 OTHER DEPRESSION: ICD-10-CM

## 2021-07-06 DIAGNOSIS — R93.89 ABNORMAL CXR: Primary | ICD-10-CM

## 2021-07-06 DIAGNOSIS — G35 MULTIPLE SCLEROSIS (H): ICD-10-CM

## 2021-07-06 PROCEDURE — 71046 X-RAY EXAM CHEST 2 VIEWS: CPT | Performed by: RADIOLOGY

## 2021-07-06 RX ORDER — OXYCODONE AND ACETAMINOPHEN 10; 325 MG/1; MG/1
1 TABLET ORAL EVERY 6 HOURS PRN
Status: ON HOLD | COMMUNITY
End: 2021-07-23

## 2021-07-06 RX ORDER — OXCARBAZEPINE 150 MG/1
TABLET, FILM COATED ORAL
COMMUNITY
Start: 2021-06-24 | End: 2022-09-17

## 2021-07-06 NOTE — LETTER
November 2, 2021      Hian Yap  [address]  [place] [address]              Dear Hina,      We are concerned about your health. We wanted to follow up if you were able to schedule with ortho and psych. We also wanted to follow up to see if you were in need of housing resources.     The phone numbers for your referrals are as follows:     Dr. Flores and [facility]: [phone] [place] General [facility] number: [phone]      Psychiatry Referral: [phone]     You can log into your [facility] and reply to this message or you can call my personal extension listed below with any questions or concerns. Thank you for choosing [facility]!       Sincerely,       Reji Rahman, EMT at 1:15 PM on November 2, 2021   [facility]   [phone]

## 2021-07-06 NOTE — TELEPHONE ENCOUNTER
Per PCP, outreach to pt to:    1) Check on SOB and Cough   2) Ask if pt scheduled with urology [phone]     Called pt.    1) Pt notes SOB and cough worse with walking but overall still about the same.  2) Pt has not scheduled with urology. She notes she feels overwhelmed and has too many appointments right now.    Will check back near end of month after her surgery to try and schedule appts with specialists.    Reji Rahman, EMT at 2:41 PM on July 6, 2021   [facility] Health Guide   [phone]

## 2021-07-12 ENCOUNTER — HOSPITAL ENCOUNTER (OUTPATIENT)
Dept: CARDIOLOGY | Facility: CLINIC | Age: 56
End: 2021-07-12
Attending: INTERNAL MEDICINE
Payer: MEDICAID

## 2021-07-12 VITALS
HEART RATE: 76 BPM | HEIGHT: 67 IN | BODY MASS INDEX: 17.58 KG/M2 | DIASTOLIC BLOOD PRESSURE: 78 MMHG | OXYGEN SATURATION: 97 % | WEIGHT: 112 LBS | SYSTOLIC BLOOD PRESSURE: 118 MMHG

## 2021-07-12 DIAGNOSIS — Z01.810 PRE-OPERATIVE CARDIOVASCULAR EXAMINATION: ICD-10-CM

## 2021-07-12 DIAGNOSIS — I34.0 NONRHEUMATIC MITRAL VALVE REGURGITATION: ICD-10-CM

## 2021-07-12 LAB
CV BLOOD PRESSURE: 43 MMHG
CV STRESS MAX HR HE: 92
NUC STRESS EJECTION FRACTION: 44 %
RATE PRESSURE PRODUCT: 9200
STRESS ECHO BASELINE DIASTOLIC HE: 78
STRESS ECHO BASELINE HR: 75
STRESS ECHO BASELINE SYSTOLIC BP: 118
STRESS ECHO CALCULATED PERCENT HR: 56 %
STRESS ECHO LAST STRESS DIASTOLIC BP: 74
STRESS ECHO LAST STRESS SYSTOLIC BP: 100
STRESS ECHO TARGET HR: 165
STRESS/REST PERFUSION RATIO: 1.01

## 2021-07-12 PROCEDURE — 93016 CV STRESS TEST SUPVJ ONLY: CPT | Performed by: INTERNAL MEDICINE

## 2021-07-12 PROCEDURE — 343N000001 HC RX 343: Performed by: INTERNAL MEDICINE

## 2021-07-12 PROCEDURE — 93018 CV STRESS TEST I&R ONLY: CPT | Performed by: INTERNAL MEDICINE

## 2021-07-12 PROCEDURE — 78452 HT MUSCLE IMAGE SPECT MULT: CPT

## 2021-07-12 PROCEDURE — 250N000011 HC RX IP 250 OP 636: Performed by: INTERNAL MEDICINE

## 2021-07-12 PROCEDURE — 78452 HT MUSCLE IMAGE SPECT MULT: CPT | Mod: 26 | Performed by: INTERNAL MEDICINE

## 2021-07-12 PROCEDURE — A9502 TC99M TETROFOSMIN: HCPCS | Performed by: INTERNAL MEDICINE

## 2021-07-12 RX ORDER — REGADENOSON 0.08 MG/ML
0.4 INJECTION, SOLUTION INTRAVENOUS ONCE
Status: COMPLETED | OUTPATIENT
Start: 2021-07-12 | End: 2021-07-12

## 2021-07-12 RX ORDER — AMINOPHYLLINE 25 MG/ML
50-100 INJECTION, SOLUTION INTRAVENOUS
Status: DISCONTINUED | OUTPATIENT
Start: 2021-07-12 | End: 2021-07-13 | Stop reason: HOSPADM

## 2021-07-12 RX ORDER — ACYCLOVIR 200 MG/1
0-1 CAPSULE ORAL
Status: DISCONTINUED | OUTPATIENT
Start: 2021-07-12 | End: 2021-07-13 | Stop reason: HOSPADM

## 2021-07-12 RX ORDER — ALBUTEROL SULFATE 90 UG/1
2 AEROSOL, METERED RESPIRATORY (INHALATION) EVERY 5 MIN PRN
Status: DISCONTINUED | OUTPATIENT
Start: 2021-07-12 | End: 2021-07-13 | Stop reason: HOSPADM

## 2021-07-12 RX ORDER — CAFFEINE CITRATE 20 MG/ML
60 SOLUTION INTRAVENOUS
Status: DISCONTINUED | OUTPATIENT
Start: 2021-07-12 | End: 2021-07-13 | Stop reason: HOSPADM

## 2021-07-12 RX ADMIN — TETROFOSMIN 3.15 MCI.: 1.38 INJECTION, POWDER, LYOPHILIZED, FOR SOLUTION INTRAVENOUS at 13:46

## 2021-07-12 RX ADMIN — REGADENOSON 0.4 MG: 0.08 INJECTION, SOLUTION INTRAVENOUS at 14:47

## 2021-07-12 RX ADMIN — TETROFOSMIN 9.7 MCI.: 1.38 INJECTION, POWDER, LYOPHILIZED, FOR SOLUTION INTRAVENOUS at 14:50

## 2021-07-12 ASSESSMENT — MIFFLIN-ST. JEOR: SCORE: 1135.66

## 2021-07-14 ENCOUNTER — TELEPHONE (OUTPATIENT)
Dept: CARDIOLOGY | Facility: CLINIC | Age: 56
End: 2021-07-14

## 2021-07-14 NOTE — TELEPHONE ENCOUNTER
----- Message from Pollo Kovacs MD sent at 7/14/2021  1:15 PM CDT -----  Since there are no large areas of reversible ischemia noted on the study she should be able to undergo the surgery without any further work up.       Spoke to patient and informed her of the Lexiscan result and Dr. Presley review above. Pt verbalized understanding. Also sent MyChart result to patient. No further questions or concerns.

## 2021-07-19 ENCOUNTER — LAB (OUTPATIENT)
Dept: LAB | Facility: CLINIC | Age: 56
End: 2021-07-19
Payer: MEDICAID

## 2021-07-19 DIAGNOSIS — Z11.59 ENCOUNTER FOR SCREENING FOR OTHER VIRAL DISEASES: ICD-10-CM

## 2021-07-19 PROCEDURE — U0003 INFECTIOUS AGENT DETECTION BY NUCLEIC ACID (DNA OR RNA); SEVERE ACUTE RESPIRATORY SYNDROME CORONAVIRUS 2 (SARS-COV-2) (CORONAVIRUS DISEASE [COVID-19]), AMPLIFIED PROBE TECHNIQUE, MAKING USE OF HIGH THROUGHPUT TECHNOLOGIES AS DESCRIBED BY CMS-2020-01-R: HCPCS

## 2021-07-20 LAB — SARS-COV-2 RNA RESP QL NAA+PROBE: NEGATIVE

## 2021-07-22 ENCOUNTER — TRANSFERRED RECORDS (OUTPATIENT)
Dept: HEALTH INFORMATION MANAGEMENT | Facility: CLINIC | Age: 56
End: 2021-07-22

## 2021-07-23 ENCOUNTER — ANESTHESIA (OUTPATIENT)
Dept: SURGERY | Facility: CLINIC | Age: 56
End: 2021-07-23
Payer: MEDICAID

## 2021-07-23 ENCOUNTER — APPOINTMENT (OUTPATIENT)
Dept: GENERAL RADIOLOGY | Facility: CLINIC | Age: 56
End: 2021-07-23
Attending: NEUROLOGICAL SURGERY
Payer: MEDICAID

## 2021-07-23 ENCOUNTER — HOSPITAL ENCOUNTER (INPATIENT)
Facility: CLINIC | Age: 56
LOS: 3 days | Discharge: HOME | End: 2021-07-26
Attending: NEUROLOGICAL SURGERY | Admitting: NEUROLOGICAL SURGERY
Payer: MEDICAID

## 2021-07-23 ENCOUNTER — ANESTHESIA EVENT (OUTPATIENT)
Dept: SURGERY | Facility: CLINIC | Age: 56
End: 2021-07-23
Payer: MEDICAID

## 2021-07-23 DIAGNOSIS — Z98.1 S/P LUMBAR FUSION: Primary | ICD-10-CM

## 2021-07-23 LAB
ABO/RH(D): NORMAL
ANTIBODY SCREEN: NEGATIVE
HGB BLD-MCNC: 11.7 G/DL (ref 11.7–15.7)
SPECIMEN EXPIRATION DATE: NORMAL

## 2021-07-23 PROCEDURE — C1762 CONN TISS, HUMAN(INC FASCIA): HCPCS | Performed by: NEUROLOGICAL SURGERY

## 2021-07-23 PROCEDURE — 01NB0ZZ RELEASE LUMBAR NERVE, OPEN APPROACH: ICD-10-PCS | Performed by: NEUROLOGICAL SURGERY

## 2021-07-23 PROCEDURE — 360N000085 HC SURGERY LEVEL 5 W/ FLUORO, PER MIN: Performed by: NEUROLOGICAL SURGERY

## 2021-07-23 PROCEDURE — 250N000011 HC RX IP 250 OP 636: Performed by: NURSE PRACTITIONER

## 2021-07-23 PROCEDURE — 250N000009 HC RX 250: Performed by: NURSE ANESTHETIST, CERTIFIED REGISTERED

## 2021-07-23 PROCEDURE — 258N000003 HC RX IP 258 OP 636: Performed by: NURSE ANESTHETIST, CERTIFIED REGISTERED

## 2021-07-23 PROCEDURE — 120N000001 HC R&B MED SURG/OB

## 2021-07-23 PROCEDURE — C1713 ANCHOR/SCREW BN/BN,TIS/BN: HCPCS | Performed by: NEUROLOGICAL SURGERY

## 2021-07-23 PROCEDURE — 999N000141 HC STATISTIC PRE-PROCEDURE NURSING ASSESSMENT: Performed by: NEUROLOGICAL SURGERY

## 2021-07-23 PROCEDURE — 999N000063 XR CROSSTABLE LATERAL LUMBAR SPINE PORTABLE: Mod: TC

## 2021-07-23 PROCEDURE — 8E0WXBF COMPUTER ASSISTED PROCEDURE OF TRUNK REGION, WITH FLUOROSCOPY: ICD-10-PCS | Performed by: NEUROLOGICAL SURGERY

## 2021-07-23 PROCEDURE — 0SG00AJ FUSION OF LUMBAR VERTEBRAL JOINT WITH INTERBODY FUSION DEVICE, POSTERIOR APPROACH, ANTERIOR COLUMN, OPEN APPROACH: ICD-10-PCS | Performed by: NEUROLOGICAL SURGERY

## 2021-07-23 PROCEDURE — 250N000013 HC RX MED GY IP 250 OP 250 PS 637: Performed by: NURSE PRACTITIONER

## 2021-07-23 PROCEDURE — 250N000011 HC RX IP 250 OP 636: Performed by: ANESTHESIOLOGY

## 2021-07-23 PROCEDURE — 0SG0071 FUSION OF LUMBAR VERTEBRAL JOINT WITH AUTOLOGOUS TISSUE SUBSTITUTE, POSTERIOR APPROACH, POSTERIOR COLUMN, OPEN APPROACH: ICD-10-PCS | Performed by: NEUROLOGICAL SURGERY

## 2021-07-23 PROCEDURE — 250N000009 HC RX 250: Performed by: NEUROLOGICAL SURGERY

## 2021-07-23 PROCEDURE — 86900 BLOOD TYPING SEROLOGIC ABO: CPT | Performed by: ANESTHESIOLOGY

## 2021-07-23 PROCEDURE — 250N000011 HC RX IP 250 OP 636: Performed by: NEUROLOGICAL SURGERY

## 2021-07-23 PROCEDURE — 85018 HEMOGLOBIN: CPT | Performed by: ANESTHESIOLOGY

## 2021-07-23 PROCEDURE — 99223 1ST HOSP IP/OBS HIGH 75: CPT | Performed by: NURSE PRACTITIONER

## 2021-07-23 PROCEDURE — 0ST20ZZ RESECTION OF LUMBAR VERTEBRAL DISC, OPEN APPROACH: ICD-10-PCS | Performed by: NEUROLOGICAL SURGERY

## 2021-07-23 PROCEDURE — 250N000011 HC RX IP 250 OP 636: Performed by: NURSE ANESTHETIST, CERTIFIED REGISTERED

## 2021-07-23 PROCEDURE — 258N000003 HC RX IP 258 OP 636: Performed by: ANESTHESIOLOGY

## 2021-07-23 PROCEDURE — 250N000005 HC OR RX SURGIFLO HEMOSTATIC MATRIX 10ML 199102S OPNP: Performed by: NEUROLOGICAL SURGERY

## 2021-07-23 PROCEDURE — 710N000009 HC RECOVERY PHASE 1, LEVEL 1, PER MIN: Performed by: NEUROLOGICAL SURGERY

## 2021-07-23 PROCEDURE — 36415 COLL VENOUS BLD VENIPUNCTURE: CPT | Performed by: ANESTHESIOLOGY

## 2021-07-23 PROCEDURE — 0QS004Z REPOSITION LUMBAR VERTEBRA WITH INTERNAL FIXATION DEVICE, OPEN APPROACH: ICD-10-PCS | Performed by: NEUROLOGICAL SURGERY

## 2021-07-23 PROCEDURE — 258N000003 HC RX IP 258 OP 636: Performed by: NURSE PRACTITIONER

## 2021-07-23 PROCEDURE — 999N000179 XR SURGERY CARM FLUORO LESS THAN 5 MIN W STILLS: Mod: TC

## 2021-07-23 PROCEDURE — 370N000017 HC ANESTHESIA TECHNICAL FEE, PER MIN: Performed by: NEUROLOGICAL SURGERY

## 2021-07-23 PROCEDURE — 272N000001 HC OR GENERAL SUPPLY STERILE: Performed by: NEUROLOGICAL SURGERY

## 2021-07-23 DEVICE — IMPLANTABLE DEVICE: Type: IMPLANTABLE DEVICE | Site: SPINE LUMBAR | Status: FUNCTIONAL

## 2021-07-23 DEVICE — GRAFT BONE MAGNIFUSE 1CMX5CM 7509215: Type: IMPLANTABLE DEVICE | Site: SPINE LUMBAR | Status: FUNCTIONAL

## 2021-07-23 RX ORDER — LIDOCAINE 4 G/G
1 PATCH TOPICAL
Status: DISCONTINUED | OUTPATIENT
Start: 2021-07-23 | End: 2021-07-26 | Stop reason: HOSPADM

## 2021-07-23 RX ORDER — FENTANYL CITRATE 50 UG/ML
INJECTION, SOLUTION INTRAMUSCULAR; INTRAVENOUS PRN
Status: DISCONTINUED | OUTPATIENT
Start: 2021-07-23 | End: 2021-07-23

## 2021-07-23 RX ORDER — TIZANIDINE 2 MG/1
2 TABLET ORAL AT BEDTIME
COMMUNITY
End: 2022-09-29

## 2021-07-23 RX ORDER — PANTOPRAZOLE SODIUM 40 MG/1
40 TABLET, DELAYED RELEASE ORAL
Status: DISCONTINUED | OUTPATIENT
Start: 2021-07-24 | End: 2021-07-26 | Stop reason: HOSPADM

## 2021-07-23 RX ORDER — POLYETHYLENE GLYCOL 3350 17 G/17G
17 POWDER, FOR SOLUTION ORAL DAILY
Status: DISCONTINUED | OUTPATIENT
Start: 2021-07-24 | End: 2021-07-26 | Stop reason: HOSPADM

## 2021-07-23 RX ORDER — SODIUM CHLORIDE 9 MG/ML
INJECTION, SOLUTION INTRAVENOUS CONTINUOUS
Status: DISCONTINUED | OUTPATIENT
Start: 2021-07-23 | End: 2021-07-25

## 2021-07-23 RX ORDER — KETAMINE HYDROCHLORIDE 10 MG/ML
INJECTION INTRAMUSCULAR; INTRAVENOUS PRN
Status: DISCONTINUED | OUTPATIENT
Start: 2021-07-23 | End: 2021-07-23

## 2021-07-23 RX ORDER — ONDANSETRON 2 MG/ML
4 INJECTION INTRAMUSCULAR; INTRAVENOUS EVERY 6 HOURS PRN
Status: DISCONTINUED | OUTPATIENT
Start: 2021-07-23 | End: 2021-07-26 | Stop reason: HOSPADM

## 2021-07-23 RX ORDER — DEXAMETHASONE SODIUM PHOSPHATE 10 MG/ML
INJECTION, SOLUTION INTRAMUSCULAR; INTRAVENOUS PRN
Status: DISCONTINUED | OUTPATIENT
Start: 2021-07-23 | End: 2021-07-23

## 2021-07-23 RX ORDER — CEFAZOLIN SODIUM 2 G/100ML
2 INJECTION, SOLUTION INTRAVENOUS SEE ADMIN INSTRUCTIONS
Status: DISCONTINUED | OUTPATIENT
Start: 2021-07-23 | End: 2021-07-23 | Stop reason: HOSPADM

## 2021-07-23 RX ORDER — EPHEDRINE SULFATE 50 MG/ML
INJECTION, SOLUTION INTRAMUSCULAR; INTRAVENOUS; SUBCUTANEOUS PRN
Status: DISCONTINUED | OUTPATIENT
Start: 2021-07-23 | End: 2021-07-23

## 2021-07-23 RX ORDER — POLYETHYLENE GLYCOL 3350 17 G/17G
17 POWDER, FOR SOLUTION ORAL DAILY
Status: DISCONTINUED | OUTPATIENT
Start: 2021-07-23 | End: 2021-07-23

## 2021-07-23 RX ORDER — PREGABALIN 75 MG/1
75 CAPSULE ORAL 2 TIMES DAILY
Status: DISCONTINUED | OUTPATIENT
Start: 2021-07-23 | End: 2021-07-26 | Stop reason: HOSPADM

## 2021-07-23 RX ORDER — ROPINIROLE 2 MG/1
2 TABLET, FILM COATED ORAL EVERY MORNING
Status: DISCONTINUED | OUTPATIENT
Start: 2021-07-24 | End: 2021-07-26 | Stop reason: HOSPADM

## 2021-07-23 RX ORDER — ONDANSETRON 2 MG/ML
4 INJECTION INTRAMUSCULAR; INTRAVENOUS EVERY 30 MIN PRN
Status: DISCONTINUED | OUTPATIENT
Start: 2021-07-23 | End: 2021-07-23 | Stop reason: HOSPADM

## 2021-07-23 RX ORDER — AMOXICILLIN 250 MG
1 CAPSULE ORAL 2 TIMES DAILY
Status: DISCONTINUED | OUTPATIENT
Start: 2021-07-23 | End: 2021-07-26 | Stop reason: HOSPADM

## 2021-07-23 RX ORDER — IPRATROPIUM BROMIDE AND ALBUTEROL SULFATE 2.5; .5 MG/3ML; MG/3ML
1 SOLUTION RESPIRATORY (INHALATION) EVERY 6 HOURS PRN
Status: DISCONTINUED | OUTPATIENT
Start: 2021-07-23 | End: 2021-07-26 | Stop reason: HOSPADM

## 2021-07-23 RX ORDER — ALBUTEROL SULFATE 90 UG/1
2 AEROSOL, METERED RESPIRATORY (INHALATION) EVERY 4 HOURS PRN
Status: DISCONTINUED | OUTPATIENT
Start: 2021-07-23 | End: 2021-07-26 | Stop reason: HOSPADM

## 2021-07-23 RX ORDER — MAGNESIUM HYDROXIDE/ALUMINUM HYDROXICE/SIMETHICONE 120; 1200; 1200 MG/30ML; MG/30ML; MG/30ML
30 SUSPENSION ORAL EVERY 4 HOURS PRN
Status: DISCONTINUED | OUTPATIENT
Start: 2021-07-23 | End: 2021-07-26 | Stop reason: HOSPADM

## 2021-07-23 RX ORDER — PROPOFOL 10 MG/ML
INJECTION, EMULSION INTRAVENOUS PRN
Status: DISCONTINUED | OUTPATIENT
Start: 2021-07-23 | End: 2021-07-23

## 2021-07-23 RX ORDER — OXYCODONE HYDROCHLORIDE 5 MG/1
10 TABLET ORAL EVERY 4 HOURS PRN
Status: DISCONTINUED | OUTPATIENT
Start: 2021-07-23 | End: 2021-07-24

## 2021-07-23 RX ORDER — LIDOCAINE 40 MG/G
CREAM TOPICAL
Status: DISCONTINUED | OUTPATIENT
Start: 2021-07-23 | End: 2021-07-23 | Stop reason: HOSPADM

## 2021-07-23 RX ORDER — SODIUM CHLORIDE, SODIUM LACTATE, POTASSIUM CHLORIDE, CALCIUM CHLORIDE 600; 310; 30; 20 MG/100ML; MG/100ML; MG/100ML; MG/100ML
INJECTION, SOLUTION INTRAVENOUS CONTINUOUS
Status: DISCONTINUED | OUTPATIENT
Start: 2021-07-23 | End: 2021-07-23 | Stop reason: HOSPADM

## 2021-07-23 RX ORDER — PROCHLORPERAZINE MALEATE 10 MG
10 TABLET ORAL EVERY 6 HOURS PRN
Status: DISCONTINUED | OUTPATIENT
Start: 2021-07-23 | End: 2021-07-26 | Stop reason: HOSPADM

## 2021-07-23 RX ORDER — MORPHINE SULFATE 2 MG/ML
4 INJECTION, SOLUTION INTRAMUSCULAR; INTRAVENOUS
Status: DISCONTINUED | OUTPATIENT
Start: 2021-07-23 | End: 2021-07-26 | Stop reason: HOSPADM

## 2021-07-23 RX ORDER — AMOXICILLIN 250 MG
2 CAPSULE ORAL 2 TIMES DAILY
Status: DISCONTINUED | OUTPATIENT
Start: 2021-07-23 | End: 2021-07-26 | Stop reason: HOSPADM

## 2021-07-23 RX ORDER — FENTANYL CITRATE 50 UG/ML
50 INJECTION, SOLUTION INTRAMUSCULAR; INTRAVENOUS EVERY 5 MIN PRN
Status: DISCONTINUED | OUTPATIENT
Start: 2021-07-23 | End: 2021-07-23 | Stop reason: HOSPADM

## 2021-07-23 RX ORDER — MIRTAZAPINE 15 MG/1
30 TABLET, FILM COATED ORAL AT BEDTIME
Status: DISCONTINUED | OUTPATIENT
Start: 2021-07-23 | End: 2021-07-26 | Stop reason: HOSPADM

## 2021-07-23 RX ORDER — TIZANIDINE 2 MG/1
1-2 TABLET ORAL AT BEDTIME
Status: DISCONTINUED | OUTPATIENT
Start: 2021-07-23 | End: 2021-07-26

## 2021-07-23 RX ORDER — FAMOTIDINE 20 MG/1
20 TABLET, FILM COATED ORAL 2 TIMES DAILY
Status: DISCONTINUED | OUTPATIENT
Start: 2021-07-23 | End: 2021-07-26 | Stop reason: HOSPADM

## 2021-07-23 RX ORDER — AMLODIPINE BESYLATE 2.5 MG/1
2.5 TABLET ORAL DAILY
Status: DISCONTINUED | OUTPATIENT
Start: 2021-07-23 | End: 2021-07-26 | Stop reason: HOSPADM

## 2021-07-23 RX ORDER — ACETAMINOPHEN 325 MG/1
975 TABLET ORAL EVERY 8 HOURS
Status: DISCONTINUED | OUTPATIENT
Start: 2021-07-23 | End: 2021-07-26 | Stop reason: HOSPADM

## 2021-07-23 RX ORDER — FENTANYL CITRATE 50 UG/ML
100 INJECTION, SOLUTION INTRAMUSCULAR; INTRAVENOUS EVERY 5 MIN PRN
Status: DISCONTINUED | OUTPATIENT
Start: 2021-07-23 | End: 2021-07-23 | Stop reason: HOSPADM

## 2021-07-23 RX ORDER — OXYCODONE HYDROCHLORIDE 5 MG/1
15 TABLET ORAL EVERY 4 HOURS PRN
Status: DISCONTINUED | OUTPATIENT
Start: 2021-07-23 | End: 2021-07-24

## 2021-07-23 RX ORDER — NALOXONE HYDROCHLORIDE 0.4 MG/ML
0.2 INJECTION, SOLUTION INTRAMUSCULAR; INTRAVENOUS; SUBCUTANEOUS
Status: DISCONTINUED | OUTPATIENT
Start: 2021-07-23 | End: 2021-07-26 | Stop reason: HOSPADM

## 2021-07-23 RX ORDER — DEXTROAMPHETAMINE SACCHARATE, AMPHETAMINE ASPARTATE, DEXTROAMPHETAMINE SULFATE AND AMPHETAMINE SULFATE 2.5; 2.5; 2.5; 2.5 MG/1; MG/1; MG/1; MG/1
10 TABLET ORAL DAILY
Status: DISCONTINUED | OUTPATIENT
Start: 2021-07-23 | End: 2021-07-26 | Stop reason: HOSPADM

## 2021-07-23 RX ORDER — ONDANSETRON 4 MG/1
4 TABLET, ORALLY DISINTEGRATING ORAL EVERY 30 MIN PRN
Status: DISCONTINUED | OUTPATIENT
Start: 2021-07-23 | End: 2021-07-23 | Stop reason: HOSPADM

## 2021-07-23 RX ORDER — PROCHLORPERAZINE 25 MG
25 SUPPOSITORY, RECTAL RECTAL EVERY 12 HOURS PRN
Status: DISCONTINUED | OUTPATIENT
Start: 2021-07-23 | End: 2021-07-23

## 2021-07-23 RX ORDER — ACETAMINOPHEN 325 MG/1
650 TABLET ORAL EVERY 4 HOURS PRN
Status: DISCONTINUED | OUTPATIENT
Start: 2021-07-26 | End: 2021-07-26 | Stop reason: HOSPADM

## 2021-07-23 RX ORDER — BISACODYL 10 MG
10 SUPPOSITORY, RECTAL RECTAL DAILY PRN
Status: DISCONTINUED | OUTPATIENT
Start: 2021-07-23 | End: 2021-07-26 | Stop reason: HOSPADM

## 2021-07-23 RX ORDER — MIRABEGRON 25 MG/1
25 TABLET, FILM COATED, EXTENDED RELEASE ORAL DAILY
Status: DISCONTINUED | OUTPATIENT
Start: 2021-07-23 | End: 2021-07-26 | Stop reason: HOSPADM

## 2021-07-23 RX ORDER — ALPRAZOLAM 0.25 MG
0.5 TABLET ORAL 2 TIMES DAILY
Status: DISCONTINUED | OUTPATIENT
Start: 2021-07-23 | End: 2021-07-26

## 2021-07-23 RX ORDER — NEOSTIGMINE METHYLSULFATE 1 MG/ML
VIAL (ML) INJECTION PRN
Status: DISCONTINUED | OUTPATIENT
Start: 2021-07-23 | End: 2021-07-23

## 2021-07-23 RX ORDER — HYDROXYZINE HYDROCHLORIDE 25 MG/1
25 TABLET, FILM COATED ORAL EVERY 6 HOURS PRN
Status: DISCONTINUED | OUTPATIENT
Start: 2021-07-23 | End: 2021-07-23

## 2021-07-23 RX ORDER — DEXTROAMPHETAMINE SACCHARATE, AMPHETAMINE ASPARTATE MONOHYDRATE, DEXTROAMPHETAMINE SULFATE AND AMPHETAMINE SULFATE 7.5; 7.5; 7.5; 7.5 MG/1; MG/1; MG/1; MG/1
30 CAPSULE, EXTENDED RELEASE ORAL DAILY
Status: DISCONTINUED | OUTPATIENT
Start: 2021-07-23 | End: 2021-07-26 | Stop reason: HOSPADM

## 2021-07-23 RX ORDER — BACLOFEN 10 MG/1
20 TABLET ORAL 4 TIMES DAILY
Status: DISCONTINUED | OUTPATIENT
Start: 2021-07-23 | End: 2021-07-26 | Stop reason: HOSPADM

## 2021-07-23 RX ORDER — ONDANSETRON 2 MG/ML
INJECTION INTRAMUSCULAR; INTRAVENOUS PRN
Status: DISCONTINUED | OUTPATIENT
Start: 2021-07-23 | End: 2021-07-23

## 2021-07-23 RX ORDER — ONDANSETRON 4 MG/1
4 TABLET, ORALLY DISINTEGRATING ORAL EVERY 6 HOURS PRN
Status: DISCONTINUED | OUTPATIENT
Start: 2021-07-23 | End: 2021-07-26 | Stop reason: HOSPADM

## 2021-07-23 RX ORDER — BUPIVACAINE HYDROCHLORIDE AND EPINEPHRINE 5; 5 MG/ML; UG/ML
INJECTION, SOLUTION EPIDURAL; INTRACAUDAL; PERINEURAL PRN
Status: DISCONTINUED | OUTPATIENT
Start: 2021-07-23 | End: 2021-07-23 | Stop reason: HOSPADM

## 2021-07-23 RX ORDER — HYDROXYZINE HYDROCHLORIDE 10 MG/1
10-20 TABLET, FILM COATED ORAL EVERY 6 HOURS
Status: DISCONTINUED | OUTPATIENT
Start: 2021-07-23 | End: 2021-07-24

## 2021-07-23 RX ORDER — PROMETHAZINE HYDROCHLORIDE 25 MG/1
25 TABLET ORAL EVERY 6 HOURS PRN
Status: DISCONTINUED | OUTPATIENT
Start: 2021-07-23 | End: 2021-07-26 | Stop reason: HOSPADM

## 2021-07-23 RX ORDER — PROCHLORPERAZINE MALEATE 10 MG
10 TABLET ORAL EVERY 6 HOURS PRN
Status: DISCONTINUED | OUTPATIENT
Start: 2021-07-23 | End: 2021-07-23

## 2021-07-23 RX ORDER — LIDOCAINE 40 MG/G
CREAM TOPICAL
Status: DISCONTINUED | OUTPATIENT
Start: 2021-07-23 | End: 2021-07-26 | Stop reason: HOSPADM

## 2021-07-23 RX ORDER — NALOXONE HYDROCHLORIDE 0.4 MG/ML
0.4 INJECTION, SOLUTION INTRAMUSCULAR; INTRAVENOUS; SUBCUTANEOUS
Status: DISCONTINUED | OUTPATIENT
Start: 2021-07-23 | End: 2021-07-26 | Stop reason: HOSPADM

## 2021-07-23 RX ORDER — ACETAMINOPHEN 325 MG/1
975 TABLET ORAL ONCE
Status: DISCONTINUED | OUTPATIENT
Start: 2021-07-23 | End: 2021-07-23 | Stop reason: HOSPADM

## 2021-07-23 RX ORDER — VANCOMYCIN HYDROCHLORIDE 1 G/20ML
INJECTION, POWDER, LYOPHILIZED, FOR SOLUTION INTRAVENOUS PRN
Status: DISCONTINUED | OUTPATIENT
Start: 2021-07-23 | End: 2021-07-23 | Stop reason: HOSPADM

## 2021-07-23 RX ORDER — GLYCOPYRROLATE 0.2 MG/ML
INJECTION, SOLUTION INTRAMUSCULAR; INTRAVENOUS PRN
Status: DISCONTINUED | OUTPATIENT
Start: 2021-07-23 | End: 2021-07-23

## 2021-07-23 RX ORDER — OXYCODONE HYDROCHLORIDE 5 MG/1
10 TABLET ORAL EVERY 6 HOURS PRN
Qty: 30 TABLET | Refills: 0 | Status: SHIPPED | OUTPATIENT
Start: 2021-07-23 | End: 2021-07-26

## 2021-07-23 RX ORDER — AMOXICILLIN 250 MG
1 CAPSULE ORAL 2 TIMES DAILY
Status: DISCONTINUED | OUTPATIENT
Start: 2021-07-23 | End: 2021-07-23

## 2021-07-23 RX ORDER — CEFAZOLIN SODIUM 1 G/3ML
1 INJECTION, POWDER, FOR SOLUTION INTRAMUSCULAR; INTRAVENOUS EVERY 8 HOURS
Status: DISCONTINUED | OUTPATIENT
Start: 2021-07-23 | End: 2021-07-26

## 2021-07-23 RX ORDER — OXCARBAZEPINE 300 MG/1
300 TABLET, FILM COATED ORAL AT BEDTIME
Status: DISCONTINUED | OUTPATIENT
Start: 2021-07-23 | End: 2021-07-26 | Stop reason: HOSPADM

## 2021-07-23 RX ORDER — CEFAZOLIN SODIUM 2 G/100ML
2 INJECTION, SOLUTION INTRAVENOUS
Status: DISCONTINUED | OUTPATIENT
Start: 2021-07-23 | End: 2021-07-23 | Stop reason: HOSPADM

## 2021-07-23 RX ADMIN — SODIUM CHLORIDE, POTASSIUM CHLORIDE, SODIUM LACTATE AND CALCIUM CHLORIDE: 600; 310; 30; 20 INJECTION, SOLUTION INTRAVENOUS at 11:03

## 2021-07-23 RX ADMIN — FENTANYL CITRATE 100 MCG: 50 INJECTION, SOLUTION INTRAMUSCULAR; INTRAVENOUS at 14:29

## 2021-07-23 RX ADMIN — Medication 20 MG: at 11:27

## 2021-07-23 RX ADMIN — PHENYLEPHRINE HYDROCHLORIDE 100 MCG: 10 INJECTION INTRAVENOUS at 11:31

## 2021-07-23 RX ADMIN — MIRTAZAPINE 30 MG: 15 TABLET, FILM COATED ORAL at 21:45

## 2021-07-23 RX ADMIN — ACETAMINOPHEN 975 MG: 325 TABLET, FILM COATED ORAL at 21:44

## 2021-07-23 RX ADMIN — FENTANYL CITRATE 100 MCG: 50 INJECTION, SOLUTION INTRAMUSCULAR; INTRAVENOUS at 14:56

## 2021-07-23 RX ADMIN — PHENYLEPHRINE HYDROCHLORIDE 100 MCG: 10 INJECTION INTRAVENOUS at 11:34

## 2021-07-23 RX ADMIN — CEFAZOLIN SODIUM 2 G: 2 INJECTION, SOLUTION INTRAVENOUS at 11:25

## 2021-07-23 RX ADMIN — DICLOFENAC SODIUM 4 G: 10 GEL TOPICAL at 20:05

## 2021-07-23 RX ADMIN — Medication 5 MG: at 11:37

## 2021-07-23 RX ADMIN — PHENYLEPHRINE HYDROCHLORIDE 100 MCG: 10 INJECTION INTRAVENOUS at 12:32

## 2021-07-23 RX ADMIN — FENTANYL CITRATE 100 MCG: 50 INJECTION, SOLUTION INTRAMUSCULAR; INTRAVENOUS at 14:36

## 2021-07-23 RX ADMIN — GLYCOPYRROLATE 0.2 MG: 0.2 INJECTION, SOLUTION INTRAMUSCULAR; INTRAVENOUS at 11:06

## 2021-07-23 RX ADMIN — PHENYLEPHRINE HYDROCHLORIDE 100 MCG: 10 INJECTION INTRAVENOUS at 13:01

## 2021-07-23 RX ADMIN — PROPOFOL 150 MG: 10 INJECTION, EMULSION INTRAVENOUS at 11:06

## 2021-07-23 RX ADMIN — ROCURONIUM BROMIDE 20 MG: 10 INJECTION INTRAVENOUS at 12:57

## 2021-07-23 RX ADMIN — DEXAMETHASONE SODIUM PHOSPHATE 4 MG: 10 INJECTION, SOLUTION INTRAMUSCULAR; INTRAVENOUS at 11:06

## 2021-07-23 RX ADMIN — DEXMEDETOMIDINE HYDROCHLORIDE 0.5 MCG/KG/HR: 100 INJECTION, SOLUTION INTRAVENOUS at 11:10

## 2021-07-23 RX ADMIN — ONDANSETRON HYDROCHLORIDE 4 MG: 2 INJECTION, SOLUTION INTRAVENOUS at 12:07

## 2021-07-23 RX ADMIN — PHENYLEPHRINE HYDROCHLORIDE 100 MCG: 10 INJECTION INTRAVENOUS at 12:47

## 2021-07-23 RX ADMIN — FAMOTIDINE 20 MG: 20 TABLET ORAL at 19:59

## 2021-07-23 RX ADMIN — GLYCOPYRROLATE 0.4 MG: 0.2 INJECTION, SOLUTION INTRAMUSCULAR; INTRAVENOUS at 14:08

## 2021-07-23 RX ADMIN — FENTANYL CITRATE 50 MCG: 50 INJECTION, SOLUTION INTRAMUSCULAR; INTRAVENOUS at 14:21

## 2021-07-23 RX ADMIN — FENTANYL CITRATE 100 MCG: 50 INJECTION, SOLUTION INTRAMUSCULAR; INTRAVENOUS at 11:06

## 2021-07-23 RX ADMIN — CEFAZOLIN 1 G: 1 INJECTION, POWDER, FOR SOLUTION INTRAMUSCULAR; INTRAVENOUS at 20:01

## 2021-07-23 RX ADMIN — CEFAZOLIN SODIUM 1 G: 2 INJECTION, SOLUTION INTRAVENOUS at 13:25

## 2021-07-23 RX ADMIN — FENTANYL CITRATE 50 MCG: 50 INJECTION, SOLUTION INTRAMUSCULAR; INTRAVENOUS at 11:44

## 2021-07-23 RX ADMIN — ROCURONIUM BROMIDE 20 MG: 10 INJECTION INTRAVENOUS at 11:38

## 2021-07-23 RX ADMIN — FENTANYL CITRATE 50 MCG: 50 INJECTION, SOLUTION INTRAMUSCULAR; INTRAVENOUS at 14:05

## 2021-07-23 RX ADMIN — ROCURONIUM BROMIDE 10 MG: 10 INJECTION INTRAVENOUS at 12:11

## 2021-07-23 RX ADMIN — ROCURONIUM BROMIDE 50 MG: 10 INJECTION INTRAVENOUS at 11:06

## 2021-07-23 RX ADMIN — LIDOCAINE 1 PATCH: 560 PATCH PERCUTANEOUS; TOPICAL; TRANSDERMAL at 20:01

## 2021-07-23 RX ADMIN — FENTANYL CITRATE 100 MCG: 50 INJECTION, SOLUTION INTRAMUSCULAR; INTRAVENOUS at 14:23

## 2021-07-23 RX ADMIN — MIDAZOLAM 2 MG: 1 INJECTION INTRAMUSCULAR; INTRAVENOUS at 11:05

## 2021-07-23 RX ADMIN — BACLOFEN 20 MG: 10 TABLET ORAL at 19:58

## 2021-07-23 RX ADMIN — Medication 5 MG: at 11:27

## 2021-07-23 RX ADMIN — TIZANIDINE 2 MG: 2 TABLET ORAL at 21:44

## 2021-07-23 RX ADMIN — PREGABALIN 75 MG: 75 CAPSULE ORAL at 19:58

## 2021-07-23 RX ADMIN — Medication: at 15:42

## 2021-07-23 RX ADMIN — Medication 5 MG: at 13:04

## 2021-07-23 RX ADMIN — NEOSTIGMINE METHYLSULFATE 3 MG: 1 INJECTION, SOLUTION INTRAVENOUS at 14:08

## 2021-07-23 RX ADMIN — SODIUM CHLORIDE: 9 INJECTION, SOLUTION INTRAVENOUS at 18:09

## 2021-07-23 RX ADMIN — PHENYLEPHRINE HYDROCHLORIDE 100 MCG: 10 INJECTION INTRAVENOUS at 12:17

## 2021-07-23 RX ADMIN — Medication 5 MG: at 11:29

## 2021-07-23 RX ADMIN — PHENYLEPHRINE HYDROCHLORIDE 100 MCG: 10 INJECTION INTRAVENOUS at 13:21

## 2021-07-23 RX ADMIN — DOCUSATE SODIUM AND SENNOSIDES 1 TABLET: 8.6; 5 TABLET, FILM COATED ORAL at 19:59

## 2021-07-23 RX ADMIN — ALPRAZOLAM 0.5 MG: 0.25 TABLET ORAL at 20:01

## 2021-07-23 RX ADMIN — SODIUM CHLORIDE, POTASSIUM CHLORIDE, SODIUM LACTATE AND CALCIUM CHLORIDE: 600; 310; 30; 20 INJECTION, SOLUTION INTRAVENOUS at 11:45

## 2021-07-23 RX ADMIN — HYDROXYZINE HYDROCHLORIDE 20 MG: 10 TABLET, FILM COATED ORAL at 21:44

## 2021-07-23 RX ADMIN — Medication 5 MG: at 13:01

## 2021-07-23 RX ADMIN — HYDROXYZINE HYDROCHLORIDE 25 MG: 25 TABLET, FILM COATED ORAL at 15:14

## 2021-07-23 ASSESSMENT — MIFFLIN-ST. JEOR: SCORE: 1140.19

## 2021-07-23 ASSESSMENT — COPD QUESTIONNAIRES
COPD: 1
CAT_SEVERITY: MILD

## 2021-07-23 ASSESSMENT — ACTIVITIES OF DAILY LIVING (ADL): ADLS_ACUITY_SCORE: 16

## 2021-07-23 NOTE — ANESTHESIA CARE TRANSFER NOTE
Patient: Hina Yap    Procedure(s):  left lumbar 4-lumbar 5 transforaminal lumbar interbody fusion    Diagnosis: Transient paralysis of limb [R29.818]  Spondylolisthesis of lumbar region [M43.16]  Spinal stenosis, lumbar region, without neurogenic claudication [M48.061]  Diagnosis Additional Information: No value filed.    Anesthesia Type:   General     Note:    Oropharynx: oropharynx clear of all foreign objects  Level of Consciousness: awake  Oxygen Supplementation: face mask    Independent Airway: airway patency satisfactory and stable  Dentition: dentition unchanged  Vital Signs Stable: post-procedure vital signs reviewed and stable  Report to RN Given: handoff report given  Patient transferred to: PACU    Handoff Report: Identifed the Patient, Identified the Reponsible Provider, Reviewed the pertinent medical history, Discussed the surgical course, Reviewed Intra-OP anesthesia mangement and issues during anesthesia, Set expectations for post-procedure period and Allowed opportunity for questions and acknowledgement of understanding      Vitals:  Vitals Value Taken Time   /76 07/23/21 1416   Temp     Pulse 75 07/23/21 1417   Resp 10 07/23/21 1417   SpO2 99 % 07/23/21 1417   Vitals shown include unvalidated device data.    Electronically Signed By: BENNETT Suarez CRNA  July 23, 2021  2:19 PM

## 2021-07-23 NOTE — CONSULTS
[facility]  Pain Service Consultation   Text Page    Date of Admission:  7/23/2021    Assessment & Plan   Hina Yap is a 55 year old female who was admitted on 7/23/2021. I was asked by  Ariana Noriega PA-C to see the patient for post operative pain management.    PLAN:   1)  Opioids:  Fentanyl PCA 20 mcg bolus every 10 minute lock out limit 120 mcg/hr  Hold all other opioids oral and IVP  Tomorrow 7/24 attempt to transition off PCA ( give bolus of 20 mcg prior to discontinue , then in 20 minutes give 15-20 mg oxycodone followed by every 3 hrs prn .    IV dilaudid 0.3-0.5 mg every 2 hrs prn     Opioids Treatment Goal:   -Improvement in function  -Participate in PT  -Post-operative pain management, expected 3-7 days then back on chronic dose    2)Non-opioid multimodal medication therapy  -Topical:Voltaren 1%  4 grams Topical Gel tid , Lidocaine Patch 4% cut 1/2 patch to both sides of paraspinal   -N-SAIDS:Avoid due to surgical status   -Muscle Relaxants:continue Baclofen as chronic 20 mg qid, Tizanidine 2 mg tid   -Adjuvants:Acetaminophen 975 mg every 8 hrs, Pregabalin  75 mg bid, Hydroxyzine 10-20 mg every 6 hrs hold for sedation  -Antidepresants/anxiolytics:Alprazolam 0.5 mg bid, Mirtazepine 30 mg at hs as chronic     3)  Non-medication interventions  Positioning, ICE, Relaxation, Physical therapy     4)  Constipation Prophylaxis    Continue to Monitor, Bowel Meds PRN per Constipation Order Set, Senna-S 1 or 2 tabs bid , Polyethylene Glycol  Every day     5) Medication Risk reduction strategies   -monitor for sedation   -Capnography per protocol   -narcan for opioid reversal     6)  Pain Education  -Opioid safe use, storage and disposal information included in DC AVS    7)  DC Planning   Discussed goal of Opioid therapy as above with patient and significant other   Recommend short supply of opioids only ( 3-5  days) per CDC guidelines for acute pain management.  Continued  outpatient management of pain per [place] [facility] and neurosurgery   Disposition: home   Support systems:  Significant other   Outpatient Referrals: none per pain team   The following risk factors have been identified for unintentional overdose: patient is on multiple sedating medications , patient is taking a high amount of opioids in 24 hour period, patient has anxiety, depression or PTSD and patient has been switched to a new opioid medication. Discharge with intra-nasal naloxone if discharged to home with opioids  >40 mg MME/day.  Plan for education prior to discharge.    ASSESSMENT  1)  Acute on chronic radicular back  Pain s/p L4-5 transforaminal lumbar interbody fusion with reduction of grade 1/2  POD 0 EBL 30 ml     2)  Patient with chronic  pain, on chronic opioid therapy managed by [facility]   Baseline 60 mg Daily Morphine Equivalent as dispensed.  Patient has a high opioid tolerance.     Patient's opioid use in past 24 hours: 650 mcg Fentanyl = 78 mg Daily Morphine Equivalent    3)  Risk factors for opioid related harms  -Concurrent benzodiazepine use  -High opioid dose (>50 MME/day)  -Anxiety/depression    4)  Opioid induced side-effects:  -Constipation  Yes by hx  -Nausea/Vomit no   -Sedation no   -Urinary Retention yes at times     5)  Other/Related:    -Depression/anxiety  -Deconditioning   -Multiple sclerosis     Time Spent on this Encounter   Total unit/floor time 90  minutes, time consisted of the following, examination of the patient, reviewing the record and completing documentation. >50% of time spent in counseling and coordination of care.  Time spend counseling with patient and family consisted of the following topics, symptom management.  Time spent in coordination of care with Bedside Nurse Marina PRADO.     Maine GUAJARDO CNP  Pain Management and Palliative Care  [facility]  Pgr: [phone]      Reason for Consult   Reason for  consult: I was asked  to evaluate this patient for  Acute post operative pain.    Primary Care Physician   Primary Care Physician:Sunshine Butterfield  Pain Specialist:  [facility]    Chief Complaint   Acute post operative pain     History is obtained from the patient, electronic health record and patient's significant other    History of Present Illness   Hina Yap is a 55 year old female who presents with a past medical hx of  MS, anxiety, arthritis, COPD, depression, HTN, GERD, RLS, heart disease, back pain,  lumbar spondylolithesis L4/5  foraminal stenosis Left > right with instability at same leveland tobacco dependence, She is post op day 0  for  L4-5 transforaminal lumbar interbody fusion with reduction of grade 1/2, complete discectomy and fascetectomies with EBL 30 ml.  The patient came from PACU with Fentanyl drip for pain control.  She is awake , restless and anxious. Her significant other is at the bedside.  She is c/o a lot of pain and requesting more medication for pain     CURRENT PAIN:  Her pain is located in the  Low back sacrum, uppr lateral thighs   It is described as Sharp, Shooting and Stabbing  She rates it as ranging between 9/10 and 10/10  The average is 9.5/10 on a scale of 0-10  Currently it is rated as 10 /10  It improves by  Medications   It worsens by  Medications wear off   She  been compliant with the recommendations while in the hospital.      PAIN HISTORY:  The pain is mainly located in the  Low back and legs  It is described as Sharp, Shooting, Stabbing and Unbearable  Rates it as ranging between 9/10 and 10/10  Currently it is rated as 9.5 /10  It improves by  Medications   It worsens by  Medications wearing off  She  been compliant with the recommendations while in the hospital.      PAST PAIN TREATMENT:   Medications:   Non-phamacologic modalities: heat ice, Physical Therapy    Previous interventions/surgeries:none     [place] Board of Pharmacy Data  Base Reviewed:    YES; As expected, no concern for misuse/abuse of controlled medications based on this report.  OPIOID RISK YGRSE=549           D.I.R.E Score: Patient Selection for Chronic Opioid Analgesia    For each factor, rate the patient's score from 1 - 3 based on the explanations on the right.       Diagnosis             3         1 = Benign chronic condition with minimal objective findings or no definite medical diagnosis.  Examples:  fibromyalgia, migraine, headaches, non-specific back pain.  2 = Slowly progressive condition concordant with moderate pain, or fixed condition with moderate objective findings.  Examples: failed back surgery syndrome, back pain with moderate degenerative changes, neuropathic pain.  3 = Advanced condition concordant with severe pain with objective findings.  Examples: severe ischemic vascular disease, advanced neuropathy, severe spinal stenosis.    Intractability             2         1 = Few therapies have been tried and the patient takes a passive role in his/her pain management process.   2 = Most costomary treatments have been tried but the patient is not fully engaged in the pain management process, or barriers prevent (insurance, transportation, medical illness)  3 = Patient fully engaged in a spectrum of appropriate treatments but with inadequate response.    Risk   (Risk = Total of P+C+R+S below)       Psychological             2         1 = Serious personality dysfunction or mental illness interfering with care.  Examples: personality disorder, severe affective disorder, significant personality issues.  2 = Personality or mental health interferes moderately.  Example: depression or anxiety disorder.  3 = Good communication with the clinic.  No significant personality dysfunction or mental illness.       Chemical      Health             2         1 = Active or very recent use of illicit drugs, excessive alcohol, or prescription drug abuse.  2 = Chemical coper (uses  medications to cope with stress) or history of chemical dependency in remission.  3 = No CD history.  Not drug-focused or chemically reliant       Reliability             2         1 = History of numerous problems: medication misuse, missed appointments, rarely follows through.  2 = Occasional difficulties with compliance, but generally reliable.  3 = Highly reliable patient with medications, appointments and treatment.       Social      Support             2         1= Life in chaos.  Little family support and few close relationships.  Loss of most normal life roles.  2 = Reduction in some relationships and life roles.  3 = Supportive family/close relationships.  Involved in work or school and no social isolation.    Efficacy score             1         1 = Poor function or minimal pain relief despite moderate to high doses.  2 = Moderate benefit with function improved in a number of ways (or insufficient info - hasn't tried opioid yet or very low doses or too short a trial.  3 = Good improvement in pain and function and quality of life with stable doses over time.                                    14    Total score = D + I + R + E    Score 7-13: Not a suitable candidate for long-term opioid analgesia  Score 14-21: May be a good candidate for long-term opioid analgesia    Copyright 2013 Delgado Loya MD, The DIRE Score: Predicting Outcomes of Opioid Prescribing for Chronic Pain. The Journal of Pain. 7(9) (September), 2006:671-681    Past Medical History   I have reviewed this patient's medical history and updated it with pertinent information if needed.   Past Medical History:   Diagnosis Date     Anxiety      Arthritis Years ago     COPD (chronic obstructive pulmonary disease) (H)      Depressive disorder Years ago     GERD (gastroesophageal reflux disease)      Hypertension      Ischemic cardiomyopathy      Multiple sclerosis (H)      Neuromuscular disorder (H)      Nonrheumatic mitral valve regurgitation       PVC's (premature ventricular contractions)      Renal disease      Restless leg syndrome      Tobacco use disorder        Past Surgical History   I have reviewed this patient's surgical history and updated it with pertinent information if needed.  Past Surgical History:   Procedure Laterality Date     GYN SURGERY      tubal ligation         Prior to Admission Medications   Prior to Admission Medications   Prescriptions Last Dose Informant Patient Reported? Taking?   ALPRAZolam (XANAX) 0.5 MG tablet 7/22/2021 at Unknown time  No Yes   Sig: Take 1 tablet (0.5 mg) by mouth 2 times daily   AVONEX PEN 30 MCG/0.5ML auto-injector kit Past Week at Unknown time  Yes Yes   Sig: Inject 30 mcg into the muscle every 7 days    OXcarbazepine (TRILEPTAL) 150 MG tablet   Yes No   Sig: TAKE 2 TABLETS BY MOUTH EVERY NIGHT AT BEDTIME   albuterol (PROAIR HFA/PROVENTIL HFA/VENTOLIN HFA) 108 (90 Base) MCG/ACT inhaler 7/23/2021 at Unknown time  No Yes   Sig: Inhale 2 puffs into the lungs every 4 hours as needed for shortness of breath / dyspnea or wheezing   amLODIPine (NORVASC) 2.5 MG tablet 7/22/2021 at Unknown time  No Yes   Sig: Take 1 tablet (2.5 mg) by mouth daily   amphetamine-dextroamphetamine (ADDERALL XR) 30 MG 24 hr capsule 7/23/2021 at Unknown time  No Yes   Sig: Take 1 capsule (30 mg) by mouth daily   amphetamine-dextroamphetamine (ADDERALL) 10 MG tablet 7/23/2021 at Unknown time  No Yes   Sig: Take 1 tablet (10 mg) by mouth daily   baclofen (LIORESAL) 20 MG tablet 7/23/2021 at Unknown time  Yes Yes   Sig: Take 20 mg by mouth 4 times daily    esomeprazole (NEXIUM) 40 MG DR capsule 7/23/2021 at Unknown time  No Yes   Sig: Take 1 capsule (40 mg) by mouth every morning (before breakfast) Take 30-60 minutes before eating.   ipratropium - albuterol 0.5 mg/2.5 mg/3 mL (DUONEB) 0.5-2.5 (3) MG/3ML neb solution Past Week at Unknown time  No Yes   Sig: Take 1 vial (3 mLs) by nebulization every 6 hours as needed for shortness of breath /  dyspnea or wheezing   mirabegron (MYRBETRIQ) 25 MG 24 hr tablet   No No   Sig: Take 1 tablet (25 mg) by mouth daily   mirtazapine (REMERON) 15 MG tablet 7/22/2021 at Unknown time  Yes Yes   Sig: Take 2 tablets (30 mg) by mouth At Bedtime   nicotine polacrilex (NICORETTE) 4 MG gum 7/22/2021 at Unknown time  No Yes   Sig: Place 1 each (4 mg) inside cheek as needed for smoking cessation   oxyCODONE-acetaminophen (PERCOCET)  MG per tablet 7/23/2021 at Unknown time  Yes No   Sig: Take 1 tablet by mouth every 6 hours as needed for severe pain   predniSONE (DELTASONE) 20 MG tablet   No No   Sig: 3 tabs po every day for 2 days, then 2 tabs po every day for 3 days, then 1 tab po every day for 2 days, then 1/2 tab po every day for 2 days   pregabalin (LYRICA) 75 MG capsule 7/23/2021 at Unknown time  Yes Yes   Sig: Take 1 capsule (75 mg) by mouth 2 times daily   promethazine (PHENERGAN) 25 MG tablet 7/23/2021 at Unknown time  Yes Yes   Sig: Take 25 mg by mouth every 6 hours as needed for nausea   rOPINIRole (REQUIP) 2 MG tablet 7/23/2021 at Unknown time  Yes Yes   Sig: Take 2 mg by mouth every morning Patient is taking 1 tab in the morning and 2 tab at night   tiZANidine (ZANAFLEX) 2 MG tablet 7/22/2021 at Unknown time  Yes Yes   Sig: Take 2 mg by mouth 3 times daily   tiotropium-olodaterol 2.5-2.5 MCG/ACT AERS 7/23/2021 at Unknown time  No Yes   Sig: Inhale 2 puffs into the lungs daily      Facility-Administered Medications: None     Allergies   Allergies   Allergen Reactions     Sulfa Drugs Rash     Adhesive Tape Rash     Reacts to adhesive on fentanyl patch     Wellbutrin [Bupropion Hydrobromide] Rash       Social History   I have reviewed this patient's social history and updated it with pertinent information if needed. Hina Mckeongemmaelvira  reports that she has been smoking cigarettes. She has a 17.50 pack-year smoking history. She has never used smokeless tobacco. She reports previous alcohol use. She reports that she  does not use drugs.    Family History   I have reviewed this patient's family history and updated it with pertinent information if needed.   Family History   Problem Relation Age of Onset     Diabetes Maternal Grandmother      Breast Cancer Mother      Osteoporosis Mother      Other Cancer Father      Depression Brother      Anxiety Disorder Brother      Substance Abuse Brother      Family history of addiction  None     Review of Systems   The 10 point Review of Systems is negative other than noted in the HPI or here.    Denies Bowel or bladder dysfunction    Physical Exam   Temp:  [97  F (36.1  C)-98.3  F (36.8  C)] 97  F (36.1  C)  Pulse:  [56-76] 65  Resp:  [11-21] 11  BP: (115-133)/(64-79) 115/70  SpO2:  [94 %-100 %] 99 %  113 lbs 0 oz  GEN:  Alert, oriented x 3, appears comfortable, No apparent distress. Speech hypophonic   HEENT:  Normocephalic/atraumatic, no scleral icterus, no nasal discharge, mouth moist.  CV:  RRR, S1, S2; no murmurs or other irregularities noted.  +3 DP/PT pulses bilaterally; no edema bilateral lower extremeties.  RESP:  Clear to auscultation bilaterally without rales/rhonchi/wheezing/retractions.  Symmetric chest rise on inhalation noted.  Normal respiratory effort.  ABD:  Rounded, soft, non-tender/non-distended.  +BS  EXT:  Edema & pulses as noted above.  Color, moisture and sensation intact x 4.     M/S:   Tender to palpation throghoout, other than sacrume.    SKIN:  Dry to touch, no exanthems noted in the visualized areas.    NEURO: Symmetric movement +5/5, less power and movement left leg and foot.  Sensation to touch intact all extremities.   There is no area of allodynia or hyperesthesia.  PAIN BEHAVIOR: Cooperative  Psych:  Normal affect.  Calm, cooperative, conversant appropriately.       Data   Results for orders placed or performed during the hospital encounter of 07/23/21 (from the past 24 hour(s))   Hemoglobin   Result Value Ref Range    Hemoglobin 11.7 11.7 - 15.7 g/dL    ABO/Rh type and screen    Narrative    The following orders were created for panel order ABO/Rh type and screen.  Procedure                               Abnormality         Status                     ---------                               -----------         ------                     Adult Type and Screen[[id number]]                            Edited Result - FINAL        Please view results for these tests on the individual orders.   Adult Type and Screen   Result Value Ref Range    ABO/RH(D) B NEG     Antibody Screen Negative Negative    SPECIMEN EXPIRATION DATE [id number]    XR Lumbar Spine Port 1 View    Narrative    This exam was marked as non-reportable because it will not be read by a   radiologist or a [place] non-radiologist provider.         XR Surgery LATA L/T 5 Min Fluoro w Stills    Narrative    This exam was marked as non-reportable because it will not be read by a   radiologist or a [place] non-radiologist provider.         XR Lumbar Spine Port 1 View    Narrative    This exam was marked as non-reportable because it will not be read by a   radiologist or a [place] non-radiologist provider.

## 2021-07-23 NOTE — OP NOTE
OPERATIVE REPORT        PREOPERATIVE DIAGNOSIS:   1. L4-5 grade 1/2 spondylolisthesis  2. Severe L4-5 foraminal stenosis bilaterally left > right  3. L4-5 lumbar gross instability  4. LLE weakness  5. Lumbar radiculopathy    6. Low back pain    POSTOPERATIVE DIAGNOSIS: Same    PROCEDURE: L4-5 transforaminal lumbar interbody fusion with reduction of grade 1/2 unstable spondylolisthesis    1. L4-5 bilateral laminectomies with complete facetectomies and interpedicular decompression with exposure of the L4 and L5 roots  2. L4-5 complete discectomy with arthrodesis and placement of a 8 x 26 mm Globus Caliber expandable TLIF interbody graft with locally harvested autologous bone placed centrally and anteriorly  3. Placement of Globus Cre Reduction pedicle screws bilaterally from L4 to L5  4. Open reduction and internal fixation of the grade L4-5 spondylolisthesis  5. L4 - L5 posterior lateral fusion with placement of Medtronic Magnifuse and locally harvested autologous bone  6. Use of Stealth and O-arm intraoperative neuronavigation  7. Use of portable X-ray fluoroscopy and intraoperative microscope    SURGEON: Asif Flores MD, MS, FAANS    ASSISTANT: Ariana Bell CNP and ST Cristin whose assistance was required throughout the case for positioning, exposure, retraction/suctioning during decompression, implant hardware placement, wound closure and transferred to postoperative bed.    INDICATION FOR PROCEDURE: The patient is a 55 year old female that presented with intractable low back and LLE pain and LLE weakness. After reviewing the examination and imaging studies, the decision was made to proceed with the above procedure. The patient understood the risks of surgery to be infection, CSF leak, nerve root injury, failure of hardware, the need for recurrent surgery, epidural fibrosis, weakness, coma and death. The patient voiced understanding and wanted to proceed.     DESCRIPTION OF PROCEDURE: The  patient was seen in the pre op area and the procedure was discussed with the patient and family once again and all questions were answered. The consent was then signed and the lumbar spine was marked with a marker. The patient was transported to the operating room on a stretcher and received general endotracheal anesthesia and a Valladares catheter was placed. The patient was placed on the operating table in the prone position on the operating table with all pressure points padded. The back was then prepped and draped in a normal sterile fashion and portable X-ray was used to identify the appropriate level. Local anesthesia was injected along the planned incision with 10 blade scalpel used to make a midline incision with dissection down through the subcutaneous tissue to the fascia. The fascia was opened bilaterally with the Bovie cautery. Subperiosteal dissection was used to expose the lamina facet joint and transverse processes from L4-5. The Versatrac retractor was then placed to retract the paraspinous muscles. The Stealth and O-arm were then brought in for intraoperative pedicle screw placement and the pedicle screws were placed using the normal technique of a  hole with the Midas Patric drill, the gear shift probe to penetrate the pedicle and the pedicle screws were then navigated down the pedicles from L4 to L5.     Following pedicle screw placement, the operating microscope was the brought into the field and attention then turned to the decompression where a L4-5 bilateral laminectomies with complete facetectomies was performed with the Midas Patric drill, the Kerrison rongeur and the pituitary rongeur which were used to remove the spinous process, the lamina and facet joints. This completely decompressed and exposed the L4 exiting roots bilaterally and the L5 traversing roots bilaterally. Kambin's triangle was exposed from the left side and the disk space was incised with the 11 blade scalpel after retracting the  thecal sac and nerve root medially. The complete disk was removed with the pituitary rongeur and the endplate anayeli from size 7-8 mm. The nerves were thoroughly decompressed and a size 8 x 26 mm Globus Caliber expandable interbody graft was placed at L4-5 with autologous bone placed both anteriorly and centrally inside the graft while distracting the pedicle screws to reduce the spondylolisthesis. The expandable interbody cage was then expanded to the appropriate size and open reduction and internal fixation of the grade unstable L4-5 spondylolisthesis was performed and the connecting rods were then placed and secured from L4-5 and the locking caps were secured with the counter torque system. I then placed 5 ml of Evicel were placed over the dura and copious irrigation was performed with saline. The wound was irrigated once again and the retractors were removed. Decortication of the lateral facet and transverse process was performed from L4-5 and Medtronic Magnifuse and locally harvested autologous bone were placed out laterally for the posterolateral fusion. Next, 2 Juan-Cordero drains were left subfascially. The fascia was closed with 0 Vicryl, the subcutaneous tissue closed with 2 - 0 and 3-0 Vicryl, and the skin closed with Dermabond. The patient was then transported back to the stretcher, extubated, and sent to recovery.     At the end of the case, all counts were correct    No complications    Estimated blood loss 30 ml     The patient received IV Ancef preoperatively and Vancomycin powder in the wound

## 2021-07-23 NOTE — ANESTHESIA PREPROCEDURE EVALUATION
Anesthesia Pre-Procedure Evaluation    Patient: Hina Yap   MRN: [id number] : 1965        Preoperative Diagnosis: Transient paralysis of limb [R29.818]  Spondylolisthesis of lumbar region [M43.16]  Spinal stenosis, lumbar region, without neurogenic claudication [M48.061]   Procedure : Procedure(s):  left lumbar 4-lumbar 5 transforaminal lumbar interbody fusion     Past Medical History:   Diagnosis Date     Anxiety      Arthritis Years ago     COPD (chronic obstructive pulmonary disease) (H)      Depressive disorder Years ago     GERD (gastroesophageal reflux disease)      Hypertension      Ischemic cardiomyopathy      Multiple sclerosis (H)      Neuromuscular disorder (H)      Nonrheumatic mitral valve regurgitation      PVC's (premature ventricular contractions)      Renal disease      Restless leg syndrome      Tobacco use disorder       Past Surgical History:   Procedure Laterality Date     GYN SURGERY      tubal ligation      Allergies   Allergen Reactions     Sulfa Drugs Rash     Adhesive Tape Rash     Reacts to adhesive on fentanyl patch     Wellbutrin [Bupropion Hydrobromide] Rash      Social History     Tobacco Use     Smoking status: Current Every Day Smoker     Packs/day: 0.50     Years: 35.00     Pack years: 17.50     Types: Cigarettes     Last attempt to quit: 2013     Years since quittin.0     Smokeless tobacco: Never Used     Tobacco comment: 1/2 pack per day   Substance Use Topics     Alcohol use: Not Currently     Alcohol/week: 0.0 standard drinks      Wt Readings from Last 1 Encounters:   21 51.3 kg (113 lb)        Anesthesia Evaluation   Pt has had prior anesthetic. Type: General.    No history of anesthetic complications       ROS/MED HX  ENT/Pulmonary:     (+) mild,  COPD,     Neurologic:     (+) Multiple Sclerosis,     Cardiovascular:     (+) hypertension-----    METS/Exercise Tolerance:     Hematologic:  - neg hematologic  ROS     Musculoskeletal:   (+) arthritis,      GI/Hepatic:     (+) GERD, Asymptomatic on medication,     Renal/Genitourinary:     (+) renal disease, type: CRI,     Endo:  - neg endo ROS     Psychiatric/Substance Use:     (+) psychiatric history depression     Infectious Disease:  - neg infectious disease ROS     Malignancy:       Other:      (+) , H/O Chronic Pain,        Physical Exam    Airway        Mallampati: II   TM distance: > 3 FB   Neck ROM: full   Mouth opening: > 3 cm    Respiratory Devices and Support         Dental  no notable dental history         Cardiovascular   cardiovascular exam normal          Pulmonary           (+) decreased breath sounds           OUTSIDE LABS:  CBC:   Lab Results   Component Value Date    WBC 6.0 10/29/2020    WBC 7.5 07/20/2020    HGB 11.7 07/23/2021    HGB 13.1 10/29/2020    HCT 40.0 10/29/2020    HCT 41.4 07/20/2020     10/29/2020     07/20/2020     BMP:   Lab Results   Component Value Date     07/01/2021     10/29/2020    POTASSIUM 4.1 07/01/2021    POTASSIUM 4.4 10/29/2020    CHLORIDE 109 07/01/2021    CHLORIDE 113 (H) 10/29/2020    CO2 28 07/01/2021    CO2 25 10/29/2020    BUN 21 07/01/2021    BUN 16 10/29/2020    CR 0.86 07/01/2021    CR 0.75 10/29/2020    GLC 77 07/01/2021    GLC 83 10/29/2020     COAGS:   Lab Results   Component Value Date    INR 0.95 08/25/2018     POC:   Lab Results   Component Value Date     (H) 12/05/2018     HEPATIC:   Lab Results   Component Value Date    ALBUMIN 3.5 07/01/2021    PROTTOTAL 7.0 07/01/2021    ALT 31 07/01/2021    AST 26 07/01/2021    GGT 19 11/12/2012    ALKPHOS 68 07/01/2021    BILITOTAL 0.3 07/01/2021     OTHER:   Lab Results   Component Value Date    PH 7.42 11/25/2015    LACT 1.2 10/29/2020    A1C 5.2 09/29/2015    MARIO 8.9 07/01/2021    PHOS 3.4 09/29/2015    MAG 2.9 (H) 09/29/2015    LIPASE 125 08/25/2018    TSH 2.01 07/01/2021    T4 1.26 04/06/2020    T3 151 11/12/2012    CRP 3.7 07/01/2021    SED 17 07/01/2021       Anesthesia  Plan    ASA Status:  3      Anesthesia Type: General.     - Airway: ETT   Induction: Intravenous.   Maintenance: Balanced.        Consents    Anesthesia Plan(s) and associated risks, benefits, and realistic alternatives discussed. Questions answered and patient/representative(s) expressed understanding.     - Discussed with:  Patient      - Extended Intubation/Ventilatory Support Discussed: No.      - Patient is DNR/DNI Status: No    Use of blood products discussed: No .     Postoperative Care    Pain management: Oral pain medications, IV analgesics.   PONV prophylaxis: Ondansetron (or other 5HT-3), Dexamethasone or Solumedrol     Comments:                Baldo Orlando MD

## 2021-07-23 NOTE — ANESTHESIA POSTPROCEDURE EVALUATION
Patient: Hina Yap    Procedure(s):  left lumbar 4-lumbar 5 transforaminal lumbar interbody fusion    Diagnosis:Transient paralysis of limb [R29.818]  Spondylolisthesis of lumbar region [M43.16]  Spinal stenosis, lumbar region, without neurogenic claudication [M48.061]  Diagnosis Additional Information: No value filed.    Anesthesia Type:  General    Note:  Disposition: Inpatient   Postop Pain Control: Challenging            Challenges/Interventions: Exacerbation of chronic pain            Sign Out: ONGOING pain issues   PONV: No   Neuro/Psych: Uneventful            Sign Out: Acceptable/Baseline neuro status   Airway/Respiratory: Uneventful            Sign Out: Acceptable/Baseline resp. status   CV/Hemodynamics: Uneventful            Sign Out: Acceptable CV status; No obvious hypovolemia; No obvious fluid overload   Other NRE: NONE   DID A NON-ROUTINE EVENT OCCUR? No           Last vitals:  Vitals Value Taken Time   /76 07/23/21 1445   Temp 98.3  F (36.8  C) 07/23/21 1417   Pulse 79 07/23/21 1450   Resp 30 07/23/21 1450   SpO2 97 % 07/23/21 1450   Vitals shown include unvalidated device data.    Electronically Signed By: Baldo Orlando MD  July 23, 2021  2:51 PM

## 2021-07-23 NOTE — PROGRESS NOTES
Arrived to room 620 at 1630. Alert, oriented, mild forgetful, some difficulty with following on conversation,  talkative. Repeated requests. Painful with any movement. PCA fentanyl and capnography on. SO at bedside. Lungs clear. Dressing clean and dry. Numbness baseline to bilateral legs. Bilateral legs weakness, left leg weaker than right. Weak dorsi and planter flexions to left foot, moderate strength to right foot. No edema. BP stable. Room air. Back brace at bedside. Pain team consulted. Pain 9/10 now talking with SO and eating bites of a sandwich. No nausea. DELFINA x2. Valladares drains well. Plan home at discharge. Tolerated food and fluids. Reminders on PCA.

## 2021-07-23 NOTE — PROGRESS NOTES
Spoke to patient in pre-op; dicussed management of pain following surgery.  Patient states she cannot tolerate Dilaudid due to N/V and states Morphine does not work for her.  She is currently taking Balcofen 10mg QID and Percocet 10/325mg one tab QID.  We discussed continuing Oxycodone and tylenol post op as well as Baclofen.  Will start fentanly PCA postop and once discontinued Morphine IV prn severe pain to see if this is helpful.  Pain Consult for any further recommendations.

## 2021-07-24 ENCOUNTER — APPOINTMENT (OUTPATIENT)
Dept: PHYSICAL THERAPY | Facility: CLINIC | Age: 56
End: 2021-07-24
Attending: NEUROLOGICAL SURGERY
Payer: MEDICAID

## 2021-07-24 ENCOUNTER — APPOINTMENT (OUTPATIENT)
Dept: OCCUPATIONAL THERAPY | Facility: CLINIC | Age: 56
End: 2021-07-24
Attending: NURSE PRACTITIONER
Payer: MEDICAID

## 2021-07-24 LAB
ANION GAP SERPL CALCULATED.3IONS-SCNC: 3 MMOL/L (ref 3–14)
BUN SERPL-MCNC: 18 MG/DL (ref 7–30)
CALCIUM SERPL-MCNC: 8.3 MG/DL (ref 8.5–10.1)
CHLORIDE BLD-SCNC: 111 MMOL/L (ref 94–109)
CO2 SERPL-SCNC: 29 MMOL/L (ref 20–32)
CREAT SERPL-MCNC: 0.9 MG/DL (ref 0.52–1.04)
GFR SERPL CREATININE-BSD FRML MDRD: 72 ML/MIN/1.73M2
GLUCOSE BLD-MCNC: 90 MG/DL (ref 70–99)
GLUCOSE BLDC GLUCOMTR-MCNC: 97 MG/DL (ref 70–99)
HGB BLD-MCNC: 10.5 G/DL (ref 11.7–15.7)
POTASSIUM BLD-SCNC: 3.8 MMOL/L (ref 3.4–5.3)
SODIUM SERPL-SCNC: 143 MMOL/L (ref 133–144)

## 2021-07-24 PROCEDURE — 99207 PR CONSULT E&M CHANGED TO INITIAL LEVEL: CPT | Performed by: INTERNAL MEDICINE

## 2021-07-24 PROCEDURE — 250N000013 HC RX MED GY IP 250 OP 250 PS 637: Performed by: NURSE PRACTITIONER

## 2021-07-24 PROCEDURE — 250N000013 HC RX MED GY IP 250 OP 250 PS 637: Performed by: INTERNAL MEDICINE

## 2021-07-24 PROCEDURE — 120N000001 HC R&B MED SURG/OB

## 2021-07-24 PROCEDURE — 250N000011 HC RX IP 250 OP 636: Performed by: NURSE PRACTITIONER

## 2021-07-24 PROCEDURE — 80048 BASIC METABOLIC PNL TOTAL CA: CPT | Performed by: INTERNAL MEDICINE

## 2021-07-24 PROCEDURE — 36415 COLL VENOUS BLD VENIPUNCTURE: CPT | Performed by: INTERNAL MEDICINE

## 2021-07-24 PROCEDURE — 94640 AIRWAY INHALATION TREATMENT: CPT

## 2021-07-24 PROCEDURE — 97535 SELF CARE MNGMENT TRAINING: CPT | Mod: GO | Performed by: OCCUPATIONAL THERAPIST

## 2021-07-24 PROCEDURE — 99222 1ST HOSP IP/OBS MODERATE 55: CPT | Performed by: INTERNAL MEDICINE

## 2021-07-24 PROCEDURE — 999N000157 HC STATISTIC RCP TIME EA 10 MIN

## 2021-07-24 PROCEDURE — 97166 OT EVAL MOD COMPLEX 45 MIN: CPT | Mod: GO | Performed by: OCCUPATIONAL THERAPIST

## 2021-07-24 PROCEDURE — 85018 HEMOGLOBIN: CPT | Performed by: INTERNAL MEDICINE

## 2021-07-24 PROCEDURE — 97116 GAIT TRAINING THERAPY: CPT | Mod: GP | Performed by: PHYSICAL THERAPIST

## 2021-07-24 PROCEDURE — 97162 PT EVAL MOD COMPLEX 30 MIN: CPT | Mod: GP | Performed by: PHYSICAL THERAPIST

## 2021-07-24 PROCEDURE — 97530 THERAPEUTIC ACTIVITIES: CPT | Mod: GP | Performed by: PHYSICAL THERAPIST

## 2021-07-24 RX ORDER — CHLORHEXIDINE GLUCONATE ORAL RINSE 1.2 MG/ML
15 SOLUTION DENTAL 2 TIMES DAILY
Status: DISCONTINUED | OUTPATIENT
Start: 2021-07-24 | End: 2021-07-26 | Stop reason: HOSPADM

## 2021-07-24 RX ORDER — OXYCODONE HYDROCHLORIDE 5 MG/1
15-20 TABLET ORAL EVERY 4 HOURS PRN
Status: DISCONTINUED | OUTPATIENT
Start: 2021-07-24 | End: 2021-07-26 | Stop reason: HOSPADM

## 2021-07-24 RX ADMIN — UMECLIDINIUM BROMIDE AND VILANTEROL TRIFENATATE 1 PUFF: 62.5; 25 POWDER RESPIRATORY (INHALATION) at 08:46

## 2021-07-24 RX ADMIN — ACETAMINOPHEN 975 MG: 325 TABLET, FILM COATED ORAL at 15:15

## 2021-07-24 RX ADMIN — PREGABALIN 75 MG: 75 CAPSULE ORAL at 07:57

## 2021-07-24 RX ADMIN — ALPRAZOLAM 0.5 MG: 0.25 TABLET ORAL at 21:05

## 2021-07-24 RX ADMIN — MIRABEGRON 25 MG: 25 TABLET, FILM COATED, EXTENDED RELEASE ORAL at 07:58

## 2021-07-24 RX ADMIN — PANTOPRAZOLE SODIUM 40 MG: 40 TABLET, DELAYED RELEASE ORAL at 07:05

## 2021-07-24 RX ADMIN — DOCUSATE SODIUM AND SENNOSIDES 1 TABLET: 8.6; 5 TABLET, FILM COATED ORAL at 21:05

## 2021-07-24 RX ADMIN — PREGABALIN 75 MG: 75 CAPSULE ORAL at 21:06

## 2021-07-24 RX ADMIN — BACLOFEN 20 MG: 10 TABLET ORAL at 07:57

## 2021-07-24 RX ADMIN — OXCARBAZEPINE 300 MG: 300 TABLET, FILM COATED ORAL at 21:54

## 2021-07-24 RX ADMIN — HYDROXYZINE HYDROCHLORIDE 20 MG: 10 TABLET, FILM COATED ORAL at 07:58

## 2021-07-24 RX ADMIN — CEFAZOLIN 1 G: 1 INJECTION, POWDER, FOR SOLUTION INTRAMUSCULAR; INTRAVENOUS at 04:47

## 2021-07-24 RX ADMIN — ROPINIROLE HYDROCHLORIDE 2 MG: 2 TABLET, FILM COATED ORAL at 08:02

## 2021-07-24 RX ADMIN — ACETAMINOPHEN 975 MG: 325 TABLET, FILM COATED ORAL at 07:58

## 2021-07-24 RX ADMIN — OXYCODONE HYDROCHLORIDE 20 MG: 5 TABLET ORAL at 15:15

## 2021-07-24 RX ADMIN — DICLOFENAC SODIUM 4 G: 10 GEL TOPICAL at 08:01

## 2021-07-24 RX ADMIN — TIZANIDINE 1 MG: 2 TABLET ORAL at 21:54

## 2021-07-24 RX ADMIN — OXYCODONE HYDROCHLORIDE 20 MG: 5 TABLET ORAL at 19:34

## 2021-07-24 RX ADMIN — FAMOTIDINE 20 MG: 20 TABLET ORAL at 07:58

## 2021-07-24 RX ADMIN — ALPRAZOLAM 0.5 MG: 0.25 TABLET ORAL at 07:58

## 2021-07-24 RX ADMIN — BACLOFEN 20 MG: 10 TABLET ORAL at 11:22

## 2021-07-24 RX ADMIN — DOCUSATE SODIUM AND SENNOSIDES 1 TABLET: 8.6; 5 TABLET, FILM COATED ORAL at 11:22

## 2021-07-24 RX ADMIN — MIRTAZAPINE 30 MG: 15 TABLET, FILM COATED ORAL at 21:55

## 2021-07-24 RX ADMIN — BACLOFEN 20 MG: 10 TABLET ORAL at 21:05

## 2021-07-24 RX ADMIN — DICLOFENAC SODIUM 4 G: 10 GEL TOPICAL at 13:15

## 2021-07-24 RX ADMIN — PROMETHAZINE HYDROCHLORIDE 25 MG: 25 TABLET ORAL at 10:53

## 2021-07-24 RX ADMIN — CEFAZOLIN 1 G: 1 INJECTION, POWDER, FOR SOLUTION INTRAMUSCULAR; INTRAVENOUS at 11:22

## 2021-07-24 RX ADMIN — BACLOFEN 20 MG: 10 TABLET ORAL at 15:15

## 2021-07-24 RX ADMIN — ONDANSETRON 4 MG: 4 TABLET, ORALLY DISINTEGRATING ORAL at 11:45

## 2021-07-24 RX ADMIN — FAMOTIDINE 20 MG: 20 TABLET ORAL at 21:05

## 2021-07-24 RX ADMIN — DEXTROAMPHETAMINE SACCHARATE, AMPHETAMINE ASPARTATE MONOHYDRATE, DEXTROAMPHETAMINE SULFATE, AND AMPHETAMINE SULFATE 30 MG: 7.5; 7.5; 7.5; 7.5 CAPSULE, EXTENDED RELEASE ORAL at 07:58

## 2021-07-24 RX ADMIN — OXYCODONE HYDROCHLORIDE 15 MG: 5 TABLET ORAL at 11:15

## 2021-07-24 RX ADMIN — CEFAZOLIN 1 G: 1 INJECTION, POWDER, FOR SOLUTION INTRAMUSCULAR; INTRAVENOUS at 21:06

## 2021-07-24 RX ADMIN — AMLODIPINE BESYLATE 2.5 MG: 2.5 TABLET ORAL at 21:05

## 2021-07-24 ASSESSMENT — ACTIVITIES OF DAILY LIVING (ADL)
ADLS_ACUITY_SCORE: 16
PREVIOUS_RESPONSIBILITIES: MEAL PREP;HOUSEKEEPING;LAUNDRY;SHOPPING;MEDICATION MANAGEMENT;FINANCES;DRIVING
ADLS_ACUITY_SCORE: 16
ADLS_ACUITY_SCORE: 14
ADLS_ACUITY_SCORE: 16

## 2021-07-24 NOTE — PHARMACY-ADMISSION MEDICATION HISTORY
Medication reconciliation interview completed by pre-admitting nurse Marilyn Gómez, reviewed by pharmacy. No further clarifications needed.       Prior to Admission medications    Medication Sig Last Dose Taking? Auth Provider   albuterol (PROAIR HFA/PROVENTIL HFA/VENTOLIN HFA) 108 (90 Base) MCG/ACT inhaler Inhale 2 puffs into the lungs every 4 hours as needed for shortness of breath / dyspnea or wheezing 7/23/2021 at Unknown time Yes Carlos Eduardo Madsen PA-C   ALPRAZolam (XANAX) 0.5 MG tablet Take 1 tablet (0.5 mg) by mouth 2 times daily 7/22/2021 at Unknown time Yes Sunshine Butterfield APRN CNP   amLODIPine (NORVASC) 2.5 MG tablet Take 1 tablet (2.5 mg) by mouth daily 7/22/2021 at Unknown time Yes Rashaad Modi MD   amphetamine-dextroamphetamine (ADDERALL XR) 30 MG 24 hr capsule Take 1 capsule (30 mg) by mouth daily 7/23/2021 at Unknown time Yes Sunshine Butterfield APRN CNP   amphetamine-dextroamphetamine (ADDERALL) 10 MG tablet Take 1 tablet (10 mg) by mouth daily 7/23/2021 at Unknown time Yes Sunshine Butterfield APRN CNP   AVONEX PEN 30 MCG/0.5ML auto-injector kit Inject 30 mcg into the muscle every 7 days  Past Week at Unknown time Yes Reported, Patient   baclofen (LIORESAL) 20 MG tablet Take 20 mg by mouth 4 times daily  7/23/2021 at Unknown time Yes Reported, Patient   esomeprazole (NEXIUM) 40 MG DR capsule Take 1 capsule (40 mg) by mouth every morning (before breakfast) Take 30-60 minutes before eating. 7/23/2021 at Unknown time Yes Sunshine Butterfield APRN CNP   ipratropium - albuterol 0.5 mg/2.5 mg/3 mL (DUONEB) 0.5-2.5 (3) MG/3ML neb solution Take 1 vial (3 mLs) by nebulization every 6 hours as needed for shortness of breath / dyspnea or wheezing Past Week at Unknown time Yes Carlos Eduardo Madsen PA-C   mirtazapine (REMERON) 15 MG tablet Take 2 tablets (30 mg) by mouth At Bedtime 7/22/2021 at Unknown time Yes Sunshine Butterfield APRN CNP   nicotine polacrilex (NICORETTE) 4 MG gum  Place 1 each (4 mg) inside cheek as needed for smoking cessation 7/22/2021 at Unknown time Yes Sunshine Butterfield APRN CNP   oxyCODONE (ROXICODONE) 5 MG tablet Take 2 tablets (10 mg) by mouth every 6 hours as needed for pain  Yes Ariana Bell APRN CNP   pregabalin (LYRICA) 75 MG capsule Take 1 capsule (75 mg) by mouth 2 times daily 7/23/2021 at Unknown time Yes Sunshine Butterfield APRN CNP   promethazine (PHENERGAN) 25 MG tablet Take 25 mg by mouth every 6 hours as needed for nausea 7/23/2021 at Unknown time Yes Reported, Patient   rOPINIRole (REQUIP) 2 MG tablet Take 2 mg by mouth every morning Patient is taking 1 tab in the morning and 2 tab at night 7/23/2021 at Unknown time Yes Unknown, Entered By History   tiotropium-olodaterol 2.5-2.5 MCG/ACT AERS Inhale 2 puffs into the lungs daily 7/23/2021 at Unknown time Yes Carlos Eduardo Madsen, PAMarinaC   tiZANidine (ZANAFLEX) 2 MG tablet Take 1-2 mg by mouth At Bedtime  Yes Unknown, Entered By History   mirabegron (MYRBETRIQ) 25 MG 24 hr tablet Take 1 tablet (25 mg) by mouth daily   Sunshine Butterfield APRN CNP   OXcarbazepine (TRILEPTAL) 150 MG tablet TAKE 2 TABLETS BY MOUTH EVERY NIGHT AT BEDTIME   Reported, Patient

## 2021-07-24 NOTE — PLAN OF CARE
"Variance- anxiety this afternoon. Mobility decreased due to pain to back. Chronic and acute pain.     Oriented x4. Mild forgetful. Some fidgeting when talking with me when I am in the room. Some rambling of thoughts with conversation.   Patient concerned about amount of pain post surgery. Is on chronic oxycodone at home. Discontinued fentanyl PCA this AM- switched to oxycodone 15mg. Did provide oxycodone 20mg with this dose as had break thru pain within the last hour resulting in anxiety, frustration of being here, and voicing anxiety. Mobility up with 2 assist to bedside commode however moves quiet well once sitting on edge of bed. Weakness to bilateral legs- left leg weaker than right. Neuropathy at baseline to legs and pain radiates down left leg has improved slightly. Food and fluids well. Voiding well. No edema. DELFINA x2. Lungs clear. Watching TV. Passed PT. Bed alarm on for safety. Plan is home with SO at discharge. 1644 \"My tooth just broke off on the bottom. *(shows me the tooth) This isn't the first time. The same tooth has broke off 3 times and I'm concerned about a possible tooth infection.I feel small amount of tooth pain.\" no drainage. On ancef. Will mention to Dr. Lloyd. Will monitor.   "

## 2021-07-24 NOTE — PLAN OF CARE
OT: order received, chart review, eval attempted.  Pt. And spouse present and stating continued headache and neck pain and declining therapy at this time.  Pt. States may feel more up to therapy this afternoon or tomorrow.  Will follow per POC.

## 2021-07-24 NOTE — CONSULTS
"[facility] Internal Medicine Consultation    Hina Yap MRN# [id number]   Age: 55 year old YOB: 1965   Date of Admission: 7/23/2021     Reason for consult: I was asked to co-manage this patient for their chronic medical problems       Requesting physician Dr. Alexandre Flores       Level of consult: Consult, follow and place orders           Assessment and Plan:   Assessment:   Hina Yap is a medically complex 55-year-old woman who was admitted on 7/23/2021 status post L4-5 lumbar fusion.    Past medical history includes MS that was diagnosed some 20 years ago and is now a progressive type.  She follows with pain clinic also for chronic pain related to the MS for which she has been on narcotics in addition to other medications for decades.  She also has COPD, essential hypertension, gastroesophageal reflux disease and is on various medications for anxiety and depression control as well as for sleep.      She reports having had a heart problem of some type, though coronary angiography in 2001 was negative.  Because there was a defect on stress testing, she was thought to have had a \"vasospastic heart attack\".  She also apparently had a troponin elevation following intubation at the [facility] in 2015.  Again coronary arteries were clean and she was diagnosed with possible Takotsubo cardiomyopathy.  She is noted to have mild mitral regurgitation with mitral valve thickening.    On the date of admission, Ms. Hutton was taken to the operating room by Dr. Flores.  Total anesthesia time approximately 3 hours.  General endotracheal anesthetic.  Crystalloid: 1800 mL, EBL 30 mL.  2 drains were left in place.    Diagnoses:  1.  POD # 1 status post complex L4-5 lumbar fusion with correction of spondylolisthesis.  2.  Chronic pain with chronic narcotic exposure.  Currently on PCA.  3.  MS with generalized weakness and primarily pain.  4.  Essential hypertension.  5.  Anxiety " and depression.  6.  Reported COPD.      Plan:   1.  Initiate transition to oral pain medications.  2.  Continue multimodal therapy for pain control.  3.  Parameters ordered for antihypertensive medications.  4.  Continue inhalers.             Chief Complaint:   Assist with managing chronic medical issues and pain.     History is obtained from the patient and electronic medical record.     Today, I met Ms. Yap while she was in therapy.  She was extremely uncomfortable with activity.  She moved very slowly and unfortunately I suspect that her generalized weakness probably is contributing to difficulty with managing therapy.  I see that ketamine is available to the patient every 12 hours but that has not been given.       Past Medical History:     Past Medical History:   Diagnosis Date     Anxiety      Arthritis Years ago     COPD (chronic obstructive pulmonary disease) (H)      Depressive disorder Years ago     GERD (gastroesophageal reflux disease)      Hypertension      Multiple sclerosis (H)      Neuromuscular disorder (H)      Nonrheumatic mitral valve regurgitation      PVC's (premature ventricular contractions)      Renal disease      Restless leg syndrome      Tobacco use disorder              Past Surgical History:     Past Surgical History:   Procedure Laterality Date     GYN SURGERY      tubal ligation             Social History:     Social History     Tobacco Use     Smoking status: Current Every Day Smoker     Packs/day: 0.50     Years: 35.00     Pack years: 17.50     Types: Cigarettes     Last attempt to quit: 2013     Years since quittin.0     Smokeless tobacco: Never Used     Tobacco comment: 1/2 pack per day   Substance Use Topics     Alcohol use: Not Currently     Alcohol/week: 0.0 standard drinks             Family History:   Reviewed but considered noncontributory to this hospitalization.       Allergies:     Allergies   Allergen Reactions     Sulfa Drugs Rash     Adhesive Tape Rash      Reacts to adhesive on fentanyl patch     Wellbutrin [Bupropion Hydrobromide] Rash             Medications:     Medications Prior to Admission   Medication Sig Dispense Refill Last Dose     albuterol (PROAIR HFA/PROVENTIL HFA/VENTOLIN HFA) 108 (90 Base) MCG/ACT inhaler Inhale 2 puffs into the lungs every 4 hours as needed for shortness of breath / dyspnea or wheezing 18 g 3 7/23/2021 at Unknown time     ALPRAZolam (XANAX) 0.5 MG tablet Take 1 tablet (0.5 mg) by mouth 2 times daily 60 tablet 0 7/22/2021 at Unknown time     amLODIPine (NORVASC) 2.5 MG tablet Take 1 tablet (2.5 mg) by mouth daily 30 tablet 11 7/22/2021 at Unknown time     amphetamine-dextroamphetamine (ADDERALL XR) 30 MG 24 hr capsule Take 1 capsule (30 mg) by mouth daily 30 capsule 0 7/23/2021 at Unknown time     amphetamine-dextroamphetamine (ADDERALL) 10 MG tablet Take 1 tablet (10 mg) by mouth daily 30 tablet 0 7/23/2021 at Unknown time     AVONEX PEN 30 MCG/0.5ML auto-injector kit Inject 30 mcg into the muscle every 7 days    Past Week at Unknown time     baclofen (LIORESAL) 20 MG tablet Take 20 mg by mouth 4 times daily    7/23/2021 at Unknown time     esomeprazole (NEXIUM) 40 MG DR capsule Take 1 capsule (40 mg) by mouth every morning (before breakfast) Take 30-60 minutes before eating. 90 capsule 3 7/23/2021 at Unknown time     ipratropium - albuterol 0.5 mg/2.5 mg/3 mL (DUONEB) 0.5-2.5 (3) MG/3ML neb solution Take 1 vial (3 mLs) by nebulization every 6 hours as needed for shortness of breath / dyspnea or wheezing 30 mL 3 Past Week at Unknown time     mirtazapine (REMERON) 15 MG tablet Take 2 tablets (30 mg) by mouth At Bedtime 4 tablet 0 7/22/2021 at Unknown time     nicotine polacrilex (NICORETTE) 4 MG gum Place 1 each (4 mg) inside cheek as needed for smoking cessation 100 each 3 7/22/2021 at Unknown time     pregabalin (LYRICA) 75 MG capsule Take 1 capsule (75 mg) by mouth 2 times daily 180 capsule 3 7/23/2021 at Unknown time      promethazine (PHENERGAN) 25 MG tablet Take 25 mg by mouth every 6 hours as needed for nausea   7/23/2021 at Unknown time     rOPINIRole (REQUIP) 2 MG tablet Take 2 mg by mouth every morning Patient is taking 1 tab in the morning and 2 tab at night   7/23/2021 at Unknown time     tiotropium-olodaterol 2.5-2.5 MCG/ACT AERS Inhale 2 puffs into the lungs daily 4 g 3 7/23/2021 at Unknown time     tiZANidine (ZANAFLEX) 2 MG tablet Take 1-2 mg by mouth At Bedtime        mirabegron (MYRBETRIQ) 25 MG 24 hr tablet Take 1 tablet (25 mg) by mouth daily 90 tablet 3      OXcarbazepine (TRILEPTAL) 150 MG tablet TAKE 2 TABLETS BY MOUTH EVERY NIGHT AT BEDTIME        [DISCONTINUED] oxyCODONE-acetaminophen (PERCOCET)  MG per tablet Take 1 tablet by mouth every 6 hours as needed for severe pain   7/23/2021 at Unknown time     [DISCONTINUED] predniSONE (DELTASONE) 20 MG tablet 3 tabs po every day for 2 days, then 2 tabs po every day for 3 days, then 1 tab po every day for 2 days, then 1/2 tab po every day for 2 days 10 tablet 0              Review of Systems:   A comprehensive review of systems was performed and found to be negative except as described in this note         Physical Exam:   Vitals were reviewed  Temp: 98.8  F (37.1  C) Temp src: Temporal BP: (!) 143/87 Pulse: 74   Resp: 16 SpO2: 97 % O2 Device: None (Room air)    Constitutional: Awake, alert, cooperative, no apparent distress, and appears stated age.  Eyes: Lids and lashes normal, pupils equal, round and reactive to light, extra ocular muscles intact, sclera clear, conjunctiva normal.  ENT: Normocephalic, without obvious abnormality, atramatic, sinuses nontender on palpation, external ears without lesions, oral pharynx with moist mucus membranes, tonsils without erythema or exudates, gums normal and good dentition.  Neck: Supple, symmetrical, trachea midline, no adenopathy, thyroid symmetric, not enlarged and no tenderness, skin normal.  Hematologic / Lymphatic: No  cervical lymphadenopathy and no supraclavicular lymphadenopathy.  Back: Symmetric, no curvature, spinous processes are non-tender on palpation, paraspinous muscles are non-tender on palpation, no costal vertebral tenderness.  Lungs: No increased work of breathing, good air exchange, clear to auscultation bilaterally, no crackles or wheezing.  Cardiovascular: Regular rate and rhythm, normal S1 and S2, no S3 or S4, and no murmur noted.  Abdomen: No scars, normal bowel sounds, soft, non-distended, non-tender, no masses palpated, no hepatosplenomegally.  Musculoskeletal: No redness, warmth, or swelling of the joints.  Full range of motion noted.  Motor strength is 5 out of 5 all extremities bilaterally.  Tone is normal.  Neurologic: Awake, alert, oriented to name, place and time.  Cranial nerves II-XII are grossly intact.  Motor is 5 out of 5 bilaterally.  Cerebellar finger to nose, heel to shin intact.  Sensory is intact.  Babinski down going, Romberg negative, and gait is normal.  Neuropsychiatric: Normal affect, mood, orientation, memory and insight.  Skin: No rashes, erythema, pallor, petechia or purpura.          Data:     Results for orders placed or performed during the hospital encounter of 07/23/21 (from the past 24 hour(s))   Glucose by meter   Result Value Ref Range    GLUCOSE BY METER POCT 97 70 - 99 mg/dL   Basic metabolic panel   Result Value Ref Range    Sodium 143 133 - 144 mmol/L    Potassium 3.8 3.4 - 5.3 mmol/L    Chloride 111 (H) 94 - 109 mmol/L    Carbon Dioxide (CO2) 29 20 - 32 mmol/L    Anion Gap 3 3 - 14 mmol/L    Urea Nitrogen 18 7 - 30 mg/dL    Creatinine 0.90 0.52 - 1.04 mg/dL    Calcium 8.3 (L) 8.5 - 10.1 mg/dL    Glucose 90 70 - 99 mg/dL    GFR Estimate 72 >60 mL/min/1.73m2   Hemoglobin   Result Value Ref Range    Hemoglobin 10.5 (L) 11.7 - 15.7 g/dL     EKG results:   Performed on admission        Normal sinus rhythm, normal axis, no acute ischemic ST segment or T wave changes         Attestation:  I have reviewed today's vital signs, notes, medications, labs and imaging.  Total time: 30 minutes    Nirmal Lloyd MD

## 2021-07-24 NOTE — PROGRESS NOTES
07/24/21 0900   Living Environment   People in home alone   Home Accessibility stairs to enter home;stairs within home   Transportation Anticipated family or friend will provide   Living Environment Comments Pt. will be staying with her boyfriend at discharge.  Home has stairs and multiple floors.  Pt. has bathrooms setup and boyfriend to assist.  Pt. has MS, chronic pain, and hypersensitivity at baseline.   Self-Care   Usual Activity Tolerance moderate   Current Activity Tolerance fair   Regular Exercise Yes   Activity/Exercise Type walking   Exercise Amount/Frequency 3-5 times/wk   Equipment Currently Used at Home cane, straight   Activity/Exercise/Self-Care Comment Pt. reports modified independence and tolerance with ADLs and IADLs at baseline and uses cane.  Pt. does fatigue quickly and has chronic pain at baseline.   Disability/Function   Fall history within last six months yes   Number of times patient has fallen within last six months 3  (2 in bathroom)   Change in Functional Status Since Onset of Current Illness/Injury yes   General Information   Onset of Illness/Injury or Date of Surgery 07/23/21   Referring Physician Ariana Bell, APRN CNP   Patient/Family Therapy Goals Statement (PT) Return home, feel better   Pertinent History of Current Problem (include personal factors and/or comorbidities that impact the POC) Pt is a 55 year old patient with a past medical history of multiple sclerosis (progressive MS per patient, had started out as relapsing remitting), hypertension, anxiety, depression, GERD, mildly reduced ejection fraction of 50-55% of unclear etiology, presented with intractable low back and LLE pain and LLE weakness, s/p L4-5 transforaminal lumbar interbody fusion with reduction of grade 1/2 unstable spondylolisthesis.    Existing Precautions/Restrictions brace worn when out of bed   Weight-Bearing Status - LLE full weight-bearing   Weight-Bearing Status - RLE full weight-bearing    General Observations Hypersensitivity (baseline per pt), chronic pain, chronic fatigue (likely MS related)   Cognition   Orientation Status (Cognition) oriented x 4   Affect/Mental Status (Cognition) anxious;emotionally labile   Follows Commands (Cognition) follows three-step commands;over 90% accuracy;follows two-step commands;75-90% accuracy;increased processing time needed;repetition of directions required;physical/tactile prompts required;verbal cues/prompting required   Behavioral Issues difficulty managing stress   Memory Deficit (Cognition) minimal deficit;short-term memory   Cognitive Status Comments Please see OT eval for further insight into cognition, pt appeared WFL though anxious/fearful throughout eval.   Pain Assessment   Patient Currently in Pain Yes, see Vital Sign flowsheet   Integumentary/Edema   Integumentary/Edema no deficits were identifed   Posture    Posture Forward head position;Protracted shoulders   Range of Motion (ROM)   ROM Quick Adds ROM deficits secondary to pain   ROM Comment Painful with most movements UE/LE   Strength   Strength Comments B LE generalized weakness, grossly 4/5 B LE,   Bed Mobility   Bed Mobility supine-sit   Comment (Bed Mobility) Deisi with heavy encouragement, verbal/tactile cues   Transfers   Transfers sit-stand transfer   Sit-Stand Transfer   Sit-Stand Carlton (Transfers) contact guard   Assistive Device (Sit-Stand Transfers) walker, front-wheeled   Sit/Stand Transfer Comments Cues for positioning,safety   Gait/Stairs (Locomotion)   Carlton Level (Gait) supervision;contact guard   Assistive Device (Gait) walker, front-wheeled   Distance in Feet (Required for LE Total Joints) 200   Pattern (Gait) swing-through   Maintains Weight-bearing Status (Gait) able to maintain   Comment (Gait/Stairs) SBA/CGA with 2WW   Balance   Balance Comments Impaiered 2/2 weakness, pain, endurance   Sensory Examination   Sensory Perception patient reports no sensory changes    Coordination   Coordination no deficits were identified   Coordination Comments Not formally assessed, appeared WFL during session.    Muscle Tone   Muscle Tone no deficits were identified   Muscle Tone Comments Not formally assessed; At baseline: progressive MS   Clinical Impression   Criteria for Skilled Therapeutic Intervention yes, treatment indicated   PT Diagnosis (PT) Impaired fn mobility    Influenced by the following impairments pain, strength, balance, endurance   Functional limitations due to impairments Impaired bed mobility, transfers, ambulation, stairs    Clinical Presentation Evolving/Changing   Clinical Presentation Rationale Pt presenting with multiple comorbidities affecting current presentation, assessment including strength, balance, fn mobility.    Clinical Decision Making (Complexity) moderate complexity   Therapy Frequency (PT) 2x/day   Predicted Duration of Therapy Intervention (days/wks) 3 days   Planned Therapy Interventions (PT) balance training;bed mobility training;gait training;transfer training;neuromuscular re-education;home exercise program;stair training;strengthening   Anticipated Equipment Needs at Discharge (PT)   (2WW)   Risk & Benefits of therapy have been explained evaluation/treatment results reviewed;care plan/treatment goals reviewed;participants voiced agreement with care plan;participants included;patient   PT Discharge Planning    PT Discharge Recommendation (DC Rec) home with assist   PT Rationale for DC Rec Pt presenting below baseline from fn mobilty perspective; increased pain with all movements, at increased risk of knee buckling with B leg weakness, decreased endurance, chronic pain, and progressive MS. Pt was able to perform bed mobility, transfers, ambulation >200ft and climbing/descending 4 steps with Ax1, no overt loss of balance noted. Pt endorsing she has supportive boyfriend in good health that can assist with fn mobility. Recommend Home with assist.  Spoke with pt about asking MD about recommending Outpatient Therapies for continued skilled program to improve quality of life and reduce risk of falling.  Holding off on reocmmending OPPT at this time per pt request.    PT Brief overview of current status  Ax1, incr time, reduce stress   Skin WDL   Skin WDL X   Skin Integrity/Characteristics other (see comments)  (incision, jpx2)   Coping Strategies   Trust Relationship/Rapport care explained;choices provided;emotional support provided;empathic listening provided;questions answered;questions encouraged;reassurance provided;thoughts/feelings acknowledged

## 2021-07-24 NOTE — PLAN OF CARE
Patient vital signs are at baseline: Yes  Patient able to ambulate as they were prior to admission or with assist devices provided by therapies during their stay:  No,  Reason:  Pt has refused to get OOB.  Patient MUST void prior to discharge:  No,  Reason:  Discontinue viera this AM pt is now DTV.   Patient able to tolerate oral intake:  Yes  Pain has adequate pain control using Oral analgesics:  No,  Reason:  Pt on Fentanyl PCA, PO Atarax, Tylenol, Lidocaine patches & Voltaren gel. Pt rates pain 7-8/10.    Dressing CDI. CMS- baseline neuropathy. X2 DELFINA drains patent. Will continue POC.

## 2021-07-24 NOTE — PROGRESS NOTES
Pain control adequate  Less leg pain  Still weak left foot/ankle same as pre-op  Drain, small volume bloody drainage  Up in therapy and to bathroom    conintue with pain control and mobilize in PT/OT  Drains out tomorrow

## 2021-07-24 NOTE — PROGRESS NOTES
07/24/21 1037   Quick Adds   Type of Visit Initial Occupational Therapy Evaluation   Living Environment   People in home alone   Home Accessibility stairs to enter home;stairs within home   Transportation Anticipated family or friend will provide   Living Environment Comments Pt. will be staying with her boyfriend at discharge.  Home has stairs and multiple floors.  Pt. has bathrooms setup and boyfriend to assist.  Pt. has MS at baseline.   Self-Care   Usual Activity Tolerance moderate   Current Activity Tolerance fair   Regular Exercise Other (see comments)  (Pt. tries to stay active.)   Equipment Currently Used at Home cane, straight   Activity/Exercise/Self-Care Comment Pt. reports modified independence and tolerance with ADLs and IADLs at baseline and uses cane.  Pt. does fatigue quickly and have MS pain.   Instrumental Activities of Daily Living (IADL)   Previous Responsibilities meal prep;housekeeping;laundry;shopping;medication management;finances;driving   Disability/Function   Hearing Difficulty or Deaf no   Wear Glasses or Blind yes   Concentrating, Remembering or Making Decisions Difficulty yes   Concentration Management pt. reports decreased memory/recall due to MS   Difficulty Communicating no   Difficulty Eating/Swallowing no   Walking or Climbing Stairs Difficulty yes   Walking or Climbing Stairs ambulation difficulty, requires equipment   Dressing/Bathing Difficulty no   Toileting issues no   Doing Errands Independently Difficulty (such as shopping) yes   Errands Management assist    Fall history within last six months yes   Number of times patient has fallen within last six months 1   Change in Functional Status Since Onset of Current Illness/Injury yes   General Information   Onset of Illness/Injury or Date of Surgery 07/23/21   Referring Physician Ariana Bell   Patient/Family Therapy Goal Statement (OT) decreased pain and dizziness   Additional Occupational Profile Info/Pertinent History of  Current Problem left L4-5 TLIF, MS   Performance Patterns (Routines, Roles, Habits) Pt. reports modified independence at baseline   Existing Precautions/Restrictions brace worn when out of bed;spinal   Limitations/Impairments   (baseline cognition due to MS)   Left Lower Extremity (Weight-bearing Status) weight-bearing as tolerated (WBAT)   Right Lower Extremity (Weight-bearing Status) weight-bearing as tolerated (WBAT)   Cognitive Status Examination   Orientation Status orientation to person, place and time   Cognitive Status Comments Pt. does report needing increased time to process and recall directions due to changes in cognition from MS   Visual Perception   Visual Impairment/Limitations WFL   Pain Assessment   Patient Currently in Pain Yes, see Vital Sign flowsheet   Range of Motion Comprehensive   Comment, General Range of Motion B UE WFL   Strength Comprehensive (MMT)   Comment, General Manual Muscle Testing (MMT) Assessment NT pt. notes generalized weakness   Coordination   Upper Extremity Coordination No deficits were identified   Bed Mobility   Comment (Bed Mobility) pt. needing min A x 1-2 with sit to supine and scooting up in bed   Transfers   Transfer Comments pt. needing min A x 1-2   Balance   Balance Comments pt. reports dizziness after increased activity   Clinical Impression   Criteria for Skilled Therapeutic Interventions Met (OT) yes;meets criteria;skilled treatment is necessary   OT Diagnosis impaired independence and tolerance with ADLs   OT Problem List-Impairments impacting ADL activity tolerance impaired;cognition;post-surgical precautions   Assessment of Occupational Performance 3-5 Performance Deficits   Identified Performance Deficits decreased LE dressing, decreased activity tolerance, decreased bending and reaching, decreased functional transfers   Planned Therapy Interventions (OT) ADL retraining;risk factor education;progressive activity/exercise;transfer training   Clinical Decision  Making Complexity (OT) moderate complexity   Therapy Frequency (OT) Daily   Predicted Duration of Therapy 3 days   Anticipated Equipment Needs Upon Discharge (OT) walker, rolling;shower chair;reacher  (sockaid)   Risk & Benefits of therapy have been explained evaluation/treatment results reviewed;care plan/treatment goals reviewed;risks/benefits reviewed;current/potential barriers reviewed;participants voiced agreement with care plan;participants included;patient   OT Discharge Planning    OT Discharge Recommendation (DC Rec) Home with assist   OT Rationale for DC Rec Pt. demonstrates min A for transfers, setup for ADLs, AE for ADLs, and DME for functional transfers.  Pt. independent at baseline and plans to discharge to [facility] which has stairs.  Pt. would benefit from continued skilled IP OT to for ADL training, risk factor education, and progressive activity/transfer training for safe discharge.   Total Evaluation Time (Minutes)   Total Evaluation Time (Minutes) 10

## 2021-07-25 ENCOUNTER — APPOINTMENT (OUTPATIENT)
Dept: PHYSICAL THERAPY | Facility: CLINIC | Age: 56
End: 2021-07-25
Attending: NEUROLOGICAL SURGERY
Payer: MEDICAID

## 2021-07-25 ENCOUNTER — APPOINTMENT (OUTPATIENT)
Dept: OCCUPATIONAL THERAPY | Facility: CLINIC | Age: 56
End: 2021-07-25
Attending: NEUROLOGICAL SURGERY
Payer: MEDICAID

## 2021-07-25 LAB
ANION GAP SERPL CALCULATED.3IONS-SCNC: 2 MMOL/L (ref 3–14)
BUN SERPL-MCNC: 15 MG/DL (ref 7–30)
CALCIUM SERPL-MCNC: 8.5 MG/DL (ref 8.5–10.1)
CHLORIDE BLD-SCNC: 106 MMOL/L (ref 94–109)
CO2 SERPL-SCNC: 31 MMOL/L (ref 20–32)
CREAT SERPL-MCNC: 0.77 MG/DL (ref 0.52–1.04)
GFR SERPL CREATININE-BSD FRML MDRD: 87 ML/MIN/1.73M2
GLUCOSE BLD-MCNC: 88 MG/DL (ref 70–99)
HGB BLD-MCNC: 11.2 G/DL (ref 11.7–15.7)
POTASSIUM BLD-SCNC: 3.9 MMOL/L (ref 3.4–5.3)
SODIUM SERPL-SCNC: 139 MMOL/L (ref 133–144)

## 2021-07-25 PROCEDURE — 250N000011 HC RX IP 250 OP 636: Performed by: INTERNAL MEDICINE

## 2021-07-25 PROCEDURE — 97530 THERAPEUTIC ACTIVITIES: CPT | Mod: GP | Performed by: PHYSICAL THERAPIST

## 2021-07-25 PROCEDURE — 250N000013 HC RX MED GY IP 250 OP 250 PS 637: Performed by: INTERNAL MEDICINE

## 2021-07-25 PROCEDURE — 120N000001 HC R&B MED SURG/OB

## 2021-07-25 PROCEDURE — 250N000013 HC RX MED GY IP 250 OP 250 PS 637: Performed by: NURSE PRACTITIONER

## 2021-07-25 PROCEDURE — 99233 SBSQ HOSP IP/OBS HIGH 50: CPT | Performed by: INTERNAL MEDICINE

## 2021-07-25 PROCEDURE — 97535 SELF CARE MNGMENT TRAINING: CPT | Mod: GO | Performed by: OCCUPATIONAL THERAPIST

## 2021-07-25 PROCEDURE — 94640 AIRWAY INHALATION TREATMENT: CPT

## 2021-07-25 PROCEDURE — 80048 BASIC METABOLIC PNL TOTAL CA: CPT | Performed by: INTERNAL MEDICINE

## 2021-07-25 PROCEDURE — 250N000011 HC RX IP 250 OP 636: Performed by: NURSE PRACTITIONER

## 2021-07-25 PROCEDURE — 999N000157 HC STATISTIC RCP TIME EA 10 MIN

## 2021-07-25 PROCEDURE — 250N000009 HC RX 250: Performed by: NEUROLOGICAL SURGERY

## 2021-07-25 PROCEDURE — 36415 COLL VENOUS BLD VENIPUNCTURE: CPT | Performed by: INTERNAL MEDICINE

## 2021-07-25 PROCEDURE — 85018 HEMOGLOBIN: CPT | Performed by: INTERNAL MEDICINE

## 2021-07-25 PROCEDURE — 97116 GAIT TRAINING THERAPY: CPT | Mod: GP | Performed by: PHYSICAL THERAPIST

## 2021-07-25 RX ORDER — KETAMINE HYDROCHLORIDE 10 MG/ML
10 INJECTION, SOLUTION INTRAMUSCULAR; INTRAVENOUS
Status: DISCONTINUED | OUTPATIENT
Start: 2021-07-25 | End: 2021-07-26 | Stop reason: HOSPADM

## 2021-07-25 RX ORDER — LORAZEPAM 2 MG/ML
0.5 INJECTION INTRAMUSCULAR EVERY 4 HOURS PRN
Status: DISCONTINUED | OUTPATIENT
Start: 2021-07-25 | End: 2021-07-26

## 2021-07-25 RX ADMIN — DOCUSATE SODIUM AND SENNOSIDES 1 TABLET: 8.6; 5 TABLET, FILM COATED ORAL at 08:18

## 2021-07-25 RX ADMIN — OXYCODONE HYDROCHLORIDE 20 MG: 5 TABLET ORAL at 13:10

## 2021-07-25 RX ADMIN — DEXTROAMPHETAMINE SACCHARATE, AMPHETAMINE ASPARTATE MONOHYDRATE, DEXTROAMPHETAMINE SULFATE, AND AMPHETAMINE SULFATE 30 MG: 7.5; 7.5; 7.5; 7.5 CAPSULE, EXTENDED RELEASE ORAL at 08:18

## 2021-07-25 RX ADMIN — OXCARBAZEPINE 300 MG: 300 TABLET, FILM COATED ORAL at 21:40

## 2021-07-25 RX ADMIN — BACLOFEN 20 MG: 10 TABLET ORAL at 15:10

## 2021-07-25 RX ADMIN — ALPRAZOLAM 0.5 MG: 0.25 TABLET ORAL at 08:18

## 2021-07-25 RX ADMIN — BACLOFEN 20 MG: 10 TABLET ORAL at 20:08

## 2021-07-25 RX ADMIN — CEFAZOLIN 1 G: 1 INJECTION, POWDER, FOR SOLUTION INTRAMUSCULAR; INTRAVENOUS at 04:12

## 2021-07-25 RX ADMIN — LORAZEPAM 0.5 MG: 2 INJECTION INTRAMUSCULAR; INTRAVENOUS at 18:35

## 2021-07-25 RX ADMIN — DICLOFENAC SODIUM 4 G: 10 GEL TOPICAL at 14:03

## 2021-07-25 RX ADMIN — PREGABALIN 75 MG: 75 CAPSULE ORAL at 20:08

## 2021-07-25 RX ADMIN — ACETAMINOPHEN 975 MG: 325 TABLET, FILM COATED ORAL at 08:18

## 2021-07-25 RX ADMIN — PREGABALIN 75 MG: 75 CAPSULE ORAL at 08:18

## 2021-07-25 RX ADMIN — DOCUSATE SODIUM AND SENNOSIDES 1 TABLET: 8.6; 5 TABLET, FILM COATED ORAL at 20:08

## 2021-07-25 RX ADMIN — ROPINIROLE HYDROCHLORIDE 2 MG: 2 TABLET, FILM COATED ORAL at 08:18

## 2021-07-25 RX ADMIN — ACETAMINOPHEN 975 MG: 325 TABLET, FILM COATED ORAL at 15:10

## 2021-07-25 RX ADMIN — PANTOPRAZOLE SODIUM 40 MG: 40 TABLET, DELAYED RELEASE ORAL at 08:18

## 2021-07-25 RX ADMIN — OXYCODONE HYDROCHLORIDE 20 MG: 5 TABLET ORAL at 03:00

## 2021-07-25 RX ADMIN — KETAMINE HYDROCHLORIDE 10 MG: 10 INJECTION, SOLUTION INTRAMUSCULAR; INTRAVENOUS at 11:22

## 2021-07-25 RX ADMIN — POLYETHYLENE GLYCOL 3350 17 G: 17 POWDER, FOR SOLUTION ORAL at 08:19

## 2021-07-25 RX ADMIN — CHLORHEXIDINE GLUCONATE 15 ML: 1.2 SOLUTION ORAL at 08:18

## 2021-07-25 RX ADMIN — MIRTAZAPINE 30 MG: 15 TABLET, FILM COATED ORAL at 21:40

## 2021-07-25 RX ADMIN — LORAZEPAM 0.5 MG: 2 INJECTION INTRAMUSCULAR; INTRAVENOUS at 11:34

## 2021-07-25 RX ADMIN — DICLOFENAC SODIUM 4 G: 10 GEL TOPICAL at 08:34

## 2021-07-25 RX ADMIN — FAMOTIDINE 20 MG: 20 TABLET ORAL at 20:08

## 2021-07-25 RX ADMIN — TIZANIDINE 1 MG: 2 TABLET ORAL at 21:39

## 2021-07-25 RX ADMIN — DICLOFENAC SODIUM 4 G: 10 GEL TOPICAL at 21:37

## 2021-07-25 RX ADMIN — LIDOCAINE 1 PATCH: 560 PATCH PERCUTANEOUS; TOPICAL; TRANSDERMAL at 20:08

## 2021-07-25 RX ADMIN — CEFAZOLIN 1 G: 1 INJECTION, POWDER, FOR SOLUTION INTRAMUSCULAR; INTRAVENOUS at 11:34

## 2021-07-25 RX ADMIN — CHLORHEXIDINE GLUCONATE 15 ML: 1.2 SOLUTION ORAL at 20:08

## 2021-07-25 RX ADMIN — ONDANSETRON 4 MG: 4 TABLET, ORALLY DISINTEGRATING ORAL at 03:00

## 2021-07-25 RX ADMIN — CEFAZOLIN 1 G: 1 INJECTION, POWDER, FOR SOLUTION INTRAMUSCULAR; INTRAVENOUS at 20:08

## 2021-07-25 RX ADMIN — BACLOFEN 20 MG: 10 TABLET ORAL at 11:34

## 2021-07-25 RX ADMIN — OXYCODONE HYDROCHLORIDE 20 MG: 5 TABLET ORAL at 17:28

## 2021-07-25 RX ADMIN — BACLOFEN 20 MG: 10 TABLET ORAL at 08:18

## 2021-07-25 RX ADMIN — OXYCODONE HYDROCHLORIDE 20 MG: 5 TABLET ORAL at 08:18

## 2021-07-25 RX ADMIN — FAMOTIDINE 20 MG: 20 TABLET ORAL at 08:18

## 2021-07-25 RX ADMIN — UMECLIDINIUM BROMIDE AND VILANTEROL TRIFENATATE 1 PUFF: 62.5; 25 POWDER RESPIRATORY (INHALATION) at 08:04

## 2021-07-25 RX ADMIN — MIRABEGRON 25 MG: 25 TABLET, FILM COATED, EXTENDED RELEASE ORAL at 08:18

## 2021-07-25 RX ADMIN — OXYCODONE HYDROCHLORIDE 20 MG: 5 TABLET ORAL at 21:39

## 2021-07-25 ASSESSMENT — ACTIVITIES OF DAILY LIVING (ADL)
ADLS_ACUITY_SCORE: 15

## 2021-07-25 NOTE — PLAN OF CARE
"Variances- acute and chronic pain. Pain level extremely high this morning with activity 10+ per patient. Lives alone and at this time needs assistance with all ADLs out of bed. DELFINA drains x2- plan to remove drain 2 as met criteria for removal.    Vitals stable. Room air.   Oriented, mild forgetful.  Frustrated and anxious this morning with pain level high. Oxycodone 20mg provided and morning meds. CMS at baseline. Left leg weaker than right leg. DELFINA drains x2. Dressing clean and dry. Out of bed with 1 assist and needing 1 assist, walker, GB, back brace for safety. Needing cure and reminders for ADLs. Jerky body movements at times with pain high.     Dr. Lloyd recommending to give the ketamine and ativan for pain control. Cont. Pusle ox on. The medications provided. Prior to ketamine and ativan pain 9/10 at rest, 10+ with activity. After medications now resting well, eyes closed, said \"no pain\" now, and able to rest and was able to answer my questions and then fell back to sleep.     At this time- no pain. Resting with eyes closed. Pulse ox on. Will assist with ADLs. Bed alarms for safety. Patient noted lives alone, plans to go to boyfriends post op but he works daily and not home until later in the day 1900 each day per patient. Foods and fluids well. Voiding.  1610 pain controlled, able to get up with 1 assist, walker, GB, legs weak. Drowsy after today's medications, pale in color. 1 drain #2 removed. Alarms on for safety.   "

## 2021-07-25 NOTE — PROGRESS NOTES
Ambulating in PT  Pain control limiting factor  Drains still with bloody drainage  Dressing dry  Continue multimodal pain management  Continue PT

## 2021-07-25 NOTE — PLAN OF CARE
Patient vital signs are at baseline: Yes  Patient able to ambulate as they were prior to admission or with assist devices provided by therapies during their stay:  Yes  Patient MUST void prior to discharge:  Yes  Patient able to tolerate oral intake:  Yes  Pain has adequate pain control using Oral analgesics:  Yes    Up with Ax1 with gait belt, walker, and back brace.  DELFINA x2.  Taking 20mg of Oxy for pain.  Took PO zofran for nausea.   On IV ancef.   IV running TKO.  Pt did not want Volteran cream, Lidocaine patch or scheduled tylenol.  Spine incision dressing is C/D/I.    VSS.

## 2021-07-25 NOTE — PROGRESS NOTES
"[facility]  Hospitalist Progress Note  Nirmal Lloyd MD 07/25/2021    Reason for Stay (Diagnosis): Postop L4-5 fusion         Assessment and Plan:      Summary of Stay: Hina Yap is a 55 year old female admitted on 7/23/2021 following L4-5 lumbar fusion.    Past medical history includes MS that was diagnosed some 20 years ago and is now a progressive type.  She follows with pain clinic also for chronic pain related to the MS for which she has been on narcotics in addition to other medications for decades.  She also has COPD, essential hypertension, gastroesophageal reflux disease and is on various medications for anxiety and depression control as well as for sleep.      Today, when I saw Ms. Yap, she was again working with physical therapy and was frankly \"twitching\" with pain.  She seemed extremely uncomfortable but was devoted this trying to stand.  She indicated to me that she did not sleep well despite being on oxycodone 20 mg every 4 hours.  (She did miss a dose in the late evening yesterday.)  Today we tried the patient on ketamine 10 mg IV (apparently a very small dose for this medication) and Ativan 0.5 mg IV.  (Maine Vidal had recommended the use of ketamine for pain refractory to narcotics and I had spoken briefly with our anesthesiologist about monitoring parameters for this medication.  He did suggest that using Ativan may be helpful to help with the dissociation type symptoms that come with use of ketamine.)  Overall, it seems that the patient did very well with a single dose of ketamine and was able to rest for a little while.  She now seems much better despite the fact that the ketamine should certainly have worn off (with a T1/2 of about 30 minutes, it is generally completely gone within 3-4 hours).    Problem List:   1. POD #2 s/p L4-5 lumbar fusion.  2. MS generalized weakness and chronic pain.  3. Chronic pain associated with chronic narcotic exposure.  4. Essential " "hypertension  5. Anxiety and depression  6. Reported COPD.  No current shortness of breath.    Plan:  1.  Continue multimodal therapy for pain.  We will try to de-escalate narcotic use as able.  2.  Ketamine should be considered for severe pain.  Ativan may be useful also to help the patient rest.  3.  No changes in blood pressure medication and dosing parameters          Interval History (Subjective):      At night.  Relatively irregular pattern of taking oxycodone appears appropriate.  Pain was evidently very severe when she awakened in the middle of the night.    Patient was extremely uncomfortable today.  I committed to her that I would try to work on her pain management problem.  It seems that ketamine has been beneficial.  This certainly appears to have been well-tolerated.                  Physical Exam:      Last Vital Signs:  /70 (BP Location: Right arm)   Pulse 67   Temp 98.6  F (37  C) (Temporal)   Resp 16   Ht 1.702 m (5' 7\")   Wt 51.3 kg (113 lb)   LMP 05/25/2017 (Exact Date)   SpO2 97%   BMI 17.70 kg/m      I/O last 3 completed shifts:  In: 365 [P.O.:360; I.V.:5]  Out: 1455 [Urine:1250; Drains:205]    Constitutional: Awake, alert, cooperative.  At least mildly distressed by pain.     When I returned to the patient after she had received ketamine and Ativan, she was sleeping comfortably.   Respiratory:  No increased work of breathing.   Cardiovascular:  Well-perfused.   Abdomen:    Skin: No rashes, no cyanosis, dry to touch   Neuro: Alert and appropriate to situation.   Extremities: No edema.     Other(s):        All other systems: Negative          Medications:      All current medications were reviewed with changes reflected in problem list.         Data:      All new lab and imaging data was reviewed.   Labs/Imaging:  Results for orders placed or performed during the hospital encounter of 07/23/21 (from the past 24 hour(s))   Basic metabolic panel   Result Value Ref Range    Sodium 139 " 133 - 144 mmol/L    Potassium 3.9 3.4 - 5.3 mmol/L    Chloride 106 94 - 109 mmol/L    Carbon Dioxide (CO2) 31 20 - 32 mmol/L    Anion Gap 2 (L) 3 - 14 mmol/L    Urea Nitrogen 15 7 - 30 mg/dL    Creatinine 0.77 0.52 - 1.04 mg/dL    Calcium 8.5 8.5 - 10.1 mg/dL    Glucose 88 70 - 99 mg/dL    GFR Estimate 87 >60 mL/min/1.73m2   Hemoglobin   Result Value Ref Range    Hemoglobin 11.2 (L) 11.7 - 15.7 g/dL   Care Management / Social Work IP Consult    Narrative    Rosemarie Loo RN     7/25/2021  2:14 PM  Care Management Follow Up    Length of Stay (days): 2    Expected Discharge Date:       Concerns to be Addressed: discharge planning     Patient plan of care discussed at interdisciplinary rounds: Yes    Anticipated Discharge Disposition: Home, Home Care     Anticipated Discharge Services:    Anticipated Discharge DME:      Patient/family educated on Medicare website which has current   facility and service quality ratings:    Education Provided on the Discharge Plan:    Patient/Family in Agreement with the Plan: yes    Referrals Placed by CM/SW: Homecare  Private pay costs discussed: Not applicable    Additional Information:  JESSICA met with pt and significant other, Heber. Pt very sleepy unable   to answer most questions. Most the assessment was completed with   Heber. JESSICA explained currently recommendations by PT for TCU, pt   seems uninterested and Heber states that the plan was for her to   go to his house at discharge. He has everything set up on one   level with an adjustable bed. He does work during the day but is   able to take some time off if need be. Heber is able to provide   transportation at discharge, if discharge ready tomorrow he would   be available in the afternoon or on Tuesday anytime after 10am.   Heber would be ok,if pt needs home care PT, with someone coming   into his home for therapy sessions with Hina. Plan is for pt   to [facility] to [facility] [address] [facility].   Call Heber  with updates [phone].    CM will continue to follow with discharge needs.    Arlen Loo RN, BSN CTS  Care Coordinator  [facility]   [phone]

## 2021-07-25 NOTE — CONSULTS
Care Management Follow Up    Length of Stay (days): 2    Expected Discharge Date:       Concerns to be Addressed: discharge planning     Patient plan of care discussed at interdisciplinary rounds: Yes    Anticipated Discharge Disposition: Home, Home Care     Anticipated Discharge Services:    Anticipated Discharge DME:      Patient/family educated on Medicare website which has current facility and service quality ratings:    Education Provided on the Discharge Plan:    Patient/Family in Agreement with the Plan: yes    Referrals Placed by CM/SW: Homecare  Private pay costs discussed: Not applicable    Additional Information:  CM met with pt and significant other, Heber. Pt very sleepy unable to answer most questions. Most the assessment was completed with Heber. CM explained currently recommendations by PT for TCU, pt seems uninterested and Heber states that the plan was for her to go to his house at discharge. He has everything set up on one level with an adjustable bed. He does work during the day but is able to take some time off if need be. Heber is able to provide transportation at discharge, if discharge ready tomorrow he would be available in the afternoon or on Tuesday anytime after 10am. Heber would be ok,if pt needs home care PT, with someone coming into his home for therapy sessions with Hina. Plan is for pt to discharge to [facility] [address] [facility]. Call Heber with updates [phone].    CM will continue to follow with discharge needs.    Arlen Loo RN, BSN CTS  Care Coordinator  [facility]   [phone]

## 2021-07-26 ENCOUNTER — APPOINTMENT (OUTPATIENT)
Dept: OCCUPATIONAL THERAPY | Facility: CLINIC | Age: 56
End: 2021-07-26
Attending: NEUROLOGICAL SURGERY
Payer: MEDICAID

## 2021-07-26 ENCOUNTER — APPOINTMENT (OUTPATIENT)
Dept: PHYSICAL THERAPY | Facility: CLINIC | Age: 56
End: 2021-07-26
Attending: NEUROLOGICAL SURGERY
Payer: MEDICAID

## 2021-07-26 VITALS
BODY MASS INDEX: 17.74 KG/M2 | HEIGHT: 67 IN | DIASTOLIC BLOOD PRESSURE: 63 MMHG | SYSTOLIC BLOOD PRESSURE: 112 MMHG | HEART RATE: 84 BPM | WEIGHT: 113 LBS | TEMPERATURE: 97.5 F | RESPIRATION RATE: 14 BRPM | OXYGEN SATURATION: 96 %

## 2021-07-26 LAB
ATRIAL RATE - MUSE: 68 BPM
DIASTOLIC BLOOD PRESSURE - MUSE: NORMAL MMHG
INTERPRETATION ECG - MUSE: NORMAL
LACTATE SERPL-SCNC: 0.6 MMOL/L (ref 0.7–2)
P AXIS - MUSE: 70 DEGREES
PR INTERVAL - MUSE: 142 MS
QRS DURATION - MUSE: 102 MS
QT - MUSE: 424 MS
QTC - MUSE: 450 MS
R AXIS - MUSE: 79 DEGREES
SYSTOLIC BLOOD PRESSURE - MUSE: NORMAL MMHG
T AXIS - MUSE: 64 DEGREES
VENTRICULAR RATE- MUSE: 68 BPM

## 2021-07-26 PROCEDURE — 999N000157 HC STATISTIC RCP TIME EA 10 MIN

## 2021-07-26 PROCEDURE — 97535 SELF CARE MNGMENT TRAINING: CPT | Mod: GO | Performed by: OCCUPATIONAL THERAPIST

## 2021-07-26 PROCEDURE — 250N000013 HC RX MED GY IP 250 OP 250 PS 637: Performed by: NEUROLOGICAL SURGERY

## 2021-07-26 PROCEDURE — 94640 AIRWAY INHALATION TREATMENT: CPT

## 2021-07-26 PROCEDURE — 250N000011 HC RX IP 250 OP 636: Performed by: NURSE PRACTITIONER

## 2021-07-26 PROCEDURE — 97116 GAIT TRAINING THERAPY: CPT | Mod: GP | Performed by: PHYSICAL THERAPIST

## 2021-07-26 PROCEDURE — 250N000013 HC RX MED GY IP 250 OP 250 PS 637: Performed by: INTERNAL MEDICINE

## 2021-07-26 PROCEDURE — 250N000013 HC RX MED GY IP 250 OP 250 PS 637: Performed by: NURSE PRACTITIONER

## 2021-07-26 PROCEDURE — 97530 THERAPEUTIC ACTIVITIES: CPT | Mod: GP | Performed by: PHYSICAL THERAPIST

## 2021-07-26 PROCEDURE — 99232 SBSQ HOSP IP/OBS MODERATE 35: CPT | Performed by: NURSE PRACTITIONER

## 2021-07-26 PROCEDURE — 83605 ASSAY OF LACTIC ACID: CPT | Performed by: INTERNAL MEDICINE

## 2021-07-26 PROCEDURE — 36415 COLL VENOUS BLD VENIPUNCTURE: CPT | Performed by: INTERNAL MEDICINE

## 2021-07-26 PROCEDURE — 99232 SBSQ HOSP IP/OBS MODERATE 35: CPT | Performed by: INTERNAL MEDICINE

## 2021-07-26 RX ORDER — OXYCODONE HYDROCHLORIDE 5 MG/1
15-20 TABLET ORAL EVERY 6 HOURS PRN
Qty: 30 TABLET | Refills: 0 | Status: SHIPPED | OUTPATIENT
Start: 2021-07-26 | End: 2022-09-17

## 2021-07-26 RX ORDER — TIZANIDINE 2 MG/1
1 TABLET ORAL AT BEDTIME
Status: DISCONTINUED | OUTPATIENT
Start: 2021-07-26 | End: 2021-07-26 | Stop reason: HOSPADM

## 2021-07-26 RX ORDER — ALPRAZOLAM 0.5 MG
0.5 TABLET ORAL 2 TIMES DAILY
Status: DISCONTINUED | OUTPATIENT
Start: 2021-07-26 | End: 2021-07-26 | Stop reason: HOSPADM

## 2021-07-26 RX ORDER — LORAZEPAM 2 MG/ML
0.25 INJECTION INTRAMUSCULAR EVERY 4 HOURS PRN
Status: DISCONTINUED | OUTPATIENT
Start: 2021-07-26 | End: 2021-07-26 | Stop reason: HOSPADM

## 2021-07-26 RX ADMIN — MIRABEGRON 25 MG: 25 TABLET, FILM COATED, EXTENDED RELEASE ORAL at 08:22

## 2021-07-26 RX ADMIN — FAMOTIDINE 20 MG: 20 TABLET ORAL at 08:22

## 2021-07-26 RX ADMIN — DEXTROAMPHETAMINE SACCHARATE, AMPHETAMINE ASPARTATE MONOHYDRATE, DEXTROAMPHETAMINE SULFATE, AND AMPHETAMINE SULFATE 30 MG: 7.5; 7.5; 7.5; 7.5 CAPSULE, EXTENDED RELEASE ORAL at 08:23

## 2021-07-26 RX ADMIN — BACLOFEN 20 MG: 10 TABLET ORAL at 08:22

## 2021-07-26 RX ADMIN — CEFAZOLIN 1 G: 1 INJECTION, POWDER, FOR SOLUTION INTRAMUSCULAR; INTRAVENOUS at 04:49

## 2021-07-26 RX ADMIN — POLYETHYLENE GLYCOL 3350 17 G: 17 POWDER, FOR SOLUTION ORAL at 08:22

## 2021-07-26 RX ADMIN — DICLOFENAC SODIUM 4 G: 10 GEL TOPICAL at 14:49

## 2021-07-26 RX ADMIN — OXYCODONE HYDROCHLORIDE 20 MG: 5 TABLET ORAL at 16:59

## 2021-07-26 RX ADMIN — PANTOPRAZOLE SODIUM 40 MG: 40 TABLET, DELAYED RELEASE ORAL at 08:22

## 2021-07-26 RX ADMIN — ROPINIROLE HYDROCHLORIDE 2 MG: 2 TABLET, FILM COATED ORAL at 08:23

## 2021-07-26 RX ADMIN — CHLORHEXIDINE GLUCONATE 15 ML: 1.2 SOLUTION ORAL at 08:22

## 2021-07-26 RX ADMIN — UMECLIDINIUM BROMIDE AND VILANTEROL TRIFENATATE 1 PUFF: 62.5; 25 POWDER RESPIRATORY (INHALATION) at 07:47

## 2021-07-26 RX ADMIN — OXYCODONE HYDROCHLORIDE 20 MG: 5 TABLET ORAL at 08:22

## 2021-07-26 RX ADMIN — DICLOFENAC SODIUM 4 G: 10 GEL TOPICAL at 08:28

## 2021-07-26 RX ADMIN — DOCUSATE SODIUM AND SENNOSIDES 2 TABLET: 8.6; 5 TABLET, FILM COATED ORAL at 08:23

## 2021-07-26 RX ADMIN — ACETAMINOPHEN 975 MG: 325 TABLET, FILM COATED ORAL at 08:22

## 2021-07-26 RX ADMIN — ALPRAZOLAM 0.5 MG: 0.25 TABLET ORAL at 08:22

## 2021-07-26 RX ADMIN — ACETAMINOPHEN 975 MG: 325 TABLET, FILM COATED ORAL at 17:00

## 2021-07-26 RX ADMIN — BACLOFEN 20 MG: 10 TABLET ORAL at 12:26

## 2021-07-26 RX ADMIN — BACLOFEN 20 MG: 10 TABLET ORAL at 17:00

## 2021-07-26 RX ADMIN — OXYCODONE HYDROCHLORIDE 20 MG: 5 TABLET ORAL at 12:26

## 2021-07-26 RX ADMIN — PREGABALIN 75 MG: 75 CAPSULE ORAL at 08:22

## 2021-07-26 ASSESSMENT — ACTIVITIES OF DAILY LIVING (ADL)
ADLS_ACUITY_SCORE: 14
ADLS_ACUITY_SCORE: 19
ADLS_ACUITY_SCORE: 14
ADLS_ACUITY_SCORE: 19
ADLS_ACUITY_SCORE: 14

## 2021-07-26 NOTE — TELEPHONE ENCOUNTER
Called pt. No answer, LVM to call back on my personal extension.    If pt calls back:  Ask if she has scheduled with:     1) Urology [phone]   2) Pulmonology [phone]   3) Rheum? - Status of this? [phone]    Mental Health   4) Psych [phone]   5) Therapy [phone] - or could go outside of [place] ([facility] as bridging option?)     Reji Rahman, EMT at 1:18 PM on July 26, 2021   [facility]   [phone]

## 2021-07-26 NOTE — PLAN OF CARE
Pt is A/Ox4- was lethargic overnight woke up asking where she was. VS monitored. Rating pain 6-7/10- PO Oxycodone given with some relief. LS clear. Dressing CDI. CMS- baseline neuropathy. Left leg weaker than right leg. 1 DELFINA drain patent. Voiding. Tolerating regular det. Up with Ax2 walker, GB & brace- pt needing frequent reminders to keep her eyes open. Plans to discharge to boyfriends home possibly today.

## 2021-07-26 NOTE — PROGRESS NOTES
"[facility]    [facility] Orthopedics/Neurosurgery Progress Note    Date of Service (when I saw the patient): 07/26/2021     Assessment & Plan   Hina Yap is a 55 year old female who was admitted on 7/23/2021 and underwent L4-5 posterior lumbar fusion with Dr. Asif Flores.  She has underlying chronic pain and is managed at [facility] and is on Percocet 10mg QID prn.  She has been consulted by pain management for pain control post op.  Today the patient states her LLE pain is improved.  She states her pain is mainly low back/incision pain.  She expressed concerns with movements, and not wanting to \"ruin\" anything from surgery.  She has MS with baseline generalized weakness and chronic pain.  She did have Ketamine yesterday for pain.  Per RN patient appears today the best she has been.  She has worked with PT/OT and feels she can discharge to home with her BF, patient expressed she is not interested in TCU.  She is going to discharge with her walker and we discussed transitioning off if and when she feels stable.  She will discharge on Oxycodone prn and continue pre-surgery pain medications, holding on her Percocet at home.  We discussed that she would be expected to have some post surgical pain, and would continue to control pain with current pain medication regimen.  She appears comfortable, sitting on edge of the bed, moving her BLE freely and independently. She was advised to contact her pain clinic to f/u in the next 2-3 days if having difficulty with pain control.  She is agreeable.    Active Problems:    S/P lumbar fusion    Assessment: S/p L4-5 fusion    Plan: Discharge to home with boyfriend later today      I have discussed the following assessment and plan with Dr. Flores who is in agreement with initial plan and will follow up with further consultation recommendations.    Ariana Bell [facility]   [phone]    Interval History   POD 3; stable    Physical " "Exam   Temp: 97  F (36.1  C) Temp src: Axillary BP: 110/62 Pulse: 81   Resp: 14 SpO2: 96 % O2 Device: None (Room air)    Vitals:    07/23/21 0823   Weight: 51.3 kg (113 lb)     Vital Signs with Ranges  Temp:  [96.5  F (35.8  C)-98.8  F (37.1  C)] 97  F (36.1  C)  Pulse:  [77-92] 81  Resp:  [10-18] 14  BP: (103-120)/(53-77) 110/62  SpO2:  [91 %-96 %] 96 %  I/O last 3 completed shifts:  In: 1120 [P.O.:1120]  Out: 1243 [Urine:1150; Drains:93]     , Blood pressure 110/62, pulse 81, temperature 97  F (36.1  C), resp. rate 14, height 1.702 m (5' 7\"), weight 51.3 kg (113 lb), last menstrual period 05/25/2017, SpO2 96 %.  113 lbs 0 oz  HEENT:  Normocephalic, atraumatic.  EOM s intact.    Heart:  No peripheral edema  Lungs:  No SOB  Skin:  Warm and dry, good capillary refill. Brace on; incision with dressing underneath  Extremities:  Good radial and dorsalis pedis pulses bilaterally, no edema, cyanosis or clubbing.    NEUROLOGICAL EXAMINATION:   Mental status:  Awake and alert; speech is fluent..   Motor:  Strength is 5/5 throughout the upper and lower extremities  Hip Flexor:                Right: 5/5  Left:  5/5  Hip Adductor:             Right:  5/5  Left:  5/5  Hip Abductor:             Right:  5/5  Left:  5/5  Gastroc Soleus:        Right:  5/5  Left:  5/5  Tib/Ant:                      Right:  5/5  Left:  5/5  EHL:                     Right:  5/5  Left:  5/5  Gait:  Deferred' sitting up on edge of bed; getting ready to ambulate with walker and assist.    Medications       acetaminophen  975 mg Oral Q8H     ALPRAZolam  0.5 mg Oral BID     amLODIPine  2.5 mg Oral Daily     amphetamine-dextroamphetamine  30 mg Oral Daily     amphetamine-dextroamphetamine  10 mg Oral Daily     baclofen  20 mg Oral 4x Daily     chlorhexidine  15 mL Swish & Spit BID     diclofenac  4 g Topical TID     famotidine  20 mg Oral BID    Or     famotidine  20 mg Intravenous BID     lidocaine  1 patch Transdermal Q24h    And     lidocaine   " Transdermal Q8H     mirabegron  25 mg Oral Daily     mirtazapine  30 mg Oral At Bedtime     OXcarbazepine  300 mg Oral At Bedtime     pantoprazole  40 mg Oral QAM AC     polyethylene glycol  17 g Oral Daily     pregabalin  75 mg Oral BID     rOPINIRole  2 mg Oral QAM     senna-docusate  1 tablet Oral BID    Or     senna-docusate  2 tablet Oral BID     sodium chloride (PF)  3 mL Intracatheter Q8H     tiZANidine  1 mg Oral At Bedtime     umeclidinium-vilanterol  1 puff Inhalation Daily       Data   CBC RESULTS:   Recent Labs   Lab Test 07/25/21  0725 10/29/20  1341   WBC  --  6.0   RBC  --  4.45   HGB 11.2* 13.1   HCT  --  40.0   MCV  --  90   MCH  --  29.4   MCHC  --  32.8   RDW  --  13.4   PLT  --  284     Basic Metabolic Panel:  Lab Results   Component Value Date     07/25/2021     07/01/2021      Lab Results   Component Value Date    POTASSIUM 3.9 07/25/2021    POTASSIUM 4.1 07/01/2021     Lab Results   Component Value Date    CHLORIDE 106 07/25/2021    CHLORIDE 109 07/01/2021     Lab Results   Component Value Date    MARIO 8.5 07/25/2021    MARIO 8.9 07/01/2021     Lab Results   Component Value Date    CO2 31 07/25/2021    CO2 28 07/01/2021     Lab Results   Component Value Date    BUN 15 07/25/2021    BUN 21 07/01/2021     Lab Results   Component Value Date    CR 0.77 07/25/2021    CR 0.86 07/01/2021     Lab Results   Component Value Date    GLC 88 07/25/2021    GLC 77 07/01/2021     INR:  Lab Results   Component Value Date    INR 0.95 08/25/2018

## 2021-07-26 NOTE — PROGRESS NOTES
[facility]  Pain Management Progress Note  Text Page     Assessment & Plan   Hina Yap is a 55 year old female who was admitted on 7/23/2021. I was asked by  Ariana Noriega PA-C to see the patient for post operative pain management.     PLAN:   1)  Opioids:  15-20 mg oxycodone followed by every 3 hrs prn .    Ketamine 10 mg IV every 2 hrs prn, discontinue if pain remains well controlled     Opioids Treatment Goal:   -Improvement in function  -Participate in PT  -Post-operative pain management, expected 3-7 days then back on chronic dose     2)Non-opioid multimodal medication therapy  -Topical:Voltaren 1%  4 grams Topical Gel tid , Lidocaine Patch 4% cut 1/2 patch to both sides of paraspinal   -N-SAIDS:Avoid due to surgical status   -Muscle Relaxants:continue Baclofen as chronic 20 mg qid, Tizanidine 1 mg at hs  -Adjuvants:Acetaminophen 975 mg every 8 hrs, Pregabalin  75 mg bid,   -Antidepresants/anxiolytics:Alprazolam 0.5 mg bid, Mirtazepine 30 mg at hs as chronic      3)  Non-medication interventions  Positioning, ICE, Relaxation, Physical therapy      4)  Constipation Prophylaxis    Continue to Monitor, Bowel Meds PRN per Constipation Order Set, Senna-S 1 or 2 tabs bid , Polyethylene Glycol  Every day. Bowel management       5) Medication Risk reduction strategies   -monitor for sedation   -Capnography per protocol or Pulse ox prn   -narcan for opioid reversal      6)  Pain Education  -Opioid safe use, storage and disposal information included in DC AVS     7)  DC Planning   Discussed goal of Opioid therapy as above with patient and significant other   Recommend short supply of opioids only ( 3-5  days) per CDC guidelines for acute pain management.  Continued outpatient management of pain per [place] pain clinic and neurosurgery   Disposition: home   Support systems:  Significant other   Outpatient Referrals: none per pain team   The following risk factors have been identified  for unintentional overdose: patient is on multiple sedating medications , patient is taking a high amount of opioids in 24 hour period, patient has anxiety, depression or PTSD and patient has been switched to a new opioid medication. Discharge with intra-nasal naloxone if discharged to home with opioids  >40 mg MME/day.  Plan for education prior to discharge.     ASSESSMENT  1)  Acute on chronic radicular back  Pain s/p L4-5 transforaminal lumbar interbody fusion with reduction of grade 1/2  POD 0 EBL 30 ml      2)  Patient with chronic  pain, on chronic opioid therapy managed by [facility]   Baseline 60 mg Daily Morphine Equivalent as dispensed.  Patient has a high opioid tolerance.      Patient's opioid use in past 24 hours:  Oxycodone 60 mg.Ketamine 10 mg = 90 mg Daily Morphine Equivalent     3)  Risk factors for opioid related harms  -Concurrent benzodiazepine use  -High opioid dose (>50 MME/day)  -Anxiety/depression resume Xanax to avoid benzo w/drawl     4)  Opioid induced side-effects:  -Constipation  Yes by hx,no BM since admission   -Nausea/Vomit no   -Sedation no   -Urinary Retention yes at times      5)  Other/Related:    -Depression/anxiety  -Deconditioning   -Multiple sclerosis     Maine Uribe RN, PGMT-BC, APRN, CNP, ACHPN  Pain Management and Palliative Care  [facility]  Pgr: [phone]    Time Spent on this Encounter   Total unit/floor time 25 minutes, time consisted of the following, examination of the patient, reviewing the record and completing documentation. >50% of time spent in counseling and coordination of care.  Time spend counseling with patient consisted of the following topics, symptom management.  Time spent in coordination of care with Bedside Nurse Marina Morocho RN.       Interval History    focal sever low back pain no leg pain difficult to assess power /strength  Relevant notes, labs and imaging reviewed  Awake and alert    OOB to chair, turning in bed with assist.   Review of Systems    CONSTITUTIONAL: NEGATIVE for fever, chills, change in weight  ENT/MOUTH: NEGATIVE for ear, mouth and throat problems  RESP: NEGATIVE for significant cough or SOB  CV: NEGATIVE for chest pain, palpitations or peripheral edema    Physical Exam   Temp:  [96.5  F (35.8  C)-98.8  F (37.1  C)] 96.5  F (35.8  C)  Pulse:  [77-92] 92  Resp:  [10-18] 16  BP: (100-116)/(53-77) 106/76  SpO2:  [91 %-96 %] 95 %  113 lbs 0 oz  GEN:  Alert, oriented x 3,  Voice hypophonic, appears comfortable, NAD.  HEENT:  Normocephalic/atraumatic, no scleral icterus, no nasal discharge, mouth moist.  CV:  RRR, S1, S2; no murmurs or other irregularities noted.  +3 DP/PT pulses bilatererally; no edema BLE.  RESP:  Clear to auscultation bilaterally without rales/rhonchi/wheezing/retractions.  Symmetric chest rise on inhalation noted.  Normal respiratory effort.  ABD:  Rounded, soft, non-tender/non-distended.  +BS  EXT:  Edema & pulses as noted above.  CMS intact x 4.     M/S:   Tender to palpation low back .    SKIN:  Dry to touch, no exanthems noted in the visualized areas.    NEURO: Sensation to touch intact all extremities.   There is no area of allodynia or hyperesthesia.  PAIN BEHAVIOR: Cooperative  Psych: less anxious affect.  Calm, cooperative, conversant appropriately.    Medications       acetaminophen  975 mg Oral Q8H     ALPRAZolam  0.5 mg Oral BID     amLODIPine  2.5 mg Oral Daily     amphetamine-dextroamphetamine  30 mg Oral Daily     amphetamine-dextroamphetamine  10 mg Oral Daily     baclofen  20 mg Oral 4x Daily     ceFAZolin  1 g Intravenous Q8H     chlorhexidine  15 mL Swish & Spit BID     diclofenac  4 g Topical TID     famotidine  20 mg Oral BID    Or     famotidine  20 mg Intravenous BID     lidocaine  1 patch Transdermal Q24h    And     lidocaine   Transdermal Q8H     mirabegron  25 mg Oral Daily     mirtazapine  30 mg Oral At Bedtime     OXcarbazepine  300 mg  Oral At Bedtime     pantoprazole  40 mg Oral QAM AC     polyethylene glycol  17 g Oral Daily     pregabalin  75 mg Oral BID     rOPINIRole  2 mg Oral QAM     senna-docusate  1 tablet Oral BID    Or     senna-docusate  2 tablet Oral BID     sodium chloride (PF)  3 mL Intracatheter Q8H     tiZANidine  1 mg Oral At Bedtime     umeclidinium-vilanterol  1 puff Inhalation Daily       Data   Results for orders placed or performed during the hospital encounter of 07/23/21 (from the past 24 hour(s))   Care Management / Social Work IP Consult    Narrative    Rosemarie Loo RN     7/25/2021  2:14 PM  Care Management Follow Up    Length of Stay (days): 2    Expected Discharge Date:       Concerns to be Addressed: discharge planning     Patient plan of care discussed at interdisciplinary rounds: Yes    Anticipated Discharge Disposition: Home, Home Care     Anticipated Discharge Services:    Anticipated Discharge DME:      Patient/family educated on Medicare website which has current   facility and service quality ratings:    Education Provided on the Discharge Plan:    Patient/Family in Agreement with the Plan: yes    Referrals Placed by CM/SW: Homecare  Private pay costs discussed: Not applicable    Additional Information:  CM met with pt and significant other, Heber. Pt very sleepy unable   to answer most questions. Most the assessment was completed with   Heber. JESSICA explained currently recommendations by PT for TCU, pt   seems uninterested and Heber states that the plan was for her to   go to his house at discharge. He has everything set up on one   level with an adjustable bed. He does work during the day but is   able to take some time off if need be. Heber is able to provide   transportation at discharge, if discharge ready tomorrow he would   be available in the afternoon or on Tuesday anytime after 10am.   Heber would be ok,if pt needs home care PT, with someone coming   into his home for therapy sessions with Hina.  Plan is for pt   to [facility] to [facility] [address] [facility].   Call Heber with updates [phone].    CM will continue to follow with discharge needs.    Arlen Loo RN, BSN CTS  Care Coordinator  [facility]   [phone]

## 2021-07-26 NOTE — PLAN OF CARE
"Variances- Pain 10/10 with movement but able to rest well between cares. Drowsy/light sedation when out bed with activity. For safety needing cues and reminders for assistance. 1-2, gb, walker, Brace for assistance with mobility and ambulation.   Oriented. Mild forgetful. Falling asleep easily today between cares. Pain at rest \"medium pain but when I move the pain is terrible.\" jerky and twitchy movements with activity.   DELFINA removed. Dressing clean and dry. No edema. CMS at baseline. Bilateral legs weak left leg weaker than right.   Lungs clear.   Cramping abd this morning. Hypoactive bowel sounds, no BM yet- medications given to assist with BM.   Plan for safe discharge plan. Patient lives alone and has a SO. SO works every day and states he would need to arrange time off if she needs him home to help assist with all ADLs. At this time patient needs hands on assistance and cues and reminders. TCU versus home with assist for safe plan.   Bed alarm on for safety.     1409 order for discharge home with SO. I talked with Heber [phone]- he notes he will be able to help Hina at home- hands on- he can pick her up today around 1900. He works 3 hours on Tuesday, off on Wednesday from work,and notes he will make arrangements to get more time off to help assist with care at home. Patient is in agreement for discharge to home today with heber. Pain appears to be in better control at this time. Poor lunch appetite. Tearful at this time. We talked a lot thru the plan of care. Bed alarm on now.   "

## 2021-07-26 NOTE — PROGRESS NOTES
"[facility]  Hospitalist consult progress Note  Baldo Wolf MD 07/26/21    Reason for Stay (Diagnosis): lumbar spinal surgery         Assessment and Plan:      Summary of Stay: Hina Yap is a 55 year old female with past medical history including MS diagnosed about 20 years ago, chronic pain related to this for which she is on narcotics chronically, COPD, hypertension, GERD, anxiety and depression as well as sleep disturbance admitted on 7/23/2021 following L4-5 lumbar fusion.  The primary issue postoperatively has been pain control.  She has required significant doses of narcotics in addition to a multimodal pain regimen and even the dose of ketamine.  Pain and palliative care is following as well.       Problem List:   1. POD #3 s/p L4-5 lumbar fusion: Continue to work on pain management.  Initiate pain/palliative care involvement.  Encouraging activity.    2. MS generalized weakness and chronic pain    3. Chronic pain associated with chronic narcotic exposure and resultant tolerance.    4. Essential hypertension    5. Anxiety and depression    6.  Low bmi, likely chronic protein calorie malnutrition in the context of chronic disease            Interval History (Subjective):      I assumed care today, patient tells me she feels a little bit better than yesterday  Still having difficulty with mobility due to pain  No lightheadedness or cardiopulmonary complaints                  Physical Exam:      Last Vital Signs:  /62   Pulse 81   Temp 97  F (36.1  C)   Resp 14   Ht 1.702 m (5' 7\")   Wt 51.3 kg (113 lb)   LMP 05/25/2017 (Exact Date)   SpO2 96%   BMI 17.70 kg/m      I/O last 3 completed shifts:  In: 1120 [P.O.:1120]  Out: 1243 [Urine:1150; Drains:93]    General: Alert, awake, somnolent but easily arousable  HEENT: NC/AT, eyes anicteric, external occular movements intact, face symmetric.    Cardiac: RRR, S1, S2.  No murmurs appreciated.  Pulmonary: Normal chest " rise, normal work of breathing.  Lungs CTA BL  Abdomen: soft, non-tender, non-distended.  Bowel Sounds Present.   Extremities: no deformities.  Warm, well perfused.  Skin: no rashes or lesions noted.  Warm and Dry.  Neuro: Speech easily discernible though she is quite somnolent.  Psych: Appropriate affect.         Medications:      All current medications were reviewed with changes reflected in problem list.         Data:      All new lab and imaging data was reviewed.   Labs:  Recent Labs   Lab 07/25/21  0725      POTASSIUM 3.9   CHLORIDE 106   CO2 31   ANIONGAP 2*   GLC 88   BUN 15   CR 0.77   GFRESTIMATED 87   MARIO 8.5     Recent Labs   Lab 07/25/21  0725   HGB 11.2*      Imaging:   Results for orders placed or performed during the hospital encounter of 07/23/21   XR Surgery LATA L/T 5 Min Fluoro w Stills    Narrative    This exam was marked as non-reportable because it will not be read by a   radiologist or a [place] non-radiologist provider.         XR Lumbar Spine Port 1 View    Narrative    This exam was marked as non-reportable because it will not be read by a   radiologist or a [place] non-radiologist provider.         XR Lumbar Spine Port 1 View    Narrative    This exam was marked as non-reportable because it will not be read by a   radiologist or a [place] non-radiologist provider.                 Baldo Wolf MD , MD.

## 2021-07-26 NOTE — PLAN OF CARE
Physical Therapy Discharge Summary    Reason for therapy discharge:    Discharge to her boyfriend's home    Progress towards therapy goal(s). See goals on Care Plan in [facility] electronic health record for goal details.  Goals partially met.  Barriers to achieving goals:   discharge from facility.  Pt issued FWW  Therapy recommendation(s):    Continue home exercise program.  Pt demonstrates improved mobility, alertness and activity tolerance this afternoon and is highly motivated to return to her boyfriend's home.  Pt will need 24/7 support and CGA/Min A with all mobility for the first week or more.  Pt would also benefit from continued skilled PT in the home setting to increase LE and core strength and progress independence with all functional mobility.  Home PT as leaving the home would cause increased pain and hardship.

## 2021-07-27 ENCOUNTER — PATIENT OUTREACH (OUTPATIENT)
Dept: PEDIATRICS | Facility: CLINIC | Age: 56
End: 2021-07-27

## 2021-07-27 NOTE — PLAN OF CARE
"Occupational Therapy Discharge Summary    Reason for therapy discharge:    Discharged to boyfriend's home    Progress towards therapy goal(s). See goals on Care Plan in [facility] electronic health record for goal details.  Goals met for return home to boyfriend's home    Therapy recommendation(s):    Per treating OT \"Pt. demonstrates min A for transfers, setup for ADLs, AE for ADLs, and DME for functional transfers.  Pt. independent at baseline and plans to discharge to [facility] which has stairs.\"    **This patient was not seen by writing OT, information for discharge summary taken from treating OT's notes.**    "

## 2021-07-27 NOTE — PLAN OF CARE
Pt discharged to home, discharge instructions reviewed with pt and significant other.Medications reviewed and signed for by pt, uplifted same in sealed envelope.  All personal belongings were packed  and taken by pt and significant other.  Left unit via wheelchair accompanied by NA and family.

## 2021-07-27 NOTE — TELEPHONE ENCOUNTER
ED / Discharge Outreach Protocol    Patient Contact    Attempt # 1    Was call answered?  No.  Left message on voicemail with information to call me back.    Amna Yarbrough, RN   [facility] -- Triage Nurse

## 2021-07-28 NOTE — DISCHARGE SUMMARY
[facility] Discharge Summary    Hina Yap MRN# [id number]   Age: 55 year old YOB: 1965     Date of Admission:  7/23/2021  Date of Discharge::  7/26/2021  7:41 PM   Admitting Physician:  Asif Flores MD  Discharge Physician:  Asif Flores MD    Home clinic: [facility]          Admission Diagnoses:   Transient paralysis of limb [R29.818]  Spondylolisthesis of lumbar region [M43.16]  Spinal stenosis, lumbar region, without neurogenic claudication [M48.061]  S/P lumbar fusion [Z98.1]          Discharge Diagnosis:     Patient Active Problem List   Diagnosis     MS (multiple sclerosis) (H)     Ischemic cardiomyopathy     Esophageal reflux     Anxiety     Restless leg syndrome     Moderate episode of recurrent major depressive disorder (H)     Other chronic pain     CKD (chronic kidney disease) stage 2, GFR 60-89 ml/min     Insomnia, unspecified type     Prolonged Q-T interval on ECG     Essential hypertension     PVC's (premature ventricular contractions)     Nonrheumatic mitral valve regurgitation     S/P lumbar fusion             Procedures:   Procedure(s):  L4-5 transforaminal lumbar interbody fusion with reduction of grade 1/2 unstable spondylolisthesis   Open reduction and internal fixation of the grade L4-5 spondylolisthesis L4 - L5 posterior lateral fusion          Medications Prior to Admission:     No medications prior to admission.             Discharge Medications:     Discharge Medication List as of 7/26/2021  7:21 PM      CONTINUE these medications which have CHANGED    Details   oxyCODONE (ROXICODONE) 5 MG tablet Take 3-4 tablets (15-20 mg) by mouth every 6 hours as needed for pain, Disp-30 tablet, R-0, Local Print         CONTINUE these medications which have NOT CHANGED    Details   albuterol (PROAIR HFA/PROVENTIL HFA/VENTOLIN HFA) 108 (90 Base) MCG/ACT inhaler Inhale 2 puffs into the lungs every 4 hours as needed for shortness of breath /  dyspnea or wheezing, Disp-18 g, R-3, E-PrescribePharmacy may dispense brand covered by insurance (Proair, or proventil or ventolin or generic albuterol inhaler)      ALPRAZolam (XANAX) 0.5 MG tablet Take 1 tablet (0.5 mg) by mouth 2 times daily, Disp-60 tablet, R-0, E-Prescribe      amLODIPine (NORVASC) 2.5 MG tablet Take 1 tablet (2.5 mg) by mouth daily, Disp-30 tablet, R-11, E-Prescribe      amphetamine-dextroamphetamine (ADDERALL XR) 30 MG 24 hr capsule Take 1 capsule (30 mg) by mouth daily, Disp-30 capsule, R-0, E-Prescribe      amphetamine-dextroamphetamine (ADDERALL) 10 MG tablet Take 1 tablet (10 mg) by mouth daily, Disp-30 tablet, R-0, E-Prescribe      AVONEX PEN 30 MCG/0.5ML auto-injector kit Inject 30 mcg into the muscle every 7 days , JALEN, Historical      baclofen (LIORESAL) 20 MG tablet Take 20 mg by mouth 4 times daily , Historical      esomeprazole (NEXIUM) 40 MG DR capsule Take 1 capsule (40 mg) by mouth every morning (before breakfast) Take 30-60 minutes before eating., Disp-90 capsule, R-3, E-Prescribe      ipratropium - albuterol 0.5 mg/2.5 mg/3 mL (DUONEB) 0.5-2.5 (3) MG/3ML neb solution Take 1 vial (3 mLs) by nebulization every 6 hours as needed for shortness of breath / dyspnea or wheezing, Disp-30 mL, R-3, E-Prescribe      mirabegron (MYRBETRIQ) 25 MG 24 hr tablet Take 1 tablet (25 mg) by mouth daily, Disp-90 tablet, R-3, E-Prescribe      mirtazapine (REMERON) 15 MG tablet Take 2 tablets (30 mg) by mouth At Bedtime, Disp-4 tablet, R-0, Historical      nicotine polacrilex (NICORETTE) 4 MG gum Place 1 each (4 mg) inside cheek as needed for smoking cessation, Disp-100 each, R-3, E-Prescribe      OXcarbazepine (TRILEPTAL) 150 MG tablet TAKE 2 TABLETS BY MOUTH EVERY NIGHT AT BEDTIME, Historical      pregabalin (LYRICA) 75 MG capsule Take 1 capsule (75 mg) by mouth 2 times daily, Disp-180 capsule, R-3, Historical      promethazine (PHENERGAN) 25 MG tablet Take 25 mg by mouth every 6 hours as needed  for nausea, Historical      rOPINIRole (REQUIP) 2 MG tablet Take 2 mg by mouth every morning Patient is taking 1 tab in the morning and 2 tab at night, Historical      tiotropium-olodaterol 2.5-2.5 MCG/ACT AERS Inhale 2 puffs into the lungs daily, Disp-4 g, R-3, E-Prescribe      tiZANidine (ZANAFLEX) 2 MG tablet Take 1-2 mg by mouth At Bedtime, Historical         STOP taking these medications       oxyCODONE-acetaminophen (PERCOCET)  MG per tablet Comments:   Reason for Stopping:         predniSONE (DELTASONE) 20 MG tablet Comments:   Reason for Stopping:                     Consultations:   PT  OT  Hospitalist           Brief History of Illness:    Hina Yap is a 55 year old female that is 4 Days Post-Op s/p Procedure(s):  L4-5 transforaminal lumbar interbody fusion with reduction of grade 1/2 unstable spondylolisthesis   Open reduction and internal fixation of the grade L4-5 spondylolisthesis L4 - L5 posterior lateral fusion.          Hospital Course:   The patient did well post op and was able to work with PT and OT. The patient's pain was controlled and ambulation was stable. She had issues with chronic pain management, but, was able to tolerate her post op pain with IV and oral meds. She was stable for discharge home.          Discharge Instructions and Follow-Up:     Discharge diet: Regular normal diet   Discharge activity: Lifting restricted to 10 pounds until follow up  No lifting or strenuous exercise for 6 week(s)  No heavy lifting, pushing, pulling for 6 week(s)  Do not submerge your incision underwater as in hot tub, pool or lake water for 6 weeks. Ok to take showers and bathe in water below your incision.   Discharge follow-up:   Schedule Neurosurgery follow up appointment with either Ariana Bell CNP and Dr. Asif Flores at [facility] by calling the Spine Line at [phone] or [phone] for [facility] general number in 4-6 weeks.    If sutures or staples were placed,  please schedule an appointment for wound check and staple/suture removal in 10-14 days by calling the Spine Line at [phone] or by calling the general [facility] line at [phone].            Discharge Disposition:     Discharged to home      Attestation:  I have reviewed today's vital signs, notes, medications, labs and imaging.  Amount of time performed on this discharge summary: 10 minutes.    Asif Flores MD

## 2021-07-30 ENCOUNTER — HOSPITAL ENCOUNTER (EMERGENCY)
Facility: CLINIC | Age: 56
Discharge: HOME | End: 2021-07-30
Attending: EMERGENCY MEDICINE | Admitting: EMERGENCY MEDICINE
Payer: MEDICAID

## 2021-07-30 VITALS
SYSTOLIC BLOOD PRESSURE: 116 MMHG | TEMPERATURE: 98 F | OXYGEN SATURATION: 95 % | DIASTOLIC BLOOD PRESSURE: 79 MMHG | RESPIRATION RATE: 16 BRPM | HEART RATE: 74 BPM

## 2021-07-30 DIAGNOSIS — G89.4 CHRONIC PAIN SYNDROME: ICD-10-CM

## 2021-07-30 DIAGNOSIS — G89.18 POSTOPERATIVE PAIN: ICD-10-CM

## 2021-07-30 PROCEDURE — 250N000013 HC RX MED GY IP 250 OP 250 PS 637: Performed by: EMERGENCY MEDICINE

## 2021-07-30 PROCEDURE — 250N000011 HC RX IP 250 OP 636: Performed by: EMERGENCY MEDICINE

## 2021-07-30 PROCEDURE — 99283 EMERGENCY DEPT VISIT LOW MDM: CPT

## 2021-07-30 RX ORDER — ONDANSETRON 4 MG/1
4 TABLET, ORALLY DISINTEGRATING ORAL EVERY 8 HOURS PRN
Qty: 15 TABLET | Refills: 0 | Status: SHIPPED | OUTPATIENT
Start: 2021-07-30 | End: 2022-04-22

## 2021-07-30 RX ORDER — LIDOCAINE 4 G/G
1 PATCH TOPICAL DAILY PRN
Qty: 15 PATCH | Refills: 0 | Status: SHIPPED | OUTPATIENT
Start: 2021-07-30 | End: 2023-05-18

## 2021-07-30 RX ORDER — ACETAMINOPHEN 500 MG
1000 TABLET ORAL ONCE
Status: COMPLETED | OUTPATIENT
Start: 2021-07-30 | End: 2021-07-30

## 2021-07-30 RX ORDER — ONDANSETRON 4 MG/1
4 TABLET, ORALLY DISINTEGRATING ORAL ONCE
Status: COMPLETED | OUTPATIENT
Start: 2021-07-30 | End: 2021-07-30

## 2021-07-30 RX ORDER — LIDOCAINE 4 G/G
2 PATCH TOPICAL ONCE
Status: DISCONTINUED | OUTPATIENT
Start: 2021-07-30 | End: 2021-07-30 | Stop reason: HOSPADM

## 2021-07-30 RX ORDER — POLYETHYLENE GLYCOL 3350 17 G/17G
POWDER, FOR SOLUTION ORAL
Qty: 527 G | Refills: 0 | Status: SHIPPED | OUTPATIENT
Start: 2021-07-30

## 2021-07-30 RX ORDER — OXYCODONE HYDROCHLORIDE 5 MG/1
10 TABLET ORAL ONCE
Status: COMPLETED | OUTPATIENT
Start: 2021-07-30 | End: 2021-07-30

## 2021-07-30 RX ADMIN — ONDANSETRON 4 MG: 4 TABLET, ORALLY DISINTEGRATING ORAL at 10:21

## 2021-07-30 RX ADMIN — LIDOCAINE 2 PATCH: 560 PATCH PERCUTANEOUS; TOPICAL; TRANSDERMAL at 10:21

## 2021-07-30 RX ADMIN — OXYCODONE HYDROCHLORIDE 10 MG: 5 TABLET ORAL at 11:37

## 2021-07-30 RX ADMIN — ACETAMINOPHEN 1000 MG: 500 TABLET, FILM COATED ORAL at 10:33

## 2021-07-30 ASSESSMENT — ENCOUNTER SYMPTOMS
SHORTNESS OF BREATH: 0
BACK PAIN: 1
FEVER: 0
WEAKNESS: 1
NAUSEA: 1
APPETITE CHANGE: 1

## 2021-07-30 NOTE — ED PROVIDER NOTES
"  History   Chief Complaint:  Back Pain, Nausea, and Weakness     The history is provided by the patient.      Hina Yap is a 55 year old female with history of recent L4/L5 fusion on 07/23/21 with Dr. Flores who presents for evaluation of back pain, nausea, and generalized weakness. The patient had a L4/L5 fusion 7 days ago and since then has been having persistent problems post-op. She states that she was given increased oxycodone dosages as she usually takes 10 mg every 6 hours for her \"MS\" and chronic pain but she has gotten to take increased 15-20 mg for a few days post-op and then continue her normal pain medication regimen. She states that this has not provided any significant relief from her pain. She reports along with persisting severe low back pain she has had pain radiating into her bilateral legs along with bilateral leg numbness and some tingling sensations in her left toes, although these symptoms are unchanged from prior to surgery. No new or progressive numbness, tingling, or weakness. She states that she has been experiencing nausea with loss of appetite and some generalized weakness sensations in her bilateral arms and legs. She notes that her bandage over her surgical site has not been changed since the procedure and that she has been unsure of what to do post-op for her wound care and generalized care. She notes that she believes she was sent home with information but her boyfriend and her have been unsure. She denies any shortness of breath, chest pain, fever, and any other known new or abnormal symptoms at this time. No saddle anesthesia, or bowel or bladder dysfunction.    Review of Systems   Constitutional: Positive for appetite change. Negative for fever.   Respiratory: Negative for shortness of breath.    Cardiovascular: Negative for chest pain.   Gastrointestinal: Positive for nausea.   Musculoskeletal: Positive for back pain.   Neurological: Positive for weakness.   All other " systems reviewed and are negative.      Allergies:  Sulfa Drugs  Adhesive Tape  Wellbutrin [Bupropion Hydrobromide]    Medications:  Xanax  Amlodipine  Adderall  Avonex Pen  Lioresal  Nexium  Myrbetriq  Remeron  Trileptal  Kyrica  Requip    Past Medical History:    Anxiety   Arthritis   COPD  Depressive disorder   GERD   Hypertension   Ischemic cardiomyopathy   Multiple sclerosis   Neuromuscular disorder   Nonrheumatic mitral valve regurgitation   Premature ventricular contractions  Renal disease   Restless leg syndrome   Tobacco use disorder   Chronic pain  CKD Stage 2     Past Surgical History:    Tubal Ligation  Optical Tracking Sytem Fusion Posterior Spine Lumbar 07/23/21    Family History:    Breast Cancer  Osteoporosis  Depression  Anxiety Disorder  Substance Abuse  Cancer    Social History:  The patient presents to the ED alone.   Tobacco Use: None Since Surgery on 07/23/21  Alcohol Use: No    Physical Exam     Patient Vitals for the past 24 hrs:   BP Temp Temp src Pulse Resp SpO2   07/30/21 1215 -- -- -- -- -- 95 %   07/30/21 1200 116/79 -- -- 74 -- 99 %   07/30/21 1145 -- -- -- -- -- 96 %   07/30/21 1130 114/78 -- -- 68 -- 95 %   07/30/21 1100 118/80 -- -- 63 -- --   07/30/21 1050 -- -- -- -- -- 90 %   07/30/21 1040 -- -- -- -- -- 93 %   07/30/21 1030 122/83 -- -- -- -- --   07/30/21 0846 (!) 115/94 98  F (36.7  C) Temporal 100 16 100 %       Physical Exam  General: Nontoxic. Appears fatigued. No distress.  Head:  Scalp, face, and head appear normal  Eyes:  Pupils are equal, round, reactive to light    Conjunctivae non-injected and sclerae white  ENT:    The external nose is normal    Pinnae are normal  Neck:  Normal range of motion    There is no rigidity noted    Trachea is in the midline  CV:  Regular rate and rhythm     Normal S1/S2, no S3/S4    No murmur or rub. Radial pulses 2+ bilaterally.  Resp:  Lungs are clear and equal bilaterally  There is no tachypnea    No increased work of breathing    No  rales, wheezing, or rhonchi  GI:  Abdomen is soft, no rigidity or guarding    No distension, or mass    No tenderness or rebound tenderness   MS:  Normal muscular tone. T and L spine non-tender without stepoffs. Mild nonfocal tenderness to palpation surrounding patient's lumbar surgical incision which is C/D/I without swelling, erythema, fluctuance, crepitus, purulence, dehiscence or bleeding.  No ecchymosis.  Strength is 5 out of 5 and intact in the bilateral lower extremities.  Sensation intact to light touch bilaterally.  Patient able to lift both legs off the stretcher equally.    Symmetric motor strength    No lower extremity edema  Skin:  No rash or acute skin lesions noted  Neuro: Awake and alert  Speech is normal and fluent  Moves all extremities spontaneously  Psych:  Flat affect. Appropriate interactions.      Emergency Department Course   Emergency Department Course:  Reviewed:  I reviewed nursing notes, vitals, past medical history and care everywhere    Assessments:  0958 I performed a physical exam of the patient. Findings as above.     1158 Patient rechecked and updated. Plan of care discussed and questions answered.     Interventions:  1021 Zofran 4 mg Oral  1021 Lidocaine 4% 2 patch transdermal  1033 Tylenol 1000 mg Oral  1137 Roxicodone 10 mg Oral    Disposition:  The patient was discharged to home.     Impression & Plan   CMS Diagnoses: None    Medical Decision Making:  Hina Yap is a 55 year old female who presents for evaluation of persistent low back pain following recent L4-L5 transforaminal lumbar interbody fusion with reduction of unstable spondylolisthesis including L4-L5 bilateral laminectomies, complete discectomy with arthrodesis.  On my evaluation the patient is well-appearing, hemodynamically stable and afebrile.  Her incision and surgical site appears to be healing appropriately.  Patient chronically takes Percocet 10/325 but since surgery was taking 10 to 15 mg of oxycodone  every 3-4 hours.  The prescription of this that she had after discharge from the hospital has since run out and she presents with ongoing pain.  Patient has not been participating in any rehab program and has not contacted her primary care physician or spine specialist since hospital discharge.  At this time does not appear to be any acute spinal cord compromise, decline in neurologic status, infection or other acute emergent process.  Multimodal pain control was provided including Tylenol, lidocaine patch, Zofran as well as her home dose of chronic oxycodone.  In review of the hospital records it was recommended the patient be discharged to a TCU however the patient declined and returned home instead.  The patient has received 2 phone calls for out reach after hospital discharge and she was not able to be reached.  Voicemail messages were left for her.  At this time there is no indication for rehospitalization.  I stressed the importance that she follow-up closely with her spine surgeon, primary care physician and pain clinic.  I discussed with her that the emergency department is unable to provide further prescriptions for narcotics and that she must follow-up with her outpatient providers for this.  We discussed multimodal approach to treating her pain as well as the importance of participating in rehab and keeping her body mobile.  The patient was agreeable with plan of care and she was discharged in stable condition. Return precautions were discussed with patient. The patient's questions were answered and the patient was agreeable with discharge.     Diagnosis:    ICD-10-CM    1. Postoperative pain  G89.18    2. Chronic pain syndrome  G89.4      Discharge Medications:  New Prescriptions    ACETAMINOPHEN 500 MG CAPS    Take 2 capsules by mouth every 8 hours as needed For aches, pain, fever    LIDOCAINE (LIDOCARE) 4 % PATCH    Place 1 patch onto the skin daily as needed for moderate pain (musculoskeletal pain)  Remove patch after 12 hours. To prevent lidocaine toxicity, patient should be patch free for 12 hrs daily.    ONDANSETRON (ZOFRAN ODT) 4 MG ODT TAB    Take 1 tablet (4 mg) by mouth every 8 hours as needed for nausea or vomiting    POLYETHYLENE GLYCOL (MIRALAX) 17 GM/DOSE POWDER    Mix 1 capful with 6-8 ounces of clear liquid and take by mouth twice daily as needed for constipation. Decrease dose to once daily or once every 2-3 days to prevent constipation.     Scribe Disclosure:  Spenser PALMER, am serving as a scribe at 9:28 AM on 7/30/2021 to document services personally performed by Karlos López MD based on my observations and the provider's statements to me.     [facility]         Karlos López MD  07/30/21 4905

## 2021-07-30 NOTE — ED TRIAGE NOTES
Pt presents to ED with c/o back pain. Pt reports that she had L4-L5 fusion on Monday and since then has not been eating or drinking much and has had severe pain. Pt notes hx MS. Pt has been taking oxycodone 10mg every 6 hours with no relief. ABC intact. A/O x4.

## 2021-07-30 NOTE — DISCHARGE INSTRUCTIONS
Per Emergency Department policy all opiate/narcotic pain medications must be prescribed by your surgeon or pain clinic and cannot be prescribed by the Emergency Department.

## 2021-08-02 DIAGNOSIS — F32.89 OTHER DEPRESSION: ICD-10-CM

## 2021-08-02 DIAGNOSIS — R53.83 OTHER FATIGUE: ICD-10-CM

## 2021-08-02 DIAGNOSIS — G47.00 INSOMNIA, UNSPECIFIED TYPE: ICD-10-CM

## 2021-08-02 DIAGNOSIS — F41.9 ANXIETY: ICD-10-CM

## 2021-08-02 DIAGNOSIS — G35 MULTIPLE SCLEROSIS (H): ICD-10-CM

## 2021-08-02 RX ORDER — DEXTROAMPHETAMINE SACCHARATE, AMPHETAMINE ASPARTATE MONOHYDRATE, DEXTROAMPHETAMINE SULFATE AND AMPHETAMINE SULFATE 7.5; 7.5; 7.5; 7.5 MG/1; MG/1; MG/1; MG/1
30 CAPSULE, EXTENDED RELEASE ORAL DAILY
Qty: 30 CAPSULE | Refills: 0 | Status: SHIPPED | OUTPATIENT
Start: 2021-08-02 | End: 2021-08-29

## 2021-08-02 RX ORDER — ALPRAZOLAM 0.5 MG
0.5 TABLET ORAL 2 TIMES DAILY
Qty: 60 TABLET | Refills: 0 | Status: SHIPPED | OUTPATIENT
Start: 2021-08-02 | End: 2021-08-29

## 2021-08-02 RX ORDER — MIRTAZAPINE 15 MG/1
30 TABLET, FILM COATED ORAL AT BEDTIME
Qty: 4 TABLET | Refills: 0 | Status: SHIPPED | OUTPATIENT
Start: 2021-08-02 | End: 2022-07-29

## 2021-08-02 RX ORDER — DEXTROAMPHETAMINE SACCHARATE, AMPHETAMINE ASPARTATE, DEXTROAMPHETAMINE SULFATE AND AMPHETAMINE SULFATE 2.5; 2.5; 2.5; 2.5 MG/1; MG/1; MG/1; MG/1
10 TABLET ORAL DAILY
Qty: 30 TABLET | Refills: 0 | Status: SHIPPED | OUTPATIENT
Start: 2021-08-02 | End: 2021-08-29

## 2021-08-02 NOTE — TELEPHONE ENCOUNTER
Routing refill request to provider for review/approval because:  Drug not on the [facility] refill protocol   Elevated PHQ9    Mady Ennis RN on 8/2/2021 at 10:10 AM

## 2021-08-02 NOTE — TELEPHONE ENCOUNTER
Pt called this writer. Notes she is still recovering from her surgery and would appreciate a call back later to help with scheduling appts with specialists. Will call pt back at a later date.    Reji Rahman, EMT at 8:13 AM on August 2, 2021   [facility] Guide   [phone]

## 2021-08-02 NOTE — TELEPHONE ENCOUNTER
Pt called this writer noting she is completely out of 4 of her medications. Pended medications. Pended pharmacy. Routing high priority to refill team to address.    Reji Rahman, EMT at 8:16 AM on August 2, 2021   [facility] Guide   [phone]

## 2021-08-02 NOTE — TELEPHONE ENCOUNTER
"ED / Discharge Outreach Protocol    Patient Contact    Attempt # 2    Was call answered?  Yes.  \"May I please speak with Hina\"  Is patient available?   Yes    Hospital/TCU/ED for chronic condition Discharge Protocol    \"Hi, my name is Amna Yarbrough RN, a registered nurse, and I am calling from [facility].  I am calling to follow up and see how things are going for you after your recent emergency visit/hospital/TCU stay.\"    Tell me how you are doing now that you are home?\" The Pt was seen in the ER on 7/30 for worsening, uncontrolled pain. Was advised to enter into TCU, however the Pt declined. The Pt has not reached out to surgery yet. I strongly advised her to do so. She does have their contact information. Reports pain is tolerable right now. Using oxycodone, lidocaine patches and icing the area. No numbness or tingling. No weakness.       Discharge Instructions    \"Let's review your discharge instructions.  What is/are the follow-up recommendations?  Pt. Response: Follow up with surgeons office ASAP. Call them today. The Pt will call. She has contact information.     \"Has an appointment with your primary care provider been scheduled?\"   Following up with surgeons office post surgery     \"When you see the provider, I would recommend that you bring your medications with you.\"    Medications    \"Tell me what changed about your medicines when you discharged?\"    Changes to chronic meds?    0-1    \"What questions do you have about your medications?\"    None     New diagnoses of heart failure, COPD, diabetes, or MI?    No              Post Discharge Medication Reconciliation Status: discharge medications reconciled, continue medications without change.    Was MTM referral placed (*Make sure to put transitions as reason for referral)?   No    Call Summary    \"What questions or concerns do you have about your recent visit and your follow-up care?\"     none    \"If you have questions or things " "don't continue to improve, we encourage you contact us through the main clinic number (give number).  Even if the clinic is not open, triage nurses are available 24/7 to help you.     We would like you to know that our clinic has extended hours (provide information).  We also have urgent care (provide details on closest location and hours/contact info)\"      \"Thank you for your time and take care!\"    Amna Yarbrough RN   [facility] -- Triage Nurse               "

## 2021-08-13 NOTE — TELEPHONE ENCOUNTER
Called pt. No answer, LVM to call back on my personal extension.    If pt calls back:  Assist pt with scheduling with:    1) Urology [phone]   2) Pulmonology [phone]   3) Rheum? - Status of this? [phone]    Mental Health   4) Psych [phone]   5) Therapy [phone] - or could go outside of [place] ([facility] as bridging option?)     Reji Rahman, EMT at 1:13 PM on August 13, 2021   [facility] Health Guide   [phone]

## 2021-08-20 ENCOUNTER — TRANSFERRED RECORDS (OUTPATIENT)
Dept: HEALTH INFORMATION MANAGEMENT | Facility: CLINIC | Age: 56
End: 2021-08-20

## 2021-08-23 NOTE — TELEPHONE ENCOUNTER
Called pt. Attempt #2. No answer, LVM to call back on my personal extension.     If pt calls back:  Assist pt with scheduling with:    1) Urology [phone]   2) Pulmonology [phone]   3) Rheum? - Status of this? [phone]    Mental Health   4) Psych [phone]   5) Therapy [phone] - or could go outside of [place] ([facility] as bridging option?)     Reji Rahman, EMT at 11:25 AM on August 23, 2021   [facility] Health Guide   [phone]

## 2021-08-29 ENCOUNTER — MYC REFILL (OUTPATIENT)
Dept: PEDIATRICS | Facility: CLINIC | Age: 56
End: 2021-08-29

## 2021-08-29 DIAGNOSIS — F41.9 ANXIETY: ICD-10-CM

## 2021-08-29 DIAGNOSIS — G35 MULTIPLE SCLEROSIS (H): ICD-10-CM

## 2021-08-29 DIAGNOSIS — R53.83 OTHER FATIGUE: ICD-10-CM

## 2021-08-30 RX ORDER — DEXTROAMPHETAMINE SACCHARATE, AMPHETAMINE ASPARTATE, DEXTROAMPHETAMINE SULFATE AND AMPHETAMINE SULFATE 2.5; 2.5; 2.5; 2.5 MG/1; MG/1; MG/1; MG/1
10 TABLET ORAL DAILY
Qty: 30 TABLET | Refills: 0 | Status: SHIPPED | OUTPATIENT
Start: 2021-08-30 | End: 2021-09-28

## 2021-08-30 RX ORDER — DEXTROAMPHETAMINE SACCHARATE, AMPHETAMINE ASPARTATE MONOHYDRATE, DEXTROAMPHETAMINE SULFATE AND AMPHETAMINE SULFATE 7.5; 7.5; 7.5; 7.5 MG/1; MG/1; MG/1; MG/1
30 CAPSULE, EXTENDED RELEASE ORAL DAILY
Qty: 30 CAPSULE | Refills: 0 | Status: SHIPPED | OUTPATIENT
Start: 2021-08-30 | End: 2021-09-28

## 2021-08-30 RX ORDER — ALPRAZOLAM 0.5 MG
0.5 TABLET ORAL 2 TIMES DAILY
Qty: 60 TABLET | Refills: 0 | Status: SHIPPED | OUTPATIENT
Start: 2021-08-30 | End: 2021-09-28

## 2021-08-30 NOTE — TELEPHONE ENCOUNTER
"Per AMS: \"Needs to schedule with Sunshine Greer to discuss alprazolam prior to any new prescription.\"    Called pt. No answer, LVM to call back on my personal extension or if I am unavailable to call clinic back.    If pt calls back:  Schedule with AM to discuss Alprazolam prior to any new prescription.    Reji Rahman, EMT at 2:19 PM on August 30, 2021   [facility] Health Guide   [phone]  "

## 2021-08-30 NOTE — TELEPHONE ENCOUNTER
Routing refill request to provider for review/approval because:  Drug not on the [facility] refill protocol     Amna Yarbrough RN   [facility]  -- Triage Nurse

## 2021-08-30 NOTE — TELEPHONE ENCOUNTER
Pt called this writer and notes they are completely out of their Adderall and have 1 night left of the Alprazolam. Routing high priority to be addressed.    Reji Rahman, EMT at 11:26 AM on August 30, 2021   [facility] Health Guide   [phone]

## 2021-08-30 NOTE — TELEPHONE ENCOUNTER
"Called pt.     Pt notes she would like to postpone scheduling with specialties until after Wednesday 9/1/21 as she is scheduled to see her spine doctor's office to evaluate back/leg pain and \"freeing/burning\" sensation in legs. She notes she recently went to pain clinic who had increased her Lyrica to 100 mg from 75 and also increased her Oxycodone up to 5 mg temporarily.    On a separate note, pt notes the boost protein shakes she was prescribed were not covered by insurance so she discontinued them. Would like different protein shake prescribed. Encouraged pt to reach out to insurance to find out which protein shakes are specifically covered by them. Pt agreeable to plan.    She would like a call back after appointment on Wednesday to go over appt and consider scheduling with other specialties listed below and to discuss protein shake prescription after speaking to insurance. Will reach out next week per pt preference.    She has no further questions or concerns at this time. Encouraged pt to reach out with further questions or concerns. Pt agreeable to plan and verbalized understanding.    Reji Rahman, EMT at 11:16 AM on August 30, 2021   [facility] Health Guide   [phone]  "

## 2021-09-01 ENCOUNTER — TRANSFERRED RECORDS (OUTPATIENT)
Dept: HEALTH INFORMATION MANAGEMENT | Facility: CLINIC | Age: 56
End: 2021-09-01

## 2021-09-04 ENCOUNTER — HEALTH MAINTENANCE LETTER (OUTPATIENT)
Age: 56
End: 2021-09-04

## 2021-09-23 ENCOUNTER — TRANSFERRED RECORDS (OUTPATIENT)
Dept: HEALTH INFORMATION MANAGEMENT | Facility: CLINIC | Age: 56
End: 2021-09-23

## 2021-10-04 NOTE — TELEPHONE ENCOUNTER
Called pt. No answer, LVM to call back on my personal extension.     If pt calls back:  Assist pt with scheduling with:    1) Urology [phone]   2) Pulmonology [phone]   3) Rheum? - Status of this? [phone]    Mental Health   4) Psych [phone]   5) Therapy [phone] - or could go outside of [place] ([facility] as bridging option?)     Reji Rahman, EMT at 1:18 PM on October 4, 2021   [facility] Health Guide   [phone]

## 2021-10-04 NOTE — TELEPHONE ENCOUNTER
Called pt. Attempt #2. No answer, LVM to call back on my personal extension or if I am unavailable to call clinic back.     If pt calls back:  Schedule with AM to discuss Alprazolam prior to any new prescription.    Reji Rahman, EMT at 1:15 PM on October 4, 2021   [facility] Health Guide   [phone]

## 2021-10-08 NOTE — TELEPHONE ENCOUNTER
"Called pt. Informed that referral was placed. Pt would like number sent to her [employer] so she can call at her earliest convenience.    Pt also asked about number for Dr. Flores's office and [facility] who did he back surgery as she is having pain and has questions for them about next steps. Found number in chart and will send via [employer] per pt request.    Pt also noted her landlord is ending her lease on October 31st, 2021 and she is worried about housing insecurity. Offered CC, pt is a little hesitant but is open to exploring resources as she notes she \"doesn't know what to do.\"    Reji Rahman, EMT at 12:57 PM on October 8, 2021   [facility] Health Guide   [phone]  "

## 2021-10-08 NOTE — TELEPHONE ENCOUNTER
Pt already aware that CC will reach out next week.    Reji Rahman, EMT at 2:50 PM on October 8, 2021   [facility] Health Guide   [phone]

## 2021-10-08 NOTE — TELEPHONE ENCOUNTER
Thank you for pending order. I signed it. Not sure if you needed to notify patient or if she knows to expect call from CC next week

## 2021-10-08 NOTE — TELEPHONE ENCOUNTER
Pt called and LVM on this writer's VM noting she needed new psych referral. Pended. Routing to PCP to review and sign if appropriate.    Reji Rahman, EMT at 11:32 AM on October 8, 2021   [facility] Guide   [phone]

## 2021-10-08 NOTE — TELEPHONE ENCOUNTER
Called pt. Informed of AMS message below. Offered to schedule appt. Pt declines scheduling at this time.    Encouraged pt to reach out if she would like to schedule or if she has any further questions or concerns. Pt agreeable to plan and verbalized understanding.    Reji Rahman, EMT at 1:36 PM on October 8, 2021   [facility] Health Guide   [phone]

## 2021-10-11 ENCOUNTER — PATIENT OUTREACH (OUTPATIENT)
Dept: CARE COORDINATION | Facility: CLINIC | Age: 56
End: 2021-10-11

## 2021-10-11 NOTE — PROGRESS NOTES
Clinic Care Coordination Contact  [facility]/Voicemail       Clinical Data: Care Coordinator Outreach  Outreach attempted x 1.  Left message on patient's voicemail with call back information and requested return call.    Chart Review: Referral from PCP, Pt in need of housing resources around her current living area.    Plan: Care Coordinator will try to reach patient again in 1-2 business days.    DENISSE Goodman, B.S. [facility] Care Coordination  [facility]:  [place], [place] and [place]  [phone]  [email or web address]

## 2021-10-12 NOTE — PROGRESS NOTES
Clinic Care Coordination Contact  [facility]/Voicemail       Clinical Data: Care Coordinator Outreach  Outreach attempted x 2.  Left message on patient's voicemail with call back information and requested return call.    Chart Review: Referral from PCP, Pt in need of housing resources around her current living area.    Plan: Care Coordinator will send care coordination introduction letter with care coordinator contact information and explanation of care coordination services via [employer]. Care Coordinator will do no further outreaches at this time.    Bernie Rouse, DENISSE, B.S. [facility]  Clinic Care Coordination  [facility]:  [place], [place] and [place]  [phone]  [email or web address]

## 2021-10-18 NOTE — TELEPHONE ENCOUNTER
Called pt to follow up on CC and psychiatry. No answer, LVM to call back on my personal extension or reply via [employer]. Pt has not read [employer] message at the time of this documentation.    If pt calls back:  F/u if pt would like resources from CC regarding housing and if she was able to schedule with psych and ortho    Reji Rahman, EMT at 11:08 AM on October 18, 2021   [facility] Health Guide   [phone]

## 2021-10-26 NOTE — TELEPHONE ENCOUNTER
Called pt. Attempt #2. No answer, LVM to call back on my personal extension or reply via [employer]. Pt has not read [employer] message at the time of this documentation.     If pt calls back:  F/u if pt would like resources from  regarding housing and if she was able to schedule with psych and ortho    Reji Rahman, EMT at 11:07 AM on October 26, 2021   [facility] Health Guide   [phone]

## 2021-10-27 ENCOUNTER — TRANSFERRED RECORDS (OUTPATIENT)
Dept: HEALTH INFORMATION MANAGEMENT | Facility: CLINIC | Age: 56
End: 2021-10-27
Payer: MEDICAID

## 2021-10-28 ENCOUNTER — TRANSFERRED RECORDS (OUTPATIENT)
Dept: HEALTH INFORMATION MANAGEMENT | Facility: CLINIC | Age: 56
End: 2021-10-28
Payer: MEDICAID

## 2021-11-02 ENCOUNTER — TRANSFERRED RECORDS (OUTPATIENT)
Dept: HEALTH INFORMATION MANAGEMENT | Facility: CLINIC | Age: 56
End: 2021-11-02
Payer: MEDICAID

## 2021-11-02 NOTE — TELEPHONE ENCOUNTER
Called pt. Attempt #3. No answer, LVM to call back on my personal extension or reply via [employer]. Pt has not read [employer] message at the time of this documentation. Also sent letter. No further outreaches will be made at this time.     If pt calls back:  F/u if pt would like resources from  regarding housing and if she was able to schedule with psych and ortho    Reji Rahman, EMT at 1:12 PM on November 2, 2021   [facility] Health Guide   [phone]

## 2021-11-29 ENCOUNTER — TRANSFERRED RECORDS (OUTPATIENT)
Dept: HEALTH INFORMATION MANAGEMENT | Facility: CLINIC | Age: 56
End: 2021-11-29
Payer: MEDICAID

## 2021-11-29 LAB — PHQ9 SCORE: 19

## 2021-12-04 DIAGNOSIS — Z72.0 TOBACCO ABUSE: ICD-10-CM

## 2021-12-06 NOTE — TELEPHONE ENCOUNTER
Prescription approved per [facility] Refill Protocol.    Mady Ennis RN on 12/6/2021 at 11:05 AM

## 2021-12-27 ENCOUNTER — TRANSFERRED RECORDS (OUTPATIENT)
Dept: HEALTH INFORMATION MANAGEMENT | Facility: CLINIC | Age: 56
End: 2021-12-27
Payer: MEDICAID

## 2022-01-24 ENCOUNTER — TRANSFERRED RECORDS (OUTPATIENT)
Dept: HEALTH INFORMATION MANAGEMENT | Facility: CLINIC | Age: 57
End: 2022-01-24
Payer: MEDICAID

## 2022-02-21 ENCOUNTER — TRANSFERRED RECORDS (OUTPATIENT)
Dept: HEALTH INFORMATION MANAGEMENT | Facility: CLINIC | Age: 57
End: 2022-02-21
Payer: MEDICAID

## 2022-02-26 ENCOUNTER — APPOINTMENT (OUTPATIENT)
Dept: GENERAL RADIOLOGY | Facility: CLINIC | Age: 57
End: 2022-02-26
Attending: EMERGENCY MEDICINE
Payer: MEDICAID

## 2022-02-26 ENCOUNTER — HOSPITAL ENCOUNTER (EMERGENCY)
Facility: CLINIC | Age: 57
Discharge: HOME | End: 2022-02-26
Attending: EMERGENCY MEDICINE | Admitting: EMERGENCY MEDICINE
Payer: MEDICAID

## 2022-02-26 VITALS
WEIGHT: 115 LBS | OXYGEN SATURATION: 95 % | DIASTOLIC BLOOD PRESSURE: 91 MMHG | RESPIRATION RATE: 20 BRPM | SYSTOLIC BLOOD PRESSURE: 146 MMHG | BODY MASS INDEX: 18.05 KG/M2 | TEMPERATURE: 97.9 F | HEIGHT: 67 IN | HEART RATE: 83 BPM

## 2022-02-26 DIAGNOSIS — J44.0 CHRONIC OBSTRUCTIVE PULMONARY DISEASE WITH ACUTE LOWER RESPIRATORY INFECTION (H): ICD-10-CM

## 2022-02-26 DIAGNOSIS — J44.1 COPD EXACERBATION (H): ICD-10-CM

## 2022-02-26 DIAGNOSIS — J44.9 CHRONIC OBSTRUCTIVE PULMONARY DISEASE, UNSPECIFIED COPD TYPE (H): ICD-10-CM

## 2022-02-26 DIAGNOSIS — R06.02 SOB (SHORTNESS OF BREATH): ICD-10-CM

## 2022-02-26 DIAGNOSIS — R07.9 CHEST PAIN, UNSPECIFIED TYPE: ICD-10-CM

## 2022-02-26 LAB
ANION GAP SERPL CALCULATED.3IONS-SCNC: 4 MMOL/L (ref 3–14)
BASOPHILS # BLD AUTO: 0.1 10E3/UL (ref 0–0.2)
BASOPHILS NFR BLD AUTO: 1 %
BUN SERPL-MCNC: 17 MG/DL (ref 7–30)
CALCIUM SERPL-MCNC: 9 MG/DL (ref 8.5–10.1)
CHLORIDE BLD-SCNC: 109 MMOL/L (ref 94–109)
CO2 SERPL-SCNC: 29 MMOL/L (ref 20–32)
CREAT SERPL-MCNC: 0.73 MG/DL (ref 0.52–1.04)
EOSINOPHIL # BLD AUTO: 1 10E3/UL (ref 0–0.7)
EOSINOPHIL NFR BLD AUTO: 10 %
ERYTHROCYTE [DISTWIDTH] IN BLOOD BY AUTOMATED COUNT: 14.3 % (ref 10–15)
FLUAV RNA SPEC QL NAA+PROBE: NEGATIVE
FLUBV RNA RESP QL NAA+PROBE: NEGATIVE
GFR SERPL CREATININE-BSD FRML MDRD: >90 ML/MIN/1.73M2
GLUCOSE BLD-MCNC: 95 MG/DL (ref 70–99)
HCT VFR BLD AUTO: 45.7 % (ref 35–47)
HGB BLD-MCNC: 14.6 G/DL (ref 11.7–15.7)
HOLD SPECIMEN: NORMAL
IMM GRANULOCYTES # BLD: 0 10E3/UL
IMM GRANULOCYTES NFR BLD: 0 %
LYMPHOCYTES # BLD AUTO: 2.9 10E3/UL (ref 0.8–5.3)
LYMPHOCYTES NFR BLD AUTO: 29 %
MCH RBC QN AUTO: 28.9 PG (ref 26.5–33)
MCHC RBC AUTO-ENTMCNC: 31.9 G/DL (ref 31.5–36.5)
MCV RBC AUTO: 91 FL (ref 78–100)
MONOCYTES # BLD AUTO: 0.4 10E3/UL (ref 0–1.3)
MONOCYTES NFR BLD AUTO: 4 %
NEUTROPHILS # BLD AUTO: 5.6 10E3/UL (ref 1.6–8.3)
NEUTROPHILS NFR BLD AUTO: 56 %
NRBC # BLD AUTO: 0 10E3/UL
NRBC BLD AUTO-RTO: 0 /100
PLATELET # BLD AUTO: 343 10E3/UL (ref 150–450)
POTASSIUM BLD-SCNC: 3.7 MMOL/L (ref 3.4–5.3)
RBC # BLD AUTO: 5.05 10E6/UL (ref 3.8–5.2)
SARS-COV-2 RNA RESP QL NAA+PROBE: NEGATIVE
SODIUM SERPL-SCNC: 142 MMOL/L (ref 133–144)
TROPONIN I SERPL HS-MCNC: 22 NG/L
WBC # BLD AUTO: 10 10E3/UL (ref 4–11)

## 2022-02-26 PROCEDURE — 87636 SARSCOV2 & INF A&B AMP PRB: CPT | Performed by: EMERGENCY MEDICINE

## 2022-02-26 PROCEDURE — C9803 HOPD COVID-19 SPEC COLLECT: HCPCS

## 2022-02-26 PROCEDURE — 93005 ELECTROCARDIOGRAM TRACING: CPT

## 2022-02-26 PROCEDURE — 96374 THER/PROPH/DIAG INJ IV PUSH: CPT

## 2022-02-26 PROCEDURE — 99285 EMERGENCY DEPT VISIT HI MDM: CPT | Mod: 25

## 2022-02-26 PROCEDURE — 71046 X-RAY EXAM CHEST 2 VIEWS: CPT

## 2022-02-26 PROCEDURE — 250N000009 HC RX 250: Performed by: EMERGENCY MEDICINE

## 2022-02-26 PROCEDURE — 84484 ASSAY OF TROPONIN QUANT: CPT | Performed by: EMERGENCY MEDICINE

## 2022-02-26 PROCEDURE — 999N000105 HC STATISTIC NO DOCUMENTATION TO SUPPORT CHARGE

## 2022-02-26 PROCEDURE — 85025 COMPLETE CBC W/AUTO DIFF WBC: CPT | Performed by: EMERGENCY MEDICINE

## 2022-02-26 PROCEDURE — 94640 AIRWAY INHALATION TREATMENT: CPT

## 2022-02-26 PROCEDURE — 36415 COLL VENOUS BLD VENIPUNCTURE: CPT | Performed by: EMERGENCY MEDICINE

## 2022-02-26 PROCEDURE — 82310 ASSAY OF CALCIUM: CPT | Performed by: EMERGENCY MEDICINE

## 2022-02-26 PROCEDURE — 250N000011 HC RX IP 250 OP 636: Performed by: EMERGENCY MEDICINE

## 2022-02-26 RX ORDER — ALBUTEROL SULFATE 90 UG/1
2 AEROSOL, METERED RESPIRATORY (INHALATION) EVERY 4 HOURS PRN
Qty: 18 G | Refills: 0 | Status: SHIPPED | OUTPATIENT
Start: 2022-02-26 | End: 2022-09-17

## 2022-02-26 RX ORDER — METHYLPREDNISOLONE SODIUM SUCCINATE 125 MG/2ML
125 INJECTION, POWDER, LYOPHILIZED, FOR SOLUTION INTRAMUSCULAR; INTRAVENOUS ONCE
Status: COMPLETED | OUTPATIENT
Start: 2022-02-26 | End: 2022-02-26

## 2022-02-26 RX ORDER — IPRATROPIUM BROMIDE AND ALBUTEROL SULFATE 2.5; .5 MG/3ML; MG/3ML
1 SOLUTION RESPIRATORY (INHALATION) EVERY 6 HOURS PRN
Qty: 30 ML | Refills: 3 | Status: SHIPPED | OUTPATIENT
Start: 2022-02-26 | End: 2022-10-06

## 2022-02-26 RX ORDER — IPRATROPIUM BROMIDE AND ALBUTEROL SULFATE 2.5; .5 MG/3ML; MG/3ML
6 SOLUTION RESPIRATORY (INHALATION) ONCE
Status: COMPLETED | OUTPATIENT
Start: 2022-02-26 | End: 2022-02-26

## 2022-02-26 RX ORDER — BENZONATATE 200 MG/1
200 CAPSULE ORAL 3 TIMES DAILY PRN
Qty: 15 CAPSULE | Refills: 0 | Status: SHIPPED | OUTPATIENT
Start: 2022-02-26 | End: 2022-04-22

## 2022-02-26 RX ORDER — PREDNISONE 20 MG/1
TABLET ORAL
Qty: 10 TABLET | Refills: 0 | Status: SHIPPED | OUTPATIENT
Start: 2022-02-26 | End: 2022-07-29

## 2022-02-26 RX ORDER — AZITHROMYCIN 250 MG/1
TABLET, FILM COATED ORAL
Qty: 6 TABLET | Refills: 0 | Status: SHIPPED | OUTPATIENT
Start: 2022-02-26 | End: 2022-03-03

## 2022-02-26 RX ADMIN — METHYLPREDNISOLONE SODIUM SUCCINATE 125 MG: 125 INJECTION, POWDER, FOR SOLUTION INTRAMUSCULAR; INTRAVENOUS at 14:45

## 2022-02-26 RX ADMIN — IPRATROPIUM BROMIDE AND ALBUTEROL SULFATE 6 ML: 2.5; .5 SOLUTION RESPIRATORY (INHALATION) at 15:01

## 2022-02-26 ASSESSMENT — ENCOUNTER SYMPTOMS
COUGH: 1
SHORTNESS OF BREATH: 1

## 2022-02-26 NOTE — ED PROVIDER NOTES
"  History   Chief Complaint:  Shortness of Breath       HPI   Hina Yap is a 56 year old female who presents with worsening shortness of breath and cough for the past two weeks.  The cough is mildly productive and without hemoptysis.  The shortness of breath is relatively constant but without aggravating factors including exertion and orthopnea.  She has mild diffuse chest discomfort.  Again discomfort is without aggravating or alleviating factors. This feels like a COPD flare up and bronchitis. She reports that she is Covid-19 vaccinated.  She has no history of DVT, PE, unilateral leg swelling, recent immobilization, hemoptysisi, malignancy, estrogen use.  She has been without her COPD medications as she reports that she did not have a prescription and \"my primary care doctor keeps referring me to specialist.\"    Review of Systems   Respiratory: Positive for cough and shortness of breath.    All other systems reviewed and are negative.      Allergies:  Sulfa Drugs  Adhesive Tape  Wellbutrin [Bupropion Hydrobromide]    Medications:  Albuterol  Xanax  Norvasc  Adderall  Llioresal  Nexium  Myrbetriq  Remeron  Zofran  Trileptal  Roxicodone  Miralax  Lyrica  Phenergan  Requip  Zanaflex    Past Medical History:     Anxiety  Arthritis  COPD  Depression  GERD  Hypertension  Ischemic cardiomypathy  MS  Neuromuscular disorder  PVC's  Renal disease  RLS  Esophageal reflux  CKD  Lumbar fusion    Past Surgical History:    Gyn surgery  Optical tracking system fusion posterior spine    Family History:    Breast cancer  Osteoporosis  Depression  Anxiety  Substance abuse    Social History:  The patient presents alone    Physical Exam     Patient Vitals for the past 24 hrs:   BP Temp Temp src Pulse Resp SpO2 Height Weight   02/26/22 1615 -- -- -- -- -- 97 % -- --   02/26/22 1600 (!) 141/89 -- -- 73 -- 92 % -- --   02/26/22 1545 (!) 140/95 -- -- 70 -- 92 % -- --   02/26/22 1530 (!) 149/95 -- -- 61 -- 97 % -- --   02/26/22 " "1445 135/89 -- -- 62 -- 96 % -- --   02/26/22 1400 138/76 -- -- 68 -- 96 % -- --   02/26/22 1345 (!) 142/95 -- -- 72 -- 100 % -- --   02/26/22 1329 (!) 148/106 97.9  F (36.6  C) Temporal 89 20 97 % 1.702 m (5' 7\") 52.2 kg (115 lb)       Physical Exam      Eyes:    Conjunctiva normal  Neck:    Supple, no meningismus.     CV:     Regular rate and rhythm.      No murmurs, rubs or gallops.       No unilateral leg swelling.       2+ radial pulses bilateral.       No lower extremity edema.  PULM:    Mild diffuse expiratory wheezing     No respiratory distress.      Good air exchange.     No rales or wheezing.  ABD:    Soft, non-tender, non-distended.       No pulsatile masses.       No rebound, guarding or rigidity.  MSK:     No gross deformity to all four extremities.   LYMPH:   No cervical lymphadenopathy.  NEURO:   Alert. Good muscle tone, no atrophy.  Skin:    Warm, dry and intact.    Psych:    Mood is good and affect is appropriate.        Emergency Department Course   ECG  ECG obtained at 1355, ECG read at 1411  Sinus rhythm with occasional premature ventricular complexes   Rate 71 bpm. WY interval 142 ms. QRS duration 102 ms. QT/QTc 434/471 ms. P-R-T axes 64 64 57.     Imaging:  Chest XR,  PA & LAT   Final Result   IMPRESSION: Negative chest.        Report per radiology    Laboratory:  Labs Ordered and Resulted from Time of ED Arrival to Time of ED Departure   CBC WITH PLATELETS AND DIFFERENTIAL - Abnormal       Result Value    WBC Count 10.0      RBC Count 5.05      Hemoglobin 14.6      Hematocrit 45.7      MCV 91      MCH 28.9      MCHC 31.9      RDW 14.3      Platelet Count 343      % Neutrophils 56      % Lymphocytes 29      % Monocytes 4      % Eosinophils 10      % Basophils 1      % Immature Granulocytes 0      NRBCs per 100 WBC 0      Absolute Neutrophils 5.6      Absolute Lymphocytes 2.9      Absolute Monocytes 0.4      Absolute Eosinophils 1.0 (*)     Absolute Basophils 0.1      Absolute Immature " Granulocytes 0.0      Absolute NRBCs 0.0     BASIC METABOLIC PANEL - Normal    Sodium 142      Potassium 3.7      Chloride 109      Carbon Dioxide (CO2) 29      Anion Gap 4      Urea Nitrogen 17      Creatinine 0.73      Calcium 9.0      Glucose 95      GFR Estimate >90     TROPONIN I - Normal    Troponin I High Sensitivity 22     INFLUENZA A/B & SARS-COV2 PCR MULTIPLEX        Emergency Department Course:      Reviewed:  I reviewed nursing notes, vitals, past medical history and Care Everywhere    Assessments:  1407 I obtained history and examined the patient as noted above.     1653 I rechecked the patient and explained findings.  No residual wheezing on recheck.    Interventions:  1501 Duoneb 6 mL neb    1445 Solu-medrol 125 mg IV    Disposition:  The patient was discharged to home.     Impression & Plan     Medical Decision Makin-year-old female presented to the ED with shortness of breath and cough x2 weeks with atypical chest discomfort.  She was evaluated for atypical ACS.  EKG without ischemic changes.  Troponin within normal limits.  No indication for serial enzymes given duration of symptoms.  She has no features concerning for pulmonary embolism and has an alternative source of her symptoms with clear COPD exacerbation.  Bronchospasm improved withbronchodilators and steroids.  No associated pneumothorax, pulmonary edema or pneumonia to account for symptoms.  COVID and influenza swabs negative.  Patient safe to discharge home with short course of prednisone, refill of albuterol and her controller meds along with azithromycin.      Diagnosis:    ICD-10-CM    1. COPD exacerbation (H)  J44.1    2. Chest pain, unspecified type  R07.9        Discharge Medications:  New Prescriptions    ALBUTEROL (PROAIR HFA/PROVENTIL HFA/VENTOLIN HFA) 108 (90 BASE) MCG/ACT INHALER    Inhale 2 puffs into the lungs every 4 hours as needed for shortness of breath / dyspnea or wheezing    AZITHROMYCIN (ZITHROMAX Z-BREEZY) 250  MG TABLET    Two tablets on the first day, then one tablet daily for the next 4 days    PREDNISONE (DELTASONE) 20 MG TABLET    Take two tablets (= 40mg) each day for 5 (five) days       Scribe Disclosure:  I, Glenn Mancilla, am serving as a scribe at 2:04 PM on 2/26/2022 to document services personally performed by Steven Pruitt MD based on my observations and the provider's statements to me.          Steven Pruitt MD  02/26/22 1820

## 2022-02-26 NOTE — ED TRIAGE NOTES
Shortness of breath and cough for the past two weeks. Is getting worse. Has not been seen before today. Pt notes she is Covid immunized. Feels as if this is likely a COPD flare up and bronchitis.

## 2022-02-28 ENCOUNTER — TELEPHONE (OUTPATIENT)
Dept: PEDIATRICS | Facility: CLINIC | Age: 57
End: 2022-02-28
Payer: MEDICAID

## 2022-02-28 LAB
ATRIAL RATE - MUSE: 71 BPM
DIASTOLIC BLOOD PRESSURE - MUSE: NORMAL MMHG
INTERPRETATION ECG - MUSE: NORMAL
P AXIS - MUSE: 64 DEGREES
PR INTERVAL - MUSE: 142 MS
QRS DURATION - MUSE: 102 MS
QT - MUSE: 434 MS
QTC - MUSE: 471 MS
R AXIS - MUSE: 64 DEGREES
SYSTOLIC BLOOD PRESSURE - MUSE: NORMAL MMHG
T AXIS - MUSE: 57 DEGREES
VENTRICULAR RATE- MUSE: 71 BPM

## 2022-02-28 NOTE — TELEPHONE ENCOUNTER
[facility] outreach to assist with scheduling ED follow up. No answer, LVM asking to call back on [facility] direct extension.     Aditi Keller at 8:25 AM on 2/28/2022  [facility] Health Guide  Phone [phone]

## 2022-03-01 ENCOUNTER — TELEPHONE (OUTPATIENT)
Dept: PEDIATRICS | Facility: CLINIC | Age: 57
End: 2022-03-01
Payer: MEDICAID

## 2022-03-01 NOTE — TELEPHONE ENCOUNTER
Pt called to make apt. LVM with direct number for call back.    Jasmine Farooq, EMT at 11:11 AM on March 1, 2022  [facility] Health Guide  [phone]

## 2022-03-02 ENCOUNTER — TELEPHONE (OUTPATIENT)
Dept: PEDIATRICS | Facility: CLINIC | Age: 57
End: 2022-03-02
Payer: MEDICAID

## 2022-03-02 NOTE — TELEPHONE ENCOUNTER
Called and LVM and sent Mychart. Ending encounter.    Jasmine Farooq, EMT at 9:00 AM on March 2, 2022  [facility] Health Guide  [phone]

## 2022-03-30 ENCOUNTER — TRANSFERRED RECORDS (OUTPATIENT)
Dept: HEALTH INFORMATION MANAGEMENT | Facility: CLINIC | Age: 57
End: 2022-03-30
Payer: MEDICAID

## 2022-04-16 ENCOUNTER — HEALTH MAINTENANCE LETTER (OUTPATIENT)
Age: 57
End: 2022-04-16

## 2022-04-21 ENCOUNTER — TELEPHONE (OUTPATIENT)
Dept: CARDIOLOGY | Facility: CLINIC | Age: 57
End: 2022-04-21
Payer: MEDICAID

## 2022-04-21 NOTE — TELEPHONE ENCOUNTER
[facility] Call Center    Phone Message    May a detailed message be left on voicemail: yes     Reason for Call: Other: Patient called and spoke with writer, she states she would like to speak with gaviota, regarding a upcoming dental procedure. Please call patient to discuss further      Action Taken: Message routed to:  [facility] Cardio     Travel Screening: Not Applicable

## 2022-04-21 NOTE — TELEPHONE ENCOUNTER
Patient called, she will be needing to have 4 teeth extracted and dentist has a paper to be signed regarding need for SBE.  Patient will be mailed or faxed soon. No date set for extractions.  Allergic to sulfa medications,      Hx of : Ischemic cardiomyopathy  Prolonged Q-T interval on ECG  Essential hypertension  Precordial pain  Nonrheumatic mitral valve regurgitation  PVC's (premature ventricular contractions)    Due for annual ELIDA OV, Patient will call scheduling to arrange.    Last Cardiology visit 7-5-21, Last ELIDA OV 12-

## 2022-04-22 ENCOUNTER — OFFICE VISIT (OUTPATIENT)
Dept: CARDIOLOGY | Facility: CLINIC | Age: 57
End: 2022-04-22
Payer: MEDICAID

## 2022-04-22 VITALS
SYSTOLIC BLOOD PRESSURE: 120 MMHG | OXYGEN SATURATION: 97 % | HEART RATE: 91 BPM | WEIGHT: 121.6 LBS | DIASTOLIC BLOOD PRESSURE: 68 MMHG | BODY MASS INDEX: 19.09 KG/M2 | HEIGHT: 67 IN

## 2022-04-22 DIAGNOSIS — I10 ESSENTIAL HYPERTENSION: Primary | ICD-10-CM

## 2022-04-22 DIAGNOSIS — I49.3 PVC'S (PREMATURE VENTRICULAR CONTRACTIONS): ICD-10-CM

## 2022-04-22 DIAGNOSIS — I25.5 ISCHEMIC CARDIOMYOPATHY: ICD-10-CM

## 2022-04-22 PROCEDURE — 99214 OFFICE O/P EST MOD 30 MIN: CPT | Performed by: PHYSICIAN ASSISTANT

## 2022-04-22 RX ORDER — AMLODIPINE BESYLATE 2.5 MG/1
2.5 TABLET ORAL DAILY
Qty: 90 TABLET | Refills: 3 | Status: SHIPPED | OUTPATIENT
Start: 2022-04-22 | End: 2023-03-13

## 2022-04-22 NOTE — PROGRESS NOTES
Primary Cardiologist: Dr. Modi    Reason for Visit: Annual follow up (review need for SBE prophylaxis)    History of Present Illness:   Hina is a pleasant 56 year old female with past medical history notable for hypertension, mild LV dysfunction (felt possibly ischemic CM but angio in 2001 was normal and recent stress nuclear test was normal), tobacco dependence, GERD, and depression.     She is in need of tooth extraction. There was a question about whether she would need antibiotics beforehand. She also needs refill on her amlodipine. She denies SOB, CP, palpitations, peripheral edema, PND, orthopnea, or bleeding issues.     Assessment and Plan:  Hina is a pleasant 56 year old female with past medical history notable for hypertension, mild LV dysfunction (felt possibly ischemic CM but angio in 2001 was normal and recent stress nuclear test was normal), tobacco dependence, GERD, and depression.     I have informed Hina she does not need antibiotic prophylaxis per most recent SBE prophylaxis guidelines. I refilled her amlodipine. She has no symptoms to suggest ischemia, heart failure, or arhythmia. She will continue with her current regimen with no changes.     This note was completed in part using Dragon voice recognition software. Although reviewed after completion, some word and grammatical errors may occur.    Orders this Visit:  No orders of the defined types were placed in this encounter.    Orders Placed This Encounter   Medications     amLODIPine (NORVASC) 2.5 MG tablet     Sig: Take 1 tablet (2.5 mg) by mouth daily     Dispense:  90 tablet     Refill:  3     Medications Discontinued During This Encounter   Medication Reason     benzonatate (TESSALON) 200 MG capsule Not filled/taken by Patient     ondansetron (ZOFRAN ODT) 4 MG ODT tab Therapy completed     mirabegron (MYRBETRIQ) 25 MG 24 hr tablet Not filled/taken by Patient     amLODIPine (NORVASC) 2.5 MG tablet Reorder         Encounter  Diagnoses   Name Primary?     Essential hypertension Yes     Ischemic cardiomyopathy      PVC's (premature ventricular contractions)        CURRENT MEDICATIONS:  Current Outpatient Medications   Medication Sig Dispense Refill     acetaminophen 500 MG CAPS Take 2 capsules by mouth every 8 hours as needed For aches, pain, fever 60 capsule 0     albuterol (PROAIR HFA/PROVENTIL HFA/VENTOLIN HFA) 108 (90 Base) MCG/ACT inhaler Inhale 2 puffs into the lungs every 4 hours as needed for shortness of breath / dyspnea or wheezing 18 g 0     albuterol (PROAIR HFA/PROVENTIL HFA/VENTOLIN HFA) 108 (90 Base) MCG/ACT inhaler Inhale 2 puffs into the lungs every 4 hours as needed for shortness of breath / dyspnea or wheezing 18 g 3     ALPRAZolam (XANAX) 0.5 MG tablet Take 1 tablet (0.5 mg) by mouth 2 times daily 60 tablet 0     amLODIPine (NORVASC) 2.5 MG tablet Take 1 tablet (2.5 mg) by mouth daily 90 tablet 3     amphetamine-dextroamphetamine (ADDERALL XR) 30 MG 24 hr capsule Take 1 capsule (30 mg) by mouth daily 30 capsule 0     amphetamine-dextroamphetamine (ADDERALL) 10 MG tablet Take 1 tablet (10 mg) by mouth daily 30 tablet 0     AVONEX PEN 30 MCG/0.5ML auto-injector kit Inject 30 mcg into the muscle every 7 days        baclofen (LIORESAL) 20 MG tablet Take 20 mg by mouth 4 times daily        esomeprazole (NEXIUM) 40 MG DR capsule Take 1 capsule (40 mg) by mouth every morning (before breakfast) Take 30-60 minutes before eating. 90 capsule 3     ipratropium - albuterol 0.5 mg/2.5 mg/3 mL (DUONEB) 0.5-2.5 (3) MG/3ML neb solution Take 1 vial (3 mLs) by nebulization every 6 hours as needed for shortness of breath / dyspnea or wheezing 30 mL 3     Lidocaine (LIDOCARE) 4 % Patch Place 1 patch onto the skin daily as needed for moderate pain (musculoskeletal pain) Remove patch after 12 hours. To prevent lidocaine toxicity, patient should be patch free for 12 hrs daily. 15 patch 0     mirtazapine (REMERON) 15 MG tablet Take 2 tablets  (30 mg) by mouth At Bedtime 4 tablet 0     nicotine polacrilex (NICORETTE) 4 MG gum PLACE 1 PIECE INSIDE CHEEK AS NEEDED FOR SMOKING CESSATION 100 each 3     OXcarbazepine (TRILEPTAL) 150 MG tablet TAKE 2 TABLETS BY MOUTH EVERY NIGHT AT BEDTIME       oxyCODONE (ROXICODONE) 5 MG tablet Take 3-4 tablets (15-20 mg) by mouth every 6 hours as needed for pain 30 tablet 0     polyethylene glycol (MIRALAX) 17 GM/Dose powder Mix 1 capful with 6-8 ounces of clear liquid and take by mouth twice daily as needed for constipation. Decrease dose to once daily or once every 2-3 days to prevent constipation. 527 g 0     predniSONE (DELTASONE) 20 MG tablet Take two tablets (= 40mg) each day for 5 (five) days 10 tablet 0     pregabalin (LYRICA) 75 MG capsule Take 1 capsule (75 mg) by mouth 2 times daily 180 capsule 3     promethazine (PHENERGAN) 25 MG tablet Take 25 mg by mouth every 6 hours as needed for nausea       rOPINIRole (REQUIP) 2 MG tablet Take 2 mg by mouth every morning Patient is taking 1 tab in the morning and 2 tab at night       tiotropium-olodaterol 2.5-2.5 MCG/ACT AERS Inhale 2 puffs into the lungs daily 4 g 3     tiZANidine (ZANAFLEX) 2 MG tablet Take 1-2 mg by mouth At Bedtime         ALLERGIES     Allergies   Allergen Reactions     Sulfa Drugs Rash     Adhesive Tape Rash     Reacts to adhesive on fentanyl patch     Wellbutrin [Bupropion Hydrobromide] Rash       PAST MEDICAL HISTORY:  Past Medical History:   Diagnosis Date     Anxiety      Arthritis Years ago     COPD (chronic obstructive pulmonary disease) (H)      Depressive disorder Years ago     GERD (gastroesophageal reflux disease)      Hypertension      Ischemic cardiomyopathy      Multiple sclerosis (H)      Neuromuscular disorder (H)      Nonrheumatic mitral valve regurgitation      PVC's (premature ventricular contractions)      Renal disease      Restless leg syndrome      Tobacco use disorder        PAST SURGICAL HISTORY:  Past Surgical History:  "  Procedure Laterality Date     GYN SURGERY      tubal ligation     OPTICAL TRACKING SYSTEM FUSION POSTERIOR SPINE LUMBAR N/A 7/23/2021    Procedure: L4-5 transforaminal lumbar interbody fusion with reduction of grade 1/2 unstable spondylolisthesis   Open reduction and internal fixation of the grade L4-5 spondylolisthesis L4 - L5 posterior lateral fusion;  Surgeon: Asif Flores MD;  Location: [facility] OR       FAMILY HISTORY:  Family History   Problem Relation Age of Onset     Diabetes Maternal Grandmother      Breast Cancer Mother      Osteoporosis Mother      Other Cancer Father      Depression Brother      Anxiety Disorder Brother      Substance Abuse Brother        SOCIAL HISTORY:  Social History     Socioeconomic History     Marital status: Single     Spouse name: None     Number of children: None     Years of education: None     Highest education level: None   Tobacco Use     Smoking status: Current Every Day Smoker     Packs/day: 0.50     Years: 35.00     Pack years: 17.50     Types: Cigarettes     Smokeless tobacco: Never Used   Substance and Sexual Activity     Alcohol use: Not Currently     Alcohol/week: 0.0 standard drinks     Drug use: No     Sexual activity: Yes   Other Topics Concern     Parent/sibling w/ CABG, MI or angioplasty before 65F 55M? No       Review of Systems:  Skin:  Positive for     Eyes:  Positive for glasses  ENT:  Negative    Respiratory:  Positive for dyspnea on exertion  Cardiovascular:    fatigue;Positive for  Gastroenterology: Positive for heartburn  Genitourinary:  Positive for incontinence  Musculoskeletal:  Positive for back pain;arthritis;joint pain  Neurologic:  Positive for numbness or tingling of hands;numbness or tingling of feet  Psychiatric:  Positive for anxiety;depression  Heme/Lymph/Imm:  Negative    Endocrine:  Negative      Physical Exam:  Vitals: /68   Pulse 91   Ht 1.702 m (5' 7\")   Wt 55.2 kg (121 lb 9.6 oz)   LMP 05/25/2017 (Exact Date)   SpO2 " 97%   BMI 19.05 kg/m       GEN:  NAD  NECK: No JVD  C/V:  Regular rate and rhythm, no murmur, rub or gallop.  RESP: Clear to auscultation bilaterally without wheezing, rales, or rhonchi.  GI: Abdomen soft, nontender, nondistended.   EXTREM: No pitting LE edema.   NEURO: Alert and oriented, cooperative. No obvious focal deficits.   PSYCH: Normal affect.  SKIN: Warm and dry.       Recent Lab Results:  LIPID RESULTS:  Lab Results   Component Value Date    CHOL 190 02/13/2020    HDL 60 02/13/2020     (H) 02/13/2020    TRIG 102 02/13/2020    CHOLHDLRATIO 3.7 12/07/2010       LIVER ENZYME RESULTS:  Lab Results   Component Value Date    AST 26 07/01/2021    ALT 31 07/01/2021       CBC RESULTS:  Lab Results   Component Value Date    WBC 10.0 02/26/2022    WBC 6.0 10/29/2020    RBC 5.05 02/26/2022    RBC 4.45 10/29/2020    HGB 14.6 02/26/2022    HGB 13.1 10/29/2020    HCT 45.7 02/26/2022    HCT 40.0 10/29/2020    MCV 91 02/26/2022    MCV 90 10/29/2020    MCH 28.9 02/26/2022    MCH 29.4 10/29/2020    MCHC 31.9 02/26/2022    MCHC 32.8 10/29/2020    RDW 14.3 02/26/2022    RDW 13.4 10/29/2020     02/26/2022     10/29/2020       BMP RESULTS:  Lab Results   Component Value Date     02/26/2022     07/01/2021    POTASSIUM 3.7 02/26/2022    POTASSIUM 4.1 07/01/2021    CHLORIDE 109 02/26/2022    CHLORIDE 109 07/01/2021    CO2 29 02/26/2022    CO2 28 07/01/2021    ANIONGAP 4 02/26/2022    ANIONGAP 3 07/01/2021    GLC 95 02/26/2022    GLC 77 07/01/2021    BUN 17 02/26/2022    BUN 21 07/01/2021    CR 0.73 02/26/2022    CR 0.86 07/01/2021    GFRESTIMATED >90 02/26/2022    GFRESTIMATED 75 07/01/2021    GFRESTBLACK 87 07/01/2021    MARIO 9.0 02/26/2022    MARIO 8.9 07/01/2021        A1C RESULTS:  Lab Results   Component Value Date    A1C 5.2 09/29/2015       INR RESULTS:  Lab Results   Component Value Date    INR 0.95 08/25/2018           Malik Lee PA-C   April 22, 2022

## 2022-04-22 NOTE — LETTER
4/22/2022    Sunshine Butterfield, APRN CNP  [address] [place] [address]    RE: Hina Yap       Dear Colleague,     I had the pleasure of seeing Hina Yap in the [facility].  Primary Cardiologist: Dr. Modi    Reason for Visit: Annual follow up (review need for SBE prophylaxis)    History of Present Illness:   Hina is a pleasant 56 year old female with past medical history notable for hypertension, mild LV dysfunction (felt possibly ischemic CM but angio in 2001 was normal and recent stress nuclear test was normal), tobacco dependence, GERD, and depression.     She is in need of tooth extraction. There was a question about whether she would need antibiotics beforehand. She also needs refill on her amlodipine. She denies SOB, CP, palpitations, peripheral edema, PND, orthopnea, or bleeding issues.     Assessment and Plan:  Hina is a pleasant 56 year old female with past medical history notable for hypertension, mild LV dysfunction (felt possibly ischemic CM but angio in 2001 was normal and recent stress nuclear test was normal), tobacco dependence, GERD, and depression.     I have informed Hina she does not need antibiotic prophylaxis per most recent SBE prophylaxis guidelines. I refilled her amlodipine. She has no symptoms to suggest ischemia, heart failure, or arhythmia. She will continue with her current regimen with no changes.     This note was completed in part using Dragon voice recognition software. Although reviewed after completion, some word and grammatical errors may occur.    Orders this Visit:  No orders of the defined types were placed in this encounter.    Orders Placed This Encounter   Medications     amLODIPine (NORVASC) 2.5 MG tablet     Sig: Take 1 tablet (2.5 mg) by mouth daily     Dispense:  90 tablet     Refill:  3     Medications Discontinued During This Encounter   Medication Reason     benzonatate (TESSALON) 200 MG capsule Not  filled/taken by Patient     ondansetron (ZOFRAN ODT) 4 MG ODT tab Therapy completed     mirabegron (MYRBETRIQ) 25 MG 24 hr tablet Not filled/taken by Patient     amLODIPine (NORVASC) 2.5 MG tablet Reorder         Encounter Diagnoses   Name Primary?     Essential hypertension Yes     Ischemic cardiomyopathy      PVC's (premature ventricular contractions)        CURRENT MEDICATIONS:  Current Outpatient Medications   Medication Sig Dispense Refill     acetaminophen 500 MG CAPS Take 2 capsules by mouth every 8 hours as needed For aches, pain, fever 60 capsule 0     albuterol (PROAIR HFA/PROVENTIL HFA/VENTOLIN HFA) 108 (90 Base) MCG/ACT inhaler Inhale 2 puffs into the lungs every 4 hours as needed for shortness of breath / dyspnea or wheezing 18 g 0     albuterol (PROAIR HFA/PROVENTIL HFA/VENTOLIN HFA) 108 (90 Base) MCG/ACT inhaler Inhale 2 puffs into the lungs every 4 hours as needed for shortness of breath / dyspnea or wheezing 18 g 3     ALPRAZolam (XANAX) 0.5 MG tablet Take 1 tablet (0.5 mg) by mouth 2 times daily 60 tablet 0     amLODIPine (NORVASC) 2.5 MG tablet Take 1 tablet (2.5 mg) by mouth daily 90 tablet 3     amphetamine-dextroamphetamine (ADDERALL XR) 30 MG 24 hr capsule Take 1 capsule (30 mg) by mouth daily 30 capsule 0     amphetamine-dextroamphetamine (ADDERALL) 10 MG tablet Take 1 tablet (10 mg) by mouth daily 30 tablet 0     AVONEX PEN 30 MCG/0.5ML auto-injector kit Inject 30 mcg into the muscle every 7 days        baclofen (LIORESAL) 20 MG tablet Take 20 mg by mouth 4 times daily        esomeprazole (NEXIUM) 40 MG DR capsule Take 1 capsule (40 mg) by mouth every morning (before breakfast) Take 30-60 minutes before eating. 90 capsule 3     ipratropium - albuterol 0.5 mg/2.5 mg/3 mL (DUONEB) 0.5-2.5 (3) MG/3ML neb solution Take 1 vial (3 mLs) by nebulization every 6 hours as needed for shortness of breath / dyspnea or wheezing 30 mL 3     Lidocaine (LIDOCARE) 4 % Patch Place 1 patch onto the skin daily  as needed for moderate pain (musculoskeletal pain) Remove patch after 12 hours. To prevent lidocaine toxicity, patient should be patch free for 12 hrs daily. 15 patch 0     mirtazapine (REMERON) 15 MG tablet Take 2 tablets (30 mg) by mouth At Bedtime 4 tablet 0     nicotine polacrilex (NICORETTE) 4 MG gum PLACE 1 PIECE INSIDE CHEEK AS NEEDED FOR SMOKING CESSATION 100 each 3     OXcarbazepine (TRILEPTAL) 150 MG tablet TAKE 2 TABLETS BY MOUTH EVERY NIGHT AT BEDTIME       oxyCODONE (ROXICODONE) 5 MG tablet Take 3-4 tablets (15-20 mg) by mouth every 6 hours as needed for pain 30 tablet 0     polyethylene glycol (MIRALAX) 17 GM/Dose powder Mix 1 capful with 6-8 ounces of clear liquid and take by mouth twice daily as needed for constipation. Decrease dose to once daily or once every 2-3 days to prevent constipation. 527 g 0     predniSONE (DELTASONE) 20 MG tablet Take two tablets (= 40mg) each day for 5 (five) days 10 tablet 0     pregabalin (LYRICA) 75 MG capsule Take 1 capsule (75 mg) by mouth 2 times daily 180 capsule 3     promethazine (PHENERGAN) 25 MG tablet Take 25 mg by mouth every 6 hours as needed for nausea       rOPINIRole (REQUIP) 2 MG tablet Take 2 mg by mouth every morning Patient is taking 1 tab in the morning and 2 tab at night       tiotropium-olodaterol 2.5-2.5 MCG/ACT AERS Inhale 2 puffs into the lungs daily 4 g 3     tiZANidine (ZANAFLEX) 2 MG tablet Take 1-2 mg by mouth At Bedtime         ALLERGIES     Allergies   Allergen Reactions     Sulfa Drugs Rash     Adhesive Tape Rash     Reacts to adhesive on fentanyl patch     Wellbutrin [Bupropion Hydrobromide] Rash       PAST MEDICAL HISTORY:  Past Medical History:   Diagnosis Date     Anxiety      Arthritis Years ago     COPD (chronic obstructive pulmonary disease) (H)      Depressive disorder Years ago     GERD (gastroesophageal reflux disease)      Hypertension      Ischemic cardiomyopathy      Multiple sclerosis (H)      Neuromuscular disorder (H)       Nonrheumatic mitral valve regurgitation      PVC's (premature ventricular contractions)      Renal disease      Restless leg syndrome      Tobacco use disorder        PAST SURGICAL HISTORY:  Past Surgical History:   Procedure Laterality Date     GYN SURGERY      tubal ligation     OPTICAL TRACKING SYSTEM FUSION POSTERIOR SPINE LUMBAR N/A 7/23/2021    Procedure: L4-5 transforaminal lumbar interbody fusion with reduction of grade 1/2 unstable spondylolisthesis   Open reduction and internal fixation of the grade L4-5 spondylolisthesis L4 - L5 posterior lateral fusion;  Surgeon: Asif Flores MD;  Location:  OR       FAMILY HISTORY:  Family History   Problem Relation Age of Onset     Diabetes Maternal Grandmother      Breast Cancer Mother      Osteoporosis Mother      Other Cancer Father      Depression Brother      Anxiety Disorder Brother      Substance Abuse Brother        SOCIAL HISTORY:  Social History     Socioeconomic History     Marital status: Single     Spouse name: None     Number of children: None     Years of education: None     Highest education level: None   Tobacco Use     Smoking status: Current Every Day Smoker     Packs/day: 0.50     Years: 35.00     Pack years: 17.50     Types: Cigarettes     Smokeless tobacco: Never Used   Substance and Sexual Activity     Alcohol use: Not Currently     Alcohol/week: 0.0 standard drinks     Drug use: No     Sexual activity: Yes   Other Topics Concern     Parent/sibling w/ CABG, MI or angioplasty before 65F 55M? No       Review of Systems:  Skin:  Positive for     Eyes:  Positive for glasses  ENT:  Negative    Respiratory:  Positive for dyspnea on exertion  Cardiovascular:    fatigue;Positive for  Gastroenterology: Positive for heartburn  Genitourinary:  Positive for incontinence  Musculoskeletal:  Positive for back pain;arthritis;joint pain  Neurologic:  Positive for numbness or tingling of hands;numbness or tingling of feet  Psychiatric:   "Positive for anxiety;depression  Heme/Lymph/Imm:  Negative    Endocrine:  Negative      Physical Exam:  Vitals: /68   Pulse 91   Ht 1.702 m (5' 7\")   Wt 55.2 kg (121 lb 9.6 oz)   LMP 05/25/2017 (Exact Date)   SpO2 97%   BMI 19.05 kg/m       GEN:  NAD  NECK: No JVD  C/V:  Regular rate and rhythm, no murmur, rub or gallop.  RESP: Clear to auscultation bilaterally without wheezing, rales, or rhonchi.  GI: Abdomen soft, nontender, nondistended.   EXTREM: No pitting LE edema.   NEURO: Alert and oriented, cooperative. No obvious focal deficits.   PSYCH: Normal affect.  SKIN: Warm and dry.       Recent Lab Results:  LIPID RESULTS:  Lab Results   Component Value Date    CHOL 190 02/13/2020    HDL 60 02/13/2020     (H) 02/13/2020    TRIG 102 02/13/2020    CHOLHDLRATIO 3.7 12/07/2010       LIVER ENZYME RESULTS:  Lab Results   Component Value Date    AST 26 07/01/2021    ALT 31 07/01/2021       CBC RESULTS:  Lab Results   Component Value Date    WBC 10.0 02/26/2022    WBC 6.0 10/29/2020    RBC 5.05 02/26/2022    RBC 4.45 10/29/2020    HGB 14.6 02/26/2022    HGB 13.1 10/29/2020    HCT 45.7 02/26/2022    HCT 40.0 10/29/2020    MCV 91 02/26/2022    MCV 90 10/29/2020    MCH 28.9 02/26/2022    MCH 29.4 10/29/2020    MCHC 31.9 02/26/2022    MCHC 32.8 10/29/2020    RDW 14.3 02/26/2022    RDW 13.4 10/29/2020     02/26/2022     10/29/2020       BMP RESULTS:  Lab Results   Component Value Date     02/26/2022     07/01/2021    POTASSIUM 3.7 02/26/2022    POTASSIUM 4.1 07/01/2021    CHLORIDE 109 02/26/2022    CHLORIDE 109 07/01/2021    CO2 29 02/26/2022    CO2 28 07/01/2021    ANIONGAP 4 02/26/2022    ANIONGAP 3 07/01/2021    GLC 95 02/26/2022    GLC 77 07/01/2021    BUN 17 02/26/2022    BUN 21 07/01/2021    CR 0.73 02/26/2022    CR 0.86 07/01/2021    GFRESTIMATED >90 02/26/2022    GFRESTIMATED 75 07/01/2021    GFRESTBLACK 87 07/01/2021    MARIO 9.0 02/26/2022    MARIO 8.9 07/01/2021        A1C " RESULTS:  Lab Results   Component Value Date    A1C 5.2 09/29/2015       INR RESULTS:  Lab Results   Component Value Date    INR 0.95 08/25/2018           Malik Lee PA-C   April 22, 2022   Thank you for allowing me to participate in the care of your patient.      Sincerely,     Malik Lee PA-C     [facility]  cc: Referred Self

## 2022-04-22 NOTE — PATIENT INSTRUCTIONS
Today's Plan:   1) You can go ahead with your tooth extraction. No antibiotics needed.   2) Amlodipine is refilled.     If you have questions or concerns please call my nurse team at [phone].     Scheduling phone number: [phone].  Reminder: Please bring in all current medications, over the counter supplements and vitamin bottles to your next appointment.    It was a pleasure seeing you today!     Malik Lee PA-C  4/22/2022

## 2022-04-26 ENCOUNTER — TRANSFERRED RECORDS (OUTPATIENT)
Dept: HEALTH INFORMATION MANAGEMENT | Facility: CLINIC | Age: 57
End: 2022-04-26
Payer: MEDICAID

## 2022-04-27 ENCOUNTER — TRANSFERRED RECORDS (OUTPATIENT)
Dept: HEALTH INFORMATION MANAGEMENT | Facility: CLINIC | Age: 57
End: 2022-04-27
Payer: MEDICAID

## 2022-05-03 ENCOUNTER — TRANSFERRED RECORDS (OUTPATIENT)
Dept: HEALTH INFORMATION MANAGEMENT | Facility: CLINIC | Age: 57
End: 2022-05-03
Payer: MEDICAID

## 2022-05-05 ENCOUNTER — TRANSFERRED RECORDS (OUTPATIENT)
Dept: HEALTH INFORMATION MANAGEMENT | Facility: CLINIC | Age: 57
End: 2022-05-05
Payer: MEDICAID

## 2022-05-08 DIAGNOSIS — K21.9 GASTROESOPHAGEAL REFLUX DISEASE, UNSPECIFIED WHETHER ESOPHAGITIS PRESENT: ICD-10-CM

## 2022-05-10 RX ORDER — ESOMEPRAZOLE MAGNESIUM 40 MG/1
CAPSULE, DELAYED RELEASE ORAL
Qty: 90 CAPSULE | Refills: 3 | OUTPATIENT
Start: 2022-05-10

## 2022-05-24 ENCOUNTER — TRANSFERRED RECORDS (OUTPATIENT)
Dept: HEALTH INFORMATION MANAGEMENT | Facility: CLINIC | Age: 57
End: 2022-05-24
Payer: MEDICAID

## 2022-06-22 ENCOUNTER — TRANSFERRED RECORDS (OUTPATIENT)
Dept: HEALTH INFORMATION MANAGEMENT | Facility: CLINIC | Age: 57
End: 2022-06-22

## 2022-06-28 ENCOUNTER — TELEPHONE (OUTPATIENT)
Dept: PEDIATRICS | Facility: CLINIC | Age: 57
End: 2022-06-28

## 2022-06-28 NOTE — TELEPHONE ENCOUNTER
Patient Quality Outreach    Patient is due for the following:   Colon Cancer Screening -  FIT  Breast Cancer Screening - Mammogram  Cervical Cancer Screening - PAP Needed  Depression  -  PHQ-9 Needed  Physical  - Due after 2/13/21  Immunizations  -  Covid, Pneumococcal and Zoster  Last seen by [facility] 7-1-21    NEXT STEPS:   Schedule a yearly physical and mammogram    Type of outreach:    Sent Dragon Ports message. + PHQ9    Questions for provider review:    None     Sasha Cerda MA  Chart routed to Care Team.

## 2022-07-07 NOTE — TELEPHONE ENCOUNTER
Patient Quality Outreach    Patient is due for the following:   Colon Cancer Screening -  FIT  Breast Cancer Screening - Mammogram  Cervical Cancer Screening - PAP Needed  Depression  -  PHQ-9 Needed  Physical  - see below    NEXT STEPS:   Schedule a yearly physical and mammogram - see below    Type of outreach:    Phone, left message for patient/parent to call back.    Next Steps:  Reach out within 90 days via Phone.    Max number of attempts reached: Yes. Will try again in 90 days if patient still on fail list.    Questions for provider review:    None     Sasha Cerda MA  Chart closed.

## 2022-07-29 ENCOUNTER — OFFICE VISIT (OUTPATIENT)
Dept: PEDIATRICS | Facility: CLINIC | Age: 57
End: 2022-07-29
Payer: MEDICAID

## 2022-07-29 ENCOUNTER — TELEPHONE (OUTPATIENT)
Dept: PEDIATRICS | Facility: CLINIC | Age: 57
End: 2022-07-29

## 2022-07-29 VITALS
OXYGEN SATURATION: 96 % | BODY MASS INDEX: 18.62 KG/M2 | HEART RATE: 74 BPM | RESPIRATION RATE: 14 BRPM | DIASTOLIC BLOOD PRESSURE: 84 MMHG | TEMPERATURE: 97.4 F | HEIGHT: 67 IN | WEIGHT: 118.6 LBS | SYSTOLIC BLOOD PRESSURE: 137 MMHG

## 2022-07-29 DIAGNOSIS — F33.2 SEVERE EPISODE OF RECURRENT MAJOR DEPRESSIVE DISORDER, WITHOUT PSYCHOTIC FEATURES (H): Primary | ICD-10-CM

## 2022-07-29 DIAGNOSIS — Z79.899 ENCOUNTER FOR LONG-TERM (CURRENT) USE OF MEDICATIONS: ICD-10-CM

## 2022-07-29 DIAGNOSIS — K21.9 GASTROESOPHAGEAL REFLUX DISEASE, UNSPECIFIED WHETHER ESOPHAGITIS PRESENT: ICD-10-CM

## 2022-07-29 DIAGNOSIS — F41.9 ANXIETY: ICD-10-CM

## 2022-07-29 DIAGNOSIS — J44.9 CHRONIC OBSTRUCTIVE PULMONARY DISEASE, UNSPECIFIED COPD TYPE (H): ICD-10-CM

## 2022-07-29 DIAGNOSIS — L72.3 SEBACEOUS CYST: ICD-10-CM

## 2022-07-29 DIAGNOSIS — R68.89 COLD INTOLERANCE: ICD-10-CM

## 2022-07-29 DIAGNOSIS — G35 MULTIPLE SCLEROSIS (H): ICD-10-CM

## 2022-07-29 DIAGNOSIS — R53.83 OTHER FATIGUE: ICD-10-CM

## 2022-07-29 PROCEDURE — 36415 COLL VENOUS BLD VENIPUNCTURE: CPT | Performed by: NURSE PRACTITIONER

## 2022-07-29 PROCEDURE — 96127 BRIEF EMOTIONAL/BEHAV ASSMT: CPT | Performed by: NURSE PRACTITIONER

## 2022-07-29 PROCEDURE — 84443 ASSAY THYROID STIM HORMONE: CPT | Performed by: NURSE PRACTITIONER

## 2022-07-29 PROCEDURE — 99215 OFFICE O/P EST HI 40 MIN: CPT | Performed by: NURSE PRACTITIONER

## 2022-07-29 PROCEDURE — 80048 BASIC METABOLIC PNL TOTAL CA: CPT | Performed by: NURSE PRACTITIONER

## 2022-07-29 RX ORDER — DEXTROAMPHETAMINE SACCHARATE, AMPHETAMINE ASPARTATE MONOHYDRATE, DEXTROAMPHETAMINE SULFATE AND AMPHETAMINE SULFATE 7.5; 7.5; 7.5; 7.5 MG/1; MG/1; MG/1; MG/1
30 CAPSULE, EXTENDED RELEASE ORAL DAILY
Qty: 30 CAPSULE | Refills: 0 | Status: SHIPPED | OUTPATIENT
Start: 2022-07-29 | End: 2022-08-26

## 2022-07-29 RX ORDER — ESOMEPRAZOLE MAGNESIUM 40 MG/1
40 CAPSULE, DELAYED RELEASE ORAL
Qty: 90 CAPSULE | Refills: 3 | Status: SHIPPED | OUTPATIENT
Start: 2022-07-29 | End: 2023-05-18

## 2022-07-29 RX ORDER — MIRTAZAPINE 7.5 MG/1
TABLET, FILM COATED ORAL
Qty: 311 TABLET | Refills: 0 | Status: SHIPPED | OUTPATIENT
Start: 2022-07-29 | End: 2022-10-04

## 2022-07-29 RX ORDER — DEXTROAMPHETAMINE SACCHARATE, AMPHETAMINE ASPARTATE, DEXTROAMPHETAMINE SULFATE AND AMPHETAMINE SULFATE 2.5; 2.5; 2.5; 2.5 MG/1; MG/1; MG/1; MG/1
10 TABLET ORAL DAILY
Qty: 30 TABLET | Refills: 0 | Status: SHIPPED | OUTPATIENT
Start: 2022-07-29 | End: 2022-08-26

## 2022-07-29 RX ORDER — ALPRAZOLAM 0.5 MG
0.5 TABLET ORAL DAILY
Qty: 30 TABLET | Refills: 0 | Status: SHIPPED | OUTPATIENT
Start: 2022-07-29 | End: 2022-08-26

## 2022-07-29 ASSESSMENT — ANXIETY QUESTIONNAIRES
4. TROUBLE RELAXING: NEARLY EVERY DAY
8. IF YOU CHECKED OFF ANY PROBLEMS, HOW DIFFICULT HAVE THESE MADE IT FOR YOU TO DO YOUR WORK, TAKE CARE OF THINGS AT HOME, OR GET ALONG WITH OTHER PEOPLE?: EXTREMELY DIFFICULT
7. FEELING AFRAID AS IF SOMETHING AWFUL MIGHT HAPPEN: SEVERAL DAYS
2. NOT BEING ABLE TO STOP OR CONTROL WORRYING: NEARLY EVERY DAY
GAD7 TOTAL SCORE: 18
6. BECOMING EASILY ANNOYED OR IRRITABLE: MORE THAN HALF THE DAYS
GAD7 TOTAL SCORE: 18
IF YOU CHECKED OFF ANY PROBLEMS ON THIS QUESTIONNAIRE, HOW DIFFICULT HAVE THESE PROBLEMS MADE IT FOR YOU TO DO YOUR WORK, TAKE CARE OF THINGS AT HOME, OR GET ALONG WITH OTHER PEOPLE: EXTREMELY DIFFICULT
7. FEELING AFRAID AS IF SOMETHING AWFUL MIGHT HAPPEN: SEVERAL DAYS
3. WORRYING TOO MUCH ABOUT DIFFERENT THINGS: NEARLY EVERY DAY
1. FEELING NERVOUS, ANXIOUS, OR ON EDGE: NEARLY EVERY DAY
5. BEING SO RESTLESS THAT IT IS HARD TO SIT STILL: NEARLY EVERY DAY
GAD7 TOTAL SCORE: 18

## 2022-07-29 ASSESSMENT — PATIENT HEALTH QUESTIONNAIRE - PHQ9
10. IF YOU CHECKED OFF ANY PROBLEMS, HOW DIFFICULT HAVE THESE PROBLEMS MADE IT FOR YOU TO DO YOUR WORK, TAKE CARE OF THINGS AT HOME, OR GET ALONG WITH OTHER PEOPLE: EXTREMELY DIFFICULT
SUM OF ALL RESPONSES TO PHQ QUESTIONS 1-9: 23
SUM OF ALL RESPONSES TO PHQ QUESTIONS 1-9: 23

## 2022-07-29 ASSESSMENT — PAIN SCALES - GENERAL: PAINLEVEL: SEVERE PAIN (6)

## 2022-07-29 NOTE — TELEPHONE ENCOUNTER
PAL 4:  Per check out note - please assist:    Needs a new neb machine and tubing - we currently do not have any in clinic.     Pt wants us to call psychiatry and dermatology to schedule. Sunshine Butterfield referred her. Has issues making a phone call due to anxiety.    Patient is aware you will be calling her.    Sasha Cerda, CMA

## 2022-07-29 NOTE — PATIENT INSTRUCTIONS
Please schedule with psychiatry  Please schedule with therapy at [facility]  Re-start Adderall  Re-start Mirtazapine  Care coordination will help to make sure you get an appointment.  See dermatology    Psychiatric Crisis Resources  National Suicide Crisis Line- dial 988  Crisis Text Line - text  [place]  to 119329 (standard data and text rates apply)  In the metro area, people in crisis can call **CRISIS (202034) from a mobile phone.  National Suicide Prevention Lifeline at 7-042-957-XCDJ (5924), Talk to Someone Now (Press 1 for  Crisis Line)  [facility] Helpline at 806-634-1015  You can go to any emergency room or call 911. Specifically, [facility] and [facility] are excellent for psychiatrist crises, as they have a dedicated psych team. [place] is better for people 17 and under.

## 2022-07-29 NOTE — TELEPHONE ENCOUNTER
Pt is scheduled at [facility] in [place]. [facility] is booked out until next year.     August 26th @ 10:00 with Dr. Lilia Vasquez  [address]  [place] [address]  [phone].    I have reached out to pt and introduced myself. She would like communication to go through [employer].     Jasmine Farooq, EMT at 1:59 PM on July 29, 2022  Clinic Health Guide  [phone]

## 2022-07-29 NOTE — TELEPHONE ENCOUNTER
I tried to place another referral. Please let me know if that isn't correct.    Insurance will probably cover. [facility] will be able to tell her

## 2022-07-29 NOTE — TELEPHONE ENCOUNTER
Orders faxed to [facility] [phone]    Conrad wants to know what the referral is for. Will PCP be doing pt's medication or will psychiatry  be doing medication? They don't want the diagnosis they want to know what you want the pt to be seen for.     Routing for guidance.     Called pt and [place] message was sent for direct information.     Jasmine Farooq, EMT at 12:53 PM on July 29, 2022  Clinic Health Guide  [phone]

## 2022-07-29 NOTE — TELEPHONE ENCOUNTER
I'd like the pt to be seen for depression and anxiety. I've been unable to manage it. She has been on myriad medications and has combination passive and active SI (no active SI today).    Could you also introduce yourself to her?
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
## 2022-07-29 NOTE — TELEPHONE ENCOUNTER
Pt is scheduled for Derm at [facility] soonest avail Feb 2nd at 10:45. Pt was put on wait list in case of cancellation.       Called for Mental health apt. They state referral is not correct. Is it short term? CCPS? Long term?       Pt is unsure where she wants neb machine and supplies filled at. She wants to know if insurance will cover. I told her I was unsure she would have to check with insurance. She is unable to do so. So, we are going to try [facility] medical and see.       Routing to provider for guidance.     Jasmine Farooq, EMT at 11:59 AM on July 29, 2022  [facility] Health Guide  [phone]

## 2022-07-30 LAB
ANION GAP SERPL CALCULATED.3IONS-SCNC: 5 MMOL/L (ref 3–14)
BUN SERPL-MCNC: 19 MG/DL (ref 7–30)
CALCIUM SERPL-MCNC: 8.8 MG/DL (ref 8.5–10.1)
CHLORIDE BLD-SCNC: 109 MMOL/L (ref 94–109)
CO2 SERPL-SCNC: 28 MMOL/L (ref 20–32)
CREAT SERPL-MCNC: 0.73 MG/DL (ref 0.52–1.04)
GFR SERPL CREATININE-BSD FRML MDRD: >90 ML/MIN/1.73M2
GLUCOSE BLD-MCNC: 96 MG/DL (ref 70–99)
POTASSIUM BLD-SCNC: 3.4 MMOL/L (ref 3.4–5.3)
SODIUM SERPL-SCNC: 142 MMOL/L (ref 133–144)
TSH SERPL DL<=0.005 MIU/L-ACNC: 3.01 MU/L (ref 0.4–4)

## 2022-08-02 ENCOUNTER — PATIENT OUTREACH (OUTPATIENT)
Dept: NURSING | Facility: CLINIC | Age: 57
End: 2022-08-02
Attending: NURSE PRACTITIONER

## 2022-08-02 DIAGNOSIS — I10 ESSENTIAL HYPERTENSION: Primary | ICD-10-CM

## 2022-08-02 DIAGNOSIS — F33.2 SEVERE EPISODE OF RECURRENT MAJOR DEPRESSIVE DISORDER, WITHOUT PSYCHOTIC FEATURES (H): ICD-10-CM

## 2022-08-02 NOTE — PROGRESS NOTES
Clinic Care Coordination Contact  [facility]/Voicemail    Referral Source: PCP  Clinical Data: Primary Care- Care Coordination referral received to assist with Housing or other community resources. (Psychiatry need had already been met by [facility])    Care Coordinator Outreach  [facility] outreached to pt on this date. Explained reason for the call. Pt expressed feeling very overwhelmed and although appreciated the call and all the help, denied any needs right now. Pt agreed to receive Care Coordination letter via [employer] for future reference. Declined a Care Coordination assessment.   Plan: Care Coordinator will send care coordination introduction letter with care coordinator contact information and explanation of care coordination services via [employer]. Care Coordinator will do no further outreaches at this time.    Jf Riddle, [facility]  Clinic Care Coordinator  [facility]  [facility]  [phone]  [email or web address]

## 2022-08-16 ENCOUNTER — TELEPHONE (OUTPATIENT)
Dept: PEDIATRICS | Facility: CLINIC | Age: 57
End: 2022-08-16

## 2022-08-16 NOTE — TELEPHONE ENCOUNTER
Left message for patient that we have neb machines in stock now, she can stop by clinic to pick one up if she is still in need, order at [facility] in bottom of basket by fax machine.

## 2022-08-24 ENCOUNTER — TRANSFERRED RECORDS (OUTPATIENT)
Dept: HEALTH INFORMATION MANAGEMENT | Facility: CLINIC | Age: 57
End: 2022-08-24

## 2022-08-26 ENCOUNTER — TELEPHONE (OUTPATIENT)
Dept: PEDIATRICS | Facility: CLINIC | Age: 57
End: 2022-08-26

## 2022-08-26 ENCOUNTER — OFFICE VISIT (OUTPATIENT)
Dept: PEDIATRICS | Facility: CLINIC | Age: 57
End: 2022-08-26
Payer: MEDICAID

## 2022-08-26 VITALS
WEIGHT: 117 LBS | DIASTOLIC BLOOD PRESSURE: 92 MMHG | TEMPERATURE: 97.5 F | RESPIRATION RATE: 16 BRPM | OXYGEN SATURATION: 97 % | SYSTOLIC BLOOD PRESSURE: 132 MMHG | BODY MASS INDEX: 18.32 KG/M2

## 2022-08-26 DIAGNOSIS — R53.83 OTHER FATIGUE: ICD-10-CM

## 2022-08-26 DIAGNOSIS — R35.0 URINARY FREQUENCY: ICD-10-CM

## 2022-08-26 DIAGNOSIS — Z12.31 VISIT FOR SCREENING MAMMOGRAM: ICD-10-CM

## 2022-08-26 DIAGNOSIS — R63.8 DECREASED ORAL INTAKE: ICD-10-CM

## 2022-08-26 DIAGNOSIS — F41.9 ANXIETY: ICD-10-CM

## 2022-08-26 DIAGNOSIS — L72.3 SEBACEOUS CYST: Primary | ICD-10-CM

## 2022-08-26 DIAGNOSIS — G35 MULTIPLE SCLEROSIS (H): ICD-10-CM

## 2022-08-26 DIAGNOSIS — Z12.11 SCREEN FOR COLON CANCER: ICD-10-CM

## 2022-08-26 DIAGNOSIS — R12 HEARTBURN: ICD-10-CM

## 2022-08-26 DIAGNOSIS — G89.29 OTHER CHRONIC PAIN: ICD-10-CM

## 2022-08-26 DIAGNOSIS — Z12.4 CERVICAL CANCER SCREENING: ICD-10-CM

## 2022-08-26 PROCEDURE — 99215 OFFICE O/P EST HI 40 MIN: CPT | Performed by: NURSE PRACTITIONER

## 2022-08-26 PROCEDURE — G0145 SCR C/V CYTO,THINLAYER,RESCR: HCPCS | Performed by: NURSE PRACTITIONER

## 2022-08-26 PROCEDURE — 87624 HPV HI-RISK TYP POOLED RSLT: CPT | Performed by: NURSE PRACTITIONER

## 2022-08-26 RX ORDER — POLYETHYLENE GLYCOL 3350 17 G/17G
1 POWDER, FOR SOLUTION ORAL DAILY
COMMUNITY
Start: 2022-08-26 | End: 2022-09-17

## 2022-08-26 RX ORDER — DEXTROAMPHETAMINE SACCHARATE, AMPHETAMINE ASPARTATE MONOHYDRATE, DEXTROAMPHETAMINE SULFATE AND AMPHETAMINE SULFATE 7.5; 7.5; 7.5; 7.5 MG/1; MG/1; MG/1; MG/1
30 CAPSULE, EXTENDED RELEASE ORAL DAILY
Qty: 30 CAPSULE | Refills: 0 | Status: ON HOLD | OUTPATIENT
Start: 2022-08-26 | End: 2022-09-18

## 2022-08-26 RX ORDER — DEXTROAMPHETAMINE SACCHARATE, AMPHETAMINE ASPARTATE, DEXTROAMPHETAMINE SULFATE AND AMPHETAMINE SULFATE 2.5; 2.5; 2.5; 2.5 MG/1; MG/1; MG/1; MG/1
10 TABLET ORAL DAILY
Qty: 30 TABLET | Refills: 0 | Status: SHIPPED | OUTPATIENT
Start: 2022-08-26 | End: 2022-09-29

## 2022-08-26 RX ORDER — LACTOSE-REDUCED FOOD
237 LIQUID (ML) ORAL 2 TIMES DAILY
Qty: 14220 ML | Refills: 11 | Status: SHIPPED | OUTPATIENT
Start: 2022-08-26 | End: 2023-09-15

## 2022-08-26 RX ORDER — ALPRAZOLAM 0.5 MG
0.5 TABLET ORAL DAILY
Qty: 30 TABLET | Refills: 0 | Status: SHIPPED | OUTPATIENT
Start: 2022-08-26 | End: 2022-09-18

## 2022-08-26 RX ORDER — FERROUS SULFATE 325(65) MG
325 TABLET ORAL
COMMUNITY
Start: 2022-08-26 | End: 2024-08-15

## 2022-08-26 RX ORDER — PREGABALIN 50 MG/1
50 CAPSULE ORAL 2 TIMES DAILY
COMMUNITY
Start: 2022-08-26 | End: 2023-04-28 | Stop reason: DRUGHIGH

## 2022-08-26 ASSESSMENT — ANXIETY QUESTIONNAIRES
8. IF YOU CHECKED OFF ANY PROBLEMS, HOW DIFFICULT HAVE THESE MADE IT FOR YOU TO DO YOUR WORK, TAKE CARE OF THINGS AT HOME, OR GET ALONG WITH OTHER PEOPLE?: EXTREMELY DIFFICULT
GAD7 TOTAL SCORE: 19
GAD7 TOTAL SCORE: 19
7. FEELING AFRAID AS IF SOMETHING AWFUL MIGHT HAPPEN: SEVERAL DAYS
IF YOU CHECKED OFF ANY PROBLEMS ON THIS QUESTIONNAIRE, HOW DIFFICULT HAVE THESE PROBLEMS MADE IT FOR YOU TO DO YOUR WORK, TAKE CARE OF THINGS AT HOME, OR GET ALONG WITH OTHER PEOPLE: EXTREMELY DIFFICULT
4. TROUBLE RELAXING: NEARLY EVERY DAY
2. NOT BEING ABLE TO STOP OR CONTROL WORRYING: NEARLY EVERY DAY
GAD7 TOTAL SCORE: 19
6. BECOMING EASILY ANNOYED OR IRRITABLE: NEARLY EVERY DAY
7. FEELING AFRAID AS IF SOMETHING AWFUL MIGHT HAPPEN: SEVERAL DAYS
5. BEING SO RESTLESS THAT IT IS HARD TO SIT STILL: NEARLY EVERY DAY
3. WORRYING TOO MUCH ABOUT DIFFERENT THINGS: NEARLY EVERY DAY
1. FEELING NERVOUS, ANXIOUS, OR ON EDGE: NEARLY EVERY DAY

## 2022-08-26 ASSESSMENT — PAIN SCALES - GENERAL: PAINLEVEL: EXTREME PAIN (8)

## 2022-08-26 ASSESSMENT — PATIENT HEALTH QUESTIONNAIRE - PHQ9
10. IF YOU CHECKED OFF ANY PROBLEMS, HOW DIFFICULT HAVE THESE PROBLEMS MADE IT FOR YOU TO DO YOUR WORK, TAKE CARE OF THINGS AT HOME, OR GET ALONG WITH OTHER PEOPLE: EXTREMELY DIFFICULT
SUM OF ALL RESPONSES TO PHQ QUESTIONS 1-9: 22
SUM OF ALL RESPONSES TO PHQ QUESTIONS 1-9: 22

## 2022-08-26 NOTE — TELEPHONE ENCOUNTER
Prior Authorization Retail Medication Request/CMM key# [id number]    Medication/Dose: Naloxone HCL 4mg/0.1ml liquid  ICD code (if different than what is on RX):    Previously Tried and Failed:    Rationale:      Insurance Name:  [place]  Insurance ID:  [id number]      Pharmacy Information (if different than what is on RX)  Name:  [facility]  Phone:  [phone]

## 2022-08-26 NOTE — PROGRESS NOTES
Assessment & Plan     (L72.3) Sebaceous cyst  (primary encounter diagnosis)  Comment: Doesn't appear infected. Does appear to be a sebaceous cyst because there is a blackhead. She thinks its painful and doesn't want to wait until February to see derm  Plan: Adult General Surg Referral            (Z12.31) Visit for screening mammogram  Comment: Will schedule  Plan: MA SCREENING DIGITAL BILAT - Future  (s+30)            (Z12.11) Screen for colon cancer  Plan: Fecal colorectal cancer screen FIT - Future         (S+30)            (Z12.4) Cervical cancer screening  Comment: Done today  Plan: Pap Screen with HPV - recommended age 30 - 65         years    (R35.0) Urinary frequency  Comment: Ongoing issue with frequency and retention. Doesn't think it has changed recently.   Plan: CANCELED: Adult Urology  Referral        States she wishes to hold off on urology visit for now.     (F41.9) Anxiety  Comment: Ongoing severe anxiety and depression. Hasn't noticed an improvement yet with Remron but is still up titrating. Has an appointment early next week to establish with psych. Only passive SI today.   Plan: ALPRAZolam (XANAX) 0.5 MG tablet, naloxone         (NARCAN) 4 MG/0.1ML nasal spray     -Contracted for safety. Crisis resources discussed  -Again reviewed risks of xanax combined with opioids. Narcan prescribed  -Continue to up titrate remeron per instructions  -See psych Tuesday  -Continue therapy.     (R53.83) Other fatigue  Comment: Discussed potential interaction with ensure shakes.   Plan: amphetamine-dextroamphetamine (ADDERALL XR) 30         MG 24 hr capsule, amphetamine-dextroamphetamine        (ADDERALL) 10 MG tablet        -Monitor for interactions-excessive adderall in system. I've also reached out to pharmacy to discuss.     (G35) Multiple sclerosis (H)  Comment: Follows with neuro  Plan: amphetamine-dextroamphetamine (ADDERALL XR) 30         MG 24 hr capsule, amphetamine-dextroamphetamine         (ADDERALL) 10 MG tablet            (R63.8) Decreased oral intake  Comment: Discussed at length today. No signs of eating disorder such as anorexia nervosa. Does have some GI issues (discussed below). However, this seems to be appetite suppression more related to her depression.   Plan: Nutritional Supplements (ENSURE ORIGINAL) LIQD  Will discuss with psych and GI    (R12) Heartburn  Comment: Ongoing issue. Previously on nexium with control. Now poorly controlled. No exertional sx to suggest this is actually cardiac dz. No dark stools.   Plan: Adult GI  Referral - Consult Only,         Adult GI  Referral - Procedure Only         (G89.29) Other chronic pain  Comment: Noted dose adjustment by pain team  Plan: pregabalin (LYRICA) 50 MG capsule             Depression Screening Follow Up    PHQ 8/26/2022   PHQ-9 Total Score 22   Q9: Thoughts of better off dead/self-harm past 2 weeks Several days   F/U: Thoughts of suicide or self-harm Yes   F/U: Self harm-plan No   F/U: Self-harm action No   F/U: Safety concerns No     No follow-ups on file.    BENNETT Marvin CNP  [facility] SANDEEP Santamaria is a 56 year old, presenting for the following health issues:  Anxiety, Depression, and Gyn Exam    Stomach burning, non exertional. At baseline has low appetite. Takes ibuprofen a few times a week. Takes 800mg. No ETOH.  History of Present Illness       Reason for visit:  Pap smear Physical    She eats 0-1 servings of fruits and vegetables daily.She consumes 1 sweetened beverage(s) daily.She exercises with enough effort to increase her heart rate 9 or less minutes per day.  She exercises with enough effort to increase her heart rate 3 or less days per week.   She is taking medications regularly.    Today's PHQ-9         PHQ-9 Total Score: 22    PHQ-9 Q9 Thoughts of better off dead/self-harm past 2 weeks :   Several days  Thoughts of suicide or self harm: (P) Yes  Self-harm  Plan:   (P) No  Self-harm Action:     (P) No  Safety concerns for self or others: (P) No    How difficult have these problems made it for you to do your work, take care of things at home, or get along with other people: Extremely difficult  Today's KAMALA-7 Score: 19       Social History     Tobacco Use     Smoking status: Current Every Day Smoker     Packs/day: 0.50     Years: 35.00     Pack years: 17.50     Types: Cigarettes     Smokeless tobacco: Never Used   Vaping Use     Vaping Use: Never used   Substance Use Topics     Alcohol use: Not Currently     Alcohol/week: 0.0 standard drinks     Drug use: No     PHQ 4/15/2021 7/29/2022 8/26/2022   PHQ-9 Total Score 11 23 22   Q9: Thoughts of better off dead/self-harm past 2 weeks Not at all Several days Several days   F/U: Thoughts of suicide or self-harm - Yes Yes   F/U: Self harm-plan - No No   F/U: Self-harm action - No No   F/U: Safety concerns - Yes No     KAMALA-7 SCORE 4/28/2020 11/5/2020 7/29/2022   Total Score - 14 (moderate anxiety) 18 (severe anxiety)   Total Score 18 14 18       Review of Systems   Constitutional, HEENT, cardiovascular, pulmonary, GI, , musculoskeletal, neuro, skin, endocrine and psych systems are negative, except as otherwise noted.      Objective    BP (!) 132/92 (BP Location: Right arm, Patient Position: Chair, Cuff Size: Adult Regular)   Temp 97.5  F (36.4  C) (Tympanic)   Resp 16   Wt 53.1 kg (117 lb)   LMP 05/25/2017 (Exact Date)   SpO2 97%   BMI 18.32 kg/m    Body mass index is 18.32 kg/m .  Physical Exam   CONSTITUTIONAL: Alert, well-nourished, well-groomed, NAD  PSYCH: Flat affect. Appropriate mentation and speech.    (female): normal female external genitalia, vaginal mucosa pink, moist, well rugated and normal cervix. Normal vaginal discharge    50 minutes spent with patient, over 1/2 in counseling and coordination of care regarding the above diagnoses      .  ..

## 2022-08-26 NOTE — PATIENT INSTRUCTIONS
-I'll send in ensure, adderall, xanax    -Please see gastrointestinal doctor (GI) for an endoscopy and an office visit. I'll ask someone to help you with a medical ride    -Call GIDEON Montenegro if you want to talk about [facility]  Clinic Care Coordinator  [facility]  [phone]    -Please send in your stool test      -Psych appt is at the [facility]

## 2022-08-29 NOTE — TELEPHONE ENCOUNTER
Prior Authorization Not Needed per Insurance    Medication: Naloxone HCL 4mg/0.1ml liquid  Insurance Company: [place] Medicaid ([facility]) - Phone [phone] Fax [phone]  Expected CoPay:      Pharmacy Filling the Rx: [id number] - [place], [place] - [address] [place] [facility] [place] [address]  Pharmacy Notified: Yes  Patient Notified: Yes    Patient's insurance covers brand name Narcan. Pharmacy received paid claim, no PA needed.

## 2022-08-30 LAB
BKR LAB AP GYN ADEQUACY: NORMAL
BKR LAB AP GYN INTERPRETATION: NORMAL
BKR LAB AP HPV REFLEX: NORMAL
BKR LAB AP PREVIOUS ABNORMAL: NORMAL
PATH REPORT.COMMENTS IMP SPEC: NORMAL
PATH REPORT.COMMENTS IMP SPEC: NORMAL
PATH REPORT.RELEVANT HX SPEC: NORMAL

## 2022-09-01 LAB
HUMAN PAPILLOMA VIRUS 16 DNA: NEGATIVE
HUMAN PAPILLOMA VIRUS 18 DNA: NEGATIVE
HUMAN PAPILLOMA VIRUS FINAL DIAGNOSIS: NORMAL
HUMAN PAPILLOMA VIRUS OTHER HR: NEGATIVE

## 2022-09-02 ENCOUNTER — TELEPHONE (OUTPATIENT)
Dept: PEDIATRICS | Facility: CLINIC | Age: 57
End: 2022-09-02

## 2022-09-07 NOTE — TELEPHONE ENCOUNTER
Attempt #3. Called and LVM. Have also called in the past 9/2 and sent Mychart.     Jasmine Farooq, EMT at 11:44 AM on September 7, 2022  [facility] Guide  [phone]

## 2022-09-09 ENCOUNTER — OFFICE VISIT (OUTPATIENT)
Dept: SURGERY | Facility: CLINIC | Age: 57
End: 2022-09-09
Payer: MEDICAID

## 2022-09-09 VITALS
HEART RATE: 85 BPM | WEIGHT: 117 LBS | RESPIRATION RATE: 16 BRPM | SYSTOLIC BLOOD PRESSURE: 104 MMHG | DIASTOLIC BLOOD PRESSURE: 72 MMHG | OXYGEN SATURATION: 96 % | BODY MASS INDEX: 18.36 KG/M2 | HEIGHT: 67 IN

## 2022-09-09 DIAGNOSIS — L72.0 EPIDERMAL CYST: Primary | ICD-10-CM

## 2022-09-09 PROCEDURE — 99203 OFFICE O/P NEW LOW 30 MIN: CPT | Performed by: SURGERY

## 2022-09-09 NOTE — PROGRESS NOTES
General Surgery Consultation    Hina Yap MRN# [id number]   Age: 56 year old YOB: 1965     Date of visit:9/9/2022  Reason for referral: Skin cyst  Referring provider: Greer          Assessment:    Hina Yap is a 56 year old female seen in consultation for a growing painful skin cyst of her central mid back.    Plan:  I have offered the patient incision and drainage versus complete cyst excision.  I recommend and she elects to proceed with complete excision.  We discussed the indications, alternatives, and risks.  We discussed scarring, numbness, anesthesia, infection, bleeding, and recurrence.  she understands the wound will be left open to heal by secondary intent.  she understands the need for daily wound dressing changes.  She will return to clinic next week for the scheduled procedure.      HPI:  Hina Yap is a 56 year old female who was seen in clinic today for a large growing skin cyst of her central mid back.  It has been present for roughly a year but over the last few weeks has become intensely painful for her.  She has not noticed drainage she denies fevers or chills and other signs of infection.  It is painful for the touch and she is unable to sit in chairs and lean against her back.    PMH:  Past Medical History:   Diagnosis Date     Anxiety      Arthritis Years ago     COPD (chronic obstructive pulmonary disease) (H)      Depressive disorder Years ago     GERD (gastroesophageal reflux disease)      Hypertension      Ischemic cardiomyopathy      Multiple sclerosis (H)      Neuromuscular disorder (H)      Nonrheumatic mitral valve regurgitation      PVC's (premature ventricular contractions)      Renal disease      Restless leg syndrome      Tobacco use disorder        PSH:  Past Surgical History:   Procedure Laterality Date     GYN SURGERY      tubal ligation     OPTICAL TRACKING SYSTEM FUSION POSTERIOR SPINE LUMBAR N/A 7/23/2021    Procedure: L4-5  transforaminal lumbar interbody fusion with reduction of grade 1/2 unstable spondylolisthesis   Open reduction and internal fixation of the grade L4-5 spondylolisthesis L4 - L5 posterior lateral fusion;  Surgeon: Asif Flores MD;  Location: [facility] OR       Allergies:  Allergies   Allergen Reactions     Sulfa Drugs Rash     Adhesive Tape Rash     Reacts to adhesive on fentanyl patch     Wellbutrin [Bupropion Hydrobromide] Rash       Home Medications:  Current Outpatient Medications   Medication Sig Dispense Refill     acetaminophen 500 MG CAPS Take 2 capsules by mouth every 8 hours as needed For aches, pain, fever 60 capsule 0     albuterol (PROAIR HFA/PROVENTIL HFA/VENTOLIN HFA) 108 (90 Base) MCG/ACT inhaler Inhale 2 puffs into the lungs every 4 hours as needed for shortness of breath / dyspnea or wheezing 18 g 0     albuterol (PROAIR HFA/PROVENTIL HFA/VENTOLIN HFA) 108 (90 Base) MCG/ACT inhaler Inhale 2 puffs into the lungs every 4 hours as needed for shortness of breath / dyspnea or wheezing 18 g 3     ALPRAZolam (XANAX) 0.5 MG tablet Take 1 tablet (0.5 mg) by mouth daily 30 tablet 0     amLODIPine (NORVASC) 2.5 MG tablet Take 1 tablet (2.5 mg) by mouth daily 90 tablet 3     amphetamine-dextroamphetamine (ADDERALL XR) 30 MG 24 hr capsule Take 1 capsule (30 mg) by mouth daily 30 capsule 0     amphetamine-dextroamphetamine (ADDERALL) 10 MG tablet Take 1 tablet (10 mg) by mouth daily 30 tablet 0     AVONEX PEN 30 MCG/0.5ML auto-injector kit Inject 30 mcg into the muscle every 7 days        baclofen (LIORESAL) 20 MG tablet Take 20 mg by mouth 4 times daily        diclofenac (VOLTAREN) 1 % topical gel Apply 4 g topically 4 times daily 480 g 0     esomeprazole (NEXIUM) 40 MG DR capsule Take 1 capsule (40 mg) by mouth every morning (before breakfast) Take 30-60 minutes before eating. 90 capsule 3     ferrous sulfate (FEROSUL) 325 (65 Fe) MG tablet Take 1 tablet (325 mg) by mouth daily (with breakfast)        ipratropium - albuterol 0.5 mg/2.5 mg/3 mL (DUONEB) 0.5-2.5 (3) MG/3ML neb solution Take 1 vial (3 mLs) by nebulization every 6 hours as needed for shortness of breath / dyspnea or wheezing 30 mL 3     Lidocaine (LIDOCARE) 4 % Patch Place 1 patch onto the skin daily as needed for moderate pain (musculoskeletal pain) Remove patch after 12 hours. To prevent lidocaine toxicity, patient should be patch free for 12 hrs daily. 15 patch 0     mirtazapine (REMERON) 7.5 MG tablet Take 1 tablet (7.5 mg) by mouth At Bedtime for 7 days, THEN 2 tablets (15 mg) At Bedtime for 14 days, THEN 4 tablets (30 mg) At Bedtime for 69 days. 311 tablet 0     naloxone (NARCAN) 4 MG/0.1ML nasal spray Spray 1 spray (4 mg) into one nostril alternating nostrils once as needed for opioid reversal every 2-3 minutes until assistance arrives 0.2 mL 0     nicotine polacrilex (NICORETTE) 4 MG gum PLACE 1 PIECE INSIDE CHEEK AS NEEDED FOR SMOKING CESSATION 100 each 3     Nutritional Supplements (ENSURE ORIGINAL) LIQD Take 237 mLs by mouth 2 times daily for 30 days 61589 mL 11     OXcarbazepine (TRILEPTAL) 150 MG tablet TAKE 2 TABLETS BY MOUTH EVERY NIGHT AT BEDTIME       oxyCODONE (ROXICODONE) 5 MG tablet Take 3-4 tablets (15-20 mg) by mouth every 6 hours as needed for pain 30 tablet 0     polyethylene glycol (MIRALAX) 17 GM/Dose powder Take 17 g (1 capful) by mouth daily       polyethylene glycol (MIRALAX) 17 GM/Dose powder Mix 1 capful with 6-8 ounces of clear liquid and take by mouth twice daily as needed for constipation. Decrease dose to once daily or once every 2-3 days to prevent constipation. 527 g 0     pregabalin (LYRICA) 50 MG capsule Take 1 capsule (50 mg) by mouth 2 times daily       promethazine (PHENERGAN) 25 MG tablet Take 25 mg by mouth every 6 hours as needed for nausea       rOPINIRole (REQUIP) 2 MG tablet Take 2 mg by mouth every morning Patient is taking 1 tab in the morning and 2 tab at night       tiotropium-olodaterol 2.5-2.5  "MCG/ACT AERS Inhale 2 puffs into the lungs daily 4 g 3     tiZANidine (ZANAFLEX) 2 MG tablet Take 1-2 mg by mouth At Bedtime         Social History:  Social History     Tobacco Use     Smoking status: Current Every Day Smoker     Packs/day: 0.50     Years: 35.00     Pack years: 17.50     Types: Cigarettes     Smokeless tobacco: Never Used   Vaping Use     Vaping Use: Never used   Substance Use Topics     Alcohol use: Not Currently     Alcohol/week: 0.0 standard drinks     Drug use: No       Family History:  Family history reviewed and is not pertinent.    ROS:  The 10 point review of systems is negative other than noted in the HPI and above.    PE:  /72   Pulse 85   Resp 16   Ht 1.702 m (5' 7\")   Wt 53.1 kg (117 lb)   LMP 05/25/2017 (Exact Date)   SpO2 96%   BMI 18.32 kg/m    General appearance: well-nourished, no apparent distress  Skin: There is a 2 cm skin cyst of her central mid back with at least 1 cm raised portion, there is no drainage, the surrounding tissue does not appear infected.  Extremities- without edema  Psych- mood and affect are appropriate  Neurologic- alert, speech is clear, moves all extremities with good strength      This note was created using voice recognition software. Undetected word substitutions or other errors may have occurred.       Solomon Cordero MD          "

## 2022-09-11 ENCOUNTER — NURSE TRIAGE (OUTPATIENT)
Dept: NURSING | Facility: CLINIC | Age: 57
End: 2022-09-11

## 2022-09-12 ENCOUNTER — TELEPHONE (OUTPATIENT)
Dept: SURGERY | Facility: CLINIC | Age: 57
End: 2022-09-12

## 2022-09-12 DIAGNOSIS — D23.5 DERMOID CYST OF SKIN OF BACK: Primary | ICD-10-CM

## 2022-09-12 RX ORDER — CEPHALEXIN 500 MG/1
500 CAPSULE ORAL 3 TIMES DAILY
Qty: 21 CAPSULE | Refills: 0 | Status: SHIPPED | OUTPATIENT
Start: 2022-09-12 | End: 2022-09-19

## 2022-09-12 NOTE — TELEPHONE ENCOUNTER
Patient was seen for consult by  for a mid-back dermal cyst on 9/9/22  She is scheduled for excision of skin cyst in the office on 9/14/22.      Patient is calling today to report that her cyst opened yesterday and began draining some thick white drainage.    This morning she had an increase in drainage of thick whitish/yellow drainage.      Drainage now seems to be slowing and her friend has assisted her in covering the area with gauze and tape.  She reports there is an area of redness just below the cyst.    She is unsure of the size of the redness as it is difficult for her to see the area.   The area remains tender, though she had some improvement in pain when the cyst began to drain.  Denies fever or chills.      Plan:   Hina will keep the area covered and change gauze dressing prn.   I will forward to PA / Dr. Cordero to ask if antibiotics are appropriate.  Patient would like any rx to go to  [facility] in [place].

## 2022-09-12 NOTE — TELEPHONE ENCOUNTER
"Name of caller:Hina    Reason for Call:  Cyst on back has opened up and is \"oozing white stuff\" Pt not sure what to do as she is to have surgery on 9/14 for removal of this cyst on her back.    Surgeon:  Consuelo    Recent Surgery:  No    If yes, when & what type:  Consult done on 9/9/22.      Best phone number to reach pt at is: [phone]  Ok to leave a message with medical info? Yes.    Pharmacy preferred (if calling for a refill): NA            "

## 2022-09-12 NOTE — TELEPHONE ENCOUNTER
I called patient to let her know that Keflex was prescribed TID for 7 days and sent to [facility] in [place] for her.      She will come to  for excision on 9/12/22.  She is ok to shower as instructed with surgical soap being gentle around cyst.  Ok for soapy water to rinse through wound if it is still open.  She will then pat area dry with clean towel and cover with gauze if it is still draining.

## 2022-09-12 NOTE — TELEPHONE ENCOUNTER
Cyst on back and to have it removed.  Leaking drainage that smells bad but not sure what it looks like.  Denies bleeding.    She is to have it removed Wednesday.  Pain rating 5/10 but is itching.    Disposition is to see provider within 24 hours. Care advice given and patient verbalizes understanding and will call clinic in the morning.    Shanel Cifuentes RN  [facility]      Reason for Disposition    [1] Boil > 1/2 inch across (> 12 mm; larger than a marble) AND [2] center is soft or pus colored    Additional Information    Negative: [1] Widespread rash AND [2] bright red, sunburn-like AND [3] too weak to stand    Negative: Sounds like a life-threatening emergency to the triager    Negative: Widespread red rash    Negative: Black (necrotic) color or blisters develop in wound    Negative: Patient sounds very sick or weak to the triager    Negative: Fever > 100.4 F (38.0 C)    Negative: Red streak from area of infection    Negative: SEVERE pain (e.g., excruciating)    Negative: Boil > 2 inches across (> 5 cm; larger than a golf ball or ping pong ball)    Protocols used: BOIL (SKIN ABSCESS)-A-

## 2022-09-14 ENCOUNTER — OFFICE VISIT (OUTPATIENT)
Dept: SURGERY | Facility: CLINIC | Age: 57
End: 2022-09-14
Payer: MEDICAID

## 2022-09-14 VITALS
HEART RATE: 94 BPM | BODY MASS INDEX: 18.36 KG/M2 | OXYGEN SATURATION: 99 % | DIASTOLIC BLOOD PRESSURE: 76 MMHG | HEIGHT: 67 IN | SYSTOLIC BLOOD PRESSURE: 112 MMHG | WEIGHT: 117 LBS | RESPIRATION RATE: 16 BRPM

## 2022-09-14 DIAGNOSIS — L72.0 EPIDERMAL CYST: Primary | ICD-10-CM

## 2022-09-14 PROCEDURE — 11402 EXC TR-EXT B9+MARG 1.1-2 CM: CPT | Performed by: SURGERY

## 2022-09-14 NOTE — PROGRESS NOTES
General Surgery Operative Note      Pre-operative diagnosis: central thoracic back skin cyst   Post-operative diagnosis: Same    Procedure: Excision of central thoracic back skin cyst    Surgeon: Solomon Cordero MD   Assistant(s): NONE   Anesthesia: Local    Estimated blood loss: 2 cc     Specimens: None submitted     After rediscussion of procedure including steps involved and risks/benefits.  The patients back was prepped and draped in standard sterile fashion.  The skin overlying the mass was anesthetized with local anesthetic.  An ellipse of skin, incorporating the central punctum was made with a length of 2.5 cm.  The incision was carried into the subcutaneous tissue and the cyst was completely excised from surrounding tissues using a combination of sharp and blunt dissection.  The cyst, which measured 1.5cm, was then passed off the field as specimen.  Hemostasis was maintained.  The wound was then irrigated with sterile saline and closed with 4-0 Vicryl subcuticular sutures and glue.  The patient tolerated the procedure well.  Sponge and needle counts were correct at the end of the case.     Solomon Cordero MD

## 2022-09-17 ENCOUNTER — APPOINTMENT (OUTPATIENT)
Dept: CT IMAGING | Facility: CLINIC | Age: 57
End: 2022-09-17
Attending: EMERGENCY MEDICINE
Payer: MEDICAID

## 2022-09-17 ENCOUNTER — HOSPITAL ENCOUNTER (OUTPATIENT)
Facility: CLINIC | Age: 57
Setting detail: OBSERVATION
Discharge: HOME | End: 2022-09-18
Attending: EMERGENCY MEDICINE | Admitting: HOSPITALIST
Payer: MEDICAID

## 2022-09-17 DIAGNOSIS — E86.0 DEHYDRATION: ICD-10-CM

## 2022-09-17 DIAGNOSIS — R25.2 SPASTICITY: ICD-10-CM

## 2022-09-17 DIAGNOSIS — N17.9 ACUTE KIDNEY INJURY (H): ICD-10-CM

## 2022-09-17 LAB
ALBUMIN SERPL BCG-MCNC: 4.4 G/DL (ref 3.5–5.2)
ALBUMIN UR-MCNC: NEGATIVE MG/DL
ALP SERPL-CCNC: 69 U/L (ref 35–104)
ALT SERPL W P-5'-P-CCNC: 29 U/L (ref 10–35)
AMPHETAMINES UR QL SCN: ABNORMAL
ANION GAP SERPL CALCULATED.3IONS-SCNC: 13 MMOL/L (ref 7–15)
APPEARANCE UR: CLEAR
AST SERPL W P-5'-P-CCNC: 42 U/L (ref 10–35)
BACTERIA #/AREA URNS HPF: ABNORMAL /HPF
BARBITURATES UR QL SCN: ABNORMAL
BASOPHILS # BLD AUTO: 0 10E3/UL (ref 0–0.2)
BASOPHILS NFR BLD AUTO: 1 %
BENZODIAZ UR QL SCN: ABNORMAL
BILIRUB DIRECT SERPL-MCNC: <0.2 MG/DL (ref 0–0.3)
BILIRUB SERPL-MCNC: 0.3 MG/DL
BILIRUB UR QL STRIP: NEGATIVE
BUN SERPL-MCNC: 39.9 MG/DL (ref 6–20)
BZE UR QL SCN: ABNORMAL
CALCIUM SERPL-MCNC: 9.4 MG/DL (ref 8.6–10)
CANNABINOIDS UR QL SCN: ABNORMAL
CHLORIDE SERPL-SCNC: 103 MMOL/L (ref 98–107)
COLOR UR AUTO: ABNORMAL
CREAT SERPL-MCNC: 1.44 MG/DL (ref 0.51–0.95)
DEPRECATED HCO3 PLAS-SCNC: 25 MMOL/L (ref 22–29)
EOSINOPHIL # BLD AUTO: 0.2 10E3/UL (ref 0–0.7)
EOSINOPHIL NFR BLD AUTO: 2 %
ERYTHROCYTE [DISTWIDTH] IN BLOOD BY AUTOMATED COUNT: 13.1 % (ref 10–15)
GFR SERPL CREATININE-BSD FRML MDRD: 42 ML/MIN/1.73M2
GLUCOSE SERPL-MCNC: 83 MG/DL (ref 70–99)
GLUCOSE UR STRIP-MCNC: NEGATIVE MG/DL
HCT VFR BLD AUTO: 36.7 % (ref 35–47)
HGB BLD-MCNC: 11.8 G/DL (ref 11.7–15.7)
HGB UR QL STRIP: NEGATIVE
HOLD SPECIMEN: NORMAL
IMM GRANULOCYTES # BLD: 0 10E3/UL
IMM GRANULOCYTES NFR BLD: 0 %
KETONES UR STRIP-MCNC: 10 MG/DL
LEUKOCYTE ESTERASE UR QL STRIP: ABNORMAL
LYMPHOCYTES # BLD AUTO: 3.4 10E3/UL (ref 0.8–5.3)
LYMPHOCYTES NFR BLD AUTO: 45 %
MCH RBC QN AUTO: 29.5 PG (ref 26.5–33)
MCHC RBC AUTO-ENTMCNC: 32.2 G/DL (ref 31.5–36.5)
MCV RBC AUTO: 92 FL (ref 78–100)
MONOCYTES # BLD AUTO: 0.6 10E3/UL (ref 0–1.3)
MONOCYTES NFR BLD AUTO: 8 %
MUCOUS THREADS #/AREA URNS LPF: PRESENT /LPF
NEUTROPHILS # BLD AUTO: 3.4 10E3/UL (ref 1.6–8.3)
NEUTROPHILS NFR BLD AUTO: 44 %
NITRATE UR QL: NEGATIVE
NRBC # BLD AUTO: 0 10E3/UL
NRBC BLD AUTO-RTO: 0 /100
OPIATES UR QL SCN: ABNORMAL
PH UR STRIP: 5 [PH] (ref 5–7)
PLATELET # BLD AUTO: 260 10E3/UL (ref 150–450)
POTASSIUM SERPL-SCNC: 3.5 MMOL/L (ref 3.4–5.3)
PROT SERPL-MCNC: 7 G/DL (ref 6.4–8.3)
RBC # BLD AUTO: 4 10E6/UL (ref 3.8–5.2)
RBC URINE: 1 /HPF
SARS-COV-2 RNA RESP QL NAA+PROBE: NEGATIVE
SODIUM SERPL-SCNC: 141 MMOL/L (ref 136–145)
SP GR UR STRIP: 1.01 (ref 1–1.03)
SQUAMOUS EPITHELIAL: 1 /HPF
UROBILINOGEN UR STRIP-MCNC: NORMAL MG/DL
WBC # BLD AUTO: 7.6 10E3/UL (ref 4–11)
WBC URINE: 9 /HPF

## 2022-09-17 PROCEDURE — 250N000013 HC RX MED GY IP 250 OP 250 PS 637: Performed by: NURSE PRACTITIONER

## 2022-09-17 PROCEDURE — 36415 COLL VENOUS BLD VENIPUNCTURE: CPT | Performed by: EMERGENCY MEDICINE

## 2022-09-17 PROCEDURE — 99220 PR INITIAL OBSERVATION CARE,LEVEL III: CPT | Performed by: NURSE PRACTITIONER

## 2022-09-17 PROCEDURE — G0378 HOSPITAL OBSERVATION PER HR: HCPCS

## 2022-09-17 PROCEDURE — 70450 CT HEAD/BRAIN W/O DYE: CPT

## 2022-09-17 PROCEDURE — 82248 BILIRUBIN DIRECT: CPT | Performed by: NURSE PRACTITIONER

## 2022-09-17 PROCEDURE — 87086 URINE CULTURE/COLONY COUNT: CPT | Performed by: NURSE PRACTITIONER

## 2022-09-17 PROCEDURE — 96361 HYDRATE IV INFUSION ADD-ON: CPT

## 2022-09-17 PROCEDURE — 258N000003 HC RX IP 258 OP 636: Performed by: EMERGENCY MEDICINE

## 2022-09-17 PROCEDURE — 99285 EMERGENCY DEPT VISIT HI MDM: CPT | Mod: 25

## 2022-09-17 PROCEDURE — 80053 COMPREHEN METABOLIC PANEL: CPT | Performed by: EMERGENCY MEDICINE

## 2022-09-17 PROCEDURE — 81001 URINALYSIS AUTO W/SCOPE: CPT | Mod: XU | Performed by: NURSE PRACTITIONER

## 2022-09-17 PROCEDURE — 72125 CT NECK SPINE W/O DYE: CPT

## 2022-09-17 PROCEDURE — 85025 COMPLETE CBC W/AUTO DIFF WBC: CPT | Performed by: EMERGENCY MEDICINE

## 2022-09-17 PROCEDURE — C9803 HOPD COVID-19 SPEC COLLECT: HCPCS

## 2022-09-17 PROCEDURE — U0003 INFECTIOUS AGENT DETECTION BY NUCLEIC ACID (DNA OR RNA); SEVERE ACUTE RESPIRATORY SYNDROME CORONAVIRUS 2 (SARS-COV-2) (CORONAVIRUS DISEASE [COVID-19]), AMPLIFIED PROBE TECHNIQUE, MAKING USE OF HIGH THROUGHPUT TECHNOLOGIES AS DESCRIBED BY CMS-2020-01-R: HCPCS | Performed by: EMERGENCY MEDICINE

## 2022-09-17 PROCEDURE — 96360 HYDRATION IV INFUSION INIT: CPT

## 2022-09-17 PROCEDURE — 80307 DRUG TEST PRSMV CHEM ANLYZR: CPT | Performed by: NURSE PRACTITIONER

## 2022-09-17 PROCEDURE — 258N000003 HC RX IP 258 OP 636: Performed by: NURSE PRACTITIONER

## 2022-09-17 RX ORDER — ROPINIROLE 2 MG/1
4 TABLET, FILM COATED ORAL AT BEDTIME
COMMUNITY
End: 2022-09-26

## 2022-09-17 RX ORDER — NALOXONE HYDROCHLORIDE 0.4 MG/ML
0.4 INJECTION, SOLUTION INTRAMUSCULAR; INTRAVENOUS; SUBCUTANEOUS
Status: DISCONTINUED | OUTPATIENT
Start: 2022-09-17 | End: 2022-09-18 | Stop reason: HOSPADM

## 2022-09-17 RX ORDER — AMLODIPINE BESYLATE 2.5 MG/1
2.5 TABLET ORAL EVERY EVENING
Status: DISCONTINUED | OUTPATIENT
Start: 2022-09-17 | End: 2022-09-17

## 2022-09-17 RX ORDER — NALOXONE HYDROCHLORIDE 0.4 MG/ML
0.2 INJECTION, SOLUTION INTRAMUSCULAR; INTRAVENOUS; SUBCUTANEOUS
Status: DISCONTINUED | OUTPATIENT
Start: 2022-09-17 | End: 2022-09-18 | Stop reason: HOSPADM

## 2022-09-17 RX ORDER — POLYETHYLENE GLYCOL 3350 17 G/17G
17 POWDER, FOR SOLUTION ORAL DAILY PRN
Status: DISCONTINUED | OUTPATIENT
Start: 2022-09-17 | End: 2022-09-18 | Stop reason: HOSPADM

## 2022-09-17 RX ORDER — MIRTAZAPINE 15 MG/1
15 TABLET, FILM COATED ORAL AT BEDTIME
Status: DISCONTINUED | OUTPATIENT
Start: 2022-09-17 | End: 2022-09-18 | Stop reason: HOSPADM

## 2022-09-17 RX ORDER — OXYCODONE AND ACETAMINOPHEN 10; 325 MG/1; MG/1
1 TABLET ORAL EVERY 6 HOURS PRN
COMMUNITY

## 2022-09-17 RX ORDER — CEPHALEXIN 500 MG/1
500 CAPSULE ORAL 3 TIMES DAILY
Status: DISCONTINUED | OUTPATIENT
Start: 2022-09-17 | End: 2022-09-18 | Stop reason: HOSPADM

## 2022-09-17 RX ORDER — BISACODYL 10 MG
10 SUPPOSITORY, RECTAL RECTAL DAILY PRN
Status: DISCONTINUED | OUTPATIENT
Start: 2022-09-17 | End: 2022-09-18 | Stop reason: HOSPADM

## 2022-09-17 RX ORDER — ONDANSETRON 4 MG/1
4 TABLET, ORALLY DISINTEGRATING ORAL EVERY 6 HOURS PRN
Status: DISCONTINUED | OUTPATIENT
Start: 2022-09-17 | End: 2022-09-18 | Stop reason: HOSPADM

## 2022-09-17 RX ORDER — AMLODIPINE BESYLATE 2.5 MG/1
2.5 TABLET ORAL DAILY
Status: DISCONTINUED | OUTPATIENT
Start: 2022-09-18 | End: 2022-09-17

## 2022-09-17 RX ORDER — OXYCODONE HYDROCHLORIDE 5 MG/1
5 TABLET ORAL EVERY 4 HOURS PRN
Status: DISCONTINUED | OUTPATIENT
Start: 2022-09-17 | End: 2022-09-18 | Stop reason: HOSPADM

## 2022-09-17 RX ORDER — AMOXICILLIN 250 MG
1 CAPSULE ORAL 2 TIMES DAILY
Status: DISCONTINUED | OUTPATIENT
Start: 2022-09-17 | End: 2022-09-18 | Stop reason: HOSPADM

## 2022-09-17 RX ORDER — AMLODIPINE BESYLATE 2.5 MG/1
2.5 TABLET ORAL EVERY EVENING
Status: DISCONTINUED | OUTPATIENT
Start: 2022-09-17 | End: 2022-09-18 | Stop reason: HOSPADM

## 2022-09-17 RX ORDER — ONDANSETRON 2 MG/ML
4 INJECTION INTRAMUSCULAR; INTRAVENOUS EVERY 6 HOURS PRN
Status: DISCONTINUED | OUTPATIENT
Start: 2022-09-17 | End: 2022-09-18 | Stop reason: HOSPADM

## 2022-09-17 RX ORDER — PREGABALIN 25 MG/1
50 CAPSULE ORAL 2 TIMES DAILY
Status: DISCONTINUED | OUTPATIENT
Start: 2022-09-17 | End: 2022-09-18 | Stop reason: HOSPADM

## 2022-09-17 RX ORDER — PANTOPRAZOLE SODIUM 40 MG/1
40 TABLET, DELAYED RELEASE ORAL
Status: DISCONTINUED | OUTPATIENT
Start: 2022-09-18 | End: 2022-09-18 | Stop reason: HOSPADM

## 2022-09-17 RX ORDER — AMOXICILLIN 250 MG
2 CAPSULE ORAL 2 TIMES DAILY
Status: DISCONTINUED | OUTPATIENT
Start: 2022-09-17 | End: 2022-09-18 | Stop reason: HOSPADM

## 2022-09-17 RX ORDER — ROPINIROLE 2 MG/1
2 TABLET, FILM COATED ORAL EVERY MORNING
COMMUNITY

## 2022-09-17 RX ORDER — ACETAMINOPHEN 325 MG/1
975 TABLET ORAL EVERY 8 HOURS
Status: DISCONTINUED | OUTPATIENT
Start: 2022-09-17 | End: 2022-09-18 | Stop reason: HOSPADM

## 2022-09-17 RX ORDER — SODIUM CHLORIDE 9 MG/ML
INJECTION, SOLUTION INTRAVENOUS CONTINUOUS
Status: DISCONTINUED | OUTPATIENT
Start: 2022-09-17 | End: 2022-09-18 | Stop reason: HOSPADM

## 2022-09-17 RX ORDER — LIDOCAINE 4 G/G
1 PATCH TOPICAL DAILY PRN
Status: DISCONTINUED | OUTPATIENT
Start: 2022-09-17 | End: 2022-09-18 | Stop reason: HOSPADM

## 2022-09-17 RX ADMIN — DICLOFENAC SODIUM 4 G: 10 GEL TOPICAL at 20:10

## 2022-09-17 RX ADMIN — MIRTAZAPINE 15 MG: 15 TABLET, FILM COATED ORAL at 21:33

## 2022-09-17 RX ADMIN — ACETAMINOPHEN 975 MG: 325 TABLET, FILM COATED ORAL at 20:06

## 2022-09-17 RX ADMIN — SODIUM CHLORIDE: 9 INJECTION, SOLUTION INTRAVENOUS at 20:06

## 2022-09-17 RX ADMIN — CEPHALEXIN 500 MG: 500 CAPSULE ORAL at 20:06

## 2022-09-17 RX ADMIN — OXYCODONE HYDROCHLORIDE 5 MG: 5 TABLET ORAL at 21:33

## 2022-09-17 RX ADMIN — AMLODIPINE BESYLATE 2.5 MG: 2.5 TABLET ORAL at 20:06

## 2022-09-17 RX ADMIN — SODIUM CHLORIDE 1000 ML: 9 INJECTION, SOLUTION INTRAVENOUS at 13:11

## 2022-09-17 RX ADMIN — PREGABALIN 50 MG: 25 CAPSULE ORAL at 20:06

## 2022-09-17 ASSESSMENT — ENCOUNTER SYMPTOMS
HEADACHES: 1
NAUSEA: 0
TREMORS: 1
BACK PAIN: 1
DIZZINESS: 1
NECK PAIN: 1

## 2022-09-17 ASSESSMENT — ACTIVITIES OF DAILY LIVING (ADL)
ADLS_ACUITY_SCORE: 35
ADLS_ACUITY_SCORE: 35
ADLS_ACUITY_SCORE: 21
ADLS_ACUITY_SCORE: 35
ADLS_ACUITY_SCORE: 35
ADLS_ACUITY_SCORE: 20

## 2022-09-17 NOTE — ED TRIAGE NOTES
Pt was found sleeping in her car at [employer]. PD gave her a ride home but reports she was very unsteady on her feet and called EMS. Hx of MS. Boyfriend states she becomes unsteady like this when she doesn't take the right medication

## 2022-09-17 NOTE — ED NOTES
Pt reports she fell out of bed 2 days ago hitting her head. She has neck pain from the fall, denies any other injuries

## 2022-09-17 NOTE — ED PROVIDER NOTES
History   Chief Complaint:  Dizziness    The history is provided by the patient.      Hina Yap is a 56 year old female with history of multiple sclerosis, cardiomyopathy, stage II chronic kidney disease, restless leg syndrome, and hypertension who presents with dizziness. The patient was found sleeping in her car in the [facility] parking lot. Police gave her a ride home, but reports that she is very unsteady on her feet and called EMS. The patient's boyfriend was said to have mentioned that she becomes this way when she does not take her medications correctly. Patient said she was sleeping in her car prior to running some errands, and must have fallen asleep. Patient also recounts falling out of bed two days ago, sustaining some lacerations to her left forearm and central upper forehead. She can not remember if she ever lost consciousness, but endorses headache and neck pain. She says that she has been somewhat dizzy since her fall. Unrelated, she notes developing some tremors of her extremities a couple months ago. Lastly, she notes some back pain at the site where she recently underwent cyst removal, and for which she is currently taking antibiotics. She denies any nausea.     Review of Systems   Gastrointestinal: Negative for nausea.   Musculoskeletal: Positive for back pain and neck pain.   Neurological: Positive for dizziness, tremors and headaches.   All other systems reviewed and are negative.    Allergies:  Sulfa Drugs  Adhesive Tape  Wellbutrin    Medications:  Albuterol  Xanax  Norvasc  Adderall  Avonex  Lioresal  Keflex  Ferosul  Duoneb  Zanaflex  Tiotropium-olodaterol  Requip  Phenergan  Lyrica  Miralax  Roxicodone  Trileptal  Nicorette  Narcan  Remeron    Past Medical History:     MS  Cardiomyopathy  Esophageal reflux  Anxiety  Restless leg syndrome  Depression  Stage II CKD  Insomnia  Prolonged QT interval  Hypertension  PVCs  nonrheumatic mitral valve regurgitation      Past Surgical History:     Cyst removal of back  Tubal ligation  Optical tracking system fusion posterior spine lumbar L4-5     Family History:    Mother: breast cancer, osteoporosis, dementia  Father: unspecific cancer  Brother: depression, anxiety, substance abuse     Social History:  PCP: Sunshine Butterfield   Presents to the ED alone.     Physical Exam     Patient Vitals for the past 24 hrs:   BP Temp Temp src Pulse Resp SpO2   09/17/22 1445 (!) 116/110 -- -- 70 -- 99 %   09/17/22 1300 (!) 113/95 -- -- 81 19 94 %   09/17/22 1230 (!) 128/95 -- -- 78 13 100 %   09/17/22 1217 111/85 97.8  F (36.6  C) Oral 77 16 98 %       Physical Exam  Constitutional: Vital signs reviewed as above.   Head: Abrasion noted on the forehead.  Eyes: Pupils are equal, round, and reactive to light.   Oropharynx: Dry MM.  Neck: No JVD noted  Cardiovascular: Normal rate, regular rhythm and normal heart sounds.  No murmur heard. Equal B/L peripheral pulses.  Pulmonary/Chest: Effort normal and breath sounds normal. No respiratory distress. Patient has no wheezes. Patient has no rales.   Gastrointestinal: Soft. There is no tenderness.   Musculoskeletal/Extremities: There is midline C-spine TTP. MAEx4. Bruising/abrasion on LUE.   Neurological: Patient is alert and oriented to person, place, and time. She seems to have tremulous movements c/w restless legs, though her B/L UE are moving as well  Skin: Skin is warm and dry. There is no diaphoresis noted. Bruising/abrasion on LUE. Abrasion on midline forehead. Surgical wound with skin adhesive seen on the right back lateral to midline c/w reported cyst removal.  Psychiatric: The patient appears restless.    Emergency Department Course     Imaging:  Cervical spine CT w/o contrast   Final Result   IMPRESSION:   HEAD CT:   1.  No CT evidence for acute intracranial process.   2.  Brain atrophy and presumed chronic microvascular ischemic changes as above.      CERVICAL SPINE CT:   1.  No CT evidence for acute fracture or  posttraumatic subluxation.   2.  Spondylosis with spinal canal and foraminal stenoses as detailed.         Head CT w/o contrast   Final Result   IMPRESSION:   HEAD CT:   1.  No CT evidence for acute intracranial process.   2.  Brain atrophy and presumed chronic microvascular ischemic changes as above.      CERVICAL SPINE CT:   1.  No CT evidence for acute fracture or posttraumatic subluxation.   2.  Spondylosis with spinal canal and foraminal stenoses as detailed.           Report per radiology    Laboratory:  Labs Ordered and Resulted from Time of ED Arrival to Time of ED Departure   BASIC METABOLIC PANEL - Abnormal       Result Value    Sodium 141      Potassium 3.5      Chloride 103      Carbon Dioxide (CO2) 25      Anion Gap 13      Urea Nitrogen 39.9 (*)     Creatinine 1.44 (*)     Calcium 9.4      Glucose 83      GFR Estimate 42 (*)    CBC WITH PLATELETS AND DIFFERENTIAL    WBC Count 7.6      RBC Count 4.00      Hemoglobin 11.8      Hematocrit 36.7      MCV 92      MCH 29.5      MCHC 32.2      RDW 13.1      Platelet Count 260      % Neutrophils 44      % Lymphocytes 45      % Monocytes 8      % Eosinophils 2      % Basophils 1      % Immature Granulocytes 0      NRBCs per 100 WBC 0      Absolute Neutrophils 3.4      Absolute Lymphocytes 3.4      Absolute Monocytes 0.6      Absolute Eosinophils 0.2      Absolute Basophils 0.0      Absolute Immature Granulocytes 0.0      Absolute NRBCs 0.0     COVID-19 VIRUS (CORONAVIRUS) BY PCR     Emergency Department Course:       Reviewed:  I reviewed nursing notes, vitals, past medical history and Care Everywhere    Assessments/Consults:  ED Course as of 09/17/22 1604   Sat Sep 17, 2022   1243 I obtained history and examined the patient as noted above    1458 I rechecked the patient and explained findings    1519 D/W [place], accepting for Dr. Angelo.       Interventions:  Medications   0.9% sodium chloride BOLUS (1,000 mLs Intravenous New Bag 9/17/22 1311)      Disposition:  The patient was admitted to the hospital under the care of Dr. Pugh.     Impression & Plan     Medical Decision Making:    This 56-year-old female patient presents to the ED via EMS due to concerns for dizziness.  Please see the HPI and exam for specifics.  The patient notes that she does have MS and she has begun to have a movement type disorder over the last 1 to 2 months.  She takes both Remeron and ropinirole for restless leg syndrome.  The patient notes that she fell forward out of bed 2 days ago but was unsure if she lost consciousness.  She states that she was unaware that she fell asleep in the parking lot of [employer] and that she had gone there to buy lotion.     Certainly worsening MS is in the differential as is perhaps missed medication leading to more restless type symptoms.  I also note that she takes Adderall.  A broad differential was considered leading to laboratory studies and imaging.     Imaging studies do not indicate skull fracture, intracranial hemorrhage, or cervical spine fracture.  Laboratory studies are notable for: Acute kidney injury.    Once the patient rested, she was more calm though when she was awakened she seemed somewhat spastic.  She was unable to ambulate safely and I felt that observation would be the next reasonable step.  After discussion with the hospitalist team, we will place the patient in observation for ongoing monitoring and care.    Diagnosis:    ICD-10-CM    1. Dehydration  E86.0    2. Acute kidney injury (H)  N17.9    3. Spasticity  R25.2      Scribe Disclosure:  I, Mukesh Torres, am serving as a scribe at 12:33 PM on 9/17/2022 to document services personally performed by Gustavo Montoya DO based on my observations and the provider's statements to me.              Gustavo Montoya DO  09/17/22 2595

## 2022-09-17 NOTE — ED NOTES
[facility]  ED Nurse Handoff Report    Hina Yap is a 56 year old female   ED Chief complaint: Dizziness  . ED Diagnosis:   Final diagnoses:   Dehydration   Acute kidney injury (H)   Spasticity     Allergies:   Allergies   Allergen Reactions     Sulfa Drugs Rash     Adhesive Tape Rash     Reacts to adhesive on fentanyl patch     Wellbutrin [Bupropion Hydrobromide] Rash       Code Status: Full Code  Activity level - Baseline/Home:  Independent. Activity Level - Current:   Stand by Assist. Lift room needed: No. Bariatric: No   Needed: No   Isolation: No. Infection: Not Applicable.     Vital Signs:   Vitals:    09/17/22 1217 09/17/22 1230 09/17/22 1300 09/17/22 1445   BP: 111/85 (!) 128/95 (!) 113/95 (!) 116/110   Pulse: 77 78 81 70   Resp: 16 13 19    Temp: 97.8  F (36.6  C)      TempSrc: Oral      SpO2: 98% 100% 94% 99%       Cardiac Rhythm:  ,      Pain level:    Patient confused: No. Patient Falls Risk: Yes.   Elimination Status: Has voided   Patient Report - Initial Complaint: Dizziness. Focused Assessment: Pt was found sleeping in her car at [employer]. PD gave her a ride home but reports she was very unsteady on her feet and called EMS. Hx of MS. Blackmon states she becomes unsteady like this when she doesn't take the right medication   Tests Performed: labs, CT. Abnormal Results:   Abnormal Labs Resulted from Time of ED Arrival to Time of ED Departure   BASIC METABOLIC PANEL - Abnormal       Result Value    Sodium 141      Potassium 3.5      Chloride 103      Carbon Dioxide (CO2) 25      Anion Gap 13      Urea Nitrogen 39.9 (*)     Creatinine 1.44 (*)     Calcium 9.4      Glucose 83      GFR Estimate 42 (*)      Cervical spine CT w/o contrast   Final Result   IMPRESSION:   HEAD CT:   1.  No CT evidence for acute intracranial process.   2.  Brain atrophy and presumed chronic microvascular ischemic changes as above.      CERVICAL SPINE CT:   1.  No CT evidence for acute fracture or  posttraumatic subluxation.   2.  Spondylosis with spinal canal and foraminal stenoses as detailed.         Head CT w/o contrast   Final Result   IMPRESSION:   HEAD CT:   1.  No CT evidence for acute intracranial process.   2.  Brain atrophy and presumed chronic microvascular ischemic changes as above.      CERVICAL SPINE CT:   1.  No CT evidence for acute fracture or posttraumatic subluxation.   2.  Spondylosis with spinal canal and foraminal stenoses as detailed.         .   Treatments provided: fluids, monitoring  Family Comments: no family at bedside. Does not want us to talk to boyfriendHeber. She will talk to him.  OBS brochure/video discussed/provided to patient:  Yes  ED Medications:   Medications   0.9% sodium chloride BOLUS (1,000 mLs Intravenous New Bag 9/17/22 1311)     Drips infusing:  Yes  For the majority of the shift, the patient's behavior Green. Interventions performed were n/a.    Sepsis treatment initiated: No     Patient tested for COVID 19 prior to admission: YES    ED Nurse Name/Phone Number: Viridiana Montoya RN,   3:25 PM  RECEIVING UNIT ED HANDOFF REVIEW    Above ED Nurse Handoff Report was reviewed: Yes  Reviewed by: Roxana Stewart RN on September 17, 2022 at 5:52 PM

## 2022-09-17 NOTE — ED NOTES
Bed: ED27  Expected date: 9/17/22  Expected time: 11:53 AM  Means of arrival: Ambulance  Comments:  [id number]

## 2022-09-17 NOTE — PHARMACY-ADMISSION MEDICATION HISTORY
Admission medication history interview status for this patient is complete. See [facility] admission navigator for allergy information, prior to admission medications and immunization status.     Medication history interview done, indicate source(s): Patient  Medication history resources (including written lists, pill bottles, clinic record):None  Pharmacy: [id number] - [place], [place] - [address] [facility] [address]    Changes made to PTA medication list:  Added: oxycodone-acetaminophen (added per [facility] and [facility])  Changed: alprazolam (added note only uses as needed for anxiety), ropinirole (split into two entries due to AM and PM dosing)  Reported as Not Taking: none  Removed: oxycodone 5 mg (changed to oxycodone-acetaminophen  mg), acetaminophen (not taking due to oxycodone-acetaminophen), removed duplicate albuterol inhaler entry, oxycarbazepine 150 mg (removed, not taking, called [facility] and last fill is 9/2021), removed duplicate Miralax entry    Actions taken by pharmacist (provider contacted, etc): I spoke with Hina after checking outside meds against PTA list. Hina confirmed all medications and last doses as well as changes to the PTA list. I also called her primary pharmacy, [facility], and confirmed the following medications with dispense dates. These align with Chart Notes I saw from her [facility] and other providers' notes.     Per [facility] records:  Alprazolam 0.5 mg last dispense 08/26/2022  Baclofen 20 mg last dispense 07/2022  Oxcarbazepine 150 mg last dispense 09/2021 (discontinued per Hina)  Oxycodone 5 mg (not recent)  Oxycodone-acetaminophen  mg last dispense 08/26/2022  Pregabalin 50 mg last dispense 09/15/2022  Ropinirole 2 mg last dispense 08/21/2022  Tizanidine 2 mg last dispense 09/01/2022    Additional medication history information: Hina has titrated up to four of the mirtazapine 7.5 mg tabs (30 mg) nightly. Hina stated she  started her 7 day course of cephalexin on 9/14/2022.    Medication reconciliation/reorder completed by provider prior to medication history?  N    Prior to Admission medications    Medication Sig Last Dose Taking? Auth Provider Long Term End Date   albuterol (PROAIR HFA/PROVENTIL HFA/VENTOLIN HFA) 108 (90 Base) MCG/ACT inhaler Inhale 2 puffs into the lungs every 4 hours as needed for shortness of breath / dyspnea or wheezing Unknown at Unknown time Yes Carlos Eduardo Madsen PA-C Yes    ALPRAZolam (XANAX) 0.5 MG tablet Take 1 tablet (0.5 mg) by mouth daily Past Week at Unknown time Yes Sunshine Butterfield APRN CNP Yes    amLODIPine (NORVASC) 2.5 MG tablet Take 1 tablet (2.5 mg) by mouth daily 9/16/2022 at PM Yes Malik Lee PA-C Yes    amphetamine-dextroamphetamine (ADDERALL XR) 30 MG 24 hr capsule Take 1 capsule (30 mg) by mouth daily 9/17/2022 at 0600 Yes Sunshine Butterfield APRN CNP     amphetamine-dextroamphetamine (ADDERALL) 10 MG tablet Take 1 tablet (10 mg) by mouth daily 9/16/2022 at 1400 Yes Sunshine Butterfield APRN CNP     AVONEX PEN 30 MCG/0.5ML auto-injector kit Inject 30 mcg into the muscle every 7 days  9/12/2022 Yes Reported, Patient     baclofen (LIORESAL) 20 MG tablet Take 20 mg by mouth 4 times daily  9/17/2022 at 0700 Yes Reported, Patient     cephALEXin (KEFLEX) 500 MG capsule Take 1 capsule (500 mg) by mouth 3 times daily for 7 days 9/17/2022 at AM Yes Andre Wallace PA-C  9/19/22   diclofenac (VOLTAREN) 1 % topical gel Apply 4 g topically 4 times daily 9/16/2022 at Unknown time Yes Sunshine Butterfield APRN CNP     esomeprazole (NEXIUM) 40 MG DR capsule Take 1 capsule (40 mg) by mouth every morning (before breakfast) Take 30-60 minutes before eating. 9/17/2022 at 0600 Yes Sunshine Butterfield APRN CNP     ferrous sulfate (FEROSUL) 325 (65 Fe) MG tablet Take 1 tablet (325 mg) by mouth daily (with breakfast) 9/16/2022 at Unknown time Yes Sunshine Butterfield,  APRN CNP     mirtazapine (REMERON) 7.5 MG tablet Take 1 tablet (7.5 mg) by mouth At Bedtime for 7 days, THEN 2 tablets (15 mg) At Bedtime for 14 days, THEN 4 tablets (30 mg) At Bedtime for 69 days. 9/15/2022 at PM Yes Sunshine Butterfield APRN CNP Yes 10/27/22   oxyCODONE-acetaminophen (PERCOCET)  MG per tablet Take 1 tablet by mouth every 6 hours as needed for severe pain (Max of 5 tablets per day, for chroic pain.) 9/17/2022 at 0600 Yes Reported, Patient No    pregabalin (LYRICA) 50 MG capsule Take 1 capsule (50 mg) by mouth 2 times daily 9/17/2022 at 0600 Yes Sunshine Butterfield APRN CNP Yes    promethazine (PHENERGAN) 25 MG tablet Take 25 mg by mouth every 6 hours as needed for nausea 9/17/2022 at 0600 Yes Reported, Patient     rOPINIRole (REQUIP) 2 MG tablet Take 1 mg by mouth every morning Take in addition to PM dose. 9/17/2022 at AM Yes Reported, Patient No    rOPINIRole (REQUIP) 2 MG tablet Take 4 mg by mouth At Bedtime Take in addition to AM dose. 9/15/2022 at PM Yes Reported, Patient No    tiZANidine (ZANAFLEX) 2 MG tablet Take 1-2 mg by mouth At Bedtime 9/15/2022 at PM Yes Unknown, Entered By History     ipratropium - albuterol 0.5 mg/2.5 mg/3 mL (DUONEB) 0.5-2.5 (3) MG/3ML neb solution Take 1 vial (3 mLs) by nebulization every 6 hours as needed for shortness of breath / dyspnea or wheezing   Steven Pruitt MD Yes    Lidocaine (LIDOCARE) 4 % Patch Place 1 patch onto the skin daily as needed for moderate pain (musculoskeletal pain) Remove patch after 12 hours. To prevent lidocaine toxicity, patient should be patch free for 12 hrs daily.   Karlos López MD     naloxone (NARCAN) 4 MG/0.1ML nasal spray Spray 1 spray (4 mg) into one nostril alternating nostrils once as needed for opioid reversal every 2-3 minutes until assistance arrives   Sunshine Butterfield APRN CNP Yes    nicotine polacrilex (NICORETTE) 4 MG gum PLACE 1 PIECE INSIDE CHEEK AS NEEDED FOR SMOKING CESSATION   Greer,  BENNETT George CNP     Nutritional Supplements (ENSURE ORIGINAL) LIQD Take 237 mLs by mouth 2 times daily for 30 days   Sunshine Butterfield APRN CNP  9/25/22   polyethylene glycol (MIRALAX) 17 GM/Dose powder Mix 1 capful with 6-8 ounces of clear liquid and take by mouth twice daily as needed for constipation. Decrease dose to once daily or once every 2-3 days to prevent constipation.   Karlos López MD     tiotropium-olodaterol 2.5-2.5 MCG/ACT AERS Inhale 2 puffs into the lungs daily   Steven Pruitt MD
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
## 2022-09-18 ENCOUNTER — APPOINTMENT (OUTPATIENT)
Dept: PHYSICAL THERAPY | Facility: CLINIC | Age: 57
End: 2022-09-18
Attending: NURSE PRACTITIONER
Payer: MEDICAID

## 2022-09-18 VITALS
RESPIRATION RATE: 16 BRPM | SYSTOLIC BLOOD PRESSURE: 156 MMHG | TEMPERATURE: 97.9 F | DIASTOLIC BLOOD PRESSURE: 99 MMHG | OXYGEN SATURATION: 97 % | HEART RATE: 61 BPM | WEIGHT: 120.8 LBS | HEIGHT: 67 IN | BODY MASS INDEX: 18.96 KG/M2

## 2022-09-18 LAB
ANION GAP SERPL CALCULATED.3IONS-SCNC: 6 MMOL/L (ref 7–15)
BUN SERPL-MCNC: 24.2 MG/DL (ref 6–20)
CALCIUM SERPL-MCNC: 8.2 MG/DL (ref 8.6–10)
CHLORIDE SERPL-SCNC: 111 MMOL/L (ref 98–107)
CREAT SERPL-MCNC: 0.78 MG/DL (ref 0.51–0.95)
DEPRECATED HCO3 PLAS-SCNC: 26 MMOL/L (ref 22–29)
GFR SERPL CREATININE-BSD FRML MDRD: 89 ML/MIN/1.73M2
GLUCOSE SERPL-MCNC: 94 MG/DL (ref 70–99)
POTASSIUM SERPL-SCNC: 4.1 MMOL/L (ref 3.4–5.3)
SODIUM SERPL-SCNC: 143 MMOL/L (ref 136–145)

## 2022-09-18 PROCEDURE — G0378 HOSPITAL OBSERVATION PER HR: HCPCS

## 2022-09-18 PROCEDURE — 36415 COLL VENOUS BLD VENIPUNCTURE: CPT | Performed by: NURSE PRACTITIONER

## 2022-09-18 PROCEDURE — 97116 GAIT TRAINING THERAPY: CPT | Mod: GP

## 2022-09-18 PROCEDURE — 82310 ASSAY OF CALCIUM: CPT | Performed by: NURSE PRACTITIONER

## 2022-09-18 PROCEDURE — 97162 PT EVAL MOD COMPLEX 30 MIN: CPT | Mod: GP

## 2022-09-18 PROCEDURE — 250N000013 HC RX MED GY IP 250 OP 250 PS 637: Performed by: NURSE PRACTITIONER

## 2022-09-18 PROCEDURE — 99217 PR OBSERVATION CARE DISCHARGE: CPT | Performed by: PHYSICIAN ASSISTANT

## 2022-09-18 PROCEDURE — 258N000003 HC RX IP 258 OP 636: Performed by: NURSE PRACTITIONER

## 2022-09-18 PROCEDURE — 97161 PT EVAL LOW COMPLEX 20 MIN: CPT | Mod: GP

## 2022-09-18 RX ADMIN — DICLOFENAC SODIUM 4 G: 10 GEL TOPICAL at 08:16

## 2022-09-18 RX ADMIN — CEPHALEXIN 500 MG: 500 CAPSULE ORAL at 13:49

## 2022-09-18 RX ADMIN — SODIUM CHLORIDE: 9 INJECTION, SOLUTION INTRAVENOUS at 05:14

## 2022-09-18 RX ADMIN — OXYCODONE HYDROCHLORIDE 5 MG: 5 TABLET ORAL at 05:13

## 2022-09-18 RX ADMIN — PANTOPRAZOLE SODIUM 40 MG: 40 TABLET, DELAYED RELEASE ORAL at 08:11

## 2022-09-18 RX ADMIN — CEPHALEXIN 500 MG: 500 CAPSULE ORAL at 08:11

## 2022-09-18 RX ADMIN — SENNOSIDES AND DOCUSATE SODIUM 1 TABLET: 50; 8.6 TABLET ORAL at 08:11

## 2022-09-18 RX ADMIN — ACETAMINOPHEN 975 MG: 325 TABLET, FILM COATED ORAL at 13:46

## 2022-09-18 RX ADMIN — ACETAMINOPHEN 975 MG: 325 TABLET, FILM COATED ORAL at 05:13

## 2022-09-18 RX ADMIN — PREGABALIN 50 MG: 25 CAPSULE ORAL at 08:10

## 2022-09-18 ASSESSMENT — ACTIVITIES OF DAILY LIVING (ADL)
ADLS_ACUITY_SCORE: 22
ADLS_ACUITY_SCORE: 21
ADLS_ACUITY_SCORE: 22

## 2022-09-18 NOTE — CONSULTS
Consult Date: 09/18/2022    IDENTIFICATION:  The patient is a 56-year-old single  female with longstanding multiple sclerosis, multiple sclerosis, major depressive disorder, who is seen for psychiatric consultation at the request of NP, Teodora Samayoa, for evaluation of depression.    HISTORY OF PRESENT ILLNESS:  The patient had been brought by EMS after police had been called that she was sleeping in her car in a [employer] parking lot.  She had appeared to be struggling with alertness and unable to ambulate.  Several days before she had fallen, hitting her face at home.  Workup in the ED revealed no evidence of acute injury, but some generalized atrophy and microvascular changes.  She did sustain a subconjunctival hemorrhage, but vision intact.  She denied any use of alcohol.  There was no evidence of any ingestion, and she denied suicidal ideation.  She denies any problems driving in the past.  She walks only with a cane when needed.  She denies needing more help at home.  She was seen by a physical therapist and evaluation indicates she is at baseline and independent with ability to return home.  The patient states she did have 2 days of insomnia.  No change in her neurological regimen and states she takes 30 mg of the Remeron.  She appeared frustrated she was only given 5 mg and not 10 mg of the oxycodone and had noted a recent spinal cyst removal.  She has had some nausea, has not eaten well prior to admission.  No emesis or other symptomatology on Observation Unit.  She does contract for safety and repeatedly stated she did wish to go home.    PAST PSYCHIATRIC HISTORY:  Denies suicide attempts, but has had suicidal ideation recurrently.  She is followed at [facility] and states she has an appointment this week with therapist and also is seen by a [facility] psychiatrist.  She agrees to abide by the recommendations.  She denies chemical dependency treatment.  She had done some therapy after one of  her sons  in the .    PAST MEDICAL HISTORY:  Significant for multiple sclerosis, progressive type.  She is nicotine dependent.  She does have a history of concussive injuries.  No seizure reported.  She also has recurrent falls, history of ischemic cardiomyopathy, asymptomatic, mildly elevated creatinine, COPD, esophagitis more or less, usually on Requip, hypertension, recent sebaceous cyst removal, history of chronic back pain with lumbar fusion, chronic back pain and neuropathy.    ALLERGIES:  SULFA, WELLBUTRIN.    PHYSICAL EXAMINATION:    VITAL SIGNS:  Temperature 97.9, /99, respiratory rate 16, pulse 61, saturation 97% on room air, weight 54.8 kilograms.    FAMILY HISTORY:  Denies mental illness, chemical dependency or suicide.  She did not wish to discuss the death of her son for unclear reasons.    SOCIAL HISTORY:  The patient reports being on SSDI for 20 years for mental health as well as multiple sclerosis.  She lives with a friend in [place].  She does drive.  Denies any legal issues.  She does have a boyfriend apparently.  Has reported she does not always take her medications accurately.    MENTAL STATUS EXAMINATION:  The patient is a thin, brunette female, looking at least the stated age of 56.  She is surprised to see me and actually appeared resentful, but no acute lability.  She avoided eye contact, appeared to hold her neck at an odd angle, mildly spastic in gestures, but no neglect.  She reported reasonable vision and hearing.  Mood is stated as low chronically, denies any recent increase or any active suicidal ideation, hopelessness, death wish or thoughts of harm to others.  She reports of misusing medication or forgetting them.  She is somewhat delayed in her thought processing, appearing somewhat somnolent.  Likely has some limitations of insight, but insight and judgment are likely baseline.  Gait was not observed today.  She maintained reasonable attention  overall.    DIAGNOSES:  Persistent depressive disorder, chronic pain disorder, rule out delirium secondary to medication effects, rule out neurocognitive disorder, mild, secondary to progressive MS.    RECOMMENDATION:  Agree with holding Xanax given her chronic opioid use.  She is to meet with her psychiatrist and therapist as above this week.  She is not holdable from a psychiatric standpoint.  PT has also signed off case.  [facility] can also reassess her polypharmacy.  She may continue on Remeron 15 to 30 mg at bedtime.  She did not wish additional antidepressant exploration and again will follow up with her usual providers.  She is a chronic fall risk, but it is recommended that her PMD evaluate driving skills given this incident and likely chronic deficits.  Please call with questions and concerns.    Lorena Allison MD        D: 2022   T: 2022   MT: [id number]    Name:     REY TENORIOChris  MRN:      [id number]        Account:      [id number]   :      1965           Consult Date: 2022     Document: [id number]

## 2022-09-18 NOTE — PROGRESS NOTES
PRIMARY DIAGNOSIS: GENERALIZED WEAKNESS    OUTPATIENT/OBSERVATION GOALS TO BE MET BEFORE DISCHARGE  1. Tolerating PO medications: Yes    2. Return to near baseline physical activity: Yes    3. Cleared for discharge by consultants (if involved): No    Discharge Planner Nurse   Safe discharge environment identified: Yes  Barriers to discharge: Yes       Entered by: Loretta Godoy RN 09/18/2022 2:46 PM     Please review provider order for any additional goals.   Nurse to notify provider when observation goals have been met and patient is ready for discharge.

## 2022-09-18 NOTE — PROGRESS NOTES
09/18/22 1200   Quick Adds   Type of Visit Initial PT Evaluation   Living Environment   People in Home friend(s)   Current Living Arrangements house   Home Accessibility stairs to enter home;stairs within home   Number of Stairs, Main Entrance 3   Stair Railings, Main Entrance none   Number of Stairs, Within Home, Primary greater than 10 stairs  (pt unable to recall)   Stair Railings, Within Home, Primary   (1 rail; pt unable to recall)   Transportation Anticipated family or friend will provide   Living Environment Comments Pt reports she lives with a friend in a house. Reports 3 [address], then bedroom/bathroom upstairs.   Self-Care   Usual Activity Tolerance moderate   Current Activity Tolerance moderate   Regular Exercise No   Equipment Currently Used at Home cane, quad   Fall history within last six months yes   Number of times patient has fallen within last six months   (at least 1; pt cannot recall)   Activity/Exercise/Self-Care Comment Pt reports IND with mobility and ADLs/self cares at baseline. Reports she uses cane when feeling weaker. Reports dependent on MS and pain.   General Information   Onset of Illness/Injury or Date of Surgery 09/17/22   Referring Physician Teodora Samayoa, BENNETT CNP   Patient/Family Therapy Goals Statement (PT) return home   Pertinent History of Current Problem (include personal factors and/or comorbidities that impact the POC) Hina Yap is a 56 year old female PMH significant for multiple sclerosis, cardiomyopathy, CKD stage 2, RLS, GERD, COPD, major depressive disorder, and hypertension who presents to the hospital with complaints of weakness and dizziness.  She was found sleeping in her car in the [facility] parking lot by police.  She does not remember sleeping and states she was looking for coupons in her purse when she was awoken by someone knocking on her window.  She was given a ride home by the police, but was unable to ambulate, so they called EMS.  Her boyfriend states  this happens when she does not take her medications correctly.  She denies any recent changes to her medications.  She follows with [facility].  She also states she had a fall out of bed 2 days ago.   Cognition   Affect/Mental Status (Cognition) anxious;emotionally labile   Orientation Status (Cognition) oriented x 4   Follows Commands (Cognition) WFL   Cognitive Status Comments pt is poor historian; unsure about details; pt very anxious/angry and wanting to leave   Pain Assessment   Patient Currently in Pain Yes, see Vital Sign flowsheet   Integumentary/Edema   Integumentary/Edema Comments various abrasions following fall   Posture    Posture Forward head position;Protracted shoulders   Range of Motion (ROM)   ROM Comment BLEs WFL   Strength (Manual Muscle Testing)   Strength (Manual Muscle Testing) Deficits observed during functional mobility   Strength Comments at least 3/5 with functional mobility; functional weakness noted   Bed Mobility   Comment, (Bed Mobility) supine<>sit with SBA   Transfers   Comment, (Transfers) sit<>stand with SBA   Gait/Stairs (Locomotion)   Comment, (Gait/Stairs) amb with SBA; mild unsteadiness and ataxia but no overt LOB; pt declining use of AD   Balance   Balance Comments mild unsteadiness, no overt LOB   Clinical Impression   Criteria for Skilled Therapeutic Intervention Yes, treatment indicated   PT Diagnosis (PT) impaired functional mobility   Influenced by the following impairments weakness, impaired balance, pain, MS   Functional limitations due to impairments difficulty with ambulation, stairs   Clinical Presentation (PT Evaluation Complexity) Evolving/Changing   Clinical Presentation Rationale clinical judgement, [facility]   Clinical Decision Making (Complexity) moderate complexity   Planned Therapy Interventions (PT) balance training;bed mobility training;gait training;home exercise program;neuromuscular re-education;patient/family education;stair  training;strengthening;transfer training;progressive activity/exercise   Anticipated Equipment Needs at Discharge (PT)   (TBD)   Risk & Benefits of therapy have been explained evaluation/treatment results reviewed;care plan/treatment goals reviewed;risks/benefits reviewed;current/potential barriers reviewed;participants voiced agreement with care plan;participants included;patient   PT Discharge Planning   PT Discharge Recommendation (DC Rec) home with assist   PT Rationale for DC Rec Patient appears to be at or near baseline functional mobility. Patient appears anxious and upset; patient reporting she wants to leave the hospital and return home now. Patient is able to complete all functional mobility needed for discharge home with SBA. Patient demonstrates mild balance impairment and ataxia with ambulation but declining use of AD; reports she has her friend who can assist if needed upon discharge. All therapy goals met at this time. Patient declining need for therapy services at next level of care at this time; reporting all needs are met.   Total Evaluation Time   Total Evaluation Time (Minutes) 10   Physical Therapy Goals   PT Frequency One time eval and treatment only   PT Predicted Duration/Target Date for Goal Attainment 09/18/22   PT Goals Bed Mobility;Transfers;Gait;Stairs   PT: Bed Mobility Supervision/stand-by assist;Supine to/from sit;Goal Met   PT: Transfers Supervision/stand-by assist;Sit to/from stand;Goal Met   PT: Gait Supervision/stand-by assist;Greater than 200 feet;Goal Met   PT: Stairs Supervision/stand-by assist;Greater than 10 stairs;Goal Met  (1 rail; & 3 stairs no rail)
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
## 2022-09-18 NOTE — CONSULTS
See dictation. #[id number]  Psychiatry   Initial Consultation-completed  Patient not psychiatrically holdable and PT has determined she is ambulatory but is a chronic fall risk but at likely baseline.. I recommend her PMD have her do a DMV re-evaluation of her driving. No return to Xanax recommended. She may continue on Remeron 15-30mg at bedtime. Her Adderall use may be contributing to her reported insomnia and she should take no more than 10mg qam until seen in psychiatry. She has reportedly appts with her therapist at [facility] and psychiatrist at [facility] this week. Please call prn until release.     Lorena Allison MD  9/18/2022

## 2022-09-18 NOTE — PLAN OF CARE
ROOM # 230    Living Situation (if not independent, order SW consult): Home  : Milan (Son)    Activity level at baseline: Independent  Activity level on admit: SBA    Who will be transporting you at discharge: Milan Soto    Patient registered to observation; given Patient Bill of Rights; given the opportunity to ask questions about observation status and their plan of care.  Patient has been oriented to the observation room, bathroom and call light is in place.    Discussed discharge goals and expectations with patient/family.

## 2022-09-18 NOTE — PLAN OF CARE
Physical Therapy Discharge Summary    Reason for therapy discharge:    All goals and outcomes met, no further needs identified.    Progress towards therapy goal(s). See goals on Care Plan in [facility] electronic health record for goal details.  Goals met    Therapy recommendation(s):    Patient declining further therapy at next level of care.

## 2022-09-18 NOTE — CONSULTS
"[facility]  Neurology Consultation         Cookie Beard MD    Hina Yap MRN# [id number]   YOB: 1965 Age: 56 year old      Date of Admission:  9/17/2022  Date of Consult: 9/18/2022                  Primary Care Physician:   Sunshine Butterfield [phone]         Chief Complaint:   Somnolence/Giat instability           History of Present Illness:   Hina Yap is a 56 year old female who presented with PMH significant for multiple sclerosis on Avonex, cardiomyopathy, CKD stage 2, COPD, depression, chronic pain on narcotics with recent back operation for cyst on antibiotics who was admitted for confusion/unsteadiness.  Reportedly found asleep in her car at [facility]. Police escorted her home but felt unstable and EMS called.  In ER vitals with hypertension and reports pain in her head/neck for recent fall. With some abrasions on forehead, forearm, and right conjunctival hemorrhage.  She was admitted for further observation.  Today, she tells me she feels at her baseline.  She denies any concern for MS relapse symptoms with new weakness/numbness/AMS.  She is poor historian and frustrated during my interview \"stating she has already talked to several other providers.  She reports mirtazapine is new med and but also states has been out of pain meds but she is unclear on her medications.  She is asking to go home so she can make her pain clinic visit which is on Monday.  She denies change in gait leading to falls.  States fell out of bed a few days ago but not able to provider a clear history.  When asked if feels able to care for herself at home and if would need help with med management she gets frustrated stating \"I'm not abusing my drugs\".  UDS was neg for narcotics for which she is prescribed and benzos which also looks like she has been prescribed.  Positive for amphetamines (on adderrall).  Seems from prior notes her home situation is unstable.  However, when I " asked if she feels safe and would help at home she says yes and asks again to go home stating that she feels fine.  She was able to get up and walk with PT.      In review of her MS. Follows with Dr. Darion Fan at [facility].  She reports has f/u next month.  Seems MRis in 5/2022 with new C spine lesion but not active and compared to 2019 and brain imaging stable compared to 6/2020.             Past Medical History:     Patient Active Problem List   Diagnosis     MS (multiple sclerosis) (H)     Cardiomyopathy (H)     Esophageal reflux     Anxiety     Restless leg syndrome     Moderate episode of recurrent major depressive disorder (H)     Other chronic pain     CKD (chronic kidney disease) stage 2, GFR 60-89 ml/min     Insomnia, unspecified type     Prolonged Q-T interval on ECG     Essential hypertension     PVC's (premature ventricular contractions)     Nonrheumatic mitral valve regurgitation     S/P lumbar fusion     Dehydration     Spasticity     Acute kidney injury (H)      Past Medical History:   Diagnosis Date     Anxiety      Arthritis Years ago     COPD (chronic obstructive pulmonary disease) (H)      Depressive disorder Years ago     GERD (gastroesophageal reflux disease)      Hypertension      Ischemic cardiomyopathy      Multiple sclerosis (H)      Neuromuscular disorder (H)      Nonrheumatic mitral valve regurgitation      PVC's (premature ventricular contractions)      Renal disease      Restless leg syndrome      Tobacco use disorder              Past Surgical History:     Past Surgical History:   Procedure Laterality Date     GYN SURGERY      tubal ligation     OPTICAL TRACKING SYSTEM FUSION POSTERIOR SPINE LUMBAR N/A 7/23/2021    Procedure: L4-5 transforaminal lumbar interbody fusion with reduction of grade 1/2 unstable spondylolisthesis   Open reduction and internal fixation of the grade L4-5 spondylolisthesis L4 - L5 posterior lateral fusion;  Surgeon: Asif Flores MD;  Location:  OR             Home Medications:     Prior to Admission medications    Medication Sig Last Dose Taking? Auth Provider Long Term End Date   albuterol (PROAIR HFA/PROVENTIL HFA/VENTOLIN HFA) 108 (90 Base) MCG/ACT inhaler Inhale 2 puffs into the lungs every 4 hours as needed for shortness of breath / dyspnea or wheezing Unknown at Unknown time Yes Carlos Eduardo Madsen PA-C Yes    ALPRAZolam (XANAX) 0.5 MG tablet Take 1 tablet (0.5 mg) by mouth daily Past Week at Unknown time Yes Sunshine Butterfield APRN CNP Yes    amLODIPine (NORVASC) 2.5 MG tablet Take 1 tablet (2.5 mg) by mouth daily 9/16/2022 at PM Yes Malik Lee PA-C Yes    amphetamine-dextroamphetamine (ADDERALL XR) 30 MG 24 hr capsule Take 1 capsule (30 mg) by mouth daily 9/17/2022 at 0600 Yes Sunshine Butterfield APRN CNP     amphetamine-dextroamphetamine (ADDERALL) 10 MG tablet Take 1 tablet (10 mg) by mouth daily 9/16/2022 at 1400 Yes Sunshine Butterfield APRN CNP     AVONEX PEN 30 MCG/0.5ML auto-injector kit Inject 30 mcg into the muscle every 7 days  9/12/2022 Yes Reported, Patient     baclofen (LIORESAL) 20 MG tablet Take 20 mg by mouth 4 times daily  9/17/2022 at 0700 Yes Reported, Patient     cephALEXin (KEFLEX) 500 MG capsule Take 1 capsule (500 mg) by mouth 3 times daily for 7 days 9/17/2022 at AM Yes Andre Wallace PA-C  9/19/22   diclofenac (VOLTAREN) 1 % topical gel Apply 4 g topically 4 times daily 9/16/2022 at Unknown time Yes Sunshine Butterfield APRN CNP     esomeprazole (NEXIUM) 40 MG DR capsule Take 1 capsule (40 mg) by mouth every morning (before breakfast) Take 30-60 minutes before eating. 9/17/2022 at 0600 Yes Sunshine Butterfield APRN CNP     ferrous sulfate (FEROSUL) 325 (65 Fe) MG tablet Take 1 tablet (325 mg) by mouth daily (with breakfast) 9/16/2022 at Unknown time Yes Sunshine Butterfield, BENNETT CNP     mirtazapine (REMERON) 7.5 MG tablet Take 1 tablet (7.5 mg) by mouth At Bedtime for 7 days, THEN 2  tablets (15 mg) At Bedtime for 14 days, THEN 4 tablets (30 mg) At Bedtime for 69 days. 9/15/2022 at PM Yes Sunshine Butterfield APRN CNP Yes 10/27/22   oxyCODONE-acetaminophen (PERCOCET)  MG per tablet Take 1 tablet by mouth every 6 hours as needed for severe pain (Max of 5 tablets per day, for chroic pain.) 9/17/2022 at 0600 Yes Reported, Patient No    pregabalin (LYRICA) 50 MG capsule Take 1 capsule (50 mg) by mouth 2 times daily 9/17/2022 at 0600 Yes Sunshine Butterfield APRN CNP Yes    promethazine (PHENERGAN) 25 MG tablet Take 25 mg by mouth every 6 hours as needed for nausea 9/17/2022 at 0600 Yes Reported, Patient     rOPINIRole (REQUIP) 2 MG tablet Take 1 mg by mouth every morning Take in addition to PM dose. 9/17/2022 at AM Yes Reported, Patient No    rOPINIRole (REQUIP) 2 MG tablet Take 4 mg by mouth At Bedtime Take in addition to AM dose. 9/15/2022 at PM Yes Reported, Patient No    tiZANidine (ZANAFLEX) 2 MG tablet Take 1-2 mg by mouth At Bedtime 9/15/2022 at PM Yes Unknown, Entered By History     ipratropium - albuterol 0.5 mg/2.5 mg/3 mL (DUONEB) 0.5-2.5 (3) MG/3ML neb solution Take 1 vial (3 mLs) by nebulization every 6 hours as needed for shortness of breath / dyspnea or wheezing   Steven Pruitt MD Yes    Lidocaine (LIDOCARE) 4 % Patch Place 1 patch onto the skin daily as needed for moderate pain (musculoskeletal pain) Remove patch after 12 hours. To prevent lidocaine toxicity, patient should be patch free for 12 hrs daily.   Karlos López MD     naloxone (NARCAN) 4 MG/0.1ML nasal spray Spray 1 spray (4 mg) into one nostril alternating nostrils once as needed for opioid reversal every 2-3 minutes until assistance arrives   Sunshine Butterfield APRN CNP Yes    nicotine polacrilex (NICORETTE) 4 MG gum PLACE 1 PIECE INSIDE CHEEK AS NEEDED FOR SMOKING CESSATION   Sunshine Butterfield, APRN CNP     Nutritional Supplements (ENSURE ORIGINAL) LIQD Take 237 mLs by mouth 2 times daily  "for 30 days   GreerSunshine duranBENNETT CNP  9/25/22   polyethylene glycol (MIRALAX) 17 GM/Dose powder Mix 1 capful with 6-8 ounces of clear liquid and take by mouth twice daily as needed for constipation. Decrease dose to once daily or once every 2-3 days to prevent constipation.   Karlos López MD     tiotropium-olodaterol 2.5-2.5 MCG/ACT AERS Inhale 2 puffs into the lungs daily   Steven Pruitt MD              Current Medications:           acetaminophen  975 mg Oral Q8H     amLODIPine  2.5 mg Oral QPM     cephALEXin  500 mg Oral TID     diclofenac  4 g Topical 4x Daily     mirtazapine  15 mg Oral At Bedtime     pantoprazole  40 mg Oral QAM AC     pregabalin  50 mg Oral BID     senna-docusate  1 tablet Oral BID    Or     senna-docusate  2 tablet Oral BID     bisacodyl, Lidocaine, melatonin, naloxone **OR** naloxone **OR** naloxone **OR** naloxone, nicotine polacrilex, ondansetron **OR** ondansetron, oxyCODONE, polyethylene glycol, sodium phosphate         Allergies:     Allergies   Allergen Reactions     Sulfa Drugs Rash     Adhesive Tape Rash     Reacts to adhesive on fentanyl patch     Wellbutrin [Bupropion Hydrobromide] Rash            Social History:     Does not work outside the home.  Lives with significant other           Family History:     Family History   Problem Relation Age of Onset     Breast Cancer Mother      Osteoporosis Mother      Dementia Mother      Other Cancer Father      Diabetes Maternal Grandmother      Depression Brother      Anxiety Disorder Brother      Substance Abuse Brother             Review of Systems:   The 10 point Review of Systems is negative other than noted in the HPI.           Physical Exam:    , Blood pressure 135/86, pulse 59, temperature 98  F (36.7  C), temperature source Oral, resp. rate 16, height 1.702 m (5' 7\"), weight 54.8 kg (120 lb 12.8 oz), last menstrual period 05/25/2017, SpO2 97 %.  120 lbs 12.8 oz     Cardio - Heart Rate Reg, Distal pulses " palpable  Resp - Breathing non labored    Neurological Exam:  General: Mental status -Alert and orientated, speech without dysarthria, language fluent with intact naming and repetition, (knows location, but poor historian and not able to describe recent events well is easily distracted and frustrated with feeling needs to repeat herself during the interview )  Cranial Nerves- . Pupils equal round and reactive to light. Extraocular movements intact. No nystagmus.  Face symmetric and intact to light touch, tongue midline, palate activates symmetrically. Shoulder shrug 5/5  Motor: Normal muscle bulk and tone.  Has 5/5 strength in bilateral upper and lower extremities both proximally and distally.   Sensation: intact to light touch and pinprick throughout upper and lower extremities.   Reflexes:  Biceps 2/2, brachioradialis 3/3, patella 3/3, ankles 2/2, toes downgoing  Cerebellar: intact FNF  Gait: in bed, not wanting to walk as just did with PT          Data:   All new lab and imaging data was reviewed.  Recent Labs   Lab 09/18/22  0624 09/17/22  1231   WBC  --  7.6   HGB  --  11.8   MCV  --  92   PLT  --  260    141   POTASSIUM 4.1 3.5   CHLORIDE 111* 103   CO2 26 25   BUN 24.2* 39.9*   CR 0.78 1.44*   ANIONGAP 6* 13   MARIO 8.2* 9.4   GLC 94 83   ALBUMIN  --  4.4   PROTTOTAL  --  7.0   BILITOTAL  --  0.3   ALKPHOS  --  69   ALT  --  29   AST  --  42*       ..  Recent Results (from the past 24 hour(s))   Head CT w/o contrast    Narrative    EXAM: CT HEAD WITHOUT CONTRAST, CT CERVICAL SPINE WITHOUT CONTRAST  LOCATION: [facility]  DATE/TIME: 09/17/2022, 1:34 PM    INDICATION: Fall, hit forehead. Dizziness. Unsure loss of consciousness.  COMPARISON: None.  TECHNIQUE:   1) Routine CT Head without IV contrast. Multiplanar reformats. Dose reduction techniques were used.  2) Routine CT Cervical Spine without IV contrast. Multiplanar reformats. Dose reduction techniques were used.    FINDINGS:    HEAD CT:   INTRACRANIAL CONTENTS: No intracranial hemorrhage, extra-axial collection, or mass effect.  No CT evidence of acute infarct. Moderate presumed chronic small vessel ischemic changes. Mild generalized volume loss. No hydrocephalus.     VISUALIZED ORBITS/SINUSES/MASTOIDS: No intraorbital abnormality. Mild mucosal thickening scattered about the paranasal sinuses. No middle ear or mastoid effusion.    BONES/SOFT TISSUES: No acute abnormality.    CERVICAL SPINE CT:   VERTEBRA: Straightening of usual cervical lordosis with otherwise normal alignment. Normal vertebral body heights. Moderate disc space narrowing C4-C5 and C5-C6 with marginal osteophytes. Facet arthropathy preferentially on the left at C3-C4. No fracture   or posttraumatic subluxation.     CANAL/FORAMINA: Moderate spinal canal stenosis C4-C5 with moderate to severe bilateral foraminal stenoses. Mild spinal canal stenosis C5-C6 with severe right and mild left foraminal stenoses.    PARASPINAL: No extraspinal abnormality. Visualized lung fields are clear.      Impression    IMPRESSION:  HEAD CT:  1.  No CT evidence for acute intracranial process.  2.  Brain atrophy and presumed chronic microvascular ischemic changes as above.    CERVICAL SPINE CT:  1.  No CT evidence for acute fracture or posttraumatic subluxation.  2.  Spondylosis with spinal canal and foraminal stenoses as detailed.     Cervical spine CT w/o contrast    Narrative    EXAM: CT HEAD WITHOUT CONTRAST, CT CERVICAL SPINE WITHOUT CONTRAST  LOCATION: [facility]  DATE/TIME: 09/17/2022, 1:34 PM    INDICATION: Fall, hit forehead. Dizziness. Unsure loss of consciousness.  COMPARISON: None.  TECHNIQUE:   1) Routine CT Head without IV contrast. Multiplanar reformats. Dose reduction techniques were used.  2) Routine CT Cervical Spine without IV contrast. Multiplanar reformats. Dose reduction techniques were used.    FINDINGS:   HEAD CT:   INTRACRANIAL CONTENTS: No  intracranial hemorrhage, extra-axial collection, or mass effect.  No CT evidence of acute infarct. Moderate presumed chronic small vessel ischemic changes. Mild generalized volume loss. No hydrocephalus.     VISUALIZED ORBITS/SINUSES/MASTOIDS: No intraorbital abnormality. Mild mucosal thickening scattered about the paranasal sinuses. No middle ear or mastoid effusion.    BONES/SOFT TISSUES: No acute abnormality.    CERVICAL SPINE CT:   VERTEBRA: Straightening of usual cervical lordosis with otherwise normal alignment. Normal vertebral body heights. Moderate disc space narrowing C4-C5 and C5-C6 with marginal osteophytes. Facet arthropathy preferentially on the left at C3-C4. No fracture   or posttraumatic subluxation.     CANAL/FORAMINA: Moderate spinal canal stenosis C4-C5 with moderate to severe bilateral foraminal stenoses. Mild spinal canal stenosis C5-C6 with severe right and mild left foraminal stenoses.    PARASPINAL: No extraspinal abnormality. Visualized lung fields are clear.      Impression    IMPRESSION:  HEAD CT:  1.  No CT evidence for acute intracranial process.  2.  Brain atrophy and presumed chronic microvascular ischemic changes as above.    CERVICAL SPINE CT:  1.  No CT evidence for acute fracture or posttraumatic subluxation.  2.  Spondylosis with spinal canal and foraminal stenoses as detailed.              Assessment and Plan:       Ms. Hina Yap is a 56 year old female who presented with PMH significant for multiple sclerosis on Avonex, cardiomyopathy, CKD stage 2, COPD, depression, chronic pain on narcotics with recent back operation for cyst on antibiotics who was admitted for confusion/unsteadiness.  Reportedly found asleep in her car at [facility]. Police escorted her home but felt unstable and EMS called. Concern for fall several days ago with abrasions.  In ER vitals with hypertension and reports pain in her head/neck for recent fall. CT head and neck reassuring.  She is poor  historian and cannot provide details of her recent fall but specifically denies acute change in gait or new weakness/numbness concerning for MS flare.  It seems that has had some medication changes but with possibly missing prescribed meds and starting new medications.   Agree with primary team that this presentation is likely polypharmacy related or related to actually missing meds.  Agree with home situation needs to be evaluated and given medication concerns consider home nursing services for med management and agree with SW/psych/PT consults.      In regards to her MS she is on Avonex and reports follows with Naty and has appointment next month.  Had imaging in 5/2022 and C spine with new lesion but not acute.  Feel Ms flare given her reported history of no specific change in baseline functioning with reassuring exam is less likely cause of presentation.  Discussed obtaining updated MRI imaging with patient and she prefers to follow with her outpatient provider for this and feel this is reasonable given the above.        55 min spent on date of encounter

## 2022-09-18 NOTE — CONSULTS
"CLINICAL NUTRITION SERVICES - BRIEF NOTE     MD consult received with comment \"poor oral intake\".     A full Nutrition Assessment will be deferred at this time as patient is currently observation status and not receiving nutrition support.      Pt can be referred to outpatient RD by primary care provider after discharge as appropriate.       Should patient's status change to Inpatient, we will be available for a full Nutrition Assessment with another consult.     Maine Ruiz RDN, LD  Clinical Dietitian  3rd floor/ICU: [phone]  All other floors: [phone]  Weekend/holiday: [phone]  Office: [phone]  "

## 2022-09-18 NOTE — PLAN OF CARE
PRIMARY DIAGNOSIS: GENERALIZED WEAKNESS    OUTPATIENT/OBSERVATION GOALS TO BE MET BEFORE DISCHARGE  1. Orthostatic performed: N/A    2. Tolerating PO medications: Yes    3. Return to near baseline physical activity: No    4. Cleared for discharge by consultants (if involved): No    Vitals are Temp: 97.4  F (36.3  C) Temp src: Axillary BP: 105/64 Pulse: 60   Resp: 18 SpO2: 96 %.    Patient is Alert and Oriented x4. Noted to be resting comfortably at this time with no signs of nonverbal indicators of pain noted. Patient has Lactated Ringer's running at 100 mL per hour. Pt is a Regular diet. She is 1 Assist with Gait Belt. Will continue to monitor.     Discharge Planner Nurse   Safe discharge environment identified: Yes  Barriers to discharge: Yes       Entered by: Khalida Mclain RN 09/18/2022 1:03 AM    Please review provider order for any additional goals.   Nurse to notify provider when observation goals have been met and patient is ready for discharge.

## 2022-09-18 NOTE — PROGRESS NOTES
PRIMARY DIAGNOSIS: GENERALIZED WEAKNESS    OUTPATIENT/OBSERVATION GOALS TO BE MET BEFORE DISCHARGE  1. Tolerating PO medications: Yes    2. Return to near baseline physical activity: Yes    3. Cleared for discharge by consultants (if involved): Yes    Discharge Planner Nurse   Safe discharge environment identified: Yes  Barriers to discharge: No       Entered by: Loretta Godoy RN 09/18/2022 2:47 PM     Please review provider order for any additional goals.   Nurse to notify provider when observation goals have been met and patient is ready for discharge.

## 2022-09-18 NOTE — PLAN OF CARE
PRIMARY DIAGNOSIS: GENERALIZED WEAKNESS    OUTPATIENT/OBSERVATION GOALS TO BE MET BEFORE DISCHARGE  1. Orthostatic performed: N/A    2. Tolerating PO medications: Yes    3. Return to near baseline physical activity: No    4. Cleared for discharge by consultants (if involved): No    Vitals are Temp: 97.1  F (36.2  C) Temp src: Axillary BP: 126/76 Pulse: 64   Resp: 16 SpO2: 95 %.    Patient is Alert and Oriented x4. Noted to be resting comfortably at this time with no signs of nonverbal indicators of pain noted. Patient has Lactated Ringer's running at 100 mL per hour. Pt is a Regular diet. She is 1 Assist with Gait Belt. Plan is for PT consult and eval for weakness, Neurology consult for MS and polypharmacy, Psychiatry consult for worsening depression and social Work Consult for discharge planning.     Discharge Planner Nurse   Safe discharge environment identified: Yes  Barriers to discharge: Yes       Entered by: Khalida Mclain RN 09/18/2022 6:45 AM    Please review provider order for any additional goals.   Nurse to notify provider when observation goals have been met and patient is ready for discharge.

## 2022-09-18 NOTE — PLAN OF CARE
"PRIMARY DIAGNOSIS: \"GENERIC\" NURSING  OUTPATIENT/OBSERVATION GOALS TO BE MET BEFORE DISCHARGE:  1. ADLs back to baseline: No    2. Activity and level of assistance: Up with standby assistance.    3. Pain status: Improved-controlled with oral pain medications.    4. Return to near baseline physical activity: No     Discharge Planner Nurse   Safe discharge environment identified: Yes  Barriers to discharge: Yes       Entered by: Roxana Stewart RN 09/17/2022      Please review provider order for any additional goals.   Nurse to notify provider when observation goals have been met and patient is ready for discharge.    /85 (BP Location: Right arm)   Pulse 55   Temp 97.7  F (36.5  C) (Oral)   Resp 18   Ht 1.702 m (5' 7\")   Wt 54.8 kg (120 lb 12.8 oz)   LMP 05/25/2017 (Exact Date)   SpO2 99%   BMI 18.92 kg/m        Alert and oriented x4, SBA to bathroom as gait is unsteady, pain managed with oral meds, Oxycodone 5mg given x1 for pain in legs and arms, ate two packs of cold lunches, tolerating oral meds and regular diet, voided twice, NS running at 100ml/hr, sleeping at this time, continue to provide cares.  "

## 2022-09-18 NOTE — PROGRESS NOTES
Patient's After Visit Summary was reviewed with patient and/or son.   Patient verbalized understanding of After Visit Summary, recommended follow up and was given an opportunity to ask questions.   Discharge medications sent home with patient/family: Not applicable   Discharged with son via private ride.

## 2022-09-19 ENCOUNTER — TELEPHONE (OUTPATIENT)
Dept: PEDIATRICS | Facility: CLINIC | Age: 57
End: 2022-09-19

## 2022-09-19 ENCOUNTER — TRANSFERRED RECORDS (OUTPATIENT)
Dept: HEALTH INFORMATION MANAGEMENT | Facility: CLINIC | Age: 57
End: 2022-09-19

## 2022-09-19 LAB — BACTERIA UR CULT: NO GROWTH

## 2022-09-19 NOTE — TELEPHONE ENCOUNTER
"Called patient. Reports she's very upset by this recent hospital stay. States she feels \"everyone is going off the deep end\" states she's very upset, stressed and overwhelmed. Asked how RN could support her, denies need for any help.     Patient agreeable to appointments, assisted with scheduled. Advised to contact us for any needs that could help alleviate her stress.     Scheduled both MTM and Hosp f/up visit for next week (next available)  "

## 2022-09-19 NOTE — TELEPHONE ENCOUNTER
Sunshine Butterfield, APRN CNP  P Ea Sb3/5 Rafita SALAZAR (Rn)  Please be sure pt has hospital follow-up scheduled with me. Beforehand, I'd like her to see MTM virtually as some of her issues were actually related to polypharmacy

## 2022-09-23 ENCOUNTER — TRANSFERRED RECORDS (OUTPATIENT)
Dept: HEALTH INFORMATION MANAGEMENT | Facility: CLINIC | Age: 57
End: 2022-09-23

## 2022-09-29 ENCOUNTER — TELEPHONE (OUTPATIENT)
Dept: PEDIATRICS | Facility: CLINIC | Age: 57
End: 2022-09-29

## 2022-09-29 ENCOUNTER — OFFICE VISIT (OUTPATIENT)
Dept: PEDIATRICS | Facility: CLINIC | Age: 57
End: 2022-09-29
Payer: MEDICAID

## 2022-09-29 VITALS
BODY MASS INDEX: 18.05 KG/M2 | HEART RATE: 78 BPM | TEMPERATURE: 99.1 F | SYSTOLIC BLOOD PRESSURE: 128 MMHG | WEIGHT: 115 LBS | DIASTOLIC BLOOD PRESSURE: 62 MMHG | HEIGHT: 67 IN | RESPIRATION RATE: 14 BRPM | OXYGEN SATURATION: 98 %

## 2022-09-29 DIAGNOSIS — Z72.0 TOBACCO ABUSE: ICD-10-CM

## 2022-09-29 DIAGNOSIS — G35 MULTIPLE SCLEROSIS (H): ICD-10-CM

## 2022-09-29 DIAGNOSIS — R40.0 SOMNOLENCE: ICD-10-CM

## 2022-09-29 DIAGNOSIS — M62.81 GENERALIZED MUSCLE WEAKNESS: Primary | ICD-10-CM

## 2022-09-29 DIAGNOSIS — F33.2 SEVERE EPISODE OF RECURRENT MAJOR DEPRESSIVE DISORDER, WITHOUT PSYCHOTIC FEATURES (H): ICD-10-CM

## 2022-09-29 DIAGNOSIS — R53.83 OTHER FATIGUE: ICD-10-CM

## 2022-09-29 PROCEDURE — 90677 PCV20 VACCINE IM: CPT | Performed by: NURSE PRACTITIONER

## 2022-09-29 PROCEDURE — 90472 IMMUNIZATION ADMIN EACH ADD: CPT | Performed by: NURSE PRACTITIONER

## 2022-09-29 PROCEDURE — 90682 RIV4 VACC RECOMBINANT DNA IM: CPT | Performed by: NURSE PRACTITIONER

## 2022-09-29 PROCEDURE — 90471 IMMUNIZATION ADMIN: CPT | Performed by: NURSE PRACTITIONER

## 2022-09-29 PROCEDURE — 99496 TRANSJ CARE MGMT HIGH F2F 7D: CPT | Mod: 25 | Performed by: NURSE PRACTITIONER

## 2022-09-29 RX ORDER — DEXTROAMPHETAMINE SACCHARATE, AMPHETAMINE ASPARTATE, DEXTROAMPHETAMINE SULFATE AND AMPHETAMINE SULFATE 2.5; 2.5; 2.5; 2.5 MG/1; MG/1; MG/1; MG/1
10 TABLET ORAL DAILY
Qty: 30 TABLET | Refills: 0 | Status: SHIPPED | OUTPATIENT
Start: 2022-09-29 | End: 2022-10-28

## 2022-09-29 RX ORDER — DEXTROAMPHETAMINE SACCHARATE, AMPHETAMINE ASPARTATE MONOHYDRATE, DEXTROAMPHETAMINE SULFATE AND AMPHETAMINE SULFATE 7.5; 7.5; 7.5; 7.5 MG/1; MG/1; MG/1; MG/1
30 CAPSULE, EXTENDED RELEASE ORAL EVERY MORNING
Qty: 30 CAPSULE | Refills: 0 | Status: SHIPPED | OUTPATIENT
Start: 2022-09-29 | End: 2022-10-28

## 2022-09-29 RX ORDER — DESVENLAFAXINE 50 MG/1
50 TABLET, FILM COATED, EXTENDED RELEASE ORAL DAILY
Status: CANCELLED | COMMUNITY
Start: 2022-09-29

## 2022-09-29 ASSESSMENT — PAIN SCALES - GENERAL: PAINLEVEL: MODERATE PAIN (4)

## 2022-09-29 NOTE — PATIENT INSTRUCTIONS
"Stop tizanidine  I'll get records from psychitrist.   I'll have sidney ask about boost shakes (or something similar)  I\"ll have him ask about switching psychiatrist.  "

## 2022-09-29 NOTE — TELEPHONE ENCOUNTER
Called pharmacy, Dx code used was not valid, last time used MS: G 35, which did work and continue to use this code for future refills as needed.   Pedrito filled rx, I called and informed patient.   Patient also inquired about medication Sunshine Butterfield suggested (same med her psych.tried to prescribe, but would not go through), hoping Sunshine will order RX instead. VICTOR MANUEL for psychiatrist is signed and in abstract.     Lisette Tuttle MA

## 2022-09-29 NOTE — PROGRESS NOTES
Assessment & Plan     (M62.81) Generalized muscle weakness  (primary encounter diagnosis)  (R40.0) Somnolence  Comment: Hospitalized 9/17-9/18 for somnolence and falls thought to be medication related, specifically xanax. Likely also an element of dehydration and poor oral intake, which is thought to be due to her depression. MTM assessed her meds, and recommended many further changes, most of which the pt declined  Plan:   -Continue to hold the xanax  -Pt agrees to stop tizanidine  -Will try to get nutrition replacement shakes covered  -Follow-up with MS neurologist  -Declines PT  -Declines to reduce oxycodone for pain. Declines to stop promethazine, lyrica, baclofen which can cause CNS depression.    (R53.83) Other fatigue  Comment: Prescribed adderall for MS related fatigue. She is adamant that this doesn't affect her sleep  Plan: amphetamine-dextroamphetamine (ADDERALL XR) 30         MG 24 hr capsule, amphetamine-dextroamphetamine        (ADDERALL) 10 MG tablet  Ok to continue same dose for now, but consider decreasing in the future as nexium can increase adderal levels. May be impacting sleep. Also, increased risk of serotonin syndrome when combined with oxycodone and mirtazapine    (F33.2) Severe episode of recurrent major depressive disorder, without psychotic features (H)  Comment: Very poor control. Doesn't feel that mirtazapine has helped. Seeing psychiatry, who recommended (and ordered) pristiqu. However, it sounds like pt may be on a restricted plan and wants me to order it. No SI/HI  Plan:   -Ordered pristiq  -Spoke with MTM who agrees that will put her at great risk for serotonin syndrome, so will taper off mirtazapine  -Per pt request, will inquire if we can switch psychiatrists as she doesn't like the location of the psychiatrist and doesn't feel the psychiatrist is leading the care-asks the pt what she wants.   -EKG at follow-up to assess qt prolongation  -Continue therapy  -Consider treatment  resistant depression clinic    (G35) Multiple sclerosis (H)  Comment: Stable.   Plan: amphetamine-dextroamphetamine (ADDERALL) 10 MG         tablet        -Follow-up with neuro    (Z72.0) Tobacco abuse  Comment: Stable. Interested in gum  Plan: nicotine (NICORETTE) 2 MG gum            No follow-ups on file.    BENNETT Marvin CNP  [facility] [place] Sant[place] is a 56 year old, presenting for the following health issues:  Follow Up      HPI       Hospital Follow-up Visit:    Hospital/Nursing Home/IP Rehab Facility: [facility]  Date of Admission: 9/17/22  Date of Discharge: 9/18/22  Reason(s) for Admission: dehydration, spasticity, acute kidney injury    Was your hospitalization related to COVID-19? No   Problems taking medications regularly:  None  Medication changes since discharge: None  Problems adhering to non-medication therapy:  None    Summary of hospitalization:  [facility] discharge summary reviewed  Diagnostic Tests/Treatments reviewed.  Follow up needed: follow-up with MTM, neuro, pain  Other Healthcare Providers Involved in Patient s Care:         None  Update since discharge: improved.         Post Medication Reconciliation Status:        Plan of care communicated with patient                 Review of Systems   Constitutional, HEENT, cardiovascular, pulmonary, gi and gu systems are negative, except as otherwise noted.      Objective    LMP 05/25/2017 (Exact Date)   Body mass index is 18.01 kg/m .  Physical Exam   CONSTITUTIONAL: Alert, well-nourished, well-groomed, NAD  PSYCH: Bright affect. Appropriate mentation and speech.   HRRR S1 S2 No MRG. No peripheral edema  Lungs CTA. No wheeze, rhonchi, rales.  MSK: Normal tone. Able to walk without problems  NEURO: Grossly normal.     60 minutes spent with patient and in coordination of care.

## 2022-09-29 NOTE — TELEPHONE ENCOUNTER
Confusion as to why Greer's orders will not go through, or her rx cannot be filled unless prescriber is a specific person? Unknown if this is a pharmacy request or insurance request, such as specific restrictions? Lets call the [facility] pharmacy in [place] patient has on file, then go from there. Keep AnnMolitor up to date.    Lisette Tuttle MA

## 2022-09-30 ENCOUNTER — MEDICAL CORRESPONDENCE (OUTPATIENT)
Dept: HEALTH INFORMATION MANAGEMENT | Facility: CLINIC | Age: 57
End: 2022-09-30

## 2022-09-30 ENCOUNTER — TELEPHONE (OUTPATIENT)
Dept: PEDIATRICS | Facility: CLINIC | Age: 57
End: 2022-09-30

## 2022-09-30 DIAGNOSIS — R63.8 DECREASED ORAL INTAKE: Primary | ICD-10-CM

## 2022-09-30 NOTE — TELEPHONE ENCOUNTER
Received a call from ROGELIO Davila-BC and she confirmed that she is aware of the serotonin interaction between pristiq and mirtazapine   - ROGELIO Davila-BC states that the interaction is a warning stating that serotonin syndrome could occur   - ROGELIO Davila-BC states that she will check in with the patient     Yelitza HURLEY RN   Patient Advocate Liaison (PAL)  [facility] [place]

## 2022-09-30 NOTE — TELEPHONE ENCOUNTER
The pt is aware that the provider will talk to Ivonne when she is in clinic next week.   Aury Foote on 9/30/2022 at 10:48 AM

## 2022-09-30 NOTE — TELEPHONE ENCOUNTER
I think pt must be on a restricted access plan now, so all meds must come thru me.    Psychiatry ordered pristiq, but due to restricted access I have to order it. When I do, I get an interaction with Mirtazapine, which is concerning to me especially given all her other interactions.    She did agree yesterday to stop tizanadine, but did not want to go down on adderall or stop phenergan. Is off xanax.    What are your thoughts about the pristiq?

## 2022-09-30 NOTE — TELEPHONE ENCOUNTER
Spoke with JOHAN Villanueva with [facility] regarding the patient's medications     - JOHAN Villanueva states that ROGELIO Davila-BC has ordered mirtazapine 30 mg daily at bedtime   - JOHAN Villanueva states ROGELIO Davila-BC ordered pristiq 50 mg ER once daily on 9/23/2022     Routing to Dr. Greer HURLEY RN   Patient Advocate Liaison (PAL)  [facility] [place]

## 2022-09-30 NOTE — TELEPHONE ENCOUNTER
Called [facility] at [phone]     - Spoke with Thi,  and she stated that the DME order can ordered through [facility]   - Faxed DME order to [facility] at [phone]     Yelitza HURLEY RN   Patient Advocate Liaison (PAL)  [facility]

## 2022-09-30 NOTE — TELEPHONE ENCOUNTER
Ok to wait for Ivonne.  MA/TC: Please let pt know we will wait to talk to JONI Coronado, Monday to see if Pristiq is safe

## 2022-09-30 NOTE — TELEPHONE ENCOUNTER
I don't think its appropriate for MTM to call about this. MA/TC possibly could if pall doesn't have capacity

## 2022-09-30 NOTE — TELEPHONE ENCOUNTER
Called patient's insurance ([place] [employer]) at [phone] regarding nutritional shakes     - Spoke with Medina, who directed me to: [email or web address] (on that homepage click manuals, then click [facility] manual, then on the left there is a table of contents, click equipment and supplies, and then click nutritional supplies.     Called [facility] at [phone]   - Spoke with Asim Pharmacist  - Asim Pharmacist states that they can likely amend the nutritional supplement that was previously ordered on 8/26/2022 to a nutritional supplement that is covered by the patient's insurance   - Asim Pharmacist stated that he will call back once he looks into it and determines what is covered by the patient's insurance    - Provided Asim Pharmacist, the direct PAL RN phone number to call back     Yelitza HURLEY RN   Patient Advocate Liaison (PAL)  [facility] [place]

## 2022-09-30 NOTE — TELEPHONE ENCOUNTER
I cannot order the prisiq for her because I don't know if psychiatry intended for her to be on mirtazapine and pristiq. I'm getting a serotonin interaction. Please call Yrn and ask if she meant to add pristiq to remeron, or if she meant to taper the Remeron

## 2022-09-30 NOTE — TELEPHONE ENCOUNTER
See office visit encounter from 9/29/2022 with Dr. Butterfield     Hi there, now that she is SB5 you are the pal. You can feel free to delegate any of these tasks, but hoping you can keep track of them all then send me a cohesive report when they have been completed.     1. Could you find out with pts insurance which nutrition shake is covered? Pt says it's not boost or ensure   2. Please let her neurologist know we are discontinuing tizanadine due to recent hospitalization   3. Please call her psychiatrist and ask when the next available is with a psychiatrist other than the one she is seeing? Pt wants to switch. Ok to go ahead and schedule then see if it works with th pt   4. Pls call [facility] and ask if she's had the shingles vaccine and get dates. I see that she had the live one but not shignrix.    Routing comment      Called the patient's neurologist ([facility]) and left a detailed message for the care team informing her neurolgist that Dr. Butterfield discontinued the patient's tizanidine on 9/29/2022 due to recent hospitalization   - Provided the direct phone number line   - Requested a call back to confirm that they have received this information   - Direct line to the patient's neurology care team line is [phone]   - Received a call back from [facility] confirming that they received the message regarding the patient's tizanidine medication discontinuation and states that Dr. Christensen is aware and updated     tiZANidine (ZANAFLEX) 2 MG tablet (Discontinued)    9/29/2022 --   Sig - Route: Take 2 mg by mouth At Bedtime Prescribed by Neurologist - Oral     Called patient's [facility] at [phone]   - Spoke with Ellen   - Pharmacy states that the only date that they have on file is 11/9/2020   - MIIC date shows shingles vaccine 11/9/2020   - Pharmacy states that they have no dates on file for the Shingrix vaccine     Called [facility] to assist in  scheduling an appointment for the patient with a different psychiatrist that the one she currently sees    - Patient currently sees psychiatrist BILLIE Davila   - Yrn states that there is a process to switching  Psychiatrists- they have to check with the patient's current psychiatrist (BILLIE Davila) and see if they are okay with discharging the patient to a new psychiatrist and then they have to reach out to the other psychiatrists to see if they are open to accepting a new patient   - Yrn also stated that the need to have a reason why the patient is requesting a new psychiatrist   - Called patient inquiring her reason why she is requesting a new psychiatrist   - Patient states that she would like to see a new psychiatrist because the current one asks her what she wants rather than providing recommendations (patient would prefer that BILLIE Davila provide the medical advice rather than ask her what she wants)   - Patient states that insurance coverage is playing a part too   - Patient states that she would prefer the [facility] location over the [place] location   - Called [facility] back and informed them of the patient's reasons (spoke with JOHAN Villanueva)   - JOHAN Villanueva states that she will work on seeing which psychiatrists are available for the patient to see at the [place] location   - Provided PAL RN direct line for Yrn to call back   - Patient reports that early afternoon is a good time window for appointments for her     Ivonne Gonzalez [facility], can you please call the patient's insurance and determine which nutrition shake is covered. Patient does not think that Boost or Ensure is covered.      Routing to Dr. Butterfield to review and advise.     Yelitza HURLEY RN   Patient Advocate Liaison (PAL)  [facility]

## 2022-09-30 NOTE — TELEPHONE ENCOUNTER
Sunshine    Ivonne is out of the office until Tuesday next week. The drug interaction exists for mirtazapine and Pristiq because of the increased risk of serotonin syndrome, which is also a concern with her Adderall. If benefits outweigh these risks though, we do sometimes need to use mirtazapine in addition to other antidepressants like Pristiq for resistant depression. As I do not know this patient, we could wait until Ivonne is back to help answer or reach out to psychiatry? Maybe they would be agreeable to at least decreasing the mirtazapine dose with Pristiq addition.    Maine Burk, PharmD, BCACP  Medication Therapy Management Provider, [facility]  Pager: [phone]

## 2022-09-30 NOTE — TELEPHONE ENCOUNTER
Received a call back from Asim Pharmacist with [facility]     - Asim, Pharmacist stated that the nutritional supplement is still covered, but no longer covered as a pharmacy order  - Asim, Pharmacist stated that a DME order should allow for the nutritional supplement to be covered by insurance if there is a medical need, not just a nutritional need   - Pended DME order     BENNETT Barajas CNP, please review, advise, and order as appropriate.     Yelitza HURLEY RN   Patient Advocate Liaison (PAL)  [facility]

## 2022-09-30 NOTE — TELEPHONE ENCOUNTER
"1) AM-Spoke with patient, she is requesting Sunshine prescribe Pristiq for her, her psych tried to prescribe but patient states \"it wouldn't go through\", send to [facility] in [place].    2) NEFTALY-Spoke with pharmacy, they did get the Adderalll to go through, it is ready for patient to , they had to try different dx codes to get it to go through.  "

## 2022-09-30 NOTE — TELEPHONE ENCOUNTER
Called Yrn at [phone]     - Spoke with JOHAN Guerrero regarding the medication clarification needed   - JOHAN Awad stated that she will leave a message for Dr. Vasquez to review and clarify and will return the call to the PAL RN direct line     When psychiatry calls back:     1. Did psychiatry intend for the patient to add pristiq to mirtazapine (Remeron)? Dr. Butterfield is getting a serotonin interaction when ordering the pristiq    2. Did psychaitry mean to taper the Remeron (mirtazapine)?     Yelitza HURLEY RN   Patient Advocate Liaison (PAL)  [facility] [place]

## 2022-10-03 ENCOUNTER — TRANSFERRED RECORDS (OUTPATIENT)
Dept: HEALTH INFORMATION MANAGEMENT | Facility: CLINIC | Age: 57
End: 2022-10-03

## 2022-10-04 RX ORDER — DESVENLAFAXINE 50 MG/1
50 TABLET, FILM COATED, EXTENDED RELEASE ORAL DAILY
Qty: 90 TABLET | Refills: 0 | Status: SHIPPED | OUTPATIENT
Start: 2022-10-04 | End: 2022-10-11

## 2022-10-04 RX ORDER — MIRTAZAPINE 7.5 MG/1
15 TABLET, FILM COATED ORAL AT BEDTIME
Qty: 7 TABLET | Refills: 0 | COMMUNITY
Start: 2022-10-04 | End: 2022-10-11

## 2022-10-04 NOTE — TELEPHONE ENCOUNTER
Called [facility] at [phone] regarding the nutritional shakes   - Spoke with Dayron Galarza who states that they have received the order, but it is not covered by the patient's insurance    - Transferred to Dayron Hardy who is part of the adjudication team and he states that he reached out to the DME insurance because it is supposed to be covered and they are adding the NDC so that it will likely be covered by the patient's insurance   - Antony states that once this information is added and they hear back about the coverage then they will reach out to the patient informing her   - Called the patient and left a message informing her that the pharmacy is working with her insurance at this time regarding coverage for the nutritional shakes and they will be contacting her soon (Left a detailed message with the patient's PAL RN direct line to call back with questions or concerns)     Called the patient at [phone] and left a message informing her that she needs 2 shingles vaccines   - Left a detailed message with the patient's PAL RN direct line     Called [facility] at [phone] regarding the request to change providers   - Spoke with Cesar and she states that they are checking with a provider in their [place] location and they are waiting for a response from that provider and then will reach out to the patient to update her and assist in scheduling     Set a reminder to follow-up with:     1. [facility] to ensure the patient has received her nutritional shakes     2. [facility] to ensure the patient has received a new provider     3. Patient to ensure she received the message regarding the 2 shingles vaccines     Yelitza HURLEY RN   Patient Advocate Liaison (PAL)  [facility] [place]

## 2022-10-04 NOTE — TELEPHONE ENCOUNTER
I spoke with MTM:  1. Please call pt and let her know I've ordered Pristiq (desvenlafaxine) for depression.   2. However, there is an interaction with the remeron. Since she says remeron hasn't really been working for her, I'd like to stop the remeron. Decrease to 15mg for a week then stop.  3. I'll see her at our upcoming follow up  4. Please let psychiatry know that pharmacy is concerned about interactions, so we are stopping Remeron but starting the Pristiq  5. Is MTM available for co visit at next face to face?

## 2022-10-04 NOTE — TELEPHONE ENCOUNTER
PAL:  1. Did pt get the nutritional shakes, or at least notification that they are ready for pickup?  2. Please inform pt she does need 2 shingles vaccines. Got one of the old version that doesn't work as well  3. Please update me when you hear back from sarah about changing providrers    MTM:  Could we huddle about this pts meds today/

## 2022-10-04 NOTE — TELEPHONE ENCOUNTER
Called the patient at [phone] and left a message for the patient requesting a call back     When patient calls back:     - Inform patient that BENNETT Barajas CNP has ordered Pristiq (desvenlafaxine) for depression and it was sent to the [facility] in [place]   - Inform the patient that BENNETT Barajas CNP has discontinued the Remeron   - Inform patient to decrease the Remeron to 15mg for one week and then to stop taking the Remeron medication   - Remind patient that she has an upcoming appointment with BENNETT Barajas CNP on 10/6/2022 at 2:30 PM     desvenlafaxine (PRISTIQ) 50 MG 24 hr tablet 90 tablet 0 10/4/2022  --   Sig - Route: Take 1 tablet (50 mg) by mouth daily - Oral     mirtazapine (REMERON) 7.5 MG tablet 7 tablet 0 10/4/2022  No   Sig - Route: Take 2 tablets (15 mg) by mouth At Bedtime For a week then stop - Oral     Called [facility] at [phone]  - Spoke with Ambar who stated that Funjean carlos Vasquez [facility]'s care team will call back to receive the medication update  - Awaiting a [facility] from [facility] care team at this time     When [facility] calls back:   - Inform Funjean carlos Vasquez [facility] that pharmacy iis concerned about interactions, so we are stopping Remeron but starting the Pristiq     Routing to [facility] pool to see if they are able to join for the patient's upcoming appointment on 10/6/2022 at 2:30 PM     Yelitza HURLEY RN   Patient Advocate Liaison (PAL)  [facility]

## 2022-10-04 NOTE — TELEPHONE ENCOUNTER
Giana with [facility] left message.    Calling back regarding patient. Received message regarding medications. Leaving office at 4pm today, will be back in tomorrow morning at 8am.    Please callback tomorrow, [phone]    Thank you  Clarence LANGLEY

## 2022-10-05 ENCOUNTER — TELEPHONE (OUTPATIENT)
Dept: PEDIATRICS | Facility: CLINIC | Age: 57
End: 2022-10-05

## 2022-10-05 DIAGNOSIS — F33.2 SEVERE EPISODE OF RECURRENT MAJOR DEPRESSIVE DISORDER, WITHOUT PSYCHOTIC FEATURES (H): ICD-10-CM

## 2022-10-05 NOTE — TELEPHONE ENCOUNTER
Patient was unable to fill Pristiq. Called [facility] pharmacy and will need a prior authorization.    Prior Authorization Retail Medication Request    Medication/Dose: desvenlafaxine (PRISTIQ) 50 MG 24 hr tablet  ICD code (if different than what is on RX):    Previously Tried and Failed:  Remeron  Rationale:  Medication Interactions    Insurance Name:  [facility]  Insurance ID:  [id number]      Pharmacy Information (if different than what is on RX)  [facility]  Phone:  [phone]    Aneta SESAY RN, BSN

## 2022-10-05 NOTE — TELEPHONE ENCOUNTER
Called and spoke to patient. Advised to wait to make medication changes. Patient agreeable to plan.    PAL: Please watch for prior authorization decision on Pristiq.    Thanks!  Aneta SESAY RN, BSN

## 2022-10-05 NOTE — TELEPHONE ENCOUNTER
Called and spoke to patient. Relayed provider message. Patient states she got a call from her pharmacy stating there was an insurance issue with the pristiq.    Called [facility] pharmacy to discuss Pristiq. [facility] will need a prior authorization for the Pristiq. Started PA for Pristiq    PCP: Please advise of patient instructions while waiting for PA.    Aneta SESAY RN, BSN

## 2022-10-05 NOTE — LETTER
[facility] [place]  [address]  [address]  [place] [address]  Phone: [phone]  Fax: [phone]        10/7/2022    Patients Name: Hina Yap  YOB: 1965  Payor: Payor: [place] / Plan: [place] / Product Type: Medicaid /    ID: [id number]      Name of person submitting request form: BENNETT Marvin CNP .    Reason for Request: Appeal of denied prior authorization    I am writing to appeal your denial of Hina Yap's desvenlafaxine prescription. I believe she should be tom to try this medication as she has failed multiple other antidepressants.     Requested Medication: Desvenlafaxine 50mg  Dosage: 50  Quantity Requested: 30(per month) and Length of Treatment Indefinite. Treating depression.(please be specific)   Diagnosis: Major depression, treatment resistant.  Formulary Medications tried and when: (be specific and include detail (contraindications, allergies etc):  She has tried the medications below for a period of two or more months and found them to be not helpful: Amitriptyline, Cymbalta, ,Nortriptyline, venlafaxine, savella, mirtazapine. Specifically, she was on the Venlafaxine more than 2 years ago for over 2 months and found it not to be helpful    Other Pertinant History: Has seen multiple psychiatrists. Desvenlafaxine was recommended by most recent psychiatrist.     Sincerely,        KARI Barajas.

## 2022-10-05 NOTE — TELEPHONE ENCOUNTER
Please hold off on making any med adjustments until pristiq is approved. That is, do not cut down on Remeron (mirtazapine) until pristiq is approved

## 2022-10-06 ENCOUNTER — OFFICE VISIT (OUTPATIENT)
Dept: PEDIATRICS | Facility: CLINIC | Age: 57
End: 2022-10-06
Payer: MEDICAID

## 2022-10-06 VITALS
RESPIRATION RATE: 14 BRPM | HEART RATE: 80 BPM | OXYGEN SATURATION: 99 % | TEMPERATURE: 97.4 F | WEIGHT: 116 LBS | BODY MASS INDEX: 18.21 KG/M2 | SYSTOLIC BLOOD PRESSURE: 100 MMHG | HEIGHT: 67 IN | DIASTOLIC BLOOD PRESSURE: 80 MMHG

## 2022-10-06 DIAGNOSIS — F33.2 SEVERE EPISODE OF RECURRENT MAJOR DEPRESSIVE DISORDER, WITHOUT PSYCHOTIC FEATURES (H): ICD-10-CM

## 2022-10-06 DIAGNOSIS — Z71.6 ENCOUNTER FOR SMOKING CESSATION COUNSELING: ICD-10-CM

## 2022-10-06 DIAGNOSIS — Z00.00 HEALTHCARE MAINTENANCE: ICD-10-CM

## 2022-10-06 DIAGNOSIS — J44.9 CHRONIC OBSTRUCTIVE PULMONARY DISEASE, UNSPECIFIED COPD TYPE (H): ICD-10-CM

## 2022-10-06 DIAGNOSIS — R94.31 PROLONGED Q-T INTERVAL ON ECG: Primary | ICD-10-CM

## 2022-10-06 PROCEDURE — 90471 IMMUNIZATION ADMIN: CPT | Performed by: NURSE PRACTITIONER

## 2022-10-06 PROCEDURE — 93000 ELECTROCARDIOGRAM COMPLETE: CPT | Performed by: NURSE PRACTITIONER

## 2022-10-06 PROCEDURE — 90750 HZV VACC RECOMBINANT IM: CPT | Performed by: NURSE PRACTITIONER

## 2022-10-06 PROCEDURE — 36415 COLL VENOUS BLD VENIPUNCTURE: CPT | Performed by: NURSE PRACTITIONER

## 2022-10-06 PROCEDURE — 80061 LIPID PANEL: CPT | Performed by: NURSE PRACTITIONER

## 2022-10-06 PROCEDURE — 96127 BRIEF EMOTIONAL/BEHAV ASSMT: CPT | Performed by: NURSE PRACTITIONER

## 2022-10-06 PROCEDURE — 82043 UR ALBUMIN QUANTITATIVE: CPT | Performed by: NURSE PRACTITIONER

## 2022-10-06 PROCEDURE — 99215 OFFICE O/P EST HI 40 MIN: CPT | Mod: 25 | Performed by: NURSE PRACTITIONER

## 2022-10-06 RX ORDER — IPRATROPIUM BROMIDE AND ALBUTEROL SULFATE 2.5; .5 MG/3ML; MG/3ML
1 SOLUTION RESPIRATORY (INHALATION) EVERY 6 HOURS PRN
Qty: 30 ML | Refills: 3 | Status: SHIPPED | OUTPATIENT
Start: 2022-10-06 | End: 2023-05-18

## 2022-10-06 ASSESSMENT — PATIENT HEALTH QUESTIONNAIRE - PHQ9
10. IF YOU CHECKED OFF ANY PROBLEMS, HOW DIFFICULT HAVE THESE PROBLEMS MADE IT FOR YOU TO DO YOUR WORK, TAKE CARE OF THINGS AT HOME, OR GET ALONG WITH OTHER PEOPLE: EXTREMELY DIFFICULT
SUM OF ALL RESPONSES TO PHQ QUESTIONS 1-9: 21
SUM OF ALL RESPONSES TO PHQ QUESTIONS 1-9: 21

## 2022-10-06 ASSESSMENT — PAIN SCALES - GENERAL: PAINLEVEL: MODERATE PAIN (4)

## 2022-10-06 NOTE — TELEPHONE ENCOUNTER
Central Prior Authorization Team   Phone: [phone]      PA Initiation    Medication: desvenlafaxine  Insurance Company: [place] [employer] ([facility]) - Phone [phone] Fax [phone]  Pharmacy Filling the Rx: [id number] - [place], [place] - [address] AT [facility] [id number]  Filling Pharmacy Phone: [phone]  Filling Pharmacy Fax:    Start Date: 10/6/2022

## 2022-10-06 NOTE — TELEPHONE ENCOUNTER
PRIOR AUTHORIZATION DENIED    Medication: Desvenlafaxine-DENIED    Denial Date: 10/6/2022    Denial Rational:       Appeal Information:

## 2022-10-06 NOTE — PROGRESS NOTES
Assessment & Plan     (R94.31) Prolonged Q-T interval on ECG  (primary encounter diagnosis)  Comment: Improved from hospitalization but still borderline  Plan: EKG 12-lead complete w/read - Clinics  -Will be stopping mirtazapine and starting SNRI Plan to recheck EKG once she has made that change.     (F33.2) Severe episode of recurrent major depressive disorder, without psychotic features (H)  Comment: Will take over psych meds as she is not happy with her psychiatrist  Plan:  -Will contact her with plan to decrease remeron while starting SNRI    (Z71.6) Encounter for smoking cessation counseling  Comment: Wants higher dose gum  Plan: nicotine polacrilex (NICORETTE) 4 MG gum            (Z00.00) Healthcare maintenance  Plan: ZOSTER VACCINE RECOMBINANT ADJUVANTED IM NJX,         Albumin Random Urine Quantitative with Creat         Ratio, Lipid panel reflex to direct LDL         Non-fasting            (J44.9) Chronic obstructive pulmonary disease, unspecified COPD type (H)  Plan: ipratropium - albuterol 0.5 mg/2.5 mg/3 mL         (DUONEB) 0.5-2.5 (3) MG/3ML neb solution                   No follow-ups on file.    Sunshine Butterfield, BENNETT CNP  [facility] SANDEEP Santamaria is a 56 year old, presenting for the following health issues:  Follow Up    Oxcarbazepine for nerve pain. Wondering if she should get back on    Pxyh wrote down magnesium glycinate    Was previously on effexor for at least several months years ago and it didn't work    Sleeping is the same since stopping tizanidine. Hasn't gotten worse.     Nerve stimulator: Planning to wait until after she sees her neurologist to follow-up on the hospital visit.    Major thurston visit    HPI   Follow-up visit from 9/29:              Review of Systems   Constitutional, HEENT, cardiovascular, pulmonary, GI, , musculoskeletal, neuro, skin, endocrine and psych systems are negative, except as otherwise noted.      Objective    /80    "Pulse 80   Temp 97.4  F (36.3  C)   Resp 14   Ht 1.702 m (5' 7\")   Wt 52.6 kg (116 lb)   LMP 05/25/2017 (Exact Date)   SpO2 99%   BMI 18.17 kg/m    Body mass index is 18.17 kg/m .  Physical Exam   CONSTITUTIONAL: Alert, well-nourished, well-groomed, NAD  PSYCH: Bright affect. Appropriate mentation and speech.   HRRR S1 S2 No MRG. No peripheral edema          50 minutes spent with patient, over 1/2 in counseling and coordination of care regarding the above diagnoses    "

## 2022-10-07 LAB
CHOLEST SERPL-MCNC: 172 MG/DL
CREAT UR-MCNC: 52 MG/DL
FASTING STATUS PATIENT QL REPORTED: NO
HDLC SERPL-MCNC: 64 MG/DL
LDLC SERPL CALC-MCNC: 90 MG/DL
MICROALBUMIN UR-MCNC: 8 MG/L
MICROALBUMIN/CREAT UR: 15.38 MG/G CR (ref 0–25)
NONHDLC SERPL-MCNC: 108 MG/DL
TRIGL SERPL-MCNC: 88 MG/DL

## 2022-10-07 NOTE — TELEPHONE ENCOUNTER
"Received call from [facility].    States that nutritional shakes were not covered and that a PA would be initiated and had attempted to cover as DME.    Awaiting conclusion of PA.      - Tim \"Graham\" Terry (he/him/his), RN - Patient Advocate Liason (PAL)  [facility]    "

## 2022-10-07 NOTE — TELEPHONE ENCOUNTER
Bret Coronado    I think you and I should take over her psychiatry meds. She did not like her psychiatrist and they declined to allow her to transfer to another physician within the practice.    I wrote an appeal letter for the desvenlafaxine and sent it to you. Could you take a look and see if anything else would be helpful? I used an MTM note from 2020 (pharmacist from the treatment resistant depression clinic I believe) to help find which meds she has tried.

## 2022-10-10 NOTE — TELEPHONE ENCOUNTER
Bret Coronado    Are you able to help me with this? It looks like the PA team sent an appeal letter before I got a chance to send my letter (the one you looked at). They are requiring documentation of being in the medication for two consecutive months. I'm not sure if we can find pharmacy records, etc.     If not, should we try her on Effexor? I can't remember if she had genesight or why we wanted Pristiq in the first place

## 2022-10-10 NOTE — TELEPHONE ENCOUNTER
Medication Appeal Initiation    We have initiated an appeal for the requested medication:  Medication: Desvenlafaxine-APPEAL INITIATED  Appeal Start Date:  10/10/2022  Insurance Company: [place] Medicaid ([facility]) - Phone [phone] Fax [phone]  Comments:       Manually faxed letter of medical necessity to [phone].

## 2022-10-10 NOTE — TELEPHONE ENCOUNTER
MEDICATION APPEAL DENIED    Medication: Desvenlafaxine-APPEAL DENIED    Denial Date: 10/10/2022    Denial Rational:         Second Level Appeal Information:     Second level appeals will be managed by the clinic staff and provider. Please contact the [employer] Prior Authorization Team if additional information about the denial is needed.

## 2022-10-11 RX ORDER — MIRTAZAPINE 7.5 MG/1
15 TABLET, FILM COATED ORAL AT BEDTIME
Qty: 7 TABLET | Refills: 0
Start: 2022-10-11 | End: 2022-12-02

## 2022-10-11 RX ORDER — VENLAFAXINE HYDROCHLORIDE 37.5 MG/1
CAPSULE, EXTENDED RELEASE ORAL
Qty: 42 CAPSULE | Refills: 0 | Status: SHIPPED | OUTPATIENT
Start: 2022-10-11 | End: 2022-11-13

## 2022-10-11 NOTE — TELEPHONE ENCOUNTER
Will advise:  Mirtazapine 15mg at bedtime for 1 week, then stop.   Start venlafaxine XR 37.5 mg daily for 2 weeks, then increase to 75mg.    I called there was no answer, sent [employer].    Ivonne Gonzalez, PharmD  Medication Therapy Management Pharmacist

## 2022-10-18 NOTE — TELEPHONE ENCOUNTER
"Called Specialty Pharmacy ([phone]).    States that nutritional shakes fully covered by [facility]. Pt was notified (via text) on 10/12/22 and should have already been delivered.      - Tim \"Graham\" Terry (he/him/his), RN - Patient Advocate Liason (PAL)  [facility]    "

## 2022-10-26 ENCOUNTER — TRANSFERRED RECORDS (OUTPATIENT)
Dept: HEALTH INFORMATION MANAGEMENT | Facility: CLINIC | Age: 57
End: 2022-10-26

## 2022-10-28 ENCOUNTER — MYC REFILL (OUTPATIENT)
Dept: PEDIATRICS | Facility: CLINIC | Age: 57
End: 2022-10-28

## 2022-10-28 DIAGNOSIS — R53.83 OTHER FATIGUE: ICD-10-CM

## 2022-10-28 DIAGNOSIS — G35 MULTIPLE SCLEROSIS (H): ICD-10-CM

## 2022-10-28 RX ORDER — DEXTROAMPHETAMINE SACCHARATE, AMPHETAMINE ASPARTATE, DEXTROAMPHETAMINE SULFATE AND AMPHETAMINE SULFATE 2.5; 2.5; 2.5; 2.5 MG/1; MG/1; MG/1; MG/1
10 TABLET ORAL DAILY
Qty: 30 TABLET | Refills: 0 | Status: SHIPPED | OUTPATIENT
Start: 2022-10-28 | End: 2022-12-02

## 2022-10-28 RX ORDER — DEXTROAMPHETAMINE SACCHARATE, AMPHETAMINE ASPARTATE MONOHYDRATE, DEXTROAMPHETAMINE SULFATE AND AMPHETAMINE SULFATE 7.5; 7.5; 7.5; 7.5 MG/1; MG/1; MG/1; MG/1
30 CAPSULE, EXTENDED RELEASE ORAL EVERY MORNING
Qty: 30 CAPSULE | Refills: 0 | Status: SHIPPED | OUTPATIENT
Start: 2022-10-28 | End: 2022-12-02

## 2022-10-28 NOTE — TELEPHONE ENCOUNTER
Pharmacy called stating that when attempting to refill patient's Adderall XR 30mg was associated with other fatigue and not MS. Requested that they use MS as the diagnosis code. They were able to process the Adderall through MA with the diagnosis code for MS.     In the future please associate Adderall 30mg with MS and not fatigue as MA does not recognize other fatigue as an approved diagnoses for Adderall.     Mady Ennis RN on 10/28/2022 at 10:18 AM

## 2022-11-01 ENCOUNTER — TRANSFERRED RECORDS (OUTPATIENT)
Dept: HEALTH INFORMATION MANAGEMENT | Facility: CLINIC | Age: 57
End: 2022-11-01

## 2022-11-14 DIAGNOSIS — F33.2 SEVERE EPISODE OF RECURRENT MAJOR DEPRESSIVE DISORDER, WITHOUT PSYCHOTIC FEATURES (H): ICD-10-CM

## 2022-11-16 RX ORDER — VENLAFAXINE HYDROCHLORIDE 37.5 MG/1
CAPSULE, EXTENDED RELEASE ORAL
Qty: 42 CAPSULE | Refills: 0 | OUTPATIENT
Start: 2022-11-16

## 2022-11-29 ENCOUNTER — TRANSFERRED RECORDS (OUTPATIENT)
Dept: HEALTH INFORMATION MANAGEMENT | Facility: CLINIC | Age: 57
End: 2022-11-29

## 2022-12-01 ENCOUNTER — TELEPHONE (OUTPATIENT)
Dept: PEDIATRICS | Facility: CLINIC | Age: 57
End: 2022-12-01

## 2022-12-01 NOTE — TELEPHONE ENCOUNTER
"Received call from the pt.    Pt states that they have an upcoming surgery, spinal stimulator trial, on December 21st and need a pre-op appt.    Pt has upcoming appt tomorrow, but I agreed they should continue with scheduled co-visit with PCP and MTM and should not change that appt as a pre-op.    MA/TC: Can you please call pt and schedule a pre-op appt in the next 2 weeks for upcoming surgery on 12/21/22.      - Tim \"Graham\" Terry (he/him/his), RN - Patient Advocate Liason (PAL)  [facility]    "

## 2022-12-02 ENCOUNTER — OFFICE VISIT (OUTPATIENT)
Dept: PEDIATRICS | Facility: CLINIC | Age: 57
End: 2022-12-02
Payer: MEDICAID

## 2022-12-02 ENCOUNTER — OFFICE VISIT (OUTPATIENT)
Dept: PHARMACY | Facility: CLINIC | Age: 57
End: 2022-12-02
Payer: MEDICAID

## 2022-12-02 VITALS
BODY MASS INDEX: 18.36 KG/M2 | OXYGEN SATURATION: 98 % | WEIGHT: 117 LBS | TEMPERATURE: 97.5 F | HEIGHT: 67 IN | DIASTOLIC BLOOD PRESSURE: 82 MMHG | RESPIRATION RATE: 16 BRPM | HEART RATE: 94 BPM | SYSTOLIC BLOOD PRESSURE: 110 MMHG

## 2022-12-02 DIAGNOSIS — G35 MS (MULTIPLE SCLEROSIS) (H): ICD-10-CM

## 2022-12-02 DIAGNOSIS — R53.83 OTHER FATIGUE: ICD-10-CM

## 2022-12-02 DIAGNOSIS — Z87.891 PERSONAL HISTORY OF TOBACCO USE, PRESENTING HAZARDS TO HEALTH: ICD-10-CM

## 2022-12-02 DIAGNOSIS — G47.00 INSOMNIA, UNSPECIFIED TYPE: Primary | ICD-10-CM

## 2022-12-02 DIAGNOSIS — K21.9 GASTROESOPHAGEAL REFLUX DISEASE, UNSPECIFIED WHETHER ESOPHAGITIS PRESENT: ICD-10-CM

## 2022-12-02 DIAGNOSIS — Z98.1 S/P LUMBAR FUSION: ICD-10-CM

## 2022-12-02 DIAGNOSIS — F41.9 ANXIETY: ICD-10-CM

## 2022-12-02 DIAGNOSIS — G25.81 RESTLESS LEG SYNDROME: ICD-10-CM

## 2022-12-02 DIAGNOSIS — J44.0 CHRONIC OBSTRUCTIVE PULMONARY DISEASE WITH ACUTE LOWER RESPIRATORY INFECTION (H): ICD-10-CM

## 2022-12-02 DIAGNOSIS — F41.9 ANXIETY: Primary | ICD-10-CM

## 2022-12-02 DIAGNOSIS — F33.1 MODERATE EPISODE OF RECURRENT MAJOR DEPRESSIVE DISORDER (H): ICD-10-CM

## 2022-12-02 DIAGNOSIS — G47.00 INSOMNIA, UNSPECIFIED TYPE: ICD-10-CM

## 2022-12-02 DIAGNOSIS — Z78.9 TAKES DIETARY SUPPLEMENTS: ICD-10-CM

## 2022-12-02 DIAGNOSIS — G35 MULTIPLE SCLEROSIS (H): ICD-10-CM

## 2022-12-02 DIAGNOSIS — M62.81 GENERALIZED MUSCLE WEAKNESS: ICD-10-CM

## 2022-12-02 PROCEDURE — 99607 MTMS BY PHARM ADDL 15 MIN: CPT | Performed by: PHARMACIST

## 2022-12-02 PROCEDURE — 99215 OFFICE O/P EST HI 40 MIN: CPT | Performed by: NURSE PRACTITIONER

## 2022-12-02 PROCEDURE — 99606 MTMS BY PHARM EST 15 MIN: CPT | Performed by: PHARMACIST

## 2022-12-02 RX ORDER — MELOXICAM 7.5 MG/1
7.5 TABLET ORAL DAILY
Status: CANCELLED | OUTPATIENT
Start: 2022-12-02

## 2022-12-02 RX ORDER — VENLAFAXINE HYDROCHLORIDE 150 MG/1
150 CAPSULE, EXTENDED RELEASE ORAL DAILY
Status: CANCELLED | OUTPATIENT
Start: 2022-12-02

## 2022-12-02 RX ORDER — DEXTROAMPHETAMINE SACCHARATE, AMPHETAMINE ASPARTATE MONOHYDRATE, DEXTROAMPHETAMINE SULFATE AND AMPHETAMINE SULFATE 7.5; 7.5; 7.5; 7.5 MG/1; MG/1; MG/1; MG/1
30 CAPSULE, EXTENDED RELEASE ORAL EVERY MORNING
Qty: 30 CAPSULE | Refills: 0 | Status: SHIPPED | OUTPATIENT
Start: 2022-12-02 | End: 2023-01-04

## 2022-12-02 RX ORDER — DEXTROAMPHETAMINE SACCHARATE, AMPHETAMINE ASPARTATE MONOHYDRATE, DEXTROAMPHETAMINE SULFATE AND AMPHETAMINE SULFATE 6.25; 6.25; 6.25; 6.25 MG/1; MG/1; MG/1; MG/1
25 CAPSULE, EXTENDED RELEASE ORAL EVERY MORNING
Qty: 30 CAPSULE | Refills: 0 | Status: CANCELLED | OUTPATIENT
Start: 2022-12-02

## 2022-12-02 RX ORDER — DEXTROAMPHETAMINE SACCHARATE, AMPHETAMINE ASPARTATE, DEXTROAMPHETAMINE SULFATE AND AMPHETAMINE SULFATE 2.5; 2.5; 2.5; 2.5 MG/1; MG/1; MG/1; MG/1
10 TABLET ORAL DAILY
Qty: 30 TABLET | Refills: 0 | Status: SHIPPED | OUTPATIENT
Start: 2022-12-02 | End: 2023-01-04

## 2022-12-02 RX ORDER — VENLAFAXINE HYDROCHLORIDE 225 MG/1
225 TABLET, EXTENDED RELEASE ORAL DAILY
Qty: 90 TABLET | Refills: 0 | Status: SHIPPED | OUTPATIENT
Start: 2022-12-02 | End: 2023-01-05

## 2022-12-02 ASSESSMENT — ANXIETY QUESTIONNAIRES
5. BEING SO RESTLESS THAT IT IS HARD TO SIT STILL: NEARLY EVERY DAY
GAD7 TOTAL SCORE: 18
3. WORRYING TOO MUCH ABOUT DIFFERENT THINGS: NEARLY EVERY DAY
8. IF YOU CHECKED OFF ANY PROBLEMS, HOW DIFFICULT HAVE THESE MADE IT FOR YOU TO DO YOUR WORK, TAKE CARE OF THINGS AT HOME, OR GET ALONG WITH OTHER PEOPLE?: EXTREMELY DIFFICULT
6. BECOMING EASILY ANNOYED OR IRRITABLE: NEARLY EVERY DAY
2. NOT BEING ABLE TO STOP OR CONTROL WORRYING: NEARLY EVERY DAY
GAD7 TOTAL SCORE: 18
GAD7 TOTAL SCORE: 18
IF YOU CHECKED OFF ANY PROBLEMS ON THIS QUESTIONNAIRE, HOW DIFFICULT HAVE THESE PROBLEMS MADE IT FOR YOU TO DO YOUR WORK, TAKE CARE OF THINGS AT HOME, OR GET ALONG WITH OTHER PEOPLE: EXTREMELY DIFFICULT
4. TROUBLE RELAXING: NEARLY EVERY DAY
7. FEELING AFRAID AS IF SOMETHING AWFUL MIGHT HAPPEN: NOT AT ALL
1. FEELING NERVOUS, ANXIOUS, OR ON EDGE: NEARLY EVERY DAY
7. FEELING AFRAID AS IF SOMETHING AWFUL MIGHT HAPPEN: NOT AT ALL

## 2022-12-02 ASSESSMENT — PATIENT HEALTH QUESTIONNAIRE - PHQ9
SUM OF ALL RESPONSES TO PHQ QUESTIONS 1-9: 21
10. IF YOU CHECKED OFF ANY PROBLEMS, HOW DIFFICULT HAVE THESE PROBLEMS MADE IT FOR YOU TO DO YOUR WORK, TAKE CARE OF THINGS AT HOME, OR GET ALONG WITH OTHER PEOPLE: EXTREMELY DIFFICULT
SUM OF ALL RESPONSES TO PHQ QUESTIONS 1-9: 21

## 2022-12-02 ASSESSMENT — PAIN SCALES - GENERAL: PAINLEVEL: MODERATE PAIN (4)

## 2022-12-02 NOTE — PROGRESS NOTES
Sent patient [employer] message regards to scheduling next follow up visit.    Thank you  Clarence LANGLEY

## 2022-12-02 NOTE — PROGRESS NOTES
Medication Therapy Management (MTM) Encounter    ASSESSMENT:                            Medication Adherence/Access: No issues identified    Insomnia: with concerns for polypharmacy and drug interactions/safety risk would suggest reducing current agents that are worsening insomnia. Patient continues on Adderall although had been stopped due to syncope and generalized weakness concerns, which may be contributing to insomnia. However, patient is not interested in decreasing dose. Future may consider quetiapine as needed to provide assistance with depression and anxiety control as this continues to be uncontrolled which can be causing insomnia.    Smoking Cessation: no changes, continue to wean down.     Multiple sclerosis/generalized weakness: mentioned above concerns for side effects of insomnia and serotonin syndrome drug interactions.  - Concurrent use of AMPHETAMINES (Adderall and Adderall XR) and SEROTONERGIC AGENTS (oxycodone, venlafaxine) may result in increased risk of serotonin syndrome.  - Concurrent use of AMPHETAMINES (Adderall and Adderall XR) and Proton Pump Inhibitor (Nexium) may result in increased amphetamine levels.    S/p Lumbar infusion - back pain/RLS:  PCP would like to consider medical marijuana for pain and anxiety consideration.    COPD: suggest escalate therapy to reduce JONG use which may worsen her anxiety.     Depression/Anxiety: needs improvement, suggest further titration in venlafaxine and will work to obtain pharmacogenomics report from psychiatry clinic.     GERD: stable.     Supplements:  Stable.    PLAN:                            Advise patient to stop Adderall IR. However, patient was upset by this recommendation and does not want to change anything. Patient agreeable to follow-up in 1 month.     Patient agreeable to increase venlafaxine  mg daily    Change Stiolto to Breztri inhaler.     Patient to follow-up on medical cannabis clinic to see if this can help with pain,  anxiety and sleep.    Follow-up: 4-6 weeks co-visit     SUBJECTIVE/OBJECTIVE:                          Hina Yap is a 57 year old female coming for a follow-up visit. Today's visit is a co-visit with Sunshine Butterfield CNP. Today's visit is a follow-up [facility] visit from 9/26/2022 . Scheduled as phone but patient showed up in clinic.    Reason for visit: her main concern today is insomnia.    Allergies/ADRs: Reviewed in chart  Past Medical History: Reviewed in chart  Tobacco: She reports that she has been smoking cigarettes. She has a 17.50 pack-year smoking history. She has never used smokeless tobacco.Nicotine/Tobacco Cessation Plan:   0.5 ppd, not ready to quit  Alcohol: none    Medication Adherence/Access: denies any changes.   Patient was using pill box that was monthly but kept running out of refills. Now takes out of the bottles, she sets up two bottles one with a pink cap = morning and normal cap/white = nightly. She sets this up on a daily basis.   The patient fills medications at [facility]: NO, fills medications at [facility].    Insomnia: she reports this has been ongoing for years but feels has been getting worse in the last year. She doesn't think the addition of venlafaxine is helping her. She reports that she can fall asleep, then wakes up every hour. Also takes a while to fall back asleep when she wakes up. Sometimes she wakes in pain and sometimes has no symptoms/reason that she is aware of to cause her for waking. Not well rested in the morning. Denies any day times naps.   Failed: over-the-counter melatonin or Benadryl. May have been on zolpidem before, but cannot remember if effective or not.    Smoking Cessation: started on Nicotine gum 2 mg every 1 hour as needed, increased to 4 mg on 10/6/22. She is smoking 0.5 ppd. Working on her own pace with this.     Multiple sclerosis/generalized weakness: Currently patient is taking Adderall XR 30 mg in the morning and Adderall IR 10 mg in the afternoon.  Previously prescribed by psychiatrist Lilia Vasquez, PMBETOP-BC from [place] changed to PCP management.  Neurologist - Dr. Orlando from [facility] that was the earliest. Was following up usually annually. She also has Anovex injection 30mcg IM weekly.      S/p Lumbar infusion - back pain/RLS: patient trial spine stimulator, she has a pre-op appointment on 12/15/22 for this.     Percocet  mg tables, takes 5 tablets daily, Rx per [facility].   She has Narcan at home.   Phenergan 25 mg with nearly all Percocet doses.  Lyrica 50mg twice daily per [facility]  Baclofen 20 mg 4 times daily by Dr. Orlando  Lidocaine patch as needed - does not keep on long due to adhesive concern.  Voltaren topical as needed   Over-the-counter Ibuprofen 800 mg day of her Anovex injection and the day after. Then also 800 mg as needed up to 8 tabs per day (1 tab = 200 mg)   Ferrous sulfate 325 mg daily  Requip 2 mg in the morning and 4mg in the evening   Venlafaxine XR 150mg every daily  Patient has medical cannabis certification but not using any products has not followed up. She had gone previously once and felt like the product given had too much THC.     She reports acetaminophen not effective and previously on meloxicam did not react well.   Side effects: she reports the syncope has improved.   Hemoglobin   Date Value Ref Range Status   09/17/2022 11.8 11.7 - 15.7 g/dL Final   10/29/2020 13.1 11.7 - 15.7 g/dL Final      Ferritin   Date Value Ref Range Status   02/13/2020 136 8 - 252 ng/mL Final     Iron   Date Value Ref Range Status   11/12/2012 46 35 - 180 ug/dL Final     Iron Binding Cap   Date Value Ref Range Status   11/12/2012 381 240 - 430 ug/dL Final     Ferritin   Date Value Ref Range Status   02/13/2020 136 8 - 252 ng/mL Final     COPD:  JONG: albuterol HFA - using daily  NEB: DuoNeb as needed - has been using  LAMA/LABA- Stiolto Respimat 2 puff(s) once daily per Rx, but using 1 puff twice daily   Patient is not experiencing  side effects.   Patient reports the following symptoms: increased shortness of breath upon exertion. Wheezing and SOB.   Patient does not have an COPD Action Plan on file.   She also reports DPI in the past have cause her to have thrush.    Depression/anxiety: No longer following with [facility] psychiatrist, but she does work with the therapist at the [facility] gets a long with well. She continues with therapy weekly. She also mentions has had pharmacogenomics testing, no results available for our care team at this time.  Interval:   Tapered off mirtazapine 9/29/2022  Advised to Pristiq 50 mg daily 9/29/2022 but no covered, changed to venlafaxine titrate to 75 mg in the morning. Then increased to 150mg on 11/13/2022. She reports delay in increase due to concern of a rash but the rash was from a new detergent.   Currently taking venlafaxine  mg daily for 2-3 weeks. She does not think this medication is helping.   PHQ-9 SCORE 8/26/2022 10/6/2022 12/2/2022   PHQ-9 Total Score MyChart 22 (Severe depression) 21 (Severe depression) 21 (Severe depression)   PHQ-9 Total Score 22 21 21     KAMALA-7 SCORE 7/29/2022 8/26/2022 12/2/2022   Total Score 18 (severe anxiety) 19 (severe anxiety) 18 (severe anxiety)   Total Score 18 19 18       GERD: Current medications include: Nexium (esomeprazole) 40 mg in the morning. She still has breakthrough symptoms but reports manageable.     Supplements: Taking multivitamin daily (has vitamin D 800 units), calcium+vit D 100 units+mag daily. Side effects: no changes.  Lab Results   Component Value Date    VITDT 79 (H) 02/13/2020       Today's Vitals: LMP 05/25/2017 (Exact Date)   ----------------      I spent 60 minutes with this patient today. All changes were made via verbal approval with BENNETT Marvin CNP. A copy of the visit note was provided to the patient's provider(s).    A summary of these recommendations was given to the patient and was given to the patient (see  ALEX from today's appointment with Sunshine Butterfield CNP).    Ivonne Gonzalez, PharmD  Medication Therapy Management Pharmacist     Medication Therapy Recommendations  Anxiety    Current Medication: venlafaxine (EFFEXOR-ER) 225 MG 24 hr tablet   Rationale: Dose too low - Dosage too low - Effectiveness   Recommendation: Increase Dose   Status: Accepted per Provider         Chronic obstructive pulmonary disease with acute lower respiratory infection (H)    Current Medication: Budeson-Glycopyrrol-Formoterol 160-9-4.8 MCG/ACT AERO   Rationale: More effective medication available - Ineffective medication - Effectiveness   Recommendation: Change Medication   Status: Accepted per Provider         MS (multiple sclerosis) (H)    Current Medication: amphetamine-dextroamphetamine (ADDERALL) 10 MG tablet   Rationale: Undesirable effect - Adverse medication event - Safety   Recommendation: Discontinue Medication   Status: Declined per Patient

## 2022-12-02 NOTE — PROGRESS NOTES
"  ASSESSMENT/PLAN:  (F41.9) Anxiety  (primary encounter diagnosis)  (F33.1) Moderate episode of recurrent major depressive disorder (H)  (G47.00) Insomnia, unspecified type  Comment: Depression is poorly controlled but stable. Anxiety is worsened, possibly secondary to acute on chronic insomnia. Has tried multiple meds without improvement, and the situation is complicated by polypharmacy. Previously had a prolonged qt which has now resolved since adjusting her meds, but don't want to add any agents that could cause drug interactions.   Plan:   -Increase effexor, which may help with anxiety, depression, and sleep  -She is certified for med marijuana and will stop by the dispensary to try to find something that helps sleep  -Declines CBT-I  -Continue therapy  -If not improved in one month we will reduce her Adderall, although she adamantly refuses to do this.   -Consider seroquel in the future for sleep/depression. May need to monitor QT    (R53.83) Other fatigue  Comment: Stable  Plan: amphetamine-dextroamphetamine (ADDERALL XR) 30         MG 24 hr capsule, amphetamine-dextroamphetamine        (ADDERALL) 10 MG tablet            (G35) Multiple sclerosis (H)  Plan: amphetamine-dextroamphetamine (ADDERALL) 10 MG         tablet              ROS: const/psych/cv otherwise negative     OBJECTIVE:  /82   Pulse 94   Temp 97.5  F (36.4  C)   Resp 16   Ht 1.702 m (5' 7\")   Wt 53.1 kg (117 lb)   LMP 05/25/2017 (Exact Date)   SpO2 98%   BMI 18.32 kg/m    CONSTITUTIONAL: Alert, well-nourished, well-groomed, pacing the room  PSYCH: Flat affect. Appropriate mentation and speech. Wringing her hands    50 minutes spent with patient, over 1/2 in counseling and coordination of care regarding the above diagnoses    Sunshine Butterfield, SHYP-DNP.        "

## 2022-12-02 NOTE — PATIENT INSTRUCTIONS
Recommendations from today's visit:                                                      Increase venlafaxine (Effexor) 225 mg daily. Take this in the morning.    Stop the Stiolto. Start an different inhaler called Breztri which is 2 puffs twice day, make sure to rinse your mouth after the 2 puffs to avoid risk of thrush.    Please try to stop at the Medical cannabis dispensary before our next visit    Follow-up: 1 month virtual co-visit    It was great speaking with you today.  I value your experience and would be very thankful for your time in providing feedback in our clinic survey. In the next few days, you may receive an email or text message from [employer] with a link to a survey related to your visit today.    Care Team contact information:                                                      Primary care provider: Sunshine Butterfield CNP    Clinical Pharmacist: Ivonne Gonzalez, FelixD    Please feel free to contact us with any questions or concerns you have. You may reach the complex care team directly at [phone].

## 2022-12-05 ENCOUNTER — TELEPHONE (OUTPATIENT)
Dept: PEDIATRICS | Facility: CLINIC | Age: 57
End: 2022-12-05

## 2022-12-05 DIAGNOSIS — F33.1 MODERATE EPISODE OF RECURRENT MAJOR DEPRESSIVE DISORDER (H): ICD-10-CM

## 2022-12-05 DIAGNOSIS — J44.9 CHRONIC OBSTRUCTIVE PULMONARY DISEASE, UNSPECIFIED COPD TYPE (H): Primary | ICD-10-CM

## 2022-12-05 NOTE — LETTER
2023    INSURER: Payor: [place] / Plan[place] / Product Type: Medicaid /   Re: Prior Authorization Request  Patient: Hina Yap  Policy ID#:  [id number]  : 1965      To Whom it May Concern:    I am writing to formally request a prior authorization of coverage for my patient,  Hina Yap, for treatment using COPD.  I am requesting authorization for applicable provider professional and facility services associated with this therapy. The therapy involves escalation from tiotropium-olodaterol (STIOLTO RESPIMAT) 2.5-2.5 MCG/ACT AERS to triple therapy due to frequent ongoing rescue inhaler use which is daily. I have treated Hina Yap since 2015 and I have determined that it is medically appropriate for  this patient to receive be treated with Budeson-Glycopyrrol-Formoterol 160-9-4.8 MCG/ACT AERO  for the reason(s) stated below:      Uncontrolled COPD, increasing her risk for exacerbation.     JONG also worsening underlying anxiety    I have included medical records pertaining to the patient s medical history, current condition and treatment plan.  In addition, the following billing codes will be used for therapy and follow-up:Chronic obstructive pulmonary disease with acute lower respiratory infection (H) [J44.0] .      I firmly believe that this therapy is clinically appropriate and that Hina Yap would benefit from improved clinical outcomes, if allowed the opportunity to receive this treatment.  Please contact me at Dept: [phone] if you require additional information to ensure the prompt approval for coverage.     Please send your written decision to me at this address:  [address] [address] [phone]      Sincerely,      Sunshine Butterfield MD

## 2022-12-05 NOTE — TELEPHONE ENCOUNTER
Prior Authorization Retail Medication Request    Medication/Dose:   ICD code (if different than what is on RX):    Previously Tried and Failed:  Tiotropium-olodaterol aers, albuterol inh, duoneb solution, anoro ellipta inh  Rationale:  Patient needs control medication for COPD    Insurance Name:  [place]icaid [place]  Insurance ID:  [id number]      Pharmacy Information (if different than what is on RX)  Name:    Phone:      M Key# [id number]    Sasha Cerda CMA

## 2022-12-06 NOTE — TELEPHONE ENCOUNTER
PA Initiation    Medication: Breztri Aerosphere 160-9-4.8 mcg/ACT aerosol  Insurance Company: [place] Medicaid ([facility]) - Phone [phone] Fax [phone]  Pharmacy Filling the Rx: [id number] [place], [place] - [address] AT [facility] [address]  Filling Pharmacy Phone: [phone]  Filling Pharmacy Fax: [phone]  Start Date: 12/6/2022

## 2022-12-07 NOTE — TELEPHONE ENCOUNTER
PRIOR AUTHORIZATION DENIED    Medication: Breztri Aerosphere 160-9-4.8 mcg/ACT aerosol    Denial Date: 12/7/2022    Denial Rationale:          Appeal Information: If provider would like to appeal this decision we will need a detailed letter of medical necessity to start the process. Then re-route this request back to the PA pool.

## 2022-12-09 RX ORDER — TIOTROPIUM BROMIDE AND OLODATEROL 3.124; 2.736 UG/1; UG/1
2 SPRAY, METERED RESPIRATORY (INHALATION) DAILY
Qty: 4 G | Refills: 1 | Status: SHIPPED | OUTPATIENT
Start: 2022-12-09 | End: 2023-01-03

## 2022-12-15 ENCOUNTER — OFFICE VISIT (OUTPATIENT)
Dept: PEDIATRICS | Facility: CLINIC | Age: 57
End: 2022-12-15
Payer: MEDICAID

## 2022-12-15 VITALS
SYSTOLIC BLOOD PRESSURE: 130 MMHG | WEIGHT: 120 LBS | TEMPERATURE: 97.8 F | OXYGEN SATURATION: 98 % | HEIGHT: 67 IN | DIASTOLIC BLOOD PRESSURE: 90 MMHG | BODY MASS INDEX: 18.83 KG/M2 | RESPIRATION RATE: 16 BRPM | HEART RATE: 84 BPM

## 2022-12-15 DIAGNOSIS — G89.29 OTHER CHRONIC PAIN: ICD-10-CM

## 2022-12-15 DIAGNOSIS — Z01.818 PREOP GENERAL PHYSICAL EXAM: Primary | ICD-10-CM

## 2022-12-15 PROCEDURE — 99214 OFFICE O/P EST MOD 30 MIN: CPT | Performed by: STUDENT IN AN ORGANIZED HEALTH CARE EDUCATION/TRAINING PROGRAM

## 2022-12-15 SDOH — ECONOMIC STABILITY: FOOD INSECURITY: WITHIN THE PAST 12 MONTHS, THE FOOD YOU BOUGHT JUST DIDN'T LAST AND YOU DIDN'T HAVE MONEY TO GET MORE.: OFTEN TRUE

## 2022-12-15 SDOH — ECONOMIC STABILITY: TRANSPORTATION INSECURITY
IN THE PAST 12 MONTHS, HAS THE LACK OF TRANSPORTATION KEPT YOU FROM MEDICAL APPOINTMENTS OR FROM GETTING MEDICATIONS?: YES

## 2022-12-15 SDOH — HEALTH STABILITY: PHYSICAL HEALTH: ON AVERAGE, HOW MANY MINUTES DO YOU ENGAGE IN EXERCISE AT THIS LEVEL?: 0 MIN

## 2022-12-15 SDOH — ECONOMIC STABILITY: INCOME INSECURITY: IN THE LAST 12 MONTHS, WAS THERE A TIME WHEN YOU WERE NOT ABLE TO PAY THE MORTGAGE OR RENT ON TIME?: NO

## 2022-12-15 SDOH — HEALTH STABILITY: PHYSICAL HEALTH: ON AVERAGE, HOW MANY DAYS PER WEEK DO YOU ENGAGE IN MODERATE TO STRENUOUS EXERCISE (LIKE A BRISK WALK)?: 0 DAYS

## 2022-12-15 SDOH — ECONOMIC STABILITY: INCOME INSECURITY: HOW HARD IS IT FOR YOU TO PAY FOR THE VERY BASICS LIKE FOOD, HOUSING, MEDICAL CARE, AND HEATING?: VERY HARD

## 2022-12-15 SDOH — ECONOMIC STABILITY: FOOD INSECURITY: WITHIN THE PAST 12 MONTHS, YOU WORRIED THAT YOUR FOOD WOULD RUN OUT BEFORE YOU GOT MONEY TO BUY MORE.: OFTEN TRUE

## 2022-12-15 SDOH — ECONOMIC STABILITY: TRANSPORTATION INSECURITY
IN THE PAST 12 MONTHS, HAS LACK OF TRANSPORTATION KEPT YOU FROM MEETINGS, WORK, OR FROM GETTING THINGS NEEDED FOR DAILY LIVING?: YES

## 2022-12-15 ASSESSMENT — LIFESTYLE VARIABLES
AUDIT-C TOTAL SCORE: 0
SKIP TO QUESTIONS 9-10: 1
HOW OFTEN DO YOU HAVE SIX OR MORE DRINKS ON ONE OCCASION: NEVER
HOW MANY STANDARD DRINKS CONTAINING ALCOHOL DO YOU HAVE ON A TYPICAL DAY: PATIENT DOES NOT DRINK
HOW OFTEN DO YOU HAVE A DRINK CONTAINING ALCOHOL: NEVER

## 2022-12-15 ASSESSMENT — SOCIAL DETERMINANTS OF HEALTH (SDOH)
HOW OFTEN DO YOU ATTEND CHURCH OR RELIGIOUS SERVICES?: MORE THAN 4 TIMES PER YEAR
IN A TYPICAL WEEK, HOW MANY TIMES DO YOU TALK ON THE PHONE WITH FAMILY, FRIENDS, OR NEIGHBORS?: NEVER
DO YOU BELONG TO ANY CLUBS OR ORGANIZATIONS SUCH AS CHURCH GROUPS UNIONS, FRATERNAL OR ATHLETIC GROUPS, OR SCHOOL GROUPS?: NO
ARE YOU MARRIED, WIDOWED, DIVORCED, SEPARATED, NEVER MARRIED, OR LIVING WITH A PARTNER?: NEVER MARRIED
HOW OFTEN DO YOU GET TOGETHER WITH FRIENDS OR RELATIVES?: ONCE A WEEK

## 2022-12-15 ASSESSMENT — PAIN SCALES - GENERAL: PAINLEVEL: SEVERE PAIN (6)

## 2022-12-15 ASSESSMENT — PATIENT HEALTH QUESTIONNAIRE - PHQ9
SUM OF ALL RESPONSES TO PHQ QUESTIONS 1-9: 22
SUM OF ALL RESPONSES TO PHQ QUESTIONS 1-9: 22
10. IF YOU CHECKED OFF ANY PROBLEMS, HOW DIFFICULT HAVE THESE PROBLEMS MADE IT FOR YOU TO DO YOUR WORK, TAKE CARE OF THINGS AT HOME, OR GET ALONG WITH OTHER PEOPLE: EXTREMELY DIFFICULT

## 2022-12-15 NOTE — PATIENT INSTRUCTIONS
-Stay off the ibuprofen until after the procedure  -Take the rest of your other meds normally    For informational purposes only. Not to replace the advice of your health care provider. Copyright   2003, 2019 [employer] [facility]. All rights reserved. Clinically reviewed by Jayla Mcwilliams MD. [employer] [id number] - REV .  Preparing for Your Surgery  Getting started  A nurse will call you to review your health history and instructions. They will give you an arrival time based on your scheduled surgery time. Please be ready to share:  Your doctor's clinic name and phone number  Your medical, surgical, and anesthesia history  A list of allergies and sensitivities  A list of medicines, including herbal treatments and over-the-counter drugs  Whether the patient has a legal guardian (ask how to send us the papers in advance)  Please tell us if you're pregnant--or if there's any chance you might be pregnant. Some surgeries may injure a fetus (unborn baby), so they require a pregnancy test. Surgeries that are safe for a fetus don't always need a test, and you can choose whether to have one.   If you have a child who's having surgery, please ask for a copy of Preparing for Your Child's Surgery.    Preparing for surgery  Within 10 to 30 days of surgery: Have a pre-op exam (sometimes called an H&P, or History and Physical). This can be done at a clinic or pre-operative center.  If you're having a , you may not need this exam. Talk to your care team.  At your pre-op exam, talk to your care team about all medicines you take. If you need to stop any medicines before surgery, ask when to start taking them again.  We do this for your safety. Many medicines can make you bleed too much during surgery. Some change how well surgery (anesthesia) drugs work.  Call your insurance company to let them know you're having surgery. (If you don't have insurance, call [phone].)  Call your clinic if there's any change in your  health. This includes signs of a cold or flu (sore throat, runny nose, cough, rash, fever). It also includes a scrape or scratch near the surgery site.  If you have questions on the day of surgery, call your hospital or surgery center.  Eating and drinking guidelines  For your safety: Unless your surgeon tells you otherwise, follow the guidelines below.  Eat and drink as usual until 8 hours before you arrive for surgery. After that, no food or milk.  Drink clear liquids until 2 hours before you arrive. These are liquids you can see through, like water, Gatorade, and Propel Water. They also include plain black coffee and tea (no cream or milk), candy, and breath mints. You can spit out gum when you arrive.  If you drink alcohol: Stop drinking it the night before surgery.  If your care team tells you to take medicine on the morning of surgery, it's okay to take it with a sip of water.  Preventing infection  Shower or bathe the night before and morning of your surgery. Follow the instructions your clinic gave you. (If no instructions, use regular soap.)  Don't shave or clip hair near your surgery site. We'll remove the hair if needed.  Don't smoke or vape the morning of surgery. You may chew nicotine gum up to 2 hours before surgery. A nicotine patch is okay.  Note: Some surgeries require you to completely quit smoking and nicotine. Check with your surgeon.  Your care team will make every effort to keep you safe from infection. We will:  Clean our hands often with soap and water (or an alcohol-based hand rub).  Clean the skin at your surgery site with a special soap that kills germs.  Give you a special gown to keep you warm. (Cold raises the risk of infection.)  Wear special hair covers, masks, gowns and gloves during surgery.  Give antibiotic medicine, if prescribed. Not all surgeries need antibiotics.  What to bring on the day of surgery  Photo ID and insurance card  Copy of your health care directive, if you have  one  Glasses and hearing aids (bring cases)  You can't wear contacts during surgery  Inhaler and eye drops, if you use them (tell us about these when you arrive)  CPAP machine or breathing device, if you use them  A few personal items, if spending the night  If you have . . .  A pacemaker, ICD (cardiac defibrillator) or other implant: Bring the ID card.  An implanted stimulator: Bring the remote control.  A legal guardian: Bring a copy of the certified (court-stamped) guardianship papers.  Please remove any jewelry, including body piercings. Leave jewelry and other valuables at home.  If you're going home the day of surgery  You must have a responsible adult drive you home. They should stay with you overnight as well.  If you don't have someone to stay with you, and you aren't safe to go home alone, we may keep you overnight. Insurance often won't pay for this.  After surgery  If it's hard to control your pain or you need more pain medicine, please call your surgeon's office.  Questions?   If you have any questions for your care team, list them here: _________________________________________________________________________________________________________________________________________________________________________ ____________________________________ ____________________________________ ____________________________________

## 2022-12-15 NOTE — PROGRESS NOTES
[facility]  [address]  [address]  [place] [address]  Phone: [phone]  Fax: [phone]  Primary Provider: Sunshine Butterfield  Pre-op Performing Provider: ARIANA PALUMBO      PREOPERATIVE EVALUATION:  Today's date: 12/15/2022    Hina Yap is a 57 year old female who presents for a preoperative evaluation.    Surgical Information:  Surgery/Procedure: Spinal Stimulator trial  Surgery Location: [facility]  Surgeon: ?  Surgery Date: 12/21/2022  Time of Surgery: 9am  Where patient plans to recover: At home with family  Fax number for surgical facility: [phone]    Type of Anesthesia Anticipated: to be determined    Assessment & Plan     The proposed surgical procedure is considered LOW risk.    Preop general physical exam  Other chronic pain  Ok to proceed with spinal cord stimulator trial.    Medication Instructions:  Patient is to take all scheduled medications on the day of surgery EXCEPT for modifications listed below:   -Holding ibuprofen for 1 week prior to procedure (already started)    RECOMMENDATION:  APPROVAL GIVEN to proceed with proposed procedure, without further diagnostic evaluation.            Subjective     HPI related to upcoming procedure: bad chronic back pain refractory to surgery, trying spinal cord stimulator.    Preop Questions 12/15/2022   1. Have you ever had a heart attack or stroke? 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 No ischemic events since. On amlodipine.   2. Have you ever had surgery on your heart or blood vessels, such as a stent placement, a coronary artery bypass, or surgery on an artery in your head, neck, heart, or legs? No   3. Do you have chest pain with activity? No   4. Do you have a history of  heart failure? No   5. Do you currently have a cold, bronchitis or symptoms of other infection? No   6. Do you have a cough, shortness of breath, or wheezing? YES - chronic wheezing and coughing from  COPD, not worse than normal   7. Do you or anyone in your family have previous history of blood clots? No   8. Do you or does anyone in your family have a serious bleeding problem such as prolonged bleeding following surgeries or cuts? No   9. Have you ever had problems with anemia or been told to take iron pills? YES - takes iron supplement   10. Have you had any abnormal blood loss such as black, tarry or bloody stools, or abnormal vaginal bleeding? No   11. Have you ever had a blood transfusion? UNKNOWN - doesn't think so   12. Are you willing to have a blood transfusion if it is medically needed before, during, or after your surgery? Yes   13. Have you or any of your relatives ever had problems with anesthesia? No   14. Do you have sleep apnea, excessive snoring or daytime drowsiness? No   15. Do you have any artifical heart valves or other implanted medical devices like a pacemaker, defibrillator, or continuous glucose monitor? No   16. Do you have artificial joints? YES- hardware in spine   17. Are you allergic to latex? No   18. Is there any chance that you may be pregnant? No       Health Care Directive:  Patient does not have a Health Care Directive or Living Will: Discussed advance care planning with patient; however, patient declined at this time.    Preoperative Review of :   reviewed - controlled substances reflected in medication list.      Review of Systems  Complete ROS systems are negative, except as otherwise noted.    Patient Active Problem List    Diagnosis Date Noted     Dehydration 09/17/2022     Priority: Medium     Spasticity 09/17/2022     Priority: Medium     Acute kidney injury (H) 09/17/2022     Priority: Medium     S/P lumbar fusion 07/23/2021     Priority: Medium     Essential hypertension 11/30/2020     Priority: Medium     PVC's (premature ventricular contractions) 11/30/2020     Priority: Medium     Nonrheumatic mitral valve regurgitation 11/30/2020     Priority: Medium      Prolonged Q-T interval on ECG 10/29/2020     Priority: Medium     Seen on ED visit October 29, 2020  Resolved on latest EKG. March 22, 2021           Other chronic pain 11/21/2019     Priority: Medium     CURRENTLY IN THE PROCESS OF WEANING OR TRANSFERRING TO PAIN CLINIC.  Have cut down by about half. This has destabilized her mental health so we are holding on further wean until 3/2021 so she can get mental health under control. Seeing psych and pain clinic.  I inherited her from her neurologist who suddenly quit. He was prescribing her adderall, medical marijuana, phenergan, xanax, and percocet.     Has pain appt 4/22/2020  Is establishing with the treatment resistant depression clinic 4/2020.    Currenly at 35mg per day (210, 5mg tabs per month) (7, 5mg oxycodone-acetaminophen) tabs per day. We are HOLDING until March 2021 as she is now at safer doses, and the wean was destabilizing her mental health.      Pain Clinic evaluation in the past: Yes       Date/Location:    [facility]. Over 10 years ago  DIRE Total Score(s):  No flowsheet data found.    Last [facility] website verification:  done on 11/21/19   [email or web address]       CKD (chronic kidney disease) stage 2, GFR 60-89 ml/min 11/21/2019     Priority: Medium     Insomnia, unspecified type 11/21/2019     Priority: Medium     Moderate episode of recurrent major depressive disorder (H) 12/14/2018     Priority: Medium     Restless leg syndrome 04/24/2017     Priority: Medium     Esophageal reflux 01/26/2016     Priority: Medium     Anxiety 01/26/2016     Priority: Medium        (F41.9) Anxiety  (primary encounter diagnosis)  Comment: Feels much better now that we are not weaning her opioids. Seeing pain clinic. We have reduced the alprazolam by half. Still has trouble sleeping-her mind races. But feels anxiety is well controlled during the day  Plan: mirtazapine (REMERON) 15 MG tablet, ALPRAZolam         (XANAX) 0.5 MG tablet  -Ok to continue alprazolam  for now. She has a lot going on right now, but I would like to eventually get her to see psychiatry and have them take over the alprazolam. Will revisit 9/2021  -Again reviewed the potentially lethal risks of combining opioids and alprazolam. I am mildly comforted that both of her doses are much lower than when I inherited her, but the risk is still great. However, weaning further may destabilize her mental health. As we have just somewhat stabilized, she feels the benefit outweighs the risk  -Follow-up 9/2021 and reconsider wean. May consider TMS vs ketamine through psych.        Cardiomyopathy (H) 12/03/2015     Priority: Medium     MS (multiple sclerosis) (H) 12/17/2012     Priority: Medium     Controlled Substance Refill Request for Adderall 30 xr 30 tabs per month    Last refill: 12/1/10    Last clinic visit: 11/21/19     Clinic visit frequency required: Q 6  months  Next appt: TBD    Controlled substance agreement on file: No.    Documentation in problem list reviewed:  Yes    Processing:  Rx to be electronically transmitted to pharmacy by provider     [place] reviewed: Reviewed  [place] profile:  [email or web address]          Past Medical History:   Diagnosis Date     Anxiety      Arthritis Years ago     COPD (chronic obstructive pulmonary disease) (H)      Depressive disorder Years ago     GERD (gastroesophageal reflux disease)      Hypertension      Ischemic cardiomyopathy      Multiple sclerosis (H)      Neuromuscular disorder (H)      Nonrheumatic mitral valve regurgitation      PVC's (premature ventricular contractions)      Renal disease      Restless leg syndrome      Tobacco use disorder      Past Surgical History:   Procedure Laterality Date     GYN SURGERY      tubal ligation     OPTICAL TRACKING SYSTEM FUSION POSTERIOR SPINE LUMBAR N/A 7/23/2021    Procedure: L4-5 transforaminal lumbar interbody fusion with reduction of grade 1/2 unstable spondylolisthesis   Open  reduction and internal fixation of the grade L4-5 spondylolisthesis L4 - L5 posterior lateral fusion;  Surgeon: Asif Flores MD;  Location: [facility] OR     Current Outpatient Medications   Medication Sig Dispense Refill     albuterol (PROAIR HFA/PROVENTIL HFA/VENTOLIN HFA) 108 (90 Base) MCG/ACT inhaler Inhale 2 puffs into the lungs every 4 hours as needed for shortness of breath / dyspnea or wheezing 18 g 1     amLODIPine (NORVASC) 2.5 MG tablet Take 1 tablet (2.5 mg) by mouth daily 90 tablet 3     amphetamine-dextroamphetamine (ADDERALL XR) 30 MG 24 hr capsule Take 1 capsule (30 mg) by mouth every morning Prescribed by 30 capsule 0     amphetamine-dextroamphetamine (ADDERALL) 10 MG tablet Take 1 tablet (10 mg) by mouth daily 30 tablet 0     AVONEX PEN 30 MCG/0.5ML auto-injector kit Inject 30 mcg into the muscle every 7 days        baclofen (LIORESAL) 20 MG tablet Take 20 mg by mouth 4 times daily Per Neurologist       Budeson-Glycopyrrol-Formoterol 160-9-4.8 MCG/ACT AERO Inhale 2 puffs into the lungs 2 times daily (Patient not taking: Reported on 12/9/2022) 10.7 g 1     Calcium Carb-Cholecalciferol (CALCIUM 1000 + D PO) Take by mouth daily       diclofenac (VOLTAREN) 1 % topical gel Apply 4 g topically 4 times daily 480 g 0     docusate sodium (COLACE) 100 MG tablet Take 100 mg by mouth daily as needed for constipation Over-the-counter       esomeprazole (NEXIUM) 40 MG DR capsule Take 1 capsule (40 mg) by mouth every morning (before breakfast) Take 30-60 minutes before eating. 90 capsule 3     ferrous sulfate (FEROSUL) 325 (65 Fe) MG tablet Take 1 tablet (325 mg) by mouth daily (with breakfast)       ibuprofen (ADVIL/MOTRIN) 200 MG tablet 800 mg day of and day after Avonex weekly injection + as needed up to 8 tablets per day at most       ipratropium - albuterol 0.5 mg/2.5 mg/3 mL (DUONEB) 0.5-2.5 (3) MG/3ML neb solution Take 1 vial (3 mLs) by nebulization every 6 hours as needed for shortness of breath /  dyspnea or wheezing 30 mL 3     Lidocaine (LIDOCARE) 4 % Patch Place 1 patch onto the skin daily as needed for moderate pain (musculoskeletal pain) Remove patch after 12 hours. To prevent lidocaine toxicity, patient should be patch free for 12 hrs daily. (Patient not taking: Reported on 12/2/2022) 15 patch 0     multivitamin, therapeutic (THERA-VIT) TABS tablet Take 1 tablet by mouth daily       naloxone (NARCAN) 4 MG/0.1ML nasal spray Spray 1 spray (4 mg) into one nostril alternating nostrils once as needed for opioid reversal every 2-3 minutes until assistance arrives (Patient not taking: Reported on 12/2/2022) 0.2 mL 0     nicotine polacrilex (NICORETTE) 4 MG gum Place 1 each (4 mg) inside cheek every hour as needed for smoking cessation 90 each 11     oxyCODONE-acetaminophen (PERCOCET)  MG per tablet Take 1 tablet by mouth every 6 hours as needed for severe pain (Max of 5 tablets per day, for chroic pain.)       polyethylene glycol (MIRALAX) 17 GM/Dose powder Mix 1 capful with 6-8 ounces of clear liquid and take by mouth twice daily as needed for constipation. Decrease dose to once daily or once every 2-3 days to prevent constipation. 527 g 0     pregabalin (LYRICA) 50 MG capsule Take 50 mg by mouth 2 times daily Prescribed by Pain clinic       promethazine (PHENERGAN) 25 MG tablet Take 25 mg by mouth every 6 hours as needed for nausea Takes with each Percocet dose       rOPINIRole (REQUIP) 2 MG tablet Take 2 mg by mouth every morning And 4 mg at bedtime. Prescribed by neurologist       tiotropium-olodaterol (STIOLTO RESPIMAT) 2.5-2.5 MCG/ACT AERS Inhale 2 puffs into the lungs daily 4 g 1     venlafaxine (EFFEXOR-ER) 225 MG 24 hr tablet Take 1 tablet (225 mg) by mouth daily 90 tablet 0       Allergies   Allergen Reactions     Sulfa Drugs Rash     Adhesive Tape Rash     Reacts to adhesive on fentanyl patch, tapes, all kinds with prolonged duration     Wellbutrin [Bupropion Hydrobromide] Rash        Social  History     Tobacco Use     Smoking status: Every Day     Packs/day: 0.50     Years: 35.00     Pack years: 17.50     Types: Cigarettes     Smokeless tobacco: Never   Substance Use Topics     Alcohol use: Not Currently     Alcohol/week: 0.0 standard drinks     History   Drug Use No         Objective     LMP 05/25/2017 (Exact Date)     Physical Exam    GENERAL APPEARANCE: healthy, alert and no distress     EYES: EOMI, PERRL, conjunctivae clear     HENT: ear canals and TM's normal and nose and mouth without ulcers or lesions     NECK: no adenopathy, no asymmetry, masses, or scars and thyroid normal to palpation     RESP: lungs clear to auscultation - no rales, rhonchi or wheezes     CV: regular rates and rhythm, normal S1 S2, no S3 or S4 and no murmur, click or rub     ABDOMEN:  soft, nontender, no HSM or masses and bowel sounds normal     MS: extremities normal- no gross deformities note     SKIN: no suspicious lesions or rashes on revealed skin     NEURO: Normal strength and tone, sensory exam grossly normal, mentation intact and speech normal     PSYCH: mentation appears normal and affect anxious     LYMPHATICS: No cervical adenopathy    Recent Labs   Lab Test 09/18/22  0624 09/17/22  1231 07/29/22  1037 02/26/22  1344   HGB  --  11.8  --  14.6   PLT  --  260  --  343    141   < > 142   POTASSIUM 4.1 3.5   < > 3.7   CR 0.78 1.44*   < > 0.73    < > = values in this interval not displayed.      Diagnostics:  No labs were ordered during this visit.   No EKG required for low risk surgery (cataract, skin procedure, breast biopsy, etc).    Revised Cardiac Risk Index (RCRI):  The patient has the following serious cardiovascular risks for perioperative complications:   - No serious cardiac risks = 0 points     RCRI Interpretation: 0 points: Class I (very low risk - 0.4% complication rate)     Signed Electronically by: Kevin Youssef MD  Copy of this evaluation report is provided to requesting physician.    I have  seen this patient and I was present during all critical and key portions of the procedure/exam and immediately available to furnish services during the entire service. I have reviewed the documentation from this resident and discussed the findings with them, and I agree with the documentation their documentation.      Luis M Mora MD  Internal Medicine and Pediatrics

## 2022-12-27 ENCOUNTER — TRANSFERRED RECORDS (OUTPATIENT)
Dept: HEALTH INFORMATION MANAGEMENT | Facility: CLINIC | Age: 57
End: 2022-12-27

## 2023-01-02 NOTE — TELEPHONE ENCOUNTER
Medication Appeal Initiation    We have initiated an appeal for the requested medication:  Medication: Breztri Aerosphere 160-9-4.8 mcg/ACT aerosol  Appeal Start Date:  1/2/2023  Insurance Company: [place] Medicaid ([facility]) - Phone [phone] Fax [phone]  Comments:       Faxed LMN to insurance.

## 2023-01-02 NOTE — TELEPHONE ENCOUNTER
BAKARI completed. I called patient she reports continued JONG use daily and we will need to complete PA.     Ivonne Gonzalez, PharmD  Medication Therapy Management Pharmacist

## 2023-01-03 ENCOUNTER — TELEPHONE (OUTPATIENT)
Dept: PEDIATRICS | Facility: CLINIC | Age: 58
End: 2023-01-03

## 2023-01-03 DIAGNOSIS — G35 MULTIPLE SCLEROSIS (H): ICD-10-CM

## 2023-01-03 DIAGNOSIS — R53.83 OTHER FATIGUE: ICD-10-CM

## 2023-01-03 RX ORDER — BUDESONIDE AND FORMOTEROL FUMARATE DIHYDRATE 80; 4.5 UG/1; UG/1
2 AEROSOL RESPIRATORY (INHALATION) 2 TIMES DAILY
Qty: 10.2 G | Refills: 1 | Status: SHIPPED | OUTPATIENT
Start: 2023-01-03 | End: 2023-03-07

## 2023-01-03 RX ORDER — VENLAFAXINE HYDROCHLORIDE 75 MG/1
75 CAPSULE, EXTENDED RELEASE ORAL DAILY
Qty: 30 CAPSULE | Refills: 0 | Status: SHIPPED | OUTPATIENT
Start: 2023-01-03 | End: 2023-01-16

## 2023-01-03 RX ORDER — VENLAFAXINE HYDROCHLORIDE 150 MG/1
150 CAPSULE, EXTENDED RELEASE ORAL DAILY
Qty: 30 CAPSULE | Refills: 0 | Status: SHIPPED | OUTPATIENT
Start: 2023-01-03 | End: 2023-01-16

## 2023-01-03 NOTE — TELEPHONE ENCOUNTER
Prior Authorization Retail Medication Request    Medication/Dose: Venlafaxine Hcl ER 225mg tabs  ICD code (if different than what is on RX):    Previously Tried and Failed:  Alprazolam, remeron  Rationale:  desvenlafaxine was denied    Insurance Name:  Sarah DE LA CRUZ  Insurance ID:  [id number]      Pharmacy Information (if different than what is on RX)  Name:    Phone:      [facility] Key # [id number]    Sasha Cerda CMA

## 2023-01-03 NOTE — TELEPHONE ENCOUNTER
Changed Stiolto Respimat to... (due to frequent symptoms requiring daily JONG use)    Sprivia Respimat 2 puffs daily  (works the same as the Stiolto she had before)  And   Start Symbicort 2 puffs twice daily, rinse mouth after using. Works similar to the albuterol inhaler.    Routing to Patient Advocate Liaison, please review with patient.    Ivonne Gonzalez, PharmD  Medication Therapy Management Pharmacist

## 2023-01-03 NOTE — TELEPHONE ENCOUNTER
MEDICATION APPEAL DENIED    Medication: Breztri Aerosphere 160-9-4.8 mcg/ACT aerosol    Denial Date: 1/3/2023    Denial Rationale:          Second Level Appeal Information: Second level appeals will be managed by the clinic staff and provider. Please contact the [employer] Prior Authorization Team if additional information about the denial is needed.

## 2023-01-03 NOTE — TELEPHONE ENCOUNTER
Patient requesting refill. Routing to PCP due to controlled. I will routed separate enc to TC to schedule patient, due for the 1 month follow-up appointment.    Ivonne Gonzalez, PharmD  Medication Therapy Management Pharmacist

## 2023-01-03 NOTE — TELEPHONE ENCOUNTER
"Called the pt.    Reviewed MTM notes with the pt. Pt agreed to medication changes. Pt states no questions at this time.      - Tim \"Graham\" Terry (he/him/his), RN - Patient Advocate Liason (PAL)  [facility]    "

## 2023-01-04 RX ORDER — DEXTROAMPHETAMINE SACCHARATE, AMPHETAMINE ASPARTATE MONOHYDRATE, DEXTROAMPHETAMINE SULFATE AND AMPHETAMINE SULFATE 7.5; 7.5; 7.5; 7.5 MG/1; MG/1; MG/1; MG/1
30 CAPSULE, EXTENDED RELEASE ORAL EVERY MORNING
Qty: 30 CAPSULE | Refills: 0 | Status: SHIPPED | OUTPATIENT
Start: 2023-01-04 | End: 2023-02-13

## 2023-01-04 RX ORDER — DEXTROAMPHETAMINE SACCHARATE, AMPHETAMINE ASPARTATE, DEXTROAMPHETAMINE SULFATE AND AMPHETAMINE SULFATE 2.5; 2.5; 2.5; 2.5 MG/1; MG/1; MG/1; MG/1
10 TABLET ORAL DAILY
Qty: 30 TABLET | Refills: 0 | Status: SHIPPED | OUTPATIENT
Start: 2023-01-04 | End: 2023-02-13

## 2023-01-04 NOTE — TELEPHONE ENCOUNTER
Prior Authorization Retail Medication Request/CMM key# [id number]    Medication/Dose: budesonide-Formoterol fumarate 80-4.5mcg/act  ICD code (if different than what is on RX):    Previously Tried and Failed:    Rationale:      Insurance Name:    Insurance ID:        Pharmacy Information (if different than what is on RX)  Name:  [facility]  Phone:  [phone]

## 2023-01-04 NOTE — TELEPHONE ENCOUNTER
PA Initiation    Medication: Venlafaxine Hcl ER 225mg tabs PA INITIATED  Insurance Company: [place] Medicaid ([facility]) - Phone [phone] Fax [phone]  Pharmacy Filling the Rx: [id number] - [place], [place] - [address] [facility] [address] [facility] [place] [address]  Filling Pharmacy Phone: [phone]  Filling Pharmacy Fax:    Start Date: 1/4/2023    Central Prior Authorization Team   Phone: [phone]

## 2023-01-04 NOTE — TELEPHONE ENCOUNTER
PRIOR AUTHORIZATION DENIED    Medication: Venlafaxine Hcl ER 225mg tabs PA DENIED    Denial Date: 1/4/2023    Denial Rational: Capsules are preferred medication      Appeal Information:

## 2023-01-11 NOTE — PROGRESS NOTES
Medication Therapy Management (MTM) Encounter    ASSESSMENT:                            Medication Adherence/Access: No issues identified    Multiple sclerosis/generalized weakness: patient would benefit from follow-up with neurologist to assess if symptome related to MS exacerbation.     Insomnia: recommend we avoid benzodiazepine while patient is on opioid therapy but she also has history of syncope while on alprazolam.     Smoking Cessation: no change    S/p Lumbar infusion - back pain/RLS:  Continue with [facility].    COPD: improved.     Depression/Anxiety: needs improvement.  May consider agents such as buspirone or gabapentin/Lyrica for additional anxiolytic relief however, her depression continues to uncontrolled with high PHQ-9 although slightly improved. Would defer to primary care provider if mood stability with lamotrigine may be preferable to trial to minimize serotonin risk if buspironone consider and patient is already on Lyica which further titration would likely provide minimal benefit to improve depression.    Results relevant for PGx from [employer] report 2019:      WUH7Z95 intermediate metabolizer: decreased CUM8N76 function  Drug interactions (as of 1/12/2023):        esomeprazole, OTM7V91 moderate inhibitor, phenotype to poor WRO2D25 metabolizer    Promethazine, CYP2D6 inhibitor, unclear the strength of inhibitor     *Phenotype only when drug interaction is present. If the interacting medication is discontinued, the patient will convert back to their original predicted phenotype. For more information about drug interactions and a more inclusive drug interaction reference the [facility] Table.     GERD: no change.     Supplements:  No change.    PLAN:                            Consideration for PCP/future appointment with patient:  1. Drug interactions:  Adderall + Nexium  amphetamines and urinary alkalizing agents may potentiate action of amphetamine. The excretion of amphetamine is pH  dependent and an alkaline urine will significantly increase the half-life, increasing amphetamine exposure.     Adderall + venlafaxine + Percocet  Amphetamine  And/or oxycodone with any serotonergic agent may increase serotonin syndrome risk     Baclofen + Lyrica + Percocet  May increase respiratory depression and CNS depression    Ibuprofen + venlafaxine  May increase bleed risk    Iron + Nexium  Reduced iron absorption    2. Recheck vitamin D level (history of elevated level) and iron studies (poor po intake, poor absorption given on PPI-Nexium)    3. Insomnia:  May consider Ramelteon 8 mg at bedtime (melatonin agonist) but need to monitor for worsened agitation, behaviors. Alternate consider doxepin 3 mg at bedtime for sleep - monitor for serotonin syndrome given TCA but low dose.  May consider reducing Adderall XR dose to avoid insomnia      Follow-up: Return in about 1 week (around 1/19/2023) for primary care. will defer to PCP for next co-visit.    SUBJECTIVE/OBJECTIVE:                          Hina Yap is a 57 year old female called for a follow-up visit. Patient is seeing provider after our visit today. Today's visit is a follow-up MTM visit from 12/2/2022     Reason for visit: medication follow-up. Patient reports not doing well today and having difficulty concentration per patient.     Allergies/ADRs: Reviewed in chart  Past Medical History: Reviewed in chart  Tobacco: She reports that she has been smoking cigarettes. She has a 17.50 pack-year smoking history. She has never used smokeless tobacco.Nicotine/Tobacco Cessation Plan:   0.5 ppd, not ready to quit  Alcohol: none    Medication Adherence/Access: denies any changes.   Patient was using pill box that was monthly but kept running out of refills. Now takes out of the bottles, she sets up two bottles one with a pink cap = morning and normal cap/white = nightly. She sets this up on a daily basis.   The patient fills medications at [facility]: NO,  fills medications at [facility].    Multiple sclerosis/generalized weakness:    Adderall XR 30 mg in the morning (primary care provider)  Adderall IR 10 mg in the afternoon (primary care provider)  Anovex injection 30mcg IM weekly (neurology)    She no longer following psychiatry. She has a neurologist, Dr. Orlando from [facility] that she follows annually. Patient reports has been sick lately which she reports is ongoing weakness and feeling very tired. She reports has not had a MS flare in a long time and does not recall it feeling like this. Her appetite is low but is eating and drinking water. She feels very unsteady on her feet but denies any falls. She has headaches every day. Not visual changes.     Insomnia: No updates today. Previously she reported she can fall asleep, then wakes up every hour later. Wakes up for various reason, never one cause. Denies any day times naps.   Failed: over-the-counter melatonin or Benadryl. May have been on zolpidem before, but cannot remember if effective or not.    Smoking Cessation: No updates today. She has Nicotine gum 4 mg every 1 hour as needed. She is smoking 0.5 ppd. Working on her own pace with this.      S/p Lumbar infusion - back pain/RLS: patient trial spine stimulator but unable to complete due to felt too sick so cancelled. Pain is sharp in her back. Has a follow-up with [facility] end of Jan sometime.    Percocet  mg tables, takes 5 tablets daily (by [facility])  Narcan at home for emergency  Phenergan 25 mg with nearly all Percocet doses  Lyrica 50mg twice daily (by [facility])  Baclofen 20 mg 4 times daily (by neurology Dr. Orlando)  Lidocaine patch as needed - does not keep on long due to adhesive concern.  Voltaren topical as needed   She had admitted to taking over-the-counter ibuprofen when advised not to.  Ferrous sulfate 325 mg daily  Requip 2 mg in the morning and 4mg in the evening   Venlafaxine XR 225mg every daily  Recommended to re-certify her  medical cannabis certification    Previously tried:  She reports acetaminophen not effective and previously on meloxicam did not react well.   Hemoglobin   Date Value Ref Range Status   09/17/2022 11.8 11.7 - 15.7 g/dL Final   10/29/2020 13.1 11.7 - 15.7 g/dL Final      Ferritin   Date Value Ref Range Status   02/13/2020 136 8 - 252 ng/mL Final     Iron   Date Value Ref Range Status   11/12/2012 46 35 - 180 ug/dL Final     Iron Binding Cap   Date Value Ref Range Status   11/12/2012 381 240 - 430 ug/dL Final     Ferritin   Date Value Ref Range Status   02/13/2020 136 8 - 252 ng/mL Final     COPD:  JONG: albuterol HFA as needed   NEB: DuoNeb as needed   ICS-LABA: Symbicort 80 mcg, 2 puffs twice daily. Rinses mouth afterwards  LAMA: Spiriva Respimat 2 puffs daily     We changed her LAMA/LABA to triple therapy. She reports breathing has improved.   Patient is not experiencing side effects.   Patient does not have an COPD Action Plan on file.     Depression/anxiety: No longer following with [facility] psychiatrist, but she does work with the therapist at the [facility][note 3] meet weekly.  Patient did complete pharmacogenomics via [facility] in 2019. Report reviewed from PCP.   Currently on venlafaxine  mg in the morning. Recently taken off mirtazapine. Depression slightly better but still ongoing anxiety.  PHQ-9 SCORE 12/15/2022 12/15/2022 1/12/2023   PHQ-9 Total Score MyChart - 22 (Severe depression) 21 (Severe depression)   PHQ-9 Total Score 22 21 21     KAMALA-7 SCORE 8/26/2022 12/2/2022 1/12/2023   Total Score 19 (severe anxiety) 18 (severe anxiety) 18 (severe anxiety)   Total Score 19 18 18     GERD: No updates, concerns reported today. She confirms no vomiting but poor appetite. Current medications include: Nexium (esomeprazole) 40 mg in the morning. History of ongoing breakthrough symptoms.     Supplements: Taking iron, multivitamin daily (has vitamin D 800 units), calcium+vit D 100 units+mag daily. Side effects: no  changes.  Lab Results   Component Value Date    VITDT 79 (H) 02/13/2020         Today's Vitals: LMP 05/25/2017 (Exact Date)   ----------------    Visit cut short due to patient feeling unwell to talk over the phone, she denied any medication changes.     I spent 12 minutes with this patient today. All changes were made via verbal approval with BENNETT Marvin CNP. A copy of the visit note was provided to the patient's provider(s).    A summary of these recommendations was given to the patient (see AVS from today's appointment with Sunshine Butterfield).    Ivonne Gonzalez, PharmD  Medication Therapy Management Pharmacist    Telemedicine Visit Details  Type of service:  Telephone visit  Start Time: 2:10p  End Time: 2:22p     Medication Therapy Recommendations  No medication therapy recommendations to display

## 2023-01-12 ENCOUNTER — VIRTUAL VISIT (OUTPATIENT)
Dept: PEDIATRICS | Facility: CLINIC | Age: 58
End: 2023-01-12
Payer: MEDICAID

## 2023-01-12 ENCOUNTER — VIRTUAL VISIT (OUTPATIENT)
Dept: PHARMACY | Facility: CLINIC | Age: 58
End: 2023-01-12
Payer: MEDICAID

## 2023-01-12 DIAGNOSIS — R53.83 OTHER FATIGUE: ICD-10-CM

## 2023-01-12 DIAGNOSIS — F41.9 ANXIETY: ICD-10-CM

## 2023-01-12 DIAGNOSIS — Z78.9 TAKES DIETARY SUPPLEMENTS: ICD-10-CM

## 2023-01-12 DIAGNOSIS — M62.81 GENERALIZED MUSCLE WEAKNESS: ICD-10-CM

## 2023-01-12 DIAGNOSIS — E86.0 DEHYDRATION: ICD-10-CM

## 2023-01-12 DIAGNOSIS — G47.00 INSOMNIA, UNSPECIFIED TYPE: ICD-10-CM

## 2023-01-12 DIAGNOSIS — G35 MS (MULTIPLE SCLEROSIS) (H): Primary | ICD-10-CM

## 2023-01-12 DIAGNOSIS — F33.1 MODERATE EPISODE OF RECURRENT MAJOR DEPRESSIVE DISORDER (H): Primary | ICD-10-CM

## 2023-01-12 DIAGNOSIS — K21.9 GASTROESOPHAGEAL REFLUX DISEASE, UNSPECIFIED WHETHER ESOPHAGITIS PRESENT: ICD-10-CM

## 2023-01-12 DIAGNOSIS — J44.0 CHRONIC OBSTRUCTIVE PULMONARY DISEASE WITH ACUTE LOWER RESPIRATORY INFECTION (H): ICD-10-CM

## 2023-01-12 DIAGNOSIS — Z87.891 PERSONAL HISTORY OF TOBACCO USE, PRESENTING HAZARDS TO HEALTH: ICD-10-CM

## 2023-01-12 DIAGNOSIS — Z98.1 S/P LUMBAR FUSION: ICD-10-CM

## 2023-01-12 DIAGNOSIS — F33.1 MODERATE EPISODE OF RECURRENT MAJOR DEPRESSIVE DISORDER (H): ICD-10-CM

## 2023-01-12 PROCEDURE — 99606 MTMS BY PHARM EST 15 MIN: CPT | Performed by: PHARMACIST

## 2023-01-12 PROCEDURE — 99214 OFFICE O/P EST MOD 30 MIN: CPT | Mod: 93 | Performed by: NURSE PRACTITIONER

## 2023-01-12 ASSESSMENT — PATIENT HEALTH QUESTIONNAIRE - PHQ9
SUM OF ALL RESPONSES TO PHQ QUESTIONS 1-9: 21
SUM OF ALL RESPONSES TO PHQ QUESTIONS 1-9: 21
10. IF YOU CHECKED OFF ANY PROBLEMS, HOW DIFFICULT HAVE THESE PROBLEMS MADE IT FOR YOU TO DO YOUR WORK, TAKE CARE OF THINGS AT HOME, OR GET ALONG WITH OTHER PEOPLE: EXTREMELY DIFFICULT

## 2023-01-12 ASSESSMENT — ANXIETY QUESTIONNAIRES
7. FEELING AFRAID AS IF SOMETHING AWFUL MIGHT HAPPEN: NOT AT ALL
GAD7 TOTAL SCORE: 18
4. TROUBLE RELAXING: NEARLY EVERY DAY
8. IF YOU CHECKED OFF ANY PROBLEMS, HOW DIFFICULT HAVE THESE MADE IT FOR YOU TO DO YOUR WORK, TAKE CARE OF THINGS AT HOME, OR GET ALONG WITH OTHER PEOPLE?: EXTREMELY DIFFICULT
5. BEING SO RESTLESS THAT IT IS HARD TO SIT STILL: NEARLY EVERY DAY
IF YOU CHECKED OFF ANY PROBLEMS ON THIS QUESTIONNAIRE, HOW DIFFICULT HAVE THESE PROBLEMS MADE IT FOR YOU TO DO YOUR WORK, TAKE CARE OF THINGS AT HOME, OR GET ALONG WITH OTHER PEOPLE: EXTREMELY DIFFICULT
7. FEELING AFRAID AS IF SOMETHING AWFUL MIGHT HAPPEN: NOT AT ALL
3. WORRYING TOO MUCH ABOUT DIFFERENT THINGS: NEARLY EVERY DAY
1. FEELING NERVOUS, ANXIOUS, OR ON EDGE: NEARLY EVERY DAY
GAD7 TOTAL SCORE: 18
6. BECOMING EASILY ANNOYED OR IRRITABLE: NEARLY EVERY DAY
2. NOT BEING ABLE TO STOP OR CONTROL WORRYING: NEARLY EVERY DAY
GAD7 TOTAL SCORE: 18

## 2023-01-12 NOTE — PROGRESS NOTES
Hina is a 57 year old who is being evaluated via a billable telephone visit.      What phone number would you like to be contacted at? [phone]   How would you like to obtain your AVS? Jelani    Distant Location (provider location):  On-site    Assessment & Plan     (F33.1) Moderate episode of recurrent major depressive disorder (H)  (primary encounter diagnosis)  Comment: Sees to be in an acute depression flare. Unclear whether her physical symptoms of fatigue and weakness are secondary to depression or physical causes, but its clear her mental health isn't doing well. No SI/HI. States that the planned implantation of the pain pump triggered massive anxiety then depression. Cancelled the procedure due to anxiety about it and has not recovered. Hasn't been taking effexor regularly. Initially had trouble getting it, then hasn't been taking it since her depression got worse.   Plan:   -Re-start  -Did discuss with MTM possibly using lamotrigine to augment her antidepressants. I reached out to cardiology to see if this would be safe given her history of cardiomyopathy.   -Will follow-up with her next week  -PAL got her an urgent appt with her therapist tomorrow.     (M62.81) Generlized muscle weakness  (R53.83) Other fatigue  Comment: Unclear etiology. Did discuss with pt that this could be a depression related, MS flare, or other cause. Declines to come in to be seen in person.   Plan:   -Declines to contact neuro which I strongly recommended  -Declines being seen in person which I strongly recommended  -Did urge her to increase her fluids and food intake  -We will follow-up with her via phone Monday. Discussed reasons to seek care urgently.     (E86.0) Dehydration  Comment: Secondary to poor PO intake. She is urinating, but it is much darker than usual and less frequent.   Plan:   -She will increase her PO intake. PAL3 to contact her tomorrow to be sure her urination has picked up.       No follow-ups on  file.    Sunshine Butterfield, APRN CNP  [facility] SANDEEP Santamaria is a 57 year old, presenting for the following health issues:  RECHECK    Didn't have the spine stimulator. Has follow-up with  pain clinic end of January.     Is on venlafaxine. Depression improved but anxiety bad.    Pain is gradually worse.    Feels like she hasn't been feeling well for a few days. Had a similar episode that lasted about 10 days.      Doesn't feel like an MS flare. Hasn't spoken to her neurologist. Dr Winston    Feeling weak and tired. Sleeping more than usual.  Feels like she needs to hold on to something when she feels weak. No falls.   No recent illness  Not missed meds.    Daily headache.    No vomiting. Appetite is low.    BM normal.    Breathing better with symbicort and spiriva.    Feels dehydrated. Dark orange. Not drinking because she's sleeping most of the day.  History of Present Illness       Mental Health Follow-up:  Patient presents to follow-up on Depression & Anxiety.Patient's depression since last visit has been:  Better  The patient is not having other symptoms associated with depression.  Patient's anxiety since last visit has been:  Bad  The patient is not having other symptoms associated with anxiety.  Any significant life events: No  Patient is feeling anxious or having panic attacks.  Patient has no concerns about alcohol or drug use.    Reason for visit:  Follow up    She eats 0-1 servings of fruits and vegetables daily.She consumes 1 sweetened beverage(s) daily.She exercises with enough effort to increase her heart rate 9 or less minutes per day.  She exercises with enough effort to increase her heart rate 3 or less days per week.   She is taking medications regularly.    Today's PHQ-9         PHQ-9 Total Score: 21    PHQ-9 Q9 Thoughts of better off dead/self-harm past 2 weeks :   Not at all    How difficult have these problems made it for you to do your work, take care  of things at home, or get along with other people: Extremely difficult  Today's KAMALA-7 Score: 18       MTM co-visit, follow up on multiple issues.        Review of Systems   Constitutional, HEENT, cardiovascular, pulmonary, gi and gu systems are negative, except as otherwise noted.      Objective           Vitals:  No vitals were obtained today due to virtual visit.    Physical Exam   PSYCH: Flat affect        Phone call duration: 40 minutes, additional 20 minutes spent in coordination of care.

## 2023-01-12 NOTE — Clinical Note
Hi there  I messaged you both about a month ago with the question written in the attached encounter dated 1/13. Can you please let me know your thoughts?  Best,  Sunshine Butterfield, TRIP-MARY.

## 2023-01-12 NOTE — Clinical Note
Hi there - taking a step back from my first visit to now, I think we have been able to minimize some of the drug interactions by staying off Xanax, stopping tizanidine and coming off mirtazapine. I don't think we need to cut down the Adderall for serotonin syndrome unless she is having symptoms consistent to serotonin syndrome. Our last visit we were focused on this I think because of poor sleep. Take a look at my plan for sleep considerations but I think improving depression and anxiety would be more advantageous given likely underlying cause. I also mention lamotrigine in my assessment, she doesn't have bipolar but do you think a mood stabilizer may be helpful? Lamictal can be helpful for pain too. No major drug interactions. Let me know what you think or if you have questions.

## 2023-01-12 NOTE — PROGRESS NOTES
"Called [facility].    Scheduled the pt for telehealth appt tomorrow with May Lopez (the pt has seen her before) at 2pm.    ----------------------------------------------------    Called the pt and informed her of appt. Pt agreed to appt.    Reviewed with the pt that I would be checking in with her tomorrow.      - Tim \"Graham\" Terry (he/him/his), RN - Patient Advocate [place] (PAL)  [facility]    "

## 2023-01-12 NOTE — Clinical Note
Hi there  Thank you for your thoughtful note and chart review today. That isn't something I've ever started. Do you feel comfortable with that or do you think she needs to see psych. I'd like to minimize sending her to other providers because she seems to well when things are kept in house.

## 2023-01-13 ENCOUNTER — TELEPHONE (OUTPATIENT)
Dept: PEDIATRICS | Facility: CLINIC | Age: 58
End: 2023-01-13

## 2023-01-13 NOTE — PROGRESS NOTES
"Bret Gan & Dr. Kovacs    We are considering starting this patient on lamictal for mood. Could you guide us on if you feel this is safe for her from a cardiac standpoint based no your previous assessments of her? Please see below re: lamictal:      \"Use caution in patients with structural or functional heart disease (ie, cardiac channelopathies [eg, Brugada syndrome], conduction system disease, congenital heart disease, heart failure, ischemic heart disease [significant], valvular heart disease, ventricular arrhythmias, or multiple risk factors for coronary artery disease)     In vitro only -- lamotrigine exhibits class Ib antiarrhythmic activity at normal concentrations, and therefore, may slow ventricular conduction (widen QRS) and induce proarrhythmia, including sudden death. Proarrhythmic risk may also be increased with concomitant use of other sodium channel blockers. (Patient is NOT on one) \"  "

## 2023-01-13 NOTE — TELEPHONE ENCOUNTER
"Called the pt.    Pt states that they did not get a call from their psychologist from [facility] this afternoon. They were suppose to have had a telehealth visit with their psychologist today at 2pm. Pt states they did not receive a call.    I called their offices and inquire about appt. Office hours are 8-3pm on Fridays and was unable to speak to staff. I left a voicemail message with them.    Pt was scheduled with:   Fanta Lopez  [phone]    ------------------------------------------------    Also asked the pt how they were feeling. Yesterday they were feeling weak     Pt states that urine is becoming clearer. She still feels weak, but admits its slightly better than yesterday. Denies new symptoms.    Pt admit to drinking more fluids and had a breakfast this morning of blueberry pancakes and sausage. Pt stating that eating too much makes her uncomfortable. So, the pt agreed to eat smaller meals, but more often.    Informed the pt that I called and left a voicemail message with [facility] and should hopefully receive a callback on Monday. Pt has upcoming telephone appt with PCP on Monday. Will huddle with PCP and attempt to reschedule with psych.    Routing to self for remind on Monday.      - Tim \"Graham\" Terry (he/him/his), RN - Patient Advocate Liason (PAL)  [facility]    "

## 2023-01-16 NOTE — TELEPHONE ENCOUNTER
"Received voicemail from the pt (left this morning, 01/16/23).    Pt asking about status of her appt on Friday with psychiatrist.    Pt states that they do not want RN PAL to reschedule at this time.    ----------------------------------------------------------    RN PAL To Do:    1. Will attempt to recall their clinic today and ask that they outreach to reschedule with her directly.  Called [facility]. Rescheduled for Tuesday, January 24th at 12:00pm (noon), telehealth visit. No notes were provided why the pt wasn't contacted on Friday.    Called the pt to inform her, but no answer. LVMTCB.    2. Will call the pt today and ask how she's feeling. How is urine output, fluid and food intake, and weakness.      - Tim \"Graham\" Terry (he/him/his), RN - Patient Advocate Liason (PAL)  [facility]    "

## 2023-01-19 NOTE — TELEPHONE ENCOUNTER
"Sunshine Butterfield: Sending as FYI.    1. Pt rescheduled with therapist. Pt called and made aware of appt.  2. Pt states she was asked about potential falls during her last appointment and couldn't recall at that time. She states she now recalls having 2 falls a few weeks ago after slipping on ice. She states she did land on her back and neck, but denies significant pain and has since resolved. Pt also had a third fall last night and chocks it up to extreme fatigue. Denies injury. Pt did get some sleep last night (3+ hours) and agreed to monitor fatigue and signs of injury.    After speaking with her, she did sounds like she was in MUCH better spirits that she has had in the past few weeks when I spoke with her. Normal conversation, speak patterns and pacing. However, she said that a staff member as her assisted living said she sounds \"loopy\" a little bit yesterday, but they could not provide details on what behaviors or reason they felt this way. Pt agreed to monitor and call RN CORA if with any other unusual behaviors or comments.      - Tim \"Graham\" Terry (he/him/his), RN - Patient Advocate Liason (PAL)  [facility]    "

## 2023-01-19 NOTE — TELEPHONE ENCOUNTER
"I told her last week that I thought she should come in for an in person visit for an exam and labs to evaluate the fatigue and weakness. The falls and \"loopiness\" make me recommend this even stronger. Please try to convince her. Could see ANA MARIA Benavides if I don't have openings.   "

## 2023-01-20 NOTE — TELEPHONE ENCOUNTER
"Called the pt and worked with Ellen Benavides, scheduled the pt for Wednesday.    Pt is hesitant to leave home, but agreed to reach out to RN PAL if she needs to reschedule.      - Tim \"Graham\" Terry (he/him/his), RN - Patient Advocate Liason (PAL)  [facility]    "

## 2023-01-23 NOTE — PROGRESS NOTES
CC: frequent falls, weakness, fatigue      Subjective   Hina is a 57 year old, presenting to clinic for complaints of ongoing fatigue and weakness.      HPI   Hina Yap is a 56 year old female PMH significant for multiple sclerosis, cardiomyopathy, CKD stage 2, RLS, GERD, COPD, major depressive disorder, and hypertension who presents today for a face-to-face visit for a follow-up concerning weakness and fatigue.    Today, patient reports an MS flare occurred the first two weeks of January 2023) which included episode of weakness and fatigue. Patient admits she was probably dehydrated at this time. Patient also endorses decreased appetite with decreased PO intake over the past three months. Today, patient presents to clinic with water bottle and reports her urine is now clear and yellow. Patient currently denies fever, chills, rigors, or sign associated with dehydration including denies dizziness, lightheadedness, presyncope or syncope. Patient endorses chronic frequent episodes of ground-level falls typically precipitated by weakness, fatigue and decreased fluid intake. Today, /90.      Concern - Frequent fall  Onset: chronic  Description: intermittent falls  Intensity:   Progression of Symptoms: over past 6 months  Accompanying Signs & Symptoms: fatigue, insomnia  Previous history of similar problem: ongoing  Precipitating factors:        Worsened by: fatigue  Alleviating factors:        Improved by: rest/ seelp  Therapies tried and outcome: none      Review of Systems   Constitutional: Positive for activity change and appetite change.   HENT: Negative.    Eyes: Negative.    Respiratory: Negative.    Cardiovascular: Negative.    Gastrointestinal: Negative.    Endocrine: Positive for cold intolerance.   Genitourinary: Negative.    Musculoskeletal: Positive for arthralgias, back pain, gait problem, myalgias and neck pain.   Allergic/Immunologic: Negative.    Neurological: Positive for weakness.  "  Hematological: Negative.    Psychiatric/Behavioral: Positive for sleep disturbance. The patient is nervous/anxious.           Objective    LMP 05/25/2017 (Exact Date)   There is no height or weight on file to calculate BMI.       BP (!) 120/92   Pulse 84   Temp 97.8  F (36.6  C) (Oral)   Ht 1.702 m (5' 7\")   Wt 55.5 kg (122 lb 4.8 oz)   LMP 05/25/2017 (Exact Date)   SpO2 98%   BMI 19.15 kg/m          Physical Exam  Vitals reviewed.   HENT:      Head: Normocephalic and atraumatic.      Nose: Nose normal. No congestion or rhinorrhea.      Mouth/Throat:      Mouth: Mucous membranes are moist.      Pharynx: No oropharyngeal exudate or posterior oropharyngeal erythema.   Eyes:      Pupils: Pupils are equal, round, and reactive to light.   Cardiovascular:      Rate and Rhythm: Normal rate and regular rhythm.      Pulses: Normal pulses.      Heart sounds: No murmur heard.    No friction rub. No gallop.   Pulmonary:      Effort: Pulmonary effort is normal. No respiratory distress.      Breath sounds: Normal breath sounds. No stridor. No wheezing, rhonchi or rales.   Abdominal:      General: Abdomen is flat. Bowel sounds are normal.      Palpations: Abdomen is soft. There is no mass.      Tenderness: There is no abdominal tenderness.   Musculoskeletal:         General: Normal range of motion.      Cervical back: Normal range of motion. No rigidity or tenderness.      Right lower leg: No edema.      Left lower leg: No edema.      Comments: Upper grasp strength 4/5    Flexon  Extension    Cerebellar    Skin:     General: Skin is warm and dry.   Neurological:      Mental Status: She is alert.      Motor: Weakness present.      Coordination: Coordination abnormal.      Gait: Gait abnormal.   Psychiatric:         Mood and Affect: Mood is anxious and depressed.         Speech: Speech normal.         Behavior: Behavior is cooperative.         Thought Content: Thought content normal.      Comments: Depressed, anxious      "         ASSESSMENT/PLAN    (G35) MS (multiple sclerosis) (H)  (primary encounter diagnosis)  (M62.81) Generalized muscle weakness  (R53.83) Other fatigue  Comment: Today, patient reports an MS flare occurred the first two weeks of January 2023) which included episode of weakness and fatigue. Patient admits she was probably dehydrated at this time. Patient also endorses decreased appetite with decreased PO intake over the past three months. Today, patient presents to clinic with water bottle and reports her urine is now clear and yellow. Patient currently denies fever, chills, rigors, or sign associated with dehydration including denies dizziness, lightheadedness, presyncope or syncope. Patient endorses chronic frequent episodes of ground-level falls typically precipitated by weakness, fatigue and decreased fluid intake. Today, /90. No hx of orthostatic hypotension noted.    Plan: Walker Order for DME - ONLY FOR DME, CBC, CMP, Mag, recommend to follow-up with neurology.      (R55) Syncope, unspecified syncope type  (Z91.81) Personal history of fall  Comment: Patient reports intermittent episodes of frequent falls with temporarily blacking out precipitated by dizziness, denies postural hypotension. I suspect that syncopal episodes may be related to hypovolemia  Plan: recommend to follow-up with cardiology  r/o  orthostatic hypotension    (R68.89) Cold intolerance  Comment: low BMI, reviewed previous TSH levels  Plan: Check TSH level today, improve nutritional habits to avoid a further decline in BMI.    (R26.9) Gait disturbance  Comment: ataxia, gait unsteady, staggered, frequent falls secondary to multiple etiology including progressive multiple sclerosis, possible correlation with nutrition and hydration status. Patient unable to walk-steady > 2 feet, high-fall risk identified.    Plan: Recommend DME front wheel walker with seat allowing for patient to rest intermittent, also recommend PT and OT, patient  declines PT/OTat this time due to difficulty attend appointments due to fall risk and disturbed gait.    (E63.9) Nutritional deficiency  Comment: Dietary recall performed. recommended dietary consult, the patient declines at this time. Patient consumes ensure daily, decreased appetite.    Plan: draw BMP with Mag, CBC, maintain multivitamin, drink water and Gatorade throughout the day. Attempt to eat 3 meals per day with snacks in between.    (I10) Essential hypertension  Comment: stable. Maintained on amlodipine 2.5 mg Po daily.   Today: 120/92   Plan: follow-up with cardiology    (G44.219) Episodic tension-type headache, not intractable  Comment: Stable.    PLAN: Will refer to head ache clinic.      (M79.2) Neuropathic pain  (R52) Pain    Comment: chronic pain, neck, back. Managed by pain team. Pregabaliin, Percocet    Plan: Defer to pain provider,  neurostimulator implantation was cancelled due to insurance coverage issues. procedure if clear by insurance. Recommend PT. Recommend implementation of non-pharmacological pain magagement techniques.      (F32.89) Other depression  Comment: venlafaxine 225 mg PO daily (max dose) last up-titrate by my colleague  on. PHQ9: 21 13 d ago KAMALA 7 today 18. Patient therapist appointment was cancelled by clinic. Patient sent message to request RN Tim pina to assist with rescheduling. Recommend for patient to utilize support system. Patient declines and indicates she does not have anyone to provide emotional support. Encouraged patient to engage is safe hobby at home. Patient declines.       Plan: Reschedule therapy, attend routine therapy sessions, maintain current Effexor dose.    (R41.3) Memory loss (short-term)  Comment: patient reports intermittent episodes of short-term memory loss. I suspect progression of MS with possible recurring MS flare (occurring earlier this month). Also, possible medication side effect to percocet.  Plan: defer to neurology    (R63.8) Decreased  oral intake  Comment: Denies nausea or vomiting. BMs daily.     Plan: CBC with platelets, Comprehensive metabolic         panel (BMP + Alb, Alk Phos, ALT, AST, Total.         Bili, TP), Magnesium            (Z23) Need for shingles vaccine  Comment: patient eligibly confirmed  Plan: administer shingles vaccine in clinic today    (Z00.00) Healthcare maintenance  Comment: Reviewed health maintenance, patient requests COVID-booster, eligibility confirmed.  Schedule health maintenance visit.    (Z12.11) Screen for colon cancer  Comment: Patient prefers to defer, denies hematochezia or melena  Plan: CANCELED: Fecal colorectal cancer screen FIT -         Future (S+30), offer during next visit          GERD  Stable: maintained on Nexium    ADHD:  Patient reports difficulty concentrating per baseline. Maintained on amphetamine-dextroamphetamine 30 mg 24 hours capsule (patient takes at 9 am) patient takes 10 mg at 2:00 pm. Patient reports sleep disturbance.    PLAN: Recommend to take second dose earlier around 12:00-12:30 pm. Extensive sleep hygiene POC reviewed with patient. Consider sleep medicine referral in future is insomnia persists.    An extensive medication reconciliation was conducted.      PLAN:    Recommend to reschedule therapist appointment, schedule neurology follow-up (JOHAN Dumont to please assist)    Recommend to maintain current Effexor dosing (max dose)    Recommend lab draw TSH, CBC, BMP, Mag    Recommend to implement fall precautions    Recommend to maintain oral hydration, with Gatorade (or Gatorade zero if preferred)/ water.    Recommend to eat three meals daily with snack, nutrition consult    Shingles vaccine    DME order for walker with seat PAL to please help coordinate    Follow-up with me in 3-4 weeks after specialist follow-up (patient will also need a new preop scheduled if she elects to reschedule neurostimulator implantation.

## 2023-01-24 ENCOUNTER — TRANSFERRED RECORDS (OUTPATIENT)
Dept: HEALTH INFORMATION MANAGEMENT | Facility: CLINIC | Age: 58
End: 2023-01-24

## 2023-01-25 ENCOUNTER — OFFICE VISIT (OUTPATIENT)
Dept: PEDIATRICS | Facility: CLINIC | Age: 58
End: 2023-01-25
Payer: MEDICAID

## 2023-01-25 VITALS
HEIGHT: 67 IN | OXYGEN SATURATION: 98 % | SYSTOLIC BLOOD PRESSURE: 120 MMHG | DIASTOLIC BLOOD PRESSURE: 92 MMHG | HEART RATE: 84 BPM | BODY MASS INDEX: 19.19 KG/M2 | TEMPERATURE: 97.8 F | WEIGHT: 122.3 LBS

## 2023-01-25 DIAGNOSIS — R52 PAIN: ICD-10-CM

## 2023-01-25 DIAGNOSIS — F32.89 OTHER DEPRESSION: ICD-10-CM

## 2023-01-25 DIAGNOSIS — G35 MS (MULTIPLE SCLEROSIS) (H): Primary | ICD-10-CM

## 2023-01-25 DIAGNOSIS — Z23 NEED FOR SHINGLES VACCINE: ICD-10-CM

## 2023-01-25 DIAGNOSIS — Z00.00 HEALTHCARE MAINTENANCE: ICD-10-CM

## 2023-01-25 DIAGNOSIS — R41.3 MEMORY LOSS: ICD-10-CM

## 2023-01-25 DIAGNOSIS — R68.89 COLD INTOLERANCE: ICD-10-CM

## 2023-01-25 DIAGNOSIS — F90.0 ADHD (ATTENTION DEFICIT HYPERACTIVITY DISORDER), INATTENTIVE TYPE: ICD-10-CM

## 2023-01-25 DIAGNOSIS — M79.2 NEUROPATHIC PAIN: ICD-10-CM

## 2023-01-25 DIAGNOSIS — I10 ESSENTIAL HYPERTENSION: ICD-10-CM

## 2023-01-25 DIAGNOSIS — K21.9 GASTROESOPHAGEAL REFLUX DISEASE, UNSPECIFIED WHETHER ESOPHAGITIS PRESENT: ICD-10-CM

## 2023-01-25 DIAGNOSIS — R63.8 DECREASED ORAL INTAKE: ICD-10-CM

## 2023-01-25 DIAGNOSIS — M62.81 GENERALIZED MUSCLE WEAKNESS: ICD-10-CM

## 2023-01-25 DIAGNOSIS — R53.83 OTHER FATIGUE: ICD-10-CM

## 2023-01-25 DIAGNOSIS — R55 SYNCOPE, UNSPECIFIED SYNCOPE TYPE: ICD-10-CM

## 2023-01-25 DIAGNOSIS — R26.9 GAIT DISTURBANCE: ICD-10-CM

## 2023-01-25 DIAGNOSIS — E63.9 NUTRITIONAL DEFICIENCY: ICD-10-CM

## 2023-01-25 DIAGNOSIS — Z91.81 PERSONAL HISTORY OF FALL: ICD-10-CM

## 2023-01-25 DIAGNOSIS — Z12.11 SCREEN FOR COLON CANCER: ICD-10-CM

## 2023-01-25 DIAGNOSIS — G44.219 EPISODIC TENSION-TYPE HEADACHE, NOT INTRACTABLE: ICD-10-CM

## 2023-01-25 LAB
ERYTHROCYTE [DISTWIDTH] IN BLOOD BY AUTOMATED COUNT: 13.7 % (ref 10–15)
HCT VFR BLD AUTO: 40.3 % (ref 35–47)
HGB BLD-MCNC: 13.2 G/DL (ref 11.7–15.7)
MCH RBC QN AUTO: 30.3 PG (ref 26.5–33)
MCHC RBC AUTO-ENTMCNC: 32.8 G/DL (ref 31.5–36.5)
MCV RBC AUTO: 92 FL (ref 78–100)
PLATELET # BLD AUTO: 305 10E3/UL (ref 150–450)
RBC # BLD AUTO: 4.36 10E6/UL (ref 3.8–5.2)
WBC # BLD AUTO: 6.6 10E3/UL (ref 4–11)

## 2023-01-25 PROCEDURE — 91312 COVID-19 VACCINE BIVALENT BOOSTER 12+ (PFIZER): CPT | Performed by: NURSE PRACTITIONER

## 2023-01-25 PROCEDURE — 90471 IMMUNIZATION ADMIN: CPT | Performed by: NURSE PRACTITIONER

## 2023-01-25 PROCEDURE — 0124A COVID-19 VACCINE BIVALENT BOOSTER 12+ (PFIZER): CPT | Performed by: NURSE PRACTITIONER

## 2023-01-25 PROCEDURE — 85027 COMPLETE CBC AUTOMATED: CPT | Performed by: NURSE PRACTITIONER

## 2023-01-25 PROCEDURE — 36415 COLL VENOUS BLD VENIPUNCTURE: CPT | Performed by: NURSE PRACTITIONER

## 2023-01-25 PROCEDURE — 90750 HZV VACC RECOMBINANT IM: CPT | Performed by: NURSE PRACTITIONER

## 2023-01-25 PROCEDURE — 99214 OFFICE O/P EST MOD 30 MIN: CPT | Mod: 25 | Performed by: NURSE PRACTITIONER

## 2023-01-25 ASSESSMENT — ENCOUNTER SYMPTOMS
GASTROINTESTINAL NEGATIVE: 1
EYES NEGATIVE: 1
MYALGIAS: 1
APPETITE CHANGE: 1
BACK PAIN: 1
HEMATOLOGIC/LYMPHATIC NEGATIVE: 1
CARDIOVASCULAR NEGATIVE: 1
SLEEP DISTURBANCE: 1
NERVOUS/ANXIOUS: 1
RESPIRATORY NEGATIVE: 1
NECK PAIN: 1
ALLERGIC/IMMUNOLOGIC NEGATIVE: 1
ARTHRALGIAS: 1
ACTIVITY CHANGE: 1
WEAKNESS: 1

## 2023-01-25 ASSESSMENT — PAIN SCALES - GENERAL: PAINLEVEL: MODERATE PAIN (5)

## 2023-01-25 NOTE — PATIENT INSTRUCTIONS
Recommend to reschedule therapist appointment (Tim to please assist)  Recommend lab draw TSH, CBC, BMP, Mag  Recommend to implement fall precautions  Recommend to maintain oral hydration, with Gatorade (or Gatorade zero if preferred)/ water.  Recommend to eat three meals daily with snack, nutrition consult  DME order for walker with seat PAL to please help coordinate  Follow-up with me in 3-4 weeks  Shingles vaccine, COVID-19 booster per patient request

## 2023-01-27 ENCOUNTER — APPOINTMENT (OUTPATIENT)
Dept: LAB | Facility: CLINIC | Age: 58
End: 2023-01-27
Payer: MEDICAID

## 2023-01-27 LAB
ALBUMIN SERPL BCG-MCNC: 4.1 G/DL (ref 3.5–5.2)
ALP SERPL-CCNC: 73 U/L (ref 35–104)
ALT SERPL W P-5'-P-CCNC: 21 U/L (ref 10–35)
ANION GAP SERPL CALCULATED.3IONS-SCNC: 13 MMOL/L (ref 7–15)
AST SERPL W P-5'-P-CCNC: 28 U/L (ref 10–35)
BILIRUB SERPL-MCNC: 0.2 MG/DL
BUN SERPL-MCNC: 18.2 MG/DL (ref 6–20)
CALCIUM SERPL-MCNC: 9 MG/DL (ref 8.6–10)
CHLORIDE SERPL-SCNC: 104 MMOL/L (ref 98–107)
CREAT SERPL-MCNC: 0.68 MG/DL (ref 0.51–0.95)
DEPRECATED HCO3 PLAS-SCNC: 25 MMOL/L (ref 22–29)
GFR SERPL CREATININE-BSD FRML MDRD: >90 ML/MIN/1.73M2
GLUCOSE SERPL-MCNC: 107 MG/DL (ref 70–99)
MAGNESIUM SERPL-MCNC: 2.2 MG/DL (ref 1.7–2.3)
POTASSIUM SERPL-SCNC: 4 MMOL/L (ref 3.4–5.3)
PROT SERPL-MCNC: 6.8 G/DL (ref 6.4–8.3)
SODIUM SERPL-SCNC: 142 MMOL/L (ref 136–145)
TSH SERPL DL<=0.005 MIU/L-ACNC: 3.72 UIU/ML (ref 0.3–4.2)

## 2023-01-27 PROCEDURE — 80053 COMPREHEN METABOLIC PANEL: CPT | Performed by: NURSE PRACTITIONER

## 2023-01-27 PROCEDURE — 84443 ASSAY THYROID STIM HORMONE: CPT | Performed by: NURSE PRACTITIONER

## 2023-01-27 PROCEDURE — 36415 COLL VENOUS BLD VENIPUNCTURE: CPT | Performed by: NURSE PRACTITIONER

## 2023-01-27 PROCEDURE — 83735 ASSAY OF MAGNESIUM: CPT | Performed by: NURSE PRACTITIONER

## 2023-02-06 DIAGNOSIS — J44.9 CHRONIC OBSTRUCTIVE PULMONARY DISEASE, UNSPECIFIED COPD TYPE (H): ICD-10-CM

## 2023-02-06 DIAGNOSIS — F33.1 MODERATE EPISODE OF RECURRENT MAJOR DEPRESSIVE DISORDER (H): ICD-10-CM

## 2023-02-07 RX ORDER — TIOTROPIUM BROMIDE INHALATION SPRAY 3.12 UG/1
SPRAY, METERED RESPIRATORY (INHALATION)
Qty: 4 G | Refills: 1 | Status: SHIPPED | OUTPATIENT
Start: 2023-02-07 | End: 2023-04-25

## 2023-02-07 RX ORDER — VENLAFAXINE HYDROCHLORIDE 150 MG/1
CAPSULE, EXTENDED RELEASE ORAL
Qty: 90 CAPSULE | Refills: 0 | Status: SHIPPED | OUTPATIENT
Start: 2023-02-07 | End: 2023-04-25

## 2023-02-09 NOTE — PROGRESS NOTES
Please see message below. I've messed her cardiologist and cardiology PA twice asking if they are ok with lamictal for this pt and have not heard back. Please call and ask that they look at the message and give us input.

## 2023-02-27 ENCOUNTER — TRANSFERRED RECORDS (OUTPATIENT)
Dept: HEALTH INFORMATION MANAGEMENT | Facility: CLINIC | Age: 58
End: 2023-02-27

## 2023-03-05 DIAGNOSIS — J44.9 CHRONIC OBSTRUCTIVE PULMONARY DISEASE, UNSPECIFIED COPD TYPE (H): ICD-10-CM

## 2023-03-07 RX ORDER — DILTIAZEM HYDROCHLORIDE 60 MG/1
TABLET, FILM COATED ORAL
Qty: 10.2 G | Refills: 1 | Status: SHIPPED | OUTPATIENT
Start: 2023-03-07 | End: 2023-04-25

## 2023-03-07 NOTE — TELEPHONE ENCOUNTER
Prescription approved per [facility] Refill Protocol.  Ariana Tai RN

## 2023-03-10 ENCOUNTER — TELEPHONE (OUTPATIENT)
Dept: PEDIATRICS | Facility: CLINIC | Age: 58
End: 2023-03-10
Payer: MEDICAID

## 2023-03-13 DIAGNOSIS — I10 ESSENTIAL HYPERTENSION: ICD-10-CM

## 2023-03-13 RX ORDER — AMLODIPINE BESYLATE 2.5 MG/1
2.5 TABLET ORAL DAILY
Qty: 90 TABLET | Refills: 0 | Status: SHIPPED | OUTPATIENT
Start: 2023-03-13 | End: 2023-05-18

## 2023-03-14 ENCOUNTER — MYC REFILL (OUTPATIENT)
Dept: PEDIATRICS | Facility: CLINIC | Age: 58
End: 2023-03-14
Payer: MEDICAID

## 2023-03-14 DIAGNOSIS — R53.83 OTHER FATIGUE: ICD-10-CM

## 2023-03-14 DIAGNOSIS — Z12.11 SPECIAL SCREENING FOR MALIGNANT NEOPLASMS, COLON: Primary | ICD-10-CM

## 2023-03-14 DIAGNOSIS — G35 MULTIPLE SCLEROSIS (H): ICD-10-CM

## 2023-03-14 RX ORDER — DEXTROAMPHETAMINE SACCHARATE, AMPHETAMINE ASPARTATE MONOHYDRATE, DEXTROAMPHETAMINE SULFATE AND AMPHETAMINE SULFATE 7.5; 7.5; 7.5; 7.5 MG/1; MG/1; MG/1; MG/1
30 CAPSULE, EXTENDED RELEASE ORAL EVERY MORNING
Qty: 30 CAPSULE | Refills: 0 | Status: SHIPPED | OUTPATIENT
Start: 2023-03-15 | End: 2023-04-24

## 2023-03-14 RX ORDER — DEXTROAMPHETAMINE SACCHARATE, AMPHETAMINE ASPARTATE, DEXTROAMPHETAMINE SULFATE AND AMPHETAMINE SULFATE 2.5; 2.5; 2.5; 2.5 MG/1; MG/1; MG/1; MG/1
10 TABLET ORAL DAILY
Qty: 30 TABLET | Refills: 0 | Status: SHIPPED | OUTPATIENT
Start: 2023-03-15 | End: 2023-04-19

## 2023-03-14 NOTE — TELEPHONE ENCOUNTER
Not reading mychart    Due for follow-up in person visit with me in 1-3 months.  Pls schedule mammo  Ask her to  FIT

## 2023-03-27 NOTE — RESULT ENCOUNTER NOTE
Patient has been reached out to multiple times, via phone and mychart.    Waiting to hear from patient.    Thank you  Clarence LANGLEY

## 2023-03-28 NOTE — TELEPHONE ENCOUNTER
Patient is scheduled for May 18th, Physical with Sunshine Butterfield (scheduled by Francisca/changed to 60 minutes).    Thank you  Clarence LANGLEY

## 2023-03-29 ENCOUNTER — TRANSFERRED RECORDS (OUTPATIENT)
Dept: HEALTH INFORMATION MANAGEMENT | Facility: CLINIC | Age: 58
End: 2023-03-29

## 2023-03-30 ENCOUNTER — VIRTUAL VISIT (OUTPATIENT)
Dept: PHARMACY | Facility: CLINIC | Age: 58
End: 2023-03-30
Payer: MEDICAID

## 2023-03-30 DIAGNOSIS — G35 MS (MULTIPLE SCLEROSIS) (H): Primary | ICD-10-CM

## 2023-03-30 DIAGNOSIS — G62.9 NEUROPATHY: ICD-10-CM

## 2023-03-30 DIAGNOSIS — F33.1 MODERATE EPISODE OF RECURRENT MAJOR DEPRESSIVE DISORDER (H): ICD-10-CM

## 2023-03-30 DIAGNOSIS — F41.9 ANXIETY: ICD-10-CM

## 2023-03-30 PROCEDURE — 99607 MTMS BY PHARM ADDL 15 MIN: CPT | Performed by: PHARMACIST

## 2023-03-30 PROCEDURE — 99606 MTMS BY PHARM EST 15 MIN: CPT | Performed by: PHARMACIST

## 2023-03-30 RX ORDER — HYDROXYZINE HYDROCHLORIDE 10 MG/1
10 TABLET, FILM COATED ORAL 3 TIMES DAILY PRN
Qty: 90 TABLET | Refills: 0 | Status: SHIPPED | OUTPATIENT
Start: 2023-03-30 | End: 2023-04-25

## 2023-03-30 ASSESSMENT — PATIENT HEALTH QUESTIONNAIRE - PHQ9
SUM OF ALL RESPONSES TO PHQ QUESTIONS 1-9: 19
5. POOR APPETITE OR OVEREATING: NEARLY EVERY DAY

## 2023-03-30 ASSESSMENT — ANXIETY QUESTIONNAIRES
3. WORRYING TOO MUCH ABOUT DIFFERENT THINGS: MORE THAN HALF THE DAYS
7. FEELING AFRAID AS IF SOMETHING AWFUL MIGHT HAPPEN: SEVERAL DAYS
5. BEING SO RESTLESS THAT IT IS HARD TO SIT STILL: NEARLY EVERY DAY
IF YOU CHECKED OFF ANY PROBLEMS ON THIS QUESTIONNAIRE, HOW DIFFICULT HAVE THESE PROBLEMS MADE IT FOR YOU TO DO YOUR WORK, TAKE CARE OF THINGS AT HOME, OR GET ALONG WITH OTHER PEOPLE: EXTREMELY DIFFICULT
GAD7 TOTAL SCORE: 16
1. FEELING NERVOUS, ANXIOUS, OR ON EDGE: MORE THAN HALF THE DAYS
6. BECOMING EASILY ANNOYED OR IRRITABLE: NEARLY EVERY DAY
2. NOT BEING ABLE TO STOP OR CONTROL WORRYING: MORE THAN HALF THE DAYS
GAD7 TOTAL SCORE: 16

## 2023-03-30 NOTE — PROGRESS NOTES
Medication Therapy Management (MTM) Encounter    ASSESSMENT:                            Medication Adherence/Access: No issues identified    Multiple sclerosis/neuropathy: patient may trial hydroxyzine which would provide possibly dual action for itching and anxiety. Would continue to monitor any worsening CNS depressant side effects.    Depression/Anxiety: slight improvement in her PHQ-9 and KAMALA-7 scores and from her discussion today the venlafaxine has seemed to help improve her depression. Will hold on lamotrigine consideration from previously recommend consideration of lamotrigine for resistant depression and to minimize serotonin syndrome risk. However, I wanted to check with cardiology if safe option but have not heard back. For this reason, will hold any change in maintenance regimen. She would like to make one change at a time, will have her trial hydroxyzine first which can have dual action for anxiety and itching.    From previus MTM review of pharmacogenomics:  Results relevant for PGx from [employer] report 2019: [id number] intermediate metabolizer: decreased ZBE5C02 function --> caution medications are.  Sertraline, escitalopram, citalopram, proton pump inhibitors such as omeprazole, voriconazole, clopidogrel and some of the tricylic antidepressants   Drug interactions:     PLAN:                            Start hydroxyzine for as needed itching or anxiety.    Follow-up: Return in about 26 days (around 4/25/2023) for Medication Therapy Telephone Visit.    SUBJECTIVE/OBJECTIVE:                          Hina Yap is a 57 year old female called for a follow-up visit.  Today's visit is a follow-up MTM visit from 1/12/2023.     Reason for visit: new MS symptoms.    Allergies/ADRs: Reviewed in chart  Past Medical History: Reviewed in chart  Tobacco: She reports that she has been smoking cigarettes. She has a 17.50 pack-year smoking history. She has never used smokeless tobacco.Nicotine/Tobacco Cessation  Plan:   0.5 ppd, not ready to quit  Alcohol: none    Medication Adherence/Access: denies any changes.   Patient was using pill box that was monthly but kept running out of refills. Now takes out of the bottles, she sets up two bottles one with a pink cap = morning and normal cap/white = nightly. She sets this up on a daily basis.   The patient fills medications at [place]: NO, fills medications at [facility].    Multiple sclerosis/neuropathy:  Anovex injection 30mcg IM weekly (neurology)  For fatigue:   Adderall XR 30 mg in the morning (primary care provider)   Adderall IR 10 mg in the afternoon (primary care provider)    Neuropathy:  Lyrica 50mg twice daily for chronic pain  Venlafaxine for depression and anxiety    She reports 2 months starting getting a lot of itching and sweating is getting worse. She has tried topical steroids without any benefits for the itching. She reports reading her MS magazine and there was a section on itching that is associated with MS that is neuropathy. Her itching is all over. She met with her pain doctor yesterday and talked with her about this. They recommend hydroxyzine and for her to discuss with primary care provider team. Patient is wondering if she could try this. Patient has taken Benadryl before without issues. Not on any antihistamine at this time.    Depression/anxiety:   venlafaxine  mg in the morning  Lyrica 50 mg twice daily for chronic pain    She shares with me she is uncertain if the venlafaxine is working or causing her issues. She thinks that may be the depression slightly better but still ongoing anxiety. Her emotions are more controlled but she does worry about different issues easily. She struggles with poor appetite and low energy. Her sleep is not where she would like it to be but she is coping with these things.   She is wondering if her increasing itching, nasal drip, sweating or urinary rentention could be from the venlafaxine.   Urinary retention  ongoing issues but feels it has been getting worse. She had discussed urinary symptoms in August with primary care provider but declined urology at that time. She feels confident that the symptoms have been worsening.     No longer following with [facility] psychiatrist, but she was working with the therapist at the [facility] meet weekly but not anymore. She stopped making appointments in November and then just got harder and harder for her to schedule. Patient did complete pharmacogenomics via [employer] in 2019.       12/15/2022     9:38 AM 1/12/2023     2:04 PM 3/30/2023     9:20 AM   PHQ-9 SCORE   PHQ-9 Total Score MyChart  21 (Severe depression)    PHQ-9 Total Score 21 21 19         12/2/2022     9:41 AM 1/12/2023     2:04 PM 3/30/2023     9:20 AM   KAMALA-7 SCORE   Total Score 18 (severe anxiety) 18 (severe anxiety)    Total Score 18 18 16       Today's Vitals: LMP 05/25/2017 (Exact Date)   ----------------      I spent 40 minutes with this patient today. All changes were made via collaborative practice agreement with BENNETT ROUSSEAU CNP. A copy of the visit note was provided to the patient's provider(s).    A summary of these recommendations was sent via [employer].    Ivonne Gonzalez, PharmD  Medication Therapy Management Pharmacist    Telemedicine Visit Details  Type of service:  Telephone visit  Start Time: 9am  End Time: 940am     Medication Therapy Recommendations  Anxiety    Current Medication: hydrOXYzine (ATARAX) 10 MG tablet   Rationale: Synergistic therapy - Needs additional medication therapy - Indication   Recommendation: Start Medication   Status: Accepted per CPA

## 2023-04-06 NOTE — PATIENT INSTRUCTIONS
"Recommendations from today's MTM visit:                                                      Start hydroxyzine for as needed itching or anxiety.    Follow-up: Return in about 26 days (around 4/25/2023) for Medication Therapy Telephone Visit.    It was great speaking with you today.  I value your experience and would be very thankful for your time in providing feedback in our clinic survey. In the next few days, you may receive an email or text message from [employer] with a link to a survey related to your  clinical pharmacist.\"     To schedule another MTM appointment, please call the clinic directly or you may call the MTM scheduling line at [phone] or toll-free at [phone].     My Clinical Pharmacist's contact information:                                                      Please feel free to contact me with any questions or concerns you have.      Ivonne Gonzalez, PharmD  Medication Therapy Management Pharmacist     "

## 2023-04-24 NOTE — PROGRESS NOTES
Medication Therapy Management (MTM) Encounter    ASSESSMENT:                            Medication Adherence/Access: Was unable to assess due to time restraints.    Urinary Urgency & Incontinence: from her report may have a mixed urge and stress incontinence which primary care provider has referred patient in the past (8/2021) for this. However, she reports worsened since additional venlafaxine we trial down on the dose until her upcoming appointment with primary care provider.     Multiple Sclerosis/Neuropathy: patient may benefit from a higher Lyrica dose to improve her pain control and help with anxiety as well (as we are on a trial down of the venlafaxine).    Itching: suggest patient start a 2nd generation antihistamine instead due to longer duration of action and decrease side effects. I am concerned hydroxyzine may worsen urinary side effects.    Depression/Anxiety: patient continues to express desire to go down on the venlafaxine with concerns of urinary symptoms today (and previously she was expressing concerns that it was causing itching, nasal drip, sweating).    COPD: Stable, will refill inhalers.     PLAN:                            Patient to:   1. Start cetirizine 10 mg daily. Stop taking hydroxyzine and only use as needed.     2. Ask your pain doctors about increasing Lyrica dose to help with itching-neuropathy feeling and will help anxiety.    3. Decrease venlafaxine 150 mg every morning to see if this helps with the frequent urination.    4. We will send over refills     Follow-up:3 months after visit with Sunshine    SUBJECTIVE/OBJECTIVE:                          Hina Yap is a 57 year old female called for a follow-up visit.  Today's visit is a follow-up MTM visit from 3/30/23.     Reason for visit: Hydroxyzine not relieving itchiness, nerve discomfort.    Allergies/ADRs: Reviewed in chart  Past Medical History: Reviewed in chart  Tobacco: She reports that she has been smoking cigarettes. She  has a 17.50 pack-year smoking history. She has never used smokeless tobacco.   Nicotine/Tobacco Cessation Plan:   Information offered: Patient not interested at this time   Starting to think about quitting since it is affecting her breathing.  Alcohol: none    Medication Adherence/Access:   Patient was using pill box that was monthly but kept running out of refills. Now takes out of the bottles, she sets up two bottles one with a pink cap = morning and normal cap/white = nightly. She sets this up on a daily basis.   The patient fills medications at [place]: NO, fills medications at [facility].  Troubles with lining up her two strengths of Adderall together so that they fill at the same time.    Patient reports oxycodone causing the dry mouth and is trying to drink more water, about 2 bottles of water daily.Using eye drops prescribed from eye doctor and is helping relieve the symptoms.    Urinary Urgency & Incontinence:   Not currently on any medications.     Patient reports frequent urination and urinary incontinence that has been constant, using bathroom every 30-60 minutes. Feels like she is emptying bladder fully, but soon after has leakage. Drinking about 2 bottles of water daily. She reports onset seems to be when she started the venlafaxine.     Multiple Sclerosis/Neuropathy:  Anovex injection 30 mcg IM weekly (neurology)  Baclofen 20 mg four times daily for MS pain (neurology)  For fatigue:   Adderall XR 30 mg in the morning (primary care provider)              Adderall IR 10 mg in the afternoon (primary care provider)  Neuropathy:   Lyrica 100 mg daily for chronic pain   Venlafaxine for depression and anxiety    She follows neurologist for MS. She primarily is bothered by itching sensation that she had wondered from previous if neuropathic itching could be related. She had read an article on MS and itching was a sign of neuropathy. See itching below. She does endorse neuropathy as well.     Itching:  Hydroxyzine 10 mg three times daily as needed for itchiness    Patient reports taking 5 tablets of hydroxyzine one day and found some relief, but wore off over time. Itching all over all the time. Side effects: None (no drowsiness or confusion) but further discussion has urinary symptoms mentioned above and dry mouth.  Previously Tried:  Topical steroids - no benefit for itching    Depression/Anxiety:    Venlafaxine  mg in the morning  Lyrica 100 mg daily for chronic pain    Does not feel that Effexor is working well and has concerns about it causing the urinary symptoms.  No longer following with [place] psychiatrist, but she was working with the therapist at the [facility] meet weekly but not anymore. She stopped making appointments in November and then just got harder and harder for her to schedule. Patient did complete pharmacogenomics via [employer] in 2019. She feels her anxiety is worst then the depression. Depression has been manageable.       12/15/2022     9:38 AM 1/12/2023     2:04 PM 3/30/2023     9:20 AM   PHQ-9 SCORE   PHQ-9 Total Score MyChart  21 (Severe depression)    PHQ-9 Total Score 21 21 19         12/2/2022     9:41 AM 1/12/2023     2:04 PM 3/30/2023     9:20 AM   KAMALA-7 SCORE   Total Score 18 (severe anxiety) 18 (severe anxiety)    Total Score 18 18 16      COPD:   Symbicort 80-4.5 mcg/act 2 puffs twice daily   Spiriva 2.5 mcg/act 2 puffs daily  Duoneb 0.5/2.5 mg/3 mL 1 vial every 6 hours as needed  Albuterol HFA 2 puffs every 4 hours as needed    Needs refills on inhalers.  Side effects not assessed during this visit due to time constraints.  Patient does not have an COPD Action Plan on file.   Pulse Readings from Last 3 Encounters:   01/25/23 84   12/15/22 84   12/02/22 94     Today's Vitals: LMP 05/25/2017 (Exact Date)   ----------------      I spent 28 minutes with this patient today. All changes were made via collaborative practice agreement with BENNETT ROUSSEAU  CNP. A copy of the visit note was provided to the patient's provider(s).    A summary of these recommendations was sent via [employer].    Yareli Rasmussen  Pharm.D. Student     Felix ElaineD  Medication Therapy Management Pharmacist      Telemedicine Visit Details  Type of service:  Telephone visit  Start Time: 1:32 PM  End Time: 2:00 PM     Medication Therapy Recommendations  Itching    Current Medication: hydrOXYzine (ATARAX) 10 MG tablet (Discontinued)   Rationale: Condition refractory to medication - Ineffective medication - Effectiveness   Recommendation: Change Medication - cetirizine 10 MG tablet   Status: Accepted per CPA         Moderate episode of recurrent major depressive disorder (H)    Current Medication: venlafaxine (EFFEXOR XR) 150 MG 24 hr capsule   Rationale: Dose too high - Dosage too high - Safety   Recommendation: Decrease Dose   Status: Accepted per CPA         Neuropathy    Current Medication: pregabalin (LYRICA) 100 MG capsule   Rationale: Dose too low - Dosage too low - Effectiveness   Recommendation: Increase Dose   Status: Contact Provider - Awaiting Response

## 2023-04-25 ENCOUNTER — VIRTUAL VISIT (OUTPATIENT)
Dept: PHARMACY | Facility: CLINIC | Age: 58
End: 2023-04-25
Payer: MEDICAID

## 2023-04-25 DIAGNOSIS — N39.46 MIXED INCONTINENCE URGE AND STRESS (MALE)(FEMALE): ICD-10-CM

## 2023-04-25 DIAGNOSIS — J44.0 CHRONIC OBSTRUCTIVE PULMONARY DISEASE WITH ACUTE LOWER RESPIRATORY INFECTION (H): ICD-10-CM

## 2023-04-25 DIAGNOSIS — L29.9 ITCHING: Primary | ICD-10-CM

## 2023-04-25 DIAGNOSIS — J44.9 CHRONIC OBSTRUCTIVE PULMONARY DISEASE, UNSPECIFIED COPD TYPE (H): ICD-10-CM

## 2023-04-25 DIAGNOSIS — G35 MS (MULTIPLE SCLEROSIS) (H): ICD-10-CM

## 2023-04-25 DIAGNOSIS — G62.9 NEUROPATHY: ICD-10-CM

## 2023-04-25 DIAGNOSIS — F41.9 ANXIETY: ICD-10-CM

## 2023-04-25 DIAGNOSIS — F33.1 MODERATE EPISODE OF RECURRENT MAJOR DEPRESSIVE DISORDER (H): ICD-10-CM

## 2023-04-25 PROCEDURE — 99607 MTMS BY PHARM ADDL 15 MIN: CPT | Performed by: PHARMACIST

## 2023-04-25 PROCEDURE — 99606 MTMS BY PHARM EST 15 MIN: CPT | Performed by: PHARMACIST

## 2023-04-25 RX ORDER — CETIRIZINE HYDROCHLORIDE 10 MG/1
10 TABLET ORAL DAILY
Qty: 90 TABLET | Refills: 0 | Status: SHIPPED | OUTPATIENT
Start: 2023-04-25 | End: 2023-05-18

## 2023-04-25 RX ORDER — ALBUTEROL SULFATE 90 UG/1
2 AEROSOL, METERED RESPIRATORY (INHALATION) EVERY 4 HOURS PRN
Qty: 18 G | Refills: 1 | Status: SHIPPED | OUTPATIENT
Start: 2023-04-25 | End: 2024-01-11

## 2023-04-25 RX ORDER — BUDESONIDE AND FORMOTEROL FUMARATE DIHYDRATE 80; 4.5 UG/1; UG/1
2 AEROSOL RESPIRATORY (INHALATION) 2 TIMES DAILY
Qty: 10.2 G | Refills: 5 | Status: SHIPPED | OUTPATIENT
Start: 2023-04-25 | End: 2023-05-18

## 2023-04-25 RX ORDER — MULTIVITAMIN
1 TABLET ORAL DAILY
Qty: 90 TABLET | Refills: 3 | Status: SHIPPED | OUTPATIENT
Start: 2023-04-25

## 2023-04-25 RX ORDER — TIOTROPIUM BROMIDE INHALATION SPRAY 3.12 UG/1
2 SPRAY, METERED RESPIRATORY (INHALATION) DAILY
Qty: 4 G | Refills: 5 | Status: SHIPPED | OUTPATIENT
Start: 2023-04-25 | End: 2023-05-18

## 2023-04-25 RX ORDER — VENLAFAXINE HYDROCHLORIDE 150 MG/1
CAPSULE, EXTENDED RELEASE ORAL
Qty: 90 CAPSULE | Refills: 0 | Status: SHIPPED | OUTPATIENT
Start: 2023-04-25 | End: 2023-08-15

## 2023-04-27 ENCOUNTER — TRANSFERRED RECORDS (OUTPATIENT)
Dept: HEALTH INFORMATION MANAGEMENT | Facility: CLINIC | Age: 58
End: 2023-04-27
Payer: MEDICAID

## 2023-04-28 RX ORDER — CYCLOSPORINE 0.5 MG/ML
1 EMULSION OPHTHALMIC 2 TIMES DAILY
COMMUNITY
Start: 2023-04-11

## 2023-04-28 RX ORDER — PREGABALIN 100 MG/1
100 CAPSULE ORAL DAILY
COMMUNITY
Start: 2023-03-29 | End: 2024-01-11

## 2023-04-28 NOTE — PATIENT INSTRUCTIONS
"Recommendations from today's MTM visit:                                                      Patient to:   1. Start cetirizine 10 mg daily. Stop taking hydroxyzine and only use as needed.     2. Ask your pain doctors about increasing Lyrica dose to help with itching-neuropathy feeling and will help anxiety.    3. Decrease venlafaxine 150 mg every morning to see if this helps with the frequent urination.    4. We will send over refills     Follow-up:3 months after visit with Sunshine    It was great speaking with you today.  I value your experience and would be very thankful for your time in providing feedback in our clinic survey. In the next few days, you may receive an email or text message from [employer] with a link to a survey related to your  clinical pharmacist.\"     To schedule another MTM appointment, please call the clinic directly or you may call the MTM scheduling line at [phone] or toll-free at [phone].     My Clinical Pharmacist's contact information:                                                      Please feel free to contact me with any questions or concerns you have.      Ivonne Gonzalez, PharmD  Medication Therapy Management Pharmacist     "

## 2023-05-17 ENCOUNTER — TRANSFERRED RECORDS (OUTPATIENT)
Dept: HEALTH INFORMATION MANAGEMENT | Facility: CLINIC | Age: 58
End: 2023-05-17
Payer: MEDICAID

## 2023-05-18 ENCOUNTER — ANCILLARY PROCEDURE (OUTPATIENT)
Dept: MAMMOGRAPHY | Facility: CLINIC | Age: 58
End: 2023-05-18
Attending: NURSE PRACTITIONER
Payer: MEDICAID

## 2023-05-18 ENCOUNTER — PATIENT OUTREACH (OUTPATIENT)
Dept: PEDIATRICS | Facility: CLINIC | Age: 58
End: 2023-05-18

## 2023-05-18 ENCOUNTER — OFFICE VISIT (OUTPATIENT)
Dept: PEDIATRICS | Facility: CLINIC | Age: 58
End: 2023-05-18
Payer: MEDICAID

## 2023-05-18 VITALS
TEMPERATURE: 97.7 F | HEIGHT: 66 IN | OXYGEN SATURATION: 96 % | BODY MASS INDEX: 21.69 KG/M2 | HEART RATE: 87 BPM | WEIGHT: 135 LBS | RESPIRATION RATE: 16 BRPM | DIASTOLIC BLOOD PRESSURE: 80 MMHG | SYSTOLIC BLOOD PRESSURE: 114 MMHG

## 2023-05-18 DIAGNOSIS — F33.1 MODERATE EPISODE OF RECURRENT MAJOR DEPRESSIVE DISORDER (H): ICD-10-CM

## 2023-05-18 DIAGNOSIS — I42.9 CARDIOMYOPATHY, UNSPECIFIED TYPE (H): ICD-10-CM

## 2023-05-18 DIAGNOSIS — I10 ESSENTIAL HYPERTENSION: ICD-10-CM

## 2023-05-18 DIAGNOSIS — G35 MS (MULTIPLE SCLEROSIS) (H): ICD-10-CM

## 2023-05-18 DIAGNOSIS — N18.2 CKD (CHRONIC KIDNEY DISEASE) STAGE 2, GFR 60-89 ML/MIN: ICD-10-CM

## 2023-05-18 DIAGNOSIS — L29.9 ITCHING: ICD-10-CM

## 2023-05-18 DIAGNOSIS — K21.9 GASTROESOPHAGEAL REFLUX DISEASE, UNSPECIFIED WHETHER ESOPHAGITIS PRESENT: ICD-10-CM

## 2023-05-18 DIAGNOSIS — R53.83 OTHER FATIGUE: ICD-10-CM

## 2023-05-18 DIAGNOSIS — J44.9 CHRONIC OBSTRUCTIVE PULMONARY DISEASE, UNSPECIFIED COPD TYPE (H): ICD-10-CM

## 2023-05-18 DIAGNOSIS — Z00.00 ROUTINE GENERAL MEDICAL EXAMINATION AT A HEALTH CARE FACILITY: Primary | ICD-10-CM

## 2023-05-18 DIAGNOSIS — Z12.31 VISIT FOR SCREENING MAMMOGRAM: ICD-10-CM

## 2023-05-18 DIAGNOSIS — R94.31 PROLONGED Q-T INTERVAL ON ECG: ICD-10-CM

## 2023-05-18 DIAGNOSIS — Z87.891 PERSONAL HISTORY OF TOBACCO USE: ICD-10-CM

## 2023-05-18 DIAGNOSIS — E61.1 IRON DEFICIENCY: ICD-10-CM

## 2023-05-18 DIAGNOSIS — R39.15 URINARY URGENCY: ICD-10-CM

## 2023-05-18 DIAGNOSIS — G89.29 OTHER CHRONIC PAIN: ICD-10-CM

## 2023-05-18 PROBLEM — E86.0 DEHYDRATION: Status: RESOLVED | Noted: 2022-09-17 | Resolved: 2023-05-18

## 2023-05-18 PROBLEM — N17.9 ACUTE KIDNEY INJURY (H): Status: RESOLVED | Noted: 2022-09-17 | Resolved: 2023-05-18

## 2023-05-18 PROBLEM — I49.3 PVC'S (PREMATURE VENTRICULAR CONTRACTIONS): Status: RESOLVED | Noted: 2020-11-30 | Resolved: 2023-05-18

## 2023-05-18 PROBLEM — R25.2 SPASTICITY: Status: RESOLVED | Noted: 2022-09-17 | Resolved: 2023-05-18

## 2023-05-18 PROCEDURE — 99406 BEHAV CHNG SMOKING 3-10 MIN: CPT | Performed by: NURSE PRACTITIONER

## 2023-05-18 PROCEDURE — 99396 PREV VISIT EST AGE 40-64: CPT | Performed by: NURSE PRACTITIONER

## 2023-05-18 PROCEDURE — G0296 VISIT TO DETERM LDCT ELIG: HCPCS | Performed by: NURSE PRACTITIONER

## 2023-05-18 PROCEDURE — 77067 SCR MAMMO BI INCL CAD: CPT | Mod: TC | Performed by: RADIOLOGY

## 2023-05-18 PROCEDURE — 99215 OFFICE O/P EST HI 40 MIN: CPT | Mod: 25 | Performed by: NURSE PRACTITIONER

## 2023-05-18 RX ORDER — TIOTROPIUM BROMIDE INHALATION SPRAY 3.12 UG/1
2 SPRAY, METERED RESPIRATORY (INHALATION) DAILY
Qty: 4 G | Refills: 5 | Status: SHIPPED | OUTPATIENT
Start: 2023-05-18 | End: 2024-06-28

## 2023-05-18 RX ORDER — ESOMEPRAZOLE MAGNESIUM 40 MG/1
40 CAPSULE, DELAYED RELEASE ORAL
Qty: 90 CAPSULE | Refills: 3 | Status: SHIPPED | OUTPATIENT
Start: 2023-05-18 | End: 2024-07-11

## 2023-05-18 RX ORDER — DEXTROAMPHETAMINE SACCHARATE, AMPHETAMINE ASPARTATE MONOHYDRATE, DEXTROAMPHETAMINE SULFATE AND AMPHETAMINE SULFATE 7.5; 7.5; 7.5; 7.5 MG/1; MG/1; MG/1; MG/1
30 CAPSULE, EXTENDED RELEASE ORAL EVERY MORNING
Qty: 30 CAPSULE | Refills: 0 | Status: SHIPPED | OUTPATIENT
Start: 2023-05-18 | End: 2023-06-22

## 2023-05-18 RX ORDER — CETIRIZINE HYDROCHLORIDE 10 MG/1
10 TABLET ORAL 2 TIMES DAILY
Qty: 180 TABLET | Refills: 3 | Status: SHIPPED | OUTPATIENT
Start: 2023-05-18 | End: 2023-08-24

## 2023-05-18 RX ORDER — DEXTROAMPHETAMINE SACCHARATE, AMPHETAMINE ASPARTATE, DEXTROAMPHETAMINE SULFATE AND AMPHETAMINE SULFATE 2.5; 2.5; 2.5; 2.5 MG/1; MG/1; MG/1; MG/1
10 TABLET ORAL DAILY
Qty: 30 TABLET | Refills: 0 | Status: SHIPPED | OUTPATIENT
Start: 2023-05-18 | End: 2023-06-22

## 2023-05-18 RX ORDER — AMLODIPINE BESYLATE 2.5 MG/1
2.5 TABLET ORAL DAILY
Qty: 90 TABLET | Refills: 3 | Status: SHIPPED | OUTPATIENT
Start: 2023-05-18 | End: 2024-03-04

## 2023-05-18 RX ORDER — BUDESONIDE AND FORMOTEROL FUMARATE DIHYDRATE 80; 4.5 UG/1; UG/1
2 AEROSOL RESPIRATORY (INHALATION) 2 TIMES DAILY
Qty: 10.2 G | Refills: 5 | Status: SHIPPED | OUTPATIENT
Start: 2023-05-18 | End: 2024-06-28

## 2023-05-18 ASSESSMENT — ENCOUNTER SYMPTOMS
BREAST MASS: 0
CONSTIPATION: 1
MYALGIAS: 1
CHILLS: 1
COUGH: 1
WEAKNESS: 1
NERVOUS/ANXIOUS: 1
PARESTHESIAS: 1
FREQUENCY: 1

## 2023-05-18 ASSESSMENT — PATIENT HEALTH QUESTIONNAIRE - PHQ9
SUM OF ALL RESPONSES TO PHQ QUESTIONS 1-9: 20
10. IF YOU CHECKED OFF ANY PROBLEMS, HOW DIFFICULT HAVE THESE PROBLEMS MADE IT FOR YOU TO DO YOUR WORK, TAKE CARE OF THINGS AT HOME, OR GET ALONG WITH OTHER PEOPLE: EXTREMELY DIFFICULT
SUM OF ALL RESPONSES TO PHQ QUESTIONS 1-9: 20

## 2023-05-18 ASSESSMENT — PAIN SCALES - GENERAL: PAINLEVEL: SEVERE PAIN (7)

## 2023-05-18 NOTE — PATIENT INSTRUCTIONS
Call [facility] if you need to get in quickly for a preop  Follow-up with your therapist  Increase zyrtec(cetirizine) to twice a day for itching  I'm going to ask [facility] to reach out to your neurologist to see if itching/sweating is related to MS  Please send in stool test   Next time we do labs I've ordered iron tests to be done  Chest CT for lung cancer screening [facility] Radiology Scheduling [phone]      Preventive Health Recommendations    Female Ages 50 - 64    Yearly exam: See your health care provider every year in order to  Review health changes.   Discuss preventive care.    Review your medicines if your doctor has prescribed any.    Get a Pap test every three years (unless you have an abnormal result and your provider advises testing more often).  If you get Pap tests with HPV test, you only need to test every 5 years, unless you have an abnormal result.   You do not need a Pap test if your uterus was removed (hysterectomy) and you have not had cancer.  You should be tested each year for STDs (sexually transmitted diseases) if you're at risk.   Have a mammogram every 1 to 2 years.  Have a colonoscopy at age 50, or have a yearly FIT test (stool test). These exams screen for colon cancer.    Have a cholesterol test every 5 years, or more often if advised.  Have a diabetes test (fasting glucose) every three years. If you are at risk for diabetes, you should have this test more often.   If you are at risk for osteoporosis (brittle bone disease), think about having a bone density scan (DEXA).    Shots: Get a flu shot each year. Get a tetanus shot every 10 years.    Nutrition:   Eat at least 5 servings of fruits and vegetables each day.  Eat whole-grain bread, whole-wheat pasta and brown rice instead of white grains and rice.  Get adequate Calcium and Vitamin D.     Lifestyle  Exercise at least 150 minutes a week (30 minutes a day, 5 days a week). This will help you control your weight and prevent disease.  Limit  alcohol to one drink per day.  No smoking.   Wear sunscreen to prevent skin cancer.   See your dentist every six months for an exam and cleaning.  See your eye doctor every 1 to 2 years.    Lung Cancer Screening   Frequently Asked Questions  If you are at high-risk for lung cancer, getting screened with low-dose computed tomography (LDCT) every year can help save your life. This handout offers answers to some of the most common questions about lung cancer screening. If you have other questions, please call [phone] ([phone]).     What is it?  Lung cancer screening uses special X-ray technology to create an image of your lung tissue. The exam is quick and easy and takes less than 10 seconds. We don t give you any medicine or use any needles. You can eat before and after the exam. You don t need to change your clothes as long as the clothing on your chest doesn t contain metal. But, you do need to be able to hold your breath for at least 6 seconds during the exam.    What is the goal of lung cancer screening?  The goal of lung cancer screening is to save lives. Many times, lung cancer is not found until a person starts having physical symptoms. Lung cancer screening can help detect lung cancer in the earliest stages when it may be easier to treat.    Who should be screened for lung cancer?  We suggest lung cancer screening for anyone who is at high-risk for lung cancer. You are in the high-risk group if you:     are between the ages of 55 and 79, and   have smoked at least 1 pack of cigarettes a day for 20 or more years, and   still smoke or have quit within the past 15 years.    However, if you have a new cough or shortness of breath, you should talk to your doctor before being screened.    Why does it matter if I have symptoms?  Certain symptoms can be a sign that you have a condition in your lungs that should be checked and treated by your doctor. These symptoms include fever, chest pain, a new or  changing cough, shortness of breath that you have never felt before, coughing up blood or unexplained weight loss. Having any of these symptoms can greatly affect the results of lung cancer screening.       Should all smokers get an LDCT lung cancer screening exam?  It depends. Lung cancer screening is for a very specific group of men and women who have a history of heavy smoking over a long period of time (see  Who should be screened for lung cancer  above).  I am in the high-risk group, but have been diagnosed with cancer in the past. Is LDCT lung cancer screening right for me?  In some cases, you should not have LDCT lung screening, such as when your doctor is already following your cancer with CT scan studies. Your doctor will help you decide if LDCT lung screening is right for you.  Do I need to have a screening exam every year?  Yes. If you are in the high-risk group described earlier, you should get an LDCT lung cancer screening exam every year until you are 79, or are no longer willing or able to undergo screening and possible procedures to diagnose and treat lung cancer.  How effective is LDCT at preventing death from lung cancer?  Studies have shown that LDCT lung cancer screening can lower the risk of death from lung cancer by 20 percent in people who are at high-risk.  What are the risks?  There are some risks and limitations of LDCT lung cancer screening. We want to make sure you understand the risks and benefits, so please let us know if you have any questions. Your doctor may want to talk with you more about these risks.   Radiation exposure: As with any exam that uses radiation, there is a very small increased risk of cancer. The amount of radiation in LDCT is small--about the same amount a person would get from a mammogram. Your doctor orders the exam when he or she feels the potential benefits outweigh the risks.   False negatives: No test is perfect, including LDCT. It is possible that you may  have a medical condition, including lung cancer, that is not found during your exam. This is called a false negative result.   False positives and more testing: LDCT very often finds something in the lung that could be cancer, but in fact is not. This is called a false positive result. False positive tests often cause anxiety. To make sure these findings are not cancer, you may need to have more tests. These tests will be done only if you give us permission. Sometimes patients need a treatment that can have side effects, such as a biopsy. For more information on false positives, see  What can I expect from the results?    Findings not related to lung cancer: Your LDCT exam also takes pictures of areas of your body next to your lungs. In a very small number of cases, the CT scan will show an abnormal finding in one of these areas, such as your kidneys, adrenal glands, liver or thyroid. This finding may not be serious, but you may need more tests. Your doctor can help you decide what other tests you may need, if any.  What can I expect from the results?  About 1 out of 4 LDCT exams will find something that may need more tests. Most of the time, these findings are lung nodules. Lung nodules are very small collections of tissue in the lung. These nodules are very common, and the vast majority--more than 97 percent--are not cancer (benign). Most are normal lymph nodes or small areas of scarring from past infections.  But, if a small lung nodule is found to be cancer, the cancer can be cured more than 90 percent of the time. To know if the nodule is cancer, we may need to get more images before your next yearly screening exam. If the nodule has suspicious features (for example, it is large, has an odd shape or grows over time), we will refer you to a specialist for further testing.  Will my doctor also get the results?  Yes. Your doctor will get a copy of your results.  Is it okay to keep smoking now that there s a cancer  screening exam?  No. Tobacco is one of the strongest cancer-causing agents. It causes not only lung cancer, but other cancers and cardiovascular (heart) diseases as well. The damage caused by smoking builds over time. This means that the longer you smoke, the higher your risk of disease. While it is never too late to quit, the sooner you quit, the better.  Where can I find help to quit smoking?  The best way to prevent lung cancer is to stop smoking. If you have already quit smoking, congratulations and keep it up! For help on quitting smoking, please call [employer] at 4-600-QUIT-NOW (1-128.392.7837) or the American [employer] Society at 1-552.359.2954 to find local resources near you.  One-on-one health coaching:  If you d prefer to work individually with a health care provider on tobacco cessation, we offer:     Medication Therapy Management:  Our specially trained pharmacists work closely with you and your doctor to help you quit smoking.  Call [phone] or [phone] (toll free).

## 2023-05-18 NOTE — PROGRESS NOTES
SUBJECTIVE:   CC: Hina is an 57 year old who presents for preventive health visit.       5/18/2023     2:08 PM   Additional Questions   Roomed by Giana ALBARRAN   Accompanied by self         5/18/2023     2:13 PM   Patient Reported Additional Medications   Patient reports taking the following new medications lyrica and eye drops cyclosporine   Patient has been advised of split billing requirements and indicates understanding: Yes  Healthy Habits:     Getting at least 3 servings of Calcium per day:  NO    Bi-annual eye exam:  Yes    Dental care twice a year:  NO    Sleep apnea or symptoms of sleep apnea:  Daytime drowsiness    Diet:  Regular (no restrictions)    Frequency of exercise:  None    Taking medications regularly:  Yes    Medication side effects:  Other    PHQ-2 Total Score: 4    Additional concerns today:  Yes    Increased lyrica last month. Has fallen x 1. Previously falling more with high doses of lyrica. Higher dose hasn't helped with pain or itching.     Sweating all the time. 6 months.    Sweating/itching MS?    Might get a stimulator    Doesn't feel like anxiety is worse on lower dose of effexor.    Urinary urgency, frequency. Incontinence 6x per day    -------------------------------------        Social History     Tobacco Use     Smoking status: Every Day     Packs/day: 0.50     Years: 35.00     Pack years: 17.50     Types: Cigarettes     Smokeless tobacco: Never   Vaping Use     Vaping status: Never Used   Substance Use Topics     Alcohol use: Not Currently     Alcohol/week: 0.0 standard drinks of alcohol             5/18/2023     2:01 PM   Alcohol Use   Prescreen: >3 drinks/day or >7 drinks/week? Not Applicable     Reviewed orders with patient.  Reviewed health maintenance and updated orders accordingly - Yes  Lab work is in process    Breast Cancer Screening:    FHS-7:       12/15/2022     8:55 AM 5/18/2023     2:02 PM   Breast CA Risk Assessment (FHS-7)   Did any of your first-degree relatives  have breast or ovarian cancer? Yes Yes   Did any of your relatives have bilateral breast cancer? Unknown Unknown   Did any man in your family have breast cancer? Unknown No   Did any woman in your family have breast and ovarian cancer? No No   Did any woman in your family have breast cancer before age 50 y? Unknown No   Do you have 2 or more relatives with breast and/or ovarian cancer? Unknown Yes   Do you have 2 or more relatives with breast and/or bowel cancer? Unknown Unknown     click delete button to remove this line now  Mammogram Screening: Recommended mammography every 1-2 years with patient discussion and risk factor consideration  Pertinent mammograms are reviewed under the imaging tab.    History of abnormal Pap smear: NO - age 30-65 PAP every 5 years with negative HPV co-testing recommended      Latest Ref Rng & Units 8/26/2022    11:35 AM 5/25/2017     2:22 PM 5/25/2017    10:34 AM   PAP / HPV   PAP  Negative for Intraepithelial Lesion or Malignancy (NILM)       PAP (Historical)    NIL     HPV 16 DNA Negative Negative   Negative      HPV 18 DNA Negative Negative   Negative      Other HR HPV Negative Negative   Negative        Reviewed and updated as needed this visit by clinical staff   Tobacco  Allergies  Meds              Reviewed and updated as needed this visit by Provider                     Review of Systems   Constitutional: Positive for chills.   Eyes: Positive for visual disturbance.   Respiratory: Positive for cough.    Gastrointestinal: Positive for constipation.   Breasts:  Negative for tenderness, breast mass and discharge.   Genitourinary: Positive for frequency, urgency and vaginal discharge. Negative for pelvic pain and vaginal bleeding.   Musculoskeletal: Positive for myalgias.   Skin: Positive for rash.   Neurological: Positive for weakness and paresthesias.   Psychiatric/Behavioral: Positive for mood changes. The patient is nervous/anxious.           OBJECTIVE:   /80 (BP  "Location: Right arm, Patient Position: Sitting, Cuff Size: Adult Regular)   Pulse 87   Temp 97.7  F (36.5  C) (Tympanic)   Resp 16   Ht 1.683 m (5' 6.25\")   Wt 61.2 kg (135 lb)   LMP 05/25/2017 (Exact Date)   SpO2 96%   BMI 21.63 kg/m    Physical Exam  GENERAL: healthy, alert and no distress  EYES: Eyes grossly normal to inspection, PERRL and conjunctivae and sclerae normal  HENT: ear canals and TM's normal, nose and mouth without ulcers or lesions  NECK: no adenopathy, no asymmetry, masses, or scars and thyroid normal to palpation  RESP: lungs clear to auscultation - no rales, rhonchi or wheezes  BREAST: normal without masses, tenderness or nipple discharge and no palpable axillary masses or adenopathy  CV: regular rate and rhythm, normal S1 S2, no S3 or S4, no murmur, click or rub, no peripheral edema and peripheral pulses strong  ABDOMEN: soft, nontender, no hepatosplenomegaly, no masses and bowel sounds normal  MS: no gross musculoskeletal defects noted, no edema  SKIN: no suspicious lesions or rashes  NEURO: Normal strength and tone, mentation intact and speech normal  PSYCH: mentation appears normal, affect normal/bright    Diagnostic Test Results:  Labs reviewed in Epic    ASSESSMENT/PLAN:   (Z00.00) Routine general medical examination at a health care facility  (primary encounter diagnosis)  Plan: Fecal colorectal cancer screen (FIT)  -Declines hep B  -Has mammo today  -Lung CA screening ordered  -FIt test ordered     (G35) MS (multiple sclerosis) (H)  Comment: Stable per neuro. Elicia Alfonso    (I42.9) Cardiomyopathy, unspecified type (H)  Comment: Asymptomatic. Last stress test 2021 without signs of ischemia. EF stable/improved.   Plan: Adult Cardiology Katharina[facility] Edgar Referral        Asked her to see them yearly. Not sure how often she needs echo for CMY and valve issues.     (I10) Essential hypertension  Comment: Stable. Labs done within the year  Plan: amLODIPine (NORVASC) 2.5 MG tablet        "   (N18.2) CKD (chronic kidney disease) stage 2, GFR 60-89 ml/min  Comment: Stable    (F33.1) Moderate episode of recurrent major depressive disorder (H)  Comment: Poorly controlled but stable. She actually seems much better to me today than in recent years. No worsening in anxiety or depression since decreasing effexor. It was stopped due to possible urinary issues, which didn't improve with lowering the dose  Plan:   -No med changes today. Had previously talked to MTM about starting more of a mood stabilizer but a few of them would need to be cleared by cards. She needs to re-establish with cards so we can use them as a resource  -Asked her to re-start therapy  -Crisis resources discussed.    (L29.9) Itching  Comment: Ongoing for several months now. No improvement with increase in lyrica. Some improvement in zyrtec but benefit doesn't last all day.   Plan: cetirizine (ZYRTEC) 10 MG tablet     -Increase zyrtec to BID. Will have SB3 pal reach out to neuro to see if it's possibly an MS related symptom  -She declines derm and allergy referral right now, which is strange given that she is so desperately itchy  -Reached out to MTM to see if there are significant drug interaction concerns with higher than usual doses of zyrtec. Did ask her to monitor for sedation  -If neuro thinks this isn't MS related, refer to derm/allergy.    (R53.83) Other fatigue  Comment: Stable. Plan: amphetamine-dextroamphetamine (ADDERALL XR) 30         MG 24 hr capsule, amphetamine-dextroamphetamine        (ADDERALL) 10 MG tablet          (J44.9) Chronic obstructive pulmonary disease, unspecified COPD type (H)  Comment: Stable. Declines smoing cessation  Plan: budesonide-formoterol (SYMBICORT) 80-4.5         MCG/ACT Inhaler, tiotropium (SPIRIVA RESPIMAT)         2.5 MCG/ACT inhaler          (K21.9) Gastroesophageal reflux disease, unspecified whether esophagitis present  Comment: Stable. Consider discussion with MTM in future of switching to  pepcid or doing EGD.   Plan: esomeprazole (NEXIUM) 40 MG DR capsule          (R39.15) Urinary urgency  Comment: Ongoing, worse in the last year. Not improved with decreasing Effexor.  Plan: Adult Urology  Referral    (E61.1) Iron deficiency  Comment: Apparently managed by neuro. Last low ferritin was 2012. Unclear how long she's been on iron.  Plan: Ferritin, Iron and iron binding capacity  -FIT test  -Will recheck labs. If well within normal, consider stopping iron and recheck 3-6 months later.     (Z87.891) Personal history of tobacco use  Plan: SMOKING CESSATION COUNSELING 3-10 MIN, Prof         fee: Shared Decision Making for Lung Cancer         Screening, CT Chest Lung Cancer Scrn Low Dose         wo    (G89.29) Other chronic pain  Comment: Follows with pain.    (R94.31) Prolonged Q-T interval on ECG  Comment: Now borderline. Improved.   Plan: Avoid adding/increasing QT prolonging meds.           COUNSELING:  Reviewed preventive health counseling, as reflected in patient instructions        She reports that she has been smoking cigarettes. She has a 17.50 pack-year smoking history. She has never used smokeless tobacco.  Nicotine/Tobacco Cessation Plan:   Declined          BENNETT ROUSSEAU CNP  [facility]    50 minutes spent with pt in addition to the 20 minutes spent on preventative visit  Answers for HPI/ROS submitted by the patient on 5/18/2023  If you checked off any problems, how difficult have these problems made it for you to do your work, take care of things at home, or get along with other people?: Extremely difficult  PHQ9 TOTAL SCORE: 20      Lung Cancer Screening Shared Decision Making Visit     Hina Yap, a 57 year old female, is eligible for lung cancer screening    History   Smoking Status     Every Day     Packs/day: 0.50     Years: 35.00     Types: Cigarettes   Smokeless Tobacco     Never       I have discussed with patient the risks and  benefits of screening for lung cancer with low-dose CT.     The risks include:    radiation exposure: one low dose chest CT has as much ionizing radiation as about 15 chest x-rays, or 6 months of background radiation living in [place]      false positives: most findings/nodules are NOT cancer, but some might still require additional diagnostic evaluation, including biopsy    over-diagnosis: some slow growing cancers that might never have been clinically significant will be detected and treated unnecessarily     The benefit of early detection of lung cancer is contingent upon adherence to annual screening or more frequent follow up if indicated.     Furthermore, to benefit from screening, Hina must be willing and able to undergo diagnostic procedures, if indicated. Although no specific guide is available for determining severity of comorbidities, it is reasonable to withhold screening in patients who have greater mortality risk from other diseases.     We did discuss that the best way to prevent lung cancer is to not smoke.    Some patients may value a numeric estimation of lung cancer risk when evaluating if lung cancer screening is right for them, here is one calculator:    ShouldIScreen

## 2023-05-18 NOTE — TELEPHONE ENCOUNTER
Per PCP to RN PAL:  1. Could you please call her MS doc to see if she thinks this pt's diffuse itching and easy sweating can be MS symptoms? Then pls let me know.  Dr Eboni Fan  [facility]  [phone]  [phone].  States that pt had visit yesterday, noted puritis on extremities to provider, but not about sweating.  Notes:  She noted that she also has developed some pruritus in her extremities this last year and tried hydroxyzine without benefit and is now taking Zyrtec with some relief.  Notes did not mention relation to MS or not. Called them again, afterhours, left voicemail to call me back.  She does not believe the MS is related and her MS has been uncontrolled for years.    PCP recommends pt see Dermatology AND Allergy.  Ask the Pt:  1a. Is itching improving with increase in Zyrtec to BID?  2a. Is she is open to seeing either/both derm and allergy.    2. Please ask pt to ask her pain team to reduce lyrica back to old dose as the increased dose isn't helping with pain or itching. If the dose gets decreased, she should let us know.  [facility]  Emi Smith DNP  Telephone: [phone] (told to select Option #2)  Seen on 04/27/2023  - Spoke with nurse. Will send message to provider to decrease dosing, from 75 mg BID to 100 mg daily.  Left callback number (for any questions).  - Received voicemail message from  Pain ([phone]), Aliyah (triage nurse).  States that Emi Sarah agrees with dosing change (100 mg daily).  Ask the pt:  2a. Also, recommends the pt schedule their spinal cord stimulator trial ([phone]).  2b. Inform the pt that the Lyrica is correct in her chart (100 mg daily).    Update: Pt callback. Pt attempting to get surgery date. Pt already contacted by their office and they have decided to send an updated Lyrica Rx, with Lyrica 50 mg BID (morning and night).    3. Please follow-up on her returning her FIT (ordered).  Called the pt. No answer, LVMTCB, left  "RN PAL ext mobility ([phone]). Awaiting callback.    4. She may need an urgent preop soon for pain pump/stimulator. If she misses some sort of authorization window she'll have to go through the eval process again. I asked her to call you if she needed this and you could set up a preop with MARY Benavides. When you call her be sure she knows your phone number so she has access.  Called the pt. No answer, LVMTCB, left RN PAL ext mobility ([phone]). Awaiting callback.    Update: Pt callback. Pt does not have surgery date yet. Told pt to call me back when she has a surgery date, and likely can schedule with Marilyn Benavides within 1-2 days, but should give us as much notice as possible.    5. Please be sure she follows up on scheduling with her therapist. She's fallen off that.  [facility] ([phone])    Update: Pt callback. Encourage she schedule with them.    6. Ensure pt follows-up with cardiology appt. She DOES need yearly cards follow-up.  Pt to call to schedule: [phone]    Update: Pt callback. Encourage she schedule with them.      - Tim \"Graham\" Terry (he/him/his), RN - Patient Advocate Liason (PAL)  [facility]    "

## 2023-05-19 NOTE — TELEPHONE ENCOUNTER
Thank you!    1. Re: Itching not being MS related. Is zyrtec twice a day helping itching? If not completely resolved, should see derm AND allergy. Is she willing?    2. Lyrica: Med is accurate in med list. Pls let pt know about dose change

## 2023-05-22 NOTE — TELEPHONE ENCOUNTER
"Called the pt x3. No answer. LVMTCB, left RN PAL ext mobility ([phone]).    Sent pt a [employer] message.    Recieved callback. See below.      - Tim \"Graham\" Terry (he/him/his), RN - Patient Advocate Liason (PAL)  [facility] [place]    "

## 2023-05-23 ENCOUNTER — TRANSFERRED RECORDS (OUTPATIENT)
Dept: HEALTH INFORMATION MANAGEMENT | Facility: CLINIC | Age: 58
End: 2023-05-23
Payer: MEDICAID

## 2023-06-17 PROCEDURE — 82274 ASSAY TEST FOR BLOOD FECAL: CPT | Performed by: NURSE PRACTITIONER

## 2023-06-20 ENCOUNTER — OFFICE VISIT (OUTPATIENT)
Dept: URGENT CARE | Facility: URGENT CARE | Age: 58
End: 2023-06-20
Payer: MEDICAID

## 2023-06-20 ENCOUNTER — ANCILLARY PROCEDURE (OUTPATIENT)
Dept: GENERAL RADIOLOGY | Facility: CLINIC | Age: 58
End: 2023-06-20
Attending: PHYSICIAN ASSISTANT
Payer: MEDICAID

## 2023-06-20 ENCOUNTER — TELEPHONE (OUTPATIENT)
Dept: PEDIATRICS | Facility: CLINIC | Age: 58
End: 2023-06-20

## 2023-06-20 ENCOUNTER — HOSPITAL ENCOUNTER (EMERGENCY)
Facility: CLINIC | Age: 58
Discharge: LEFT BEFORE DISCHARGE | End: 2023-06-20
Payer: MEDICAID

## 2023-06-20 VITALS
SYSTOLIC BLOOD PRESSURE: 92 MMHG | OXYGEN SATURATION: 94 % | WEIGHT: 134.9 LBS | BODY MASS INDEX: 21.68 KG/M2 | HEIGHT: 66 IN | HEART RATE: 99 BPM | DIASTOLIC BLOOD PRESSURE: 62 MMHG | TEMPERATURE: 98.6 F

## 2023-06-20 VITALS
RESPIRATION RATE: 18 BRPM | OXYGEN SATURATION: 93 % | HEART RATE: 91 BPM | SYSTOLIC BLOOD PRESSURE: 127 MMHG | DIASTOLIC BLOOD PRESSURE: 86 MMHG | TEMPERATURE: 98.1 F

## 2023-06-20 DIAGNOSIS — J44.1 COPD EXACERBATION (H): ICD-10-CM

## 2023-06-20 DIAGNOSIS — S09.90XA HEAD INJURY, INITIAL ENCOUNTER: ICD-10-CM

## 2023-06-20 DIAGNOSIS — R03.1 LOW BLOOD PRESSURE READING: ICD-10-CM

## 2023-06-20 DIAGNOSIS — R29.898 BILATERAL LEG WEAKNESS: ICD-10-CM

## 2023-06-20 DIAGNOSIS — J44.1 COPD EXACERBATION (H): Primary | ICD-10-CM
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
## 2023-06-20 PROCEDURE — 99214 OFFICE O/P EST MOD 30 MIN: CPT | Performed by: PHYSICIAN ASSISTANT

## 2023-06-20 PROCEDURE — 71046 X-RAY EXAM CHEST 2 VIEWS: CPT | Mod: TC | Performed by: RADIOLOGY

## 2023-06-20 RX ORDER — PREDNISONE 20 MG/1
40 TABLET ORAL DAILY
Qty: 10 TABLET | Refills: 0 | Status: SHIPPED | OUTPATIENT
Start: 2023-06-20 | End: 2023-06-25

## 2023-06-20 NOTE — TELEPHONE ENCOUNTER
"Aneta/RN PAL: Please check if the pt was seen in the ER. If she needs a hospital follow-up, availability is difficult this week with Marilyn Benavides and Sunshine Lunsford out. And Clarence Tourelizabeth is out until Tuesday.    Yanira Dawn can help with scheduling if needed. But the pt may not need a f/u visit. Unsure what needs are, but I was trying to prepare today and not openings for SB5 this week.      - Tim \"Graham\" Terry (he/him/his), RN - Patient Advocate Liason (PAL)  [facility]    "

## 2023-06-20 NOTE — ED TRIAGE NOTES
Patient fell down 8 wood stairs around 11am. Patient denies loc but did strike her head. Patient reports pain in bilateral legs.  Patient also reports increased issues with her COPD and states that her inhalers aren't working anymore.      Triage Assessment     Row Name 06/20/23 [id number]       Triage Assessment (Adult)    Airway WDL WDL       Respiratory WDL    Respiratory WDL WDL       Peripheral/Neurovascular WDL    Peripheral Neurovascular WDL WDL       Cognitive/Neuro/Behavioral WDL    Cognitive/Neuro/Behavioral WDL WDL

## 2023-06-20 NOTE — TELEPHONE ENCOUNTER
Marilyn is out of the clinic this week.    Please refer back to patient's PCP to advise.    Thank you  Clarence LANGLEY

## 2023-06-20 NOTE — Clinical Note
Heber LOO accompanied Hina Yap to the emergency department on 6/20/2023. They may return to work on 06/21/2023.      If you have any questions or concerns, please don't hesitate to call.      Florencia Lunsford, RN

## 2023-06-20 NOTE — LETTER
June 20, 2023      [place]ina Yap  [id number] [facility] [address]        To Whom It May Concern:    Hina Yap  was seen on 6/20.  Please excuse her significant other, Heber Raman from work on 6/20 and possibly 6/21 if needed.         Sincerely,        Mirza Swain PA-C

## 2023-06-20 NOTE — TELEPHONE ENCOUNTER
"Received voicemail from the pt.    Pt states that she was seen in the  for COPD exacerbation, but then told to go into the ER. Pt seeking a Rx order for guanfacine with codeine.    Called the pt.    She is currently in the waiting room in the ER. Pt is audibly out of breath and not speaking in full sentences and unable to complete words. Pt instructed to stay in the ER and would schedule her a hospital follow-up for this week.    Clarence Gonsalves: Can you find a time with Marilyn for this week for a hospital follow-up? Pt currently in the ER for COPD exacerbation.      - Tim \"Graham\" Terry (he/him/his), RN - Patient Advocate Liason (PAL)  [facility] [place] [facility]    "

## 2023-06-21 NOTE — TELEPHONE ENCOUNTER
Patient was LWBS (left without being seen) in the ER yesterday. Sent [employer] message to follow-up with patient.    Thanks!  Aneta SESAY RN, BSN

## 2023-06-22 LAB — HEMOCCULT STL QL IA: NEGATIVE

## 2023-06-23 NOTE — TELEPHONE ENCOUNTER
"See MyChart encounter started on 06/21/2023.      - Tim \"Graham\" Terry (he/him/his), RN - Patient Advocate Liason (PAL)  [facility]    "

## 2023-06-28 ENCOUNTER — TRANSFERRED RECORDS (OUTPATIENT)
Dept: HEALTH INFORMATION MANAGEMENT | Facility: CLINIC | Age: 58
End: 2023-06-28
Payer: MEDICAID

## 2023-07-08 NOTE — PROGRESS NOTES
SUBJECTIVE:  Chief Complaint   Patient presents with     Urgent Care     Patient states she felt weak in knees and collapsed on the stair and tumbled down 8-9 stair, hitting head on the wall/ hardwood floor . Took 1 oxy for pain patient states she has MS     Hina Yap is a 57 year old female presents with a chief complaint of Patient states she felt weak in knees and collapsed on the stair and tumbled down 8-9 stair, hitting head on the wall/ hardwood floor . Took 1 oxy for pain patient states she has MS.  Denies loss of consciousness, worsening headdache, nausea, vomiting.     Patient has had productive cough worsening last several days.  Denies fever.     Past Medical History:   Diagnosis Date     Anxiety      Arthritis Years ago     COPD (chronic obstructive pulmonary disease) (H)      Depressive disorder Years ago     GERD (gastroesophageal reflux disease)      Hypertension      Ischemic cardiomyopathy      Multiple sclerosis (H)      Neuromuscular disorder (H)      Nonrheumatic mitral valve regurgitation      PVC's (premature ventricular contractions)      Renal disease      Restless leg syndrome      Tobacco use disorder      Current Outpatient Medications   Medication Sig Dispense Refill     albuterol (PROAIR HFA/PROVENTIL HFA/VENTOLIN HFA) 108 (90 Base) MCG/ACT inhaler Inhale 2 puffs into the lungs every 4 hours as needed for shortness of breath or wheezing 18 g 1     amLODIPine (NORVASC) 2.5 MG tablet Take 1 tablet (2.5 mg) by mouth daily 90 tablet 3     amphetamine-dextroamphetamine (ADDERALL XR) 30 MG 24 hr capsule Take 1 capsule (30 mg) by mouth every morning Prescribed by 30 capsule 0     amphetamine-dextroamphetamine (ADDERALL) 10 MG tablet Take 1 tablet (10 mg) by mouth daily 30 tablet 0     AVONEX PEN 30 MCG/0.5ML auto-injector kit Inject 30 mcg into the muscle every 7 days        baclofen (LIORESAL) 20 MG tablet Take 20 mg by mouth 4 times daily Per Neurologist       budesonide-formoterol  (SYMBICORT) 80-4.5 MCG/ACT Inhaler Inhale 2 puffs into the lungs 2 times daily 10.2 g 5     Calcium Carb-Cholecalciferol (CALCIUM 1000 + D PO) Take by mouth daily       cetirizine (ZYRTEC) 10 MG tablet Take 1 tablet (10 mg) by mouth 2 times daily for 90 days 180 tablet 3     cycloSPORINE (RESTASIS) 0.05 % ophthalmic emulsion Place 1 drop into both eyes 2 times daily       diclofenac (VOLTAREN) 1 % topical gel Apply 4 g topically 4 times daily 480 g 0     docusate sodium (COLACE) 100 MG tablet Take 100 mg by mouth daily as needed for constipation Over-the-counter       esomeprazole (NEXIUM) 40 MG DR capsule Take 1 capsule (40 mg) by mouth every morning (before breakfast) Take 30-60 minutes before eating. 90 capsule 3     ferrous sulfate (FEROSUL) 325 (65 Fe) MG tablet Take 1 tablet (325 mg) by mouth daily (with breakfast)       ibuprofen (ADVIL/MOTRIN) 200 MG tablet 800 mg day of and day after Avonex weekly injection + as needed up to 8 tablets per day at most       multivitamin (ONE-DAILY) tablet Take 1 tablet by mouth daily 90 tablet 3     multivitamin, therapeutic (THERA-VIT) TABS tablet Take 1 tablet by mouth daily       naloxone (NARCAN) 4 MG/0.1ML nasal spray Spray 1 spray (4 mg) into one nostril alternating nostrils once as needed for opioid reversal every 2-3 minutes until assistance arrives (Patient not taking: Reported on 5/18/2023) 0.2 mL 0     nicotine polacrilex (NICORETTE) 4 MG gum Place 1 each (4 mg) inside cheek every hour as needed for smoking cessation 90 each 11     oxyCODONE-acetaminophen (PERCOCET)  MG per tablet Take 1 tablet by mouth every 6 hours as needed for severe pain (Max of 5 tablets per day, for chroic pain.)       polyethylene glycol (MIRALAX) 17 GM/Dose powder Mix 1 capful with 6-8 ounces of clear liquid and take by mouth twice daily as needed for constipation. Decrease dose to once daily or once every 2-3 days to prevent constipation. 527 g 0     pregabalin (LYRICA) 100 MG  "capsule Take 100 mg by mouth daily       promethazine (PHENERGAN) 25 MG tablet Take 25 mg by mouth every 6 hours as needed for nausea Takes with each Percocet dose       rOPINIRole (REQUIP) 2 MG tablet Take 2 mg by mouth every morning And 4 mg at bedtime. Prescribed by neurologist       tiotropium (SPIRIVA RESPIMAT) 2.5 MCG/ACT inhaler Inhale 2 puffs into the lungs daily 4 g 5     venlafaxine (EFFEXOR XR) 150 MG 24 hr capsule TAKE 1 CAPSULE BY MOUTH DAILY. 90 capsule 0     Social History     Tobacco Use     Smoking status: Every Day     Packs/day: 0.50     Years: 35.00     Pack years: 17.50     Types: Cigarettes     Smokeless tobacco: Never   Substance Use Topics     Alcohol use: Not Currently     Alcohol/week: 0.0 standard drinks of alcohol       ROS:  Review of systems negative except as stated above.    EXAM:   BP 92/62   Pulse 99   Temp 98.6  F (37  C) (Tympanic)   Ht 1.676 m (5' 6\")   Wt 61.2 kg (134 lb 14.4 oz)   LMP 05/25/2017 (Exact Date)   SpO2 94%   BMI 21.77 kg/m    Gen: healthy,alert,no distress  Extremity: no appreciable swelling or stepoffs of cranium  GENERAL APPEARANCE: healthy, alert and no distress  CHEST: clear to auscultation  CV: regular rate and rhythm  SKIN: no suspicious lesions or rashes  NEURO: Normal strength and tone, sensory exam grossly normal, mentation intact and speech normal    Results for orders placed or performed in visit on 06/20/23   XR Chest 2 Views     Status: None    Narrative    CHEST TWO VIEWS 6/20/2023 1:23 PM     HISTORY: COPD exacerbation (H)    COMPARISON: February 26, 2022       Impression    IMPRESSION: There are no acute infiltrates. The cardiac silhouette is  not enlarged. Pulmonary vasculature is unremarkable.    TAMIKO GARY MD         SYSTEM ID:  [id number]         ASSESSMENT:     (J44.1) COPD exacerbation (H)  (primary encounter diagnosis)  Plan: predniSONE (DELTASONE) 20 MG tablet, XR Chest 2        Views      (S09.90XA) Head injury, initial " encounter  (R03.1) Low blood pressure reading  (W10.8XXA) Fall down stairs, initial encounter  (R29.898) Bilateral leg weakness      Patient Instructions   I recommend assessment in ED given injury, low blood pressure

## 2023-07-12 NOTE — TELEPHONE ENCOUNTER
Spoke to patient and reviewed no SBE needed per Dr Modi. Pt verbalized understanding, will bring her form in to her appointment today and have Aybike review/sign it.    Calm

## 2023-07-25 ENCOUNTER — TRANSFERRED RECORDS (OUTPATIENT)
Dept: HEALTH INFORMATION MANAGEMENT | Facility: CLINIC | Age: 58
End: 2023-07-25
Payer: MEDICAID

## 2023-07-26 ENCOUNTER — MYC REFILL (OUTPATIENT)
Dept: PEDIATRICS | Facility: CLINIC | Age: 58
End: 2023-07-26
Payer: MEDICAID

## 2023-07-26 DIAGNOSIS — R53.83 OTHER FATIGUE: ICD-10-CM

## 2023-07-26 DIAGNOSIS — G35 MS (MULTIPLE SCLEROSIS) (H): Primary | ICD-10-CM

## 2023-07-26 NOTE — TELEPHONE ENCOUNTER
Routing refill request to provider for review/approval because:  Drug not on the [facility] refill protocol     Eliud ONTIVEROS RN 7/26/2023 at 1:32 PM

## 2023-07-27 RX ORDER — DEXTROAMPHETAMINE SACCHARATE, AMPHETAMINE ASPARTATE, DEXTROAMPHETAMINE SULFATE AND AMPHETAMINE SULFATE 2.5; 2.5; 2.5; 2.5 MG/1; MG/1; MG/1; MG/1
10 TABLET ORAL DAILY
Qty: 30 TABLET | Refills: 0 | Status: SHIPPED | OUTPATIENT
Start: 2023-07-27 | End: 2023-08-24

## 2023-07-27 RX ORDER — DEXTROAMPHETAMINE SACCHARATE, AMPHETAMINE ASPARTATE MONOHYDRATE, DEXTROAMPHETAMINE SULFATE AND AMPHETAMINE SULFATE 7.5; 7.5; 7.5; 7.5 MG/1; MG/1; MG/1; MG/1
30 CAPSULE, EXTENDED RELEASE ORAL EVERY MORNING
Qty: 30 CAPSULE | Refills: 0 | Status: SHIPPED | OUTPATIENT
Start: 2023-07-27 | End: 2023-08-24

## 2023-08-24 ENCOUNTER — MYC REFILL (OUTPATIENT)
Dept: PEDIATRICS | Facility: CLINIC | Age: 58
End: 2023-08-24
Payer: MEDICAID

## 2023-08-24 DIAGNOSIS — L29.9 ITCHING: ICD-10-CM

## 2023-08-24 DIAGNOSIS — R53.83 OTHER FATIGUE: ICD-10-CM

## 2023-08-25 RX ORDER — DEXTROAMPHETAMINE SACCHARATE, AMPHETAMINE ASPARTATE MONOHYDRATE, DEXTROAMPHETAMINE SULFATE AND AMPHETAMINE SULFATE 7.5; 7.5; 7.5; 7.5 MG/1; MG/1; MG/1; MG/1
30 CAPSULE, EXTENDED RELEASE ORAL EVERY MORNING
Qty: 30 CAPSULE | Refills: 0 | Status: SHIPPED | OUTPATIENT
Start: 2023-08-25 | End: 2023-10-04

## 2023-08-25 RX ORDER — CETIRIZINE HYDROCHLORIDE 10 MG/1
10 TABLET ORAL 2 TIMES DAILY
Qty: 180 TABLET | Refills: 3 | Status: SHIPPED | OUTPATIENT
Start: 2023-08-25 | End: 2024-08-15

## 2023-08-25 RX ORDER — DEXTROAMPHETAMINE SACCHARATE, AMPHETAMINE ASPARTATE, DEXTROAMPHETAMINE SULFATE AND AMPHETAMINE SULFATE 2.5; 2.5; 2.5; 2.5 MG/1; MG/1; MG/1; MG/1
10 TABLET ORAL DAILY
Qty: 30 TABLET | Refills: 0 | Status: SHIPPED | OUTPATIENT
Start: 2023-08-25 | End: 2023-10-04

## 2023-08-25 NOTE — TELEPHONE ENCOUNTER
Routing refill request to provider for review/approval because:  Drug not on the [facility] refill protocol   Drug not active on patient's medication list    Ching COMBS RN  [facility]

## 2023-08-28 ENCOUNTER — TRANSFERRED RECORDS (OUTPATIENT)
Dept: HEALTH INFORMATION MANAGEMENT | Facility: CLINIC | Age: 58
End: 2023-08-28
Payer: MEDICAID

## 2023-09-07 ENCOUNTER — TELEPHONE (OUTPATIENT)
Dept: PEDIATRICS | Facility: CLINIC | Age: 58
End: 2023-09-07
Payer: MEDICAID

## 2023-09-07 DIAGNOSIS — R63.8 DECREASED ORAL INTAKE: Primary | ICD-10-CM

## 2023-09-07 NOTE — TELEPHONE ENCOUNTER
Can you do some digging to find out who was ordering this? I don't see anything in Sunshine's last note  BENNETT Hanna, CNP

## 2023-09-07 NOTE — TELEPHONE ENCOUNTER
Pt is requesting new rx for    Ensure liqd    Did not see on active med list please verify and send new rx    [facility]  [phone]

## 2023-09-15 ENCOUNTER — TELEPHONE (OUTPATIENT)
Dept: PEDIATRICS | Facility: CLINIC | Age: 58
End: 2023-09-15
Payer: MEDICAID

## 2023-09-15 DIAGNOSIS — R63.8 DECREASED ORAL INTAKE: ICD-10-CM

## 2023-09-15 RX ORDER — LACTOSE-REDUCED FOOD
237 LIQUID (ML) ORAL 2 TIMES DAILY
Qty: 14220 ML | Refills: 11 | Status: SHIPPED | OUTPATIENT
Start: 2023-09-15 | End: 2024-08-15

## 2023-09-15 NOTE — TELEPHONE ENCOUNTER
Hello,    We got an order for Ensure but it does not have the quantity, refills, or instructions for use on it.  Wondering if we could get a prescription sent like last time.  Thanks for the help.  Hina

## 2023-09-15 NOTE — TELEPHONE ENCOUNTER
"Hina Garzon: I have an order teed up, was last ordered via medication order (on 08/26/2022). Do we know where this needs to be sent? [facility]?      - Tim \"Graham\" Terry (he/him/his), RN - Patient Advocate Liason (PAL)  [facility]    "

## 2023-09-26 ENCOUNTER — TRANSFERRED RECORDS (OUTPATIENT)
Dept: HEALTH INFORMATION MANAGEMENT | Facility: CLINIC | Age: 58
End: 2023-09-26
Payer: MEDICAID

## 2023-09-26 DIAGNOSIS — Z71.6 ENCOUNTER FOR SMOKING CESSATION COUNSELING: ICD-10-CM

## 2023-10-03 ENCOUNTER — TELEPHONE (OUTPATIENT)
Dept: PEDIATRICS | Facility: CLINIC | Age: 58
End: 2023-10-03

## 2023-10-03 DIAGNOSIS — F33.1 MODERATE EPISODE OF RECURRENT MAJOR DEPRESSIVE DISORDER (H): ICD-10-CM

## 2023-10-03 DIAGNOSIS — G35 MS (MULTIPLE SCLEROSIS) (H): Primary | ICD-10-CM

## 2023-10-03 NOTE — TELEPHONE ENCOUNTER
Last visit with  PCP 5/18/23  I guess I'm unclear why her housing would impact her ability to make an appointment-understand she is busy but can you help clarify?  Lorena Carbajal, BENNETT, CNP

## 2023-10-03 NOTE — TELEPHONE ENCOUNTER
"Pt called RN CORA back    States that they will be losing their housing at the end of the month as their landlord is selling the property. Pt states she was informed a few weeks ago.    Pt does not have family or friends to move in with. Denies having a  who is helping them with housing, but states they do (pt seems unsure of their role) who connects them to their mental health services.    Pt is unable to make appointment at this time, despite aware of the multiple attempts to schedule in the past few months.    Pt wondering if they can still receive refills of adderall.    Covering Provider: I signed a Care Coordination referral to assist with housing, unsure what they can do. Pt seeking refills of adderall, but was told that provider had requested they schedule visit with Marilyn Benavides. Please advise.      - Tim \"Graham\" Terry (he/him/his), RN - Patient Advocate Liason (PAL)  [facility]    "

## 2023-10-04 ENCOUNTER — OFFICE VISIT (OUTPATIENT)
Dept: PEDIATRICS | Facility: CLINIC | Age: 58
End: 2023-10-04
Payer: MEDICAID

## 2023-10-04 VITALS
OXYGEN SATURATION: 96 % | SYSTOLIC BLOOD PRESSURE: 130 MMHG | HEART RATE: 88 BPM | TEMPERATURE: 97.6 F | DIASTOLIC BLOOD PRESSURE: 86 MMHG | RESPIRATION RATE: 18 BRPM

## 2023-10-04 DIAGNOSIS — Z23 FLU VACCINE NEED: ICD-10-CM

## 2023-10-04 DIAGNOSIS — Z23 HIGH PRIORITY FOR 2019-NCOV VACCINE: ICD-10-CM

## 2023-10-04 DIAGNOSIS — L29.9 CHRONIC PRURITUS: ICD-10-CM

## 2023-10-04 DIAGNOSIS — R53.83 OTHER FATIGUE: ICD-10-CM

## 2023-10-04 DIAGNOSIS — F41.9 ANXIETY: ICD-10-CM

## 2023-10-04 DIAGNOSIS — Z23 ENCOUNTER FOR ADMINISTRATION OF COVID-19 VACCINE: ICD-10-CM

## 2023-10-04 DIAGNOSIS — Z91.81 AT HIGH RISK FOR FALLS: ICD-10-CM

## 2023-10-04 DIAGNOSIS — I10 ESSENTIAL HYPERTENSION: ICD-10-CM

## 2023-10-04 DIAGNOSIS — Z51.81 ENCOUNTER FOR THERAPEUTIC DRUG MONITORING: ICD-10-CM

## 2023-10-04 DIAGNOSIS — R35.0 URINARY FREQUENCY: ICD-10-CM

## 2023-10-04 DIAGNOSIS — J44.9 CHRONIC OBSTRUCTIVE PULMONARY DISEASE, UNSPECIFIED COPD TYPE (H): ICD-10-CM

## 2023-10-04 DIAGNOSIS — R30.0 DYSURIA: ICD-10-CM

## 2023-10-04 DIAGNOSIS — N18.2 CKD (CHRONIC KIDNEY DISEASE) STAGE 2, GFR 60-89 ML/MIN: ICD-10-CM

## 2023-10-04 DIAGNOSIS — R26.81 UNSTEADY GAIT WHEN WALKING: ICD-10-CM

## 2023-10-04 DIAGNOSIS — R94.31 PROLONGED Q-T INTERVAL ON ECG: ICD-10-CM

## 2023-10-04 DIAGNOSIS — Z13.9 ENCOUNTER FOR SCREENING INVOLVING SOCIAL DETERMINANTS OF HEALTH (SDOH): ICD-10-CM

## 2023-10-04 DIAGNOSIS — G35 MS (MULTIPLE SCLEROSIS) (H): Primary | ICD-10-CM

## 2023-10-04 DIAGNOSIS — Z59.10 INADEQUATE HOUSING: ICD-10-CM

## 2023-10-04 DIAGNOSIS — F33.1 MODERATE EPISODE OF RECURRENT MAJOR DEPRESSIVE DISORDER (H): ICD-10-CM

## 2023-10-04 LAB
ALBUMIN SERPL BCG-MCNC: 4 G/DL (ref 3.5–5.2)
ALBUMIN UR-MCNC: 30 MG/DL
ALP SERPL-CCNC: 96 U/L (ref 35–104)
ALT SERPL W P-5'-P-CCNC: 23 U/L (ref 0–50)
ANION GAP SERPL CALCULATED.3IONS-SCNC: 9 MMOL/L (ref 7–15)
APPEARANCE UR: CLEAR
AST SERPL W P-5'-P-CCNC: 29 U/L (ref 0–45)
BACTERIA #/AREA URNS HPF: ABNORMAL /HPF
BILIRUB SERPL-MCNC: 0.2 MG/DL
BILIRUB UR QL STRIP: NEGATIVE
BUN SERPL-MCNC: 20.9 MG/DL (ref 6–20)
CALCIUM SERPL-MCNC: 9.1 MG/DL (ref 8.6–10)
CANNABINOIDS UR QL SCN: NORMAL
CHLORIDE SERPL-SCNC: 106 MMOL/L (ref 98–107)
COLOR UR AUTO: YELLOW
CREAT SERPL-MCNC: 0.83 MG/DL (ref 0.51–0.95)
CREAT UR-MCNC: 130 MG/DL
DEPRECATED HCO3 PLAS-SCNC: 27 MMOL/L (ref 22–29)
EGFRCR SERPLBLD CKD-EPI 2021: 82 ML/MIN/1.73M2
ERYTHROCYTE [DISTWIDTH] IN BLOOD BY AUTOMATED COUNT: 13.1 % (ref 10–15)
GLUCOSE SERPL-MCNC: 82 MG/DL (ref 70–99)
GLUCOSE UR STRIP-MCNC: NEGATIVE MG/DL
HCT VFR BLD AUTO: 37.6 % (ref 35–47)
HGB BLD-MCNC: 12.1 G/DL (ref 11.7–15.7)
HGB UR QL STRIP: ABNORMAL
KETONES UR STRIP-MCNC: NEGATIVE MG/DL
LEUKOCYTE ESTERASE UR QL STRIP: ABNORMAL
MCH RBC QN AUTO: 29.3 PG (ref 26.5–33)
MCHC RBC AUTO-ENTMCNC: 32.2 G/DL (ref 31.5–36.5)
MCV RBC AUTO: 91 FL (ref 78–100)
NITRATE UR QL: NEGATIVE
PH UR STRIP: 6 [PH] (ref 5–7)
PLATELET # BLD AUTO: 308 10E3/UL (ref 150–450)
POTASSIUM SERPL-SCNC: 3.8 MMOL/L (ref 3.4–5.3)
PROT SERPL-MCNC: 6.7 G/DL (ref 6.4–8.3)
RBC # BLD AUTO: 4.13 10E6/UL (ref 3.8–5.2)
RBC #/AREA URNS AUTO: ABNORMAL /HPF
SODIUM SERPL-SCNC: 142 MMOL/L (ref 135–145)
SP GR UR STRIP: 1.02 (ref 1–1.03)
SQUAMOUS #/AREA URNS AUTO: ABNORMAL /LPF
UROBILINOGEN UR STRIP-ACNC: 0.2 E.U./DL
WBC # BLD AUTO: 7.2 10E3/UL (ref 4–11)
WBC #/AREA URNS AUTO: ABNORMAL /HPF

## 2023-10-04 PROCEDURE — 90480 ADMN SARSCOV2 VAC 1/ONLY CMP: CPT | Performed by: NURSE PRACTITIONER

## 2023-10-04 PROCEDURE — 99417 PROLNG OP E/M EACH 15 MIN: CPT | Performed by: NURSE PRACTITIONER

## 2023-10-04 PROCEDURE — 85027 COMPLETE CBC AUTOMATED: CPT | Performed by: NURSE PRACTITIONER

## 2023-10-04 PROCEDURE — 82043 UR ALBUMIN QUANTITATIVE: CPT | Performed by: NURSE PRACTITIONER

## 2023-10-04 PROCEDURE — G0480 DRUG TEST DEF 1-7 CLASSES: HCPCS | Performed by: NURSE PRACTITIONER

## 2023-10-04 PROCEDURE — 91320 SARSCV2 VAC 30MCG TRS-SUC IM: CPT | Performed by: NURSE PRACTITIONER

## 2023-10-04 PROCEDURE — 90682 RIV4 VACC RECOMBINANT DNA IM: CPT | Performed by: NURSE PRACTITIONER

## 2023-10-04 PROCEDURE — 87186 SC STD MICRODIL/AGAR DIL: CPT | Performed by: NURSE PRACTITIONER

## 2023-10-04 PROCEDURE — 90471 IMMUNIZATION ADMIN: CPT | Performed by: NURSE PRACTITIONER

## 2023-10-04 PROCEDURE — 87088 URINE BACTERIA CULTURE: CPT | Performed by: NURSE PRACTITIONER

## 2023-10-04 PROCEDURE — 36415 COLL VENOUS BLD VENIPUNCTURE: CPT | Performed by: NURSE PRACTITIONER

## 2023-10-04 PROCEDURE — 81001 URINALYSIS AUTO W/SCOPE: CPT | Performed by: NURSE PRACTITIONER

## 2023-10-04 PROCEDURE — 87086 URINE CULTURE/COLONY COUNT: CPT | Performed by: NURSE PRACTITIONER

## 2023-10-04 PROCEDURE — 80061 LIPID PANEL: CPT | Performed by: NURSE PRACTITIONER

## 2023-10-04 PROCEDURE — 99215 OFFICE O/P EST HI 40 MIN: CPT | Mod: 25 | Performed by: NURSE PRACTITIONER

## 2023-10-04 PROCEDURE — 80307 DRUG TEST PRSMV CHEM ANLYZR: CPT | Performed by: NURSE PRACTITIONER

## 2023-10-04 PROCEDURE — 82570 ASSAY OF URINE CREATININE: CPT | Performed by: NURSE PRACTITIONER

## 2023-10-04 PROCEDURE — 80053 COMPREHEN METABOLIC PANEL: CPT | Performed by: NURSE PRACTITIONER

## 2023-10-04 RX ORDER — DEXTROAMPHETAMINE SACCHARATE, AMPHETAMINE ASPARTATE, DEXTROAMPHETAMINE SULFATE AND AMPHETAMINE SULFATE 2.5; 2.5; 2.5; 2.5 MG/1; MG/1; MG/1; MG/1
10 TABLET ORAL DAILY
Qty: 30 TABLET | Refills: 0 | Status: SHIPPED | OUTPATIENT
Start: 2023-10-04 | End: 2023-10-04

## 2023-10-04 RX ORDER — DEXTROAMPHETAMINE SACCHARATE, AMPHETAMINE ASPARTATE MONOHYDRATE, DEXTROAMPHETAMINE SULFATE AND AMPHETAMINE SULFATE 7.5; 7.5; 7.5; 7.5 MG/1; MG/1; MG/1; MG/1
30 CAPSULE, EXTENDED RELEASE ORAL EVERY MORNING
Qty: 30 CAPSULE | Refills: 0 | Status: SHIPPED | OUTPATIENT
Start: 2023-10-04 | End: 2023-11-08

## 2023-10-04 RX ORDER — NITROFURANTOIN 25; 75 MG/1; MG/1
100 CAPSULE ORAL 2 TIMES DAILY
Qty: 10 CAPSULE | Refills: 0 | Status: SHIPPED | OUTPATIENT
Start: 2023-10-04 | End: 2023-10-06

## 2023-10-04 SDOH — ECONOMIC STABILITY - HOUSING INSECURITY: INADEQUATE HOUSING UNSPECIFIED: Z59.10

## 2023-10-04 ASSESSMENT — PATIENT HEALTH QUESTIONNAIRE - PHQ9
SUM OF ALL RESPONSES TO PHQ QUESTIONS 1-9: 25
10. IF YOU CHECKED OFF ANY PROBLEMS, HOW DIFFICULT HAVE THESE PROBLEMS MADE IT FOR YOU TO DO YOUR WORK, TAKE CARE OF THINGS AT HOME, OR GET ALONG WITH OTHER PEOPLE: EXTREMELY DIFFICULT
SUM OF ALL RESPONSES TO PHQ QUESTIONS 1-9: 25

## 2023-10-04 ASSESSMENT — PAIN SCALES - GENERAL: PAINLEVEL: MODERATE PAIN (5)

## 2023-10-04 NOTE — COMMUNITY RESOURCES LIST (ENGLISH)
10/04/2023   [facility]  N/A  For questions about this resource list or additional care needs, please contact your primary care clinic or care manager.  Phone: [phone]   Email: N/A   Address: [address] [place] [address]   Hours: N/A        Financial Stability       Rent and mortgage payment assistance  1  [address] [place] [address] - Rent payment assistance Distance: 3.45 miles      In-Person, Phone/Virtual   [address] [place] [address]  Language: English  Hours: Mon 8:00 AM - 4:00 PM , Tue 8:00 AM - 7:00 PM , Wed - Thu 8:00 AM - 4:00 PM  Fees: Free   Phone: (800) 372-8169 Email: info@Lessno.[employer]ise [employer]place [employer]. Website: https://iRewardChart.org/resources/resource-centers/     2  [address] [place] [address] Distance: 3.97 miles      In-Person, Phone/Virtual   [address] [address]  Language: English, Lithuanian  Hours: Mon - Thu 9:00 AM - 4:00 PM  Fees: Free   Phone: (472) 373-1691 Email: info@Lessno.org Website: https://iRewardChart.Raise Marketplace [employer]./          Food and Nutrition       Food pantry  3  [address] [place] [address] Food Shelf - Coordinated entry food pantry Distance: 2.13 miles      Pick   [address] [place] [address]  Language: English  Hours: Tue 10:00 AM - 4:00 PM , Thu 10:00 AM - 4:00 PM  Fees: Free   Phone: (474) 198-8719 Email: info@Lessno.org Website: https://www.iRewardChart.org/     [address] [place] [address] Distance: 2.13 miles      In-Person   [address] [place] [address]  Language: English  Hours: Tue 10:00 AM - 4:00 PM , Thu 10:00 AM - 4:00 PM  Fees: Free   Phone: (945) 846-7294 Email: communications@Ridejoy.org Website: http://www.sotv.org     SNAP application assistance  5  [address] [facility] Distance: 3.45 miles      In-Person   [address] [place] [address]  Language: English  Hours: Mon 8:00 AM - 4:00 PM , Tue 8:00 AM - 7:00 PM , Wed - Thu 8:00 AM - 4:00 PM  Fees: Free   Phone: (476) 769-8343 Email: info@[facility] Website: https://Shanghai Anymoba.Save22/resources/resource-centers/     6  Community [employer] Partnership ([facility]) [facility] Distance: 4.74 miles      In-Person   [address] [place] [address]  Language: English, Cymraes  Hours: Mon - Fri 8:00 AM - 8:00 PM  Fees: Free   Phone: (492) 466-1193 Email: info@Sunshine Heart.Save22 Website: http://www.Sunshine Heart.org     Soup kitchen or free meals  7  Easter by the [facility] and Fishes Distance: 4.11 miles      Pickup   [address] [place] [address]  Language: English, Cymraes  Hours: Mon - Thu 5:30 PM - 6:30 PM  Fees: Free   Phone: (714) 510-4213 Email: easter@[employer] Website: http://i2 Telecom IP Holdings.Save22/wordpress/?page_id=5168     8  [place] the [facility] and Fishes Distance: 8.5 miles      Pickup   [address] [place] [address]  Language: English  Hours: Mon - Fri 5:00 PM - 6:00 PM  Fees: Free   Phone: (604) 358-9461 Email: alvaus@Snapjoy.org Website: https://www.Snapjoy.org/          Transportation       Free or low-cost transportation  9  [place]. Distance: 0.45 miles      In-Person   [address] [facility] [address] [place] [address]  Language: English  Hours: Mon - Fri 7:00 AM - 5:00 PM  Fees: Free   Phone: (441) 941-1500 Email: xdofqmegvcqe73@Buzzient.com Website: https://Dilithium Networks.Wetradetogether/     10  DARTS - The [facility] Circulator Bus Distance: 12.66 miles      In-Person   [address] [place] [address]  Language: English  Hours: Tue 9:00 AM - 2:00 PM  Fees: Self Pay   Phone: (555) 988-3229 Email: info@[employer] Website: http://www.7write.org/     Transportation to medical appointments  11  [place] [employer] Distance: 3.98 miles      In-Person   [address] [place] [address]  Language: Croatian, English, Oromo, Cayman Islander  Hours: Mon - Sat 5:00 AM - 9:00 PM  Fees: Insurance, Self Pay   Phone: (454) 626-4615 Email: info@[employer] Website: http://www.Secure Computing/     12  Assisted Transport Distance: 8.77 miles      In-Person   [address] [facility] [place] [address]  Language: English, Azeri  Hours: Mon - Sun Appt. Only  Fees: Self Pay   Phone: (277) 252-8652 Email: amador@TurnTide Website: http://www.TurnTide/          Important Numbers & Websites       Emergency Services   911  City Services   311  Poison Control   (175) 469-6111  Suicide Prevention Lifeline   (533) 261-4021 (TALK)  Child Abuse Hotline   (638) 677-9752 (4-A-Child)  Sexual Assault Hotline   (214) 565-2335 (HOPE)  National Runaway Safeline   (404) 455-6408 (RUNAWAY)  All-Options Talkline   (341) 148-1214  Substance Abuse Referral   (729) 963-6829 (HELP)

## 2023-10-04 NOTE — COMMUNITY RESOURCES LIST (ENGLISH)
10/04/2023   [facility] - Outpatient Clinics  N/A  For additional resource needs, please contact your health insurance member services or your primary care team.  Phone: [phone]   Email: N/A   Address: [address] [place] [address]   Hours: N/A        Financial Stability       Rent and mortgage payment assistance  1  [address] [place] [address] - Rent payment assistance Distance: 3.45 miles      In-Person, Phone/Virtual   [address] [place] [address]  Language: English  Hours: Mon 8:00 AM - 4:00 PM , Tue 8:00 AM - 7:00 PM , Wed - Thu 8:00 AM - 4:00 PM  Fees: Free   Phone: (557) 650-1512 Email: info@iiyuma.DJO Global Website: https://Coinfloor/resources/resource-centers/     2  [address] [place] [address] Distance: 3.97 miles      In-Person, Phone/Virtual   [address] [address]  Language: English, British Virgin Islander  Hours: Mon - Thu 9:00 AM - 4:00 PM  Fees: Free   Phone: (777) 543-5512 Email: info@iiyuma.DJO Global Website: https://Coinfloor/          Food and Nutrition       Food pantry  3  [address] [place] [address] Food Shelf - Coordinated entry food pantry Distance: 2.13 miles      Pick   [address] [place] [id number]  Language: English  Hours: Tue 10:00 AM - 4:00 PM , Thu 10:00 AM - 4:00 PM  Fees: Free   Phone: (180) 143-3105 Email: info@iiyuma.DJO Global Website: https://www.Colto.DJO Global/     4  [facility] Distance: 2.13 miles      In-Person   [address] [place] [address]  Language: English  Hours: Tue 10:00 AM - 4:00 PM , Thu 10:00 AM - 4:00 PM  Fees: Free   Phone: (172) 932-8074 Email: communications@[employer] Website: http://www.sotv.org     SNAP application assistance  5  [employer] [place] Distance: 15.95 miles      Phone/Virtual   [address] [address]  Language:  English, Hmong, Samoan, Kuwaiti, Slovenian  Hours: Mon - Fri 8:30 AM - 4:30 PM  Fees: Free   Phone: (697) 336-8448 Email: helpline@hungerPlaytika.org Website: https://www.hungersolutions.org/programs/[place]-food-helpline/     6  [address] [place] [address] Distance: 3.45 miles      In-Person   [address] [place] [address]  Language: English  Hours: Mon 8:00 AM - 4:00 PM , Tue 8:00 AM - 7:00 PM , Wed - Thu 8:00 AM - 4:00 PM  Fees: Free   Phone: (378) 885-8475 Email: info@[address] [place] [address].[facility] Website: https://[address] [place], [place] [address]Tango Card/resources/resource-centers/     Soup kitchen or free meals  7  Easter by the [facility] - Loaves and Fishes Distance: 4.11 miles      Pickup   [address] [place] [address]  Language: English, Slovenian  Hours: Mon - Thu 5:30 PM - 6:30 PM  Fees: Free   Phone: (923) 980-2037 Email: liberty@Prezi Website: http://Prezi/wordCollaborative Software Initiative/?page_id=5168     8  [place] the [facility] - Loaves and Fishes Distance: 8.5 miles      Pickup   [address] [place] [address]  Language: English  Hours: Mon - Fri 5:00 PM - 6:00 PM  Fees: Free   Phone: (521) 945-3972 Email: contactus@Innovacene.org Website: https://www.Innovacene.org/          Transportation       Free or low-cost transportation  9  iCents.net. Distance: 0.45 miles      In-Person   [address] [facility] [address] [place] [address]  Language: English  Hours: Mon - Fri 7:00 AM - 5:00 PM  Fees: Free   Phone: (117) 973-1279 Email: kzfqrdpgjmir27@Acid Labs.Kinetic Website: https://Zivity.Kinetic/     10  DARTS - The [facility] Circulator Bus Distance: 12.66 miles      In-Person   [address] [place] [place] [address]  Language: English  Hours: Tue 9:00 AM - 2:00 PM  Fees: Self Pay   Phone: (117) 486-1397 Email: info@[facility] Website: http://www.LaticÃ­nios Bom Gosto/LBR.org/     Transportation to medical appointments  11  [place] [employer] Distance: 3.98 miles      In-Person    [address] [place] [address]  Language: Italian, English, Oromo, Estonian  Hours: Mon - Sat 5:00 AM - 9:00 PM  Fees: Insurance, Self Pay   Phone: (252) 940-3743 Email: info@WeDuc Website: http://www.WeDuc/     12  Assisted Transport Distance: 8.77 miles      In-Person   [address] [facility] [place] [address]  Language: English, Guyanese  Hours: Mon - Sun Appt. Only  Fees: Self Pay   Phone: (707) 518-2793 Email: amador@Atzip Website: http://www.Atzip/          Important Numbers & Websites       [address].org  Poison Control   (993) 495-5531 [place]poison.org  Suicide and Crisis Lifeline   [address] Healthline.org  Childhelp [place] Child Abuse Hotline   278.418.8245 Childhelphotline.org  [place] Sexual Assault Hotline   (363) 204-3687 (HOPE) Rainn.org  National Runaway Safeline   (825) 776-2289 (RUNAWAY) 1800runaway.org  Pregnancy & Postpartum Support [place]   Call/text 260-954-2947 Ppsupportmn.org  Substance Abuse National Helpline ([facility]   054-447-HELP (5688) Findtreatment.gov  Emergency Services   911

## 2023-10-04 NOTE — PROGRESS NOTES
Chief complaint: Hina is an 57 year old who presents for Chronic complex disease follow-up, urinary frequency    Denies any respiratory symptoms including denies SOB at rest, exertional dyspnea. Patient continues to smoke cigarette (unable to quantify)    MS (multiple sclerosis) (H)-[facility]-Takes Adderall  (primary encounter diagnosis)    Increased fall risk  Unsteady gait    Comment: stable, ongoing, managed per neuro.    Plan: Comprehensive metabolic panel (BMP + Alb, Alk         Phos, ALT, AST, Total. Bili, TP), CBC with         platelets          Patient declined weight therefore, unable to review weight trends.      Other fatigue      Comment: Chronic Stable. Increased fall frequency, given potential side effect includes dizziness, It is appropriate to reduce dose to amphetamine-dextroamphetamine (ADDERALL XR) 30,     Recommend to discontinue 10 mg table due to increased fall risk. However, patient prefers not to wean adderal, states she needs the additional 10 mg, therefore, will refill however, recommend future consideration of decreasing Adderall dosing      Plan: amphetamine-dextroamphetamine (ADDERALL XR) 30 mg PO every morning      At high risk for falls  Unsteady gait  Comment: ADLS independent, chronic progressive, multiple sclerosis, reviewed functional limitations, fall precaution education provided. Offered referral for walker, however patient indicates neurology previously provided patient with DME order. She plans to follow-up with neurology.     Documentation of Face to Face and Certification for DME need, four wheel walker with seat    I certify that patient: Hina Yap is under my care and that I, or a nurse practitioner or physician's assistant working with me, had a face-to-face encounter that meets the physician face-to-face encounter requirements with this patient on: 10/4/23    This encounter with the patient was in whole, or in part, for the following medical condition,  which is the primary reason for home health care: MS (multiple sclerosis),  unsteady gait, generalized muscle weakness-chronic, high fall risk, history of falls    I certify that, based on my findings, the following services are medically necessary four wheel walker with seat     My clinical findings support the need for the above services because: DME needs fall risk, multiple sclerosis history of frequent falls, unsteady gait, needs to stop frequently to rest    Further, I certify that my clinical findings support that this patient is homebound (i.e. absences from home require considerable and taxing effort and are for medical reasons or Mosque services or infrequently or of short duration when for other reasons) because:     Based on the above findings. I certify that this patient is confined to the home and needs intermittent skilled nursing care, physical therapy and/or speech therapy.  The patient is under my care, and I have initiated the establishment of the plan of care.  This patient will be followed by a physician who will periodically review the plan of care.  Physician/Provider to provide follow up care: Sunshine Lunsford    Attending hospital physician (the Medicare certified [place] provider): Marilyn Benavides   Physician Signature: See electronic signature associated with these discharge orders.  Date: 10/5/2023       Plan: recommend PT OT home safety evaluation, front wheel walker: patient declines at this time.     Chronic intermittent pruritus:    Patient reports ongoing intermittent chronic pruritis. No visible rash, lesions or eczema. Medication reconciliation reviewed, possible environmental allergies. This has been ongoing. Denies angioedema, throat swelling or respiratory symptoms. Patient also maintained on zyrtec with only minimal relief. Scratching with nails makes itching worse. Instructed patient not to scratch.    Therefore, I recommend to refer to allergy specialist,  patient is agreeable    PLAN: refer to allergy specialty referral     Anxiety  generalized anxiety disorder  Depression    Comment: ongoing, patient reports significant stressors including: she will be evicted from her current residence within the next 3 weeks. Care coordination team is involved in resource referral. I recommend coordinating [facility] housing resource referral for possible temporary hotel l waiver to allow additional time for coordination. Reviewed relaxation measures. Patient appears well-groomed, denies SI. Recommend to follow up with psych however patient indicates now is not a good time since she is looking for a place to live.    Patient is currently maintained on venlafaxine 150 mg Q 24 hours last increased on 8/17/23. Reviewed medication history trends. Given significant psycho social stressors, >KAMALA score, I recommend to up-titrate.    Recommend up-titrate: venlafaxine  mg PO daily      Has tried Xanax in past discontinued per pain clinic.      GAD7 score:       Plan:  Up-titrate venlafaxine  mg PO daily, recommend follow-up with counselor or psych, patient declines at this time.           Essential hypertension    Comment:   BP Readings from Last 6 Encounters:   10/04/23 130/86   06/20/23 127/86   06/20/23 92/62   05/18/23 114/80   01/25/23 (Abnormal) 120/92   12/15/22 (Abnormal) 130/90         Plan: Comprehensive metabolic panel (BMP + Alb, Alk         Phos, ALT, AST, Total. Bili, TP), CBC with         platelets, Lipid panel reflex to direct LDL         Non-fasting            CKD (chronic kidney disease) stage 2, GFR 60-89 ml/min    Comment: basic metabolic panel      Plan: Lipid panel reflex to direct LDL Non-fasting,         Albumin Random Urine Quantitative with Creat         Ratio    CKD (chronic kidney disease) stage 2, GFR 60-89 ml/min  Comment: Stable          Encounter for screening involving social determinants of health (SDoH)  Inadequate housing    Comment: reviewed,  care coordination takes place    Plan: Primary Care - Care Coordination Referral            Encounter for therapeutic drug monitoring    Comment: conducted comprehensive medication reconciliation,   Patient requesting refill    Plan: [id number] - Urine Drug Confirmation Panel         (Comprehensive), UA Macroscopic with reflex to         Microscopic and Culture            Urinary frequency  Dysuria    Comment:  Denies fever, chills, rigors, no flank pain, urine appears clear, however patient report dysuria, urinary frequency x 5-7 days. Denies sexual activity.    +WBC, bacteria    Plan:  UA/UC reveal nitrofurantoin resistance, therefore recommend Levaquin 250 mg Po daily x 5 days, hold iron supplement for 5 days while taking Levaquin.    High priority for 2019-nCoV vaccine  Comment: reviewed VACCINE HX, due for booster    Plan: clinic administer vaccine      Thank you for meeting with me today!      Encounter for administration of COVID-19 vaccine    Comment: counseled patient on FDA recommendations, recommended vaccine, patient agreeable.    Plan: administered in clinic COVID-19 mRNA vaccine 12+y         (PFIZER) injection 30 mcg            Flu vaccine need  Comment: provided vaccine counseling, recommend patient receive flu vaccine, patient verbalize agreeable.     Plan: clinic administer    PLAN/Recommendation    Flu vaccine (administered in clinic today)  COVID vaccine booster (administered in clinic today)    UPDATE:   Discontinue Macrobid (urine culture reveals resistant e to nitrofurantoin): patient did not fill prescription, therefore discontinued Macrobid, ordered   Levaquin 250 mg PO x 5 days to treat urinary symptoms.  Hold Iron containing supplement for 5 days while taking Levaquin, then resume      Increase venlafaxine to 225 mg PO daily (patient already has 75 mg tablets and has difficulty affording medication refills)    Maintain amphetamine-dextroamphetamine (ADDERALL XR) dose to 30 mg daily (due to  frequent falls) (ORDER SENT TO Pharmacy on file)     Follow-up with psych and therapist for anxiety and depression  Allergy referral    Care coordination referral to assist with resource referral and housing (referral previously submitted)  Implement fall precautions  Recommend front-wheel walker (DME order placed)    Follow-up with me and MTM for co-visit in 2 weeks.        Lourdes Santamaria is a 57 year old, presenting for the following health issues: chronic complex disease follow-up. Hypertension, Chronic pain, multiple sclerosis, depression, inadequate housing,     Acute disease: dysuria, urinary frequency      Recheck Medication and Imm/Inj (COVID-19 VACCINE)     Row Labels 10/4/2023    12:40 PM   Additional Questions   Section Header. No data exists in this row.    Roomed by   IVANIA Marquez   Accompanied by   DEE        Row Labels 10/4/2023    12:40 PM   Patient Reported Additional Medications   Section Header. No data exists in this row.    Patient reports taking the following new medications   NA       History of Present Illness       Reason for visit:  Review medications    She eats 0-1 servings of fruits and vegetables daily.She consumes 1 sweetened beverage(s) daily.She exercises with enough effort to increase her heart rate 9 or less minutes per day.  She exercises with enough effort to increase her heart rate 3 or less days per week.   She is taking medications regularly.       Review of Systems   Constitutional, HEENT, cardiovascular, pulmonary, GI, , musculoskeletal, neuro, skin, endocrine and psych systems are negative, except as otherwise noted.      Objective    Blood Pressure 130/86   Pulse 88   Temperature 97.6  F (36.4  C) (Oral)   Respiration 18   Last Menstrual Period 05/25/2017 (Exact Date)   Oxygen Saturation 96%     There is no height or weight on file to calculate BMI.      Physical Exam   GENERAL: chronic disease process alert and distress when talking about losing housing  EYES:  Eyes grossly normal to inspection, PERRL and conjunctivae and sclerae normal  HENT: ear canals and TM's normal, nose and mouth without ulcers or lesions  NECK: no adenopathy, no asymmetry, masses, or scars and thyroid normal to palpation  RESP: lungs clear to auscultation - no rales, rhonchi or wheezes  BREAST: normal without masses, tenderness or nipple discharge and no palpable axillary masses or adenopathy  CV: regular rate and rhythm, normal S1 S2, no S3 or S4, no murmur, click or rub, no peripheral edema and peripheral pulses strong  ABDOMEN: soft, nontender, no hepatosplenomegaly, no masses and bowel sounds normal  MS: no gross musculoskeletal defects noted, no edema  SKIN: no suspicious lesions or rashes  NEURO: moderate 4/5 strength and tone, mentation intact and speech clear  PSYCH: mentation appears normal, affect depressed, hostile bright    Office Visit on 05/18/2023   Component Date Value Ref Range Status    Occult Blood Screen FIT 06/17/2023 Negative  Negative Final           I spent 60 minutes with the patient, greater than 50% of that time was spent in counseling or coordination of the above issues.     On the same date of service, I spent an additional 90 minutes conducting care coordination.

## 2023-10-05 ENCOUNTER — PATIENT OUTREACH (OUTPATIENT)
Dept: CARE COORDINATION | Facility: CLINIC | Age: 58
End: 2023-10-05
Payer: MEDICAID

## 2023-10-05 ENCOUNTER — VIRTUAL VISIT (OUTPATIENT)
Dept: PEDIATRICS | Facility: CLINIC | Age: 58
End: 2023-10-05
Payer: MEDICAID

## 2023-10-05 ENCOUNTER — TELEPHONE (OUTPATIENT)
Dept: PEDIATRICS | Facility: CLINIC | Age: 58
End: 2023-10-05

## 2023-10-05 DIAGNOSIS — G35 MS (MULTIPLE SCLEROSIS) (H): Primary | ICD-10-CM

## 2023-10-05 DIAGNOSIS — N30.00 ACUTE CYSTITIS WITHOUT HEMATURIA: ICD-10-CM

## 2023-10-05 LAB
BACTERIA UR CULT: ABNORMAL
CHOLEST SERPL-MCNC: 207 MG/DL
CREAT UR-MCNC: 130 MG/DL
HDLC SERPL-MCNC: 48 MG/DL
LDLC SERPL CALC-MCNC: 116 MG/DL
MICROALBUMIN UR-MCNC: 110 MG/L
MICROALBUMIN/CREAT UR: 84.62 MG/G CR (ref 0–25)
NONHDLC SERPL-MCNC: 159 MG/DL
TRIGL SERPL-MCNC: 217 MG/DL

## 2023-10-05 PROCEDURE — 99207 PR NO CHARGE LOS: CPT | Mod: 93 | Performed by: NURSE PRACTITIONER

## 2023-10-05 RX ORDER — VENLAFAXINE HYDROCHLORIDE 75 MG/1
225 CAPSULE, EXTENDED RELEASE ORAL DAILY
Qty: 270 CAPSULE | Refills: 0 | Status: SHIPPED | OUTPATIENT
Start: 2023-10-05 | End: 2023-11-08

## 2023-10-05 RX ORDER — DEXTROAMPHETAMINE SACCHARATE, AMPHETAMINE ASPARTATE, DEXTROAMPHETAMINE SULFATE AND AMPHETAMINE SULFATE 2.5; 2.5; 2.5; 2.5 MG/1; MG/1; MG/1; MG/1
10 TABLET ORAL DAILY
Qty: 30 TABLET | Refills: 0 | Status: SHIPPED | OUTPATIENT
Start: 2023-10-05 | End: 2023-11-08

## 2023-10-05 NOTE — TELEPHONE ENCOUNTER
Patient had visit with ANA MARIA Benavides yesterday (10/04/05). Per ANA MARIA Benavides:    Clarissa Santamaria,     Your BMP labs remain stable.     Your urine sample indicates positive white blood cells with a small amount of bacteria.     Given your symptoms, I will treat you with 5 days of antibiotics. I sent the order to [facility] on file.     I also sent Adderall med refill to the pharmacy on file.     Notify clinic if your symptoms worsen or do not improve.     Thank you,     [facility] CNP     ______________________________________________________________    Spoke to patient. Advised of provider message as well as discussed prescriptions sent to her pharmacy. Patient agreeable to plan and patient will call if her symptoms worsen or don't improve on the Antibiotic. Also advised of dosage increase for her venlafaxine.   Aneta SESAY RN, BSN

## 2023-10-05 NOTE — PATIENT INSTRUCTIONS
Thank you for meeting with me today!    PLAN/Recommendation    Flu vaccine (completed)  COVID vaccine booster (completed)    Treat Urinary symptoms with Macrobid (ORDER SENT TO Pharmacy on file)  -notify clinic if symptoms do not improve or worsen    Increase venlafaxine to 225 mg Po daily    Decrease amphetamine-dextroamphetamine (ADDERALL XR) dose to 30 mg daily (due to frequent falls) (ORDER SENT TO Pharmacy on file)    Follow-up with psych and therapist for anxiety and depression  Dermatology referral  Care coordination referral to assist with resource referral and housing.    Implement fall precautions  Recommend front-wheel walker (DME order placed)    Follow-up with me and MTM for co-visit in 2 weeks.

## 2023-10-05 NOTE — PROGRESS NOTES
Clinic Care Coordination Contact  Community Health Worker Initial Outreach    Chart Review: Referral from provider. Pt states they will be losing their housing at the end of the month. Has no family or friends to stay with.     CHW Initial Information Gathering:  Referral Source: Care Team  Preferred Hospital: [facility], [place]  [phone]  Current living arrangement:: I live alone  Community Resources: [facility]  Supplies Currently Used at Home: None  No PCP office visit in Past Year: No  Transportation means:: Regular car  CHW Additional Questions  If ED/Hospital discharge, follow-up appointment scheduled as recommended?: N/A  Medication changes made following ED/Hospital discharge?: N/A  MyChart active?: Yes    CHW introduced self and role with CC  Patients CC referral has many reasons for referral listed, she states that there is a lot going on however her main concern is housing.   She has been renting a room in a house and the owner is selling the house. She found this out a couple weeks ago.   Pt states she does not have a [facility] or anyone to help. She is disabled due to MS, mental health concerns, COPD, etc.   Gets social security income.   Patient states that she does have a car however doesn't drive far. She goes to therapy in [place] at the [facility], has appointment today.   CHW scheduled SW assessment, patient was unable to take down any resources at time of call.    Patient accepts CC: Yes. Patient scheduled for assessment with [facility] on 10/6 at 2:00pm. Patient noted desire to discuss housing resources.     DENISSE Goodman, B.S. [facility]  Clinic Care Coordination  [facility]:  [place], [place]  [phone]  [email or web address]

## 2023-10-05 NOTE — PROGRESS NOTES
Lab results follow-up plan of care update      Refilled Adderall 10 mg     Update Plan/recommendation:     Stop taking macrobid NOW, based on urine culture results    Start taking levoquin 250 mg oral daily x 5 days (prescription sent to [facility])    Hold Iron x 5 days: resume iron after completing Levaquin    PLAN communicated to patient. Patient verbalizes understanding,

## 2023-10-06 ENCOUNTER — PATIENT OUTREACH (OUTPATIENT)
Dept: NURSING | Facility: CLINIC | Age: 58
End: 2023-10-06
Payer: MEDICAID

## 2023-10-06 ENCOUNTER — TELEPHONE (OUTPATIENT)
Dept: PEDIATRICS | Facility: CLINIC | Age: 58
End: 2023-10-06

## 2023-10-06 RX ORDER — LEVOFLOXACIN 250 MG/1
250 TABLET, FILM COATED ORAL DAILY
Qty: 5 TABLET | Refills: 0 | Status: SHIPPED | OUTPATIENT
Start: 2023-10-06 | End: 2023-10-11

## 2023-10-06 NOTE — TELEPHONE ENCOUNTER
Called and spoke to patient. Advised of new Antibiotic. Patient has not picked up the macrobid yet. She will  the Levaquin instead.  Aneta SESAY RN, BSN
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
## 2023-10-06 NOTE — LETTER
[facility]  Patient Centered Plan of Care  About Me:        Patient Name:  Hina Tenorio    YOB: 1965  Age:         57 year old   Tashi MRN:    [id number] Telephone Information:  Home Phone [phone]   Mobile [phone]       [address]  [address] [address] Email address:  [email or web address]      Emergency Contact(s)    Name Relationship Lgl Grd Work Phone Home Phone Mobile Phone   1. BRITTANIE TENORIO Son    [phone]   2. MICHAEL BELLE Friend    [phone]   3. SUE TENORIO Mother No   [phone]           Primary language:  English     needed? No   [place] [employer]:  [phone] op. 1  Other communication barriers:No data recorded  Preferred Method of Communication:  Mail  Current living arrangement: I live alone    Mobility Status/ Medical Equipment: No data recorded  Health Maintenance  Health Maintenance Reviewed:   Health Maintenance Due   Topic Date Due    COPD ACTION PLAN  Never done    IRON & TIBC  11/12/2013    LUNG CANCER SCREENING  08/25/2019    FERRITIN  02/13/2021     My Access Plan  Medical Emergency 911   Primary Clinic Line [facility] - [phone]   24 Hour Appointment Line [phone] or  [phone]) (toll-free)   24 Hour Nurse Line [phone] (toll-free)   Preferred Urgent Care No data recorded   Preferred Hospital [facility]  [phone]     Preferred Pharmacy [id number] [address] AT [facility] [address]     Behavioral Health Crisis Line The National Suicide Prevention Lifeline at 1-524.690.4898 or Text/Call 658       My Care Team Members  Patient Care Team         Relationship Specialty Notifications Start End    Greer-Sunshine Correa APRN CNP PCP - General Nurse Practitioner  12/1/15     Phone: [phone] Fax: [phone] [address] [place] [address]     Sunshine Lunsford APRN CNP Assigned PCP   5/4/17     Phone: [phone] Fax: [phone] [address] [place] [address]    Malik Lee PA-C Assigned Heart and Vascular Provider   5/1/22     Phone: [phone] Fax: [phone] [address] [place] [address]    Reji Sandoval MD MD Dermatology  7/29/22     Phone: [phone] Pager: [phone] Fax: [phone] [address] [place] [address]    Solomon Cordero MD Assigned Surgical Provider   9/17/22     Phone: [phone] Fax: [phone]         [address] [address]    Ivonne Gonzalez [facility] Pharmacist Pharmacist  9/26/22     Phone: [phone] Fax: [phone]         [address] [place] [address]    Ivonne Gonzalez [facility] Assigned MTM Pharmacist   12/10/22     Phone: [phone] Fax: [phone]         [address] [place] [address]    Marilyn Benavides APRN CNP Nurse Practitioner Nurse Practitioner Primary Care  1/30/23     Phone: [phone] Fax: [phone] [address] [place] [address]    Jf Riddle LSW Lead Care Coordinator Primary Care - CC Admissions 10/5/23     Phone: [phone]         Bernie Rouse MA Community Health Worker Primary Care - CC Admissions 10/6/23     Phone: [phone]         Amna Romero [facility] Pharmacist   10/11/23                                                               My Care Plans  Self Management and Treatment Plan- Care Plan  Care Plan: Housing Instability       Problem: SDOH LACK OF STABLE HOUSING       Goal: Establish Stable Housing       Start Date: 10/6/2023 Expected End Date: 1/1/2024    Note:     Goal Statement: I will continue to take steps over the next 3 months to secure safe, stable, and long term housing.  Barriers: Current living space no longer available 10/31/23.  Strengths: Open to Medical Assistance. Could qualify for Housing Stabilization  Services.  Patient expressed understanding of goal: yes    Action steps to achieve this goal:  I will continue to work to find long term housing   I will review resources and supports sent to me via [employer]  I will outreach to supports and consider establishing with ones I might find beneficial.   I will contact my care team with questions, concerns, support needs.   I will use the clinic as a resource and I understand I can contact my clinic with 24/7 after hours services available.   I will continue to outreach to care coordination as needed for additional resources or supports.                                      My Medical and Care Information  Problem List   Patient Active Problem List   Diagnosis    MS (multiple sclerosis) (H)-[facility]-Takes Adderall    Cardiomyopathy (H)    Esophageal reflux    Anxiety    Restless leg syndrome    Moderate episode of recurrent major depressive disorder (H)    Other chronic pain-sees pain clinic    CKD (chronic kidney disease) stage 2, GFR 60-89 ml/min    Insomnia, unspecified type    Prolonged Q-T interval on ECG    Essential hypertension    Nonrheumatic mitral valve regurgitation    S/P lumbar fusion    Itching    Urinary urgency    Iron deficiency      Current Medications and Allergies:     Allergies   Allergen Reactions    Sulfa Antibiotics Rash    Rosuvastatin     Adhesive Tape Rash     Reacts to adhesive on fentanyl patch, tapes, all kinds with prolonged duration    Wellbutrin [Bupropion Hydrobromide] Rash     Current Outpatient Medications   Medication    albuterol (PROAIR HFA/PROVENTIL HFA/VENTOLIN HFA) 108 (90 Base) MCG/ACT inhaler    amLODIPine (NORVASC) 2.5 MG tablet    amphetamine-dextroamphetamine (ADDERALL XR) 30 MG 24 hr capsule    amphetamine-dextroamphetamine (ADDERALL) 10 MG tablet    AVONEX PEN 30 MCG/0.5ML auto-injector kit    baclofen (LIORESAL) 20 MG tablet    budesonide-formoterol (SYMBICORT) 80-4.5 MCG/ACT Inhaler    Calcium Carb-Cholecalciferol  (CALCIUM 1000 + D PO)    cetirizine (ZYRTEC) 10 MG tablet    cycloSPORINE (RESTASIS) 0.05 % ophthalmic emulsion    diclofenac (VOLTAREN) 1 % topical gel    docusate sodium (COLACE) 100 MG tablet    esomeprazole (NEXIUM) 40 MG DR capsule    ferrous sulfate (FEROSUL) 325 (65 Fe) MG tablet    ibuprofen (ADVIL/MOTRIN) 200 MG tablet    levofloxacin (LEVAQUIN) 250 MG tablet    multivitamin (ONE-DAILY) tablet    multivitamin, therapeutic (THERA-VIT) TABS tablet    naloxone (NARCAN) 4 MG/0.1ML nasal spray    nicotine polacrilex (NICORETTE) 4 MG gum    Nutritional Supplements (ENSURE ORIGINAL) LIQD    oxyCODONE-acetaminophen (PERCOCET)  MG per tablet    polyethylene glycol (MIRALAX) 17 GM/Dose powder    pregabalin (LYRICA) 100 MG capsule    promethazine (PHENERGAN) 25 MG tablet    rOPINIRole (REQUIP) 2 MG tablet    tiotropium (SPIRIVA RESPIMAT) 2.5 MCG/ACT inhaler    venlafaxine (EFFEXOR XR) 75 MG 24 hr capsule     No current facility-administered medications for this visit.     Care Coordination Start Date: 10/3/2023   Frequency of Care Coordination: monthly     Form Last Updated: 10/11/2023

## 2023-10-06 NOTE — TELEPHONE ENCOUNTER
please call patient to review updated plan of care:   Plan/recommendation:      Stop taking/discontinue macrobid NOW (based on urine culture results received today)      Start taking levoquin 250 mg oral daily x 3 days (prescription sent to [facility])      Hold Iron x 3 days: resume iron after completing Levaquin     Thank you!   Marilyn Benavides DNP CNP     ________________________________________________________________    Left message for patient to call back.    Huddled with ANA MARIA Benavides. Per note above, advises 3 days. Prescription sent for 5 days. Per provider, correct is 5 days, not 3.    Sent mychart message to advised of above with correct number of days for Antibiotic and holding iron supplement.  Aneta SESAY RN, BSN

## 2023-10-06 NOTE — LETTER
[facility] CARE COORDINATION  [address] [place] [address]    October 11, 2023    Hina Yap  [address]  [place] [address]      Dear Hina,        I am a  clinic care coordinator who works with BENNETT ROUSSEAU CNP with the [facility]. I wanted to thank you for spending the time to talk with me.  Below is a description of clinic care coordination and how I can further assist you.       The clinic care coordination team is made up of a registered nurse, , financial resource worker and community health worker who understand the health care system. The goal of clinic care coordination is to help you manage your health and improve access to the health care system. Our team works alongside your provider to assist you in determining your health and social needs. We can help you obtain health care and community resources, providing you with necessary information and education. We can work with you through any barriers and develop a care plan that helps coordinate and strengthen the communication between you and your care team.  Our services are voluntary and are offered without charge to you personally.    Please feel free to contact me with any questions or concerns regarding care coordination and what we can offer.      We are focused on providing you with the highest-quality healthcare experience possible.    Sincerely,     Jf Riddle [facility]  Clinic Care Coordinator  [facility]  [phone]  [email or web address]    Enclosed: I have enclosed a copy of the Patient Centered Plan of Care. This has helpful information and goals that we have talked about. Please keep this in an easy to access place to use as needed.

## 2023-10-10 LAB
AMPHET UR CFM-MCNC: ABNORMAL NG/ML
AMPHET/CREAT UR: ABNORMAL
OXYCODONE UR CFM-MCNC: 6340 NG/ML
OXYCODONE/CREAT UR: 4877 NG/MG {CREAT}
OXYMORPHONE UR CFM-MCNC: 2520 NG/ML
OXYMORPHONE/CREAT UR: 1938 NG/MG {CREAT}
PREGABALIN UR QL CFM: PRESENT

## 2023-10-12 ENCOUNTER — TELEPHONE (OUTPATIENT)
Dept: PEDIATRICS | Facility: CLINIC | Age: 58
End: 2023-10-12
Payer: MEDICAID

## 2023-10-12 NOTE — TELEPHONE ENCOUNTER
Performed patient outreach via telephone x 2, in attempt to follow-up with patient regarding urinary symptoms.      Medication non-adherence  Patient may not have money to afford co-pays. In this case, she can maintain her current venlafaxine dosing.    OUTCOME:    No answer/patient unable to reach at this time.    Secure message sent to patient.

## 2023-10-16 ENCOUNTER — PATIENT OUTREACH (OUTPATIENT)
Dept: CARE COORDINATION | Facility: CLINIC | Age: 58
End: 2023-10-16
Payer: MEDICAID

## 2023-10-16 NOTE — PROGRESS NOTES
Clinic Care Coordination Contact  [facility] [[email or web address]]/Voicemail       Clinical Data: Care Coordinator Outreach  Outreach attempted x 1.  Left message on patient's voicemail with call back information and requested return call.    Plan: Care Coordinator will try to reach patient again in 3-5 business days.    DENISSE Goodman, B.S. [facility] Care Coordination  [facility]:  [place], [place]  [phone]  [email or web address]

## 2023-10-23 ENCOUNTER — TRANSFERRED RECORDS (OUTPATIENT)
Dept: HEALTH INFORMATION MANAGEMENT | Facility: CLINIC | Age: 58
End: 2023-10-23
Payer: MEDICAID

## 2023-10-26 ENCOUNTER — PATIENT OUTREACH (OUTPATIENT)
Dept: CARE COORDINATION | Facility: CLINIC | Age: 58
End: 2023-10-26
Payer: MEDICAID

## 2023-10-26 NOTE — LETTER
[facility] CARE COORDINATION  [address] [place] [address]    November 20, 2023    Hina Yap  [address]  [place] [address]      Dear Hina,    I have been unsuccessful in reaching you since our last contact. At this time the Care Coordination team will make no further attempts to reach you, however this does not change your ability to continue receiving care from your providers at your primary care clinic. If you need additional support from a care coordinator in the future please contact me.    All of us at [facility] are invested in your health and are here to assist you in meeting your goals.     Sincerely,    Jf Riddle, [facility]  Clinic Care Coordinator  [facility]  [phone]  [email or web address]

## 2023-10-26 NOTE — PROGRESS NOTES
Clinic Care Coordination Contact  [facility]/Voicemail    Clinical Data: Care Coordinator Outreach    Outreach Documentation Number of Outreach Attempt   10/26/2023  11:05 AM 2       Left message on patient's voicemail with call back information and requested return call.    Plan: Care Coordinator will send a message asking for call back via [employer]. Care Coordinator will do no further outreaches at this time, chart review in 10 business days to review for response. If no reply, will route to lead CC to review for disenrollment.    DENISSE Goodman, B.S. [facility] Care Coordination  [facility]:  [place]  [phone]  [email or web address]

## 2023-11-08 ENCOUNTER — VIRTUAL VISIT (OUTPATIENT)
Dept: PEDIATRICS | Facility: CLINIC | Age: 58
End: 2023-11-08
Payer: MEDICAID

## 2023-11-08 ENCOUNTER — MYC REFILL (OUTPATIENT)
Dept: PEDIATRICS | Facility: CLINIC | Age: 58
End: 2023-11-08

## 2023-11-08 DIAGNOSIS — R53.83 OTHER FATIGUE: ICD-10-CM

## 2023-11-08 DIAGNOSIS — F33.1 MODERATE EPISODE OF RECURRENT MAJOR DEPRESSIVE DISORDER (H): ICD-10-CM

## 2023-11-08 DIAGNOSIS — G35 MS (MULTIPLE SCLEROSIS) (H): ICD-10-CM

## 2023-11-08 DIAGNOSIS — F33.1 MODERATE EPISODE OF RECURRENT MAJOR DEPRESSIVE DISORDER (H): Primary | ICD-10-CM

## 2023-11-08 PROCEDURE — 99207 PR NO CHARGE LOS: CPT | Mod: 93 | Performed by: NURSE PRACTITIONER

## 2023-11-08 RX ORDER — VENLAFAXINE HYDROCHLORIDE 75 MG/1
225 CAPSULE, EXTENDED RELEASE ORAL DAILY
Qty: 270 CAPSULE | Refills: 0 | Status: CANCELLED | OUTPATIENT
Start: 2023-11-08

## 2023-11-08 RX ORDER — VENLAFAXINE HYDROCHLORIDE 75 MG/1
225 CAPSULE, EXTENDED RELEASE ORAL DAILY
Qty: 270 CAPSULE | Refills: 0 | Status: SHIPPED | OUTPATIENT
Start: 2023-11-08 | End: 2023-11-09

## 2023-11-08 NOTE — TELEPHONE ENCOUNTER
"Marilyn    What made you think the adderall was contributing to falls?  Previously we decreased effexor: \"Depression/Anxiety: patient continues to express desire to go down on the venlafaxine with concerns of urinary symptoms today (and previously she was expressing concerns that it was causing itching, nasal drip, sweating). \" Did she end up going up on the effexor? Did you happen to discuss these side effects at all?    Best,    Sunshine  "

## 2023-11-08 NOTE — TELEPHONE ENCOUNTER
Bret Gonsalez,     Thanks for reaching out.    Patient reported worsening depression and anxiety. Patient is tolerating current venlafaxine dose and denied any of the side effects you listed above. She does not believe Effexor caused history of post nasal drip or itching. Casper decision making was utlized in this decision to up-titrate opposed to adding second agent.     Do you feel comfortable resuming prescription for adderall? I do not feel comfortable prescribing patient adderall for fatigue. Otherwise, I would recommend neurology to manage if they were initial prescribers.    Thank you,    Marilyn Benavides [facility]

## 2023-11-09 ENCOUNTER — TRANSFERRED RECORDS (OUTPATIENT)
Dept: HEALTH INFORMATION MANAGEMENT | Facility: CLINIC | Age: 58
End: 2023-11-09
Payer: MEDICAID

## 2023-11-09 ENCOUNTER — TELEPHONE (OUTPATIENT)
Dept: PEDIATRICS | Facility: CLINIC | Age: 58
End: 2023-11-09
Payer: MEDICAID

## 2023-11-09 DIAGNOSIS — F33.1 MODERATE EPISODE OF RECURRENT MAJOR DEPRESSIVE DISORDER (H): ICD-10-CM

## 2023-11-09 RX ORDER — DEXTROAMPHETAMINE SACCHARATE, AMPHETAMINE ASPARTATE MONOHYDRATE, DEXTROAMPHETAMINE SULFATE AND AMPHETAMINE SULFATE 7.5; 7.5; 7.5; 7.5 MG/1; MG/1; MG/1; MG/1
30 CAPSULE, EXTENDED RELEASE ORAL EVERY MORNING
Qty: 30 CAPSULE | Refills: 0 | Status: SHIPPED | OUTPATIENT
Start: 2023-11-09 | End: 2023-12-08

## 2023-11-09 RX ORDER — DEXTROAMPHETAMINE SACCHARATE, AMPHETAMINE ASPARTATE, DEXTROAMPHETAMINE SULFATE AND AMPHETAMINE SULFATE 2.5; 2.5; 2.5; 2.5 MG/1; MG/1; MG/1; MG/1
10 TABLET ORAL DAILY
Qty: 30 TABLET | Refills: 0 | Status: SHIPPED | OUTPATIENT
Start: 2023-11-09 | End: 2023-12-08

## 2023-11-09 RX ORDER — VENLAFAXINE HYDROCHLORIDE 75 MG/1
225 CAPSULE, EXTENDED RELEASE ORAL DAILY
Qty: 270 CAPSULE | Refills: 0 | Status: SHIPPED | OUTPATIENT
Start: 2023-11-09 | End: 2024-01-11

## 2023-11-09 NOTE — TELEPHONE ENCOUNTER
Patient has reported frequent falls for at least 10 months. Patient was not forthcoming with fall details. Given possible adderall adverse side effect includes dizziness. I would consider possibly a contributing factor. Patient is extremely complex with ataxia progressive MS, I would refer to MS neuro specialty.  I did order the patient a front wheel walker in attempt to further reduce fall risk. As always, I would recommend PT OT however, patient declines notable, psycho social/ financial limitations.    Thank you,    Marilyn Benavides DNP CNP

## 2023-11-09 NOTE — TELEPHONE ENCOUNTER
General Call    Contacts         Type Contact Phone/Fax    11/09/2023 09:38 AM CST Phone (Incoming) [id number] - [place], [place] - [address] AT [facility] [address] (Pharmacy) [phone]          Reason for Call:  Venlafaxine ER 75MG Capsules    What are your questions or concerns:  There are 2 sets of directions on the prescription, take 3 capsules daily . Which directions current?  [facility] [phone]    Date of last appointment with provider: 10/5/2023 Virtual with Marilyn Benavides

## 2023-11-09 NOTE — TELEPHONE ENCOUNTER
1st attempt: Sent [employer] message. Tentatively, scheduled for January 11th [facility]. Waiting for patient confirmation.    Thank you  Clarence LANGLEY

## 2023-11-09 NOTE — TELEPHONE ENCOUNTER
Marilyn Benavides: Please review Sig for venlafaxine.    Per order:   Route: Take 3 capsules (225 mg) by mouth daily

## 2023-11-09 NOTE — TELEPHONE ENCOUNTER
Thanks for your note.    Regarding the adderall. You mentioned in your note you thought it was contributing to falls. Has she had more falls recently? What made you connect the adderall with the falls?    Sunshine Hernandez FNP-MARY.

## 2023-11-10 NOTE — PROGRESS NOTES
venlafaxine refill request received. I received a message from PCP Sunshine Butterfield inquiring if patient is taking venlafaxine as ordered.     Therefore, I conducted patient telephonic outreach verified patient she is taking venlafaxine 75 mg. Patient denies medication side effects.     Chronic depression symptoms continue to remain stable/ ongoing. Patient also volunteers information she does not have to leave her home at this time.    I recommend follow-up with me or PCP in 2 weeks, patient declines to make an appointment at this time. Patient denies additional inquiries or requests at this  time.

## 2023-11-20 NOTE — PROGRESS NOTES
Clinic Care Coordination Contact    Situation: Patient chart reviewed by care coordinator.    Background: Pt is enrolled in care coordination and followed to assist with goal(s) progression. Chart routed to [facility] by W for review to determine eligibility of diserolling from Care Coordination per standard work.     Assessment: Per Chart Review pt has been unreachable for follow up x3 with no further engagement or return calls.     Plan/Recommendations: Per Care Coordination standard work pt will be disenrolled from Care Coordination. Care Coordinator will send disenrollment letter with care coordinator contact information via [employer]. Care Coordinator will remain available, however, will do no further outreaches at this time unless a new referral is made or a change it pt status occurs. Pt has been provided with this writer's contact information and has been encouraged to call with any questions.     Jf Riddle [facility]  Clinic Care Coordinator  [facility]  [facility]  [facility]  [phone]  [email or web address]

## 2023-11-21 ENCOUNTER — TRANSFERRED RECORDS (OUTPATIENT)
Dept: HEALTH INFORMATION MANAGEMENT | Facility: CLINIC | Age: 58
End: 2023-11-21
Payer: MEDICAID

## 2023-12-08 ENCOUNTER — MYC REFILL (OUTPATIENT)
Dept: PEDIATRICS | Facility: CLINIC | Age: 58
End: 2023-12-08
Payer: MEDICAID

## 2023-12-08 DIAGNOSIS — G35 MS (MULTIPLE SCLEROSIS) (H): ICD-10-CM

## 2023-12-08 DIAGNOSIS — R53.83 OTHER FATIGUE: ICD-10-CM

## 2023-12-08 RX ORDER — DEXTROAMPHETAMINE SACCHARATE, AMPHETAMINE ASPARTATE, DEXTROAMPHETAMINE SULFATE AND AMPHETAMINE SULFATE 2.5; 2.5; 2.5; 2.5 MG/1; MG/1; MG/1; MG/1
10 TABLET ORAL DAILY
Qty: 30 TABLET | Refills: 0 | Status: SHIPPED | OUTPATIENT
Start: 2023-12-08 | End: 2024-01-11

## 2023-12-08 RX ORDER — DEXTROAMPHETAMINE SACCHARATE, AMPHETAMINE ASPARTATE MONOHYDRATE, DEXTROAMPHETAMINE SULFATE AND AMPHETAMINE SULFATE 7.5; 7.5; 7.5; 7.5 MG/1; MG/1; MG/1; MG/1
30 CAPSULE, EXTENDED RELEASE ORAL EVERY MORNING
Qty: 30 CAPSULE | Refills: 0 | Status: SHIPPED | OUTPATIENT
Start: 2023-12-08 | End: 2024-02-26

## 2023-12-08 NOTE — TELEPHONE ENCOUNTER
Refilled adderall for pt as pcp is out of office. [place] rx monitoring checked, no unexpected rx fills noted

## 2023-12-10 ENCOUNTER — HOSPITAL ENCOUNTER (EMERGENCY)
Facility: CLINIC | Age: 58
Discharge: HOME | End: 2023-12-10
Attending: EMERGENCY MEDICINE | Admitting: EMERGENCY MEDICINE
Payer: MEDICAID

## 2023-12-10 ENCOUNTER — APPOINTMENT (OUTPATIENT)
Dept: GENERAL RADIOLOGY | Facility: CLINIC | Age: 58
End: 2023-12-10
Attending: EMERGENCY MEDICINE
Payer: MEDICAID

## 2023-12-10 VITALS
OXYGEN SATURATION: 99 % | DIASTOLIC BLOOD PRESSURE: 83 MMHG | SYSTOLIC BLOOD PRESSURE: 118 MMHG | WEIGHT: 140 LBS | RESPIRATION RATE: 18 BRPM | TEMPERATURE: 98.1 F | HEART RATE: 96 BPM | BODY MASS INDEX: 22.6 KG/M2

## 2023-12-10 DIAGNOSIS — S92.352A CLOSED DISPLACED FRACTURE OF FIFTH METATARSAL BONE OF LEFT FOOT, INITIAL ENCOUNTER: ICD-10-CM

## 2023-12-10 DIAGNOSIS — S92.342A CLOSED DISPLACED FRACTURE OF FOURTH METATARSAL BONE OF LEFT FOOT, INITIAL ENCOUNTER: ICD-10-CM

## 2023-12-10 DIAGNOSIS — S82.55XA CLOSED NONDISPLACED FRACTURE OF MEDIAL MALLEOLUS OF LEFT TIBIA, INITIAL ENCOUNTER: ICD-10-CM

## 2023-12-10 DIAGNOSIS — S82.65XA CLOSED NONDISPLACED FRACTURE OF LATERAL MALLEOLUS OF LEFT FIBULA, INITIAL ENCOUNTER: ICD-10-CM

## 2023-12-10 PROCEDURE — 73562 X-RAY EXAM OF KNEE 3: CPT | Mod: LT

## 2023-12-10 PROCEDURE — 73562 X-RAY EXAM OF KNEE 3: CPT | Mod: 50

## 2023-12-10 PROCEDURE — 99284 EMERGENCY DEPT VISIT MOD MDM: CPT | Mod: 25

## 2023-12-10 PROCEDURE — 27808 TREATMENT OF ANKLE FRACTURE: CPT | Mod: LT

## 2023-12-10 PROCEDURE — 73610 X-RAY EXAM OF ANKLE: CPT | Mod: LT

## 2023-12-10 PROCEDURE — 250N000013 HC RX MED GY IP 250 OP 250 PS 637: Performed by: EMERGENCY MEDICINE

## 2023-12-10 PROCEDURE — 73630 X-RAY EXAM OF FOOT: CPT | Mod: LT

## 2023-12-10 RX ORDER — OXYCODONE HYDROCHLORIDE 5 MG/1
10 TABLET ORAL ONCE
Status: COMPLETED | OUTPATIENT
Start: 2023-12-10 | End: 2023-12-10

## 2023-12-10 RX ADMIN — OXYCODONE HYDROCHLORIDE 10 MG: 5 TABLET ORAL at 16:09

## 2023-12-10 ASSESSMENT — ACTIVITIES OF DAILY LIVING (ADL): ADLS_ACUITY_SCORE: 35

## 2023-12-10 NOTE — ED PROVIDER NOTES
History     Chief Complaint:  Fall       The history is provided by the patient.      Hina Yap is a 58 year old female with history of MS, hypertension, PVCs, nonrheumatic mitral valve regurgitation, and chronic kidney disease stage 2 who presents to the ED after a fall. The patient reports that she was going down the stairs yesterday morning and fell forwards.  She did not strike her head or lose consciousness.  She states that she is experiencing left foot swelling, left ankle swelling, bilateral knee pain, left arm pain. She confirms that she is able to walk. She states that she has arthritic knees. She reports that she normally takes 10 mg of Oxycodone 5x per day. She states that she took only one this morning.     Independent Historian:   None - Patient Only    Review of External Notes:   None    Medications:    Albuterol inhaler  Amlodipine  Amphetamine-dextroamphetamine  Baclofen  Budesonide-formoterol Inhaler  Calcium Carb-Cholecalciferol   Cetirizine  Cyclosporine  Docusate sodium   Esomeprazole  Ferrous sulfate  Ibuprofen  Multivitamin  Naloxone  Nicotine polacrilex  Nutritional Supplements  Oxycodone-acetaminophen   Polyethylene glycol   Pregabalin  Promethazine  Ropinirole  Tiotropium inhaler  Venlafaxine    Past Medical History:    Anxiety  Arthritis  COPD (chronic obstructive pulmonary disease) (H)  Depressive disorder  GERD (gastroesophageal reflux disease)  Hypertension  Ischemic cardiomyopathy  Multiple sclerosis (H)  Neuromuscular disorder (H)  Nonrheumatic mitral valve regurgitation  PVC's (premature ventricular contractions)  Renal disease  Restless leg syndrome  Tobacco use disorder  CKD (chronic kidney disease) stage 2, GFR 60-89 ml/min  Insomnia   Prolonged Q-T interval on ECG  Nonrheumatic mitral valve regurgitation  Iron deficiency    Past Surgical History:    Optical tracking system fusion posterior spine lumbar (N/A)   Gyn surgery - tubal ligation        Physical Exam   Patient  Vitals for the past 24 hrs:   BP Temp Temp src Pulse Resp SpO2 Weight   12/10/23 1434 118/83 98.1  F (36.7  C) Temporal 96 18 99 % 63.5 kg (140 lb)        Physical Exam    GENERAL:  Pleasant, age appropriate.   HEENT:   No scalp hematoma or defect to the bony calvarium.      Sequeira's and Racoon's sign negative.      Oropharynx is moist, without lesions or trismus.  EYES:   Conjunctiva normal, PERRL  NECK:   C-spine non-tender     No bony step-off to cervical spine.   CV:    Regular rate and rhythm.     No murmurs, rubs or gallops.      2+ DP pulses bilateral  PULM:  Clear to auscultation bilateral.      No respiratory distress.      No subcutaneous emphysema or crepitus.  ABD:   Soft, non-tender, non-distended.      No pulsatile masses.  No rebound or guarding.  MSK:    RLE:     Knee: No effusion      Extensor mechanism and ligaments intact      Abrasion over the anterior knee      Mild diffuse tenderness    LLE      Knee: No effusion      Extensor mechanism and ligaments intact      Abrasion over the anterior knee      Mild diffuse tenderness     Achilles tendon intact, Santana test within norm limits     Marked edema and ecchymosis at the ankle and the dorsum of the foot with diffuse tenderness      No violation of the skin or gross deformity  LYMPH:  No cervical lymphadenopathy.  NEURO:  Alert and oriented x 3. GCS 15.      Strength and sensation intact lower extremities  SKIN:   Warm, dry and intact.    PSYCH:   Mood is good and affect is appropriate.      Emergency Department Course   Imaging:  XR Knee Bilateral 3 Views   Final Result   IMPRESSION:    RIGHT KNEE: Anatomic alignment right knee. No acute displaced right knee fracture. Mild medial and patellofemoral compartment right knee osteoarthritis. Tiny right knee joint effusion. No significant anterior right knee soft tissue swelling.      LEFT KNEE: Anatomic alignment left knee. No acute displaced left knee fracture. Mild medial and patellofemoral  compartment left knee osteoarthritis. No left knee joint effusion. No significant anterior left knee soft tissue swelling.            Foot XR, G/E 3 views, left   Final Result   IMPRESSION: Comminuted displaced fracture mid to distal shaft fifth metatarsal extending to the neck. Comminuted mildly displaced fracture distal shaft to neck fourth metatarsal. Focal cortical thickening is seen along the medial shaft of the third    metatarsal. Partial visualization of distal tibia and fibula fractures. 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 Ankle and foot soft tissue swelling mainly dorsal and distal. No dislocation.      NOTE: ABNORMAL REPORT      THE DICTATION ABOVE DESCRIBES AN ABNORMALITY FOR WHICH FOLLOW-UP IS NEEDED.       XR Ankle Left G/E 3 Views   Final Result   IMPRESSION: Minimally displaced transverse oriented fracture of the lateral malleolus below the level of the tibial plafond. Oblique nondisplaced fracture at the base of the medial malleolus. Moderate to severe lateral and mild medial ankle soft tissue    swelling. Intact appearing ankle mortise and distal syndesmosis. No significant ankle or hindfoot joint space narrowing.      NOTE: ABNORMAL REPORT      THE DICTATION ABOVE DESCRIBES AN ABNORMALITY FOR WHICH FOLLOW-UP IS NEEDED.                Laboratory:  Labs Ordered and Resulted from Time of ED Arrival to Time of ED Departure - No data to display     Procedures     Splint Placement     Procedure: Splint Placement     Indication: Fracture    Consent: Verbal     Location: Left Lower Extremity     Procedure detail:   Splint was applied by Myself  Splint type: Posterior short leg and stirrup   Splint materilal: Fiberglass  After placement I checked and adjusted the fit as needed to ensure proper positioning/fit   Sensation and circulation are intact after splint placement     Patient Status: The patient tolerated the procedure well: Yes. There were no complications.   Short leg  posterior and stirrups pslint     Emergency Department Course & Assessments:           Interventions:  Medications   oxyCODONE (ROXICODONE) tablet 10 mg (10 mg Oral $Given 12/10/23 1609)        Independent Interpretation (X-rays, CTs, rhythm strip):  I independently reviewed x-rays of the knees which are without fracture or dislocation.  X-ray of the left ankle reveals nondisplaced medial and lateral malleolus fracture.  X-ray of the left foot reveals displaced fourth and fifth metatarsal fractures.    Assessments/Consultations/Discussion of Management or Tests:  ED Course as of 12/10/23 1654   Sun Dec 10, 2023   1537 I obtained history and examined the patient as noted above.    1638 I performed a splint placement.       Social Determinants of Health affecting care:   None    Disposition:  The patient was discharged to home.     Impression & Plan    Medical Decision Makin-year-old female presents after mechanical fall yesterday resulting in bilateral knee and left foot/ankle pain.  Ligaments and tendons intact to the knee.  X-rays of the knee are unremarkable.  Evaluation consistent with soft tissue contusion.  Unfortunately she has a nondisplaced fracture of the medial and lateral malleolus on the left as well as displaced fractures of the fourth and fifth metatarsals.  Patient placed in a short leg posterior and stirrup splint.  Nonweightbearing with crutches.  She takes oxycodone daily which she can continue to utilize for pain control.  Outpatient referral to orthopedic surgery.      Diagnosis:    ICD-10-CM    1. Closed displaced fracture of fifth metatarsal bone of left foot, initial encounter  S92.352A       2. Closed displaced fracture of fourth metatarsal bone of left foot, initial encounter  S92.342A       3. Closed nondisplaced fracture of medial malleolus of left tibia, initial encounter  S82.55XA       4. Closed nondisplaced fracture of lateral malleolus of left fibula, initial encounter   S82.65XA            Discharge Medications:  New Prescriptions    No medications on file          Scribe Disclosure:  I, Liv Lopez, am serving as a scribe at 4:22 PM on 12/10/2023 to document services personally performed by Steven Pruitt MD based on my observations and the provider's statements to me.     12/10/2023   Steven Pruitt MD Matthews, Jeremiah R, MD  12/10/23 1826

## 2023-12-10 NOTE — DISCHARGE INSTRUCTIONS
Please follow-up with the orthopedic surgeon this week please.  Please tell them that you have a fracture to the fourth and fifth metatarsal as well as the distal tibia and fibula.    Discharge Instructions  Splint Care    You had a splint put on today to help protect your injury and help it heal.  Splints are used to treat things like strains, sprains, large cuts, and fractures (broken bones). Splints are temporary and are designed to allow for swelling.    Be sure your splint is not too tight!  If you splint is too tight, it may cause loss of blood supply.  Signs of your splint being too tight include: your arm or leg hurting a lot more; your fingers or toes getting numb, cold, pale or blue; or your child is crying, fussing or seeming restless.    Generally, every Emergency Department visit should have a follow-up clinic visit with either a primary or a specialty clinic/provider. Please follow-up as instructed by your emergency provider today.  Return to the Emergency Department right away if:  You have increased pain or pressure around the injury.  You have numbness, tingling, or cool, pale, or blue toes or fingers past the injury.  Your child is more fussy than normal, crying a lot, or restless.  Your splint becomes soft, breaks, or is wet.  Your splint begins to smell bad.  Your splint is cutting into your skin.    Home care:  Keep the injured area above the level of your heart while laying or sitting down.  This will help decrease the swelling and the pain.  Keep the splint dry.  Do not put objects down or inside the splint.  If there is an elastic bandage (Ace  wrap) holding the splint on this may be loosened slightly to relieve pressure or pain.  If pain continues return to the Emergency Department right away.  Do not remove your splint by yourself unless told to by your provider.    Follow-up:  Sometimes the splint put on in the Emergency Department needs to be changed once the swelling has gone down and a  more permanent cast needs to be placed.  This is usually done by a bone specialist provider (Orthopedist).  Follow the instructions given to you by your provider today.    If you were given a prescription for medicine here today, be sure to read all of the information (including the package insert) that comes with your prescription.  This will include important information about the medicine, its side effects, and any warnings that you need to know about.  The pharmacist who fills the prescription can provide more information and answer questions you may have about the medicine.  If you have questions or concerns that the pharmacist cannot address, please call or return to the Emergency Department.     Remember that you can always come back to the Emergency Department if you are not able to see your regular provider in the amount of time listed above, if you get any new symptoms, or if there is anything that worries you.

## 2023-12-10 NOTE — ED TRIAGE NOTES
PT reports that she fell down 3 stairs at home 2 days ago, pt reports pain to bilateral knees and Left lower leg, bruising noted to left ankle with abrasions to knees. No LOC, didn't hit head. PT VSS and ABC's intact

## 2023-12-12 ENCOUNTER — TELEPHONE (OUTPATIENT)
Dept: PEDIATRICS | Facility: CLINIC | Age: 58
End: 2023-12-12
Payer: MEDICAID

## 2023-12-12 ENCOUNTER — TRANSFERRED RECORDS (OUTPATIENT)
Dept: HEALTH INFORMATION MANAGEMENT | Facility: CLINIC | Age: 58
End: 2023-12-12
Payer: MEDICAID

## 2023-12-12 NOTE — TELEPHONE ENCOUNTER
"Received call from the pt.    Pt states she was seen in the ED this weekend after a fall resulting in injuries to her left knee and foot.  Pt is scheduled for orthopedic surgery on Friday, December 15th.    Pt is seeking pre-op appt.  Pt scheduled for tomorrow with SB5 extender.     Orthopedics [facility]  Surgeon: Dr Bryan Khan  Friday Dec 15th, 2023      - Tim \"Graham\" Terry (he/him/his), RN - Patient Advocate Liason (PAL)  [facility]    "

## 2023-12-13 ENCOUNTER — ANCILLARY PROCEDURE (OUTPATIENT)
Dept: GENERAL RADIOLOGY | Facility: CLINIC | Age: 58
End: 2023-12-13
Attending: NURSE PRACTITIONER
Payer: MEDICAID

## 2023-12-13 ENCOUNTER — OFFICE VISIT (OUTPATIENT)
Dept: PEDIATRICS | Facility: CLINIC | Age: 58
End: 2023-12-13
Payer: MEDICAID

## 2023-12-13 VITALS
BODY MASS INDEX: 21.16 KG/M2 | HEART RATE: 94 BPM | WEIGHT: 134.8 LBS | DIASTOLIC BLOOD PRESSURE: 69 MMHG | TEMPERATURE: 97.8 F | OXYGEN SATURATION: 97 % | SYSTOLIC BLOOD PRESSURE: 117 MMHG | HEIGHT: 67 IN | RESPIRATION RATE: 12 BRPM

## 2023-12-13 DIAGNOSIS — S82.65XA CLOSED NONDISPLACED FRACTURE OF LATERAL MALLEOLUS OF LEFT FIBULA, INITIAL ENCOUNTER: ICD-10-CM

## 2023-12-13 DIAGNOSIS — G89.4 CHRONIC PAIN SYNDROME: ICD-10-CM

## 2023-12-13 DIAGNOSIS — Z01.818 PREOPERATIVE EXAMINATION: Primary | ICD-10-CM

## 2023-12-13 DIAGNOSIS — Z13.820 SCREENING FOR OSTEOPOROSIS: ICD-10-CM

## 2023-12-13 DIAGNOSIS — G35 MS (MULTIPLE SCLEROSIS) (H): ICD-10-CM

## 2023-12-13 DIAGNOSIS — N18.2 CKD (CHRONIC KIDNEY DISEASE) STAGE 2, GFR 60-89 ML/MIN: ICD-10-CM

## 2023-12-13 DIAGNOSIS — S82.55XA CLOSED NONDISPLACED FRACTURE OF MEDIAL MALLEOLUS OF LEFT TIBIA, INITIAL ENCOUNTER: ICD-10-CM

## 2023-12-13 DIAGNOSIS — S92.352A CLOSED DISPLACED FRACTURE OF FIFTH METATARSAL BONE OF LEFT FOOT, INITIAL ENCOUNTER: ICD-10-CM

## 2023-12-13 DIAGNOSIS — Z01.818 PREOPERATIVE EXAMINATION: ICD-10-CM

## 2023-12-13 DIAGNOSIS — I10 ESSENTIAL HYPERTENSION: ICD-10-CM

## 2023-12-13 LAB
ANION GAP SERPL CALCULATED.3IONS-SCNC: 3 MMOL/L (ref 3–14)
BUN SERPL-MCNC: 25 MG/DL (ref 7–30)
CALCIUM SERPL-MCNC: 9.6 MG/DL (ref 8.5–10.1)
CHLORIDE BLD-SCNC: 109 MMOL/L (ref 94–109)
CO2 SERPL-SCNC: 31 MMOL/L (ref 20–32)
CREAT SERPL-MCNC: 0.7 MG/DL (ref 0.52–1.04)
EGFRCR SERPLBLD CKD-EPI 2021: >90 ML/MIN/1.73M2
ERYTHROCYTE [DISTWIDTH] IN BLOOD BY AUTOMATED COUNT: 13.8 % (ref 10–15)
GLUCOSE BLD-MCNC: 98 MG/DL (ref 70–99)
HBA1C MFR BLD: 5.4 % (ref 0–5.6)
HCT VFR BLD AUTO: 34.6 % (ref 35–47)
HGB BLD-MCNC: 11.1 G/DL (ref 11.7–15.7)
MCH RBC QN AUTO: 29.9 PG (ref 26.5–33)
MCHC RBC AUTO-ENTMCNC: 32.1 G/DL (ref 31.5–36.5)
MCV RBC AUTO: 93 FL (ref 78–100)
PLATELET # BLD AUTO: 320 10E3/UL (ref 150–450)
POTASSIUM BLD-SCNC: 4 MMOL/L (ref 3.4–5.3)
RBC # BLD AUTO: 3.71 10E6/UL (ref 3.8–5.2)
SODIUM SERPL-SCNC: 143 MMOL/L (ref 133–144)
WBC # BLD AUTO: 9 10E3/UL (ref 4–11)

## 2023-12-13 PROCEDURE — 71046 X-RAY EXAM CHEST 2 VIEWS: CPT | Mod: TC | Performed by: RADIOLOGY

## 2023-12-13 PROCEDURE — 99215 OFFICE O/P EST HI 40 MIN: CPT | Mod: 25 | Performed by: NURSE PRACTITIONER

## 2023-12-13 PROCEDURE — 83036 HEMOGLOBIN GLYCOSYLATED A1C: CPT | Performed by: NURSE PRACTITIONER

## 2023-12-13 PROCEDURE — 99417 PROLNG OP E/M EACH 15 MIN: CPT | Performed by: NURSE PRACTITIONER

## 2023-12-13 PROCEDURE — 93000 ELECTROCARDIOGRAM COMPLETE: CPT | Performed by: NURSE PRACTITIONER

## 2023-12-13 PROCEDURE — 36415 COLL VENOUS BLD VENIPUNCTURE: CPT | Performed by: NURSE PRACTITIONER

## 2023-12-13 PROCEDURE — 85027 COMPLETE CBC AUTOMATED: CPT | Performed by: NURSE PRACTITIONER

## 2023-12-13 PROCEDURE — 80048 BASIC METABOLIC PNL TOTAL CA: CPT | Performed by: NURSE PRACTITIONER

## 2023-12-13 RX ORDER — PREGABALIN 50 MG/1
75 CAPSULE ORAL 2 TIMES DAILY
COMMUNITY
Start: 2023-11-26 | End: 2024-03-04

## 2023-12-13 RX ORDER — PREGABALIN 75 MG/1
75 CAPSULE ORAL 2 TIMES DAILY
COMMUNITY
Start: 2023-04-27 | End: 2024-01-11

## 2023-12-13 ASSESSMENT — PAIN SCALES - GENERAL: PAINLEVEL: NO PAIN (0)

## 2023-12-13 NOTE — PROGRESS NOTES
Status-post fall    Preoperative examination  (primary encounter diagnosis)    Closed displaced fracture of fifth metatarsal bone of left foot, initial encounter  Closed displaced fracture of fourth metatarsal bone of left foot, initial encounter  Closed nondisplaced fracture of medial malleolus of left tibia, initial encounter  Closed nondisplaced fracture of lateral malleolus of left fibula, initial encounter      Comment: Today, patient presents to clinic for  preoperative evaluation of:   Left open reduction internal fixation ankle fracture and brostrom lateral ligament repair and open ar eduction internal fixation fifth metatarsal fracture and big toe fusion. following: closed displaced fracture fifth metatarsal bone of left foot.    On 12/9/23 patient endured fall from third step while looking for cat. Patient indicates she forgot she was not on bottom step and consequently endured fall landing on bilateral knees and bilateral wrists attempting to break fall. Denies loss of consciousness, denies hitting head. Patient went to sleep thinking she had a left foot sprain.    The next morning, On 12/10/23: Patient noticed increased edema of left foot therefore Patient roommate transported patient to Emergency department. Xray revealed, Closed displaced fracture of fifth metatarsal bone of left foot, initial encounter  Closed displaced fracture of fourth metatarsal bone of left foot, initial encounter  Closed nondisplaced fracture of medial malleolus of left tibia, initial encounter  Closed nondisplaced fracture of lateral malleolus of left fibula, initial encounter    [facility] provider recommends urgent surgery given displaced fracture.    Called surgery center, verified procedure details.    Plan:  BMP: stable, within normal limits  CBC: stable, within normal limits  chest xray: stable, within normal limits  EKG stable, within normal limits; SR    COPD    Patient denies SOB at rest or worsening CASTANO. LS clear  bilaterally, no rhonchi rales or wheezing, patient denies cough. 17. 50 pack-year smoking history. Patient has not needed to use rescue inhaler.    PLAN: Recommend smoking cessation.     MS (multiple sclerosis) (H)-[facility]-Takes Adderall  neuropathy  Chronic fatigue    Comment: stable, denies current flare, alert, well groomed, ambulates with boot. Denies fatigue or malaise. Patient is maiantained on: amphetamine-dextroamphetamine (ADDERALL XR)    Maintained on lyrica, stable, ongoing managed per neuropathy.    Multiple Sclerosis/Neuropathy:  Anovex injection 30 mcg IM weekly (neurology)  Baclofen 20 mg four times daily for MS pain (neurology)  For fatigue:              Adderall XR 30 mg in the morning (primary care provider)              Adderall IR 10 mg in the afternoon (primary care provider)  Neuropathy:              Lyrica 100 mg daily for chronic pain              Venlafaxine for depression and anxiety     She follows neurologist for MS.    Plan: Maintain current treatment plan.    Essential hypertension    Comment: reviewed, stable    BP Readings from Last 6 Encounters:   12/13/23 117/69   12/10/23 118/83   10/04/23 130/86   06/20/23 127/86   06/20/23 92/62   05/18/23 114/80       Plan: maintain current treatment plan norvasc 2.5 mg PO daily    Non rheumatic Mitral valve Regurgitation    Stable, reviewed Patient denies chest pain, Sob at rest, dizziness, lightheadedness. presyncope, syncope, palpitations.    Interpretation   Summary     Last echo 2018.   1. The left ventricle is normal in size. The visual ejection fraction is  estimated at 50-55%. Mild inferolateral hypokinesis.  2. The right ventricle is normal in structure, function and size.  3. No valve disease.    12/13/23: EKG: SR    PLAN:    Patient may benefit from repeat echo within the next 6 months.    CKD (chronic kidney disease) stage 2, GFR 60-89 ml/min    Comment: stable, ongoing.    Plan: BMP pending      Iron deficiency Anemia    PLAN:  cbc stable, Hb.1, platelets: 320, chronic smoker.    Chronic pain syndrome    Comment: reviewed, chronic ongoing,     Plan: pain clinic to continue to manage.      Chronic Pain    Managed per pain clinic, patient plans to follow up today with pain clinic    PLAN/Recommendation  Continue to hold ibuprofen  Continue to hold multivitamin, supplements      EKG, chest xray, lab      Future plan: dexa scan in 2 months; patient agreeable.    Depression    Stable, ongoing, patient has improved feelings of hope, well groomed. Maintained on venlafaxine    PLAN: maintain current treatment plan      [facility]  [address]  [address]  [place] [address]  Phone: [phone]  Fax: [phone]  Primary Provider: Sunshine Lunsford  Pre-op Performing Provider: NADINE SIEGEL      PREOPERATIVE EVALUATION:  Today's date: 2023    Hina Yap is a 58 year old female with history of MS, hypertension, PVCs, nonrheumatic mitral valve regurgitation, and chronic kidney disease stage 2 presenting for the following: closed displaced fracture fifth metatarsal bone of left foot,     On 23 patient endured unwitnessed  fall from third step while walking down staircase. Patient indicates she was looking for her cat and forgot she was not on bottom step and consequently endured fall landing on bilateral knees and bilateral wrists while attempting to break the fall. Denies loss of consciousness, denies hitting head. Patient went to sleep thinking she had a left foot sprain.      On 12/10/23: Patient noticed increased edema of left foot. Patient roommate/ ex-boyfriend transported patient to Emergency department.     Xray revealed: left foot: Baclofen continue without modification    Today, patient presents for preoperative evaluation of urgent left foot surgery. Despite left foot fracture, patient appears to be doing remarkably well. Patient is alert, calm, well  groomed, ambulatory independent with boot. Gait appears steady and even when compared to previous visits.    Patient denies history of anesthesia reaction post-operative complications.      Pre-Op Exam        12/13/2023    10:16 AM   Additional Questions   Roomed by ROLY Mendosa   Accompanied by RoommateHeber         12/13/2023    10:16 AM   Patient Reported Additional Medications   Patient reports taking the following new medications n/a     CONCERNS: None    Surgical Information:  Surgery/Procedure: Left open reduction internal fixation ankle fracture and brostrom lateral ligament repair and open ar eduction internal fixation fifth metatarsal fracture and big toe fusion.  Surgery Location: [facility]  Surgeon: Dr Bryan Khan  Surgery Date: 12/15/2023  Time of Surgery: 7:15 am starts at 8:45 am  Where patient plans to recover: At home with roommate  Fax number for surgical facility: [phone]    General Anesthesia: denies history of anesthesia reaction.    Assessment & Plan     The proposed surgical procedure is considered Intermediate to high risk.      Problem List Items Addressed This Visit          Nervous and Auditory    MS (multiple sclerosis) (H)-[facility]-Takes Adderall       Circulatory    Essential hypertension       Urinary    CKD (chronic kidney disease) stage 2, GFR 60-89 ml/min     Other Visit Diagnoses       Preoperative examination    -  Primary    Closed displaced fracture of fifth metatarsal bone of left foot, initial encounter        Closed displaced fracture of fourth metatarsal bone of left foot, initial encounter        Closed nondisplaced fracture of medial malleolus of left tibia, initial encounter        Closed nondisplaced fracture of lateral malleolus of left fibula, initial encounter        Chronic pain syndrome                  Depression Screening Follow Up       Row Labels 12/13/2023    10:02 AM   PHQ   Section Header. No data exists in this row.     PHQ-9 Total Score   21   Q9: Thoughts of better off dead/self-harm past 2 weeks   Several days   F/U: Thoughts of suicide or self-harm   Yes   F/U: Self harm-plan   No   F/U: Self-harm action   No   F/U: Safety concerns   No        Row Labels 12/13/2023    10:02 AM   Last PHQ-9   Section Header. No data exists in this row.    1.  Little interest or pleasure in doing things   3   2.  Feeling down, depressed, or hopeless   1   3.  Trouble falling or staying asleep, or sleeping too much   3   4.  Feeling tired or having little energy   3   5.  Poor appetite or overeating   3   6.  Feeling bad about yourself   1   7.  Trouble concentrating   3   8.  Moving slowly or restless   3   Q9: Thoughts of better off dead/self-harm past 2 weeks   1   PHQ-9 Total Score   21   In the past two weeks have you had thoughts of suicide or self harm?   Yes   Do you have concerns about your personal safety or the safety of others?   No   In the past 2 weeks have you thought about a plan or had intention to harm yourself?   No   In the past 2 weeks have you acted on these thoughts in any way?   No         Follow Up    Follow Up Actions Taken  Crisis resource information provided in the After Visit Summary    Discussed the following ways the patient can remain in a safe environment:      Risks and Recommendations:      The patient has the following additional risks and recommendations for perioperative complications:   - Multiple Sclerosis      NPO at midnight per facility guidelines    Additional Medication Instructions:    Baclofen continue without modification  Norvasc-(patient takes at night) continue without modification  Pregabalin-continue without modification)  Venlafaxine-(continue without modification)    Opioids: Oxycodone-acetaminophen continue without modification    Continue to hold supplements/ multivitamin-(patient is not taking)  Continue to hold NSAIDs -(patient is not taking)    Esomeprazole (nexium) 40 mg      RECOMMENDATION:    Patient may elect to proceed with procedure. Patient indicates perioperative and postoperative risks were reviewed with surgery team. Patient verbalizes understanding.       Subjective   {  HPI related to upcoming procedure:   Today, patient presents for preoperative evaluation of urgent left foot surgery. Despite left foot fracture, patient appears to be doing remarkably well. Patient is alert, calm, well groomed, ambulatory independent with boot. Gait appears steady and even when compared to previous visits.          12/13/2023    10:04 AM   Preop Questions   1. Have you ever had a heart attack or stroke? UNKNOWN -    2. Have you ever had surgery on your heart or blood vessels, such as a stent placement, a coronary artery bypass, or surgery on an artery in your head, neck, heart, or legs? No   3. Do you have chest pain with activity? No   4. Do you have a history of  heart failure? No   5. Do you currently have a cold, bronchitis or symptoms of other infection? No   6. Do you have a cough, shortness of breath, or wheezing? No   7. Do you or anyone in your family have previous history of blood clots? No   8. Do you or does anyone in your family have a serious bleeding problem such as prolonged bleeding following surgeries or cuts? No   9. Have you ever had problems with anemia or been told to take iron pills? No   10. Have you had any abnormal blood loss such as black, tarry or bloody stools, or abnormal vaginal bleeding? No   11. Have you ever had a blood transfusion? UNKNOWN -    12. Are you willing to have a blood transfusion if it is medically needed before, during, or after your surgery? Yes   13. Have you or any of your relatives ever had problems with anesthesia? No   14. Do you have sleep apnea, excessive snoring or daytime drowsiness? No   15. Do you have any artifical heart valves or other implanted medical devices like a pacemaker, defibrillator, or continuous glucose monitor? No    16. Do you have artificial joints? No   17. Are you allergic to latex? No   18. Is there any chance that you may be pregnant? No       Health Care Directive:  Patient does not have a Health Care Directive or Living Will: Full code    Preoperative Review of :  reviewed      Status of Chronic Conditions:    HYPERTENSION - Patient has longstanding history of HTN , currently denies any symptoms referable to elevated blood pressure. Specifically denies chest pain, palpitations, dyspnea, orthopnea, PND or peripheral edema. Blood pressure readings have been in normal range. Current medication regimen is as listed below. Patient denies any side effects of medication.     COPD: stable, denies need for rescue inhaler, lS clear bilaterally no wheezing, rhonchi or rales.    Neurological:  Multiple Sclerosis: stable, denies current flare, gait stability has improved since last visit despite left walking boot intact.      Right Ankle Exam   Swelling: none    Range of Motion   The patient has normal right ankle ROM.       Left Ankle Exam   Swelling: mild                 Patient Active Problem List    Diagnosis Date Noted    Itching 05/18/2023     Priority: Medium     Unclear cause  Slightly improved with Zyrtec but doesn't last all day. Increased to BID May 18, 2023  Not improved with increased lyrica.  Declines derm/allergy referral  Asked SB3 pal to call neuro to see if this could be MS related May 18, 2023        Urinary urgency 05/18/2023     Priority: Medium     With incontinence. Referred to urology May 18, 2023        Iron deficiency 05/18/2023     Priority: Medium     Managed by neuro. Takes iron Unclear cause.  May 18, 2023: Recheck ordered. Asked her to do a FIT test.      S/P lumbar fusion 07/23/2021     Priority: Medium    Essential hypertension 11/30/2020     Priority: Medium    Nonrheumatic mitral valve regurgitation 11/30/2020     Priority: Medium    Prolonged Q-T interval on ECG 10/29/2020     Priority: Medium      Seen on ED visit October 29, 2020  Resolved on latest EKG. March 22, 2021  Borderline 10/2022          Other chronic pain-sees pain clinic 11/21/2019     Priority: Medium    CKD (chronic kidney disease) stage 2, GFR 60-89 ml/min 11/21/2019     Priority: Medium    Insomnia, unspecified type 11/21/2019     Priority: Medium    Moderate episode of recurrent major depressive disorder (H) 12/14/2018     Priority: Medium    Restless leg syndrome 04/24/2017     Priority: Medium    Esophageal reflux 01/26/2016     Priority: Medium    Anxiety 01/26/2016     Priority: Medium        (F41.9) Anxiety  (primary encounter diagnosis)  Comment: Feels much better now that we are not weaning her opioids. Seeing pain clinic. We have reduced the alprazolam by half. Still has trouble sleeping-her mind races. But feels anxiety is well controlled during the day  Plan: mirtazapine (REMERON) 15 MG tablet, ALPRAZolam         (XANAX) 0.5 MG tablet  -Ok to continue alprazolam for now. She has a lot going on right now, but I would like to eventually get her to see psychiatry and have them take over the alprazolam. Will revisit 9/2021  -Again reviewed the potentially lethal risks of combining opioids and alprazolam. I am mildly comforted that both of her doses are much lower than when I inherited her, but the risk is still great. However, weaning further may destabilize her mental health. As we have just somewhat stabilized, she feels the benefit outweighs the risk  -Follow-up 9/2021 and reconsider wean. May consider TMS vs ketamine through psych.       Cardiomyopathy (H) 12/03/2015     Priority: Medium     Not ischemic per cards.  Asked her to see yearly May 18, 2023        MS (multiple sclerosis) (H)-[facility] clinic-Takes Adderall 12/17/2012     Priority: Medium     Controlled Substance Refill Request for   Adderall 30 xr 30 tabs per month  Adderall IR 10mg x 30 tabs a month    Last refill: 12/1/10    Last clinic visit: 11/21/19     Clinic  visit frequency required: Q 6  months  Next appt: TBD    Controlled substance agreement on file: No.    Documentation in problem list reviewed:  Yes    Processing:  Rx to be electronically transmitted to pharmacy by provider     [place] reviewed: Reviewed  [place]:  [email or web address]          Past Medical History:   Diagnosis Date    Anxiety     Arthritis Years ago    COPD (chronic obstructive pulmonary disease) (H)     Depressive disorder Years ago    GERD (gastroesophageal reflux disease)     Hypertension     Ischemic cardiomyopathy     Multiple sclerosis (H)     Neuromuscular disorder (H)     Nonrheumatic mitral valve regurgitation     PVC's (premature ventricular contractions)     Renal disease     Restless leg syndrome     Tobacco use disorder      Past Surgical History:   Procedure Laterality Date    GYN SURGERY      tubal ligation    OPTICAL TRACKING SYSTEM FUSION POSTERIOR SPINE LUMBAR N/A 7/23/2021    Procedure: L4-5 transforaminal lumbar interbody fusion with reduction of grade 1/2 unstable spondylolisthesis   Open reduction and internal fixation of the grade L4-5 spondylolisthesis L4 - L5 posterior lateral fusion;  Surgeon: Asif Flores MD;  Location:  OR     Current Outpatient Medications   Medication Sig Dispense Refill    albuterol (PROAIR HFA/PROVENTIL HFA/VENTOLIN HFA) 108 (90 Base) MCG/ACT inhaler Inhale 2 puffs into the lungs every 4 hours as needed for shortness of breath or wheezing 18 g 1    amLODIPine (NORVASC) 2.5 MG tablet Take 1 tablet (2.5 mg) by mouth daily 90 tablet 3    amphetamine-dextroamphetamine (ADDERALL XR) 30 MG 24 hr capsule Take 1 capsule (30 mg) by mouth every morning Prescribed by 30 capsule 0    amphetamine-dextroamphetamine (ADDERALL) 10 MG tablet Take 1 tablet (10 mg) by mouth daily 30 tablet 0    AVONEX PEN 30 MCG/0.5ML auto-injector kit Inject 30 mcg into the muscle every 7 days       baclofen (LIORESAL) 20 MG  tablet Take 20 mg by mouth 4 times daily Per Neurologist      budesonide-formoterol (SYMBICORT) 80-4.5 MCG/ACT Inhaler Inhale 2 puffs into the lungs 2 times daily 10.2 g 5    Calcium Carb-Cholecalciferol (CALCIUM 1000 + D PO) Take by mouth daily      cetirizine (ZYRTEC) 10 MG tablet Take 1 tablet (10 mg) by mouth 2 times daily 180 tablet 3    cycloSPORINE (RESTASIS) 0.05 % ophthalmic emulsion Place 1 drop into both eyes 2 times daily      diclofenac (VOLTAREN) 1 % topical gel Apply 4 g topically 4 times daily 480 g 0    docusate sodium (COLACE) 100 MG tablet Take 100 mg by mouth daily as needed for constipation Over-the-counter      esomeprazole (NEXIUM) 40 MG DR capsule Take 1 capsule (40 mg) by mouth every morning (before breakfast) Take 30-60 minutes before eating. 90 capsule 3    ferrous sulfate (FEROSUL) 325 (65 Fe) MG tablet Take 1 tablet (325 mg) by mouth daily (with breakfast)      ibuprofen (ADVIL/MOTRIN) 200 MG tablet 800 mg day of and day after Avonex weekly injection + as needed up to 8 tablets per day at most      multivitamin (ONE-DAILY) tablet Take 1 tablet by mouth daily 90 tablet 3    multivitamin, therapeutic (THERA-VIT) TABS tablet Take 1 tablet by mouth daily      naloxone (NARCAN) 4 MG/0.1ML nasal spray Spray 1 spray (4 mg) into one nostril alternating nostrils once as needed for opioid reversal every 2-3 minutes until assistance arrives 0.2 mL 0    nicotine polacrilex (NICORETTE) 4 MG gum PLACE 1 EACH INSIDE CHEEK EVERY HOUR AS NEEDED FOR SMOKING CESSATION. 100 each 1    Nutritional Supplements (ENSURE ORIGINAL) LIQD Take 237 mLs by mouth 2 times daily 92748 mL 11    oxyCODONE-acetaminophen (PERCOCET)  MG per tablet Take 1 tablet by mouth every 6 hours as needed for severe pain (Max of 5 tablets per day, for chroic pain.)      polyethylene glycol (MIRALAX) 17 GM/Dose powder Mix 1 capful with 6-8 ounces of clear liquid and take by mouth twice daily as needed for constipation. Decrease dose  "to once daily or once every 2-3 days to prevent constipation. 527 g 0    pregabalin (LYRICA) 100 MG capsule Take 100 mg by mouth daily      pregabalin (LYRICA) 50 MG capsule Take 50 mg by mouth 2 times daily      pregabalin (LYRICA) 75 MG capsule Take 75 mg by mouth 2 times daily      promethazine (PHENERGAN) 25 MG tablet Take 25 mg by mouth every 6 hours as needed for nausea Takes with each Percocet dose      rOPINIRole (REQUIP) 2 MG tablet Take 2 mg by mouth every morning And 4 mg at bedtime. Prescribed by neurologist      tiotropium (SPIRIVA RESPIMAT) 2.5 MCG/ACT inhaler Inhale 2 puffs into the lungs daily 4 g 5    venlafaxine (EFFEXOR XR) 75 MG 24 hr capsule Take 3 capsules (225 mg) by mouth daily 270 capsule 0       Allergies   Allergen Reactions    Sulfa Antibiotics Rash    Rosuvastatin     Adhesive Tape Rash     Reacts to adhesive on fentanyl patch, tapes, all kinds with prolonged duration    Wellbutrin [Bupropion Hydrobromide] Rash        Social History     Tobacco Use    Smoking status: Every Day     Packs/day: 0.50     Years: 35.00     Additional pack years: 0.00     Total pack years: 17.50     Types: Cigarettes    Smokeless tobacco: Never   Substance Use Topics    Alcohol use: Not Currently     Alcohol/week: 0.0 standard drinks of alcohol     Family History   Problem Relation Age of Onset    Breast Cancer Mother     Osteoporosis Mother     Dementia Mother     Other Cancer Father     Diabetes Maternal Grandmother     Depression Brother     Anxiety Disorder Brother     Substance Abuse Brother      History   Drug Use No           Objective     /69 (BP Location: Right arm, Patient Position: Sitting, Cuff Size: Adult Regular)   Pulse 94   Temp 97.8  F (36.6  C) (Oral)   Resp 12   Ht 1.698 m (5' 6.85\")   Wt 61.1 kg (134 lb 12.8 oz)   LMP 05/25/2017 (Exact Date)   SpO2 97%   BMI 21.21 kg/m              GENERAL APPEARANCE: healthy, alert and no distress     EYES: EOMI, PERRL     HENT: ear canals and " TM's normal and nose and mouth without ulcers or lesions     NECK: no adenopathy, no asymmetry, masses, or scars and thyroid normal to palpation     RESP: lungs clear to auscultation - no rales, rhonchi or wheezes     CV: regular rates and rhythm, normal S1 S2, no S3 or S4 and no murmur, click or rub     ABDOMEN:  soft, nontender, no HSM or masses and bowel sounds normal     MS: left leg decreased ROM, secondary to displaced transverse oriented fracture of the lateral malleolus below the level of the tibial, walking boot intact, foot not visualized     SKIN: no suspicious lesions or rashes     NEURO: Normal strength and tone, sensory exam grossly normal, mentation intact and speech normal     PSYCH: mentation appears normal. and affect normal/bright     LYMPHATICS: No cervical adenopathy      Diagnostics:  Chest Xray: IMPRESSION:  There are no acute infiltrates. The cardiac silhouette is  not enlarged. Pulmonary vasculature is unremarkable.       EKG: normal sinus rhythm      Recent Labs       Recent Results (from the past 24 hour(s))   CBC with platelets    Collection Time: 12/13/23 11:35 AM   Result Value Ref Range    WBC Count 9.0 4.0 - 11.0 10e3/uL    RBC Count 3.71 (L) 3.80 - 5.20 10e6/uL    Hemoglobin 11.1 (L) 11.7 - 15.7 g/dL    Hematocrit 34.6 (L) 35.0 - 47.0 %    MCV 93 78 - 100 fL    MCH 29.9 26.5 - 33.0 pg    MCHC 32.1 31.5 - 36.5 g/dL    RDW 13.8 10.0 - 15.0 %    Platelet Count 320 150 - 450 10e3/uL   Basic metabolic panel  (Ca, Cl, CO2, Creat, Gluc, K, Na, BUN)    Collection Time: 12/13/23 11:35 AM   Result Value Ref Range    Sodium 143 133 - 144 mmol/L    Potassium 4.0 3.4 - 5.3 mmol/L    Chloride 109 94 - 109 mmol/L    Carbon Dioxide (CO2) 31 20 - 32 mmol/L    Anion Gap 3 3 - 14 mmol/L    Urea Nitrogen 25 7 - 30 mg/dL    Creatinine 0.70 0.52 - 1.04 mg/dL    GFR Estimate >90 >60 mL/min/1.73m2    Calcium 9.6 8.5 - 10.1 mg/dL    Glucose 98 70 - 99 mg/dL   Hemoglobin A1c    Collection Time: 12/13/23 11:35  AM   Result Value Ref Range    Hemoglobin A1C 5.4 0.0 - 5.6 %        EKG: appears normal, NSR, Normal Sinus Rhythm, normal axis, normal intervals, no acute ST/T changes c/w ischemia, no LVH by voltage criteria, unchanged from previous tracings    CHEST TWO VIEWS  12/13/2023 11:52 AM      HISTORY: CKD (chronic kidney disease) stage 2, GFR 60-89 mL/min.  Preoperative examination.     COMPARISON: June 20, 2023                                                                       IMPRESSION:  There are no acute infiltrates. The cardiac silhouette is  not enlarged. Pulmonary vasculature is unremarkable.    ___________________________  EXAM: XR FOOT LEFT G/E 3 VIEWS  LOCATION: [facility]  DATE: 12/10/2023     INDICATION: Fall. Foot pain.  COMPARISON: None.                                                                      IMPRESSION: Comminuted displaced fracture mid to distal shaft fifth metatarsal extending to the neck. Comminuted mildly displaced fracture distal shaft to neck fourth metatarsal. Focal cortical thickening is seen along the medial shaft of the third   metatarsal. Partial visualization of distal tibia and fibula fractures. 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 Ankle and foot soft tissue swelling mainly dorsal and distal. No dislocation.     EXAM: XR KNEE BILATERAL 3 VIEWS  LOCATION: [facility]  DATE: 12/10/2023     INDICATION: Fall, bilateral knee pain.  COMPARISON: None.                                                                      IMPRESSION:   RIGHT KNEE: Anatomic alignment right knee. No acute displaced right knee fracture. Mild medial and patellofemoral compartment right knee osteoarthritis. Tiny right knee joint effusion. No significant anterior right knee soft tissue swelling.     LEFT KNEE: Anatomic alignment left knee. No acute displaced left knee fracture. Mild medial and patellofemoral compartment left knee  osteoarthritis. No left knee joint effusion. No significant anterior left knee soft tissue swelling.    _____________________________  EXAM: XR ANKLE LEFT G/E 3 VIEWS  LOCATION: [facility]  DATE: 12/10/2023     INDICATION: Fall. Ankle pain.  COMPARISON: None.                                                                      IMPRESSION: Minimally displaced transverse oriented fracture of the lateral malleolus below the level of the tibial plafond. Oblique nondisplaced fracture at the base of the medial malleolus. Moderate to severe lateral and mild medial ankle soft tissue   swelling. Intact appearing ankle mortise and distal syndesmosis. No significant ankle or hindfoot joint space narrowing.     Revised Cardiac Risk Index (RCRI): 1  Cardiovascular disease: 1         Signed Electronically by: BENNETT Ledezma CNP  Copy of this evaluation report is provided to requesting physician.      Answers submitted by the patient for this visit:  Patient Health Questionnaire (Submitted on 12/13/2023)  If you checked off any problems, how difficult have these problems made it for you to do your work, take care of things at home, or get along with other people?: Extremely difficult  PHQ9 TOTAL SCORE: 21      I spent 60 minutes with the patient, greater than 50% of that time was spent in counseling or coordination of the above issues.     On the same date of service, I spent an additional 90 minutes conducting chart review, lab and diagnostic interpretation and analysis.

## 2023-12-13 NOTE — TELEPHONE ENCOUNTER
Called pt to remind her of upcoming appt at 10 AM on Monday 4/27/2020 with Dr. Buckner.    Pt notes appt with dentist tomorrow to get tooth pulled. Pt wanting to know if she needs antibiotics from us or from them.    Encouraged pt to reach out with any questions or concerns. Pt verbalized understanding.    Reji Rahman, EMT at 9:37 AM on May 14, 2020   [facility] Health Guide   [phone]     Pt needs a referral for the pain doctor.    Fax [phone]

## 2023-12-13 NOTE — COMMUNITY RESOURCES LIST (ENGLISH)
12/13/2023   [facility]  N/A  For questions about this resource list or additional care needs, please contact your primary care clinic or care manager.  Phone: [phone]   Email: N/A   Address: [address] [place] [address]   Hours: N/A        Financial Stability       Rent and mortgage payment assistance  1  [address] [place] [address] Distance: 3.45 miles      In-Person, Phone/Virtual   [address] [place] [address]  Language: English  Hours: Mon 8:00 AM - 4:00 PM , Tue 8:00 AM - 7:00 PM , Wed - Thu 8:00 AM - 4:00 PM  Fees: Free   Phone: (354) 815-1034 Email: info@[employer] Website: https://Meme.[employer]/resources/resource-centers/     2  [address] [place] [address] Distance: 3.97 miles      In-Person, Phone/Virtual   [address] [address]  Language: English  Hours: Mon - Thu 9:00 AM - 4:00 PM  Fees: Free   Phone: (721) 974-2233 Email: info@[employer].org Website: https://Meme.[employer]/          Food and Nutrition       Food pantry  3  [address] [place] [address] Food Shelf - Coordinated entry food pantry Distance: 2.13 miles      Pick   [address] [place] [address]  Language: English  Hours: Tue 10:00 AM - 4:00 PM , Thu 10:00 AM - 4:00 PM  Fees: Free   Phone: (564) 261-9758 Email: info@[employer] Website: https://www.Lumeta/     4  [facility] Distance: 2.13 miles      In-Person   [address] [place] [address]  Language: English  Hours: Tue 10:00 AM - 4:00 PM , Thu 10:00 AM - 4:00 PM  Fees: Free   Phone: (979) 523-4490 Email: communications@[employer] Website: http://www.sotv.org     SNAP application assistance  5  [address] [facility] Distance: 3.45 miles      In-Person   [address] [place] [place] [address]  Language: English   Hours: Mon 8:00 AM - 4:00 PM , Tue 8:00 AM - 7:00 PM , Wed - Thu 8:00 AM - 4:00 PM  Fees: Free   Phone: (914) 558-5380 Email: info@[employer] Website: https://iJukebox.org/resources/resource-centers/     6  [employer] ([facility] Distance: 4.74 miles      In-Person   [address] [place] [address]  Language: English, Pakistani  Hours: Mon - Fri 8:00 AM - 8:00 PM  Fees: Free   Phone: (143) 563-2700 Email: info@[facility] Hospital[facility].org Website: http://www.LATTO.org     Soup kitchen or free meals  7  [place] [facility] the [facility] - aves and Fishes Distance: 4.11 miles      Pickup   [address] [place] [address]  Language: English, Pakistani  Hours: Mon - Thu 5:30 PM - 6:30 PM  Fees: Free   Phone: (739) 357-9496 Email: liberty@[employer] Website: http://VODECLIC.[employer]/wordpress/?page_id=5168     8  [place] the [facility] and Fishes Distance: 8.5 miles      Pickup   [address] [place] [address]  Language: English  Hours: Mon - Fri 5:00 PM - 6:00 PM  Fees: Free   Phone: (193) 936-5101 Email: chauncey@[employer].org Website: https://www.SpunLive.org/          Transportation       Free or low-cost transportation  9  [facility]. Distance: 0.45 miles      In-Person   [address]  [address] [place] [address]  Language: English  Hours: Mon - Fri 7:00 AM - 5:00 PM  Fees: Free   Phone: (987) 142-5895 Email: xhcfcltleila39@[employer] Website: https://[employer].Explorer.io/     10  [employer] [place] [employer] Distance: 12.66 miles      In-Person   [address] [place] [address]  Language: English  Hours: Tue 9:00 AM - 2:00 PM  Fees: Self Pay   Phone: (418) 614-9014 Email: info@[employer] Website: http://www.Tigerlily.org/     Transportation to medical appointments  11  [place] [employer] Distance: 3.98 miles      In-Person   [address] [place] [address]   Language: Azeri, English, Oromo, Azerbaijani  Hours: Mon - Sat 5:00 AM - 9:00 PM  Fees: Insurance, Self Pay   Phone: (964) 513-8968 Email: info@[employer] Website: http://www.[employer]/     12  [employer] Distance: 8.77 miles      In-Person   [address] [place] [place] [address]  Language: English, Dominican  Hours: Mon - Sun Appt. Only  Fees: Self Pay   Phone: (114) 636-2425 Email: amador@[employer] Website: http://www.[employer]/          Important Numbers & Websites       Emergency Services   911  [facility] Services   311  Poison Control   (972) 872-4043  Suicide Prevention Lifeline   (332) 847-9055 (TALK)  Child Abuse Hotline   (508) 761-6458 (4-A-Child)  Sexual Assault Hotline   (161) 871-6737 (HOPE)  National Runaway Safeline   (446) 910-8223 (RUNAWAY)  All-Options Talkline   (789) 892-2206  Substance Abuse Referral   (233) 496-6799 (HELP)

## 2023-12-13 NOTE — PATIENT INSTRUCTIONS
Plan/Recommendations    Baclofen continue without modification  Norvasc-(patient takes at night) continue without modification  Pregabalin-continue without modification)  Venlafaxine-(continue without modification)    Opioids: Oxycodone-acetaminophen continue without modification      Continue to hold supplements/ multivitamin-(patient is not taking)  Continue to hold NSAIDs -(patient is not taking)    Esomeprazole (nexium) 40 mg

## 2023-12-14 ENCOUNTER — VIRTUAL VISIT (OUTPATIENT)
Dept: PEDIATRICS | Facility: CLINIC | Age: 58
End: 2023-12-14
Payer: MEDICAID

## 2023-12-14 DIAGNOSIS — G35 MS (MULTIPLE SCLEROSIS) (H): Primary | ICD-10-CM

## 2023-12-14 DIAGNOSIS — Z71.89 ENCOUNTER FOR MEDICATION COUNSELING: ICD-10-CM

## 2023-12-14 PROCEDURE — 99441 PR PHYSICIAN TELEPHONE EVALUATION 5-10 MIN: CPT | Mod: 93 | Performed by: NURSE PRACTITIONER

## 2023-12-14 NOTE — PROGRESS NOTES
Performed telephonic outreach.    MS  Encounter for medication counseling.    Reviewed preoperative summary and medication recommendations.    OUTCOME  Patient verbalizes understanding and provided verbal teach-back. Patient denies unmet needs or inquiries at this time.    Phone call time: 5 minutes.  On the same date of service, I spent an additional 15 minutes conducting chart review, developing plan of care.

## 2023-12-18 ENCOUNTER — TRANSFERRED RECORDS (OUTPATIENT)
Dept: HEALTH INFORMATION MANAGEMENT | Facility: CLINIC | Age: 58
End: 2023-12-18
Payer: MEDICAID

## 2023-12-26 ENCOUNTER — TRANSFERRED RECORDS (OUTPATIENT)
Dept: HEALTH INFORMATION MANAGEMENT | Facility: CLINIC | Age: 58
End: 2023-12-26
Payer: MEDICAID

## 2023-12-31 DIAGNOSIS — Z71.6 ENCOUNTER FOR SMOKING CESSATION COUNSELING: ICD-10-CM

## 2024-01-07 ENCOUNTER — HOSPITAL ENCOUNTER (EMERGENCY)
Facility: CLINIC | Age: 59
Discharge: HOME | End: 2024-01-07
Attending: STUDENT IN AN ORGANIZED HEALTH CARE EDUCATION/TRAINING PROGRAM | Admitting: STUDENT IN AN ORGANIZED HEALTH CARE EDUCATION/TRAINING PROGRAM
Payer: MEDICAID

## 2024-01-07 ENCOUNTER — APPOINTMENT (OUTPATIENT)
Dept: GENERAL RADIOLOGY | Facility: CLINIC | Age: 59
End: 2024-01-07
Attending: STUDENT IN AN ORGANIZED HEALTH CARE EDUCATION/TRAINING PROGRAM
Payer: MEDICAID

## 2024-01-07 VITALS
SYSTOLIC BLOOD PRESSURE: 129 MMHG | HEART RATE: 94 BPM | DIASTOLIC BLOOD PRESSURE: 97 MMHG | RESPIRATION RATE: 18 BRPM | OXYGEN SATURATION: 100 % | TEMPERATURE: 97.8 F
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
## 2024-01-07 DIAGNOSIS — S01.01XA LACERATION OF SCALP WITHOUT FOREIGN BODY, INITIAL ENCOUNTER: ICD-10-CM
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
## 2024-01-07 DIAGNOSIS — S52.509A DISTAL RADIUS FRACTURE: ICD-10-CM

## 2024-01-07 PROCEDURE — 73110 X-RAY EXAM OF WRIST: CPT | Mod: RT

## 2024-01-07 PROCEDURE — 25600 CLTX DST RDL FX/EPHYS SEP WO: CPT | Mod: RT

## 2024-01-07 PROCEDURE — 12001 RPR S/N/AX/GEN/TRNK 2.5CM/<: CPT

## 2024-01-07 PROCEDURE — 99284 EMERGENCY DEPT VISIT MOD MDM: CPT | Mod: 25

## 2024-01-07 PROCEDURE — 250N000013 HC RX MED GY IP 250 OP 250 PS 637: Performed by: STUDENT IN AN ORGANIZED HEALTH CARE EDUCATION/TRAINING PROGRAM

## 2024-01-07 RX ORDER — OXYCODONE HYDROCHLORIDE 5 MG/1
5 TABLET ORAL EVERY 6 HOURS PRN
Qty: 20 TABLET | Refills: 0 | Status: SHIPPED | OUTPATIENT
Start: 2024-01-07 | End: 2024-08-15

## 2024-01-07 RX ORDER — OXYCODONE HYDROCHLORIDE 5 MG/1
5 TABLET ORAL ONCE
Status: COMPLETED | OUTPATIENT
Start: 2024-01-07 | End: 2024-01-07

## 2024-01-07 RX ORDER — PROCHLORPERAZINE MALEATE 10 MG
10 TABLET ORAL ONCE
Status: COMPLETED | OUTPATIENT
Start: 2024-01-07 | End: 2024-01-07

## 2024-01-07 RX ORDER — IBUPROFEN 600 MG/1
600 TABLET, FILM COATED ORAL ONCE
Status: COMPLETED | OUTPATIENT
Start: 2024-01-07 | End: 2024-01-07

## 2024-01-07 RX ADMIN — PROCHLORPERAZINE MALEATE 10 MG: 10 TABLET ORAL at 10:58

## 2024-01-07 RX ADMIN — OXYCODONE HYDROCHLORIDE 5 MG: 5 TABLET ORAL at 10:58

## 2024-01-07 RX ADMIN — IBUPROFEN 600 MG: 600 TABLET, FILM COATED ORAL at 10:02

## 2024-01-07 ASSESSMENT — ACTIVITIES OF DAILY LIVING (ADL): ADLS_ACUITY_SCORE: 35

## 2024-01-07 NOTE — ED PROVIDER NOTES
Emergency Department Attending Supervision Note  2/21/2018  4:46 PM      I evaluated this patient in conjunction with Tawanna MARQUEZ      Briefly, the patient presented with right wrist pain and headache after a fall going up stairs.  No loss of consciousness, not on anticoagulation.  No neck pain.  No chest pain or shortness of breath.          On my exam,     Occipital scalp hematoma with 2 cm wound present.  No significant ongoing bleeding.  No palpable underlying bony deformity.    CV: ppi, regular   Resp: speaking in full sentences without any resp distress  Skin: warm dry well perfused  Neuro: Alert, no gross motor or sensory deficits,  gait stable    Extremity: Right distal radius mild soft tissue swelling and tenderness palpation.  Distally CMS is intact.  No tenderness at the right elbow or shoulder.      Results:  XR Wrist Right G/E 3 Views   Final Result   IMPRESSION: Acute nondisplaced intra-articular fracture of the distal radius. There is normal joint alignment. Severe first CMC joint degenerative changes with bone-on-bone articulation and an adjacent chronic ossicle. Moderate to severe first MCP joint    degenerative changes.            My impression is     50-year-old female accidental fall resulting in a nondisplaced distal radius fracture and a scalp contusion with associated laceration.  No neurologic abnormalities on anticoagulation suspicion for significant head injury is low does not meet criteria for advanced imaging of the head.  C-spine cleared clinically.  Skeletal survey unremarkable other than the noted distal right radius fracture.  Patient was splinted as noted in the PA's note.  CMS intact after splint placement.  Status placed in the head wound.        Diagnosis    ICD-10-CM    1. Fall at home, initial encounter  W19.XXXA     Y92.009       2. Laceration of scalp without foreign body, initial encounter  S01.01XA       3. Distal radius fracture  S52.509A             Duncan Wu  MD [facility]        Duncan Wu MD  01/07/24 5289

## 2024-01-07 NOTE — DISCHARGE INSTRUCTIONS
Please follow-up with your primary care provider next Thursday and have your head wound assessed.  If they think that it is appropriately healing and okay for staple removal, they can do  so that at that time.  If they believe staples should remain in for full week, you can follow-up with them for nurse visit for staple removal at another time.     Follow-up with [facility] within the next week for follow-up of your wrist fracture that he sustained today.  Keep splint on until you are seen by them.    I have sent pain medication to your pharmacy.  Continue to take this along with Tylenol or ibuprofen for ongoing discomfort.    If you develop severe pain that cannot be controlled at home or signs of infection to your scalp including increased redness, swelling, or drainage from the site, return to ER.

## 2024-01-07 NOTE — ED PROVIDER NOTES
History     Chief Complaint:  Fall     The history is provided by the patient.      Hina Yap is a 58 year old female with history of hypertension, COPD, stage 2 CKD, and multiple sclerosis who presents to the ED with her  for evaluation of a fall. The patient reports that she was going up her stairs this morning with her hands full and she wasn't holding a railing. After going up 3 steps, she slipped and fell, hitting the back of the right side of her head on a stair post. She now endorses a headache and right wrist pain. Her  states that he ran to her after the fall and he had to help her back up. Denies loss of consciousness, left hand pain, back pain, neck pain, or other injury.    Independent Historian:    Spouse/partner - They report as noted above.    Review of External Notes:  Reviewed [facility] - last tdap 2017    Medications:    Albuterol  Norvasc  Adderall  Avonex Pen  Baclofen   Symbicort   Zyrtec   Colace  Nexium   Percocet  Lyrica  Phenegran  Requip  Spiriva Respimat  Effexor    Past Medical History:    Anxiety  Arthritis  COPD  Depression  GERD  Hypertension  Ischemic cardiomyopathy   Multiple sclerosis  Neuromuscular disorder  Non rheumatic mitral valve regurgitation  Premature ventricular contractions  Renal disease  Restless leg syndrome  Tobacco use disorder  Stage 2 CKD  Insomnia   Iron deficiency    Past Surgical History:    Tubal ligation  Optical tracking system fusion posterior spine lumbar    Physical Exam   Patient Vitals for the past 24 hrs:   BP Temp Temp src Pulse Resp SpO2   01/07/24 0921 (!) 129/97 97.8  F (36.6  C) Temporal 94 18 100 %      Physical Exam  General: Alert and cooperative with exam. Patient in no apparent distress. Normal mentation.  Head:  2 cm laceration to posterior scalp, bleeding controlled  Eyes:  No scleral icterus, PERRL  ENT:  The external nose and ears are normal.   Neck:  Normal range of motion without rigidity.  CV:  Regular rate and  rhythm    No pathologic murmur   Resp:  Breath sounds are clear bilaterally    Non-labored, no retractions or accessory muscle use  GI:  Abdomen is soft, no distension, no tenderness. No peritoneal signs  MS:  No deformity noted to right wrist.  Tender to palpation along the distal radius.  Full range of motion of all digits without pain.  Skin:  Warm and dry, No rash or lesions noted.  Neuro:  Oriented x 3. No gross motor deficits.      Emergency Department Course   Imaging:  XR Wrist Right G/E 3 Views   Final Result   IMPRESSION: Acute nondisplaced intra-articular fracture of the distal radius. There is normal joint alignment. Severe first CMC joint degenerative changes with bone-on-bone articulation and an adjacent chronic ossicle. Moderate to severe first MCP joint    degenerative changes.        Report per radiology     Procedures     Laceration Repair      Procedure: Laceration Repair    Indication: Laceration    Consent: Verbal    Location: Right Scalp     Length: 2.0 cm    Preparation: Irrigation with Sterile Saline.    Anesthesia/Sedation: None       Treatment/Exploration: Wound explored, no foreign bodies found     Closure: The wound was closed with  3 staples.    Patient Status: The patient tolerated the procedure well: Yes. There were no complications.       Splint Placement     Procedure: Splint Placement     Indication: Fracture    Consent: Verbal     Location: Right Wrist    Preparation: Wounds were cleansed and dressed with a non-adherent bandage     Procedure detail:   Splint was applied by Myself  Splint type: Dorsal forearm  and Volar forearm  (short arm)  Splint materilal: Fiberglass  After placement I checked and adjusted the fit as needed to ensure proper positioning/fit   Sensation and circulation are intact after splint placement     Patient Status: The patient tolerated the procedure well: Yes. There were no complications.     Emergency Department Course & Assessments:      Interventions:  Medications   ibuprofen (ADVIL/MOTRIN) tablet 600 mg (600 mg Oral $Given 1/7/24 1002)   prochlorperazine (COMPAZINE) tablet 10 mg (10 mg Oral $Given 1/7/24 1058)   oxyCODONE (ROXICODONE) tablet 5 mg (5 mg Oral $Given 1/7/24 1058)      Independent Interpretation (X-rays, CTs, rhythm strip):  I independently reviewed the right wrist XR and see distal radius fracture.     Assessments/Consultations/Discussion of Management or Tests:  ED Course as of 01/07/24 1152   Sun Jan 07, 2024   0934 I obtained history and examined the patient as noted above.    1134 I rechecked the patient and repaired the laceration.   1145 I placed the short arm splint.     Social Determinants of Health affecting care:  None      Disposition:  The patient was discharged to home.     Impression & Plan    Medical Decision Making:  Hina Yap is a 58 year old female who presents after mechanical fall with head injury and right wrist injury.  Laceration noted to the posterior scalp, approximately 2 cm in length and linear.  Bleeding is controlled.  There is no obvious deformity to the right wrist however there is mild swelling to the radial aspect along with tenderness to palpation to the distal radius.  Full range of motion of all fingers without pain.  Normal sensation.  X-ray was completed at the right wrist and does show distal radius fracture that is nondisplaced.  Discussed possible head CT with patient.  She did not lose consciousness or experience any nausea or vomiting.  No somnolence or confusion.  She is not anticoagulated.  Patient would like to defer imaging today as she has low suspicion for intracranial concerns.  I am in agreement with this plan.  Scalp lac was thoroughly cleansed and inspected.  No foreign bodies are present.  Laceration was repaired with 3 staples.  Wrist was splinted by myself and a tech, placed in short arm sandwich splint with both dorsal and volar aspects.  Patient has been following  with [facility] orthopedics for recent foot fracture and will follow-up with [facility] for this fracture as well.  Staples to be removed by primary care provider in 1 week.  Instructed patient to return to ER for any worsening symptoms including confusion, lethargy, somnolence, severe nausea and vomiting, headache that is not improved with over-the-counter medications, or signs or symptoms of infection to laceration.  Patient safer discharge home with follow-up planned.  All questions answered.      Diagnosis:    ICD-10-CM    1. Fall at home, initial encounter  W19.XXXA     Y92.009       2. Laceration of scalp without foreign body, initial encounter  S01.01XA       3. Distal radius fracture  S52.509A          Discharge Medications:  New Prescriptions    OXYCODONE (ROXICODONE) 5 MG TABLET    Take 1 tablet (5 mg) by mouth every 6 hours as needed for pain      Scribe Disclosure:  EMIR PALMER, am serving as a scribe at 9:24 AM on 1/7/2024 to document services personally performed by Arleen Stacy PA-C based on my observations and the provider's statements to me.    1/7/2024   Arleen Stacy PA-C Snell, Lauren R, PA-C  01/07/24 1553

## 2024-01-07 NOTE — ED TRIAGE NOTES
Pt arrives for fall backwards down three steps after losing footing this morning and hit back of head on a railing. Bleeding from injury site controlled. Also reports right wrist pain. No meds taken prior to coming to ED. History of MS. History of recent fall down same stairs mid December - has left foot brace.

## 2024-01-10 NOTE — PROGRESS NOTES
Medication Therapy Management (MTM) Encounter    ASSESSMENT:                            Medication Adherence/Access: See below for considerations and note not able to complete visit today as patient needed to go early   Lexicomp drug interaction: CNS depression with baclofen, pregablin, oxycodone, promethazine, ropinirole  Title Amphetamine / Inhibitors of the Proton Pump (PPIs and PCABs)  Risk Rating C: Monitor therapy  Summary Inhibitors of the Proton Pump (PPIs and PCABs) may increase the absorption of Amphetamine. Severity Moderate Reliability Rating Good  Title Amphetamines / Multivitamins/Minerals (with ADEK, Folate, Iron)  Risk Rating C: Monitor therapy  Summary Multivitamins/Minerals (with ADEK, Folate, Iron) may decrease the serum concentration of Amphetamines. Severity Moderate Reliability Rating Fa  Ibuprofen and SNRI affect on platelets  Opioid and Amphetamine may add to increased serotonin with venlafaxine    Nondisplaced distal radius fracture (right wrist) history of left ankle fracture/chronic pain:  Would benefit from continued follow up with [facility] and pain clinic    Multiple Sclerosis/Neuropathy: discussed with PCP about Adderall contributing to insomnia, would benefit from continuing to follow up with neurology     Itching: stable     Depression/Anxiety: worsened, would benefit from meeting with therapist, see insomnia below     COPD/Smoking cessation:   Hina is having difficulty getting inhaler twice daily.  Reviewed medcaid formulary and Trelegy is non-preferred, preferred is Dulera, Symbicort, Advair.  Continue current Symbicort with stable breathing symptoms, would benefit from smoking cessation, sent nicotine patches to pharmacy    Hypertension:   Patient is not meeting blood pressure goal of < 130/80mmHg.  Elevated due to pain today, continue to monitor    Insomnia:   Failed many agents in the past and concern of adding to falls with current use CNS depressive agents  with baclofen, pregablin, oxycodone, promethazine, ropinirole.  Recommend decreasing stimulant which may be contributing to insomnia.  Will give short term use of quetiapine to help with her recent increase in anxiety, depression, insomnia with her fractured wrist. Consider monitoring QTc and sedation and falls.  Recommend re-establishing with therapist.    GERD/nausea: Stable.    Dry Eyes: stable    Constipation: stable    Anemia: recommend  iron from other supplements    Supplements: change calcium carbonate to citrate for best absorption while on PPI, consider DECA scan in the future    Vaccines: up to date    PLAN:                            Smoking cessation:  Nicotine 21mg patch for 6 weeks and then decrease to 14mg patch for 14 days and then 7mg patch for 2 weeks  When able stop smoking    Refill sent in today    Mood:  Please set up therapist appointment    Sleep:  Start quetiapine 25mg bedtime for 1 month, stop Adderral 10mg in the afternoon    Supplements: Ferrous sulfate to be  from calcium and multivitamin by 2 hours so different time of the day    Bone Health Goals:  Calcium carbonate vs citrate: Calcium carbonate needs lots of stomach acid to be absorbed . Because you are taking medication to lower stomach acid, calcium citrate would be a better option as is does not need stomach acid to be absorbed. Please change your calcium supplement to calcium citrate: calcium+D 2 tablets once daily    Follow-up: 1 month with PCP or MTM pharmacist Return in about 3 months (around 4/11/2024) for MTM/Pharmacist Co-Visit with your doctor.     SUBJECTIVE/OBJECTIVE:                          Hina Yap is a 58 year old female coming in for a co-visit with Sunshine Lunsford CNP.This visit is also a follow up visit from Ivonne Gonzalez PharmD 4/25/23.  She was recently discarged from [facility] on 1/7/23 for fall.     Reason for visit: Sleeping and need refills.    Allergies/ADRs:  Reviewed in chart  Tobacco: She reports that she has been smoking cigarettes. She has a 17.5 pack-year smoking history. She has never used smokeless tobacco.Nicotine/Tobacco Cessation Plan:   Pharmacotherapies : Nicotine patch  Alcohol: none  Past Medical History: [id number] intermediate metabolizers, history of prolonged QT on ECG    Medication Adherence/Access:   Patient would like some refills today.  Continues to takes out of the bottles, she sets up two bottles one with a pink cap = morning and normal cap/white = nightly. She has pill box but not always using, she feels she is always getting refills at different times so hard to set up pill box on monthly basis  The patient fills medications at [facility]: NO, fills medications at [facility].    Smoking cessation:    Nicotine 4mg gum as needed   She is hoping to quit, to <1/2 pack per day  She is worried about adhesive, rash with Fentanyl patch after 3 days.  Patient reports things not going well.  Recent pain and anxiety do not help     Nondisplaced distal radius fracture (right wrist) history of left ankle fracture/chronic pain:  Diclofenac 1% topical gel  Ibuprofen 800mg day of and after Avonex weekly injection  Oxycodone-acetaminophen 10-325mg every 6 hours as needed Max 5/day  Oxycodone 5mg every 6 hours as needed (#5 left)    Pain type/location: Neck, low back with radiation to L buttock and L leg; OA both hands  History of s/p lumbar fusion, back surgeries  Pain is described as new pain on her arm has made if difficult for her to sleep  Patient reports the following side effects:  dry mouth and constipation and nausea.  Falls, neurology thought from poly-pharmacy and not MS and to wean off opioids, with ongoing pain, pain clinic said difficult to wean  Specialist(s):   [facility] for ankle fracture  Emi Boyle CNP, [facility]. Last visit recommendations 12/18/23 Follow up with Dr. Flores through [facility], [facility] SCS trial denied by  insurance but want to retrial in future  No appointments set up    Multiple Sclerosis/Neuropathy:  Avonex injection 30 mcg IM weekly (neurology)  Baclofen 20 mg four times daily for MS pain (neurology)  For fatigue:   Adderall XR 30 mg in the morning (primary care provider)              Adderall IR 10 mg in the afternoon (primary care provider) 12-1pm  Neuropathy:   Lyrica 50mg twice daily for chronic pain--morning & bedtime   Venlafaxine 225mg for depression and anxiety    History of neuropathic itch.  Dry mouth    Specialist(s):   Dr. Eboni Christensen, [facility]. Last visit recommendations 5/17/23: walker given, physical therapy recommended, follow-up with urology for bladder symptoms     Itching:   Cetirizine 10mg twice daily     Patient reports cetirizine helps. Side effects: None (no drowsiness or confusion) but further discussion has urinary symptoms mentioned above and dry mouth.  Previously Tried:  Topical steroids - no benefit for itching  Hydroxyzine wore off after time    Depression/Anxiety:    Venlafaxine  mg in the morning  Lyrica 50 mg twice daily for chronic pain    Does not feel that Effexor is working well and has concerns about it causing the urinary symptoms.  No longer following with [facility] psychiatrist. Was working with the therapist  meet weekly but not anymore since she broke her foot.  Patient did complete pharmacogenomics via [employer] in 2019.       5/18/2023     1:57 PM 10/4/2023    12:31 PM 12/13/2023    10:02 AM   PHQ-9 SCORE   PHQ-9 Total Score MyChart 20 (Severe depression) 25 (Severe depression) 21 (Severe depression)   PHQ-9 Total Score 20 25 21         12/2/2022     9:41 AM 1/12/2023     2:04 PM 3/30/2023     9:20 AM   KAMALA-7 SCORE   Total Score 18 (severe anxiety) 18 (severe anxiety)    Total Score 18 18 16      COPD:   Symbicort 80-4.5 mcg/act 2 puffs daily --she does not like this one, feels, like she needs to rinse her mouth continuously, not sure how  taking  Spiriva 2.5 mcg/act 2 puffs daily  Duoneb 0.5/2.5 mg/3 mL 1 vial every 6 hours as needed  Albuterol HFA 2 puffs every 4 hours as needed    Patient reports no current medication side effects.    Patient reports the following symptoms: breathing is doing well.  Blood eosinophils > 300 cells/ L?: Yes 400 on 10/29/20    Hypertension:   Amlodipine 2.5mg every day   Patient reports She is in pain today and reason for higher reading  No side effects reported   BP Readings from Last 3 Encounters:   01/07/24 (!) 129/97   12/13/23 117/69   12/10/23 118/83     Pulse Readings from Last 3 Encounters:   01/07/24 94   12/13/23 94   12/10/23 96      Insomnia:   none  Has not sleep for 5 days, can not sleep, having pain and thoughts of making appointment and being overwhelmed with caring for herself  History:  hydroxyzine, alprazolam (more for anxiety, did not help with sleep), failed over-the-counter melatonin or Benadryl. May have been on zolpidem before, but cannot remember if effective or not.  Trazodone makes her sick  Bedtime 10pm typically.  QTc 419 12/13/23    Patient reports trouble falling asleep.     Nausea/GERD:   Nexium 40mg every day   Promethazine 25mg every 6 hours as needed , with pain pills every time, pain pills  Patient reports nausea from pills. She stopped Nexium and had symptoms    Constipation:  Docusate 100mg every day  Miralax 17 grams daily   Not always working and getting diarrhea sometimes, every 2-3 days  Does not remember what happened with Senna    Dry Eyes:  Cyclosporine 0.05% 1 drop twice daily   Really feels this works well.    RLS/Anemia:  Ferrous sulfate 325mg every day   Ropinirole 2mg in morning and 4mg at bedtime  Pregabalin 50mg twice daily   She thinks she got hydroxyzine/Vistaril and was helpful    Neurologist to stay on ferrous sulfate, take at night with other vitamins.  Requip helps RLS. [facility]  Hemoglobin   Date Value Ref Range Status   12/13/2023 11.1 (L) 11.7  - 15.7 g/dL Final   10/29/2020 13.1 11.7 - 15.7 g/dL Final   ]  Ferritin   Date Value Ref Range Status   02/13/2020 136 8 - 252 ng/mL Final   Estimated Creatinine Clearance: 84.5 mL/min (based on SCr of 0.7 mg/dL).       Supplements:   Ensure twice daily   Calcium carbonate+ Vitamin D every day at dinner  Multivitamin every day at dinner  No reported issues at this time.    Vitamin D Deficiency Screening Results:  Lab Results   Component Value Date    VITDT 79 (H) 02/13/2020      Today's Vitals: LMP 05/25/2017 (Exact Date)   ----------------  Post Discharge Medication Reconciliation Status: discharge medications reconciled and changed, per note/orders.    I spent 50 minutes with this patient today. All changes were made via verbal approval with BENNETT ROUSSEAU CNP. A copy of the visit note was provided to the patient's provider(s).    A summary of these recommendations was given to the patient (see AVS from today's appointment with Sunshine Butterfield CNP).    Loretta Chapa, PharmD [facility]  Medication Therapy Management Practitioner  Voicemail #[phone]        Medication Therapy Recommendations  Insomnia, unspecified type    Rationale: Untreated condition - Needs additional medication therapy - Indication   Recommendation: Start Medication - QUEtiapine 25 MG tablet   Status: Accepted per Provider         Iron deficiency    Current Medication: ferrous sulfate (FEROSUL) 325 (65 Fe) MG tablet   Rationale: Medication interaction - Dosage too low - Effectiveness   Recommendation: Change Administration Time   Status: Patient Agreed - Adherence/Education         Nicotine dependence, uncomplicated, unspecified nicotine product type    Current Medication: nicotine polacrilex (NICORETTE) 4 MG gum   Rationale: Synergistic therapy - Needs additional medication therapy - Indication   Recommendation: Start Medication   Status: Accepted per Provider         Takes dietary supplements    Current Medication: Calcium  Carb-Cholecalciferol (CALCIUM 1000 + D PO)   Rationale: More effective medication available - Ineffective medication - Effectiveness   Recommendation: Change Medication - Calcium Citrate + 315-5 MG-MCG Tabs   Status: Accepted - no CPA Needed

## 2024-01-11 ENCOUNTER — OFFICE VISIT (OUTPATIENT)
Dept: PHARMACY | Facility: CLINIC | Age: 59
End: 2024-01-11
Payer: MEDICAID

## 2024-01-11 ENCOUNTER — TRANSFERRED RECORDS (OUTPATIENT)
Dept: HEALTH INFORMATION MANAGEMENT | Facility: CLINIC | Age: 59
End: 2024-01-11

## 2024-01-11 ENCOUNTER — TELEPHONE (OUTPATIENT)
Dept: PEDIATRICS | Facility: CLINIC | Age: 59
End: 2024-01-11

## 2024-01-11 ENCOUNTER — OFFICE VISIT (OUTPATIENT)
Dept: PEDIATRICS | Facility: CLINIC | Age: 59
End: 2024-01-11
Payer: MEDICAID

## 2024-01-11 VITALS
WEIGHT: 138 LBS | BODY MASS INDEX: 22.18 KG/M2 | DIASTOLIC BLOOD PRESSURE: 92 MMHG | HEIGHT: 66 IN | TEMPERATURE: 97.6 F | HEART RATE: 106 BPM | SYSTOLIC BLOOD PRESSURE: 142 MMHG | RESPIRATION RATE: 16 BRPM | OXYGEN SATURATION: 98 %

## 2024-01-11 DIAGNOSIS — G35 MS (MULTIPLE SCLEROSIS) (H): ICD-10-CM

## 2024-01-11 DIAGNOSIS — G62.9 NEUROPATHY: ICD-10-CM

## 2024-01-11 DIAGNOSIS — F17.200 NICOTINE DEPENDENCE, UNCOMPLICATED, UNSPECIFIED NICOTINE PRODUCT TYPE: Primary | ICD-10-CM

## 2024-01-11 DIAGNOSIS — G47.00 INSOMNIA, UNSPECIFIED TYPE: ICD-10-CM

## 2024-01-11 DIAGNOSIS — K59.00 CONSTIPATION, UNSPECIFIED CONSTIPATION TYPE: ICD-10-CM

## 2024-01-11 DIAGNOSIS — Z78.9 TAKES DIETARY SUPPLEMENTS: ICD-10-CM

## 2024-01-11 DIAGNOSIS — T07.XXXA MULTIPLE FRACTURE: ICD-10-CM

## 2024-01-11 DIAGNOSIS — Z87.81 HISTORY OF FRACTURE: ICD-10-CM

## 2024-01-11 DIAGNOSIS — K21.9 GASTROESOPHAGEAL REFLUX DISEASE, UNSPECIFIED WHETHER ESOPHAGITIS PRESENT: ICD-10-CM

## 2024-01-11 DIAGNOSIS — J44.9 CHRONIC OBSTRUCTIVE PULMONARY DISEASE, UNSPECIFIED COPD TYPE (H): ICD-10-CM

## 2024-01-11 DIAGNOSIS — R29.6 FALLS FREQUENTLY: Primary | ICD-10-CM

## 2024-01-11 DIAGNOSIS — E61.1 IRON DEFICIENCY: ICD-10-CM

## 2024-01-11 DIAGNOSIS — F33.1 MODERATE EPISODE OF RECURRENT MAJOR DEPRESSIVE DISORDER (H): ICD-10-CM

## 2024-01-11 DIAGNOSIS — H04.123 DRY EYES: ICD-10-CM

## 2024-01-11 DIAGNOSIS — L29.9 ITCHING: ICD-10-CM

## 2024-01-11 DIAGNOSIS — I10 ESSENTIAL HYPERTENSION: ICD-10-CM

## 2024-01-11 DIAGNOSIS — I42.9 CARDIOMYOPATHY, UNSPECIFIED TYPE (H): ICD-10-CM

## 2024-01-11 DIAGNOSIS — F41.9 ANXIETY: ICD-10-CM

## 2024-01-11 DIAGNOSIS — G89.29 OTHER CHRONIC PAIN: ICD-10-CM

## 2024-01-11 DIAGNOSIS — R11.0 NAUSEA: ICD-10-CM

## 2024-01-11 DIAGNOSIS — Z71.85 VACCINE COUNSELING: ICD-10-CM

## 2024-01-11 PROCEDURE — 99214 OFFICE O/P EST MOD 30 MIN: CPT | Performed by: NURSE PRACTITIONER

## 2024-01-11 PROCEDURE — 99605 MTMS BY PHARM NP 15 MIN: CPT | Performed by: PHARMACIST

## 2024-01-11 PROCEDURE — 99607 MTMS BY PHARM ADDL 15 MIN: CPT | Performed by: PHARMACIST

## 2024-01-11 RX ORDER — QUETIAPINE FUMARATE 25 MG/1
25 TABLET, FILM COATED ORAL AT BEDTIME
Qty: 30 TABLET | Refills: 0 | Status: SHIPPED | OUTPATIENT
Start: 2024-01-11 | End: 2024-03-04

## 2024-01-11 RX ORDER — NICOTINE 21 MG/24HR
1 PATCH, TRANSDERMAL 24 HOURS TRANSDERMAL EVERY 24 HOURS
Qty: 42 PATCH | Refills: 0 | Status: SHIPPED | OUTPATIENT
Start: 2024-01-11 | End: 2024-02-22

## 2024-01-11 RX ORDER — NICOTINE 21 MG/24HR
1 PATCH, TRANSDERMAL 24 HOURS TRANSDERMAL EVERY 24 HOURS
Qty: 14 PATCH | Refills: 0 | Status: SHIPPED | OUTPATIENT
Start: 2024-02-22 | End: 2024-03-07

## 2024-01-11 RX ORDER — VENLAFAXINE HYDROCHLORIDE 75 MG/1
225 CAPSULE, EXTENDED RELEASE ORAL DAILY
Qty: 270 CAPSULE | Refills: 0 | Status: SHIPPED | OUTPATIENT
Start: 2024-01-11 | End: 2024-04-15

## 2024-01-11 RX ORDER — ALBUTEROL SULFATE 90 UG/1
2 AEROSOL, METERED RESPIRATORY (INHALATION) EVERY 4 HOURS PRN
Qty: 18 G | Refills: 1 | Status: SHIPPED | OUTPATIENT
Start: 2024-01-11

## 2024-01-11 ASSESSMENT — PAIN SCALES - GENERAL: PAINLEVEL: MODERATE PAIN (5)

## 2024-01-11 NOTE — COMMUNITY RESOURCES LIST (ENGLISH)
01/11/2024   [facility]  N/A  For questions about this resource list or additional care needs, please contact your primary care clinic or care manager.  Phone: [phone]   Email: N/A   Address: [address] [place] [address]   Hours: N/A        Food and Nutrition       Food pantry  1  [address] [place] [address] [facility] food pant[facility] Distance: 2.13 miles      Pickup   [address] [place] [address]  Language: English  Hours: Tue 10:00 AM - 4:00 PM , Thu 10:00 AM - 4:00 PM  Fees: Free   Phone: (360) 999-5767 Email: info@[employer] Website: https://www.VYou/     2  [facility] Distance: 2.13 miles      In-Person   [address] [id number]  Language: English  Hours: Tue 10:00 AM - 4:00 PM , Thu 10:00 AM - 4:00 PM  Fees: Free   Phone: (535) 813-6769 Email: communications@[employer] Website: http://www.[employer]     SNAP application assistance  3  [address] [place] [address] Distance: 3.45 miles      In-Person   [id number] [place] [address]  Language: English  Hours: Mon 8:00 AM - 4:00 PM , Tue 8:00 AM - 7:00 PM , Wed - Thu 8:00 AM - 4:00 PM  Fees: Free   Phone: (776) 689-9403 Email: info@[employer] Website: https://VYou/resources/resource-centers/     4  [employer] ([facility] Distance: 4.74 miles      In-Person   [address] [place] [address]  Language: English, East Timorese  Hours: Mon - Fri 8:00 AM - 8:00 PM  Fees: Free   Phone: (404) 212-1908 Email: info@Oyokey.org Website: http://www.Oyokey.org     Soup kitchen or free meals  5  [facility] Distance: 4.11 miles      Pickup   [address] [place] [address]  Language: English, East Timorese  Hours: Mon - Thu 5:30 PM - 6:30 PM   Fees: Free   Phone: (802) 538-8039 Email: liberty@[employer] Website: http://CashEdge.Redeemia/wordpress/?page_id=5168     6  [facility] Distance: 8.5 miles      Pickup   [address] [place] [address]  Language: English  Hours: Mon - Fri 5:00 PM - 6:00 PM  Fees: Free   Phone: (222) 561-2053 Email: contactus@"[employer] Website: https://www.Moblyng/          Transportation       Free or low-cost transportation  7  [employer]. Distance: 0.45 miles      In-Person   [address] [facility] [address] [place] [address]  Language: English  Hours: Mon - Fri 7:00 AM - 5:00 PM  Fees: Free   Phone: (628) 696-1678 Email: ptvyrpioksjw78@Zitra.com Website: https://Ascenz/     8  [facility] Distance: 12.66 miles      In-Person   [address] [place] [address]  Language: English  Hours: Tue 9:00 AM - 2:00 PM  Fees: Self Pay   Phone: (579) 802-1293 Email: info@[employer] Website: http://www.iubenda.org/     Transportation to medical appointments  9  [employer] Distance: 3.98 miles      In-Person   [address] [place] [address]  Language: Uzbek, English, Oromo, Iranian  Hours: Mon - Sat 5:00 AM - 9:00 PM  Fees: Insurance, Self Pay   Phone: (775) 866-8308 Email: info@"[employer] Website: http://www.Revolve..51wan/     10  [facility] Transportation Distance: 7.89 miles      [address] [place] [address]  Language: English  Hours: Mon - Fri 6:30 AM - 4:30 PM  Fees: Insurance, Self Pay   Phone: (164) 910-7808 Email: QpijjftzrEbfnoqxsyxcayp40@Zitra.com Website: https://Wesleyland-transportation.com          Important Numbers & Websites       Emergency Services   [address] [address]  Poison Control   (464) 784-4132  Suicide Prevention Lifeline   (989) 790-7975 (TALK)  Child Abuse Hotline   (156) 925-8538 (4-A-Child)  Sexual Assault Hotline   (427) 658-9954  (HOPE)  National Runaway Safeline   (409) 842-5859 (RUNAWAY)  All-Options Talkline   (797) 774-6624  Substance Abuse Referral   (712) 829-3689 (HELP)

## 2024-01-11 NOTE — PATIENT INSTRUCTIONS
Recommendations from today's Complex Care Team visit:                                                      Thank you for participating in our complex care team visit today!    Smoking cessation:  Nicotine 21mg patch for 6 weeks and then decrease to 14mg patch for 14 days and then 7mg patch for 2 weeks  When able stop smoking    Refill sent in today    Mood:  Please set up therapist appointment    Sleep:  Start quetiapine 25mg bedtime for 1 month, stop Adderral 10mg in the afternoon    Supplements: Ferrous sulfate to be  from calcium and multivitamin by 2 hours so different time of the day    Bone Health Goals:  Calcium carbonate vs citrate: Calcium carbonate needs lots of stomach acid to be absorbed . Because you are taking medication to lower stomach acid, calcium citrate would be a better option as is does not need stomach acid to be absorbed. Please change your calcium supplement to calcium citrate: calcium+D 2 tablets once daily    Follow-up: 1 month with PCP or MTM pharmacist Return in about 3 months (around 4/11/2024) for MTM/Pharmacist Co-Visit with your doctor.     Please feel free to contact us with any questions or concerns you have.     You may reach the complex care team directly at [phone].    Please feel free to leave a voicemail if we are not available and we will return your call as soon as possible.     My Care Team Members                                                      Dr. Sunshine Lunsford      My Clinical Pharmacist's contact information:    Loretta Chapa, PharmD BCPS  Medication Therapy Management Practitioner    It was great to speak with you today.  I value your experience and would be very thankful for your time with providing feedback on our clinic survey. You may receive a survey via email or text message in the next few days.      Nicotine Transdermal System   Habitrol, Nicoderm C-Q    Uses  For quitting smoking.    Instructions  DO NOT take this medicine by  mouth.    Avoid placing the patch near the breast.    Remove the patch after 24 hours.    Keep the medicine at room temperature. Avoid heat and direct light.    This patch should not be cut.    Wash your hands before and after handling this medicine.    Remove old patch before applying new one. Change the location of the new patch.    If you have vivid dreams or trouble sleeping, you may remove the patch before going to sleep.    Ask your doctor or pharmacist about locations on your body where this patch can be used.    Remove the plastic liner that protects the sticky side of the patch before applying to the skin.    Be sure the area of skin is clean and dry before putting on a new patch.    Apply the patch to a clean, dry, hairless area.    Press the patch firmly for a few seconds to make sure it stays in place.    After removing the patch, fold it together and discard it out of reach of children and pets.    Please ask your doctor or pharmacist how you can safely dispose of used patches.    If the skin under the patch becomes irritated, remove the patch. Do not apply a new patch to the area until the skin feels better.    To avoid irritating your skin, use a different location for a new patch.    Apply the patch only to normal looking skin. Avoid areas of the skin that are red, have scrapes, or damaged.    If the patch falls off, apply a new a patch on a different location of the body.    Please tell your doctor and pharmacist about all the medicines you take. Include both prescription and over-the-counter medicines. Also tell them about any vitamins, herbal medicines, or anything else you take for your health.    If you need to stop this medicine, your doctor may wish to gradually reduce the dosage before stopping.    Do not use more than 1 patch at any one time.    Cautions  Tell your doctor and pharmacist if you ever had an allergic reaction to a medicine. Symptoms of an allergic reaction can include trouble  breathing, skin rash, itching, swelling, or severe dizziness.    Do not use the medication any more than instructed.    Avoid smoking while on this medicine. Smoking may increase your risk for stroke, heart attack, blood clots, high blood pressure, and other diseases of the heart and blood vessels.    Tell the doctor or pharmacist if you are pregnant, planning to be pregnant, or breastfeeding.    Ask your pharmacist if this medicine can interact with any of your other medicines. Be sure to tell them about all the medicines you take.    Please tell all your doctors and dentists that you are on this medicine before they provide care.    Side Effects  The following is a list of some common side effects from this medicine. Please speak with your doctor about what you should do if you experience these or other side effects.    skin irritation where medicine is applied    If you have any of the following side effects, you may be getting too much medicine. Please contact your doctor to let them know about these side effects.    diarrhea  dizziness  nausea  rapid heartbeat  vomiting    A few people may have an allergic reaction to this medicine. Symptoms can include difficulty breathing, skin rash, itching, swelling, or severe dizziness. If you notice any of these symptoms, seek medical help quickly.    Extra  Please speak with your doctor, nurse, or pharmacist if you have any questions about this medicine.      [email or web address]  IMPORTANT NOTE: This document tells you briefly how to take your medicine, but it does not tell you all there is to know about it. Your doctor or pharmacist may give you other documents about your medicine. Please talk to them if you have any questions. Always follow their advice. There is a more complete description of this medicine available in English. Scan this code on your smartphone or tablet or use the web address below. You can also ask your pharmacist for a  printout. If you have any questions, please ask your pharmacist.   2021 [employer].      6902-7559 The [employer]Well [employer]. All rights reserved. This information is not intended as a substitute for professional medical care. Always follow your healthcare professional's instructions.

## 2024-01-11 NOTE — LETTER
February 1, 2024      Hina Yap  [address]  [facility] [address]        Dear Hina,       You recently had an appointment on 1/25/24 which you did not attend.    Your provider would like you to re-book your appointment asap. We have attempted to reach you by phone and My Chart to help you with this and have been unable to do so.    Please call our office at [phone] and schedule a co-visit with Ivonne QUINONES and Sunshine Lunsford DNP.    Please call us at the [facility] - [phone] (or use [email or web address]) to address the above recommendations.     Thank you for trusting [facility] and we appreciate the opportunity to serve you.  We look forward to supporting your healthcare needs in the future.    Healthy Regards,    Your Health Care Team  [facility]

## 2024-01-11 NOTE — PROGRESS NOTES
Assessment & Plan     (R29.6) Falls frequently  (primary encounter diagnosis)  Comment: Likely multifactorial: MS related weakness, polypharmacy leading to sedation/weakness, poor PO intake, fatigue due to lack of sleep. Pt was resistant to MTM input.   Plan: Primary Care - Care Coordination Referral  -Encouraged her to consider decreasing baclofen  -States she's unable to reduce pain pills, lyrica, baclofen, requip, all of which could cause sedation/weakness  -See sleep plan below  -Declines PT for generalized weakness  -CC referral placed to see if she qualifies for PCA so she can get someone to help her with laundry, etc.     (G35) MS (multiple sclerosis) (H)-[facility]-Takes Adderall  Comment: Gradually worsening. Following with neuro. Takes Adderall for MS related fatigue. Prescribed by original neurologist who suddenly left the practice. New neurologist has a policy of not prescribing it for that indication. I have discussed with her my desire for her to come off of it. Given that she has acute on chronic insomnia, I'm asking her to decrease. She'll continue her 30mg xr but stop fsu10ht IR. Long discussion with her that there is no sleep agent that will not increase her risk of falls, but that Seroquel may be the least risky. Has had prolonged QT in the past. Recently normal.   Plan:   -Stop afternoon adderall  -Start 25mg Seroquel. Could go as high as 50mg  -Should work to get back in with her therapist as anxiety/depression likely contributing.     (G89.29) Other chronic pain-sees pain clinic  Comment: On chronic opioids.     (I42.9) Cardiomyopathy, unspecified type (H)  Comment: Follows with cards. BENNETT Cavazos CNP  [facility]    Subjective   -Seroquel  -Therapist  Hina is a 58 year old, presenting for the following health issues:  Recheck Medication and Medication Therapy Management        1/11/2024    12:43 PM   Additional Questions   Roomed by  "Sasha Cerda, SANDIP     Patient here today for MTM visit and medication check    HPI     ED/UC Followup:    Facility:  [facility] ED  Date of visit: 1/7/24  Reason for visit: fall  Current Status: had cast put on today, wants staples in head checked    States needs a couple meds renewed        Review of Systems   Constitutional, HEENT, cardiovascular, pulmonary, gi and gu systems are negative, except as otherwise noted.      Objective    BP (!) 142/92 (BP Location: Right arm, Cuff Size: Adult Regular)   Pulse 106   Temp 97.6  F (36.4  C) (Tympanic)   Resp 16   Ht 1.676 m (5' 6\")   Wt 62.6 kg (138 lb)   LMP 05/25/2017 (Exact Date)   SpO2 98%   BMI 22.27 kg/m    Body mass index is 22.27 kg/m .  Physical Exam   CONSTITUTIONAL: Alert, well-nourished, well-groomed, NAD  MSK: Boot on left foot and cast on right arm.              "

## 2024-01-11 NOTE — PATIENT INSTRUCTIONS
Recommendations from today's Complex Care Team visit:                                                      Thank you for participating in our complex care team visit today!    Smoking cessation:  Nicotine 21mg patch for 6 weeks and then decrease to 14mg patch for 14 days and then 7mg patch for 2 weeks  When able stop smoking    Refill sent in today    Mood:  Please set up therapist appointment    Sleep:  Start quetiapine 25mg bedtime for 1 month, stop Adderral 10mg in the afternoon    Supplements: Ferrous sulfate to be  from calcium and multivitamin by 2 hours so different time of the day    Bone Health Goals:  Calcium carbonate vs citrate: Calcium carbonate needs lots of stomach acid to be absorbed . Because you are taking medication to lower stomach acid, calcium citrate would be a better option as is does not need stomach acid to be absorbed. Please change your calcium supplement to calcium citrate: calcium+D 2 tablets once daily    Follow-up: 1 month with PCP or MTM pharmacist Return in about 3 months (around 4/11/2024) for MTM/Pharmacist Co-Visit with your doctor.     Please feel free to contact us with any questions or concerns you have.     You may reach the complex care team directly at [phone].    Please feel free to leave a voicemail if we are not available and we will return your call as soon as possible.     My Care Team Members                                                      Dr. Sunshine Lunsford      My Clinical Pharmacist's contact information:    Loretta Chapa, PharmD BCPS  Medication Therapy Management Practitioner    It was great to speak with you today.  I value your experience and would be very thankful for your time with providing feedback on our clinic survey. You may receive a survey via email or text message in the next few days.      Nicotine Transdermal System   Habitrol, Nicoderm C-Q    Uses  For quitting smoking.    Instructions  DO NOT take this medicine by  mouth.    Avoid placing the patch near the breast.    Remove the patch after 24 hours.    Keep the medicine at room temperature. Avoid heat and direct light.    This patch should not be cut.    Wash your hands before and after handling this medicine.    Remove old patch before applying new one. Change the location of the new patch.    If you have vivid dreams or trouble sleeping, you may remove the patch before going to sleep.    Ask your doctor or pharmacist about locations on your body where this patch can be used.    Remove the plastic liner that protects the sticky side of the patch before applying to the skin.    Be sure the area of skin is clean and dry before putting on a new patch.    Apply the patch to a clean, dry, hairless area.    Press the patch firmly for a few seconds to make sure it stays in place.    After removing the patch, fold it together and discard it out of reach of children and pets.    Please ask your doctor or pharmacist how you can safely dispose of used patches.    If the skin under the patch becomes irritated, remove the patch. Do not apply a new patch to the area until the skin feels better.    To avoid irritating your skin, use a different location for a new patch.    Apply the patch only to normal looking skin. Avoid areas of the skin that are red, have scrapes, or damaged.    If the patch falls off, apply a new a patch on a different location of the body.    Please tell your doctor and pharmacist about all the medicines you take. Include both prescription and over-the-counter medicines. Also tell them about any vitamins, herbal medicines, or anything else you take for your health.    If you need to stop this medicine, your doctor may wish to gradually reduce the dosage before stopping.    Do not use more than 1 patch at any one time.    Cautions  Tell your doctor and pharmacist if you ever had an allergic reaction to a medicine. Symptoms of an allergic reaction can include trouble  breathing, skin rash, itching, swelling, or severe dizziness.    Do not use the medication any more than instructed.    Avoid smoking while on this medicine. Smoking may increase your risk for stroke, heart attack, blood clots, high blood pressure, and other diseases of the heart and blood vessels.    Tell the doctor or pharmacist if you are pregnant, planning to be pregnant, or breastfeeding.    Ask your pharmacist if this medicine can interact with any of your other medicines. Be sure to tell them about all the medicines you take.    Please tell all your doctors and dentists that you are on this medicine before they provide care.    Side Effects  The following is a list of some common side effects from this medicine. Please speak with your doctor about what you should do if you experience these or other side effects.    skin irritation where medicine is applied    If you have any of the following side effects, you may be getting too much medicine. Please contact your doctor to let them know about these side effects.    diarrhea  dizziness  nausea  rapid heartbeat  vomiting    A few people may have an allergic reaction to this medicine. Symptoms can include difficulty breathing, skin rash, itching, swelling, or severe dizziness. If you notice any of these symptoms, seek medical help quickly.    Extra  Please speak with your doctor, nurse, or pharmacist if you have any questions about this medicine.      [email or web address]  IMPORTANT NOTE: This document tells you briefly how to take your medicine, but it does not tell you all there is to know about it. Your doctor or pharmacist may give you other documents about your medicine. Please talk to them if you have any questions. Always follow their advice. There is a more complete description of this medicine available in English. Scan this code on your smartphone or tablet or use the web address below. You can also ask your pharmacist for a  printout. If you have any questions, please ask your pharmacist.   2021 [employer].      8707-2709 [employer]. All rights reserved. This information is not intended as a substitute for professional medical care. Always follow your healthcare professional's instructions.

## 2024-01-12 ENCOUNTER — PATIENT OUTREACH (OUTPATIENT)
Dept: CARE COORDINATION | Facility: CLINIC | Age: 59
End: 2024-01-12
Payer: MEDICAID

## 2024-01-12 NOTE — LETTER
[facility] CARE COORDINATION  [address] [place] [address]    January 15, 2024    Hina Yap  [address]  [place] [address]      Dear Hina,    I am a clinic community health worker who works with BENNETT ROUSSEAU CNP with the [facility]. I have been trying to reach you recently to introduce Clinic Care Coordination. Below is a description of clinic care coordination and how I can further assist you.       The clinic care coordination team is made up of a registered nurse, , financial resource worker and community health worker who understand the health care system. The goal of clinic care coordination is to help you manage your health and improve access to the health care system. Our team works alongside your provider to assist you in determining your health and social needs. We can help you obtain health care and community resources, providing you with necessary information and education. We can work with you through any barriers and develop a care plan that helps coordinate and strengthen the communication between you and your care team.  Our services are voluntary and are offered without charge to you personally.    Please feel free to contact me with any questions or concerns regarding care coordination and what we can offer.      We are focused on providing you with the highest-quality healthcare experience possible.    Sincerely,     Bernie Rouse, DENISSE, B.S. [facility]  Clinic Care Coordination  [facility]:  [address]  [phone]  [email or web address]

## 2024-01-12 NOTE — PROGRESS NOTES
Clinic Care Coordination Contact  [facility]/Voicemail    Clinical Data: Care Coordinator Outreach    Outreach Documentation Number of Outreach Attempt       1/12/2024  10:49 AM 1       Left message on patient's voicemail with call back information and requested return call.    Plan: Care Coordinator will try to reach patient again in 1-2 business days.    DENISSE Chinchilla (covering for DENISSE Goodman)  Clinic Care Coordination  [facility]:   [facility]  [phone]

## 2024-01-15 NOTE — PROGRESS NOTES
Clinic Care Coordination Contact  [facility]/Voicemail    Clinical Data: Care Coordinator Outreach    Outreach Documentation Number of Outreach Attempt       1/12/2024  10:49 AM 1   1/15/2024   1:16 PM 2       Left message on patient's voicemail with call back information and requested return call.    Plan: Care Coordinator will send care coordination introduction letter with care coordinator contact information and explanation of care coordination services via [employer]. Care Coordinator will do no further outreaches at this time.    Bernie Rouse, DENISSE, B.S. [facility] Care Coordination  [facility]:  [place] [place] [address] [place]  [phone]  [email or web address]

## 2024-01-19 ENCOUNTER — TRANSFERRED RECORDS (OUTPATIENT)
Dept: HEALTH INFORMATION MANAGEMENT | Facility: CLINIC | Age: 59
End: 2024-01-19

## 2024-01-22 ENCOUNTER — TRANSFERRED RECORDS (OUTPATIENT)
Dept: HEALTH INFORMATION MANAGEMENT | Facility: CLINIC | Age: 59
End: 2024-01-22
Payer: MEDICAID

## 2024-01-25 ENCOUNTER — TRANSFERRED RECORDS (OUTPATIENT)
Dept: HEALTH INFORMATION MANAGEMENT | Facility: CLINIC | Age: 59
End: 2024-01-25

## 2024-01-25 NOTE — TELEPHONE ENCOUNTER
Missed visit today  Pls schedule co visit with myself and soraya. If necessary, we could do it on a Monday and my component could be via phone.  MTM prior to me.Pls let this pt know how long the appt will be so she knows what to expect

## 2024-02-14 ENCOUNTER — TRANSFERRED RECORDS (OUTPATIENT)
Dept: HEALTH INFORMATION MANAGEMENT | Facility: CLINIC | Age: 59
End: 2024-02-14
Payer: MEDICAID

## 2024-02-19 DIAGNOSIS — F33.1 MODERATE EPISODE OF RECURRENT MAJOR DEPRESSIVE DISORDER (H): ICD-10-CM

## 2024-02-19 DIAGNOSIS — F41.9 ANXIETY: ICD-10-CM

## 2024-02-19 DIAGNOSIS — G47.00 INSOMNIA, UNSPECIFIED TYPE: ICD-10-CM

## 2024-02-20 NOTE — TELEPHONE ENCOUNTER
Is seroquel helping? If not, we could increase to 50mg    Needs to schedule co visit myself and Ivonne

## 2024-02-20 NOTE — TELEPHONE ENCOUNTER
"Called the pt. No answer. LVMTCB.        - Tim \"Graham\" Terry (he/him/his), RN - Patient Advocate Liason (PAL)  [facility]    "

## 2024-02-22 ENCOUNTER — TRANSFERRED RECORDS (OUTPATIENT)
Dept: HEALTH INFORMATION MANAGEMENT | Facility: CLINIC | Age: 59
End: 2024-02-22
Payer: MEDICAID

## 2024-02-26 ENCOUNTER — TELEPHONE (OUTPATIENT)
Dept: PEDIATRICS | Facility: CLINIC | Age: 59
End: 2024-02-26
Payer: MEDICAID

## 2024-02-26 DIAGNOSIS — R53.83 OTHER FATIGUE: ICD-10-CM

## 2024-02-26 RX ORDER — DEXTROAMPHETAMINE SACCHARATE, AMPHETAMINE ASPARTATE MONOHYDRATE, DEXTROAMPHETAMINE SULFATE AND AMPHETAMINE SULFATE 7.5; 7.5; 7.5; 7.5 MG/1; MG/1; MG/1; MG/1
30 CAPSULE, EXTENDED RELEASE ORAL EVERY MORNING
Qty: 30 CAPSULE | Refills: 0 | Status: SHIPPED | OUTPATIENT
Start: 2024-02-26 | End: 2024-03-26

## 2024-02-26 NOTE — TELEPHONE ENCOUNTER
Routing refill request to provider for review/approval because:  Drug not on the [facility] refill protocol     NEFTALY, I have her scheduled for a virtual visit next month.

## 2024-02-29 NOTE — TELEPHONE ENCOUNTER
Lets just start with the visit Monday, then I can determine need for MTM or follow-up with me after that.

## 2024-02-29 NOTE — TELEPHONE ENCOUNTER
"Sunshine Lunsford: Sorry for the disconnect here. I ended up scheduling her with you for Monday, but forgot to look for MTM co-visit at the time. I guess there is still a MTM visit in the afternoon on Monday, but AFTER her visit with you. Would that help? If so, route to Clarence Gonsalves to schedule.      - Tim \"Graham\" Terry (he/him/his), RN - Patient Advocate Liason (PAL)  [facility]    "

## 2024-02-29 NOTE — TELEPHONE ENCOUNTER
No 60 minute appointments available for MTM. Only later Monday afternoon.    Thank you  Clarence LANGLEY

## 2024-02-29 NOTE — TELEPHONE ENCOUNTER
"Clarence Gonsalves: Can you call the pt and if she answers, see if there is any way to schedule an MTM visit today, tomorrow, or Monday morning. I have her scheduled for a virtual with Sunshine on Monday, but haven't been able to get a hold her since.      - Tim \"Graham\" Terry (he/him/his), RN - Patient Advocate Liason (PAL)  [facility]    "

## 2024-03-04 ENCOUNTER — VIRTUAL VISIT (OUTPATIENT)
Dept: PEDIATRICS | Facility: CLINIC | Age: 59
End: 2024-03-04
Payer: MEDICAID

## 2024-03-04 DIAGNOSIS — F33.1 MODERATE EPISODE OF RECURRENT MAJOR DEPRESSIVE DISORDER (H): ICD-10-CM

## 2024-03-04 DIAGNOSIS — G47.00 INSOMNIA, UNSPECIFIED TYPE: ICD-10-CM

## 2024-03-04 DIAGNOSIS — F41.9 ANXIETY: ICD-10-CM

## 2024-03-04 DIAGNOSIS — I10 BENIGN ESSENTIAL HYPERTENSION: Primary | ICD-10-CM

## 2024-03-04 DIAGNOSIS — I10 ESSENTIAL HYPERTENSION: ICD-10-CM

## 2024-03-04 PROCEDURE — 99214 OFFICE O/P EST MOD 30 MIN: CPT | Mod: 93 | Performed by: NURSE PRACTITIONER

## 2024-03-04 RX ORDER — PREGABALIN 75 MG/1
75 CAPSULE ORAL 2 TIMES DAILY
COMMUNITY
Start: 2024-03-04

## 2024-03-04 RX ORDER — AMLODIPINE BESYLATE 2.5 MG/1
2.5 TABLET ORAL DAILY
Qty: 90 TABLET | Refills: 3 | Status: SHIPPED | OUTPATIENT
Start: 2024-03-04

## 2024-03-04 RX ORDER — QUETIAPINE FUMARATE 50 MG/1
50 TABLET, FILM COATED ORAL AT BEDTIME
Qty: 180 TABLET | Refills: 0 | Status: SHIPPED | OUTPATIENT
Start: 2024-03-04 | End: 2024-08-15

## 2024-03-04 NOTE — PROGRESS NOTES
Hina is a 58 year old who is being evaluated via a billable telephone visit.      Originating Location (pt. Location): Home    Distant Location (provider location):  Off-site    Assessment & Plan     Insomnia, unspecified type  No improvement with 25mg Seroquel, stopping afternoon adderall. See previous note. We have very few options given med interactions (risk of serotonin syndrome, risk of QT syndrome, on opioids so shouldn't be on hyppnotics/benzos). I did discuss with there that, if this doesn't work, she can discuss with MTM but we likely have no further interventions. She's refusing a sleep referral.  - QUEtiapine (SEROQUEL) 50 MG tablet; Take 1 tablet (50 mg) by mouth at bedtime for 180 days  -Send mychart in a few days to SB3 pal with an udpate.   -Follow-up MTM 1 month    Moderate episode of recurrent major depressive disorder (H)  Poorly controlled but stable. Declines psych referral.  - QUEtiapine (SEROQUEL) 50 MG tablet; Take 1 tablet (50 mg) by mouth at bedtime for 180 days  -Follow-up MTM 1 month    Anxiety  Poorly controlled but stable. Declines psych referral  - QUEtiapine (SEROQUEL) 50 MG tablet; Take 1 tablet (50 mg) by mouth at bedtime for 180 days      (I10) Essential hypertension  Comment: High on last check. Due for recheck                    Subjective     Got cast off her arm    Seroquel didn't work.     Now only taking promethazine and baclofen twice a day.       Hina is a 58 year old, presenting for the following health issues:  No chief complaint on file.    HPI           Objective           Vitals:  No vitals were obtained today due to virtual visit.    Physical Exam   General: Alert and no distress //Respiratory: No audible wheeze, cough, or shortness of breath // Psychiatric:  Appropriate affect, tone, and pace of words              Signed Electronically by: BENNETT ROUSSEAU CNP

## 2024-03-05 RX ORDER — QUETIAPINE FUMARATE 25 MG/1
25 TABLET, FILM COATED ORAL AT BEDTIME
Qty: 30 TABLET | Refills: 0 | OUTPATIENT
Start: 2024-03-05

## 2024-03-21 ENCOUNTER — TRANSFERRED RECORDS (OUTPATIENT)
Dept: HEALTH INFORMATION MANAGEMENT | Facility: CLINIC | Age: 59
End: 2024-03-21
Payer: MEDICAID

## 2024-03-26 ENCOUNTER — MYC REFILL (OUTPATIENT)
Dept: PHARMACY | Facility: CLINIC | Age: 59
End: 2024-03-26
Payer: MEDICAID

## 2024-03-26 ENCOUNTER — MYC REFILL (OUTPATIENT)
Dept: PEDIATRICS | Facility: CLINIC | Age: 59
End: 2024-03-26
Payer: MEDICAID

## 2024-03-26 DIAGNOSIS — G47.00 INSOMNIA, UNSPECIFIED TYPE: ICD-10-CM

## 2024-03-26 DIAGNOSIS — F33.1 MODERATE EPISODE OF RECURRENT MAJOR DEPRESSIVE DISORDER (H): ICD-10-CM

## 2024-03-26 DIAGNOSIS — F17.200 NICOTINE DEPENDENCE, UNCOMPLICATED, UNSPECIFIED NICOTINE PRODUCT TYPE: ICD-10-CM

## 2024-03-26 DIAGNOSIS — R53.83 OTHER FATIGUE: ICD-10-CM

## 2024-03-26 DIAGNOSIS — F41.9 ANXIETY: ICD-10-CM

## 2024-03-26 RX ORDER — DEXTROAMPHETAMINE SACCHARATE, AMPHETAMINE ASPARTATE MONOHYDRATE, DEXTROAMPHETAMINE SULFATE AND AMPHETAMINE SULFATE 7.5; 7.5; 7.5; 7.5 MG/1; MG/1; MG/1; MG/1
30 CAPSULE, EXTENDED RELEASE ORAL EVERY MORNING
Qty: 30 CAPSULE | Refills: 0 | Status: SHIPPED | OUTPATIENT
Start: 2024-03-28 | End: 2024-04-25

## 2024-03-26 RX ORDER — QUETIAPINE FUMARATE 50 MG/1
50 TABLET, FILM COATED ORAL AT BEDTIME
Qty: 180 TABLET | Refills: 0 | OUTPATIENT
Start: 2024-03-26

## 2024-04-08 ENCOUNTER — TRANSFERRED RECORDS (OUTPATIENT)
Dept: HEALTH INFORMATION MANAGEMENT | Facility: CLINIC | Age: 59
End: 2024-04-08
Payer: MEDICAID

## 2024-04-14 DIAGNOSIS — F33.1 MODERATE EPISODE OF RECURRENT MAJOR DEPRESSIVE DISORDER (H): ICD-10-CM

## 2024-04-15 RX ORDER — VENLAFAXINE HYDROCHLORIDE 75 MG/1
225 CAPSULE, EXTENDED RELEASE ORAL DAILY
Qty: 270 CAPSULE | Refills: 0 | Status: SHIPPED | OUTPATIENT
Start: 2024-04-15 | End: 2024-07-15

## 2024-04-22 ENCOUNTER — TRANSFERRED RECORDS (OUTPATIENT)
Dept: HEALTH INFORMATION MANAGEMENT | Facility: CLINIC | Age: 59
End: 2024-04-22
Payer: MEDICAID

## 2024-04-25 ENCOUNTER — MYC REFILL (OUTPATIENT)
Dept: PEDIATRICS | Facility: CLINIC | Age: 59
End: 2024-04-25
Payer: MEDICAID

## 2024-04-25 DIAGNOSIS — R53.83 OTHER FATIGUE: ICD-10-CM

## 2024-04-25 NOTE — LETTER
April 30, 2024      Hina Yap  [address]  [place] [address]        Dear Hina,       We have attempted to reach you by phone and My Chart message and have been unable to do so.    Your provider Sunshine Lunsford would like you to schedule an appointment for follow up. She would like it to be a co-visit with our Medication Therapy Management provider.      Please call our office at [phone] and ask to schedule a co-visit with MTM and Sunshine Lunsford for your follow up.    You should expect this visti to take 1-1 1/2 hours with possible lab testing done.    Please call us at the [facility] - [phone] (or use [employer]) to address the above recommendations.     Thank you for trusting [facility] and we appreciate the opportunity to serve you.  We look forward to supporting your healthcare needs in the future.    Healthy Regards,    Your Health Care Team  [facility]

## 2024-04-26 RX ORDER — DEXTROAMPHETAMINE SACCHARATE, AMPHETAMINE ASPARTATE MONOHYDRATE, DEXTROAMPHETAMINE SULFATE AND AMPHETAMINE SULFATE 7.5; 7.5; 7.5; 7.5 MG/1; MG/1; MG/1; MG/1
30 CAPSULE, EXTENDED RELEASE ORAL EVERY MORNING
Qty: 30 CAPSULE | Refills: 0 | Status: SHIPPED | OUTPATIENT
Start: 2024-04-26 | End: 2024-06-28

## 2024-04-26 NOTE — TELEPHONE ENCOUNTER
Recommend 3-6 month MTM covisit follow-up. Please let her know this will be a co-visit and to expect it will take 1.5 hours plus possible labs

## 2024-04-26 NOTE — TELEPHONE ENCOUNTER
LVM x1 attempt.    Zina GRIFFIN, - Flex  [facility]

## 2024-05-21 ENCOUNTER — TRANSFERRED RECORDS (OUTPATIENT)
Dept: HEALTH INFORMATION MANAGEMENT | Facility: CLINIC | Age: 59
End: 2024-05-21
Payer: MEDICAID

## 2024-05-28 NOTE — TELEPHONE ENCOUNTER
Sent nicotine patch per MTM CPA  
BPP 8/8, MVP 2.4cm, M mode 135. [facility]

## 2024-06-18 ENCOUNTER — TELEPHONE (OUTPATIENT)
Dept: PEDIATRICS | Facility: CLINIC | Age: 59
End: 2024-06-18
Payer: MEDICAID

## 2024-06-18 NOTE — LETTER
6/18/2024      Hina Yap  [address]  [place] [address]      Hi Hina,    We have tried reaching you by Mychart and by Phone.    Loretta Chapa & Sunshine would like a Follow-Up Visit with you.    I am letting you know that I have scheduled you for Thursday August 15th, arrive at 12:40pm.    If this does not work please let us know.      Sincerely,      BENNETT ROUSSEAU CNP, P

## 2024-06-18 NOTE — TELEPHONE ENCOUNTER
Patient is due for MTM follow-up.  We sent MCx1.  This is an SB5 patient.  Sunshine do you think we should still do co-visits with her?  Have TC reach out to schedule?

## 2024-06-24 NOTE — TELEPHONE ENCOUNTER
Next available SB5 Covisit with PCP & Loretta would be August 15th.    Okay for patient to wait?    Thank you kindly,  Clarence LANGLEY

## 2024-06-24 NOTE — TELEPHONE ENCOUNTER
-LVM for patient that I have scheduled her for a Co-Visit with Loretta Chapa & PCP for Thursday August 15th arrive at 12:40pm.  -Will send patient a MECLUB message as well.    Thank you kindly,  Clarence LANGLEY

## 2024-06-24 NOTE — TELEPHONE ENCOUNTER
MA/TCPlease schedule co visit    Id like to keep doing co visits. It seems like she doesn't want to do MTM without me being there

## 2024-06-26 NOTE — TELEPHONE ENCOUNTER
-Mailing letter to patient with scheduled appointment date and time that is scheduled.    Thank you kindly,  Clarence LANGLEY

## 2024-06-28 ENCOUNTER — MYC REFILL (OUTPATIENT)
Dept: PEDIATRICS | Facility: CLINIC | Age: 59
End: 2024-06-28
Payer: MEDICAID

## 2024-06-28 DIAGNOSIS — R53.83 OTHER FATIGUE: ICD-10-CM

## 2024-06-28 DIAGNOSIS — L29.9 ITCHING: ICD-10-CM

## 2024-06-28 DIAGNOSIS — J44.9 CHRONIC OBSTRUCTIVE PULMONARY DISEASE, UNSPECIFIED COPD TYPE (H): ICD-10-CM

## 2024-06-28 RX ORDER — TIOTROPIUM BROMIDE INHALATION SPRAY 3.12 UG/1
2 SPRAY, METERED RESPIRATORY (INHALATION) DAILY
Qty: 4 G | Refills: 1 | Status: SHIPPED | OUTPATIENT
Start: 2024-06-28 | End: 2024-09-18

## 2024-06-28 RX ORDER — CETIRIZINE HYDROCHLORIDE 10 MG/1
10 TABLET ORAL 2 TIMES DAILY
Qty: 180 TABLET | Refills: 3 | OUTPATIENT
Start: 2024-06-28

## 2024-06-28 RX ORDER — BUDESONIDE AND FORMOTEROL FUMARATE DIHYDRATE 80; 4.5 UG/1; UG/1
2 AEROSOL RESPIRATORY (INHALATION) 2 TIMES DAILY
Qty: 10.2 G | Refills: 1 | Status: SHIPPED | OUTPATIENT
Start: 2024-06-28 | End: 2024-08-15

## 2024-06-28 NOTE — TELEPHONE ENCOUNTER
Prescription approved per [facility] Refill Protocol.  Key Daniel, RN  [facility] Triage Nurse

## 2024-07-01 RX ORDER — DEXTROAMPHETAMINE SACCHARATE, AMPHETAMINE ASPARTATE MONOHYDRATE, DEXTROAMPHETAMINE SULFATE AND AMPHETAMINE SULFATE 7.5; 7.5; 7.5; 7.5 MG/1; MG/1; MG/1; MG/1
30 CAPSULE, EXTENDED RELEASE ORAL EVERY MORNING
Qty: 30 CAPSULE | Refills: 0 | Status: SHIPPED | OUTPATIENT
Start: 2024-07-01 | End: 2024-08-09

## 2024-07-02 ENCOUNTER — TELEPHONE (OUTPATIENT)
Dept: PEDIATRICS | Facility: CLINIC | Age: 59
End: 2024-07-02
Payer: MEDICAID

## 2024-07-02 NOTE — TELEPHONE ENCOUNTER
Prior Authorization Retail Medication Request    Medication/Dose: amphetamine-dextroamphetamine (ADDERALL XR) 30 MG 24 hr capsule  Diagnosis and ICD code (if different than what is on RX):  Other fatigue [R53.83]   New/renewal/insurance change PA/secondary ins. PA:  Previously Tried and Failed:    Rationale:      Insurance   Primary:   Insurance ID:  [id number]    Secondary (if applicable):  Insurance ID:      Pharmacy Information (if different than what is on RX)  [facility]  Phone:    Fax:    MA objecting to ICD-10 diagnosis code.

## 2024-07-05 NOTE — TELEPHONE ENCOUNTER
PA Initiation    Medication: AMPHETAMINE-DEXTROAMPHET ER 30 MG PO CP24  Insurance Company: [place] Medicaid ([facility]) - Phone [phone] Fax [phone]  Pharmacy Filling the Rx: [id number] - [place], [place] - [address] AT [facility] [address]  Filling Pharmacy Phone: [phone]  Filling Pharmacy Fax:    Start Date: 7/5/2024

## 2024-07-05 NOTE — TELEPHONE ENCOUNTER
Prior Authorization Not Needed per Insurance    Medication: AMPHETAMINE-DEXTROAMPHET ER 30 MG PO CP24  Insurance Company: [place] Medicaid ([facility]) - Phone [phone] Fax [phone]  Expected CoPay: $    Pharmacy Filling the Rx: [facility] #[id number] - [place], [place] - [address] AT [facility] [address]  Pharmacy Notified: Yes  Patient Notified: Yes

## 2024-07-10 DIAGNOSIS — K21.9 GASTROESOPHAGEAL REFLUX DISEASE, UNSPECIFIED WHETHER ESOPHAGITIS PRESENT: ICD-10-CM

## 2024-07-11 ENCOUNTER — TELEPHONE (OUTPATIENT)
Dept: PEDIATRICS | Facility: CLINIC | Age: 59
End: 2024-07-11
Payer: MEDICAID

## 2024-07-11 RX ORDER — ESOMEPRAZOLE MAGNESIUM 40 MG/1
CAPSULE, DELAYED RELEASE ORAL
Qty: 90 CAPSULE | Refills: 1 | Status: SHIPPED | OUTPATIENT
Start: 2024-07-11 | End: 2024-08-15

## 2024-07-11 NOTE — TELEPHONE ENCOUNTER
-Received faxed message from pharmacy to disregard the PA request. Having computer issues.    Thank you kindly,  Clarence LANGLEY

## 2024-07-11 NOTE — TELEPHONE ENCOUNTER
Prior Authorization Retail Medication Request    Medication/Dose: budesonide-formoterol (SYMBICORT) 80-4.5 MCG/ACT Inhaler   Diagnosis and ICD code (if different than what is on RX):  Chronic obstructive pulmonary disease, unspecified COPD type (H) [J44.9]   New/renewal/insurance change PA/secondary ins. PA:  Previously Tried and Failed:    Rationale:      Insurance   Primary:   Insurance ID:      Secondary (if applicable):  Insurance ID:      Pharmacy Information (if different than what is on RX)  Name:  [facility]  Phone:    Fax:

## 2024-07-14 DIAGNOSIS — F33.1 MODERATE EPISODE OF RECURRENT MAJOR DEPRESSIVE DISORDER (H): ICD-10-CM

## 2024-07-15 RX ORDER — VENLAFAXINE HYDROCHLORIDE 75 MG/1
225 CAPSULE, EXTENDED RELEASE ORAL DAILY
Qty: 270 CAPSULE | Refills: 0 | Status: SHIPPED | OUTPATIENT
Start: 2024-07-15 | End: 2024-08-15

## 2024-07-16 ENCOUNTER — TRANSFERRED RECORDS (OUTPATIENT)
Dept: HEALTH INFORMATION MANAGEMENT | Facility: CLINIC | Age: 59
End: 2024-07-16
Payer: MEDICAID

## 2024-08-09 ENCOUNTER — MYC REFILL (OUTPATIENT)
Dept: PEDIATRICS | Facility: CLINIC | Age: 59
End: 2024-08-09
Payer: MEDICAID

## 2024-08-09 DIAGNOSIS — G35 MS (MULTIPLE SCLEROSIS) (H): Primary | ICD-10-CM

## 2024-08-09 DIAGNOSIS — K21.9 GASTROESOPHAGEAL REFLUX DISEASE, UNSPECIFIED WHETHER ESOPHAGITIS PRESENT: ICD-10-CM

## 2024-08-09 DIAGNOSIS — R53.83 OTHER FATIGUE: ICD-10-CM

## 2024-08-09 DIAGNOSIS — F33.1 MODERATE EPISODE OF RECURRENT MAJOR DEPRESSIVE DISORDER (H): ICD-10-CM

## 2024-08-09 RX ORDER — DEXTROAMPHETAMINE SACCHARATE, AMPHETAMINE ASPARTATE MONOHYDRATE, DEXTROAMPHETAMINE SULFATE AND AMPHETAMINE SULFATE 7.5; 7.5; 7.5; 7.5 MG/1; MG/1; MG/1; MG/1
30 CAPSULE, EXTENDED RELEASE ORAL EVERY MORNING
Qty: 30 CAPSULE | Refills: 0 | Status: SHIPPED | OUTPATIENT
Start: 2024-08-09 | End: 2024-09-19

## 2024-08-09 RX ORDER — ESOMEPRAZOLE MAGNESIUM 40 MG/1
CAPSULE, DELAYED RELEASE ORAL
Qty: 90 CAPSULE | Refills: 1 | OUTPATIENT
Start: 2024-08-09

## 2024-08-09 RX ORDER — VENLAFAXINE HYDROCHLORIDE 75 MG/1
225 CAPSULE, EXTENDED RELEASE ORAL DAILY
Qty: 270 CAPSULE | Refills: 0 | OUTPATIENT
Start: 2024-08-09

## 2024-08-11 ENCOUNTER — HEALTH MAINTENANCE LETTER (OUTPATIENT)
Age: 59
End: 2024-08-11

## 2024-08-13 NOTE — PROGRESS NOTES
Medication Therapy Management (MTM) Encounter    ASSESSMENT:                            Medication Adherence/Access: Called pharmacy and Adderall went through insurance and Nexium and venlafaxine are not due to be filled until about October 18th    history of left ankle fracture/chronic pain:  Would benefit from continued follow up with [facility] and pain clinic, she will work on scheduling [facility], , due for DEXA scan with history of foot fracture     Multiple Sclerosis/Neuropathy: small studies with Adderral and MS, will continue for depression and fatigue associated symptoms with MS    Effects of single dose mixed amphetamine salts--extended release on processing speed in multiple sclerosis: a double blind placebo controlled study.    Tomi SCOTT, Kyra PEÑA  Psychopharmacology. 232(09):8658-9, 2015 Dec.     AB RATIONALE: Multiple sclerosis (MS) commonly affects cognitive function, most frequently presenting as impaired processing speed (PS). There are currently no approved treatments for PS in this population, but previous studies suggest amphetamines may be beneficial. OBJECTIVE: The objective of this study is to determine if mixed amphetamine salts, extended release (MAS-XR) has the potential to improve impaired PS in MS patients in a randomized controlled pre- and post-dose testing study. METHODS: Fifty-two MS patients demonstrating PS impairment on either the Symbol Digit Modalities Test (SDMT) or Paced Auditory Serial Addition Test (PASAT) were randomized to a single dose of 5 mg MAS-XR (n = 18), 10 mg MAS-XR (n = 20), or placebo (n = 14). Subjects were evaluated a second time, after taking the blinded medication. ANOVA was used to compare the change on the SDMT and PASAT in each of the treatment groups compared to the placebo. Plunkett's d was used to calculate effect size. RESULTS: At baseline, the mean SDMT score was 43.3 +/- 7.2 and the mean PASAT was 34.8 +/- 13.4, with 47  "(90.4 %) and 25 (48.1 %) categorized as impaired on the SDMT and PASAT, respectively. The change in SDMT scores from baseline to post-treatment demonstrated significant improvement for the MAS-XR 10-mg dose compared to placebo, increasing by 5.2 +/- 4.5 vs. 0.6 +/- 4.4 points (p = 0.043), with a medium effect size of 0.47. Change on the PASAT was not significantly different in either treatment group. CONCLUSIONS: This study supports MAS-XR 10 mg as a potential treatment for MS patients with demonstrated PS impairment, warranting a larger longitudinal study.\"     Itching: stable     Depression/Anxiety: worsened, looking to get venlafaxine refilled with, would benefit from meeting with therapist     COPD/Smoking cessation:   Would benefit from smoking cessation, ordering nicotine 14mg patch for when she is ready to quit smoking     Hypertension:   Patient is close to meeting blood pressure goal of < 130/80mmHg.     Insomnia:   Stable.  Possibly being off Adderral is helpful, could consider reducing dose if needed in the future     GERD/nausea: would benefit from restarting esomeprazole     Dry Eyes: stable     Constipation:  stable     Anemia: check iron labs today to see if ferrous sulfate is needed     Supplements: stable    PLAN:                            Foot pain: Schedule with [facility]    Care Coordination referral given to help with housing resources    MS: Spoke with pharmacy, the Adderall was filled for you    Venlafaxine and esomeprazole #90 was filled on July 18th and will not be due to refill until about October 18th.  PCP sent mychart to patient, she left visit before we could discuss, consider duloxetine 60mg every day and pantoprazole 40mg every day as substitute.    Therapist:  Please call the [facility] to set up therapist visit    Smoking cessation: When ready to quit smoking, start nicotine 14mg patch once daily and nicotine gum as needed    Labs: iron studies, we can see if you " need to restart the iron    DEXA scan due to check for osteoporosis    Follow-up: Return in about 3 months (around 11/15/2024) for MTM/Pharmacist Co-Visit with your doctor.    SUBJECTIVE/OBJECTIVE:                          Hina Yap is a 58 year old female seen for a co-visit with Sunshine Lunsford CNP. This visit is also a follow up visit from 1/11/24 with MTM.     Reason for visit: She can not get a few medication Adderral, not on for 3 months.    Allergies/ADRs: Reviewed in chart  Past Medical History: Reviewed in chart  Tobacco: She reports that she has been smoking cigarettes. She has a 17.5 pack-year smoking history. She has never used smokeless tobacco.Nicotine/Tobacco Cessation Plan  Information offered: Patient not interested at this time  Alcohol: none  Past Medical History: [id number] intermediate metabolizers, history of prolonged QT on ECG    Medication Adherence/Access:   Patient would like some refills today.  The patient fills medications at [place]: NO, fills medications at [facility].    Smoking cessation:    Nicotine 4mg gum as needed --not taking  Nicotine 7mg patch--reports she would like  to have this on hand for future use  She is hoping to quit, to <1/2 pack per day or less  On menthol which is harder  She is worried about adhesive, rash with Fentanyl patch after 3 days.  Patient reports things not going well.  Recent pain and anxiety do not help     Nondisplaced distal radius fracture (right wrist) history of left ankle fracture/chronic pain:  Diclofenac 1% topical gel  Ibuprofen 800mg day of and after Avonex weekly injection  Oxycodone-acetaminophen 10-325mg every 6 hours as needed Max 5/day  Promethazine with oxycodone due to nausea    Pain type/location: Neck, low back with radiation to L buttock and L leg; OA both hands  History of s/p lumbar fusion, back surgeries  Having difficulty with walking and trying to get hold of [facility].    Pain is described foot  pain.  Patient reports the following side effects:  dry mouth and constipation and nausea.  Falls, neurology thought from poly-pharmacy and not MS and to wean off opioids, with ongoing pain, pain clinic said difficult to wean  Specialist(s):   [facility] for ankle fracture  Emi Boyle CNP, [facility]. Last visit recommendations 7/15/24  Abbott SCS trial had no pain relief, recommended retrial due to lead migration    Multiple Sclerosis/Neuropathy:  Avonex injection 30 mcg IM weekly (neurology)  Baclofen 20 mg four times daily for MS pain (neurology)  For fatigue:   Adderall XR 30 mg in the morning (primary care provider)-not taking  Neuropathy:   Lyrica 75mg twice daily for chronic pain--morning & bedtime   Venlafaxine 225mg for depression and anxiety    History of neuropathic itch.  Dry mouth. Reports not able to get the Adderall at pharmacy. Been off 3 months and feels so fatigued and tired.    Specialist(s):   Dr. Eboni Christensen, [facility]. Last visit 4/8/2024 recommendations  4 wheeled walker given, physical therapy recommended, follow-up with urology for bladder symptoms     Itching:   Cetirizine 10mg twice daily   Hydrocortisone cream as needed     Patient reports cetirizine helps. Side effects: None (no drowsiness or confusion) but further discussion has urinary symptoms mentioned above and dry mouth.  Previously Tried:  Topical steroids - no benefit for itching  Hydroxyzine wore off after time    Depression/Anxiety:    Venlafaxine  mg in the morning--been off 3 days  Lyrica 75 mg twice daily for chronic pain    Does not feel that Effexor is working the best, been off for 3 days due to not being able to get.  She does not see therapist.  Can not cry on venlafaxine.  She has a hard time making phone calls.  Would like to see therapist.  Works with the [email or web address].  Patient did complete pharmacogenomics via [employer] in 2019.       5/18/2023     1:57  PM 10/4/2023    12:31 PM 12/13/2023    10:02 AM   PHQ-9 SCORE   PHQ-9 Total Score MyChart 20 (Severe depression) 25 (Severe depression) 21 (Severe depression)   PHQ-9 Total Score 20 25 21         12/2/2022     9:41 AM 1/12/2023     2:04 PM 3/30/2023     9:20 AM   KAMALA-7 SCORE   Total Score 18 (severe anxiety) 18 (severe anxiety)    Total Score 18 18 16      COPD:   Symbicort 80-4.5 mcg/act 2 puffs twice daily  Spiriva 2.5 mcg/act 2 puffs daily  Albuterol HFA 2 puffs every 4 hours as needed    Patient reports no current medication side effects.    Patient reports the following symptoms: breathing is doing well.  Blood eosinophils > 300 cells/ L?: Yes 400 on 10/29/20    Hypertension:   Amlodipine 2.5mg every day   Patient reports She is in pain today and reason for higher reading  No side effects reported   BP Readings from Last 3 Encounters:   08/15/24 126/84   01/11/24 (!) 142/92   01/07/24 (!) 129/97     Pulse Readings from Last 3 Encounters:   01/11/24 106   01/07/24 94   12/13/23 94      Insomnia:   None  Reports sleeping on the time.  Off Adderral    History:  Quetiapine 50mg at bedtime-did not work, hydroxyzine, alprazolam (more for anxiety, did not help with sleep), failed over-the-counter melatonin or Benadryl. May have been on zolpidem before, but cannot remember if effective or not.  Trazodone makes her sick  Bedtime 10pm typically.  QTc 419 12/13/23    Nausea/GERD:   Nexium 40mg every day --off for 3 days  Promethazine 25mg every 6 hours as needed , with pain pills every time, pain pills  Patient reports nausea from pills.     Constipation:  Miralax 17 grams every other day  Not taking more due to get more symptoms    Dry Eyes:  Cyclosporine 0.05% 1 drop twice daily   Really feels this works well.    RLS/Anemia:  Ferrous sulfate 325mg every day --not taking, needs refill, not sure when to take  Ropinirole 2mg in morning and 4mg at bedtime  Pregabalin 75mg twice daily   Requip helps RLS. Seeing [facility]  "Orthopedics  Hemoglobin   Date Value Ref Range Status   12/13/2023 11.1 (L) 11.7 - 15.7 g/dL Final   10/29/2020 13.1 11.7 - 15.7 g/dL Final   ]  Ferritin   Date Value Ref Range Status   02/13/2020 136 8 - 252 ng/mL Final   Estimated Creatinine Clearance: 86.6 mL/min (based on SCr of 0.7 mg/dL).       Supplements:   Multivitamin every day at dinner  No reported issues at this time.    She feels like she gained weight  Estimated body mass index is 23.52 kg/m  as calculated from the following:    Height as of an earlier encounter on 8/15/24: 5' 6\" (1.676 m).    Weight as of an earlier encounter on 8/15/24: 145 lb 11.2 oz (66.1 kg).  Vitamin D Deficiency Screening Results:  Lab Results   Component Value Date    VITDT 79 (H) 02/13/2020      Today's Vitals: LMP 05/25/2017 (Exact Date)   ----------------    I spent 70 minutes with this patient today. All changes were made via verbal approval with BENNETT ROUSSEAU CNP. A copy of the visit note was not provided to the patient's provider(s), discussed verbally.    A summary of these recommendations was given to the patient (see AVS from today's appointment with Sunshine Lunsford).    Loretta Chapa, PharmD BCPS  Medication Therapy Management Practitioner  Pharmacist     Medication Therapy Recommendations  Encounter for smoking cessation counseling    Rationale: Untreated condition - Needs additional medication therapy - Indication   Recommendation: Start Medication - nicotine 14 MG/24HR 24 hr patch   Status: Accepted per Provider         Moderate episode of recurrent major depressive disorder (H)    Current Medication: venlafaxine (EFFEXOR XR) 75 MG 24 hr capsule   Rationale: Medication product not available - Adherence - Adherence   Recommendation: Change Medication - DULoxetine 60 MG capsule - Also change eosomeprazole to pantoprazole   Status: Accepted per Provider            "

## 2024-08-14 ENCOUNTER — TRANSFERRED RECORDS (OUTPATIENT)
Dept: HEALTH INFORMATION MANAGEMENT | Facility: CLINIC | Age: 59
End: 2024-08-14
Payer: MEDICAID

## 2024-08-15 ENCOUNTER — OFFICE VISIT (OUTPATIENT)
Dept: PHARMACY | Facility: CLINIC | Age: 59
End: 2024-08-15
Payer: MEDICAID

## 2024-08-15 ENCOUNTER — OFFICE VISIT (OUTPATIENT)
Dept: PEDIATRICS | Facility: CLINIC | Age: 59
End: 2024-08-15
Payer: MEDICAID

## 2024-08-15 VITALS
DIASTOLIC BLOOD PRESSURE: 84 MMHG | WEIGHT: 145.7 LBS | HEIGHT: 66 IN | HEART RATE: 75 BPM | SYSTOLIC BLOOD PRESSURE: 126 MMHG | TEMPERATURE: 97.9 F | OXYGEN SATURATION: 95 % | BODY MASS INDEX: 23.42 KG/M2 | RESPIRATION RATE: 18 BRPM

## 2024-08-15 DIAGNOSIS — F17.200 NICOTINE DEPENDENCE, UNCOMPLICATED, UNSPECIFIED NICOTINE PRODUCT TYPE: ICD-10-CM

## 2024-08-15 DIAGNOSIS — Z71.6 ENCOUNTER FOR SMOKING CESSATION COUNSELING: ICD-10-CM

## 2024-08-15 DIAGNOSIS — G35 MS (MULTIPLE SCLEROSIS) (H): ICD-10-CM

## 2024-08-15 DIAGNOSIS — I42.9 CARDIOMYOPATHY, UNSPECIFIED TYPE (H): ICD-10-CM

## 2024-08-15 DIAGNOSIS — G62.9 NEUROPATHY: ICD-10-CM

## 2024-08-15 DIAGNOSIS — Z87.81 HISTORY OF FRACTURE: ICD-10-CM

## 2024-08-15 DIAGNOSIS — R29.6 FALLS FREQUENTLY: ICD-10-CM

## 2024-08-15 DIAGNOSIS — Z78.9 TAKES IRON SUPPLEMENTS: ICD-10-CM

## 2024-08-15 DIAGNOSIS — L29.9 ITCHING: ICD-10-CM

## 2024-08-15 DIAGNOSIS — I10 ESSENTIAL HYPERTENSION: ICD-10-CM

## 2024-08-15 DIAGNOSIS — Z78.0 POSTMENOPAUSAL STATUS: ICD-10-CM

## 2024-08-15 DIAGNOSIS — R11.0 NAUSEA: ICD-10-CM

## 2024-08-15 DIAGNOSIS — E03.8 SUBCLINICAL HYPOTHYROIDISM: ICD-10-CM

## 2024-08-15 DIAGNOSIS — F41.9 ANXIETY: ICD-10-CM

## 2024-08-15 DIAGNOSIS — R53.83 OTHER FATIGUE: ICD-10-CM

## 2024-08-15 DIAGNOSIS — G47.00 INSOMNIA, UNSPECIFIED TYPE: ICD-10-CM

## 2024-08-15 DIAGNOSIS — Z86.2 HISTORY OF ANEMIA: ICD-10-CM

## 2024-08-15 DIAGNOSIS — H04.123 DRY EYES: ICD-10-CM

## 2024-08-15 DIAGNOSIS — E61.1 IRON DEFICIENCY: Primary | ICD-10-CM

## 2024-08-15 DIAGNOSIS — K59.00 CONSTIPATION, UNSPECIFIED CONSTIPATION TYPE: ICD-10-CM

## 2024-08-15 DIAGNOSIS — Z91.89 AT HIGH RISK FOR COMPLICATION OF FRACTURE: ICD-10-CM

## 2024-08-15 DIAGNOSIS — F33.1 MODERATE EPISODE OF RECURRENT MAJOR DEPRESSIVE DISORDER (H): ICD-10-CM

## 2024-08-15 DIAGNOSIS — K21.9 GASTROESOPHAGEAL REFLUX DISEASE, UNSPECIFIED WHETHER ESOPHAGITIS PRESENT: ICD-10-CM

## 2024-08-15 DIAGNOSIS — J44.9 CHRONIC OBSTRUCTIVE PULMONARY DISEASE, UNSPECIFIED COPD TYPE (H): ICD-10-CM

## 2024-08-15 DIAGNOSIS — Z78.9 TAKES DIETARY SUPPLEMENTS: ICD-10-CM

## 2024-08-15 DIAGNOSIS — Z12.11 SCREEN FOR COLON CANCER: Primary | ICD-10-CM

## 2024-08-15 DIAGNOSIS — G89.29 OTHER CHRONIC PAIN: ICD-10-CM

## 2024-08-15 LAB
BASOPHILS # BLD AUTO: 0 10E3/UL (ref 0–0.2)
BASOPHILS NFR BLD AUTO: 1 %
EOSINOPHIL # BLD AUTO: 0.2 10E3/UL (ref 0–0.7)
EOSINOPHIL NFR BLD AUTO: 3 %
ERYTHROCYTE [DISTWIDTH] IN BLOOD BY AUTOMATED COUNT: 13.6 % (ref 10–15)
FERRITIN SERPL-MCNC: 311 NG/ML (ref 11–328)
HCT VFR BLD AUTO: 42.8 % (ref 35–47)
HGB BLD-MCNC: 13.9 G/DL (ref 11.7–15.7)
IMM GRANULOCYTES # BLD: 0 10E3/UL
IMM GRANULOCYTES NFR BLD: 0 %
IRON BINDING CAPACITY (ROCHE): 289 UG/DL (ref 240–430)
IRON SATN MFR SERPL: 14 % (ref 15–46)
IRON SERPL-MCNC: 41 UG/DL (ref 37–145)
LYMPHOCYTES # BLD AUTO: 2.1 10E3/UL (ref 0.8–5.3)
LYMPHOCYTES NFR BLD AUTO: 29 %
MCH RBC QN AUTO: 30.1 PG (ref 26.5–33)
MCHC RBC AUTO-ENTMCNC: 32.5 G/DL (ref 31.5–36.5)
MCV RBC AUTO: 93 FL (ref 78–100)
MONOCYTES # BLD AUTO: 0.6 10E3/UL (ref 0–1.3)
MONOCYTES NFR BLD AUTO: 9 %
NEUTROPHILS # BLD AUTO: 4.2 10E3/UL (ref 1.6–8.3)
NEUTROPHILS NFR BLD AUTO: 58 %
PLATELET # BLD AUTO: 298 10E3/UL (ref 150–450)
RBC # BLD AUTO: 4.62 10E6/UL (ref 3.8–5.2)
TSH SERPL DL<=0.005 MIU/L-ACNC: 3.26 UIU/ML (ref 0.3–4.2)
WBC # BLD AUTO: 7.2 10E3/UL (ref 4–11)

## 2024-08-15 PROCEDURE — 83550 IRON BINDING TEST: CPT | Performed by: NURSE PRACTITIONER

## 2024-08-15 PROCEDURE — G2211 COMPLEX E/M VISIT ADD ON: HCPCS | Performed by: NURSE PRACTITIONER

## 2024-08-15 PROCEDURE — 99606 MTMS BY PHARM EST 15 MIN: CPT | Performed by: PHARMACIST

## 2024-08-15 PROCEDURE — 99607 MTMS BY PHARM ADDL 15 MIN: CPT | Performed by: PHARMACIST

## 2024-08-15 PROCEDURE — 99215 OFFICE O/P EST HI 40 MIN: CPT | Performed by: NURSE PRACTITIONER

## 2024-08-15 PROCEDURE — 85025 COMPLETE CBC W/AUTO DIFF WBC: CPT | Performed by: NURSE PRACTITIONER

## 2024-08-15 PROCEDURE — 83540 ASSAY OF IRON: CPT | Performed by: NURSE PRACTITIONER

## 2024-08-15 PROCEDURE — 84443 ASSAY THYROID STIM HORMONE: CPT | Performed by: NURSE PRACTITIONER

## 2024-08-15 PROCEDURE — 82728 ASSAY OF FERRITIN: CPT | Performed by: NURSE PRACTITIONER

## 2024-08-15 PROCEDURE — 36415 COLL VENOUS BLD VENIPUNCTURE: CPT | Performed by: NURSE PRACTITIONER

## 2024-08-15 RX ORDER — BUDESONIDE AND FORMOTEROL FUMARATE DIHYDRATE 80; 4.5 UG/1; UG/1
2 AEROSOL RESPIRATORY (INHALATION) 2 TIMES DAILY
Qty: 10.2 G | Refills: 11 | Status: SHIPPED | OUTPATIENT
Start: 2024-08-15

## 2024-08-15 RX ORDER — ESOMEPRAZOLE MAGNESIUM 40 MG/1
40 CAPSULE, DELAYED RELEASE ORAL
Qty: 30 CAPSULE | Refills: 11 | Status: SHIPPED | OUTPATIENT
Start: 2024-08-15

## 2024-08-15 RX ORDER — FERROUS SULFATE 325(65) MG
325 TABLET ORAL
Qty: 30 TABLET | Refills: 11 | Status: SHIPPED | OUTPATIENT
Start: 2024-08-15 | End: 2024-08-16

## 2024-08-15 RX ORDER — HYDROCORTISONE 10 MG/G
CREAM TOPICAL 2 TIMES DAILY PRN
COMMUNITY

## 2024-08-15 RX ORDER — NICOTINE 21 MG/24HR
1 PATCH, TRANSDERMAL 24 HOURS TRANSDERMAL EVERY 24 HOURS
Qty: 30 PATCH | Refills: 1 | Status: SHIPPED | OUTPATIENT
Start: 2024-08-15

## 2024-08-15 RX ORDER — VENLAFAXINE HYDROCHLORIDE 75 MG/1
225 CAPSULE, EXTENDED RELEASE ORAL DAILY
Qty: 90 CAPSULE | Refills: 11 | Status: SHIPPED | OUTPATIENT
Start: 2024-08-15

## 2024-08-15 RX ORDER — CETIRIZINE HYDROCHLORIDE 10 MG/1
10 TABLET ORAL 2 TIMES DAILY
Qty: 180 TABLET | Refills: 3 | Status: SHIPPED | OUTPATIENT
Start: 2024-08-15

## 2024-08-15 ASSESSMENT — PAIN SCALES - GENERAL: PAINLEVEL: MODERATE PAIN (5)
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
## 2024-08-15 NOTE — PATIENT INSTRUCTIONS
Recommendations from today's MTM visit:                                                      Foot pain: Schedule with [facility]    Care Coordination referral given to help with housing resources    MS: I will check with your pharmacy about the Adderall   I will ask pharmacy about venlafaxine and esomeprazole    Therapist:  Please call the [facility] to set up therapist visit    Smoking cessation: When ready to quit smoking, start nicotine 14mg patch once daily and nicotine gum as needed    Labs: iron studies, we can see if you need to restart the iron    DEXA scan due to check for osteoporosis    Follow-up: Return in about 3 months (around 11/15/2024) for MTM/Pharmacist Co-Visit with your doctor.    To schedule another MTM appointment, please call the clinic directly or you may call the MTM scheduling line at [phone] or toll-free at [phone].     My Clinical Pharmacist's contact information:                                                      It was a pleasure seeing you today!  Please feel free to contact me with any questions or concerns you have.      Loretta Chapa, PharmD BCPS  Medication Therapy Management Practitioner    It was great to speak with you today.  I value your experience and would be very thankful for your time with providing feedback on our clinic survey. You may receive a survey via email or text message in the next few days.

## 2024-08-15 NOTE — PATIENT INSTRUCTIONS
Recommendations from today's MTM visit:                                                      Foot pain: Schedule with [facility]    Care Coordination referral given to help with housing resources    MS: I will check with your pharmacy about the Adderall   I will ask pharmacy about venlafaxine and esomeprazole    Therapist:  Please call the [facility] to set up therapist visit    Smoking cessation: When ready to quit smoking, start nicotine 14mg patch once daily and nicotine gum as needed    Labs: iron studies, we can see if you need to restart the iron    DEXA scan due to check for osteoporosis    Follow-up: Return in about 3 months (around 11/15/2024) for MTM/Pharmacist Co-Visit with your doctor.    To schedule another MTM appointment, please call the clinic directly or you may call the MTM scheduling line at [phone] or toll-free at [phone].     My Clinical Pharmacist's contact information:                                                      It was a pleasure seeing you today!  Please feel free to contact me with any questions or concerns you have.      Loretta Chapa, PharmD BCPS  Medication Therapy Management Practitioner    It was great to speak with you today.  I value your experience and would be very thankful for your time with providing feedback on our clinic survey. You may receive a survey via email or text message in the next few days.

## 2024-08-16 ENCOUNTER — TELEPHONE (OUTPATIENT)
Dept: PEDIATRICS | Facility: CLINIC | Age: 59
End: 2024-08-16
Payer: MEDICAID

## 2024-08-22 ENCOUNTER — TRANSFERRED RECORDS (OUTPATIENT)
Dept: HEALTH INFORMATION MANAGEMENT | Facility: CLINIC | Age: 59
End: 2024-08-22
Payer: MEDICAID

## 2024-08-22 ENCOUNTER — TELEPHONE (OUTPATIENT)
Dept: PEDIATRICS | Facility: CLINIC | Age: 59
End: 2024-08-22
Payer: MEDICAID

## 2024-08-22 NOTE — TELEPHONE ENCOUNTER
Reason for Call:  Appointment Request    Patient requesting this type of appt: Pre-op    Requested provider:  PCP but is open    Reason patient unable to be scheduled: Not within requested timeframe    When does patient want to be seen/preferred time:  8/23    Comments: preop DOS 8/26 [facility] foot surgery     Could we send this information to you in [facility] or would you prefer to receive a phone call?:   Patient would prefer a phone call   Okay to leave a detailed message?: Yes at Cell number on file:    Telephone Information:   Mobile [phone]       Call taken on 8/22/2024 at 2:41 PM by Elen Miner  
Called patient and scheduled appointment.     Closing encounter.     Alma Delia Ayon CMA  
Strong peripheral pulses

## 2024-08-23 ENCOUNTER — OFFICE VISIT (OUTPATIENT)
Dept: PEDIATRICS | Facility: CLINIC | Age: 59
End: 2024-08-23
Payer: MEDICAID

## 2024-08-23 VITALS
BODY MASS INDEX: 23.65 KG/M2 | OXYGEN SATURATION: 98 % | RESPIRATION RATE: 16 BRPM | TEMPERATURE: 97.5 F | SYSTOLIC BLOOD PRESSURE: 100 MMHG | HEART RATE: 83 BPM | DIASTOLIC BLOOD PRESSURE: 60 MMHG | WEIGHT: 146.5 LBS

## 2024-08-23 DIAGNOSIS — G35 MS (MULTIPLE SCLEROSIS) (H): ICD-10-CM

## 2024-08-23 DIAGNOSIS — I42.9 CARDIOMYOPATHY, UNSPECIFIED TYPE (H): ICD-10-CM

## 2024-08-23 DIAGNOSIS — N18.2 CKD (CHRONIC KIDNEY DISEASE) STAGE 2, GFR 60-89 ML/MIN: ICD-10-CM

## 2024-08-23 DIAGNOSIS — F33.1 MODERATE EPISODE OF RECURRENT MAJOR DEPRESSIVE DISORDER (H): ICD-10-CM

## 2024-08-23 DIAGNOSIS — Z01.818 PREOP GENERAL PHYSICAL EXAM: Primary | ICD-10-CM

## 2024-08-23 DIAGNOSIS — G89.29 OTHER CHRONIC PAIN: ICD-10-CM

## 2024-08-23 DIAGNOSIS — M79.672 LEFT FOOT PAIN: ICD-10-CM

## 2024-08-23 DIAGNOSIS — J44.9 CHRONIC OBSTRUCTIVE PULMONARY DISEASE, UNSPECIFIED COPD TYPE (H): ICD-10-CM

## 2024-08-23 DIAGNOSIS — I10 ESSENTIAL HYPERTENSION: ICD-10-CM

## 2024-08-23 LAB
ANION GAP SERPL CALCULATED.3IONS-SCNC: 9 MMOL/L (ref 3–14)
BUN SERPL-MCNC: 20 MG/DL (ref 7–30)
CALCIUM SERPL-MCNC: 9.5 MG/DL (ref 8.5–10.1)
CHLORIDE BLD-SCNC: 108 MMOL/L (ref 94–109)
CO2 SERPL-SCNC: 30 MMOL/L (ref 20–32)
CREAT SERPL-MCNC: 1 MG/DL (ref 0.52–1.04)
EGFRCR SERPLBLD CKD-EPI 2021: 65 ML/MIN/1.73M2
GLUCOSE BLD-MCNC: 88 MG/DL (ref 70–99)
POTASSIUM BLD-SCNC: 4.3 MMOL/L (ref 3.4–5.3)
SODIUM SERPL-SCNC: 147 MMOL/L (ref 135–145)

## 2024-08-23 PROCEDURE — 93005 ELECTROCARDIOGRAM TRACING: CPT | Performed by: PHYSICIAN ASSISTANT

## 2024-08-23 PROCEDURE — 36415 COLL VENOUS BLD VENIPUNCTURE: CPT | Performed by: PHYSICIAN ASSISTANT

## 2024-08-23 PROCEDURE — 99214 OFFICE O/P EST MOD 30 MIN: CPT | Performed by: PHYSICIAN ASSISTANT

## 2024-08-23 PROCEDURE — 80048 BASIC METABOLIC PNL TOTAL CA: CPT | Performed by: PHYSICIAN ASSISTANT

## 2024-08-23 ASSESSMENT — PAIN SCALES - GENERAL: PAINLEVEL: NO PAIN (0)

## 2024-08-23 NOTE — PATIENT INSTRUCTIONS

## 2024-08-23 NOTE — PROGRESS NOTES
Preoperative Evaluation  [facility] [place]  [address]  [address]  [place] [address]  Phone: [phone]  Fax: [phone]  Primary Provider: BENNETT ROUSSEAU CNP  Pre-op Performing Provider: Anahi Moreno PA-C  Aug 23, 2024             8/23/2024   Surgical Information   What procedure is being done? foot surgery   Facility or Hospital where procedure/surgery will be performed: [facility]   Who is doing the procedure / surgery? Dr. Khan   Date of surgery / procedure: 8-26-24   Time of surgery / procedure: no time set yet   Where do you plan to recover after surgery? Home     Fax number for surgical facility: [phone]    Assessment & Plan     The proposed surgical procedure is considered INTERMEDIATE risk.    Preop general physical exam  - EKG 12-lead, tracing only  - Basic metabolic panel  (Ca, Cl, CO2, Creat, Gluc, K, Na, BUN)  - Basic metabolic panel  (Ca, Cl, CO2, Creat, Gluc, K, Na, BUN)    Left foot pain  Continue to follow-up with orthopedics following procedure    Other chronic pain-sees pain clinic  Managed by pain clinic. Hold ibuprofen. Hold multivitamin/supplements    MS (multiple sclerosis) (H)-[facility] clinic-Takes Adderall  Weekly Anovex, managed by Neurology. Baclofen as needed. Adderall for chronic fatigue. Lyrica for pain.  Hold Adderall day of procedure. Continue all other medications without modification.    Essential hypertension  BP stable. On amlodipine unchanged.    Cardiomyopathy, unspecified type (H)  Follows with cardiology. Last visit Feb 2022.   Negative NM Lexiscan stress test last completed 2021.   Last echocardiogram 2018.    Chronic obstructive pulmonary disease, unspecified COPD type (H)  Stable. Denies cough, SOB, difficulty breathing. No recent illness    CKD (chronic kidney disease) stage 2, GFR 60-89 ml/min  Repeat BMP today. Creatinine 1.00.    Moderate episode of recurrent major depressive disorder  (H)  Stable on venlafaxine. Continue unchanged       - No identified additional risk factors other than previously addressed    Preoperative Medication Instructions  Antiplatelet or Anticoagulation Medication Instructions   - Patient is on no antiplatelet or anticoagulation medications.    Additional Medication Instructions   - Calcium Channel Blockers: May be continued on the day of surgery.   - Opioids: Continue without modification.   956}   - Psychostimulants: Hold the day of surgery   - SSRIs, SNRIs, TCAs, Antipsychotics: Continue without modification.   -Pregabalin -continue without modification  -Hold ibuprofen  -Hold multivitamin, supplements    Recommendation  Approval given to proceed with proposed procedure, without further diagnostic evaluation.    Subjective   Hina is a 58 year old, presenting for the following:  Pre-Op Exam          8/23/2024     1:46 PM   Additional Questions   Roomed by Alma Delia Ayon   Accompanied by DEE SIMON related to upcoming procedure: Replacing hardware in the left foot. Pt had foot surgery in December following closed displaced fracture fifth metatarsal bone of left foot. Reports ongoing pain of left foot.    Hina Yap is a 58 year old female with history of MS, hypertension, PVCs, nonrheumatic mitral valve regurgitation, and chronic kidney disease stage 2 and COPD. Reports she is feeling well today. Denies SOB, difficulty breathing, chest pain, palpitations, lightheadedness, dizziness. Reports chronic fatigue from MS.        8/23/2024   Pre-Op Questionnaire   Have you ever had a heart attack or stroke? (!) UNKNOWN -sees cardiology. Had vasospastic MI in 2001. No ischemic events since then. On amlodipine.   Have you ever had surgery on your heart or blood vessels, such as a stent placement, a coronary artery bypass, or surgery on an artery in your head, neck, heart, or legs? No   Do you have chest pain with activity? No   Do you have a history of heart failure? No    Do you currently have a cold, bronchitis or symptoms of other infection? No   Do you have a cough, shortness of breath, or wheezing? Not at this time. Has COPD   Do you or anyone in your family have previous history of blood clots? No   Do you or does anyone in your family have a serious bleeding problem such as prolonged bleeding following surgeries or cuts? No   Have you ever had problems with anemia or been told to take iron pills? No   Have you had any abnormal blood loss such as black, tarry or bloody stools, or abnormal vaginal bleeding? No   Have you ever had a blood transfusion? (!) UNKNOWN -unsure if she did with tubal ligation?   Are you willing to have a blood transfusion if it is medically needed before, during, or after your surgery? Yes   Have you or any of your relatives ever had problems with anesthesia? No   Do you have sleep apnea, excessive snoring or daytime drowsiness? Daytime drowsiness -fatigue from MS   Do you have any artifical heart valves or other implanted medical devices like a pacemaker, defibrillator, or continuous glucose monitor? No   Do you have artificial joints? Hardware in spine and left foot   Are you allergic to latex? No      Health Care Directive  Patient does not have a Health Care Directive or Living Will: Discussed advance care planning with patient; however, patient declined at this time.    Preoperative Review of    reviewed - controlled substances reflected in medication list.          Patient Active Problem List    Diagnosis Date Noted    Subclinical hypothyroidism 08/15/2024     Priority: Medium    Falls frequently 01/11/2024     Priority: Medium    Itching 05/18/2023     Priority: Medium     Unclear cause  Slightly improved with Zyrtec but doesn't last all day. Increased to BID May 18, 2023  Not improved with increased lyrica.  Declines derm/allergy referral  Asked SB3 pal to call neuro to see if this could be MS related May 18, 2023        Urinary urgency  05/18/2023     Priority: Medium     With incontinence. Referred to urology May 18, 2023        Iron deficiency 05/18/2023     Priority: Medium     Managed by neuro. Takes iron Unclear cause.  May 18, 2023: Recheck ordered-normal to high. Asked her to do a FIT test. Fit negative 2023      S/P lumbar fusion 07/23/2021     Priority: Medium    Essential hypertension 11/30/2020     Priority: Medium    Nonrheumatic mitral valve regurgitation 11/30/2020     Priority: Medium    Prolonged Q-T interval on ECG 10/29/2020     Priority: Medium     Seen on ED visit October 29, 2020  Resolved on latest EKG. March 22, 2021  Borderline 10/2022          Other chronic pain-sees pain clinic 11/21/2019     Priority: Medium    CKD (chronic kidney disease) stage 2, GFR 60-89 ml/min 11/21/2019     Priority: Medium    Insomnia, unspecified type 11/21/2019     Priority: Medium    Moderate episode of recurrent major depressive disorder (H) 12/14/2018     Priority: Medium    Restless leg syndrome 04/24/2017     Priority: Medium    Anxiety 01/26/2016     Priority: Medium        (F41.9) Anxiety  (primary encounter diagnosis)  Comment: Feels much better now that we are not weaning her opioids. Seeing pain clinic. We have reduced the alprazolam by half. Still has trouble sleeping-her mind races. But feels anxiety is well controlled during the day  Plan: mirtazapine (REMERON) 15 MG tablet, ALPRAZolam         (XANAX) 0.5 MG tablet  -Ok to continue alprazolam for now. She has a lot going on right now, but I would like to eventually get her to see psychiatry and have them take over the alprazolam. Will revisit 9/2021  -Again reviewed the potentially lethal risks of combining opioids and alprazolam. I am mildly comforted that both of her doses are much lower than when I inherited her, but the risk is still great. However, weaning further may destabilize her mental health. As we have just somewhat stabilized, she feels the benefit outweighs the  risk  -Follow-up 9/2021 and reconsider wean. May consider TMS vs ketamine through psych.       Cardiomyopathy (H)-Cards wants to see her q2 years 12/03/2015     Priority: Medium     Not ischemic per cards.          MS (multiple sclerosis) (H)-[facility] clinic-Takes Adderall 12/17/2012     Priority: Medium     Controlled Substance Refill Request for   Adderall 30 xr 30 tabs per month  Adderall IR 10mg x 30 tabs a month    Last refill: 12/1/10    Last clinic visit: 11/21/19     Clinic visit frequency required: Q 6  months  Next appt: TBD    Controlled substance agreement on file: No.    Documentation in problem list reviewed:  Yes    Processing:  Rx to be electronically transmitted to pharmacy by provider     [place] reviewed: Reviewed  [place] profile:  [email or web address]          Past Medical History:   Diagnosis Date    Anxiety     Arthritis Years ago    COPD (chronic obstructive pulmonary disease) (H)     Depressive disorder Years ago    GERD (gastroesophageal reflux disease)     Hypertension     Ischemic cardiomyopathy     Multiple sclerosis (H)     Neuromuscular disorder (H)     Nonrheumatic mitral valve regurgitation     PVC's (premature ventricular contractions)     Renal disease     Restless leg syndrome     Tobacco use disorder      Past Surgical History:   Procedure Laterality Date    GYN SURGERY      tubal ligation    OPTICAL TRACKING SYSTEM FUSION POSTERIOR SPINE LUMBAR N/A 7/23/2021    Procedure: L4-5 transforaminal lumbar interbody fusion with reduction of grade 1/2 unstable spondylolisthesis   Open reduction and internal fixation of the grade L4-5 spondylolisthesis L4 - L5 posterior lateral fusion;  Surgeon: Asif Flores MD;  Location: [facility] OR     Current Outpatient Medications   Medication Sig Dispense Refill    albuterol (PROAIR HFA/PROVENTIL HFA/VENTOLIN HFA) 108 (90 Base) MCG/ACT inhaler Inhale 2 puffs into the lungs every 4 hours as needed for  shortness of breath or wheezing 18 g 1    amLODIPine (NORVASC) 2.5 MG tablet Take 1 tablet (2.5 mg) by mouth daily 90 tablet 3    Ascorbic Acid (SUPER C COMPLEX PO) Take 1 tablet by mouth daily      AVONEX PEN 30 MCG/0.5ML auto-injector kit Inject 30 mcg into the muscle every 7 days       baclofen (LIORESAL) 20 MG tablet Take 20 mg by mouth 4 times daily Per Neurologist      budesonide-formoterol (SYMBICORT) 80-4.5 MCG/ACT Inhaler Inhale 2 puffs into the lungs 2 times daily 10.2 g 11    cetirizine (ZYRTEC) 10 MG tablet Take 1 tablet (10 mg) by mouth 2 times daily 180 tablet 3    cycloSPORINE (RESTASIS) 0.05 % ophthalmic emulsion Place 1 drop into both eyes 2 times daily      diclofenac (VOLTAREN) 1 % topical gel Apply 4 g topically 4 times daily 480 g 0    esomeprazole (NEXIUM) 40 MG DR capsule Take 1 capsule (40 mg) by mouth every morning (before breakfast) Take 30-60 minutes before eating. 30 capsule 11    hydrocortisone 1 % CREA cream Place rectally 2 times daily as needed for itching      ibuprofen (ADVIL/MOTRIN) 200 MG tablet 800 mg day of and day after Avonex weekly injection + as needed up to 8 tablets per day at most      multivitamin (ONE-DAILY) tablet Take 1 tablet by mouth daily 90 tablet 3    nicotine (NICODERM CQ) 14 MG/24HR 24 hr patch Place 1 patch onto the skin every 24 hours 30 patch 1    nicotine polacrilex (NICORETTE) 4 MG gum Place 1 each (4 mg) inside cheek every hour as needed for nicotine withdrawal symptoms 100 each 0    oxyCODONE-acetaminophen (PERCOCET)  MG per tablet Take 1 tablet by mouth every 6 hours as needed for severe pain (Max of 5 tablets per day, for chroic pain.)      polyethylene glycol (MIRALAX) 17 GM/Dose powder Mix 1 capful with 6-8 ounces of clear liquid and take by mouth twice daily as needed for constipation. Decrease dose to once daily or once every 2-3 days to prevent constipation. 527 g 0    pregabalin (LYRICA) 75 MG capsule Take 1 capsule (75 mg) by mouth 2 times  daily      promethazine (PHENERGAN) 25 MG tablet Take 25 mg by mouth every 6 hours as needed for nausea Takes with each Percocet dose      rOPINIRole (REQUIP) 2 MG tablet Take 2 mg by mouth every morning And 4 mg at bedtime. Prescribed by neurologist      tiotropium (SPIRIVA RESPIMAT) 2.5 MCG/ACT inhaler Inhale 2 puffs into the lungs daily 4 g 1    amphetamine-dextroamphetamine (ADDERALL XR) 30 MG 24 hr capsule Take 1 capsule (30 mg) by mouth every morning Prescribed by (Patient not taking: Reported on 8/15/2024) 30 capsule 0    naloxone (NARCAN) 4 MG/0.1ML nasal spray Spray 1 spray (4 mg) into one nostril alternating nostrils once as needed for opioid reversal every 2-3 minutes until assistance arrives (Patient not taking: Reported on 8/15/2024) 0.2 mL 0    venlafaxine (EFFEXOR XR) 75 MG 24 hr capsule Take 3 capsules (225 mg) by mouth daily (Patient not taking: Reported on 8/15/2024) 90 capsule 11       Allergies   Allergen Reactions    Sulfa Antibiotics Rash    Rosuvastatin     Adhesive Tape Rash     Reacts to adhesive on fentanyl patch, tapes, all kinds with prolonged duration    Wellbutrin [Bupropion Hydrobromide] Rash        Social History     Tobacco Use    Smoking status: Every Day     Current packs/day: 0.50     Average packs/day: 0.5 packs/day for 35.0 years (17.5 ttl pk-yrs)     Types: Cigarettes    Smokeless tobacco: Never   Substance Use Topics    Alcohol use: Not Currently     Alcohol/week: 0.0 standard drinks of alcohol       History   Drug Use No         Review of Systems  Constitutional, HEENT, cardiovascular, pulmonary, gi and gu systems are negative, except as otherwise noted.    Objective    /60 (BP Location: Right arm, Patient Position: Sitting, Cuff Size: Adult Regular)   Pulse 83   Temp 97.5  F (36.4  C) (Tympanic)   Resp 16   Wt 66.5 kg (146 lb 8 oz)   LMP 05/25/2017 (Exact Date)   SpO2 98%   BMI 23.65 kg/m     Estimated body mass index is 23.65 kg/m  as calculated from the  "following:    Height as of 8/15/24: 1.676 m (5' 6\").    Weight as of this encounter: 66.5 kg (146 lb 8 oz).    Physical Exam  GENERAL: alert and no distress  EYES: Eyes grossly normal to inspection, PERRL and conjunctivae and sclerae normal  HENT: ear canals and TM's normal, nose and mouth without ulcers or lesions  NECK: no adenopathy, no asymmetry, masses, or scars  RESP: lungs clear to auscultation - no rales, rhonchi or wheezes  CV: regular rate and rhythm, normal S1 S2, no S3 or S4, no murmur, click or rub, no peripheral edema  ABDOMEN: soft, nontender, no hepatosplenomegaly, no masses and bowel sounds normal  MS: no gross musculoskeletal defects noted, no edema  SKIN: no suspicious lesions or rashes  NEURO: Normal strength and tone, mentation intact and speech normal  PSYCH: mentation appears normal, affect normal/bright    Recent Labs   Lab Test 08/15/24  1413 12/13/23  1135 10/04/23  1408   HGB 13.9 11.1* 12.1    320 308   NA  --  143 142   POTASSIUM  --  4.0 3.8   CR  --  0.70 0.83   A1C  --  5.4  --       Last Comprehensive Metabolic Panel:  Lab Results   Component Value Date     (H) 08/23/2024    POTASSIUM 4.3 08/23/2024    CHLORIDE 108 08/23/2024    CO2 30 08/23/2024    ANIONGAP 9 08/23/2024    GLC 88 08/23/2024    BUN 20 08/23/2024    CR 1.00 08/23/2024    GFRESTIMATED 65 08/23/2024    MARIO 9.5 08/23/2024     Diagnostics  Labs pending at this time.  Results will be reviewed when available.   EKG: appears normal, NSR, normal axis, normal intervals, no acute ST/T changes c/w ischemia, unchanged from previous tracings, occasional ectopic beat noted, no QT prolongation    Revised Cardiac Risk Index (RCRI)  The patient has the following serious cardiovascular risks for perioperative complications:   - Coronary Artery Disease (MI, positive stress test, angina, Qs on EKG) = 1 point     RCRI Interpretation: 1 point: Class II (low risk - 0.9% complication rate)     Signed Electronically by: Anahi MILLER" JIMMY Moreno  A copy of this evaluation report is provided to the requesting physician.

## 2024-08-27 ENCOUNTER — APPOINTMENT (OUTPATIENT)
Dept: CT IMAGING | Facility: CLINIC | Age: 59
End: 2024-08-27
Attending: EMERGENCY MEDICINE
Payer: MEDICAID

## 2024-08-27 ENCOUNTER — HOSPITAL ENCOUNTER (EMERGENCY)
Facility: CLINIC | Age: 59
Discharge: HOME | End: 2024-08-27
Attending: EMERGENCY MEDICINE | Admitting: EMERGENCY MEDICINE
Payer: MEDICAID

## 2024-08-27 VITALS
OXYGEN SATURATION: 95 % | SYSTOLIC BLOOD PRESSURE: 140 MMHG | WEIGHT: 150 LBS | HEART RATE: 77 BPM | DIASTOLIC BLOOD PRESSURE: 90 MMHG | TEMPERATURE: 98 F | RESPIRATION RATE: 18 BRPM | BODY MASS INDEX: 24.11 KG/M2 | HEIGHT: 66 IN

## 2024-08-27 DIAGNOSIS — S09.90XA CLOSED HEAD INJURY, INITIAL ENCOUNTER: ICD-10-CM

## 2024-08-27 DIAGNOSIS — S01.81XA FACIAL LACERATION, INITIAL ENCOUNTER: ICD-10-CM

## 2024-08-27 DIAGNOSIS — M47.812 CERVICAL SPINE ARTHRITIS: ICD-10-CM

## 2024-08-27 PROCEDURE — 70486 CT MAXILLOFACIAL W/O DYE: CPT

## 2024-08-27 PROCEDURE — 99284 EMERGENCY DEPT VISIT MOD MDM: CPT | Mod: 25

## 2024-08-27 PROCEDURE — 70450 CT HEAD/BRAIN W/O DYE: CPT

## 2024-08-27 PROCEDURE — 72125 CT NECK SPINE W/O DYE: CPT

## 2024-08-27 PROCEDURE — 12013 RPR F/E/E/N/L/M 2.6-5.0 CM: CPT

## 2024-08-27 RX ORDER — HYDROMORPHONE HYDROCHLORIDE 1 MG/ML
0.5 INJECTION, SOLUTION INTRAMUSCULAR; INTRAVENOUS; SUBCUTANEOUS
Status: DISCONTINUED | OUTPATIENT
Start: 2024-08-27 | End: 2024-08-27 | Stop reason: HOSPADM

## 2024-08-27 RX ORDER — LIDOCAINE HYDROCHLORIDE AND EPINEPHRINE 10; 10 MG/ML; UG/ML
5 INJECTION, SOLUTION INFILTRATION; PERINEURAL ONCE
Status: DISCONTINUED | OUTPATIENT
Start: 2024-08-27 | End: 2024-08-27 | Stop reason: HOSPADM

## 2024-08-27 RX ORDER — ONDANSETRON 2 MG/ML
4 INJECTION INTRAMUSCULAR; INTRAVENOUS EVERY 30 MIN PRN
Status: DISCONTINUED | OUTPATIENT
Start: 2024-08-27 | End: 2024-08-27 | Stop reason: HOSPADM

## 2024-08-27 ASSESSMENT — ACTIVITIES OF DAILY LIVING (ADL)
ADLS_ACUITY_SCORE: 38
ADLS_ACUITY_SCORE: 38

## 2024-08-27 ASSESSMENT — COLUMBIA-SUICIDE SEVERITY RATING SCALE - C-SSRS
6. HAVE YOU EVER DONE ANYTHING, STARTED TO DO ANYTHING, OR PREPARED TO DO ANYTHING TO END YOUR LIFE?: YES
2. HAVE YOU ACTUALLY HAD ANY THOUGHTS OF KILLING YOURSELF IN THE PAST MONTH?: NO
1. IN THE PAST MONTH, HAVE YOU WISHED YOU WERE DEAD OR WISHED YOU COULD GO TO SLEEP AND NOT WAKE UP?: NO

## 2024-08-27 NOTE — ED PROVIDER NOTES
58-year-old female who was signed out to me pending CTs.  They showed no traumatic injuries.  Patient was reassessed, she declined any pain medication, can take Tylenol and ibuprofen, declined a prescription for muscle relaxants, will return if worsening is able to ambulate okay and we discussed symptoms of a concussion     Arleen Hall MD  08/27/24 2052

## 2024-08-27 NOTE — ED NOTES
C-collar removed per MD [place]. Pt ambulated w/o difficulty. Reviewed AVS w/ pt. All questions answered.

## 2024-08-27 NOTE — ED PROVIDER NOTES
Emergency Department Note      History of Present Illness     Chief Complaint   Fall and Facial Laceration      HPI   Hina Yap is a 58 year old female who presents with a head and facial injury after a fall approximately 5 to 6 feet onto rocks that occurred just prior to arrival.  Patient was trying to cut branches when she lost her footing.  She complains of a headache and some neck pain as well as pain around the left eyebrow where she sustained a laceration.  She denies any chest, abdomen, back, arm, leg pain.  She did not lose consciousness.  She is not on any blood thinners.  She denies any numbness or weakness in her extremities.    Independent Historian   None    Review of External Notes   I reviewed the office visit from 8/23/2024 which reviewed her chronic medical problems.    Past Medical History     Medical History and Problem List   Past Medical History:   Diagnosis Date    Anxiety     Arthritis Years ago    COPD (chronic obstructive pulmonary disease) (H)     Depressive disorder Years ago    GERD (gastroesophageal reflux disease)     Hypertension     Ischemic cardiomyopathy     Multiple sclerosis (H)     Neuromuscular disorder (H)     Nonrheumatic mitral valve regurgitation     PVC's (premature ventricular contractions)     Renal disease     Restless leg syndrome     Tobacco use disorder        Medications   albuterol (PROAIR HFA/PROVENTIL HFA/VENTOLIN HFA) 108 (90 Base) MCG/ACT inhaler  amLODIPine (NORVASC) 2.5 MG tablet  amphetamine-dextroamphetamine (ADDERALL XR) 30 MG 24 hr capsule  Ascorbic Acid (SUPER C COMPLEX PO)  AVONEX PEN 30 MCG/0.5ML auto-injector kit  baclofen (LIORESAL) 20 MG tablet  budesonide-formoterol (SYMBICORT) 80-4.5 MCG/ACT Inhaler  cetirizine (ZYRTEC) 10 MG tablet  cycloSPORINE (RESTASIS) 0.05 % ophthalmic emulsion  diclofenac (VOLTAREN) 1 % topical gel  esomeprazole (NEXIUM) 40 MG DR capsule  hydrocortisone 1 % CREA cream  ibuprofen (ADVIL/MOTRIN) 200 MG  "tablet  multivitamin (ONE-DAILY) tablet  naloxone (NARCAN) 4 MG/0.1ML nasal spray  nicotine (NICODERM CQ) 14 MG/24HR 24 hr patch  nicotine polacrilex (NICORETTE) 4 MG gum  oxyCODONE-acetaminophen (PERCOCET)  MG per tablet  polyethylene glycol (MIRALAX) 17 GM/Dose powder  pregabalin (LYRICA) 75 MG capsule  promethazine (PHENERGAN) 25 MG tablet  rOPINIRole (REQUIP) 2 MG tablet  tiotropium (SPIRIVA RESPIMAT) 2.5 MCG/ACT inhaler  venlafaxine (EFFEXOR XR) 75 MG 24 hr capsule        Surgical History   Past Surgical History:   Procedure Laterality Date    GYN SURGERY      tubal ligation    OPTICAL TRACKING SYSTEM FUSION POSTERIOR SPINE LUMBAR N/A 7/23/2021    Procedure: L4-5 transforaminal lumbar interbody fusion with reduction of grade 1/2 unstable spondylolisthesis   Open reduction and internal fixation of the grade L4-5 spondylolisthesis L4 - L5 posterior lateral fusion;  Surgeon: Asif Flores MD;  Location:  OR       Physical Exam     Patient Vitals for the past 24 hrs:   BP Temp Temp src Pulse Resp SpO2 Height Weight   08/27/24 1416 (!) 141/97 98  F (36.7  C) Oral 77 18 100 % 1.676 m (5' 6\") 68 kg (150 lb)     Physical Exam  Vitals and nursing note reviewed.   Constitutional:       General: She is not in acute distress.     Appearance: She is not ill-appearing or toxic-appearing.   HENT:      Head: Normocephalic. Laceration present.        Right Ear: External ear normal.      Left Ear: External ear normal.      Nose: Nose normal.      Mouth/Throat:      Mouth: Mucous membranes are moist.   Eyes:      Extraocular Movements: Extraocular movements intact.      Conjunctiva/sclera: Conjunctivae normal.      Pupils: Pupils are equal, round, and reactive to light.   Neck:      Comments: +c-collar  Cardiovascular:      Rate and Rhythm: Normal rate and regular rhythm.      Heart sounds: No murmur heard.  Pulmonary:      Effort: Pulmonary effort is normal. No respiratory distress.      Breath sounds: " Normal breath sounds. No wheezing, rhonchi or rales.   Chest:      Chest wall: No tenderness.   Abdominal:      General: Abdomen is flat. Bowel sounds are normal. There is no distension.      Palpations: Abdomen is soft.      Tenderness: There is no abdominal tenderness. There is no guarding or rebound.   Musculoskeletal:         General: No deformity or signs of injury.      Cervical back: Tenderness present.   Skin:     General: Skin is warm and dry.      Findings: No rash.   Neurological:      Mental Status: She is alert and oriented to person, place, and time.      Cranial Nerves: No cranial nerve deficit.      Sensory: No sensory deficit.      Motor: No weakness.   Psychiatric:         Behavior: Behavior normal.           Diagnostics     Lab Results   Labs Ordered and Resulted from Time of ED Arrival to Time of ED Departure - No data to display    Imaging   CT Head w/o Contrast    (Results Pending)   CT Cervical Spine w/o Contrast    (Results Pending)   CT Facial Bones without Contrast    (Results Pending)         Independent Interpretation   None    ED Course      Medications Administered   Medications   ondansetron (ZOFRAN) injection 4 mg (has no administration in time range)   HYDROmorphone (PF) (DILAUDID) injection 0.5 mg (has no administration in time range)   lidocaine 1% with EPINEPHrine 1:100,000 injection 5 mL (has no administration in time range)       Procedures   Procedures     Laceration Repair      Procedure: Laceration Repair    Indication: Laceration    Consent: Verbal    Tetanus status reviewed    Location: Left Face     Length: 5 cm    Preparation: Irrigation with Sterile Saline.    Anesthesia/Sedation: Lidocaine with Epinephrine - 1%      Treatment/Exploration: Wound explored and minimal debris removed     Closure: The wound was closed with one layer. Skin/superficial layer was closed with 9 x 5-0 Nylon using Interrupted sutures.     Patient Status: The patient tolerated the procedure well:  Yes. There were no complications.      Discussion of Management   None    ED Course        Additional Documentation  None    Medical Decision Making / Diagnosis     CMS Diagnoses: None    MIPS       None    [facility]   Hina MARTIN Yap is a 58 year old female who presents with a fall of 5 to 6 feet onto her head and face.  She has a sizable laceration to the left eyebrow which was cleaned and repaired as noted.  Patient will need her sutures removed in about 1 week.  Given the significant injury, we did perform a CT of the head, cervical spine, and facial bones.    Disposition   Care of the patient was transferred to my colleague Dr. KELLY pending CT results.     Diagnosis     ICD-10-CM    1. Closed head injury, initial encounter  S09.90XA       2. Facial laceration, initial encounter  S01.81XA            Discharge Medications   New Prescriptions    No medications on file         MD Alvin Lester Shaun M, MD  08/27/24 1528

## 2024-08-27 NOTE — ED TRIAGE NOTES
Fall while trimming tree branches falling onto rocks. Denies LOC at time of fall. No neck or back pain. No blood thinners. Fall was approximately 5-6 feet. Laceration to left side of forehead with bleeding controlled.  Pt is alert and oriented times four. Denies any other injuries.     Dr. Esquivel to room at 1418

## 2024-08-30 ENCOUNTER — TELEPHONE (OUTPATIENT)
Dept: PEDIATRICS | Facility: CLINIC | Age: 59
End: 2024-08-30
Payer: MEDICAID

## 2024-08-30 NOTE — TELEPHONE ENCOUNTER
"Patient called requesting information on how to care for sutures that were placed in ER on 8/27. Patient was not given any information on suture care or if they can get wet.    Per clinical resources:  \"Leave the bandage on for the first 24-48 hours and don't get it wet.  After the first 24-48 hours, you can remove the bandage and gently wash the wound. Do not scrub or soak the wound.\" Instructed patient to gently pat to dry.    Patient reports it is healing well, no redness or drainage. Awaiting call back from clinic to schedule suture removal with a provider.    No other questions at this time, patient verbalized understanding and agreement in plan. Instructed to call back with any new or worsening symptoms or questions.    Jing CLARKE RN  8/30/2024 at 12:40 PM    "

## 2024-09-04 ENCOUNTER — TELEPHONE (OUTPATIENT)
Dept: PEDIATRICS | Facility: CLINIC | Age: 59
End: 2024-09-04

## 2024-09-04 ENCOUNTER — OFFICE VISIT (OUTPATIENT)
Dept: PEDIATRICS | Facility: CLINIC | Age: 59
End: 2024-09-04
Payer: MEDICAID

## 2024-09-04 VITALS
OXYGEN SATURATION: 95 % | HEIGHT: 66 IN | TEMPERATURE: 98.1 F | RESPIRATION RATE: 18 BRPM | SYSTOLIC BLOOD PRESSURE: 122 MMHG | HEART RATE: 82 BPM | BODY MASS INDEX: 23.7 KG/M2 | WEIGHT: 147.5 LBS | DIASTOLIC BLOOD PRESSURE: 80 MMHG

## 2024-09-04 DIAGNOSIS — Z48.02 VISIT FOR SUTURE REMOVAL: ICD-10-CM

## 2024-09-04 DIAGNOSIS — F41.9 ANXIETY: ICD-10-CM

## 2024-09-04 DIAGNOSIS — F33.1 MODERATE EPISODE OF RECURRENT MAJOR DEPRESSIVE DISORDER (H): ICD-10-CM

## 2024-09-04 DIAGNOSIS — Z59.819 HOUSING INSTABILITY: ICD-10-CM

## 2024-09-04 DIAGNOSIS — S09.90XD CLOSED HEAD INJURY, SUBSEQUENT ENCOUNTER: ICD-10-CM

## 2024-09-04 DIAGNOSIS — R45.851 SUICIDAL IDEATION: ICD-10-CM

## 2024-09-04 DIAGNOSIS — S01.81XD FACIAL LACERATION, SUBSEQUENT ENCOUNTER: Primary | ICD-10-CM

## 2024-09-04 DIAGNOSIS — Z23 NEED FOR PROPHYLACTIC VACCINATION AND INOCULATION AGAINST INFLUENZA: ICD-10-CM

## 2024-09-04 PROCEDURE — 90471 IMMUNIZATION ADMIN: CPT | Performed by: NURSE PRACTITIONER

## 2024-09-04 PROCEDURE — 90673 RIV3 VACCINE NO PRESERV IM: CPT | Performed by: NURSE PRACTITIONER

## 2024-09-04 PROCEDURE — 99214 OFFICE O/P EST MOD 30 MIN: CPT | Mod: 25 | Performed by: NURSE PRACTITIONER

## 2024-09-04 SDOH — ECONOMIC STABILITY - HOUSING INSECURITY: HOUSING INSTABILITY UNSPECIFIED: Z59.819

## 2024-09-04 ASSESSMENT — PAIN SCALES - GENERAL: PAINLEVEL: MILD PAIN (2)

## 2024-09-04 NOTE — PATIENT INSTRUCTIONS
Avoid excess sun exposure to laceration as it heals to prevent scarring    Avoid picking    Can put vaseline or bacitracin to avoid itching/scratching    Keep covered with a bandaid for a few days

## 2024-09-04 NOTE — Clinical Note
Saw her for suture removal but mental health very poorly controlled-seems like this isn't new but has passive SI, a little tearful and worried about housing stability. Put in CC referral and she plans to meet with therapist again this week. Do you want to see her sooner than November or is this c/w her baseline?

## 2024-09-04 NOTE — PROGRESS NOTES
Assessment & Plan   Closed head injury, subsequent encounter  Facial laceration, subsequent encounter  Visit for suture removal  Tolerated and healing well.  - REMOVAL OF SUTURES    Housing instability  Pt open to referral-renting a room currently but worries about housing stability  - Primary Care - Care Coordination Referral; Future    Need for prophylactic vaccination and inoculation against influenza  Agreeable to vaccinate    Anxiety  Moderate episode of recurrent major depressive disorder (H)  Suicidal ideation  Not well controlled but upon chart review has scored high in 19s-20s on both KAMALA/PHQ since 2022. Has therapist that she plans to meet with weekly. SI is not new for her.    Depression Screening Follow Up        9/4/2024     9:28 AM   PHQ   PHQ-9 Total Score 23   Q9: Thoughts of better off dead/self-harm past 2 weeks Several days   F/U: Thoughts of suicide or self-harm No   F/U: Safety concerns No           9/4/2024     9:28 AM   Last PHQ-9   1.  Little interest or pleasure in doing things 3   2.  Feeling down, depressed, or hopeless 3   3.  Trouble falling or staying asleep, or sleeping too much 3   4.  Feeling tired or having little energy 3   5.  Poor appetite or overeating 3   6.  Feeling bad about yourself 1   7.  Trouble concentrating 3   8.  Moving slowly or restless 3   Q9: Thoughts of better off dead/self-harm past 2 weeks 1   PHQ-9 Total Score 23   In the past two weeks have you had thoughts of suicide or self harm? No   Do you have concerns about your personal safety or the safety of others? No              No data to display                      Follow Up Actions Taken  Crisis resource information provided in the After Visit Summary  Referred patient back to mental health provider  Patient to follow up with PCP.  Clinic staff to schedule appointment if able.    Discussed the following ways the patient can remain in a safe environment:  be around others    Patient Instructions   Avoid  "excess sun exposure to laceration as it heals to prevent scarring    Avoid picking    Can put vaseline or bacitracin to avoid itching/scratching    Keep covered with a bandaid for a few days      Subjective   Hina is a 58 year old, presenting for the following health issues:  Wound Check and Imm/Inj (Flu Shot)        9/4/2024     9:39 AM   Additional Questions   Roomed by Sasha Gilbert CMA     History of Present Illness       Reason for visit:  Stiches removal  Symptom onset:  3-7 days ago  Symptom intensity:  Moderate  Symptom progression:  Staying the same  Had these symptoms before:  Yes  Has tried/received treatment for these symptoms:  No   She is taking medications regularly.         Here for suture removal  Hx of frequent falls  Has some pain over laceration  L eyelid still slight swollen  Headache bu tnot new or worsening since ER visit    Mood ups and downs per pt  Has therapist-text her and needs appointment this week  Plan to meet weekly  Passive SI \"not new\" has no plans of self harm or suicide  Lives with male roommate-rents a room. Worried will have to be evicted sometime this month          Objective    /80 (BP Location: Right arm, Cuff Size: Adult Regular)   Pulse 82   Temp 98.1  F (36.7  C) (Tympanic)   Resp 18   Ht 1.676 m (5' 6\")   Wt 66.9 kg (147 lb 8 oz)   LMP 05/25/2017 (Exact Date)   SpO2 95%   BMI 23.81 kg/m    Body mass index is 23.81 kg/m .  Physical Exam   GENERAL: alert and no distress  SKIN:9 sutures located above L eyebrow were removed without difficulty and covered with bacitracin and bandaid. Wound well approximated.   EYE: l upper eyelid slight swelling, tender to touch over laceration  PSYCH: tearful at times          Signed Electronically by: Lorena Carbajla NP    "

## 2024-09-04 NOTE — TELEPHONE ENCOUNTER
Outgoing call appointment scheduled.    Yes, she is taking he venlafaxine.    Thank you kindly,  Clarence LANGLEY

## 2024-09-04 NOTE — TELEPHONE ENCOUNTER
Please reach out to pt  Needs appointment with Sunshine for mental health /falls follow-up, ASAP please  -can be virtual. Needs 1 hour time slot    Also can you check if she started venlafaxine?    Thanks  Lorena Carbajal, APRN, CNP

## 2024-09-05 ENCOUNTER — PATIENT OUTREACH (OUTPATIENT)
Dept: CARE COORDINATION | Facility: CLINIC | Age: 59
End: 2024-09-05
Payer: MEDICAID

## 2024-09-06 NOTE — PROGRESS NOTES
Clinic Care Coordination Contact  Community Health Worker Initial Outreach    CHW Initial Information Gathering:  Referral Source: PCP  Preferred Hospital: [facility]  [phone]  Preferred Urgent Care: [facility] [place], [phone]  Current living arrangement:: I live alone  Community Resources: County Programs, Financial/Insurance  Supplies Currently Used at Home: None  No PCP office visit in Past Year: No  CHW Additional Questions  If ED/Hospital discharge, follow-up appointment scheduled as recommended?: N/A  Medication changes made following ED/Hospital discharge?: N/A    CHW introduced self and role with CC  Patient states that she does not have long to talk, she mentions that she has to be out of where she is living by Oct 1 and does not have anywhere to go.   Pt is currently renting a room. She is interested in further resources and/or supports from CC team.     Patient accepts CC: Yes. Patient scheduled for assessment with [facility] on 9/9 at 11:00am. Patient noted desire to discuss housing resources.     DENISSE Goodman, B.S. [facility]  Clinic Care Coordination  [facility]:  [place], [place] and [place]  [phone]  [email or web address]

## 2024-09-09 ENCOUNTER — VIRTUAL VISIT (OUTPATIENT)
Dept: PEDIATRICS | Facility: CLINIC | Age: 59
End: 2024-09-09
Payer: MEDICAID

## 2024-09-09 ENCOUNTER — PATIENT OUTREACH (OUTPATIENT)
Dept: NURSING | Facility: CLINIC | Age: 59
End: 2024-09-09
Payer: MEDICAID

## 2024-09-09 DIAGNOSIS — R29.6 FALLS FREQUENTLY: ICD-10-CM

## 2024-09-09 DIAGNOSIS — F33.1 MODERATE EPISODE OF RECURRENT MAJOR DEPRESSIVE DISORDER (H): ICD-10-CM

## 2024-09-09 DIAGNOSIS — Z59.00 LACK OF HOUSING: Primary | ICD-10-CM

## 2024-09-09 DIAGNOSIS — G89.29 OTHER CHRONIC PAIN: ICD-10-CM

## 2024-09-09 DIAGNOSIS — Z71.89 COUNSELING AND COORDINATION OF CARE: Primary | ICD-10-CM

## 2024-09-09 PROCEDURE — 99443 PR PHYSICIAN TELEPHONE EVALUATION 21-30 MIN: CPT | Mod: 93 | Performed by: NURSE PRACTITIONER

## 2024-09-09 SDOH — ECONOMIC STABILITY - HOUSING INSECURITY: HOMELESSNESS UNSPECIFIED: Z59.00

## 2024-09-09 NOTE — PROGRESS NOTES
Faxed letter, and placed one in the mail to patient.  Routing to Jf to make sure she got Sunshine's message. Does not need to come back to this pool, only Sunshine.    Thank you kindly,  Clarence LANGLEY

## 2024-09-09 NOTE — PROGRESS NOTES
"Clinic Care Coordination Contact  Clinic Care Coordination Contact  OUTREACH    Referral Information:  Referral Source: PCP         Chief Complaint   Patient presents with    Clinic Care Coordination - Initial     Housing Resources        Universal Utilization:   Clinic Utilization  No PCP office visit in Past Year: No  Utilization      No Show Count (past year)  1             ED Visits  3             Hospital Admissions  0                    Current as of: 9/9/2024 11:01 AM                Clinical Concerns:  Current Medical Concerns:    Patient Active Problem List   Diagnosis    [facility]    Cardiomyopathy (H)-[facility] wants to see her q2 years    Anxiety    Restless leg syndrome    Moderate episode of recurrent major depressive disorder (H)    Other chronic pain-sees pain clinic    CKD (chronic kidney disease) stage 2, GFR 60-89 ml/min    Insomnia, unspecified type    Prolonged Q-T interval on ECG    Essential hypertension    Nonrheumatic mitral valve regurgitation    S/P lumbar fusion    Itching    Urinary urgency    Iron deficiency    Falls frequently    Subclinical hypothyroidism       [facility] outreached to pt on this date. Pt shares she needs to vacate her place of living by 10/1/2024. Pt states she is subletting from the legal renter who is behind in rent and the owner of the home wants to sell. Additionally, pt shares she has a lot of belongings. Pt mentions \"hoarding\" but it's \"not like the show\".  [facility] explained housing programs typically need a number of months to process. Additionally, with SSDI of $900/mo and Medical Assistance pt could qualify for a CADI waiver. Referral for [facility] assessment completed online on this date. Pt also shares she does have a contact at the [facility]: Valdo who called while on the phone with [facility]. [facility] strongly encouraged pt to connect with Valdo to review options as this would be the instruction to seek housing program " eligibility through the county anyway. Pt stated Valdo agreed to call her again in 1 hour.     [facility] offered to send some information about different programs available via [employer].     Current Behavioral Concerns: No needs in this area despite life stressors.       Education Provided to patient: Resources for housing.         Health Maintenance Reviewed:   Health Maintenance Due   Topic Date Due    LUNG CANCER SCREENING  08/25/2019    YEARLY PREVENTIVE VISIT  05/18/2024    COLORECTAL CANCER SCREENING  06/17/2024    COVID-19 Vaccine (5 - 2023-24 season) 09/01/2024    LIPID  10/04/2024    MICROALBUMIN  10/04/2024    URINE DRUG SCREEN  10/04/2024        Clinical Pathway: None    Medication Management:  Medication review status: Medications reviewed and no changes reported per patient.            Living Situation:  Current living arrangement:: I live alone    Lifestyle & Psychosocial Needs:    Social Determinants of Health     Food Insecurity: High Risk (1/11/2024)    Food Insecurity     Within the past 12 months, did you worry that your food would run out before you got money to buy more?: Yes     Within the past 12 months, did the food you bought just not last and you didn t have money to get more?: Yes   Depression: At risk (9/4/2024)    PHQ-2     PHQ-2 Score: 6   Housing Stability: Low Risk  (1/11/2024)    Housing Stability     Do you have housing? : Yes     Are you worried about losing your housing?: No   Recent Concern: Housing Stability - High Risk (12/13/2023)    Housing Stability     Do you have housing? : Yes     Are you worried about losing your housing?: Yes   Tobacco Use: High Risk (9/4/2024)    Patient History     Smoking Tobacco Use: Every Day     Smokeless Tobacco Use: Never     Passive Exposure: Not on file   Financial Resource Strain: Low Risk  (1/11/2024)    Financial Resource Strain     Within the past 12 months, have you or your family members you live with been unable to get utilities (heat,  "electricity) when it was really needed?: No   Alcohol Use: Not At Risk (12/15/2022)    AUDIT-C     Frequency of Alcohol Consumption: Never     Average Number of Drinks: Patient does not drink     Frequency of Binge Drinking: Never   Transportation Needs: High Risk (1/11/2024)    Transportation Needs     Within the past 12 months, has lack of transportation kept you from medical appointments, getting your medicines, non-medical meetings or appointments, work, or from getting things that you need?: Yes   Physical Activity: Inactive (12/15/2022)    Exercise Vital Sign     Days of Exercise per Week: 0 days     Minutes of Exercise per Session: 0 min   Interpersonal Safety: Low Risk  (8/15/2024)    Interpersonal Safety     Do you feel physically and emotionally safe where you currently live?: Yes     Within the past 12 months, have you been hit, slapped, kicked or otherwise physically hurt by someone?: No     Within the past 12 months, have you been humiliated or emotionally abused in other ways by your partner or ex-partner?: No   Stress: Stress Concern Present (12/15/2022)    [place] [employer] - Occupational Stress Questionnaire     Feeling of Stress : Very much   Social Connections: Socially Isolated (12/15/2022)    Social Connection and Isolation Panel [NHANES]     Frequency of Communication with Friends and Family: Never     Frequency of Social Gatherings with Friends and Family: Once a week     Attends [employer] Services: More than 4 times per year     Active Member of Clubs or Organizations: No     Attends Club or Organization Meetings: Not on file     Marital Status: Never    Health Literacy: Not on file         Resources and Interventions:  Current Resources: [facility] contact: \"Valdo\"     Community Resources: County Programs, Financial/Insurance  Supplies Currently Used at Home: None        Care Plan:  Care Plan: Housing Instability       Problem: SDOH LACK OF STABLE HOUSING       " Goal: Establish Stable Housing       Start Date: 9/9/2024 Expected End Date: 12/1/2024    Note:     Goal Statement: I will continue to take steps over the next 3 months to secure safe, stable, and long term housing.  Barriers: Needs to move out by October 1st, 2024.   Strengths: Currently receiving SSDI and Medical Assistance.   Patient expressed understanding of goal: yes    Action steps to achieve this goal:  Referral placed 9/9/2024 for [place] online for [facility].  I have a contact at the county: Valdo. I will connect with her to know if she can review housing options through [facility].   I will call [facility] Housing Resource Line to review options and eligibility for Housing Stabilization and Housing Supports. [phone]  I will consider leaning on informal supports of my: friends, family, and neighbors  I will review resources and supports sent to me via News[employer] for review of housing services.   I will contact my care team with questions, concerns, support needs.   I will use the clinic as a resource and I understand I can contact my clinic with 24/7 after hours services available.   I will continue to outreach to care coordination as needed for additional resources or supports.                              Care Plan: Financial Wellbeing       Problem: Patient expresses financial resource strain       Goal: Create an action plan to increase financial stability       Start Date: 9/9/2024 Expected End Date: 12/1/2024    Note:     Goal Statement: I will continue to take steps to further support my financial wellbeing over the next 3 month(s).  Barriers: minimal income  Strengths: Currently on Medical Assistance and receiving SSDI  Patient expressed understanding of goal: yes    Action steps to achieve this goal:  [place] on 9/9/2024 to potentially explore option for Community Access for Disability Inclusion (CADI) waiver.  Financial Resource Worker referral placed  9/11/2024.  I will continue to work with the Financial Resource Worker for the SNAP and Cash assistance application.   I will review resources and supports sent to me via Before the Call and consider outreaching and establishing with one or more which interest me.  I will use the clinic as a resource and I understand I can contact my clinic with 24/7 after hours services available.   I will continue to outreach to care coordination as needed for additional resources or supports.                                  Patient/Caregiver understanding: Pt reports understanding and denies any additional questions or concerns at this times. [facility] CC engaged in AIDET communication during encounter.      Outreach Frequency: monthly, more frequently as needed  Future Appointments                Today Sunshine Lunsford APRN CNP [facility] ADELINA Babin    Tomorrow [id number] [facility] ADELINA Babin    In 2 months Loretta Chapa [facility] ADELINA Babin    In 2 months Sunshine Lunsford APRN CNP [facility] ADELINA Babin            Plan: Pt to review Before the Call message with resources. Care Coordination to follow up with pt in 1-2 weeks.     Jf Riddle, [facility]  Clinic Care Coordinator  [facility]  [phone]  [email or web address]

## 2024-09-09 NOTE — LETTER
2024    Hina Yap   1965        To Whom it May Concern;    Please excuse Hina MARTIN Fracisco from work/school for a healthcare visit on Sep 9, 2024.    Sincerely,        BENNETT ROUSSEAU CNP

## 2024-09-09 NOTE — LETTER
[facility] CARE COORDINATION  [address] [place] [address]    September 11, 2024    Hina Yap  [address]  [place] [address]      Dear Hina,    I am a clinic care coordinator who works with BENNETT ROUSSEAU CNP with the [facility]. I wanted to thank you for spending the time to talk with me.  Below is a description of clinic care coordination and how I can further assist you.       The clinic care coordination team is made up of a registered nurse, , financial resource worker and community health worker who understand the health care system. The goal of clinic care coordination is to help you manage your health and improve access to the health care system. Our team works alongside your provider to assist you in determining your health and social needs. We can help you obtain health care and community resources, providing you with necessary information and education. We can work with you through any barriers and develop a care plan that helps coordinate and strengthen the communication between you and your care team.  Our services are voluntary and are offered without charge to you personally.    Please feel free to contact me with any questions or concerns regarding care coordination and what we can offer.      We are focused on providing you with the highest-quality healthcare experience possible.    Sincerely,     Jf Riddle [facility]  Clinic Care Coordinator  [facility]  [phone]  [email or web address]    Enclosed: I have enclosed a copy of the Patient Centered Plan of Care. This has helpful information and goals that we have talked about. Please keep this in an easy to access place to use as needed.

## 2024-09-09 NOTE — PROGRESS NOTES
Hina is a 58 year old who is being evaluated via a billable video visit.      Originating Location (pt. Location): Home    Distant Location (provider location):  Off-site    Assessment & Plan     (Z59.00) Lack of housing  (primary encounter diagnosis)  Comment: Has connected with our /care coordinator and people from [facility]. I told pt I would reach out to care coordination to familiarize myself with her current options and see if there is anyone available to help her with paperwork.    (G89.29) Other chronic pain-sees pain clinic  Comment: See letter to pain clinic. She is at risk of losing her eligibility for pain stimulator if she doesn't get it done soon, but not good timing with housing issues    (F33.1) Moderate episode of recurrent major depressive disorder (H)  Comment: Poor control, but improved since last visit with JULIA mota. Now back at her baseline. On effexor  Plan: -Has upcoming therapy appt    (R29.6) Falls frequently  Comment: We have discussed at length. Declines making med changes. Declines physical therapy.               Subjective     Can't move left forehead as much    Signing up pt for cadi waiver. Going to take too long. Getting evicted October 1st. Talking to Arelis from [facility]. She was going to send some resources.     No friends or family who would put her up.    Has a cat in the car    The pain clinic will terminate her candidacy if she doesn't get the pain pump implanted temporarily for 5 days as a trial.   Defer 6 months. Re-evaluate at that time. Write a note   [facility] in [place] and to pt  Hina is a 58 year old, presenting for the following health issues:  No chief complaint on file.    HPI       Review of Systems  Constitutional, neuro, ENT, endocrine, pulmonary, cardiac, gastrointestinal, genitourinary, musculoskeletal, integument and psychiatric systems are negative, except as otherwise noted.      Objective           Vitals:  No  vitals were obtained today due to virtual visit.    Physical Exam   General: Alert and no distress //Respiratory: No audible wheeze, cough, or shortness of breath // Psychiatric:  Appropriate affect, tone, and pace of words      The longitudinal plan of care for the diagnosis(es)/condition(s) as documented were addressed during this visit. Due to the added complexity in care, I will continue to support Hina in the subsequent management and with ongoing continuity of care.        Video via Zoomdata: Start 230. End 3pm    Phone call duration: 40 minutes  Signed Electronically by: BENNETT ROUSSEAU CNP

## 2024-09-09 NOTE — LETTER
September 9, 2024      Hina Yap  [address]  [place] [address]        To Whom It May Concern,     I am Hina's primary care provider. According to Hina, she is at risk for losing her candidacy for the pain stimulator unless she has the 5 day trial within the next month or so. She recently had a significant fall with myriad injuries and is also about to be evicted October 1st with limited options for housing. Right now is absolutely not the right time for her to pursue this trial, although she remains eager and committed to completing this as soon as possible.     I am writing to you to ask that you kindly reilly her a 6 month extension for completing the 5 day trial and not withdraw her candidacy for this program. She has had many setbacks in the last couple of years which have made it difficult to complete appointments including this trial. I will do anything I can to help her complete this as soon as possible.     Please let the pt know whether or not you will reilly this 6 month extension until 3/2025.      Sincerely,        BENNETT ROUSSEAU CNP, DNP

## 2024-09-09 NOTE — LETTER
[facility]  Patient Centered Plan of Care  About Me:        Patient Name:  Hina Yap    YOB: 1965  Age:         58 year old   Tashi MRN:    [id number] Telephone Information:  Home Phone [phone]   Mobile [phone]       [address]  [address] [address] Email address:  [email or web address]      Emergency Contact(s)    Name Relationship Lgl Grd Work Phone Home Phone Mobile Phone   1. BRITTANIE YAP Son    [phone]   2. MICHAEL BELLE Friend    [phone]   3. SUE YAP Mother No  [phone]            Primary language:  English     needed? No   [employer]:  [phone] op. 1  Other communication barriers:No data recorded  Preferred Method of Communication:  Mail  Current living arrangement: I live alone    Mobility Status/ Medical Equipment: No data recorded      Health Maintenance  Health Maintenance Reviewed: .  Health Maintenance Due   Topic Date Due    LUNG CANCER SCREENING  08/25/2019    YEARLY PREVENTIVE VISIT  05/18/2024    COLORECTAL CANCER SCREENING  06/17/2024    COVID-19 Vaccine (5 - 2023-24 season) 09/01/2024    LIPID  10/04/2024    MICROALBUMIN  10/04/2024    URINE DRUG SCREEN  10/04/2024         My Access Plan  Medical Emergency 911   Primary Clinic Line [facility] - [phone]   24 Hour Appointment Line [phone] or  [phone]) (toll-free)   24 Hour Nurse Line [phone] (toll-free)   Preferred Urgent Care [facility], [phone]     Preferred Hospital [facility]  [phone]     Preferred Pharmacy [id number] - [facility] [address] AT [facility] [address]     Behavioral Health Crisis Line The National Suicide Prevention Lifeline at 1-956.915.4734 or Text/Call 828           My Care Team Members  Patient Care Team         Relationship Specialty Notifications Start End     Sunshine Lunsford APRN CNP PCP - General Nurse Practitioner  12/1/15     Phone: [phone] Fax: [phone]         [address] [place] [address]    Sunshine Lunsford APRN CNP Assigned PCP   5/4/17     Phone: [phone] Fax: [phone]         [address] [place] [address]    Reji Sandoval MD MD Dermatology  7/29/22     Phone: [phone] Pager: [phone] Fax: [phone]        [address] [place] [address]    Ivonne Gonzalez, [facility] Pharmacist Pharmacist  9/26/22     Phone: [phone] Fax: [phone]         [address] [address]    Marilyn Benavides APRN CNP Nurse Practitioner Nurse Practitioner Primary Care  1/30/23     Phone: [phone] Fax: [phone]         [address] [place] [address]    Loretta Chapa, [facility] Pharmacist Pharmacist  1/11/24     Phone: [phone]          [address] [place] [address]    Loretta Chapa, [facility] Assigned MTM Pharmacist   8/23/24     Phone: [phone]          [address] [address]    Jf Riddle LSW Lead Care Coordinator Primary Care - CC Admissions 9/6/24     Phone: [phone]                     My Care Plans  Self Management and Treatment Plan    Care Plan  Care Plan: Housing Instability       Problem: SDOH LACK OF STABLE HOUSING       Goal: Establish Stable Housing       Start Date: 9/9/2024 Expected End Date: 12/1/2024    Note:     Goal Statement: I will continue to take steps over the next 3 months to secure safe, stable, and long term housing.  Barriers: Needs to move out by October 1st, 2024.   Strengths: Currently receiving SSDI and Medical Assistance.   Patient expressed understanding of goal: yes    Action steps to achieve this goal:  Referral placed 9/9/2024 for [place].  [place] contact [place]. I will connect with her to know if she can review housing  options through [facility].   I will call [facility] Housing Resource Line to review options and eligibility for Housing Stabilization and Housing Supports. [phone]  I will consider leaning on informal supports of my: friends, family, and neighbors  I will review resources and supports sent to me via [employer] for review of housing services.   I will contact my care team with questions, concerns, support needs.   I will use the clinic as a resource and I understand I can contact my clinic with 24/7 after hours services available.   I will continue to outreach to care coordination as needed for additional resources or supports.                              Care Plan: Financial Wellbeing       Problem: Patient expresses financial resource strain       Goal: Create an action plan to increase financial stability       Start Date: 9/9/2024 Expected End Date: 12/1/2024    Note:     Goal Statement: I will continue to take steps to further support my financial wellbeing over the next 3 month(s).  Barriers: minimal income  Strengths: Currently on Medical Assistance and receiving SSDI  Patient expressed understanding of goal: yes    Action steps to achieve this goal:  [place] to potentially explore option for Community Access for Disability Inclusion (CADI) waiver.  Financial Resource Worker referral placed 9/11/2024.  I will continue to work with the Financial Resource Worker for the SNAP and Cash assistance application.   I will review resources and supports sent to me via [employer] and consider outreaching and establishing with one or more which interest me.  I will use the clinic as a resource and I understand I can contact my clinic with 24/7 after hours services available.   I will continue to outreach to care coordination as needed for additional resources or supports.                                Advance Care Plans/Directives:   Advanced Care Plan/Directives on file: No data  recorded  Discussed with patient/caregiver(s): No data recorded           My Medical and Care Information  Problem List   Patient Active Problem List   Diagnosis    MS (multiple sclerosis) (H)-[facility] clinic-Takes Adderall    Cardiomyopathy (H)-Cards wants to see her q2 years    Anxiety    Restless leg syndrome    Moderate episode of recurrent major depressive disorder (H)    Other chronic pain-sees pain clinic    CKD (chronic kidney disease) stage 2, GFR 60-89 ml/min    Insomnia, unspecified type    Prolonged Q-T interval on ECG    Essential hypertension    Nonrheumatic mitral valve regurgitation    S/P lumbar fusion    Itching    Urinary urgency    Iron deficiency    Falls frequently    Subclinical hypothyroidism      Current Medications and Allergies:     Allergies   Allergen Reactions    Sulfa Antibiotics Rash    Rosuvastatin     Adhesive Tape Rash     Reacts to adhesive on fentanyl patch, tapes, all kinds with prolonged duration    Wellbutrin [Bupropion Hydrobromide] Rash     Current Outpatient Medications   Medication Sig Dispense Refill    albuterol (PROAIR HFA/PROVENTIL HFA/VENTOLIN HFA) 108 (90 Base) MCG/ACT inhaler Inhale 2 puffs into the lungs every 4 hours as needed for shortness of breath or wheezing 18 g 1    amLODIPine (NORVASC) 2.5 MG tablet Take 1 tablet (2.5 mg) by mouth daily 90 tablet 3    amphetamine-dextroamphetamine (ADDERALL XR) 30 MG 24 hr capsule Take 1 capsule (30 mg) by mouth every morning Prescribed by 30 capsule 0    Ascorbic Acid (SUPER C COMPLEX PO) Take 1 tablet by mouth daily      AVONEX PEN 30 MCG/0.5ML auto-injector kit Inject 30 mcg into the muscle every 7 days       baclofen (LIORESAL) 20 MG tablet Take 20 mg by mouth 4 times daily Per Neurologist      budesonide-formoterol (SYMBICORT) 80-4.5 MCG/ACT Inhaler Inhale 2 puffs into the lungs 2 times daily 10.2 g 11    cetirizine (ZYRTEC) 10 MG tablet Take 1 tablet (10 mg) by mouth 2 times daily 180 tablet 3    cycloSPORINE (RESTASIS)  0.05 % ophthalmic emulsion Place 1 drop into both eyes 2 times daily      diclofenac (VOLTAREN) 1 % topical gel Apply 4 g topically 4 times daily 480 g 0    esomeprazole (NEXIUM) 40 MG DR capsule Take 1 capsule (40 mg) by mouth every morning (before breakfast) Take 30-60 minutes before eating. 30 capsule 11    hydrocortisone 1 % CREA cream Place rectally 2 times daily as needed for itching      multivitamin (ONE-DAILY) tablet Take 1 tablet by mouth daily 90 tablet 3    naloxone (NARCAN) 4 MG/0.1ML nasal spray Spray 1 spray (4 mg) into one nostril alternating nostrils once as needed for opioid reversal every 2-3 minutes until assistance arrives (Patient not taking: Reported on 8/15/2024) 0.2 mL 0    nicotine (NICODERM CQ) 14 MG/24HR 24 hr patch Place 1 patch onto the skin every 24 hours 30 patch 1    nicotine polacrilex (NICORETTE) 4 MG gum Place 1 each (4 mg) inside cheek every hour as needed for nicotine withdrawal symptoms 100 each 0    oxyCODONE-acetaminophen (PERCOCET)  MG per tablet Take 1 tablet by mouth every 6 hours as needed for severe pain (Max of 5 tablets per day, for chroic pain.)      polyethylene glycol (MIRALAX) 17 GM/Dose powder Mix 1 capful with 6-8 ounces of clear liquid and take by mouth twice daily as needed for constipation. Decrease dose to once daily or once every 2-3 days to prevent constipation. 527 g 0    pregabalin (LYRICA) 75 MG capsule Take 1 capsule (75 mg) by mouth 2 times daily      promethazine (PHENERGAN) 25 MG tablet Take 25 mg by mouth every 6 hours as needed for nausea Takes with each Percocet dose      rOPINIRole (REQUIP) 2 MG tablet Take 2 mg by mouth every morning And 4 mg at bedtime. Prescribed by neurologist      tiotropium (SPIRIVA RESPIMAT) 2.5 MCG/ACT inhaler Inhale 2 puffs into the lungs daily 4 g 1    venlafaxine (EFFEXOR XR) 75 MG 24 hr capsule Take 3 capsules (225 mg) by mouth daily 90 capsule 11     No current facility-administered medications for this visit.          Care Coordination Start Date: 9/4/2024   Frequency of Care Coordination: monthly, more frequently as needed     Form Last Updated: 09/11/2024

## 2024-09-10 ENCOUNTER — ANCILLARY PROCEDURE (OUTPATIENT)
Dept: BONE DENSITY | Facility: CLINIC | Age: 59
End: 2024-09-10
Attending: NURSE PRACTITIONER
Payer: MEDICAID

## 2024-09-10 DIAGNOSIS — Z91.89 AT HIGH RISK FOR COMPLICATION OF FRACTURE: ICD-10-CM

## 2024-09-10 DIAGNOSIS — Z78.0 POSTMENOPAUSAL STATUS: ICD-10-CM

## 2024-09-10 PROCEDURE — 77080 DXA BONE DENSITY AXIAL: CPT | Mod: TC | Performed by: PHYSICIAN ASSISTANT

## 2024-09-12 ENCOUNTER — PATIENT OUTREACH (OUTPATIENT)
Dept: CARE COORDINATION | Facility: CLINIC | Age: 59
End: 2024-09-12
Payer: MEDICAID

## 2024-09-16 ENCOUNTER — PATIENT OUTREACH (OUTPATIENT)
Dept: CARE COORDINATION | Facility: CLINIC | Age: 59
End: 2024-09-16
Payer: MEDICAID

## 2024-09-16 NOTE — PROGRESS NOTES
Clinic Care Coordination Contact  Community Health Worker Follow Up    Care Gaps:     Health Maintenance Due   Topic Date Due    LUNG CANCER SCREENING  08/25/2019    YEARLY PREVENTIVE VISIT  05/18/2024    COLORECTAL CANCER SCREENING  06/17/2024    COVID-19 Vaccine (5 - 2024-25 season) 09/01/2024    LIPID  10/04/2024    MICROALBUMIN  10/04/2024    URINE DRUG SCREEN  10/04/2024     Not discussed    Care Plan:   Care Plan: Housing Instability       Problem: SDOH LACK OF STABLE HOUSING       Goal: Establish Stable Housing       Start Date: 9/9/2024 Expected End Date: 12/1/2024    This Visit's Progress: 10%    Note:     Goal Statement: I will continue to take steps over the next 3 months to secure safe, stable, and long term housing.  Barriers: Needs to move out by October 1st, 2024.   Strengths: Currently receiving SSDI and Medical Assistance.   Patient expressed understanding of goal: yes    Action steps to achieve this goal:  Referral placed 9/9/2024 for [place]hoices Assessment online for [facility].  I have a contact at the county[place] I will connect with her to know if she can review housing options through [facility].   I will call [facility] Housing Resource Line to review options and eligibility for Housing Stabilization and Housing Supports. [phone]  I will consider leaning on informal supports of my: friends, family, and neighbors  I will review resources and supports sent to me via [employer] for review of housing services.   I will contact my care team with questions, concerns, support needs.   I will use the clinic as a resource and I understand I can contact my clinic with 24/7 after hours services available.   I will continue to outreach to care coordination as needed for additional resources or supports.                              Care Plan: Financial Wellbeing       Problem: Patient expresses financial resource strain       Goal: Create an action plan to increase financial stability        "Start Date: 9/9/2024 Expected End Date: 12/1/2024    This Visit's Progress: 10%    Note:     Goal Statement: I will continue to take steps to further support my financial wellbeing over the next 3 month(s).  Barriers: minimal income  Strengths: Currently on Medical Assistance and receiving SSDI  Patient expressed understanding of goal: yes    Action steps to achieve this goal:  [place]2024 to potentially explore option for Community Access for Disability Inclusion (CADI) waiver.  Financial Resource Worker referral placed 9/11/2024.  I will continue to work with the Financial Resource Worker for the SNAP and Cash assistance application.   I will review resources and supports sent to me via [employer] and consider outreaching and establishing with one or more which interest me.  I will use the clinic as a resource and I understand I can contact my clinic with 24/7 after hours services available.   I will continue to outreach to care coordination as needed for additional resources or supports.                                  Intervention and Education during outreach: Patient states that she did talk to [place] and she had sent a list of HSS organizations for her to call. Which patient has not done and feels overwhelmed by the list. Pt states that [place] told her there's no way she would get a place in October. Pt is \"trying not to think ab out 10/1\", CHW encouraged her to review MC message from [facility], and call at least the first 2 HSS agencies to see if they have capacity.   CHW inquired if she has tried to find another room to rent. Pt states that she has a phobia of people, wouldn't want to live with a stranger and wouldn't feel safe with the medical issues she has.   CHW offered to assist with calls, patient declines.    CHW Plan: Next outreach in 3-5 business days.     DENISSE Goodman, B.S. [facility] Care Coordination  [facility] Clinics:  [place], [place] and " [place]  [phone]  [email or web address]

## 2024-09-17 ENCOUNTER — TRANSFERRED RECORDS (OUTPATIENT)
Dept: HEALTH INFORMATION MANAGEMENT | Facility: CLINIC | Age: 59
End: 2024-09-17
Payer: MEDICAID

## 2024-09-18 DIAGNOSIS — J44.9 CHRONIC OBSTRUCTIVE PULMONARY DISEASE, UNSPECIFIED COPD TYPE (H): ICD-10-CM

## 2024-09-18 RX ORDER — TIOTROPIUM BROMIDE INHALATION SPRAY 3.12 UG/1
2 SPRAY, METERED RESPIRATORY (INHALATION) DAILY
Qty: 4 G | Refills: 1 | Status: SHIPPED | OUTPATIENT
Start: 2024-09-18

## 2024-09-19 ENCOUNTER — TRANSFERRED RECORDS (OUTPATIENT)
Dept: HEALTH INFORMATION MANAGEMENT | Facility: CLINIC | Age: 59
End: 2024-09-19
Payer: MEDICAID

## 2024-09-19 ENCOUNTER — MYC REFILL (OUTPATIENT)
Dept: PEDIATRICS | Facility: CLINIC | Age: 59
End: 2024-09-19
Payer: MEDICAID

## 2024-09-19 DIAGNOSIS — G35 MS (MULTIPLE SCLEROSIS) (H): ICD-10-CM

## 2024-09-19 DIAGNOSIS — R53.83 OTHER FATIGUE: ICD-10-CM

## 2024-09-19 RX ORDER — DEXTROAMPHETAMINE SACCHARATE, AMPHETAMINE ASPARTATE MONOHYDRATE, DEXTROAMPHETAMINE SULFATE AND AMPHETAMINE SULFATE 7.5; 7.5; 7.5; 7.5 MG/1; MG/1; MG/1; MG/1
30 CAPSULE, EXTENDED RELEASE ORAL EVERY MORNING
Qty: 30 CAPSULE | Refills: 0 | Status: SHIPPED | OUTPATIENT
Start: 2024-09-19

## 2024-09-24 ENCOUNTER — PATIENT OUTREACH (OUTPATIENT)
Dept: CARE COORDINATION | Facility: CLINIC | Age: 59
End: 2024-09-24
Payer: MEDICAID

## 2024-09-24 NOTE — PROGRESS NOTES
Clinic Care Coordination Contact  [facility]/Voicemail    Clinical Data: Care Coordinator Outreach    Outreach Documentation Number of Outreach Attempt   9/24/2024  10:18 AM 1       Left message on patient's voicemail with call back information and requested return call.    Plan: Care Coordinator will try to reach patient again in 3-5 business days.    DENISSE Goodman, B.S. [facility] Care Coordination  [facility] Clinics:  [place], [place] and [place]  [phone]  [email or web address]

## 2024-10-01 ENCOUNTER — PATIENT OUTREACH (OUTPATIENT)
Dept: CARE COORDINATION | Facility: CLINIC | Age: 59
End: 2024-10-01

## 2024-10-03 ENCOUNTER — PATIENT OUTREACH (OUTPATIENT)
Dept: CARE COORDINATION | Facility: CLINIC | Age: 59
End: 2024-10-03
Payer: MEDICAID

## 2024-10-03 NOTE — PROGRESS NOTES
Clinic Care Coordination Contact  [facility]/Voicemail    Clinical Data: Care Coordinator Outreach    Outreach Documentation Number of Outreach Attempt   9/24/2024  10:18 AM 1   10/3/2024   3:16 PM 2       Left message on patient's voicemail with call back information and requested return call.    Plan: Care Coordinator will try to reach patient again in 3-5 business days.    Monica YOUNG [facility] Community Health Worker  Ambulatory Care Coordination  Covering for Bernie SALCEDO

## 2024-10-08 ENCOUNTER — TRANSFERRED RECORDS (OUTPATIENT)
Dept: HEALTH INFORMATION MANAGEMENT | Facility: CLINIC | Age: 59
End: 2024-10-08
Payer: MEDICAID

## 2024-10-09 ENCOUNTER — PATIENT OUTREACH (OUTPATIENT)
Dept: CARE COORDINATION | Facility: CLINIC | Age: 59
End: 2024-10-09
Payer: MEDICAID

## 2024-10-09 NOTE — LETTER
[facility] CARE COORDINATION  [address] [place] [address]    October 25, 2024    Hina Yap  [address]  [place] [address]      Dear Hina,    I have been unsuccessful in reaching you since our last contact. At this time the Care Coordination team will make no further attempts to reach you, however this does not change your ability to continue receiving care from your providers at your primary care clinic. If you need additional support from a care coordinator in the future please contact your clinic.    We are focused on providing you with the highest-quality healthcare experience possible.    Sincerely,    Jf Riddle, [facility]  Clinic Care Coordinator  [facility]  [phone]  [email or web address]

## 2024-10-09 NOTE — PROGRESS NOTES
Clinic Care Coordination Contact  [facility]/Voicemail    Clinical Data: Care Coordinator Outreach    Outreach Documentation Number of Outreach Attempt   9/24/2024  10:18 AM 1   10/3/2024   3:16 PM 2   10/9/2024   3:52 PM 3       Left message on patient's voicemail with call back information and requested return call.    Plan: Care Coordinator will send a message via [employer]. Care Coordinator will chart review again in 10 business days, if no further engagement from patient will route to lead CC.    Bernie Rouse, DENISSE, B.S. [facility] Care Coordination  [facility]:  [place] and [place]  [phone]  [email or web address]

## 2024-10-15 ENCOUNTER — TRANSFERRED RECORDS (OUTPATIENT)
Dept: HEALTH INFORMATION MANAGEMENT | Facility: CLINIC | Age: 59
End: 2024-10-15
Payer: MEDICAID

## 2024-10-20 ENCOUNTER — MYC REFILL (OUTPATIENT)
Dept: PEDIATRICS | Facility: CLINIC | Age: 59
End: 2024-10-20
Payer: MEDICAID

## 2024-10-20 DIAGNOSIS — G35 MS (MULTIPLE SCLEROSIS) (H): ICD-10-CM

## 2024-10-20 DIAGNOSIS — R53.83 OTHER FATIGUE: ICD-10-CM

## 2024-10-21 RX ORDER — DEXTROAMPHETAMINE SACCHARATE, AMPHETAMINE ASPARTATE MONOHYDRATE, DEXTROAMPHETAMINE SULFATE AND AMPHETAMINE SULFATE 7.5; 7.5; 7.5; 7.5 MG/1; MG/1; MG/1; MG/1
30 CAPSULE, EXTENDED RELEASE ORAL EVERY MORNING
Qty: 30 CAPSULE | Refills: 0 | Status: SHIPPED | OUTPATIENT
Start: 2024-10-21

## 2024-10-25 NOTE — PROGRESS NOTES
Clinic Care Coordination Contact    Situation: Patient chart reviewed by care coordinator.    Background: Pt is enrolled in care coordination and followed to assist with goal(s) progression. Chart routed to [facility] by W for review to determine eligibility of diserolling from Care Coordination per standard work.     Assessment: Per Chart Review pt has been unreachable for follow up x4 with no further engagement or return calls.     Plan/Recommendations: Per Care Coordination standard work pt will be disenrolled from Care Coordination. Care Coordinator will send disenrollment letter with care coordinator contact information via [employer]. Care Coordinator will remain available, however, will do no further outreaches at this time unless a new referral is made or a change it pt status occurs. Pt has been provided with this writer's contact information and has been encouraged to call with any questions.     Jf Riddle [facility]  Clinic Care Coordinator  [facility]  [facility]  [facility] [place]  [phone]  [email or web address]

## 2024-11-06 ASSESSMENT — PATIENT HEALTH QUESTIONNAIRE - PHQ9: SUM OF ALL RESPONSES TO PHQ QUESTIONS 1-9: 22

## 2024-11-07 ENCOUNTER — PATIENT OUTREACH (OUTPATIENT)
Dept: CARE COORDINATION | Facility: CLINIC | Age: 59
End: 2024-11-07
Payer: MEDICAID

## 2024-11-13 DIAGNOSIS — J44.9 CHRONIC OBSTRUCTIVE PULMONARY DISEASE, UNSPECIFIED COPD TYPE (H): ICD-10-CM

## 2024-11-13 RX ORDER — TIOTROPIUM BROMIDE INHALATION SPRAY 3.12 UG/1
2 SPRAY, METERED RESPIRATORY (INHALATION) DAILY
Qty: 4 G | Refills: 1 | Status: SHIPPED | OUTPATIENT
Start: 2024-11-13

## 2024-11-14 NOTE — PROGRESS NOTES
Medication Therapy Management (MTM) Encounter    ASSESSMENT:                            Medication Adherence/Access: Offered patient care coordination number, or to have them call her again. She states she has the number and will call them.     Smoking cessation:   Patient requested nicotine patches and gum to have on hand, though not ready to set a quit date today. Refills resent today.    Depression/Anxiety:   Not well controlled.  PCP to address more after visit today. Recommend that patient gets in contact with care coordination to work on stressors such as housing. Treating insomnia may also help (see section below). Continue to follow with therapy.     Insomnia:   Uncontrolled. May consider additional drug therapy with ramelteon, a melatonin receptor agonist. Based on medication failures in the past, potential drug interactions, and daytime fatigue, this agent may be preferred over benzodiazepines, nonbenzodiazepine receptor agonists, and dual orexin receptor agonists. Discussed with PCP.      COPD:  Not well controlled. Patient may benefit form taking Symbicort at 2 puffs twice daily as prescribed to avoid frequent albuterol use. May consider switching Symbicort + Spiriva to Trelegy in the future if covered by insurance for ease of use.     History of left ankle fracture/chronic pain:  Stable. Would benefit from continued follow up with [facility] and pain clinic.  Per pain clinic note waiting for housing to stabilize before more forward on their plans.     Multiple Sclerosis/Neuropathy:   Stable. Continue to follow with neurology.     Itching:   unimproved, continue current regimen     Hypertension:  Stable. Check blood pressure at next clinic visit.     GERD/nausea:  Stable, continue current regimen     Constipation:    Stable, continue current regimen    Dry Eyes:   Stable, continue current regimen     RLS:  Stable, continue current regimen     Supplements:   Stable, continue current  regimen    Vaccines: Due for COVID vaccine per ACIP/CDC Guidelines: Discussed receiving vaccines at local pharmacy, patient is agreeable to plan.     PLAN:                            Smoking: Sent Rx for nicotine patch 14 mg and nicotine gum 4 mg.     Sleep: Start taking ramelteon 8 mg once daily at bedtime for sleep.     COPD: Increase Symbicort to 2 puffs twice daily.    Vaccines: Ask [facility] to set up an appointment for COVID vaccine.     Depression: continue to meet with therapist, Sunshine Butterfield to review more in detail after appointment today    Follow-up: Return in about 3 months (around 2/18/2025) for Co-visit.    Future Considerations:  Switch Symbicort + Spiriva to Trelegy     SUBJECTIVE/OBJECTIVE:                          Hina Yap is a 59 year old female seen prior to Sunshine Butterfield CNP visit today, initially scheduled as co-visit     Reason for visit: Routine co-visit.    Allergies/ADRs: Reviewed in chart  Past Medical History: Reviewed in chart  Nicotine/Tobacco Cessation Plan  1/2 pack a day - see smoking cessation section  Alcohol: none  Past Medical History: [id number] intermediate metabolizers, history of prolonged QT on ECG    Medication Adherence/Access:   The patient fills medications at [facility]: NO, fills medications at [facility].      Smoking cessation:    Nicotine gum 4 mg - currently out/not using    Smoking 1/2 pack per day (~10 cigarettes/day)  She is hoping to quit, but is not quite ready to set a date. Ongoing stressors of housing, mental health, and chronic pain  From previous visit: worried about adhesive, rash with Fentanyl patch after 3 days.  Previously tried: Chantix (2020, unclear why stopped), bupropion (rash)     Depression/Anxiety:    Venlafaxine  mg (3x 75 mg capsule) - splitting dose into 2 capsules in the evening and 1 in the morning  Lyrica 75 mg twice daily for chronic pain    Seeing therapist once weekly, outside of [facility] system, which she is finding helpful.    Overall, feels that mental health is worse than the last time she saw us. She did not want to comment on suicidal thoughts.   Housing is currently up in the air and she is struggling to get in contact with care coordination  Previous visit: Cannot cry on venlafaxine. She has a hard time making phone calls.      Results relevant for PGx from [employer] report 2019: [id number] intermediate metabolizer: decreased [id number] function --> caution medications are.  Sertraline, escitalopram, citalopram, proton pump inhibitors such as omeprazole, voriconazole, clopidogrel and some of the tricylic antidepressants     Insomnia:  No current therapy    Has trouble both falling asleep and staying asleep, though staying asleep more of an issue  Per patient, only sleeping 2 hours on average every night   History: Quetiapine 50mg at bedtime-did not work, hydroxyzine, alprazolam (more for anxiety, did not help with sleep), failed over-the-counter melatonin or Benadryl. May have been on zolpidem before, but cannot remember if effective or not.  Trazodone makes her sick    COPD:   Symbicort 80-4.5 mcg/act 2 puffs twice daily - taking 2 puffs once daily   Spiriva 2.5 mcg/act 2 puffs daily  Albuterol HFA 2 puffs every 4 hours as needed - using every other day on average     Patient reports the following symptoms: Needing albuterol more due to smoking and change in seasons  Patient reports no current medication side effects.    Blood eosinophils > 300 cells/ L?: Yes 400 on 10/29/20    Nondisplaced distal radius fracture (right wrist) history of left ankle fracture/chronic pain:  Diclofenac 1% topical gel - not using right now due to cost   Ibuprofen 800mg day of and after Avonex weekly injection  Oxycodone-acetaminophen 10-325mg every 6 hours as needed Max 5/day - taking 5 tabs/day every day   Promethazine with oxycodone due to nausea - taking with almost every dose (no more than 4x/day)    Pain type/location: Neck, low back with radiation to  L buttock and L leg; OA both hands  History of s/p lumbar fusion, back surgeries  Patient reports the following side effects: dry mouth, constipation, and nausea.  Falls, neurology thought from poly-pharmacy and not MS and to wean off opioids, with ongoing pain, pain clinic said difficult to wean  Normal DEXA scan 9/2024  Specialist(s):   [facility] for ankle fracture  Emi Boyle CNP, [facility]. Last visit recommendations 10/15/24  Abbott SCS trial had no pain relief, recommended retrial due to lead migration    Multiple Sclerosis/Neuropathy:  Avonex injection 30 mcg IM weekly (neurology)  Baclofen 20 mg four times daily for MS pain (neurology)  Adderall XR 30 mg in the morning (primary care provider)  Lyrica 75mg twice daily for chronic pain--morning & bedtime  Venlafaxine 225mg for depression and anxiety    History of neuropathic itch. Reports pain is constant, but as controlled as it can be for now. Reports that Adderall has been very helpful for overall symptoms and alertness.  Denies side effects.     Specialist(s):   Dr. Eboni Christensen, [facility]. Last visit 4/8/2024 recommendations  4 wheeled walker given, physical therapy recommended, follow-up with urology for bladder symptoms     Itching:   Cetirizine 10mg twice daily   Hydrocortisone cream as needed - using once in a while for breakthrough itching    Patient reports cetirizine helps. Side effects: general fatigue and dry mouth.  Previously Tried:  Topical steroids - no benefit for itching  Hydroxyzine wore off after time    Hypertension:   Amlodipine 2.5mg every day     Patient reports no concerns or side effects at this time.     Nausea/GERD:   Nexium 40mg every day   Promethazine 25mg every 6 hours as needed , with pain pills every time, pain pills    Reports heartburn well controlled on this regimen, no breakthrough symptoms.     Constipation:  Miralax 17 grams every other day    Not having regular  bowel movements, but this is normal for her. She will take an extra dose here and there as needed.   Tried taking Miralax daily before, but got diarrhea.   Pain clinic manages    Dry Eyes:  Cyclosporine 0.05% 1 drop twice daily     Really feels this works well.  Patient reports no concerns or side effects at this time.     RLS:  Ropinirole 2mg in morning and 4mg at bedtime  Pregabalin 75mg twice daily     Iron stopped last visit due to high labs   Reports that requip helps RLS. Seeing [facility].  Patient reports no concerns or side effects at this time.     Supplements:   Multivitamin every day at dinner    No reported issues at this time.      Immunizations:   Most Recent Immunizations   Administered Date(s) Administered    COVID-19 12+ (Pfizer) 10/04/2023    COVID-19 Bivalent 12+ (Pfizer) 01/25/2023    COVID-19 MONOVALENT 12+ (Pfizer) 04/07/2021    Influenza (IIV3) PF 10/18/2005    Influenza Vaccine 18-64 (Flublok) 10/04/2023    Influenza Vaccine >6 months,quad, PF 12/01/2015    Influenza Vaccine Trivalent (FluBlok) 09/04/2024    Pneumococcal 20 valent Conjugate (Prevnar 20) 09/29/2022    Pneumococcal 23 valent 12/01/2015    TDAP Vaccine (Adacel) 05/25/2017    Zoster recombinant adjuvanted (SHINGRIX) 01/25/2023    Zoster vaccine, live 11/09/2020     Today's Vitals: LMP 05/25/2017 (Exact Date)   ----------------  I spent 40 minutes with this patient today. All changes were made via verbal approval with BENNETT ROUSSEAU CNP. A copy of the visit note was provided to the patient's provider(s).    A summary of these recommendations was sent via [employer].    Korin Neri PharmD   Medication Therapy Management   Pharmacy Resident  Pager: [phone]    Loretta Chapa PharmD BCPS  Medication Therapy Management Practitioner  Pharmacist    Telemedicine Visit Details  The patient's medications can be safely assessed via a telemedicine encounter.  Type of service:  Telephone visit  Originating  Location (pt. Location): Home    Distant Location (provider location):  On-site  Start Time:  10:05  AM  End Time:  10:45 AM     Medication Therapy Recommendations  Insomnia, unspecified type   1 Rationale: Untreated condition - Needs additional medication therapy - Indication   Recommendation: Start Medication - ramelteon 8 MG tablet   Status: Accepted per Provider   Identified Date: 11/18/2024 Completed Date: 11/18/2024         Vaccine counseling   1 Rationale: Preventive therapy - Needs additional medication therapy - Indication   Recommendation: influenza trivalent vaccine for ages 50-64 years (PF) 0.5 ML injection   Status: Patient Agreed - Adherence/Education   Identified Date: 11/18/2024 Completed Date: 11/18/2024

## 2024-11-18 ENCOUNTER — VIRTUAL VISIT (OUTPATIENT)
Dept: PHARMACY | Facility: CLINIC | Age: 59
End: 2024-11-18
Payer: MEDICAID

## 2024-11-18 ENCOUNTER — VIRTUAL VISIT (OUTPATIENT)
Dept: PEDIATRICS | Facility: CLINIC | Age: 59
End: 2024-11-18
Payer: MEDICAID

## 2024-11-18 DIAGNOSIS — H04.123 DRY EYES: ICD-10-CM

## 2024-11-18 DIAGNOSIS — I10 ESSENTIAL HYPERTENSION: ICD-10-CM

## 2024-11-18 DIAGNOSIS — I42.9 CARDIOMYOPATHY, UNSPECIFIED TYPE (H): ICD-10-CM

## 2024-11-18 DIAGNOSIS — G47.00 INSOMNIA, UNSPECIFIED TYPE: ICD-10-CM

## 2024-11-18 DIAGNOSIS — G89.29 OTHER CHRONIC PAIN: ICD-10-CM

## 2024-11-18 DIAGNOSIS — K59.00 CONSTIPATION, UNSPECIFIED CONSTIPATION TYPE: ICD-10-CM

## 2024-11-18 DIAGNOSIS — R11.0 NAUSEA: ICD-10-CM

## 2024-11-18 DIAGNOSIS — Z71.6 ENCOUNTER FOR SMOKING CESSATION COUNSELING: Primary | ICD-10-CM

## 2024-11-18 DIAGNOSIS — Z71.85 VACCINE COUNSELING: ICD-10-CM

## 2024-11-18 DIAGNOSIS — G25.81 RESTLESS LEG SYNDROME: ICD-10-CM

## 2024-11-18 DIAGNOSIS — Z87.81 HISTORY OF FRACTURE: ICD-10-CM

## 2024-11-18 DIAGNOSIS — J44.9 CHRONIC OBSTRUCTIVE PULMONARY DISEASE, UNSPECIFIED COPD TYPE (H): ICD-10-CM

## 2024-11-18 DIAGNOSIS — G35 MS (MULTIPLE SCLEROSIS) (H): ICD-10-CM

## 2024-11-18 DIAGNOSIS — G62.9 NEUROPATHY: ICD-10-CM

## 2024-11-18 DIAGNOSIS — R94.31 PROLONGED Q-T INTERVAL ON ECG: ICD-10-CM

## 2024-11-18 DIAGNOSIS — F41.9 ANXIETY: ICD-10-CM

## 2024-11-18 DIAGNOSIS — Z78.9 TAKES DIETARY SUPPLEMENTS: ICD-10-CM

## 2024-11-18 DIAGNOSIS — F33.1 MODERATE EPISODE OF RECURRENT MAJOR DEPRESSIVE DISORDER (H): Primary | ICD-10-CM

## 2024-11-18 DIAGNOSIS — K21.9 GASTROESOPHAGEAL REFLUX DISEASE, UNSPECIFIED WHETHER ESOPHAGITIS PRESENT: ICD-10-CM

## 2024-11-18 DIAGNOSIS — F33.1 MODERATE EPISODE OF RECURRENT MAJOR DEPRESSIVE DISORDER (H): ICD-10-CM

## 2024-11-18 DIAGNOSIS — L29.9 ITCHING: ICD-10-CM

## 2024-11-18 PROBLEM — E61.1 IRON DEFICIENCY: Status: RESOLVED | Noted: 2023-05-18 | Resolved: 2024-11-18

## 2024-11-18 PROCEDURE — 99215 OFFICE O/P EST HI 40 MIN: CPT | Mod: 95 | Performed by: NURSE PRACTITIONER

## 2024-11-18 PROCEDURE — 99607 MTMS BY PHARM ADDL 15 MIN: CPT | Mod: 95 | Performed by: PHARMACIST

## 2024-11-18 PROCEDURE — 99606 MTMS BY PHARM EST 15 MIN: CPT | Mod: 95 | Performed by: PHARMACIST

## 2024-11-18 RX ORDER — RAMELTEON 8 MG/1
8 TABLET ORAL AT BEDTIME
Qty: 30 TABLET | Refills: 1 | Status: SHIPPED | OUTPATIENT
Start: 2024-11-18

## 2024-11-18 RX ORDER — NICOTINE 21 MG/24HR
1 PATCH, TRANSDERMAL 24 HOURS TRANSDERMAL EVERY 24 HOURS
Qty: 28 PATCH | Refills: 1 | Status: SHIPPED | OUTPATIENT
Start: 2024-11-18

## 2024-11-18 NOTE — PROGRESS NOTES
Hina is a 59 year old who is being evaluated via a billable video visit.          Assessment & Plan     Moderate episode of recurrent major depressive disorder (H)  Very poor control given ongoing stressor of housing instability. Endorses some passive SI and has had thoughts of how to commit suicide but denies any active plans and doesn't think she would follow through on it.  -Seeing therapist  -Declines psychiatry.   -Contracted for safety. Crisis resources discussed, especially epath unit and suicide hotline  -Strongly encouraged her to get her phone in working order so she can connect with our  and [facility]  about food, housing, etc resources    Insomnia, unspecified type  Has not benefited from several different agents. High risk for falls and drug interactions.   - ramelteon (ROZEREM) 8 MG tablet; Take 1 tablet (8 mg) by mouth at bedtime.  -Reviewed r/b/se    MS (multiple sclerosis) (H)-[facility]-Takes Adderall  Stable on Adderall. Using lower than previous doses due to risk for polypharmacy. Follows with neuro for all other MS care    Cardiomyopathy, unspecified type (H)  Currently asymptomatic. Overdue for cardiology visit but declines at this time due to housing issues. I'll bring this up again at next visit    Other chronic pain-sees pain clinic  Poor control but stable. Seeing pain clinic. Again reviewed risks of opioids    (J44.9) Chronic obstructive pulmonary disease, unspecified COPD type (H)  Comment: Stable on Spiriva and Symbicort, although only taking Symbicort once daily  Plan: Recommended taking twice daily as prescribed, especially headed into cold/flu season. If not able to take BID regularly, consider switch to trelegy as it is once daily and has all 3 agents in it  -She agrees to get covid shot.    (R94.31) Prolonged Q-T interval on ECG  Comment: History of prolonged QT. QTc within normal female range 8/2024. Will monitor closely if adding any new agents that  could further prolong the QT              Subjective   Hina is a 59 year old, presenting for the following health issues:  No chief complaint on file.      HPI             Review of Systems  Constitutional, HEENT, cardiovascular, pulmonary, GI, , musculoskeletal, neuro, skin, endocrine and psych systems are negative, except as otherwise noted.      Objective           Vitals:  No vitals were obtained today due to virtual visit.    Physical Exam   NA/    Phone visit 10 minutes plus time spent in chart review, video coordination with MTM, and documentation  Signed Electronically by: BENNETT ROUSSEAU CNP

## 2024-11-18 NOTE — PATIENT INSTRUCTIONS
"Recommendations from today's MTM visit:                                                      Smoking: Sent Rx for nicotine patch 14 mg and nicotine gum 4 mg.     Sleep: Start taking ramelteon 8 mg once daily at bedtime for sleep.     COPD: Increase Symbicort to 2 puffs twice daily.    Vaccines: Ask [facility] to set up an appointment for COVID vaccine.       It was great speaking with you today.  I value your experience and would be very thankful for your time in providing feedback in our clinic survey. In the next few days, you may receive an email or text message from [employer] with a link to a survey related to your  clinical pharmacist.\"     To schedule another MTM appointment, please call the clinic directly or you may call the MTM scheduling line at [phone] or toll-free at [phone].     My Clinical Pharmacist's contact information:                                                      Please feel free to contact me with any questions or concerns you have.      Korin Neri PharmD   Medication Therapy Management   Pharmacy Resident  Pager: [phone]    Loretta Chapa PharmD [facility]  Medication Therapy Management Practitioner  Pharmacist     "

## 2024-11-19 ENCOUNTER — PATIENT OUTREACH (OUTPATIENT)
Dept: CARE COORDINATION | Facility: CLINIC | Age: 59
End: 2024-11-19
Payer: MEDICAID

## 2024-11-19 NOTE — PROGRESS NOTES
FRW UPDATE:  11/19/24:Denied [facility] Benefits due to 2 attempts to call pt. FRW left VM routing pt to follow up with [facility].

## 2024-11-21 ENCOUNTER — PATIENT OUTREACH (OUTPATIENT)
Dept: CARE COORDINATION | Facility: CLINIC | Age: 59
End: 2024-11-21
Payer: MEDICAID

## 2024-11-21 NOTE — PROGRESS NOTES
Clinic Care Coordination Contact    Situation: Patient chart reviewed by care coordinator.    Background: Referral from PCP for food, housing, financial and medication resources/support.     Assessment: Noted that patient prefers to be contacted mid week next week.     Plan/Recommendations: CHW will attempt to reach patient on 11/26/24.    DENISSE Goodman, B.S. [facility] Care Coordination  [facility] Clinics:  [address]  [phone]  [email or web address]

## 2024-11-26 ENCOUNTER — MYC REFILL (OUTPATIENT)
Dept: PEDIATRICS | Facility: CLINIC | Age: 59
End: 2024-11-26
Payer: MEDICAID

## 2024-11-26 DIAGNOSIS — G35 MS (MULTIPLE SCLEROSIS) (H): ICD-10-CM

## 2024-11-26 DIAGNOSIS — R53.83 OTHER FATIGUE: ICD-10-CM

## 2024-11-26 RX ORDER — DEXTROAMPHETAMINE SACCHARATE, AMPHETAMINE ASPARTATE MONOHYDRATE, DEXTROAMPHETAMINE SULFATE AND AMPHETAMINE SULFATE 7.5; 7.5; 7.5; 7.5 MG/1; MG/1; MG/1; MG/1
30 CAPSULE, EXTENDED RELEASE ORAL EVERY MORNING
Qty: 30 CAPSULE | Refills: 0 | Status: SHIPPED | OUTPATIENT
Start: 2024-11-26

## 2024-11-27 ENCOUNTER — PATIENT OUTREACH (OUTPATIENT)
Dept: CARE COORDINATION | Facility: CLINIC | Age: 59
End: 2024-11-27
Payer: MEDICAID

## 2024-11-27 NOTE — PROGRESS NOTES
Clinic Care Coordination Contact  [facility]/Voicemail    Clinical Data: Care Coordinator Outreach    Outreach Documentation Number of Outreach Attempt   11/26/2024  10:50 AM 1   11/27/2024  12:44 PM 2       Left message on patient's voicemail with call back information and requested return call.      Plan: Care Coordinator will send care coordination introduction letter with care coordinator contact information and explanation of care coordination services via [employer].     Care Coordinator will do no further outreaches at this time.      DENISSE Riley  Clinic Care Coordination  [facility] Clinics: [place], [place], [place], and [facility]  Phone: [phone]

## 2024-11-27 NOTE — LETTER
[facility] CARE COORDINATION  [address] [place] [address]    November 27, 2024    Hina Yap  [address]  [place] [address]      Dear Hina,    I am a clinic community health worker who works with BENNETT ROUSSEAU CNP with the [facility]. I have been trying to reach you recently to introduce Clinic Care Coordination. Below is a description of clinic care coordination and how I can further assist you.       The clinic care coordination team is made up of a registered nurse, , financial resource worker and community health worker who understand the health care system. The goal of clinic care coordination is to help you manage your health and improve access to the health care system. Our team works alongside your provider to assist you in determining your health and social needs. We can help you obtain health care and community resources, providing you with necessary information and education. We can work with you through any barriers and develop a care plan that helps coordinate and strengthen the communication between you and your care team.  Our services are voluntary and are offered without charge to you personally.    Please feel free to contact Bernie at [phone]  with any questions or concerns regarding care coordination and what we can offer.      We are focused on providing you with the highest-quality healthcare experience possible.    Sincerely,     DENISSE Riley  Clinic Care Coordination  [facility]

## 2025-01-20 ENCOUNTER — TRANSFERRED RECORDS (OUTPATIENT)
Dept: HEALTH INFORMATION MANAGEMENT | Facility: CLINIC | Age: 60
End: 2025-01-20
Payer: MEDICAID

## 2025-02-19 ENCOUNTER — APPOINTMENT (OUTPATIENT)
Dept: CT IMAGING | Facility: CLINIC | Age: 60
End: 2025-02-19
Attending: EMERGENCY MEDICINE
Payer: MEDICAID

## 2025-02-19 ENCOUNTER — APPOINTMENT (OUTPATIENT)
Dept: GENERAL RADIOLOGY | Facility: CLINIC | Age: 60
End: 2025-02-19
Attending: EMERGENCY MEDICINE
Payer: MEDICAID

## 2025-02-19 ENCOUNTER — TRANSFERRED RECORDS (OUTPATIENT)
Dept: HEALTH INFORMATION MANAGEMENT | Facility: CLINIC | Age: 60
End: 2025-02-19

## 2025-02-19 ENCOUNTER — HOSPITAL ENCOUNTER (OUTPATIENT)
Facility: CLINIC | Age: 60
Setting detail: OBSERVATION
End: 2025-02-19
Attending: EMERGENCY MEDICINE | Admitting: INTERNAL MEDICINE
Payer: MEDICAID

## 2025-02-19 ENCOUNTER — APPOINTMENT (OUTPATIENT)
Dept: MRI IMAGING | Facility: CLINIC | Age: 60
End: 2025-02-19
Attending: EMERGENCY MEDICINE
Payer: MEDICAID

## 2025-02-19 DIAGNOSIS — J44.1 COPD WITH ACUTE EXACERBATION (H): ICD-10-CM

## 2025-02-19 DIAGNOSIS — R26.0 ATAXIC GAIT: ICD-10-CM

## 2025-02-19 DIAGNOSIS — J96.01 ACUTE RESPIRATORY FAILURE WITH HYPOXIA (H): ICD-10-CM

## 2025-02-19 DIAGNOSIS — J44.9 CHRONIC OBSTRUCTIVE PULMONARY DISEASE, UNSPECIFIED COPD TYPE (H): Primary | ICD-10-CM

## 2025-02-19 DIAGNOSIS — N30.00 ACUTE CYSTITIS WITHOUT HEMATURIA: ICD-10-CM

## 2025-02-19 DIAGNOSIS — S01.01XA SCALP LACERATION, INITIAL ENCOUNTER: ICD-10-CM

## 2025-02-19 LAB
ALBUMIN UR-MCNC: 30 MG/DL
ANION GAP SERPL CALCULATED.3IONS-SCNC: 11 MMOL/L (ref 7–15)
APPEARANCE UR: CLEAR
APTT PPP: 29 SECONDS (ref 22–38)
BASOPHILS # BLD AUTO: 0.1 10E3/UL (ref 0–0.2)
BASOPHILS NFR BLD AUTO: 1 %
BILIRUB UR QL STRIP: NEGATIVE
BUN SERPL-MCNC: 24 MG/DL (ref 8–23)
CALCIUM SERPL-MCNC: 8.9 MG/DL (ref 8.8–10.4)
CHLORIDE SERPL-SCNC: 99 MMOL/L (ref 98–107)
COLOR UR AUTO: ABNORMAL
CREAT SERPL-MCNC: 1.13 MG/DL (ref 0.51–0.95)
EGFRCR SERPLBLD CKD-EPI 2021: 56 ML/MIN/1.73M2
EOSINOPHIL # BLD AUTO: 0.1 10E3/UL (ref 0–0.7)
EOSINOPHIL NFR BLD AUTO: 1 %
ERYTHROCYTE [DISTWIDTH] IN BLOOD BY AUTOMATED COUNT: 13.5 % (ref 10–15)
FLUAV RNA SPEC QL NAA+PROBE: NEGATIVE
FLUBV RNA RESP QL NAA+PROBE: NEGATIVE
GLUCOSE BLDC GLUCOMTR-MCNC: 116 MG/DL (ref 70–99)
GLUCOSE SERPL-MCNC: 87 MG/DL (ref 70–99)
GLUCOSE UR STRIP-MCNC: NEGATIVE MG/DL
HCO3 SERPL-SCNC: 25 MMOL/L (ref 22–29)
HCT VFR BLD AUTO: 40.7 % (ref 35–47)
HGB BLD-MCNC: 13.5 G/DL (ref 11.7–15.7)
HGB UR QL STRIP: ABNORMAL
IMM GRANULOCYTES # BLD: 0 10E3/UL
IMM GRANULOCYTES NFR BLD: 0 %
INR PPP: 1.01 (ref 0.85–1.15)
KETONES UR STRIP-MCNC: ABNORMAL MG/DL
LACTATE SERPL-SCNC: 0.7 MMOL/L (ref 0.7–2)
LEUKOCYTE ESTERASE UR QL STRIP: ABNORMAL
LYMPHOCYTES # BLD AUTO: 2.4 10E3/UL (ref 0.8–5.3)
LYMPHOCYTES NFR BLD AUTO: 23 %
MCH RBC QN AUTO: 29.5 PG (ref 26.5–33)
MCHC RBC AUTO-ENTMCNC: 33.2 G/DL (ref 31.5–36.5)
MCV RBC AUTO: 89 FL (ref 78–100)
MONOCYTES # BLD AUTO: 0.7 10E3/UL (ref 0–1.3)
MONOCYTES NFR BLD AUTO: 6 %
MUCOUS THREADS #/AREA URNS LPF: PRESENT /LPF
NEUTROPHILS # BLD AUTO: 7.2 10E3/UL (ref 1.6–8.3)
NEUTROPHILS NFR BLD AUTO: 69 %
NITRATE UR QL: POSITIVE
NRBC # BLD AUTO: 0 10E3/UL
NRBC BLD AUTO-RTO: 0 /100
PH UR STRIP: 6 [PH] (ref 5–7)
PLATELET # BLD AUTO: 319 10E3/UL (ref 150–450)
POTASSIUM SERPL-SCNC: 3.9 MMOL/L (ref 3.4–5.3)
RBC # BLD AUTO: 4.57 10E6/UL (ref 3.8–5.2)
RBC URINE: 13 /HPF
RSV RNA SPEC NAA+PROBE: NEGATIVE
SARS-COV-2 RNA RESP QL NAA+PROBE: NEGATIVE
SODIUM SERPL-SCNC: 135 MMOL/L (ref 135–145)
SP GR UR STRIP: 1.01 (ref 1–1.03)
SQUAMOUS EPITHELIAL: 5 /HPF
TROPONIN T SERPL HS-MCNC: 9 NG/L
UROBILINOGEN UR STRIP-MCNC: NORMAL MG/DL
WBC # BLD AUTO: 10.5 10E3/UL (ref 4–11)
WBC URINE: 31 /HPF

## 2025-02-19 PROCEDURE — 87040 BLOOD CULTURE FOR BACTERIA: CPT | Performed by: EMERGENCY MEDICINE

## 2025-02-19 PROCEDURE — 250N000011 HC RX IP 250 OP 636: Mod: JZ | Performed by: EMERGENCY MEDICINE

## 2025-02-19 PROCEDURE — A9585 GADOBUTROL INJECTION: HCPCS | Performed by: EMERGENCY MEDICINE

## 2025-02-19 PROCEDURE — 85041 AUTOMATED RBC COUNT: CPT | Performed by: EMERGENCY MEDICINE

## 2025-02-19 PROCEDURE — 255N000002 HC RX 255 OP 636: Performed by: EMERGENCY MEDICINE

## 2025-02-19 PROCEDURE — 250N000009 HC RX 250: Performed by: EMERGENCY MEDICINE

## 2025-02-19 PROCEDURE — 36415 COLL VENOUS BLD VENIPUNCTURE: CPT | Performed by: EMERGENCY MEDICINE

## 2025-02-19 PROCEDURE — 96365 THER/PROPH/DIAG IV INF INIT: CPT

## 2025-02-19 PROCEDURE — 83605 ASSAY OF LACTIC ACID: CPT | Performed by: EMERGENCY MEDICINE

## 2025-02-19 PROCEDURE — G0378 HOSPITAL OBSERVATION PER HR: HCPCS

## 2025-02-19 PROCEDURE — 96375 TX/PRO/DX INJ NEW DRUG ADDON: CPT

## 2025-02-19 PROCEDURE — 85025 COMPLETE CBC W/AUTO DIFF WBC: CPT | Performed by: EMERGENCY MEDICINE

## 2025-02-19 PROCEDURE — 71046 X-RAY EXAM CHEST 2 VIEWS: CPT

## 2025-02-19 PROCEDURE — 82962 GLUCOSE BLOOD TEST: CPT

## 2025-02-19 PROCEDURE — 70553 MRI BRAIN STEM W/O & W/DYE: CPT

## 2025-02-19 PROCEDURE — 84484 ASSAY OF TROPONIN QUANT: CPT | Performed by: EMERGENCY MEDICINE

## 2025-02-19 PROCEDURE — 87186 SC STD MICRODIL/AGAR DIL: CPT | Performed by: EMERGENCY MEDICINE

## 2025-02-19 PROCEDURE — 94640 AIRWAY INHALATION TREATMENT: CPT

## 2025-02-19 PROCEDURE — 85610 PROTHROMBIN TIME: CPT | Performed by: EMERGENCY MEDICINE

## 2025-02-19 PROCEDURE — 120N000001 HC R&B MED SURG/OB

## 2025-02-19 PROCEDURE — 80048 BASIC METABOLIC PNL TOTAL CA: CPT | Performed by: EMERGENCY MEDICINE

## 2025-02-19 PROCEDURE — 99285 EMERGENCY DEPT VISIT HI MDM: CPT | Mod: 25

## 2025-02-19 PROCEDURE — 87637 SARSCOV2&INF A&B&RSV AMP PRB: CPT | Performed by: EMERGENCY MEDICINE

## 2025-02-19 PROCEDURE — 70450 CT HEAD/BRAIN W/O DYE: CPT

## 2025-02-19 PROCEDURE — 70496 CT ANGIOGRAPHY HEAD: CPT

## 2025-02-19 PROCEDURE — 99207 PR NO BILLABLE SERVICE THIS VISIT: CPT | Performed by: PHYSICIAN ASSISTANT

## 2025-02-19 PROCEDURE — 250N000011 HC RX IP 250 OP 636: Performed by: EMERGENCY MEDICINE

## 2025-02-19 PROCEDURE — 85730 THROMBOPLASTIN TIME PARTIAL: CPT | Performed by: EMERGENCY MEDICINE

## 2025-02-19 PROCEDURE — 96366 THER/PROPH/DIAG IV INF ADDON: CPT

## 2025-02-19 PROCEDURE — 0HQ0XZZ REPAIR SCALP SKIN, EXTERNAL APPROACH: ICD-10-PCS | Performed by: EMERGENCY MEDICINE

## 2025-02-19 PROCEDURE — 81001 URINALYSIS AUTO W/SCOPE: CPT | Performed by: EMERGENCY MEDICINE

## 2025-02-19 PROCEDURE — 0042T CT HEAD PERFUSION W CONTRAST: CPT

## 2025-02-19 RX ORDER — GUAIFENESIN 600 MG/1
1200 TABLET, EXTENDED RELEASE ORAL 2 TIMES DAILY
Status: DISCONTINUED | OUTPATIENT
Start: 2025-02-19 | End: 2025-02-20

## 2025-02-19 RX ORDER — CEFTRIAXONE 1 G/1
1 INJECTION, POWDER, FOR SOLUTION INTRAMUSCULAR; INTRAVENOUS EVERY 24 HOURS
Status: DISCONTINUED | OUTPATIENT
Start: 2025-02-20 | End: 2025-02-23 | Stop reason: HOSPADM

## 2025-02-19 RX ORDER — ALBUTEROL SULFATE 0.83 MG/ML
2.5 SOLUTION RESPIRATORY (INHALATION)
Status: DISCONTINUED | OUTPATIENT
Start: 2025-02-19 | End: 2025-02-23 | Stop reason: HOSPADM

## 2025-02-19 RX ORDER — CALCIUM CARBONATE 500 MG/1
1000 TABLET, CHEWABLE ORAL 4 TIMES DAILY PRN
Status: DISCONTINUED | OUTPATIENT
Start: 2025-02-19 | End: 2025-02-23 | Stop reason: HOSPADM

## 2025-02-19 RX ORDER — POLYETHYLENE GLYCOL 3350 17 G/17G
17 POWDER, FOR SOLUTION ORAL 2 TIMES DAILY PRN
Status: DISCONTINUED | OUTPATIENT
Start: 2025-02-19 | End: 2025-02-23 | Stop reason: HOSPADM

## 2025-02-19 RX ORDER — ONDANSETRON 4 MG/1
4 TABLET, ORALLY DISINTEGRATING ORAL EVERY 6 HOURS PRN
Status: DISCONTINUED | OUTPATIENT
Start: 2025-02-19 | End: 2025-02-23 | Stop reason: HOSPADM

## 2025-02-19 RX ORDER — AMOXICILLIN 250 MG
1 CAPSULE ORAL 2 TIMES DAILY PRN
Status: DISCONTINUED | OUTPATIENT
Start: 2025-02-19 | End: 2025-02-23 | Stop reason: HOSPADM

## 2025-02-19 RX ORDER — PREDNISONE 20 MG/1
40 TABLET ORAL DAILY
Status: DISCONTINUED | OUTPATIENT
Start: 2025-02-20 | End: 2025-02-23 | Stop reason: HOSPADM

## 2025-02-19 RX ORDER — ACETAMINOPHEN 325 MG/1
650 TABLET ORAL EVERY 4 HOURS PRN
Status: DISCONTINUED | OUTPATIENT
Start: 2025-02-19 | End: 2025-02-23 | Stop reason: HOSPADM

## 2025-02-19 RX ORDER — CETIRIZINE HYDROCHLORIDE 10 MG/1
10 TABLET ORAL 2 TIMES DAILY
Status: DISCONTINUED | OUTPATIENT
Start: 2025-02-19 | End: 2025-02-20

## 2025-02-19 RX ORDER — BACLOFEN 10 MG/1
20 TABLET ORAL 4 TIMES DAILY
Status: DISCONTINUED | OUTPATIENT
Start: 2025-02-20 | End: 2025-02-23 | Stop reason: HOSPADM

## 2025-02-19 RX ORDER — AMLODIPINE BESYLATE 2.5 MG/1
2.5 TABLET ORAL DAILY
Status: DISCONTINUED | OUTPATIENT
Start: 2025-02-20 | End: 2025-02-23 | Stop reason: HOSPADM

## 2025-02-19 RX ORDER — LIDOCAINE 40 MG/G
CREAM TOPICAL
Status: DISCONTINUED | OUTPATIENT
Start: 2025-02-19 | End: 2025-02-23 | Stop reason: HOSPADM

## 2025-02-19 RX ORDER — ONDANSETRON 2 MG/ML
4 INJECTION INTRAMUSCULAR; INTRAVENOUS EVERY 6 HOURS PRN
Status: DISCONTINUED | OUTPATIENT
Start: 2025-02-19 | End: 2025-02-23 | Stop reason: HOSPADM

## 2025-02-19 RX ORDER — GADOBUTROL 604.72 MG/ML
6.5 INJECTION INTRAVENOUS ONCE
Status: COMPLETED | OUTPATIENT
Start: 2025-02-19 | End: 2025-02-19

## 2025-02-19 RX ORDER — DEXTROAMPHETAMINE SACCHARATE, AMPHETAMINE ASPARTATE MONOHYDRATE, DEXTROAMPHETAMINE SULFATE AND AMPHETAMINE SULFATE 3.75; 3.75; 3.75; 3.75 MG/1; MG/1; MG/1; MG/1
30 CAPSULE, EXTENDED RELEASE ORAL EVERY MORNING
Status: DISCONTINUED | OUTPATIENT
Start: 2025-02-20 | End: 2025-02-23 | Stop reason: HOSPADM

## 2025-02-19 RX ORDER — ACETAMINOPHEN 650 MG/1
650 SUPPOSITORY RECTAL EVERY 4 HOURS PRN
Status: DISCONTINUED | OUTPATIENT
Start: 2025-02-19 | End: 2025-02-23 | Stop reason: HOSPADM

## 2025-02-19 RX ORDER — IPRATROPIUM BROMIDE AND ALBUTEROL SULFATE 2.5; .5 MG/3ML; MG/3ML
3 SOLUTION RESPIRATORY (INHALATION) ONCE
Status: COMPLETED | OUTPATIENT
Start: 2025-02-19 | End: 2025-02-19

## 2025-02-19 RX ORDER — AMOXICILLIN 250 MG
2 CAPSULE ORAL 2 TIMES DAILY PRN
Status: DISCONTINUED | OUTPATIENT
Start: 2025-02-19 | End: 2025-02-23 | Stop reason: HOSPADM

## 2025-02-19 RX ORDER — PANTOPRAZOLE SODIUM 40 MG/1
40 TABLET, DELAYED RELEASE ORAL
Status: DISCONTINUED | OUTPATIENT
Start: 2025-02-20 | End: 2025-02-23 | Stop reason: HOSPADM

## 2025-02-19 RX ORDER — IOPAMIDOL 755 MG/ML
500 INJECTION, SOLUTION INTRAVASCULAR ONCE
Status: COMPLETED | OUTPATIENT
Start: 2025-02-19 | End: 2025-02-19

## 2025-02-19 RX ORDER — METHYLPREDNISOLONE SODIUM SUCCINATE 125 MG/2ML
125 INJECTION INTRAMUSCULAR; INTRAVENOUS ONCE
Status: COMPLETED | OUTPATIENT
Start: 2025-02-19 | End: 2025-02-19

## 2025-02-19 RX ORDER — RAMELTEON 8 MG/1
8 TABLET ORAL AT BEDTIME
Status: DISCONTINUED | OUTPATIENT
Start: 2025-02-19 | End: 2025-02-20

## 2025-02-19 RX ORDER — IPRATROPIUM BROMIDE AND ALBUTEROL SULFATE 2.5; .5 MG/3ML; MG/3ML
3 SOLUTION RESPIRATORY (INHALATION)
Status: DISCONTINUED | OUTPATIENT
Start: 2025-02-20 | End: 2025-02-21

## 2025-02-19 RX ORDER — ROPINIROLE 2 MG/1
4 TABLET, FILM COATED ORAL AT BEDTIME
Status: DISCONTINUED | OUTPATIENT
Start: 2025-02-19 | End: 2025-02-20

## 2025-02-19 RX ORDER — PREGABALIN 75 MG/1
75 CAPSULE ORAL 2 TIMES DAILY
Status: DISCONTINUED | OUTPATIENT
Start: 2025-02-19 | End: 2025-02-23 | Stop reason: HOSPADM

## 2025-02-19 RX ORDER — IOPAMIDOL 755 MG/ML
117 INJECTION, SOLUTION INTRAVASCULAR ONCE
Status: COMPLETED | OUTPATIENT
Start: 2025-02-19 | End: 2025-02-19

## 2025-02-19 RX ORDER — FLUTICASONE FUROATE AND VILANTEROL 100; 25 UG/1; UG/1
1 POWDER RESPIRATORY (INHALATION) DAILY
Status: DISCONTINUED | OUTPATIENT
Start: 2025-02-20 | End: 2025-02-23 | Stop reason: HOSPADM

## 2025-02-19 RX ORDER — ROPINIROLE 2 MG/1
2 TABLET, FILM COATED ORAL EVERY MORNING
Status: DISCONTINUED | OUTPATIENT
Start: 2025-02-20 | End: 2025-02-23 | Stop reason: HOSPADM

## 2025-02-19 RX ORDER — CEFTRIAXONE 1 G/1
1 INJECTION, POWDER, FOR SOLUTION INTRAMUSCULAR; INTRAVENOUS ONCE
Status: COMPLETED | OUTPATIENT
Start: 2025-02-19 | End: 2025-02-19

## 2025-02-19 RX ADMIN — SODIUM CHLORIDE 80 ML: 9 INJECTION, SOLUTION INTRAVENOUS at 14:09

## 2025-02-19 RX ADMIN — METHYLPREDNISOLONE SODIUM SUCCINATE 125 MG: 125 INJECTION, POWDER, FOR SOLUTION INTRAMUSCULAR; INTRAVENOUS at 21:34

## 2025-02-19 RX ADMIN — IPRATROPIUM BROMIDE AND ALBUTEROL SULFATE 3 ML: .5; 3 SOLUTION RESPIRATORY (INHALATION) at 21:47

## 2025-02-19 RX ADMIN — CEFTRIAXONE 1 G: 1 INJECTION, POWDER, FOR SOLUTION INTRAMUSCULAR; INTRAVENOUS at 21:34

## 2025-02-19 RX ADMIN — GADOBUTROL 6.5 ML: 604.72 INJECTION INTRAVENOUS at 19:32

## 2025-02-19 RX ADMIN — IPRATROPIUM BROMIDE AND ALBUTEROL SULFATE 3 ML: .5; 3 SOLUTION RESPIRATORY (INHALATION) at 17:28

## 2025-02-19 RX ADMIN — IOPAMIDOL 117 ML: 755 INJECTION, SOLUTION INTRAVENOUS at 14:52

## 2025-02-19 RX ADMIN — IOPAMIDOL 67 ML: 755 INJECTION, SOLUTION INTRAVENOUS at 14:09

## 2025-02-19 ASSESSMENT — ACTIVITIES OF DAILY LIVING (ADL)
ADLS_ACUITY_SCORE: 49

## 2025-02-19 ASSESSMENT — COLUMBIA-SUICIDE SEVERITY RATING SCALE - C-SSRS
2. HAVE YOU ACTUALLY HAD ANY THOUGHTS OF KILLING YOURSELF IN THE PAST MONTH?: NO
6. HAVE YOU EVER DONE ANYTHING, STARTED TO DO ANYTHING, OR PREPARED TO DO ANYTHING TO END YOUR LIFE?: NO
1. IN THE PAST MONTH, HAVE YOU WISHED YOU WERE DEAD OR WISHED YOU COULD GO TO SLEEP AND NOT WAKE UP?: NO

## 2025-02-19 NOTE — PROGRESS NOTES
Right AC IV extravasated during contrast injection. 67ml of Isovue 370 was administered. Interventions that were preformed: RN contacted, MD contacted, IV removed, and warm pack applied.

## 2025-02-19 NOTE — ED PROVIDER NOTES
Emergency Department Note      History of Present Illness     Chief Complaint   Fall      HPI   Hina Yap is a 59 year old female who presents to the ED with slurred speech and gait ataxia.  She felt normal around 11:00 PM when she went to bed last night.  She notes some chronic weakness and at times feeling off balance related to multiple sclerosis.  She is not anticoagulated though.  She awoke overnight around 2 AM and felt that her gait was off.  She fell and struck her occiput.  EMS came out and she declined transport at that time.  This morning around 9 she felt that her gait was worsening.  She had a slight meeting with her clinic and advised her to come into the ED.    Independent Historian   The patient's  is present and provides additional history.    Past Medical History     Medical History and Problem List   Past Medical History:   Diagnosis Date    Anxiety     Arthritis Years ago    COPD (chronic obstructive pulmonary disease) (H)     Depressive disorder Years ago    GERD (gastroesophageal reflux disease)     Hypertension     Ischemic cardiomyopathy     Multiple sclerosis (H)     Neuromuscular disorder (H)     Nonrheumatic mitral valve regurgitation     PVC's (premature ventricular contractions)     Renal disease     Restless leg syndrome     Tobacco use disorder        Medications   albuterol (PROAIR HFA/PROVENTIL HFA/VENTOLIN HFA) 108 (90 Base) MCG/ACT inhaler  amLODIPine (NORVASC) 2.5 MG tablet  amphetamine-dextroamphetamine (ADDERALL XR) 30 MG 24 hr capsule  Ascorbic Acid (SUPER C COMPLEX PO)  AVONEX PEN 30 MCG/0.5ML auto-injector kit  baclofen (LIORESAL) 20 MG tablet  budesonide-formoterol (SYMBICORT) 80-4.5 MCG/ACT Inhaler  cetirizine (ZYRTEC) 10 MG tablet  cycloSPORINE (RESTASIS) 0.05 % ophthalmic emulsion  diclofenac (VOLTAREN) 1 % topical gel  esomeprazole (NEXIUM) 40 MG DR capsule  hydrocortisone 1 % CREA cream  multivitamin (ONE-DAILY) tablet  naloxone (NARCAN) 4 MG/0.1ML  "nasal spray  nicotine (NICOTINE STEP 2) 14 MG/24HR 24 hr patch  nicotine polacrilex (NICORETTE) 4 MG gum  oxyCODONE-acetaminophen (PERCOCET)  MG per tablet  polyethylene glycol (MIRALAX) 17 GM/Dose powder  pregabalin (LYRICA) 75 MG capsule  promethazine (PHENERGAN) 25 MG tablet  ramelteon (ROZEREM) 8 MG tablet  rOPINIRole (REQUIP) 2 MG tablet  SPIRIVA RESPIMAT 2.5 MCG/ACT inhaler  venlafaxine (EFFEXOR XR) 75 MG 24 hr capsule        Surgical History   Past Surgical History:   Procedure Laterality Date    GYN SURGERY      tubal ligation    OPTICAL TRACKING SYSTEM FUSION POSTERIOR SPINE LUMBAR N/A 7/23/2021    Procedure: L4-5 transforaminal lumbar interbody fusion with reduction of grade 1/2 unstable spondylolisthesis   Open reduction and internal fixation of the grade L4-5 spondylolisthesis L4 - L5 posterior lateral fusion;  Surgeon: Asif Flores MD;  Location: [facility] OR       Physical Exam     Patient Vitals for the past 24 hrs:   BP Temp Temp src Pulse Resp SpO2 Height Weight   02/19/25 1923 -- -- -- 71 -- 93 % -- --   02/19/25 1811 -- -- -- 73 -- 97 % -- --   02/19/25 1756 -- -- -- 76 -- 96 % -- --   02/19/25 1741 -- -- -- 73 -- 93 % -- --   02/19/25 1731 -- -- -- 76 -- (!) 89 % -- --   02/19/25 1730 -- -- -- 77 -- (!) 90 % -- --   02/19/25 1729 -- -- -- 72 -- (!) 89 % -- --   02/19/25 1727 -- -- -- 75 -- (!) 86 % -- --   02/19/25 1726 -- -- -- 73 -- (!) 86 % -- --   02/19/25 1725 -- -- -- 73 -- (!) 87 % -- --   02/19/25 1724 -- -- -- 76 -- (!) 86 % -- --   02/19/25 1715 -- -- -- 80 -- (!) 88 % -- --   02/19/25 1714 -- -- -- -- -- 92 % -- --   02/19/25 1704 -- -- -- 81 -- 92 % -- --   02/19/25 1703 -- -- -- 80 -- 93 % -- --   02/19/25 1702 -- -- -- 79 -- 93 % -- --   02/19/25 1701 -- -- -- 80 -- 93 % -- --   02/19/25 1700 -- -- -- 82 -- -- -- --   02/19/25 1659 -- -- -- 80 -- 93 % -- --   02/19/25 1657 (!) 126/94 -- -- 78 -- 94 % -- --   02/19/25 1527 -- -- -- -- -- -- 1.676 m (5' 6\") 65 kg (143 lb " 4.8 oz)   02/19/25 1356 (!) 122/98 97.5  F (36.4  C) Temporal 101 20 95 % -- --     Physical Exam  Constitutional:       General: She is not in acute distress.     Appearance: Normal appearance. She is not diaphoretic.   HENT:      Head: Atraumatic.      Mouth/Throat:      Mouth: Mucous membranes are moist.   Eyes:      General: No scleral icterus.     Conjunctiva/sclera: Conjunctivae normal.   Cardiovascular:      Rate and Rhythm: Normal rate and regular rhythm.      Heart sounds: Normal heart sounds.   Pulmonary:      Effort: No respiratory distress.      Breath sounds: Normal breath sounds.   Abdominal:      General: Abdomen is flat. There is no distension.      Tenderness: There is no abdominal tenderness.   Musculoskeletal:      Cervical back: Neck supple.   Skin:     General: Skin is warm.      Capillary Refill: Capillary refill takes less than 2 seconds.      Findings: No rash.   Neurological:      Mental Status: She is alert.      Comments: Dysarthric speech.  Strength globally diminished.  No facial asymmetry.  There is bilateral finger-nose dysmetria.  Sensation light touch intact over extremities and face.           Diagnostics     Lab Results   Labs Ordered and Resulted from Time of ED Arrival to Time of ED Departure   BASIC METABOLIC PANEL - Abnormal       Result Value    Sodium 135      Potassium 3.9      Chloride 99      Carbon Dioxide (CO2) 25      Anion Gap 11      Urea Nitrogen 24.0 (*)     Creatinine 1.13 (*)     GFR Estimate 56 (*)     Calcium 8.9      Glucose 87     ROUTINE UA WITH MICROSCOPIC REFLEX TO CULTURE - Abnormal    Color Urine Light Yellow      Appearance Urine Clear      Glucose Urine Negative      Bilirubin Urine Negative      Ketones Urine Trace (*)     Specific Gravity Urine 1.010      Blood Urine Trace (*)     pH Urine 6.0      Protein Albumin Urine 30 (*)     Urobilinogen Urine Normal      Nitrite Urine Positive (*)     Leukocyte Esterase Urine Moderate (*)     Mucus Urine  Present (*)     RBC Urine 13 (*)     WBC Urine 31 (*)     Squamous Epithelials Urine 5 (*)    GLUCOSE BY METER - Abnormal    GLUCOSE BY METER POCT 116 (*)    INR - Normal    INR 1.01     PARTIAL THROMBOPLASTIN TIME - Normal    aPTT 29     TROPONIN T, HIGH SENSITIVITY - Normal    Troponin T, High Sensitivity 9     LACTIC ACID WHOLE BLOOD WITH 1X REPEAT IN 2 HR WHEN >2 - Normal    Lactic Acid, Initial 0.7     INFLUENZA A/B, RSV AND SARS-COV2 PCR - Normal    Influenza A PCR Negative      Influenza B PCR Negative      RSV PCR Negative      SARS CoV2 PCR Negative     CBC WITH PLATELETS AND DIFFERENTIAL    WBC Count 10.5      RBC Count 4.57      Hemoglobin 13.5      Hematocrit 40.7      MCV 89      MCH 29.5      MCHC 33.2      RDW 13.5      Platelet Count 319      % Neutrophils 69      % Lymphocytes 23      % Monocytes 6      % Eosinophils 1      % Basophils 1      % Immature Granulocytes 0      NRBCs per 100 WBC 0      Absolute Neutrophils 7.2      Absolute Lymphocytes 2.4      Absolute Monocytes 0.7      Absolute Eosinophils 0.1      Absolute Basophils 0.1      Absolute Immature Granulocytes 0.0      Absolute NRBCs 0.0     GLUCOSE MONITOR NURSING POCT   BLOOD CULTURE   BLOOD CULTURE   URINE CULTURE       Imaging   MR Brain w/o & w Contrast   Final Result   IMPRESSION:   1.  Negative for acute intracranial abnormality.   2.  Nonspecific patchy and confluent white matter FLAIR hyperintensities. Appearance is atypical for chronic small vessel ischemic disease and raises suspicion for demyelinating disease.         XR Chest 2 Views   Preliminary Result   IMPRESSION: Cardiac silhouette is within normal limits. Linear atelectasis is noted in the right midlung. Elevation of the right hemidiaphragm is more significant than on the previous study. Mild atelectasis along the right hemidiaphragm. Otherwise, the    lungs are clear. No significant bony abnormalities.      CT Head Perfusion w Contrast - For Tier 2 Stroke   Final  Result   IMPRESSION: Unremarkable CT perfusion. No convincing focal perfusion defect.      CTA Head Neck with Contrast   Final Result   IMPRESSION:       HEAD CTA:    1.  No evidence of large vessel occlusion or high-grade stenosis.      NECK CTA:   1.  No evidence of large vessel occlusion or high-grade stenosis.   2.  Small right tonsillar, possibly benign cyst. Direct visualization would be typically recommended.      CT Head w/o Contrast   Final Result   IMPRESSION:   1.  No evidence of hemorrhage.   2.  Scattered nonspecific patchy white matter hypoattenuation presumably representing chronic small vessel ischemic change, similar compared to the prior.   3.  Basilar tip appears slightly dense, possibly atherosclerotic, although thrombosis not excluded. Recommend CTA correlation.      Findings were discussed by phone between Dr. Harris and Dr. Lorenzo at approximately 2:19 PM 2/19/2025.          EKG   ECG results from 02/19/25   EKG 12-lead, tracing only     Value    Systolic Blood Pressure     Diastolic Blood Pressure     Ventricular Rate 79    Atrial Rate 79    NV Interval 142    QRS Duration 106        QTc 502    P Axis 59    R AXIS 47    T Axis 51    Interpretation ECG      Sinus rhythm with occasional Premature ventricular complexes  Nonspecific T wave abnormality  Abnormal ECG  When compared with ECG of 26-Feb-2022 13:55,  Nonspecific T wave abnormality now evident in Anterior leads          Independent Interpretation   Chest x-ray independently interpreted.  No infiltrate.    ED Course      Medications Administered   Medications   ipratropium - albuterol 0.5 mg/2.5 mg/3 mL (DUONEB) neb solution 3 mL (has no administration in time range)   cefTRIAXone (ROCEPHIN) 1 g vial to attach to  mL bag for ADULTS or NS 50 mL bag for PEDS (1 g Intravenous $New Bag 2/19/25 2136)   lidocaine 1 % 5 mL (has no administration in time range)   iopamidol (ISOVUE-370) solution 117 mL (67 mLs Intravenous $Given  2/19/25 1409)     And   sodium chloride 0.9 % bag for CT scan flush use (80 mLs As instructed $Given 2/19/25 1409)   sodium chloride (PF) 0.9% PF flush 100 mL (80 mLs Intravenous $Given 2/19/25 1452)   iopamidol (ISOVUE-370) solution 500 mL (117 mLs Intravenous $Given 2/19/25 1452)   ipratropium - albuterol 0.5 mg/2.5 mg/3 mL (DUONEB) neb solution 3 mL (3 mLs Nebulization $Given 2/19/25 1728)   gadobutrol (GADAVIST) injection 6.5 mL (6.5 mLs Intravenous $Given 2/19/25 1932)   methylPREDNISolone Na Suc (solu-MEDROL) injection 125 mg (125 mg Intravenous $Given 2/19/25 2134)       Procedures   [facility]    -Laceration Repair    Date/Time: 2/19/2025 9:35 PM    Performed by: Edward Lorenzo MD  Authorized by: Edward Lorenzo MD    Risks, benefits and alternatives discussed.      ANESTHESIA (see MAR for exact dosages):     Anesthesia method:  Local infiltration    Local anesthetic:  Lidocaine 1% w/o epi  LACERATION DETAILS     Location:  Scalp    Length (cm):  1    REPAIR TYPE:     Repair type:  Simple    EXPLORATION:     Hemostasis achieved with:  Direct pressure    Wound extent: fascia not violated and no foreign body      Contaminated: no      TREATMENT:     Area cleansed with:  Saline    Amount of cleaning:  Extensive    Irrigation solution:  Sterile saline    Irrigation method:  Pressure wash    Visualized foreign bodies/material removed: no      SKIN REPAIR     Repair method:  Staples    Number of staples:  3    APPROXIMATION     Approximation:  Close    POST-PROCEDURE DETAILS     Dressing:  Open (no dressing)      PROCEDURE    Patient Tolerance:  Patient tolerated the procedure well with no immediate complications       Medical Decision Making / Diagnosis     AMRITA Yap is a 59 year old female who presents to the ED with gait ataxia and slurred speech.  Initially our concern was possible stroke.  Stroke neurology was consulted.  She had a CT/CTA that overall did  not show any significant changes.  MRI also was negative.    While she has been here, the patient has continued coughing which she says she has been over the course the last 2 or 3 weeks at least.  She has had episodes of hypoxia into the 80s with wheezing.  She has improved somewhat with nebulizers but requires nasal cannula oxygen.  Her hypoxia is more likely the cause of her weakness and fall and encephalopathy.    The patient also appears to have a urinary tract infection and was covered with ceftriaxone.  Lactate is normal and she is not hypotensive.  She does not meet criteria for severe sepsis or septic shock.    The patient has a laceration over the scalp that was repaired with staples.  She will need staples removed in 10 to 14 days and she was informed of this.    Disposition   The patient was admitted to the hospital.     Diagnosis     ICD-10-CM    1. Acute respiratory failure with hypoxia (H)  J96.01       2. COPD with acute exacerbation (H)  J44.1       3. Ataxic gait  R26.0       4. Scalp laceration, initial encounter  S01.01XA       5. Acute cystitis without hematuria  N30.00             Edward Lorenzo MD  02/19/25 2138

## 2025-02-19 NOTE — ED TRIAGE NOTES
Pt arrives with - had a fall last eveing at 0200- was found on the ground in kitchen- large amount of blood on floor- was seen by EMS overnight- was not brought in to ED. This am around 0900 started to have slurred speech and walking significantly to the right. Maura called to triage for Stroke eval.   Not on thinners.      Triage Assessment (Adult)       Row Name 02/19/25 6795          Triage Assessment    Airway WDL WDL        Respiratory WDL    Respiratory WDL WDL        Cardiac WDL    Cardiac WDL WDL

## 2025-02-19 NOTE — CONSULTS
Addendum:  CTA/CTP obtained--unrevealing.  Mild aortic arch atheroma and scattered plaque.    Recs:  --code stroke already deescalated  --Brain MRI w/wo contrast  --consider infectious/toxic/metabolic work-up for possible exacerbation of baseline deficits due to MS (awaiting baseline labs).    ____________________          [facility]    Stroke Telephone Note    I was called by Edward Nix on 02/19/25 regarding patient Hina Yap. The patient is a 59 year old female with MS, PCVs, tobacco, COPD, anxiety, who presents with worsening of baseline gait ataxia (falling to R) and dysarthria.  Had a fall the night before and was evaluated by EMS, but did not come to ED.  Came due to worsening symptoms this AM.    Head CT no acute pathology, areas of hypopattenuation that could be consitent with known MS.  IV extravasated and CTA/CTP are pending.    Discussed with DR. Nix that given mild interval worsening of baseline deficits and timing, we could defer CTA/CTP and get MRI/MRA to assess etiology and vessel status.     Vitals  BP: (!) 122/98   Pulse: 101   Resp: 20   Temp: 97.5  F (36.4  C)        Stroke Code Data (for stroke code without tele)  Stroke code activated 02/19/25  1404   Stroke provider first response 02/19/25  1408   Last known normal 02/18/25  2300      Time of discovery (or onset of symptoms) 02/19/25  0900   Head CT read by Stroke Neuro Provider 02/19/25  1419   Was stroke code de-escalated? Yes  02/19/25  1453     Imaging Findings  CT head: as above  CTA head/neck: pending due to IV extravasation    Intravenous Thrombolysis  Not given due to:   - unclear or unfavorable risk-benefit profile for extended window thrombolysis beyond the conventional 4.5 hour time window    Endovascular Treatment  CTA or MRA pending, would not be indicated for current deficits    Impression  Acute ischemic stroke vs recrudescence of neurologic deficits from MS vs other  --no an acute  "stroke intervention candidate based upon symptoms    Recommendations   Ongoing difficulty with vascular access--please call when CTA completed  --it is also reasonable to get stat MRI brain w/wo contrast and MRA Head/Neck.  This would help assess etiology and vessel status.  MRI/MRA can be obtained without contrast if access remains an issue.  2. Please inform stroke team when imaging is available.    My recommendations are based on the information provided over the phone by Hina Yap's in-person providers. They are not intended to replace the clinical judgment of her in-person providers. I was not requested to personally see or examine the patient at this time.     Anson Harrison MD, MS  Vascular Neurology    To page me or covering stroke neurology team member, click here: [facility]  Choose \"On Call\" tab at top, then select \"NEUROLOGY/ALL SITES\" from middle drop-down box, press Enter, then look for \"stroke\" or \"telestroke\" for your site.    I personally spent a total of 25 minutes on February 19, 2025 consulting with her  medical providers and coordinating care.  This includes personally reviewing the patient's medical record including diagnostic testing, neuroimaging, and laboratory studies.            "

## 2025-02-19 NOTE — ED NOTES
Supplemental oxygen turned off to trial pt on room air. SpO2 86% after being on room air for 10 minutes. Oxygen reapplied. MD Lorenzo updated.

## 2025-02-19 NOTE — ED NOTES
Pt ambulated up and down hallway; pt attempted to give UA but states she is too dehydrated to urinate. Pt coughing and wheezing upon arrival back to room.

## 2025-02-20 ENCOUNTER — APPOINTMENT (OUTPATIENT)
Dept: PHYSICAL THERAPY | Facility: CLINIC | Age: 60
End: 2025-02-20
Attending: PHYSICIAN ASSISTANT
Payer: MEDICAID

## 2025-02-20 VITALS
HEIGHT: 66 IN | SYSTOLIC BLOOD PRESSURE: 113 MMHG | TEMPERATURE: 97.5 F | WEIGHT: 140 LBS | DIASTOLIC BLOOD PRESSURE: 65 MMHG | HEART RATE: 90 BPM | OXYGEN SATURATION: 94 % | BODY MASS INDEX: 22.5 KG/M2 | RESPIRATION RATE: 20 BRPM

## 2025-02-20 LAB
ANION GAP SERPL CALCULATED.3IONS-SCNC: 15 MMOL/L (ref 7–15)
ATRIAL RATE - MUSE: 79 BPM
ATRIAL RATE - MUSE: 82 BPM
BACTERIA BLD CULT: NORMAL
BACTERIA BLD CULT: NORMAL
BACTERIA UR CULT: ABNORMAL
BUN SERPL-MCNC: 25.8 MG/DL (ref 8–23)
CALCIUM SERPL-MCNC: 9.8 MG/DL (ref 8.8–10.4)
CHLORIDE SERPL-SCNC: 101 MMOL/L (ref 98–107)
CREAT SERPL-MCNC: 0.97 MG/DL (ref 0.51–0.95)
DIASTOLIC BLOOD PRESSURE - MUSE: NORMAL MMHG
DIASTOLIC BLOOD PRESSURE - MUSE: NORMAL MMHG
EGFRCR SERPLBLD CKD-EPI 2021: 67 ML/MIN/1.73M2
ERYTHROCYTE [DISTWIDTH] IN BLOOD BY AUTOMATED COUNT: 13.3 % (ref 10–15)
FLUAV RNA SPEC QL NAA+PROBE: NEGATIVE
FLUBV RNA RESP QL NAA+PROBE: NEGATIVE
GLUCOSE SERPL-MCNC: 142 MG/DL (ref 70–99)
HCO3 SERPL-SCNC: 21 MMOL/L (ref 22–29)
HCT VFR BLD AUTO: 42.9 % (ref 35–47)
HGB BLD-MCNC: 14.1 G/DL (ref 11.7–15.7)
INTERPRETATION ECG - MUSE: NORMAL
INTERPRETATION ECG - MUSE: NORMAL
MCH RBC QN AUTO: 28.8 PG (ref 26.5–33)
MCHC RBC AUTO-ENTMCNC: 32.9 G/DL (ref 31.5–36.5)
MCV RBC AUTO: 88 FL (ref 78–100)
P AXIS - MUSE: 43 DEGREES
P AXIS - MUSE: 59 DEGREES
PLATELET # BLD AUTO: 359 10E3/UL (ref 150–450)
POTASSIUM SERPL-SCNC: 3.7 MMOL/L (ref 3.4–5.3)
PR INTERVAL - MUSE: 142 MS
PR INTERVAL - MUSE: 142 MS
QRS DURATION - MUSE: 106 MS
QRS DURATION - MUSE: 116 MS
QT - MUSE: 364 MS
QT - MUSE: 438 MS
QTC - MUSE: 425 MS
QTC - MUSE: 502 MS
R AXIS - MUSE: 40 DEGREES
R AXIS - MUSE: 47 DEGREES
RBC # BLD AUTO: 4.89 10E6/UL (ref 3.8–5.2)
RSV RNA SPEC NAA+PROBE: NEGATIVE
SARS-COV-2 RNA RESP QL NAA+PROBE: NEGATIVE
SODIUM SERPL-SCNC: 137 MMOL/L (ref 135–145)
SYSTOLIC BLOOD PRESSURE - MUSE: NORMAL MMHG
SYSTOLIC BLOOD PRESSURE - MUSE: NORMAL MMHG
T AXIS - MUSE: -3 DEGREES
T AXIS - MUSE: 51 DEGREES
VENTRICULAR RATE- MUSE: 79 BPM
VENTRICULAR RATE- MUSE: 82 BPM
WBC # BLD AUTO: 7.4 10E3/UL (ref 4–11)

## 2025-02-20 PROCEDURE — 250N000009 HC RX 250: Performed by: PHYSICIAN ASSISTANT

## 2025-02-20 PROCEDURE — 87637 SARSCOV2&INF A&B&RSV AMP PRB: CPT | Performed by: PHYSICIAN ASSISTANT

## 2025-02-20 PROCEDURE — 250N000011 HC RX IP 250 OP 636: Performed by: INTERNAL MEDICINE

## 2025-02-20 PROCEDURE — 99232 SBSQ HOSP IP/OBS MODERATE 35: CPT | Performed by: INTERNAL MEDICINE

## 2025-02-20 PROCEDURE — 250N000011 HC RX IP 250 OP 636: Performed by: PHYSICIAN ASSISTANT

## 2025-02-20 PROCEDURE — 250N000013 HC RX MED GY IP 250 OP 250 PS 637: Performed by: INTERNAL MEDICINE

## 2025-02-20 PROCEDURE — 97530 THERAPEUTIC ACTIVITIES: CPT | Mod: GP | Performed by: PHYSICAL THERAPIST

## 2025-02-20 PROCEDURE — G0378 HOSPITAL OBSERVATION PER HR: HCPCS

## 2025-02-20 PROCEDURE — 250N000013 HC RX MED GY IP 250 OP 250 PS 637: Performed by: STUDENT IN AN ORGANIZED HEALTH CARE EDUCATION/TRAINING PROGRAM

## 2025-02-20 PROCEDURE — 36415 COLL VENOUS BLD VENIPUNCTURE: CPT | Performed by: PHYSICIAN ASSISTANT

## 2025-02-20 PROCEDURE — 80048 BASIC METABOLIC PNL TOTAL CA: CPT | Performed by: PHYSICIAN ASSISTANT

## 2025-02-20 PROCEDURE — 250N000012 HC RX MED GY IP 250 OP 636 PS 637: Performed by: PHYSICIAN ASSISTANT

## 2025-02-20 PROCEDURE — 120N000001 HC R&B MED SURG/OB

## 2025-02-20 PROCEDURE — 97161 PT EVAL LOW COMPLEX 20 MIN: CPT | Mod: GP | Performed by: PHYSICAL THERAPIST

## 2025-02-20 PROCEDURE — 82310 ASSAY OF CALCIUM: CPT | Performed by: PHYSICIAN ASSISTANT

## 2025-02-20 PROCEDURE — 250N000013 HC RX MED GY IP 250 OP 250 PS 637: Performed by: PHYSICIAN ASSISTANT

## 2025-02-20 PROCEDURE — 85027 COMPLETE CBC AUTOMATED: CPT | Performed by: PHYSICIAN ASSISTANT

## 2025-02-20 RX ORDER — VENLAFAXINE HYDROCHLORIDE 75 MG/1
150 CAPSULE, EXTENDED RELEASE ORAL EVERY EVENING
COMMUNITY

## 2025-02-20 RX ORDER — OXYCODONE HYDROCHLORIDE 5 MG/1
5 TABLET ORAL EVERY 6 HOURS PRN
Status: DISCONTINUED | OUTPATIENT
Start: 2025-02-20 | End: 2025-02-23 | Stop reason: HOSPADM

## 2025-02-20 RX ORDER — ROPINIROLE 2 MG/1
4 TABLET, FILM COATED ORAL AT BEDTIME
Status: DISCONTINUED | OUTPATIENT
Start: 2025-02-20 | End: 2025-02-23 | Stop reason: HOSPADM

## 2025-02-20 RX ORDER — ROPINIROLE 2 MG/1
4 TABLET, FILM COATED ORAL AT BEDTIME
COMMUNITY

## 2025-02-20 RX ORDER — VIT E ACET/GLY/DIMETH/WATER
LOTION (ML) TOPICAL
Status: DISCONTINUED | OUTPATIENT
Start: 2025-02-20 | End: 2025-02-23 | Stop reason: HOSPADM

## 2025-02-20 RX ORDER — VENLAFAXINE HYDROCHLORIDE 75 MG/1
75 CAPSULE, EXTENDED RELEASE ORAL EVERY MORNING
Status: DISCONTINUED | OUTPATIENT
Start: 2025-02-21 | End: 2025-02-23 | Stop reason: HOSPADM

## 2025-02-20 RX ORDER — DIPHENHYDRAMINE HYDROCHLORIDE, ZINC ACETATE 2; .1 G/100G; G/100G
CREAM TOPICAL 3 TIMES DAILY PRN
Status: DISCONTINUED | OUTPATIENT
Start: 2025-02-20 | End: 2025-02-23 | Stop reason: HOSPADM

## 2025-02-20 RX ORDER — DIPHENHYDRAMINE HCL 25 MG
25 CAPSULE ORAL EVERY 6 HOURS PRN
Status: DISCONTINUED | OUTPATIENT
Start: 2025-02-20 | End: 2025-02-23 | Stop reason: HOSPADM

## 2025-02-20 RX ORDER — KETOROLAC TROMETHAMINE 15 MG/ML
15 INJECTION, SOLUTION INTRAMUSCULAR; INTRAVENOUS EVERY 6 HOURS PRN
Status: DISCONTINUED | OUTPATIENT
Start: 2025-02-20 | End: 2025-02-23 | Stop reason: HOSPADM

## 2025-02-20 RX ORDER — BENZONATATE 100 MG/1
200 CAPSULE ORAL 3 TIMES DAILY PRN
Status: DISCONTINUED | OUTPATIENT
Start: 2025-02-20 | End: 2025-02-23 | Stop reason: HOSPADM

## 2025-02-20 RX ORDER — CODEINE PHOSPHATE AND GUAIFENESIN 10; 100 MG/5ML; MG/5ML
5 SOLUTION ORAL EVERY 4 HOURS PRN
Status: DISCONTINUED | OUTPATIENT
Start: 2025-02-20 | End: 2025-02-23 | Stop reason: HOSPADM

## 2025-02-20 RX ORDER — VENLAFAXINE HYDROCHLORIDE 75 MG/1
75 CAPSULE, EXTENDED RELEASE ORAL EVERY MORNING
COMMUNITY

## 2025-02-20 RX ORDER — VENLAFAXINE HYDROCHLORIDE 75 MG/1
150 CAPSULE, EXTENDED RELEASE ORAL EVERY EVENING
Status: DISCONTINUED | OUTPATIENT
Start: 2025-02-20 | End: 2025-02-23 | Stop reason: HOSPADM

## 2025-02-20 RX ADMIN — KETOROLAC TROMETHAMINE 15 MG: 15 INJECTION, SOLUTION INTRAMUSCULAR; INTRAVENOUS at 00:40

## 2025-02-20 RX ADMIN — IPRATROPIUM BROMIDE AND ALBUTEROL SULFATE 3 ML: .5; 3 SOLUTION RESPIRATORY (INHALATION) at 03:19

## 2025-02-20 RX ADMIN — ROPINIROLE HYDROCHLORIDE 2 MG: 2 TABLET, FILM COATED ORAL at 12:16

## 2025-02-20 RX ADMIN — GUAIFENESIN 1200 MG: 600 TABLET ORAL at 08:18

## 2025-02-20 RX ADMIN — DIPHENHYDRAMINE HYDROCHLORIDE, ZINC ACETATE: 2; .1 CREAM TOPICAL at 23:10

## 2025-02-20 RX ADMIN — IPRATROPIUM BROMIDE AND ALBUTEROL SULFATE 3 ML: .5; 3 SOLUTION RESPIRATORY (INHALATION) at 16:11

## 2025-02-20 RX ADMIN — IPRATROPIUM BROMIDE AND ALBUTEROL SULFATE 3 ML: .5; 3 SOLUTION RESPIRATORY (INHALATION) at 08:18

## 2025-02-20 RX ADMIN — PREGABALIN 75 MG: 75 CAPSULE ORAL at 10:27

## 2025-02-20 RX ADMIN — OXYCODONE HYDROCHLORIDE 5 MG: 5 TABLET ORAL at 18:27

## 2025-02-20 RX ADMIN — BACLOFEN 20 MG: 10 TABLET ORAL at 19:43

## 2025-02-20 RX ADMIN — BACLOFEN 20 MG: 10 TABLET ORAL at 10:18

## 2025-02-20 RX ADMIN — ACETAMINOPHEN 650 MG: 325 TABLET, FILM COATED ORAL at 08:18

## 2025-02-20 RX ADMIN — BENZONATATE 200 MG: 100 CAPSULE ORAL at 04:46

## 2025-02-20 RX ADMIN — ALBUTEROL SULFATE 2.5 MG: 2.5 SOLUTION RESPIRATORY (INHALATION) at 12:17

## 2025-02-20 RX ADMIN — AMLODIPINE BESYLATE 2.5 MG: 2.5 TABLET ORAL at 10:18

## 2025-02-20 RX ADMIN — CEFTRIAXONE 1 G: 1 INJECTION, POWDER, FOR SOLUTION INTRAMUSCULAR; INTRAVENOUS at 20:00

## 2025-02-20 RX ADMIN — OXYCODONE HYDROCHLORIDE 5 MG: 5 TABLET ORAL at 12:15

## 2025-02-20 RX ADMIN — IPRATROPIUM BROMIDE AND ALBUTEROL SULFATE 3 ML: .5; 3 SOLUTION RESPIRATORY (INHALATION) at 21:19

## 2025-02-20 RX ADMIN — VENLAFAXINE HYDROCHLORIDE 150 MG: 75 CAPSULE, EXTENDED RELEASE ORAL at 19:42

## 2025-02-20 RX ADMIN — PREDNISONE 40 MG: 20 TABLET ORAL at 08:17

## 2025-02-20 RX ADMIN — GUAIFENESIN AND CODEINE PHOSPHATE 5 ML: 100; 10 SOLUTION ORAL at 10:17

## 2025-02-20 RX ADMIN — PREGABALIN 75 MG: 75 CAPSULE ORAL at 19:42

## 2025-02-20 RX ADMIN — KETOROLAC TROMETHAMINE 15 MG: 15 INJECTION, SOLUTION INTRAMUSCULAR; INTRAVENOUS at 08:18

## 2025-02-20 RX ADMIN — GUAIFENESIN 1200 MG: 600 TABLET ORAL at 00:39

## 2025-02-20 RX ADMIN — BACLOFEN 20 MG: 10 TABLET ORAL at 17:37

## 2025-02-20 RX ADMIN — GUAIFENESIN AND CODEINE PHOSPHATE 5 ML: 100; 10 SOLUTION ORAL at 19:44

## 2025-02-20 ASSESSMENT — ACTIVITIES OF DAILY LIVING (ADL)
ADLS_ACUITY_SCORE: 46
ADLS_ACUITY_SCORE: 51
ADLS_ACUITY_SCORE: 46
ADLS_ACUITY_SCORE: 51
ADLS_ACUITY_SCORE: 46
ADLS_ACUITY_SCORE: 51
ADLS_ACUITY_SCORE: 46
ADLS_ACUITY_SCORE: 51
ADLS_ACUITY_SCORE: 51
ADLS_ACUITY_SCORE: 46
ADLS_ACUITY_SCORE: 51
ADLS_ACUITY_SCORE: 51
ADLS_ACUITY_SCORE: 46
ADLS_ACUITY_SCORE: 51
ADLS_ACUITY_SCORE: 46

## 2025-02-20 NOTE — PLAN OF CARE
[facility]    ED Boarding Nurse Handoff Addendum Report:    Date/time: 2/20/2025, 3:57 AM    Activity Level: standby    Fall Risk: Yes:  bed/chair alarm on, nonskid shoes/slippers when out of bed, arm band in place, patient and family education, assistive device/personal items within reach, activity supervised, and room door open    Active Infusions: See MAR    Current Meds Due: See MAR    Current care needs: Neuros Q4h    Oxygen requirements (liters/min and/or FiO2): 4L    Respiratory status: Nasal cannula    Vital signs (within last 30 minutes):    Vitals:    02/19/25 2230 02/19/25 2231 02/19/25 2257 02/20/25 0000   BP: 115/82  123/89 122/84   BP Location:    Right arm   Pulse: 70   76   Resp:    18   Temp:    97.5  F (36.4  C)   TempSrc:    Oral   SpO2:  92% 94% 92%   Weight:       Height:           Focused assessment within last 30 minutes:    A&Ox4, no slurred speech noted. SBA when OOB. On 4L O2 via NC (not baseline). On a regular diet. Pain managed with PRN Toradol. Q4h neuros intact & unchanged. Tessalon pearls given for cough in addition to scheduled Mucinex. Plan of care ongoing.     ED Boarding Nurse name: Medina Montoya RN

## 2025-02-20 NOTE — PHARMACY-ADMISSION MEDICATION HISTORY
Pharmacy Intern Admission Medication History    Admission medication history is complete. The information provided in this note is only as accurate as the sources available at the time of the update.    Information Source(s): Patient and CareEverywhere/SureScripts via in-person    Pertinent Information: None     Changes made to PTA medication list:  Added: None  Deleted: Ascorbic acid, Zyrtec, Voltaren gel, Nicotine gum and patch, Ramelteon  Changed:   Ropinirole-> split dosing (1 tab QAM and 2 tabs QHS)  Venlafaxine-> split doinsg (1 cap Qam and 2 caps QHS)    Allergies reviewed with patient and updates made in EHR: no    Medication History Completed By: Tiarra Connolly [facility] 2/20/2025 8:41 AM    PTA Med List   Medication Sig Note Last Dose/Taking    albuterol (PROAIR HFA/PROVENTIL HFA/VENTOLIN HFA) 108 (90 Base) MCG/ACT inhaler Inhale 2 puffs into the lungs every 4 hours as needed for shortness of breath or wheezing  Taking As Needed    amLODIPine (NORVASC) 2.5 MG tablet Take 1 tablet (2.5 mg) by mouth daily  Past Week Evening    amphetamine-dextroamphetamine (ADDERALL XR) 30 MG 24 hr capsule Take 1 capsule (30 mg) by mouth every morning. Prescribed by  Past Month Morning    AVONEX PEN 30 MCG/0.5ML auto-injector kit Inject 30 mcg into the muscle every 7 days  2/20/2025: Unable to recall what day of the week she uses it  Past Week    baclofen (LIORESAL) 20 MG tablet Take 20 mg by mouth 4 times daily Per Neurologist  2/19/2025    budesonide-formoterol (SYMBICORT) 80-4.5 MCG/ACT Inhaler Inhale 2 puffs into the lungs 2 times daily  Past Week    cycloSPORINE (RESTASIS) 0.05 % ophthalmic emulsion Place 1 drop into both eyes 2 times daily  Past Week    esomeprazole (NEXIUM) 40 MG DR capsule Take 1 capsule (40 mg) by mouth every morning (before breakfast) Take 30-60 minutes before eating.  2/19/2025 Morning    hydrocortisone 1 % CREA cream Place rectally 2 times daily as needed for itching  Taking As Needed    multivitamin  (ONE-DAILY) tablet Take 1 tablet by mouth daily  More than a month    naloxone (NARCAN) 4 MG/0.1ML nasal spray Spray 1 spray (4 mg) into one nostril alternating nostrils once as needed for opioid reversal. every 2-3 minutes until assistance arrives  Taking As Needed    oxyCODONE-acetaminophen (PERCOCET)  MG per tablet Take 1 tablet by mouth every 6 hours as needed for severe pain (Max of 5 tablets per day, for chroic pain.)  2/19/2025    polyethylene glycol (MIRALAX) 17 GM/Dose powder Mix 1 capful with 6-8 ounces of clear liquid and take by mouth twice daily as needed for constipation. Decrease dose to once daily or once every 2-3 days to prevent constipation.  Taking    pregabalin (LYRICA) 75 MG capsule Take 1 capsule (75 mg) by mouth 2 times daily  2/19/2025 Morning    promethazine (PHENERGAN) 25 MG tablet Take 25 mg by mouth every 6 hours as needed for nausea Takes with each Percocet dose  Taking As Needed    rOPINIRole (REQUIP) 2 MG tablet Take 4 mg by mouth at bedtime. And 2mg every morning.  Past Week Bedtime    rOPINIRole (REQUIP) 2 MG tablet Take 2 mg by mouth every morning And 4 mg at bedtime. Prescribed by neurologist  2/19/2025 Morning    SPIRIVA RESPIMAT 2.5 MCG/ACT inhaler INHALE 2 PUFFS INTO THE LUNGS DAILY  Past Week    venlafaxine (EFFEXOR XR) 75 MG 24 hr capsule Take 75 mg by mouth every morning. 1 capsule every morning and 2 capsules every evening  2/19/2025 Morning    venlafaxine (EFFEXOR XR) 75 MG 24 hr capsule Take 150 mg by mouth every evening. 1 capsule every morning and 2 capsules every evening  Past Week Evening

## 2025-02-20 NOTE — SIGNIFICANT EVENT
Significant Event Note    Time of event: 0030 hrs.    Description of event:  Called by nursing staff stating patient would like her Percocet which she takes for generalized body pains and aches    Plan:  Medical records reviewed patient has been admitted for concerns of ataxia and confusion I do not think this is an appropriate medication given the overall clinical picture.  One-time dose of Toradol ordered    Discussed with: bedside nurse    Celeste Pichardo MD

## 2025-02-20 NOTE — ED NOTES
[facility]  ED Nurse Handoff Report    ED Chief complaint: Fall  . ED Diagnosis:   Final diagnoses:   Acute respiratory failure with hypoxia (H)   COPD with acute exacerbation (H)   Ataxic gait   Scalp laceration, initial encounter   Acute cystitis without hematuria       Allergies:   Allergies   Allergen Reactions    Sulfa Antibiotics Rash    Rosuvastatin     Adhesive Tape Rash     Reacts to adhesive on fentanyl patch, tapes, all kinds with prolonged duration    Wellbutrin [Bupropion Hydrobromide] Rash       Code Status: Full Code    Activity level - Baseline/Home:  independent.  Activity Level - Current:   standby.   Lift room needed: No.   Bariatric: No   Needed: No   Isolation: No.   Infection: Not Applicable.     Respiratory status: Nasal cannula    Vital Signs (within 30 minutes):   Vitals:    02/19/25 1741 02/19/25 1756 02/19/25 1811 02/19/25 1923   BP:       Pulse: 73 76 73 71   Resp:       Temp:       TempSrc:       SpO2: 93% 96% 97% 93%   Weight:       Height:           Cardiac Rhythm:  ,   Cardiac  Cardiac Rhythm: Normal sinus rhythm  Pain level:    Patient confused: No.   Patient Falls Risk: nonskid shoes/slippers when out of bed, arm band in place, and patient and family education.   Elimination Status: Has voided     Patient Report - Initial Complaint: iHna Yap is a 59 year old female who presents to the ED with slurred speech and gait ataxia.  She felt normal around 11:00 PM when she went to bed last night.  She notes some chronic weakness and at times feeling off balance related to multiple sclerosis.  She is not anticoagulated though.  She awoke overnight around 2 AM and felt that her gait was off.  She fell and struck her occiput.  EMS came out and she declined transport at that time.  This morning around 9 she felt that her gait was worsening.  She had a slight meeting with her clinic and advised her to come into the ED .   Focused Assessment: Pt arrives  with - had a fall last eveing at 0200- was found on the ground in kitchen- large amount of blood on floor- was seen by EMS overnight- was not brought in to ED. This am around 0900 started to have slurred speech and walking significantly to the right. Maura called to triage for Stroke eval.   Not on thinners.      Abnormal Results:   Labs Ordered and Resulted from Time of ED Arrival to Time of ED Departure   BASIC METABOLIC PANEL - Abnormal       Result Value    Sodium 135      Potassium 3.9      Chloride 99      Carbon Dioxide (CO2) 25      Anion Gap 11      Urea Nitrogen 24.0 (*)     Creatinine 1.13 (*)     GFR Estimate 56 (*)     Calcium 8.9      Glucose 87     ROUTINE UA WITH MICROSCOPIC REFLEX TO CULTURE - Abnormal    Color Urine Light Yellow      Appearance Urine Clear      Glucose Urine Negative      Bilirubin Urine Negative      Ketones Urine Trace (*)     Specific Gravity Urine 1.010      Blood Urine Trace (*)     pH Urine 6.0      Protein Albumin Urine 30 (*)     Urobilinogen Urine Normal      Nitrite Urine Positive (*)     Leukocyte Esterase Urine Moderate (*)     Mucus Urine Present (*)     RBC Urine 13 (*)     WBC Urine 31 (*)     Squamous Epithelials Urine 5 (*)    GLUCOSE BY METER - Abnormal    GLUCOSE BY METER POCT 116 (*)    INR - Normal    INR 1.01     PARTIAL THROMBOPLASTIN TIME - Normal    aPTT 29     TROPONIN T, HIGH SENSITIVITY - Normal    Troponin T, High Sensitivity 9     LACTIC ACID WHOLE BLOOD WITH 1X REPEAT IN 2 HR WHEN >2 - Normal    Lactic Acid, Initial 0.7     INFLUENZA A/B, RSV AND SARS-COV2 PCR - Normal    Influenza A PCR Negative      Influenza B PCR Negative      RSV PCR Negative      SARS CoV2 PCR Negative     CBC WITH PLATELETS AND DIFFERENTIAL    WBC Count 10.5      RBC Count 4.57      Hemoglobin 13.5      Hematocrit 40.7      MCV 89      MCH 29.5      MCHC 33.2      RDW 13.5      Platelet Count 319      % Neutrophils 69      % Lymphocytes 23      % Monocytes 6      %  Eosinophils 1      % Basophils 1      % Immature Granulocytes 0      NRBCs per 100 WBC 0      Absolute Neutrophils 7.2      Absolute Lymphocytes 2.4      Absolute Monocytes 0.7      Absolute Eosinophils 0.1      Absolute Basophils 0.1      Absolute Immature Granulocytes 0.0      Absolute NRBCs 0.0     GLUCOSE MONITOR NURSING POCT   BLOOD CULTURE   BLOOD CULTURE   URINE CULTURE        MR Brain w/o & w Contrast   Final Result   IMPRESSION:   1.  Negative for acute intracranial abnormality.   2.  Nonspecific patchy and confluent white matter FLAIR hyperintensities. Appearance is atypical for chronic small vessel ischemic disease and raises suspicion for demyelinating disease.         XR Chest 2 Views   Preliminary Result   IMPRESSION: Cardiac silhouette is within normal limits. Linear atelectasis is noted in the right midlung. Elevation of the right hemidiaphragm is more significant than on the previous study. Mild atelectasis along the right hemidiaphragm. Otherwise, the    lungs are clear. No significant bony abnormalities.      CT Head Perfusion w Contrast - For Tier 2 Stroke   Final Result   IMPRESSION: Unremarkable CT perfusion. No convincing focal perfusion defect.      CTA Head Neck with Contrast   Final Result   IMPRESSION:       HEAD CTA:    1.  No evidence of large vessel occlusion or high-grade stenosis.      NECK CTA:   1.  No evidence of large vessel occlusion or high-grade stenosis.   2.  Small right tonsillar, possibly benign cyst. Direct visualization would be typically recommended.      CT Head w/o Contrast   Final Result   IMPRESSION:   1.  No evidence of hemorrhage.   2.  Scattered nonspecific patchy white matter hypoattenuation presumably representing chronic small vessel ischemic change, similar compared to the prior.   3.  Basilar tip appears slightly dense, possibly atherosclerotic, although thrombosis not excluded. Recommend CTA correlation.      Findings were discussed by phone between   Steven and Dr. Lorenzo at approximately 2:19 PM 2/19/2025.          Treatments provided: See MAR  Family Comments: At bedside  OBS brochure/video discussed/provided to patient:  Yes  ED Medications:   Medications   ipratropium - albuterol 0.5 mg/2.5 mg/3 mL (DUONEB) neb solution 3 mL (has no administration in time range)   cefTRIAXone (ROCEPHIN) 1 g vial to attach to  mL bag for ADULTS or NS 50 mL bag for PEDS (1 g Intravenous $New Bag 2/19/25 2134)   lidocaine 1 % 5 mL (has no administration in time range)   iopamidol (ISOVUE-370) solution 117 mL (67 mLs Intravenous $Given 2/19/25 1409)     And   sodium chloride 0.9 % bag for CT scan flush use (80 mLs As instructed $Given 2/19/25 1409)   sodium chloride (PF) 0.9% PF flush 100 mL (80 mLs Intravenous $Given 2/19/25 1452)   iopamidol (ISOVUE-370) solution 500 mL (117 mLs Intravenous $Given 2/19/25 1452)   ipratropium - albuterol 0.5 mg/2.5 mg/3 mL (DUONEB) neb solution 3 mL (3 mLs Nebulization $Given 2/19/25 1728)   gadobutrol (GADAVIST) injection 6.5 mL (6.5 mLs Intravenous $Given 2/19/25 1932)   methylPREDNISolone Na Suc (solu-MEDROL) injection 125 mg (125 mg Intravenous $Given 2/19/25 2134)       Drips infusing:  No  For the majority of the shift this patient was Green.   Interventions performed were NA.    Sepsis treatment initiated: No    Cares/treatment/interventions/medications to be completed following ED care: All inpatient orders    ED Nurse Name: Dionne Menon RN  9:44 PM     RECEIVING UNIT ED HANDOFF REVIEW    Above ED Nurse Handoff Report was reviewed: Yes  Reviewed by: Jasmine Lew RN on February 20, 2025 at 1:39 PM   SPENCER Bravo called the ED to inform them the note was read: Yes

## 2025-02-20 NOTE — PROGRESS NOTES
"   02/20/25 1500   Appointment Info   Signing Clinician's Name / Credentials (PT) Carolyn Taylor, DPRADHA   Quick Adds   Quick Adds Certification   Living Environment   People in Home friend(s)   Living Environment Comments Pt becoming emotional with questions re: Home, reports she might get evicted from her home in the next week. Reports a house with stairs, deferred more questions.   Self-Care   Equipment Currently Used at Home none   Fall history within last six months yes   Number of times patient has fallen within last six months 4   General Information   Onset of Illness/Injury or Date of Surgery 02/19/25   Referring Physician Aneta Mccarty, JIMMY   Patient/Family Therapy Goals Statement (PT) Pt wants to DC home   Pertinent History of Current Problem (include personal factors and/or comorbidities that impact the POC) Hina Yap is a 59 year old female with PMHx significant for MS, COPD, HTN, RLS, GERD, CKD stage II, anxiety and tobacco use, who was admitted on 2/19/2025 for acute hypoxic respiratory failure.   She presents after a fall last evening, then developed intermittent dizziness and slurred speech. She also notes cough, SOB and wheezing.   Existing Precautions/Restrictions fall;oxygen therapy device and L/min  (5 L, typically on RA)   Cognition   Affect/Mental Status (Cognition) anxious   Orientation Status (Cognition) oriented x 3   Follows Commands (Cognition) follows one-step commands   Behavioral Issues difficulty managing stress   Pain Assessment   Patient Currently in Pain Yes, see Vital Sign flowsheet  (\"everywhere\" hips and back, arms. Nerve pain)   Integumentary/Edema   Integumentary/Edema   (small abrasions on LEs from falls)   Posture    Posture Forward head position;Protracted shoulders   Range of Motion (ROM)   Range of Motion ROM is WFL   Strength (Manual Muscle Testing)   Strength (Manual Muscle Testing) Deficits observed during functional mobility   Bed Mobility   Comment, (Bed " Mobility) inde   Transfers   Comment, (Transfers) SBA   Gait/Stairs (Locomotion)   Comment, (Gait/Stairs) mild veering, but no overt LOB, SBA with FWW   Balance   Balance Comments no ataxia noted   Clinical Impression   Criteria for Skilled Therapeutic Intervention Yes, treatment indicated   PT Diagnosis (PT) decreased functional endurance   Influenced by the following impairments decreased strength, decreased endurance, decreased balance, pain   Functional limitations due to impairments decreased endurance, safety with ambulation   Clinical Presentation (PT Evaluation Complexity) evolving   Clinical Presentation Rationale high O2 need currently on 6 L   Clinical Decision Making (Complexity) low complexity   Planned Therapy Interventions (PT) balance training;gait training;home exercise program;patient/family education;strengthening;transfer training;progressive activity/exercise;risk factor education;home program guidelines   Risk & Benefits of therapy have been explained evaluation/treatment results reviewed;care plan/treatment goals reviewed;risks/benefits reviewed;current/potential barriers reviewed;participants voiced agreement with care plan;participants included;patient   PT Total Evaluation Time   PT Eval, Low Complexity Minutes (69630) 5   Therapy Certification   Start of care date 02/20/25   Certification date from 02/20/25   Certification date to 02/25/25   Physical Therapy Goals   PT Frequency Daily   PT Predicted Duration/Target Date for Goal Attainment 02/23/25   PT Goals Transfers;Gait;Stairs   PT: Transfers Independent;Sit to/from stand;Bed to/from chair   PT: Gait Independent;Greater than 200 feet   PT: Stairs Independent;6 stairs;Rail on right   Interventions   Interventions Quick Adds Therapeutic Activity   Therapeutic Activity   Therapeutic Activities: dynamic activities to improve functional performance Minutes (68859) 25   Symptoms Noted During/After Treatment Fatigue;Shortness of breath  "  Treatment Detail/Skilled Intervention Pt on 6 L of O2 upon entry to room. Pt emotional upon entry, reporting she cant remember numbers and is forgetful of everything right now. Pt assisted with finding friends number in order to allow her to focus on PT session. Pt much more focused once PT wrote down the number of her friend on a peice of paper for her. Pt was cued for ambulation with FWW intially as RN reporting unsteady with ambulation in room earlier. Pt was cued for 50 feet on 6 L. Sats stable, mildly SOB but tolerating fair. Mild veering. Pt distractable. Pt was cued for another bout of 50 feet no AD, on 4 L. Pt tolerating fair, mild veering, no overt LOB. But overall low endurance, fatigued, feeling \"not right/dizzy\" Pt was cued for return to EOB.   PT Discharge Planning   PT Plan Formal balance test? monitor O2, stairs, LRAD- bring cane   PT Discharge Recommendation (DC Rec) home with outpatient physical therapy;home with assist   PT Rationale for DC Rec Pt likely to meet basic PT goals with continued therapy, due to balance and fatigue would benefit from OP PT. Rec friend to A with IADLs   PT Brief overview of current status Pt limited to 50 feet at a time, on 4 L   PT Total Distance Amb During Session (feet) 100   Physical Therapy Time and Intention   Timed Code Treatment Minutes 25   Total Session Time (sum of timed and untimed services) 30     [facility]                                                                                   OUTPATIENT PHYSICAL THERAPY    PLAN OF TREATMENT FOR OUTPATIENT REHABILITATION   Patient's Last Name, First Name, GARRETChrisSPENCERChris  MikeHina peterson YOB: 1965   Provider's Name   [facility]   Medical Record No.  [id number]     Onset Date: 02/19/25 Start of Care Date: (P) 02/20/25     Medical Diagnosis:                  PT Diagnosis:  decreased functional endurance Certification Dates:  From: (P) " 02/20/25  To: (P) 02/25/25       See note for plan of treatment, functional goals, and certification details.    I CERTIFY THE NEED FOR THESE SERVICES FURNISHED UNDER        THIS PLAN OF TREATMENT AND WHILE UNDER MY CARE (Physician co-signature of this document indicates review and certification of the therapy plan).

## 2025-02-20 NOTE — PLAN OF CARE
"Goal Outcome Evaluation:      Plan of Care Reviewed With: patient    Overall Patient Progress: no changeOverall Patient Progress: no change    Outcome Evaluation: A&Ox4, on 4L O2 via NC (not baseline), Q4h neuro checks- intact & unchanged, pain managed with PRN Toradol, no slurred speech noted      Problem: Adult Inpatient Plan of Care  Goal: Plan of Care Review  Description: The Plan of Care Review/Shift note should be completed every shift.  The Outcome Evaluation is a brief statement about your assessment that the patient is improving, declining, or no change.  This information will be displayed automatically on your shift  note.  Outcome: Progressing  Flowsheets (Taken 2/20/2025 0351)  Outcome Evaluation: A&Ox4, on 4L O2 via NC (not baseline), Q4h neuro checks- intact & unchanged, pain managed with PRN Toradol, no slurred speech noted  Plan of Care Reviewed With: patient  Overall Patient Progress: no change  Goal: Patient-Specific Goal (Individualized)  Description: You can add care plan individualizations to a care plan. Examples of Individualization might be:  \"Parent requests to be called daily at 9am for status\", \"I have a hard time hearing out of my right ear\", or \"Do not touch me to wake me up as it startles  me\".  Outcome: Progressing  Goal: Absence of Hospital-Acquired Illness or Injury  Outcome: Progressing  Intervention: Identify and Manage Fall Risk  Recent Flowsheet Documentation  Taken 2/20/2025 0000 by Medina Montoya, RN  Safety Promotion/Fall Prevention:   activity supervised   lighting adjusted   nonskid shoes/slippers when out of bed   room near nurse's station   safety round/check completed  Intervention: Prevent Skin Injury  Recent Flowsheet Documentation  Taken 2/20/2025 0000 by Medina Montoya, RN  Body Position: position changed independently  Goal: Optimal Comfort and Wellbeing  Outcome: Progressing  Intervention: Monitor Pain and Promote Comfort  Recent Flowsheet Documentation  Taken " 2/20/2025 0106 by Medina Montoya, RN  Pain Management Interventions: rest  Taken 2/20/2025 0040 by Medina Montoya RN  Pain Management Interventions: medication (see MAR)  Goal: Readiness for Transition of Care  Outcome: Progressing     Problem: Pain Chronic (Persistent)  Goal: Optimal Pain Control and Function  Outcome: Progressing  Intervention: Develop Pain Management Plan  Recent Flowsheet Documentation  Taken 2/20/2025 0106 by Medina Montoya, RN  Pain Management Interventions: rest  Taken 2/20/2025 0040 by Medina Montoya RN  Pain Management Interventions: medication (see MAR)  Intervention: Manage Persistent Pain  Recent Flowsheet Documentation  Taken 2/20/2025 0000 by Medina Montoya RN  Medication Review/Management: medications reviewed     Problem: Gas Exchange Impaired  Goal: Optimal Gas Exchange  Outcome: Progressing  Intervention: Optimize Oxygenation and Ventilation  Recent Flowsheet Documentation  Taken 2/20/2025 0000 by Medina Montoya RN  Head of Bed (HOB) Positioning: HOB at 20-30 degrees     Problem: Comorbidity Management  Goal: Maintenance of COPD Symptom Control  Outcome: Progressing  Intervention: Maintain COPD Symptom Control  Recent Flowsheet Documentation  Taken 2/20/2025 0000 by Medina Montoya RN  Medication Review/Management: medications reviewed  Goal: Blood Pressure in Desired Range  Outcome: Progressing  Intervention: Maintain Blood Pressure Management  Recent Flowsheet Documentation  Taken 2/20/2025 0000 by Medina Montoya RN  Medication Review/Management: medications reviewed

## 2025-02-20 NOTE — PROGRESS NOTES
[facility]    Medicine Progress Note - Hospitalist Service    Date of Admission:  2/19/2025    Assessment & Plan      Hina Yap is a 59 year old female with PMHx significant for MS, COPD, HTN, RLS, GERD, CKD stage II, anxiety and tobacco use, who was admitted on 2/19/2025 for acute hypoxic respiratory failure.   She presents after a fall last evening, then developed intermittent dizziness and slurred speech. She also notes cough, SOB and wheezing.     Acute hypoxic respiratory failure  Acute COPD exacerbation  Pt notes 1.5 months of cough, SOB and wheezing that has progressively worsened. Denies fever, notes chest discomfort due to coughing.   Initially not hypoxic in the ED but did become hypoxic with SpO2 86% on room air, placed on 2L supplemental O2. VS otherwise stable. BMP fairly unremarkable. CBC unremarkable. Lactic acid normal, troponin normal at 9. CXR clear. Given report of slurred speech and dizziness, stroke work up done. CT head negative, CTA head and neck fairly unremarkable. CT perfusion study is normal. EKG SR. MRI brain negative for acute stroke, nonspecific patchy and confluent white matter raises suspicion for demyelinating disease (pt has known MS).   Given hypoxia noted here, this may have contributed to recent fall.  She was given 125 mg IV solu-medrol, Duoneb x2 and IV Rocephin for presumed UTI.      - continue PO Prednisone 40 mg daily  - continue scheduled Duonebs and PRN albuterol nebs  - Mucinex BID  - wean O2 as able  - incentive spirometry   - continued on Rocephin      UTI  Notes intermittent dysuria. Also notes abdominal pain but unsure if this is related to her bowels.   May have contributed to falls.  - continue IV Rocephin  - follow urine culture    Fall  Dizziness, slurred speech  Hx of MS  Suspect due to UTI and possibly related to hypoxia from COPD exacerbation. Did sustain laceration to the back of her head, concussion possible.   On Avonex for MS  weekly  Treat COPD and UTI per above. Low suspicion that sx are due to MS flare.  - PT consult  - SW consult  - resume home Lyrica and Baclofen  - MRI and earlier CT studies showed no evidence of CVA      Chronic pain on chronic narcotics  - can resume patient home oxycodone  - mental state appears to be at baseline now    Prolonged QTc  - hold home Effexor and PPI. Repeat EKG in AM   ERON on CKD stage II  Cr 1.13 on admission, baseline seems to run <1.0'  Suspect due to poor appetite and acute processes above  - avoid nephrotoxins as able  - recheck in AM  HTN  - resume home amlodipine with parameters   RLS  - resume home Requip and Ramelteon  Anxiety  - hold home Effexor due to prolonged QT  GERD  On Nexium  - hold PPI due to prolonged QT.     Incidental small R tonsillar cyst  - follow up with PCP, may need direct visualization with ENT          Diet: Combination Diet Regular Diet Adult    DVT Prophylaxis: Pneumatic Compression Devices and Ambulate every shift  Valladares Catheter: Not present  Lines: None     Cardiac Monitoring: None  Code Status: Full Code      Clinically Significant Risk Factors Present on Admission                   # Hypertension: Noted on problem list     # Acute Hypoxic Respiratory Failure: Documented O2 saturation < 90%. Continue supplemental oxygen as needed            # COPD: noted on problem list        Social Drivers of Health    Food Insecurity: High Risk (1/11/2024)    Food Insecurity     Within the past 12 months, did you worry that your food would run out before you got money to buy more?: Yes     Within the past 12 months, did the food you bought just not last and you didn t have money to get more?: Yes   Depression: At risk (9/4/2024)    PHQ-2     PHQ-2 Score: 6   Housing Stability: Low Risk  (1/11/2024)    Housing Stability     Do you have housing? : Yes     Are you worried about losing your housing?: No   Recent Concern: Housing Stability - High Risk (12/13/2023)    Housing  Stability     Do you have housing? : Yes     Are you worried about losing your housing?: Yes   Tobacco Use: High Risk (11/18/2024)    Patient History     Smoking Tobacco Use: Every Day     Smokeless Tobacco Use: Never   Transportation Needs: High Risk (1/11/2024)    Transportation Needs     Within the past 12 months, has lack of transportation kept you from medical appointments, getting your medicines, non-medical meetings or appointments, work, or from getting things that you need?: Yes   Physical Activity: Inactive (12/15/2022)    Exercise Vital Sign     Days of Exercise per Week: 0 days     Minutes of Exercise per Session: 0 min   Stress: Stress Concern Present (12/15/2022)    [employer] [place] [employer] - Occupational Stress Questionnaire     Feeling of Stress : Very much   Social Connections: Socially Isolated (12/15/2022)    Social Connection and Isolation Panel [NHANES]     Frequency of Communication with Friends and Family: Never     Frequency of Social Gatherings with Friends and Family: Once a week     Attends [employer] Services: More than 4 times per year     Active Member of Clubs or Organizations: No     Marital Status: Never           Disposition Plan     Medically Ready for Discharge: Anticipated in 2-4 Days             David Herron MD, MD  Hospitalist Service  [facility]  Securely message with [employer] (more info)  Text page via [facility] Paging/Directory   ______________________________________________________________________    Interval History   I assume medicine service care today.  Seen and examined.  Chart reviewed.  I met this very pleasant lady this morning while she is boarding in the ED. She is still feeling weak and no appetite.   Still having generalized pain. No worsening cough or any chest pain.  Denies any diarrhea. No vomiting.          Physical Exam   Vital Signs: Temp: 97.6  F (36.4  C) Temp src: Oral BP: (!) 153/110 Pulse: 87   Resp: 22  SpO2: 94 % O2 Device: Oxymask Oxygen Delivery: 5 LPM  Weight: 143 lbs 4.8 oz    HEENT; Atraumatic, normocephalic, pinkish conjuctiva, pupils bilateral reactive   Skin: warm and moist, no rashes  Lymphatics: no cervical or axillary lymphandenopathy  Lungs: equal chest expansion, clear to auscultation, no wheezes, no stridor, no crackles,   Heart: normal rate, normal rhythm, no rubs or gallops.   Abdomen: normal bowel sounds, no tenderness, no peritoneal signs, no guarding  Extremities: no deformities, no edema   Neuro; follow commands, alert and oriented x3, spontaneous speech, coherent, moves all extremities spontaneously  Psych; no hallucination, euthymic mood, not agitated      Medical Decision Making       40 MINUTES SPENT BY ME on the date of service doing chart review, history, exam, documentation & further activities per the note.  MANAGEMENT DISCUSSED with the following over the past 24 hours: yes   NOTE(S)/MEDICAL RECORDS REVIEWED over the past 24 hours: yes       Data     I have personally reviewed the following data over the past 24 hrs:    7.4  \   14.1   / 359     137 101 25.8 (H) /  142 (H)   3.7 21 (L) 0.97 (H) \     Trop: 9 BNP: N/A     Procal: N/A CRP: N/A Lactic Acid: 0.7       INR:  1.01 PTT:  29   D-dimer:  N/A Fibrinogen:  N/A       Imaging results reviewed over the past 24 hrs:   Recent Results (from the past 24 hours)   CT Head w/o Contrast    Narrative    EXAM: CT HEAD W/O CONTRAST  LOCATION: [facility]  DATE: 2/19/2025    INDICATION: Code Stroke to evaluate for potential thrombolysis and thrombectomy. Neurological symptoms.  COMPARISON: None.  TECHNIQUE: Routine CT Head without IV contrast. Multiplanar reformats. Dose reduction techniques were used.    FINDINGS: No evidence of hemorrhage. Background of nonspecific patchy white matter hypoattenuation presumably representing chronic small vessel ischemic change, similar compared to the prior. Scattered vascular  calcifications. Basilar tip appears   slightly dense. No acute osseous abnormality.      Impression    IMPRESSION:  1.  No evidence of hemorrhage.  2.  Scattered nonspecific patchy white matter hypoattenuation presumably representing chronic small vessel ischemic change, similar compared to the prior.  3.  Basilar tip appears slightly dense, possibly atherosclerotic, although thrombosis not excluded. Recommend CTA correlation.    Findings were discussed by phone between Dr. Harris and Dr. Lorenzo at approximately 2:19 PM 2/19/2025.   CTA Head Neck with Contrast    Narrative    EXAM: CTA HEAD NECK W CONTRAST  LOCATION: [facility]  DATE: 2/19/2025    INDICATION: Code Stroke to evaluate for potential thrombolysis and thrombectomy. PLEASE READ IMMEDIATELY. Neurological symptoms.  COMPARISON: None.  CONTRAST: 67 mL Isovue 370  TECHNIQUE: Axial helical CT images of the head and neck vessels obtained during the arterial phase of intravenous contrast administration. Axial 2D reconstructed images and multiplanar 3D MIP reconstructed images of the head and neck vessels were   performed by the technologist. Dose reduction techniques were used. All stenosis measurements made according to NASCET criteria unless otherwise specified.    FINDINGS:     HEAD CTA:  No evidence of large vessel occlusion, high-grade stenosis, aneurysm, or high-flow vascular malformation.    NECK CTA:  No evidence of large vessel occlusion, high-grade stenosis, or dissection.    NONVASCULAR STRUCTURES: Cervical spine degenerative change. Pulmonary emphysema. Presumed scattered pulmonary atelectasis. Small hypoattenuating right tonsillar lesion measuring about 9 mm, nonspecific.      Impression    IMPRESSION:     HEAD CTA:   1.  No evidence of large vessel occlusion or high-grade stenosis.    NECK CTA:  1.  No evidence of large vessel occlusion or high-grade stenosis.  2.  Small right tonsillar, possibly benign cyst. Direct  visualization would be typically recommended.   CT Head Perfusion w Contrast - For Tier 2 Stroke    Narrative    EXAM: CT HEAD PERFUSION W CONTRAST  LOCATION: [facility]  DATE: 2/19/2025    INDICATION: Code Stroke to evaluate for potential thrombolysis and thrombectomy. Evaluate mismatch between penumbra and core infarct. READ IMMEDIATELY. Neurological symptoms.  COMPARISON: None.  TECHNIQUE: CT cerebral perfusion was performed utilizing a second contrast bolus. Perfusion data were post processed with generation of standard perfusion maps and estimation of ischemic/infarcted volumes utilizing standard threshold values. Dose   reduction techniques were used.   CONTRAST: 50 mL Isovue 370      Impression    IMPRESSION: Unremarkable CT perfusion. No convincing focal perfusion defect.   XR Chest 2 Views    Narrative    EXAM: XR CHEST 2 VIEWS  LOCATION: [facility]  DATE: 2/19/2025    INDICATION: Cough.  COMPARISON: 12/13/2023      Impression    IMPRESSION: Cardiac silhouette is within normal limits. Linear atelectasis is noted in the right midlung. Elevation of the right hemidiaphragm is more significant than on the previous study. Mild atelectasis along the right hemidiaphragm. Otherwise, the   lungs are clear. No significant bony abnormalities.   MR Brain w/o & w Contrast    Narrative    EXAM: MR BRAIN W/O and W CONTRAST  LOCATION: [facility]  DATE: 2/19/2025    INDICATION: dizziness, slurred speech  COMPARISON: CT head perfusion 2/19/2025  CONTRAST: 6.5 mL Gadavist  TECHNIQUE: Routine multiplanar multisequence head MRI without and with intravenous contrast.    FINDINGS:  INTRACRANIAL CONTENTS: No acute or subacute infarct. No mass, acute hemorrhage, or extra-axial fluid collections. There are patchy and somewhat confluent predominantly periventricular but also subcortical white matter foci of FLAIR hyperintensity.   Several of these are oriented  perpendicular to the corpus callosum. A few demonstrate intrinsic T1 hypointensity. Normal ventricles and sulci. Normal position of the cerebellar tonsils. No pathologic contrast enhancement.    SELLA: No abnormality accounting for technique.    OSSEOUS STRUCTURES/SOFT TISSUES: Normal marrow signal. The major intracranial vascular flow voids are maintained.     ORBITS: No abnormality accounting for technique.     SINUSES/MASTOIDS: Mucosal thickening primarily involving the ethmoid air cells. No middle ear or mastoid effusion.       Impression    IMPRESSION:  1.  Negative for acute intracranial abnormality.  2.  Nonspecific patchy and confluent white matter FLAIR hyperintensities. Appearance is atypical for chronic small vessel ischemic disease and raises suspicion for demyelinating disease.

## 2025-02-20 NOTE — PROGRESS NOTES
"Brief Stroke Note:     MRI brain negative for acute infarct.  No further stroke evaluation indicated at this time.    BENNETT Street, CNP  Neurology  02/20/2025 1:11 PM  To page stroke neurology after hours or on a subsequent day, click here: [facility]  Choose \"On Call\" tab at top, then search dropdown box for \"Neurology Adult\" & press Enter, look for Neuro ICU/Stroke       "

## 2025-02-20 NOTE — PROGRESS NOTES
[facility]    ED Boarding Nurse Handoff Addendum Report:    Date/time: 2/20/2025, 2:07 PM    Activity Level: standby    Fall Risk: Yes:  bed/chair alarm on, nonskid shoes/slippers when out of bed, arm band in place, patient and family education, and assistive device/personal items within reach    Active Infusions: none    Current Meds Due: see MAR    Current care needs: see POC    Oxygen requirements (liters/min and/or FiO2): 3L    Respiratory status: Nasal cannula    Vital signs (within last 30 minutes):    Vitals:    02/20/25 1025 02/20/25 1033 02/20/25 1205 02/20/25 1359   BP:    117/90   BP Location:       Pulse:    103   Resp:    20   Temp:    97.6  F (36.4  C)   TempSrc:    Oral   SpO2: 92% 94% 97% 93%   Weight:       Height:           Focused assessment within last 30 minutes:    Patient A&O x4. Neuros intact. Patient often tearful, withdrawn. Patient unwilling to discuss reason for tearfulness. Frequent congested cough. On 3-4L O2 to keep sats above 92%, switch between NC and oxymask. 88% on room air. SOB with exertion and at rest. Nebs given. See MAR for PRNs given. Patient endorses generalized pain due to MS, medication given and somewhat effective.   Of note, patient did express psychosocial concern of being homeless soon as she is being evicted from her home soon.     ED Boarding Nurse name: Dominique oByer RN

## 2025-02-20 NOTE — H&P
[facility]    History and Physical - Hospitalist Service       Date of Admission:  2/19/2025    Assessment & Plan      Hina Yap is a 59 year old female with PMHx significant for MS, COPD, HTN, RLS, GERD, CKD stage II, anxiety and tobacco use, who was admitted on 2/19/2025 for acute hypoxic respiratory failure.   She presents after a fall last evening, then developed intermittent dizziness and slurred speech. She also notes cough, SOB and wheezing.     Acute hypoxic respiratory failure  Acute COPD exacerbation  Pt notes 1.5 months of cough, SOB and wheezing that has progressively worsened. Denies fever, notes chest discomfort due to coughing.   Initially not hypoxic in the ED but did become hypoxic with SpO2 86% on room air, placed on 2L supplemental O2. VS otherwise stable. BMP fairly unremarkable. CBC unremarkable. Lactic acid normal, troponin normal at 9. CXR clear. Given report of slurred speech and dizziness, stroke work up done. CT head negative, CTA head and neck fairly unremarkable. CT perfusion study is normal. EKG SR. MRI brain negative for acute stroke, nonspecific patchy and confluent white matter raises suspicion for demyelinating disease (pt has known MS).   Given hypoxia noted here, this may have contributed to recent fall.  She was given 125 mg IV solu-medrol, Duoneb x2 and IV Rocephin for presumed UTI.  - admit to inpatient  - continue PO Prednisone 40 mg daily  - continue scheduled Duonebs and PRN albuterol nebs  - Mucinex BID  - wean O2 as able  - incentive spirometry     UTI  Notes intermittent dysuria. Also notes abdominal pain but unsure if this is related to her bowels.   May have contributed to falls.  - continue IV Rocephin  - follow urine culture    Fall  Dizziness, slurred speech  Hx of MS  Suspect due to UTI and possibly related to hypoxia from COPD exacerbation. Did sustain laceration to the back of her head, concussion possible.   On Avonex for MS  weekly  Treat COPD and UTI per above. Low suspicion that sx are due to MS flare.  - PT consult  - SW consult  - resume home Lyrica and Baclofen    Prolonged QTc  - hold home Effexor and PPI. Repeat EKG in AM   ERON on CKD stage II  Cr 1.13 on admission, baseline seems to run <1.0'  Suspect due to poor appetite and acute processes above  - avoid nephrotoxins as able  - recheck in AM  HTN  - resume home amlodipine with parameters   RLS  - resume home Requip and Ramelteon  Anxiety  - hold home Effexor due to prolonged QT  GERD  On Nexium  - hold PPI due to prolonged QT.     Incidental small R tonsillar cyst  - follow up with PCP, may need direct visualization with ENT        Diet: NPO for Medical/Clinical Reasons Except for: No Exceptions    DVT Prophylaxis: Pneumatic Compression Devices  Valladares Catheter: Not present  Lines: None     Cardiac Monitoring: None  Code Status:  Full code    Clinically Significant Risk Factors Present on Admission                   # Hypertension: Noted on problem list     # Acute Hypoxic Respiratory Failure: Documented O2 saturation < 90%. Continue supplemental oxygen as needed            # COPD: noted on problem list        Disposition Plan     Medically Ready for Discharge: Anticipated in 2-4 Days         The patient's care was discussed with the Attending Physician, Dr. Isaac Singh, Patient, Patient's Family, and ED provider, Dr. Lorenzo .    Aneta Mccarty PA-C  Hospitalist Service  [facility]  Securely message with [employer] (more info)  Text page via [facility] Paging/Directory     ______________________________________________________________________    Chief Complaint   Fall, dizziness, SOB    History is obtained from the patient    History of Present Illness   Hina Yap is a 59 year old female with PMHx significant for MS, COPD, HTN, RLS, GERD, anxiety and tobacco use, who presents to the ED after a fall last evening with ongoing dizziness and slurred  speech.  Patient was in the kitchen last night and fell after getting something out of the fridge.  She is unclear why she fell.  She does not believe she lost consciousness but her  believes that she did.  She fell flat on her back. She did hit her head an sustained a laceration. EMS was called but she did not come in for evaluation at that time. Today, she noted ongoing dizziness and slurred speech, prompting her to come to the ED. She does endorse 1.5 months of cough, shortness of breath and wheezing.  She does note that she feels intermittently lightheaded with ambulation during this time.  She denies any fever or chills.  She notes some chest discomfort due to coughing.  She denies nausea, vomiting, or diarrhea.  She does note some abdominal pain but is unclear if this is related to her bowels.  She also notes intermittent dysuria.      Past Medical History    Past Medical History:   Diagnosis Date    Anxiety     Arthritis Years ago    COPD (chronic obstructive pulmonary disease) (H)     Depressive disorder Years ago    GERD (gastroesophageal reflux disease)     Hypertension     Ischemic cardiomyopathy     Multiple sclerosis (H)     Neuromuscular disorder (H)     Nonrheumatic mitral valve regurgitation     PVC's (premature ventricular contractions)     Renal disease     Restless leg syndrome     Tobacco use disorder        Past Surgical History   Past Surgical History:   Procedure Laterality Date    GYN SURGERY      tubal ligation    OPTICAL TRACKING SYSTEM FUSION POSTERIOR SPINE LUMBAR N/A 7/23/2021    Procedure: L4-5 transforaminal lumbar interbody fusion with reduction of grade 1/2 unstable spondylolisthesis   Open reduction and internal fixation of the grade L4-5 spondylolisthesis L4 - L5 posterior lateral fusion;  Surgeon: Asif Flores MD;  Location: [facility] OR       Prior to Admission Medications   Prior to Admission Medications   Prescriptions Last Dose Informant Patient Reported? Taking?    AVONEX PEN 30 MCG/0.5ML auto-injector kit   Yes No   Sig: Inject 30 mcg into the muscle every 7 days    Ascorbic Acid (SUPER C COMPLEX PO)   Yes No   Sig: Take 1 tablet by mouth daily   SPIRIVA RESPIMAT 2.5 MCG/ACT inhaler   No No   Sig: INHALE 2 PUFFS INTO THE LUNGS DAILY   albuterol (PROAIR HFA/PROVENTIL HFA/VENTOLIN HFA) 108 (90 Base) MCG/ACT inhaler   No No   Sig: Inhale 2 puffs into the lungs every 4 hours as needed for shortness of breath or wheezing   amLODIPine (NORVASC) 2.5 MG tablet   No No   Sig: Take 1 tablet (2.5 mg) by mouth daily   amphetamine-dextroamphetamine (ADDERALL XR) 30 MG 24 hr capsule   No No   Sig: Take 1 capsule (30 mg) by mouth every morning. Prescribed by   baclofen (LIORESAL) 20 MG tablet   Yes No   Sig: Take 20 mg by mouth 4 times daily Per Neurologist   budesonide-formoterol (SYMBICORT) 80-4.5 MCG/ACT Inhaler   No No   Sig: Inhale 2 puffs into the lungs 2 times daily   cetirizine (ZYRTEC) 10 MG tablet   No No   Sig: Take 1 tablet (10 mg) by mouth 2 times daily   cycloSPORINE (RESTASIS) 0.05 % ophthalmic emulsion   Yes No   Sig: Place 1 drop into both eyes 2 times daily   diclofenac (VOLTAREN) 1 % topical gel   Yes No   Sig: Apply 4 g topically 4 times daily   esomeprazole (NEXIUM) 40 MG DR capsule   No No   Sig: Take 1 capsule (40 mg) by mouth every morning (before breakfast) Take 30-60 minutes before eating.   hydrocortisone 1 % CREA cream   Yes No   Sig: Place rectally 2 times daily as needed for itching   multivitamin (ONE-DAILY) tablet   No No   Sig: Take 1 tablet by mouth daily   naloxone (NARCAN) 4 MG/0.1ML nasal spray   No No   Sig: Spray 1 spray (4 mg) into one nostril alternating nostrils once as needed for opioid reversal. every 2-3 minutes until assistance arrives   nicotine (NICOTINE STEP 2) 14 MG/24HR 24 hr patch   No No   Sig: Place 1 patch over 24 hours onto the skin every 24 hours.   nicotine polacrilex (NICORETTE) 4 MG gum   No No   Sig: Place 1 each (4 mg) inside  cheek every hour as needed for nicotine withdrawal symptoms.   oxyCODONE-acetaminophen (PERCOCET)  MG per tablet  Pharmacy Yes No   Sig: Take 1 tablet by mouth every 6 hours as needed for severe pain (Max of 5 tablets per day, for chroic pain.)   polyethylene glycol (MIRALAX) 17 GM/Dose powder   No No   Sig: Mix 1 capful with 6-8 ounces of clear liquid and take by mouth twice daily as needed for constipation. Decrease dose to once daily or once every 2-3 days to prevent constipation.   pregabalin (LYRICA) 75 MG capsule   Yes No   Sig: Take 1 capsule (75 mg) by mouth 2 times daily   promethazine (PHENERGAN) 25 MG tablet   Yes No   Sig: Take 25 mg by mouth every 6 hours as needed for nausea Takes with each Percocet dose   rOPINIRole (REQUIP) 2 MG tablet  Pharmacy Yes No   Sig: Take 2 mg by mouth every morning And 4 mg at bedtime. Prescribed by neurologist   ramelteon (ROZEREM) 8 MG tablet   No No   Sig: Take 1 tablet (8 mg) by mouth at bedtime.   venlafaxine (EFFEXOR XR) 75 MG 24 hr capsule   No No   Sig: Take 3 capsules (225 mg) by mouth daily      Facility-Administered Medications: None           Physical Exam   Vital Signs: Temp: 97.5  F (36.4  C) Temp src: Temporal BP: (!) 126/94 Pulse: 71   Resp: 20 SpO2: 93 % O2 Device: Nasal cannula Oxygen Delivery: 2 LPM  Weight: 143 lbs 4.8 oz    GENERAL:  Comfortable.  PSYCH: pleasant, oriented, No acute distress.  HEENT:  Atraumatic, normocephalic. Normal conjunctiva, normal hearing, and oropharynx is normal.  NECK:  Supple, no neck vein distention  HEART:  Normal S1, S2 with no murmur, no pericardial rub, gallops or S3 or S4.  LUNGS:  expiratory course rhonchi and wheeze.   GI:  Soft, normal bowel sounds. Non-tender, non distended.   EXTREMITIES:  No pedal edema, +2 pulses bilateral and equal.  SKIN:  Dry to touch, No rash, wound or ulcerations.  NEUROLOGIC:  CN 2-12 intact, BL 5/5 symmetric upper and lower extremity strength, sensation is intact with no focal  deficits.      Medical Decision Making       75+ MINUTES SPENT BY ME on the date of service doing chart review, history, exam, documentation & further activities per the note.      Data     I have personally reviewed the following data over the past 24 hrs:    10.5  \   13.5   / 319     135 99 24.0 (H) /  87   3.9 25 1.13 (H) \     Trop: 9 BNP: N/A     Procal: N/A CRP: N/A Lactic Acid: 0.7       INR:  1.01 PTT:  29   D-dimer:  N/A Fibrinogen:  N/A       Imaging results reviewed over the past 24 hrs:   Recent Results (from the past 24 hours)   CT Head w/o Contrast    Narrative    EXAM: CT HEAD W/O CONTRAST  LOCATION: [facility]  DATE: 2/19/2025    INDICATION: Code Stroke to evaluate for potential thrombolysis and thrombectomy. Neurological symptoms.  COMPARISON: None.  TECHNIQUE: Routine CT Head without IV contrast. Multiplanar reformats. Dose reduction techniques were used.    FINDINGS: No evidence of hemorrhage. Background of nonspecific patchy white matter hypoattenuation presumably representing chronic small vessel ischemic change, similar compared to the prior. Scattered vascular calcifications. Basilar tip appears   slightly dense. No acute osseous abnormality.      Impression    IMPRESSION:  1.  No evidence of hemorrhage.  2.  Scattered nonspecific patchy white matter hypoattenuation presumably representing chronic small vessel ischemic change, similar compared to the prior.  3.  Basilar tip appears slightly dense, possibly atherosclerotic, although thrombosis not excluded. Recommend CTA correlation.    Findings were discussed by phone between Dr. Harris and Dr. Lorenzo at approximately 2:19 PM 2/19/2025.   CTA Head Neck with Contrast    Narrative    EXAM: CTA HEAD NECK W CONTRAST  LOCATION: [facility]  DATE: 2/19/2025    INDICATION: Code Stroke to evaluate for potential thrombolysis and thrombectomy. PLEASE READ IMMEDIATELY. Neurological symptoms.  COMPARISON:  None.  CONTRAST: 67 mL Isovue 370  TECHNIQUE: Axial helical CT images of the head and neck vessels obtained during the arterial phase of intravenous contrast administration. Axial 2D reconstructed images and multiplanar 3D MIP reconstructed images of the head and neck vessels were   performed by the technologist. Dose reduction techniques were used. All stenosis measurements made according to NASCET criteria unless otherwise specified.    FINDINGS:     HEAD CTA:  No evidence of large vessel occlusion, high-grade stenosis, aneurysm, or high-flow vascular malformation.    NECK CTA:  No evidence of large vessel occlusion, high-grade stenosis, or dissection.    NONVASCULAR STRUCTURES: Cervical spine degenerative change. Pulmonary emphysema. Presumed scattered pulmonary atelectasis. Small hypoattenuating right tonsillar lesion measuring about 9 mm, nonspecific.      Impression    IMPRESSION:     HEAD CTA:   1.  No evidence of large vessel occlusion or high-grade stenosis.    NECK CTA:  1.  No evidence of large vessel occlusion or high-grade stenosis.  2.  Small right tonsillar, possibly benign cyst. Direct visualization would be typically recommended.   CT Head Perfusion w Contrast - For Tier 2 Stroke    Narrative    EXAM: CT HEAD PERFUSION W CONTRAST  LOCATION: [facility]  DATE: 2/19/2025    INDICATION: Code Stroke to evaluate for potential thrombolysis and thrombectomy. Evaluate mismatch between penumbra and core infarct. READ IMMEDIATELY. Neurological symptoms.  COMPARISON: None.  TECHNIQUE: CT cerebral perfusion was performed utilizing a second contrast bolus. Perfusion data were post processed with generation of standard perfusion maps and estimation of ischemic/infarcted volumes utilizing standard threshold values. Dose   reduction techniques were used.   CONTRAST: 50 mL Isovue 370      Impression    IMPRESSION: Unremarkable CT perfusion. No convincing focal perfusion defect.   XR Chest 2  Views    Narrative    EXAM: XR CHEST 2 VIEWS  LOCATION: [facility]  DATE: 2/19/2025    INDICATION: Cough.  COMPARISON: 12/13/2023      Impression    IMPRESSION: Cardiac silhouette is within normal limits. Linear atelectasis is noted in the right midlung. Elevation of the right hemidiaphragm is more significant than on the previous study. Mild atelectasis along the right hemidiaphragm. Otherwise, the   lungs are clear. No significant bony abnormalities.   MR Brain w/o & w Contrast    Narrative    EXAM: MR BRAIN W/O and W CONTRAST  LOCATION: [facility]  DATE: 2/19/2025    INDICATION: dizziness, slurred speech  COMPARISON: CT head perfusion 2/19/2025  CONTRAST: 6.5 mL Gadavist  TECHNIQUE: Routine multiplanar multisequence head MRI without and with intravenous contrast.    FINDINGS:  INTRACRANIAL CONTENTS: No acute or subacute infarct. No mass, acute hemorrhage, or extra-axial fluid collections. There are patchy and somewhat confluent predominantly periventricular but also subcortical white matter foci of FLAIR hyperintensity.   Several of these are oriented perpendicular to the corpus callosum. A few demonstrate intrinsic T1 hypointensity. Normal ventricles and sulci. Normal position of the cerebellar tonsils. No pathologic contrast enhancement.    SELLA: No abnormality accounting for technique.    OSSEOUS STRUCTURES/SOFT TISSUES: Normal marrow signal. The major intracranial vascular flow voids are maintained.     ORBITS: No abnormality accounting for technique.     SINUSES/MASTOIDS: Mucosal thickening primarily involving the ethmoid air cells. No middle ear or mastoid effusion.       Impression    IMPRESSION:  1.  Negative for acute intracranial abnormality.  2.  Nonspecific patchy and confluent white matter FLAIR hyperintensities. Appearance is atypical for chronic small vessel ischemic disease and raises suspicion for demyelinating disease.

## 2025-02-20 NOTE — ED NOTES
Writer noticed patients O2 was at 86%, reapplied O2 at 3L NC, patients O2 is between 91-93%. MD and RN notified.

## 2025-02-20 NOTE — PLAN OF CARE
Admitted/transferred from:   2 RN full   skin assessment completed by Jasmine Lew RN and Tracey Lam.  Skin assessment finding: skin intact, no problems   Interventions/actions: other NA, left abrasion scab left knee from fall    Does have swelling to left arm from IV infiltrate in ED and contrast. Is marked.  Will continue to monitor.

## 2025-02-21 ENCOUNTER — APPOINTMENT (OUTPATIENT)
Dept: PHYSICAL THERAPY | Facility: CLINIC | Age: 60
End: 2025-02-21
Payer: MEDICAID

## 2025-02-21 LAB — BACTERIA UR CULT: ABNORMAL

## 2025-02-21 PROCEDURE — 99232 SBSQ HOSP IP/OBS MODERATE 35: CPT | Performed by: INTERNAL MEDICINE

## 2025-02-21 PROCEDURE — 250N000011 HC RX IP 250 OP 636: Performed by: PHYSICIAN ASSISTANT

## 2025-02-21 PROCEDURE — 250N000013 HC RX MED GY IP 250 OP 250 PS 637: Performed by: INTERNAL MEDICINE

## 2025-02-21 PROCEDURE — 97530 THERAPEUTIC ACTIVITIES: CPT | Mod: GP | Performed by: PHYSICAL THERAPIST

## 2025-02-21 PROCEDURE — 999N000156 HC STATISTIC RCP CONSULT EA 30 MIN

## 2025-02-21 PROCEDURE — 250N000012 HC RX MED GY IP 250 OP 636 PS 637: Performed by: PHYSICIAN ASSISTANT

## 2025-02-21 PROCEDURE — 94640 AIRWAY INHALATION TREATMENT: CPT

## 2025-02-21 PROCEDURE — 250N000009 HC RX 250: Performed by: PHYSICIAN ASSISTANT

## 2025-02-21 PROCEDURE — 94640 AIRWAY INHALATION TREATMENT: CPT | Mod: 76

## 2025-02-21 PROCEDURE — 120N000001 HC R&B MED SURG/OB

## 2025-02-21 PROCEDURE — 999N000157 HC STATISTIC RCP TIME EA 10 MIN

## 2025-02-21 PROCEDURE — 250N000013 HC RX MED GY IP 250 OP 250 PS 637: Performed by: PHYSICIAN ASSISTANT

## 2025-02-21 RX ORDER — IPRATROPIUM BROMIDE AND ALBUTEROL SULFATE 2.5; .5 MG/3ML; MG/3ML
3 SOLUTION RESPIRATORY (INHALATION) 3 TIMES DAILY
Status: DISCONTINUED | OUTPATIENT
Start: 2025-02-22 | End: 2025-02-23 | Stop reason: HOSPADM

## 2025-02-21 RX ADMIN — PREGABALIN 75 MG: 75 CAPSULE ORAL at 20:52

## 2025-02-21 RX ADMIN — VENLAFAXINE HYDROCHLORIDE 150 MG: 75 CAPSULE, EXTENDED RELEASE ORAL at 20:51

## 2025-02-21 RX ADMIN — GUAIFENESIN AND CODEINE PHOSPHATE 5 ML: 100; 10 SOLUTION ORAL at 21:16

## 2025-02-21 RX ADMIN — ACETAMINOPHEN 650 MG: 325 TABLET, FILM COATED ORAL at 02:48

## 2025-02-21 RX ADMIN — PANTOPRAZOLE SODIUM 40 MG: 40 TABLET, DELAYED RELEASE ORAL at 06:46

## 2025-02-21 RX ADMIN — AMLODIPINE BESYLATE 2.5 MG: 2.5 TABLET ORAL at 09:29

## 2025-02-21 RX ADMIN — VENLAFAXINE HYDROCHLORIDE 75 MG: 75 CAPSULE, EXTENDED RELEASE ORAL at 09:29

## 2025-02-21 RX ADMIN — IPRATROPIUM BROMIDE AND ALBUTEROL SULFATE 3 ML: .5; 3 SOLUTION RESPIRATORY (INHALATION) at 20:16

## 2025-02-21 RX ADMIN — BACLOFEN 20 MG: 10 TABLET ORAL at 16:42

## 2025-02-21 RX ADMIN — BACLOFEN 20 MG: 10 TABLET ORAL at 12:41

## 2025-02-21 RX ADMIN — ACETAMINOPHEN 650 MG: 325 TABLET, FILM COATED ORAL at 16:54

## 2025-02-21 RX ADMIN — CEFTRIAXONE 1 G: 1 INJECTION, POWDER, FOR SOLUTION INTRAMUSCULAR; INTRAVENOUS at 20:44

## 2025-02-21 RX ADMIN — PREDNISONE 40 MG: 20 TABLET ORAL at 09:29

## 2025-02-21 RX ADMIN — ROPINIROLE HYDROCHLORIDE 2 MG: 2 TABLET, FILM COATED ORAL at 09:30

## 2025-02-21 RX ADMIN — DIPHENHYDRAMINE HYDROCHLORIDE, ZINC ACETATE: 2; .1 CREAM TOPICAL at 11:11

## 2025-02-21 RX ADMIN — IPRATROPIUM BROMIDE AND ALBUTEROL SULFATE 3 ML: .5; 3 SOLUTION RESPIRATORY (INHALATION) at 15:01

## 2025-02-21 RX ADMIN — OXYCODONE HYDROCHLORIDE 5 MG: 5 TABLET ORAL at 16:50

## 2025-02-21 RX ADMIN — ROPINIROLE HYDROCHLORIDE 4 MG: 2 TABLET, FILM COATED ORAL at 22:54

## 2025-02-21 RX ADMIN — OXYCODONE HYDROCHLORIDE 5 MG: 5 TABLET ORAL at 22:54

## 2025-02-21 RX ADMIN — BACLOFEN 20 MG: 10 TABLET ORAL at 20:52

## 2025-02-21 RX ADMIN — PREGABALIN 75 MG: 75 CAPSULE ORAL at 09:29

## 2025-02-21 RX ADMIN — GUAIFENESIN AND CODEINE PHOSPHATE 5 ML: 100; 10 SOLUTION ORAL at 09:43

## 2025-02-21 RX ADMIN — BACLOFEN 20 MG: 10 TABLET ORAL at 09:29

## 2025-02-21 RX ADMIN — IPRATROPIUM BROMIDE AND ALBUTEROL SULFATE 3 ML: .5; 3 SOLUTION RESPIRATORY (INHALATION) at 07:23

## 2025-02-21 RX ADMIN — OXYCODONE HYDROCHLORIDE 5 MG: 5 TABLET ORAL at 02:50

## 2025-02-21 ASSESSMENT — ACTIVITIES OF DAILY LIVING (ADL)
ADLS_ACUITY_SCORE: 46

## 2025-02-21 NOTE — PROGRESS NOTES
[facility]    Medicine Progress Note - Hospitalist Service    Date of Admission:  2/19/2025    Assessment & Plan      Hina Yap is a 59 year old female with PMHx significant for MS, COPD, HTN, RLS, GERD, CKD stage II, anxiety and tobacco use, who was admitted on 2/19/2025 for acute hypoxic respiratory failure.   She presents after a fall last evening, then developed intermittent dizziness and slurred speech. She also notes cough, SOB and wheezing.     Acute hypoxic respiratory failure  Acute COPD exacerbation  Tobacco abuse    Pt notes 1.5 months of cough, SOB and wheezing that has progressively worsened. Denies fever, notes chest discomfort due to coughing.   Initially not hypoxic in the ED but did become hypoxic with SpO2 86% on room air, placed on 2L supplemental O2. VS otherwise stable. BMP fairly unremarkable. CBC unremarkable. Lactic acid normal, troponin normal at 9. CXR clear. Given report of slurred speech and dizziness, stroke work up done. CT head negative, CTA head and neck fairly unremarkable. CT perfusion study is normal. EKG SR. MRI brain negative for acute stroke, nonspecific patchy and confluent white matter raises suspicion for demyelinating disease (pt has known MS).   Given hypoxia noted here, this may have contributed to recent fall.  She was given 125 mg IV solu-medrol, Duoneb x2 and IV Rocephin for presumed UTI.      - continue PO Prednisone 40 mg daily  - continue scheduled Duonebs and PRN albuterol nebs  - Mucinex BID  - wean O2 as able  -Patient mentioned that she is not on oxygen at home  -She also shared with us that she just quit smoking at least in the last 3 weeks  - incentive spirometry   - continued on Rocephin    #Ruled out for CVA  -Reassuring MRI and CT imaging  -Appreciate input of stroke neurology and sign of    UTI with isolated pansensitive E. Coli    Notes intermittent dysuria. Also notes abdominal pain but unsure if this is related to her  bowels.   May have contributed to falls.  - continue IV Rocephin      Fall  Dizziness, slurred speech  Hx of MS  Suspect due to UTI and possibly related to hypoxia from COPD exacerbation. Did sustain laceration to the back of her head, concussion possible.   On Avonex for MS weekly  Treat COPD and UTI per above. Low suspicion that sx are due to MS flare.  - PT consult  - SW consult  - resume home Lyrica and Baclofen  - MRI and earlier CT studies showed no evidence of CVA  -Appreciate physical therapy input      Chronic pain on chronic narcotics  - can resume patient home oxycodone  - mental state appears to be at baseline now    Prolonged QTc-resolved  -  ERON on CKD stage II  Cr 1.13 on admission, baseline seems to run <1.0'  Suspect due to poor appetite and acute processes above  - avoid nephrotoxins as able  -  resolved currently at baseline level      HTN  - resume home amlodipine with parameters   -She is currently normotensive    RLS  - resume home Requip and Ramelteon  Anxiety  -Home Effexor has been resumed  GERD  On Nexium      Incidental small R tonsillar cyst  - follow up with PCP, may need direct visualization with ENT          Diet: Combination Diet Regular Diet Adult    DVT Prophylaxis: Pneumatic Compression Devices and Ambulate every shift  Valladares Catheter: Not present  Lines: None     Cardiac Monitoring: None  Code Status: Full Code      Clinically Significant Risk Factors                   # Hypertension: Noted on problem list                # COPD: noted on problem list        Social Drivers of Health    Food Insecurity: High Risk (2/20/2025)    Food Insecurity     Within the past 12 months, did you worry that your food would run out before you got money to buy more?: Yes     Within the past 12 months, did the food you bought just not last and you didn t have money to get more?: Yes   Depression: At risk (9/4/2024)    PHQ-2     PHQ-2 Score: 6   Housing Stability: High Risk (2/20/2025)    Housing  Stability     Do you have housing? : Yes     Are you worried about losing your housing?: Yes   Tobacco Use: High Risk (11/18/2024)    Patient History     Smoking Tobacco Use: Every Day     Smokeless Tobacco Use: Never   Transportation Needs: High Risk (2/20/2025)    Transportation Needs     Within the past 12 months, has lack of transportation kept you from medical appointments, getting your medicines, non-medical meetings or appointments, work, or from getting things that you need?: Yes   Physical Activity: Inactive (12/15/2022)    Exercise Vital Sign     Days of Exercise per Week: 0 days     Minutes of Exercise per Session: 0 min   Stress: Stress Concern Present (12/15/2022)    Citizen of [place] Cost of Occupational Health - Occupational Stress Questionnaire     Feeling of Stress : Very much   Social Connections: Socially Isolated (12/15/2022)    Social Connection and Isolation Panel [NHANES]     Frequency of Communication with Friends and Family: Never     Frequency of Social Gatherings with Friends and Family: Once a week     Attends Islam Services: More than 4 times per year     Active Member of Clubs or Organizations: No     Marital Status: Never           Disposition Plan     Medically Ready for Discharge: Anticipated Tomorrow if continues to improve and has been tapered and discontinued on oxygen support             David Herron MD, MD  Hospitalist Service  [facility]  Securely message with [employer] (more info)  Text page via [employer] Paging/Directory   ______________________________________________________________________    Interval History   Continuing medicine service care today.  Seen and examined.  Case discussed with charge nurse.  I met Hina this morning while she is laying comfortably in bed.  She appears to be in better spirits as she mentioned that she is feeling improved from yesterday's condition with no further worsening shortness of breath.  She thinks  overall her anxiety levels slightly improved as well.  Denies any ongoing uncontrolled generalized pain.  No reports of her being agitated or combative overnight.  I believe her mental state is at baseline level.  She is very much conversational, cooperative and interactive.  She also shared with me that her appetite has improved and able to tolerate oral diet yesterday with no complaints of any nausea or vomiting.     Still requiring oxygen support though currently between 2 to 3 L by nasal cannula decreasing wheezing but still having intermittent coughing spells.           Physical Exam   Vital Signs: Temp: 98.1  F (36.7  C) Temp src: Oral BP: 135/81 Pulse: 73   Resp: 16 SpO2: 92 % O2 Device: Nasal cannula Oxygen Delivery: 3 LPM  Weight: 140 lbs 0 oz    HEENT; Atraumatic, normocephalic, pinkish conjuctiva, pupils bilateral reactive   Skin: warm and moist, no rashes  Lymphatics: no cervical or axillary lymphandenopathy  Lungs: equal chest expansion, fair air entry, scattered scant wheezes  Heart: normal rate, normal rhythm, no rubs or gallops.   Abdomen: normal bowel sounds, no tenderness, no peritoneal signs, no guarding  Extremities: no deformities, no edema   Neuro; follow commands, alert and oriented x3, spontaneous speech, coherent, moves all extremities spontaneously  Psych; no hallucination, euthymic mood, not agitated      Medical Decision Making       40 MINUTES SPENT BY ME on the date of service doing chart review, history, exam, documentation & further activities per the note.  MANAGEMENT DISCUSSED with the following over the past 24 hours: Yes   NOTE(S)/MEDICAL RECORDS REVIEWED over the past 24 hours: Yes       Data         Imaging results reviewed over the past 24 hrs:   No results found for this or any previous visit (from the past 24 hours).

## 2025-02-21 NOTE — PLAN OF CARE
"Pertinent assessments: Pt A&Ox4. On 3L/NC. Was on 4L but titrated down to 3L. LS coarse.  Assist  of 1 with walker. Refusing gait belt.  Afebrile. VSS. Denies pain or N/V. PIV is SL. Coughing frequently. PRN Robitussin AC given for cough with some relief.     Major Shift Events: PRN Robitussin AC given x1    Treatment Plan: Monitor respiratory status & mentation. O2 support. Nebs. IV Rocephin. Pain management. Discharge TBD.    Goal Outcome Evaluation:  Plan of Care Reviewed With: patient  Overall Patient Progress: improvingOverall Patient Progress: improving  Outcome Evaluation: PRN Robutussin given x1.  Problem: Adult Inpatient Plan of Care  Goal: Plan of Care Review  Description: The Plan of Care Review/Shift note should be completed every shift.  The Outcome Evaluation is a brief statement about your assessment that the patient is improving, declining, or no change.  This information will be displayed automatically on your shift  note.  Outcome: Progressing  Flowsheets (Taken 2/21/2025 5935)  Outcome Evaluation: PRN Robutussin given x1.  Plan of Care Reviewed With: patient  Overall Patient Progress: improving  Goal: Patient-Specific Goal (Individualized)  Description: You can add care plan individualizations to a care plan. Examples of Individualization might be:  \"Parent requests to be called daily at 9am for status\", \"I have a hard time hearing out of my right ear\", or \"Do not touch me to wake me up as it startles  me\".  Outcome: Progressing  Goal: Absence of Hospital-Acquired Illness or Injury  Outcome: Progressing  Intervention: Identify and Manage Fall Risk  Recent Flowsheet Documentation  Taken 2/21/2025 9692 by Jayshree Lowry, RN  Safety Promotion/Fall Prevention:   activity supervised   lighting adjusted   nonskid shoes/slippers when out of bed   room near nurse's station   safety round/check completed   assistive device/personal items within reach   clutter free environment maintained   increase " visualization of patient   room organization consistent   treat reversible contributory factors  Intervention: Prevent Skin Injury  Recent Flowsheet Documentation  Taken 2/21/2025 0845 by Jayshree Lowry RN  Body Position: position changed independently  Intervention: Prevent and Manage VTE (Venous Thromboembolism) Risk  Recent Flowsheet Documentation  Taken 2/21/2025 0845 by Jayshree Lowry RN  VTE Prevention/Management: SCDs off (sequential compression devices)  Intervention: Prevent Infection  Recent Flowsheet Documentation  Taken 2/21/2025 0845 by Jayshree Lowry RN  Infection Prevention:   cohorting utilized   personal protective equipment utilized   hand hygiene promoted   rest/sleep promoted   single patient room provided  Goal: Optimal Comfort and Wellbeing  Outcome: Progressing  Goal: Readiness for Transition of Care  Outcome: Progressing     Problem: Pain Chronic (Persistent)  Goal: Optimal Pain Control and Function  Outcome: Progressing  Intervention: Manage Persistent Pain  Recent Flowsheet Documentation  Taken 2/21/2025 0845 by Jayshree Lowry RN  Medication Review/Management: medications reviewed     Problem: Gas Exchange Impaired  Goal: Optimal Gas Exchange  Outcome: Progressing  Intervention: Optimize Oxygenation and Ventilation  Recent Flowsheet Documentation  Taken 2/21/2025 0845 by Jayshree Lowry RN  Airway/Ventilation Management: oxygen therapy provided  Head of Bed (HOB) Positioning: HOB at 20-30 degrees     Problem: Comorbidity Management  Goal: Maintenance of COPD Symptom Control  Outcome: Progressing  Intervention: Maintain COPD Symptom Control  Recent Flowsheet Documentation  Taken 2/21/2025 0845 by Jayshree Lowry RN  Medication Review/Management: medications reviewed  Goal: Blood Pressure in Desired Range  Outcome: Progressing  Intervention: Maintain Blood Pressure Management  Recent Flowsheet Documentation  Taken 2/21/2025 0845 by Jayshree Lowry RN  Medication Review/Management: medications  reviewed     Problem: UTI (Urinary Tract Infection)  Goal: Improved Infection Symptoms  Outcome: Progressing     Problem: COPD (Chronic Obstructive Pulmonary Disease)  Goal: Optimal Chronic Illness Coping  Outcome: Progressing  Goal: Optimal Level of Functional [place]  Outcome: Progressing  Intervention: Optimize Functional Ability  Recent Flowsheet Documentation  Taken 2/21/2025 0845 by Jayshree Lowry RN  Activity Management:   activity adjusted per tolerance   activity encouraged   ambulated in room  Goal: Absence of Infection Signs and Symptoms  Outcome: Progressing  Goal: Improved Oral Intake  Outcome: Progressing  Goal: Effective Oxygenation and Ventilation  Outcome: Progressing  Intervention: Promote Airway Secretion Clearance  Recent Flowsheet Documentation  Taken 2/21/2025 0845 by Jayshree Lowry RN  Cough And Deep Breathing: done independently per patient  Activity Management:   activity adjusted per tolerance   activity encouraged   ambulated in room  Intervention: Optimize Oxygenation and Ventilation  Recent Flowsheet Documentation  Taken 2/21/2025 0845 by Jayshree Lowry RN  Airway/Ventilation Management: oxygen therapy provided  Head of Bed (HOB) Positioning: HOB at 20-30 degrees

## 2025-02-21 NOTE — PLAN OF CARE
"      To Do:  End of Shift Summary  For vital signs and complete assessments, please see documentation flowsheets.     Pertinent assessments: Pt is alert and oriented x 4. VSS on 4L oxygen. Pt denies nausea, dizziness, sob. Reported severe pain, oxycodone x1 given. Regular diet, good appetite. Assist x 1 with gait belt and walker. Call light within reach and able to make needs known.     Major Shift Events: Admitted to unit    Treatment Plan: Rocephin, pain control, encourage adequate fluids    Bedside Nurse: Shaneka Dick RN                                               Goal Outcome Evaluation:         Problem: Adult Inpatient Plan of Care  Goal: Plan of Care Review  Description: The Plan of Care Review/Shift note should be completed every shift.  The Outcome Evaluation is a brief statement about your assessment that the patient is improving, declining, or no change.  This information will be displayed automatically on your shift  note.  Outcome: Progressing  Goal: Patient-Specific Goal (Individualized)  Description: You can add care plan individualizations to a care plan. Examples of Individualization might be:  \"Parent requests to be called daily at 9am for status\", \"I have a hard time hearing out of my right ear\", or \"Do not touch me to wake me up as it startles  me\".  Outcome: Progressing  Goal: Absence of Hospital-Acquired Illness or Injury  Outcome: Progressing  Intervention: Identify and Manage Fall Risk  Recent Flowsheet Documentation  Taken 2/20/2025 1632 by Shaneka Dick RN  Safety Promotion/Fall Prevention:   activity supervised   lighting adjusted   nonskid shoes/slippers when out of bed   room near nurse's station   safety round/check completed  Intervention: Prevent Skin Injury  Recent Flowsheet Documentation  Taken 2/20/2025 1632 by Shaneka Dick RN  Body Position: position changed independently  Intervention: Prevent Infection  Recent Flowsheet Documentation  Taken 2/20/2025 " 1632 by Shaneka Dick RN  Infection Prevention:   cohorting utilized   personal protective equipment utilized   hand hygiene promoted  Goal: Optimal Comfort and Wellbeing  Outcome: Progressing  Goal: Readiness for Transition of Care  Outcome: Progressing     Problem: Pain Chronic (Persistent)  Goal: Optimal Pain Control and Function  Outcome: Progressing  Intervention: Manage Persistent Pain  Recent Flowsheet Documentation  Taken 2/20/2025 1632 by Shaneka Dick RN  Medication Review/Management: medications reviewed     Problem: Gas Exchange Impaired  Goal: Optimal Gas Exchange  Outcome: Progressing  Intervention: Optimize Oxygenation and Ventilation  Recent Flowsheet Documentation  Taken 2/20/2025 1632 by Shaneka Dick RN  Head of Bed (HOB) Positioning: HOB at 20-30 degrees     Problem: Comorbidity Management  Goal: Maintenance of COPD Symptom Control  Outcome: Progressing  Intervention: Maintain COPD Symptom Control  Recent Flowsheet Documentation  Taken 2/20/2025 1632 by Shaneka Dick RN  Medication Review/Management: medications reviewed  Goal: Blood Pressure in Desired Range  Outcome: Progressing  Intervention: Maintain Blood Pressure Management  Recent Flowsheet Documentation  Taken 2/20/2025 1632 by Shaneka Dick RN  Medication Review/Management: medications reviewed     Problem: UTI (Urinary Tract Infection)  Goal: Improved Infection Symptoms  Outcome: Progressing     Problem: COPD (Chronic Obstructive Pulmonary Disease)  Goal: Optimal Chronic Illness Coping  Outcome: Progressing  Goal: Optimal Level of Functional [place]  Outcome: Progressing  Intervention: Optimize Functional Ability  Recent Flowsheet Documentation  Taken 2/20/2025 1632 by Shaneka Dick RN  Activity Management:   activity adjusted per tolerance   ambulated in room  Goal: Absence of Infection Signs and Symptoms  Outcome: Progressing  Goal: Improved Oral Intake  Outcome: Progressing  Goal:  Effective Oxygenation and Ventilation  Outcome: Progressing  Intervention: Promote Airway Secretion Clearance  Recent Flowsheet Documentation  Taken 2/20/2025 1632 by Shaneka Dick, RN  Cough And Deep Breathing: done independently per patient  Activity Management:   activity adjusted per tolerance   ambulated in room  Intervention: Optimize Oxygenation and Ventilation  Recent Flowsheet Documentation  Taken 2/20/2025 1632 by Shaneka Dick, RN  Head of Bed (HOB) Positioning: HOB at 20-30 degrees

## 2025-02-21 NOTE — CONSULTS
Care Management Discharge Note    Discharge Date: 02/21/2025       Discharge Disposition:  home    Discharge Services:  outpatient PT    Discharge DME:  none    Discharge Transportation:  family    Private pay costs discussed:  NA    Handoff Referral Completed: Yes, [facility] PCP: Internal handoff referral completed    Additional Information:  Care management consult ordered for discharge planning/disposition. Patient admitted for acute hypoxic respiratory failure, acute COPD exacerbation, UTI.     Per chart review, PT evaluated patient and recommends home with assist, outpatient PT. No care management needs identified at this time.     No further care management intervention anticipated at this time.  Please re-consult if further needs arise.  Care management signing off.       Thao Martin RN  Care Coordinator  [facility]

## 2025-02-21 NOTE — PLAN OF CARE
"For vital signs and complete assessments, please see documentation flowsheets.     [id number]  Pertinent assessments: Pt A&Ox4. Ax1 with walker. On 4LNC. Afebrile. VSS. Denies nausea. C/o back pain given po tylenol & oxy prn. PIV saline locked.    Major Shift Events: None    Treatment Plan: Monitor respiratory status & mentation. O2 support. Nebs. IV Rocephin. Pain management. Discharge TBD.    Bedside Nurse: Tre Rojas, RN     Problem: Adult Inpatient Plan of Care  Goal: Plan of Care Review  Description: The Plan of Care Review/Shift note should be completed every shift.  The Outcome Evaluation is a brief statement about your assessment that the patient is improving, declining, or no change.  This information will be displayed automatically on your shift  note.  Outcome: Progressing  Flowsheets (Taken 2/21/2025 0512)  Outcome Evaluation: Pain controlled with tylenol & oxy. PIV ssaline locked. VSS. O2 support continued.  Plan of Care Reviewed With: patient  Overall Patient Progress: no change  Goal: Patient-Specific Goal (Individualized)  Description: You can add care plan individualizations to a care plan. Examples of Individualization might be:  \"Parent requests to be called daily at 9am for status\", \"I have a hard time hearing out of my right ear\", or \"Do not touch me to wake me up as it startles  me\".  Outcome: Progressing  Goal: Absence of Hospital-Acquired Illness or Injury  Outcome: Progressing  Intervention: Identify and Manage Fall Risk  Recent Flowsheet Documentation  Taken 2/21/2025 0000 by Tre Rojas, RN  Safety Promotion/Fall Prevention:   activity supervised   lighting adjusted   nonskid shoes/slippers when out of bed   room near nurse's station   safety round/check completed   assistive device/personal items within reach   clutter free environment maintained   increase visualization of patient   room organization consistent   treat reversible contributory factors  Intervention: Prevent Skin " Injury  Recent Flowsheet Documentation  Taken 2/21/2025 0000 by Tre Rojas RN  Body Position: position changed independently  Intervention: Prevent and Manage VTE (Venous Thromboembolism) Risk  Recent Flowsheet Documentation  Taken 2/21/2025 0000 by Tre Rojas RN  VTE Prevention/Management: SCDs off (sequential compression devices)  Intervention: Prevent Infection  Recent Flowsheet Documentation  Taken 2/21/2025 0000 by Tre Rojas RN  Infection Prevention:   cohorting utilized   personal protective equipment utilized   hand hygiene promoted   rest/sleep promoted   single patient room provided  Goal: Optimal Comfort and Wellbeing  Outcome: Progressing  Goal: Readiness for Transition of Care  Outcome: Progressing     Problem: Pain Chronic (Persistent)  Goal: Optimal Pain Control and Function  Outcome: Progressing  Intervention: Manage Persistent Pain  Recent Flowsheet Documentation  Taken 2/21/2025 0000 by Tre Rojas RN  Medication Review/Management: medications reviewed     Problem: Gas Exchange Impaired  Goal: Optimal Gas Exchange  Outcome: Progressing  Intervention: Optimize Oxygenation and Ventilation  Recent Flowsheet Documentation  Taken 2/21/2025 0000 by Tre Rojas RN  Head of Bed (HOB) Positioning: HOB at 20-30 degrees     Problem: Comorbidity Management  Goal: Maintenance of COPD Symptom Control  Outcome: Progressing  Intervention: Maintain COPD Symptom Control  Recent Flowsheet Documentation  Taken 2/21/2025 0000 by Tre Rojas RN  Medication Review/Management: medications reviewed  Goal: Blood Pressure in Desired Range  Outcome: Progressing  Intervention: Maintain Blood Pressure Management  Recent Flowsheet Documentation  Taken 2/21/2025 0000 by Tre Rojas RN  Medication Review/Management: medications reviewed     Problem: UTI (Urinary Tract Infection)  Goal: Improved Infection Symptoms  Outcome: Progressing     Problem: COPD (Chronic Obstructive Pulmonary  Disease)  Goal: Optimal Chronic Illness Coping  Outcome: Progressing  Goal: Optimal Level of Functional [place]  Outcome: Progressing  Goal: Absence of Infection Signs and Symptoms  Outcome: Progressing  Goal: Improved Oral Intake  Outcome: Progressing  Goal: Effective Oxygenation and Ventilation  Outcome: Progressing  Intervention: Promote Airway Secretion Clearance  Recent Flowsheet Documentation  Taken 2/21/2025 0000 by Tre Rojas, RN  Cough And Deep Breathing: done independently per patient  Intervention: Optimize Oxygenation and Ventilation  Recent Flowsheet Documentation  Taken 2/21/2025 0000 by Tre Rojas, RN  Head of Bed (HOB) Positioning: HOB at 20-30 degrees     Goal Outcome Evaluation:      Plan of Care Reviewed With: patient    Overall Patient Progress: no changeOverall Patient Progress: no change    Outcome Evaluation: Pain controlled with tylenol & oxy. PIV ssaline locked. VSS. O2 support continued.

## 2025-02-22 ENCOUNTER — APPOINTMENT (OUTPATIENT)
Dept: PHYSICAL THERAPY | Facility: CLINIC | Age: 60
End: 2025-02-22
Payer: MEDICAID

## 2025-02-22 PROCEDURE — 94640 AIRWAY INHALATION TREATMENT: CPT

## 2025-02-22 PROCEDURE — 120N000001 HC R&B MED SURG/OB

## 2025-02-22 PROCEDURE — 97530 THERAPEUTIC ACTIVITIES: CPT | Mod: GP | Performed by: PHYSICAL THERAPIST

## 2025-02-22 PROCEDURE — 250N000013 HC RX MED GY IP 250 OP 250 PS 637: Performed by: INTERNAL MEDICINE

## 2025-02-22 PROCEDURE — 250N000012 HC RX MED GY IP 250 OP 636 PS 637: Performed by: PHYSICIAN ASSISTANT

## 2025-02-22 PROCEDURE — 250N000011 HC RX IP 250 OP 636: Performed by: PHYSICIAN ASSISTANT

## 2025-02-22 PROCEDURE — 94640 AIRWAY INHALATION TREATMENT: CPT | Mod: 76

## 2025-02-22 PROCEDURE — 250N000009 HC RX 250

## 2025-02-22 PROCEDURE — 99232 SBSQ HOSP IP/OBS MODERATE 35: CPT | Performed by: INTERNAL MEDICINE

## 2025-02-22 PROCEDURE — 250N000013 HC RX MED GY IP 250 OP 250 PS 637: Performed by: PHYSICIAN ASSISTANT

## 2025-02-22 PROCEDURE — 999N000157 HC STATISTIC RCP TIME EA 10 MIN

## 2025-02-22 RX ADMIN — BACLOFEN 20 MG: 10 TABLET ORAL at 09:43

## 2025-02-22 RX ADMIN — ROPINIROLE HYDROCHLORIDE 4 MG: 2 TABLET, FILM COATED ORAL at 21:31

## 2025-02-22 RX ADMIN — IPRATROPIUM BROMIDE AND ALBUTEROL SULFATE 3 ML: .5; 3 SOLUTION RESPIRATORY (INHALATION) at 14:56

## 2025-02-22 RX ADMIN — CEFTRIAXONE 1 G: 1 INJECTION, POWDER, FOR SOLUTION INTRAMUSCULAR; INTRAVENOUS at 21:31

## 2025-02-22 RX ADMIN — POLYETHYLENE GLYCOL 3350 17 G: 17 POWDER, FOR SOLUTION ORAL at 21:31

## 2025-02-22 RX ADMIN — BACLOFEN 20 MG: 10 TABLET ORAL at 21:31

## 2025-02-22 RX ADMIN — IPRATROPIUM BROMIDE AND ALBUTEROL SULFATE 3 ML: .5; 3 SOLUTION RESPIRATORY (INHALATION) at 07:18

## 2025-02-22 RX ADMIN — PREDNISONE 40 MG: 20 TABLET ORAL at 09:43

## 2025-02-22 RX ADMIN — OXYCODONE HYDROCHLORIDE 5 MG: 5 TABLET ORAL at 09:51

## 2025-02-22 RX ADMIN — PANTOPRAZOLE SODIUM 40 MG: 40 TABLET, DELAYED RELEASE ORAL at 06:35

## 2025-02-22 RX ADMIN — BACLOFEN 20 MG: 10 TABLET ORAL at 17:15

## 2025-02-22 RX ADMIN — PREGABALIN 75 MG: 75 CAPSULE ORAL at 21:26

## 2025-02-22 RX ADMIN — OXYCODONE HYDROCHLORIDE 5 MG: 5 TABLET ORAL at 21:26

## 2025-02-22 RX ADMIN — VENLAFAXINE HYDROCHLORIDE 75 MG: 75 CAPSULE, EXTENDED RELEASE ORAL at 09:43

## 2025-02-22 RX ADMIN — SENNOSIDES AND DOCUSATE SODIUM 2 TABLET: 50; 8.6 TABLET ORAL at 21:31

## 2025-02-22 RX ADMIN — GUAIFENESIN AND CODEINE PHOSPHATE 5 ML: 100; 10 SOLUTION ORAL at 13:45

## 2025-02-22 RX ADMIN — IPRATROPIUM BROMIDE AND ALBUTEROL SULFATE 3 ML: .5; 3 SOLUTION RESPIRATORY (INHALATION) at 19:51

## 2025-02-22 RX ADMIN — ACETAMINOPHEN 650 MG: 325 TABLET, FILM COATED ORAL at 06:35

## 2025-02-22 RX ADMIN — GUAIFENESIN AND CODEINE PHOSPHATE 5 ML: 100; 10 SOLUTION ORAL at 21:30

## 2025-02-22 RX ADMIN — BACLOFEN 20 MG: 10 TABLET ORAL at 12:08

## 2025-02-22 RX ADMIN — VENLAFAXINE HYDROCHLORIDE 150 MG: 75 CAPSULE, EXTENDED RELEASE ORAL at 21:31

## 2025-02-22 RX ADMIN — ROPINIROLE HYDROCHLORIDE 2 MG: 2 TABLET, FILM COATED ORAL at 09:43

## 2025-02-22 RX ADMIN — PREGABALIN 75 MG: 75 CAPSULE ORAL at 09:43

## 2025-02-22 RX ADMIN — AMLODIPINE BESYLATE 2.5 MG: 2.5 TABLET ORAL at 09:43

## 2025-02-22 ASSESSMENT — ACTIVITIES OF DAILY LIVING (ADL)
ADLS_ACUITY_SCORE: 46

## 2025-02-22 NOTE — CARE PLAN
02/22/25 1617   Home Oxygen Assessment (RN/RT ONLY)   Does patient have oxygen at home? No   1. SpO2 on room air at rest while awake 89       Oxygen LPM at rest 1L   3. SpO2 on room air during Activity/with exercise 89   4. SpO2 with oxygen during activity/with exercise 93       Oxygen LPM during activity/with exercise 2   Does patient qualify for Home O2?  --

## 2025-02-22 NOTE — PROGRESS NOTES
02/22/25 1617   Home Oxygen Assessment (RN/RT ONLY)   Does patient have oxygen at home? No   1. SpO2 on room air at rest while awake 89   2. SpO2 while at rest on oxygen 91       Oxygen LPM at rest 1L   3. SpO2 on room air during Activity/with exercise 89   4. SpO2 with oxygen during activity/with exercise 93       Oxygen LPM during activity/with exercise 2

## 2025-02-22 NOTE — PLAN OF CARE
"  To Do:  End of Shift Summary  For vital signs and complete assessments, please see documentation flowsheets.     Pertinent assessments: Pt is alert and oriented x4. VSS on 3L oxygen. Pt denies nausea, dizziness, sob. Reports pain, prn oxycodone given. Regular diet, good appetite. SBA to bathroom. Pt refused bed and chair alarms, education provided & MD notified. Call light within reach and able to make needs known.     /85 (BP Location: Right arm)   Pulse 92   Temp 97.9  F (36.6  C) (Oral)   Resp 17   Ht 1.676 m (5' 6\")   Wt 63.5 kg (140 lb)   LMP 05/25/2017 (Exact Date)   SpO2 95%   BMI 22.60 kg/m       Major Shift Events: Uneventful     Treatment Plan: Rocephin, pain management, symptom management     Bedside Nurse: Shaneka Dick RN                                                     Goal Outcome Evaluation:         Problem: Adult Inpatient Plan of Care  Goal: Plan of Care Review  Description: The Plan of Care Review/Shift note should be completed every shift.  The Outcome Evaluation is a brief statement about your assessment that the patient is improving, declining, or no change.  This information will be displayed automatically on your shift  note.  Outcome: Progressing  Goal: Patient-Specific Goal (Individualized)  Description: You can add care plan individualizations to a care plan. Examples of Individualization might be:  \"Parent requests to be called daily at 9am for status\", \"I have a hard time hearing out of my right ear\", or \"Do not touch me to wake me up as it startles  me\".  Outcome: Progressing  Goal: Absence of Hospital-Acquired Illness or Injury  Outcome: Progressing  Intervention: Identify and Manage Fall Risk  Recent Flowsheet Documentation  Taken 2/21/2025 1832 by Shaneka Dick, RN  Safety Promotion/Fall Prevention:   activity supervised   lighting adjusted   nonskid shoes/slippers when out of bed   room near nurse's station   safety round/check completed   assistive " device/personal items within reach   clutter free environment maintained   increase visualization of patient   room organization consistent   treat reversible contributory factors  Intervention: Prevent Skin Injury  Recent Flowsheet Documentation  Taken 2/21/2025 1832 by Shaneka Dick RN  Body Position: position changed independently  Intervention: Prevent and Manage VTE (Venous Thromboembolism) Risk  Recent Flowsheet Documentation  Taken 2/21/2025 1832 by Shaneka Dick RN  VTE Prevention/Management: SCDs off (sequential compression devices)  Intervention: Prevent Infection  Recent Flowsheet Documentation  Taken 2/21/2025 1852 by Shaneka Dick RN  Infection Prevention:   equipment surfaces disinfected   hand hygiene promoted   single patient room provided  Taken 2/21/2025 1832 by Shaneka Dick RN  Infection Prevention:   cohorting utilized   personal protective equipment utilized   hand hygiene promoted   rest/sleep promoted   single patient room provided  Goal: Optimal Comfort and Wellbeing  Outcome: Progressing  Goal: Readiness for Transition of Care  Outcome: Progressing     Problem: Pain Chronic (Persistent)  Goal: Optimal Pain Control and Function  Outcome: Progressing  Intervention: Manage Persistent Pain  Recent Flowsheet Documentation  Taken 2/21/2025 1832 by Shaneka Dick RN  Medication Review/Management: medications reviewed     Problem: Gas Exchange Impaired  Goal: Optimal Gas Exchange  Outcome: Progressing  Intervention: Optimize Oxygenation and Ventilation  Recent Flowsheet Documentation  Taken 2/21/2025 1832 by Shaneka Dick RN  Head of Bed (HOB) Positioning: HOB at 20-30 degrees     Problem: Comorbidity Management  Goal: Maintenance of COPD Symptom Control  Outcome: Progressing  Intervention: Maintain COPD Symptom Control  Recent Flowsheet Documentation  Taken 2/21/2025 1832 by Shaneka Dick RN  Medication Review/Management: medications  reviewed  Goal: Blood Pressure in Desired Range  Outcome: Progressing  Intervention: Maintain Blood Pressure Management  Recent Flowsheet Documentation  Taken 2/21/2025 1832 by Shaneka Dick, RN  Medication Review/Management: medications reviewed     Problem: UTI (Urinary Tract Infection)  Goal: Improved Infection Symptoms  Outcome: Progressing     Problem: COPD (Chronic Obstructive Pulmonary Disease)  Goal: Optimal Chronic Illness Coping  Outcome: Progressing  Goal: Optimal Level of Functional [place]  Outcome: Progressing  Goal: Absence of Infection Signs and Symptoms  Outcome: Progressing  Goal: Improved Oral Intake  Outcome: Progressing  Goal: Effective Oxygenation and Ventilation  Outcome: Progressing  Intervention: Promote Airway Secretion Clearance  Recent Flowsheet Documentation  Taken 2/21/2025 1832 by Shaneka Dick RN  Cough And Deep Breathing: done independently per patient  Intervention: Optimize Oxygenation and Ventilation  Recent Flowsheet Documentation  Taken 2/21/2025 1832 by Shaneka Dick, RN  Head of Bed (HOB) Positioning: HOB at 20-30 degrees

## 2025-02-22 NOTE — PLAN OF CARE
"Pertinent assessments: A&Ox4. VSS. On RA. Sating low 90s. SOB. SBA in room. Refusing walker or gait belt. Ambulating in hallway witth PT. Gait unsteady a times. Afebrile. Denies N/V. C/O generalized pain. PRN Oxycodone given x1. Coughing frequently. PRN Robitussin AC given for cough with some relief. LS diminished.     Major Shift Events: PRN Oxycodone & Robitussin AC.    Treatment Plan: Monitor respiratory status & mentation. Supplemental O2 . Nebs. IV Rocephin. Pain management. Discharge TBD.    Goal Outcome Evaluation:  Plan of Care Reviewed With: patient  Overall Patient Progress: improvingOverall Patient Progress: improving  Outcome Evaluation: Wean off of Oxygen. Pain & cough control  Problem: Adult Inpatient Plan of Care  Goal: Plan of Care Review  Description: The Plan of Care Review/Shift note should be completed every shift.  The Outcome Evaluation is a brief statement about your assessment that the patient is improving, declining, or no change.  This information will be displayed automatically on your shift  note.  Outcome: Progressing  Flowsheets (Taken 2/22/2025 9252)  Outcome Evaluation: Wean off of Oxygen. Pain & cough control  Plan of Care Reviewed With: patient  Overall Patient Progress: improving  Goal: Patient-Specific Goal (Individualized)  Description: You can add care plan individualizations to a care plan. Examples of Individualization might be:  \"Parent requests to be called daily at 9am for status\", \"I have a hard time hearing out of my right ear\", or \"Do not touch me to wake me up as it startles  me\".  Outcome: Progressing  Goal: Absence of Hospital-Acquired Illness or Injury  Outcome: Progressing  Intervention: Identify and Manage Fall Risk  Recent Flowsheet Documentation  Taken 2/22/2025 3422 by Jayshree Lowry, RN  Safety Promotion/Fall Prevention:   activity supervised   lighting adjusted   nonskid shoes/slippers when out of bed   room near nurse's station   safety round/check completed   " assistive device/personal items within reach   clutter free environment maintained   increase visualization of patient   room organization consistent   treat reversible contributory factors  Intervention: Prevent Skin Injury  Recent Flowsheet Documentation  Taken 2/22/2025 0945 by Jayshree Lowry RN  Body Position: position changed independently  Intervention: Prevent and Manage VTE (Venous Thromboembolism) Risk  Recent Flowsheet Documentation  Taken 2/22/2025 0945 by Jayshree Lowry RN  VTE Prevention/Management: SCDs off (sequential compression devices)  Intervention: Prevent Infection  Recent Flowsheet Documentation  Taken 2/22/2025 0945 by Jayshree Lowry RN  Infection Prevention:   cohorting utilized   personal protective equipment utilized   hand hygiene promoted   rest/sleep promoted   single patient room provided  Goal: Optimal Comfort and Wellbeing  Outcome: Progressing  Intervention: Monitor Pain and Promote Comfort  Recent Flowsheet Documentation  Taken 2/22/2025 1033 by Jayshree Lowry RN  Pain Management Interventions: medication (see MAR)  Taken 2/22/2025 0951 by Jayshree Lowry RN  Pain Management Interventions: medication (see MAR)  Taken 2/22/2025 0722 by Jayshree Lowry RN  Pain Management Interventions: medication (see MAR)  Goal: Readiness for Transition of Care  Outcome: Progressing     Problem: Pain Chronic (Persistent)  Goal: Optimal Pain Control and Function  Outcome: Progressing  Intervention: Develop Pain Management Plan  Recent Flowsheet Documentation  Taken 2/22/2025 1033 by Jayshree Lowry RN  Pain Management Interventions: medication (see MAR)  Taken 2/22/2025 0951 by Jayshree Lowry RN  Pain Management Interventions: medication (see MAR)  Taken 2/22/2025 0722 by Jayshree Lowry RN  Pain Management Interventions: medication (see MAR)  Intervention: Manage Persistent Pain  Recent Flowsheet Documentation  Taken 2/22/2025 0945 by Jayshree Lowry RN  Medication Review/Management: medications  reviewed     Problem: Gas Exchange Impaired  Goal: Optimal Gas Exchange  Outcome: Progressing  Intervention: Optimize Oxygenation and Ventilation  Recent Flowsheet Documentation  Taken 2/22/2025 0945 by Jayshree Lowry RN  Airway/Ventilation Management: oxygen therapy provided  Head of Bed (HOB) Positioning: HOB at 20-30 degrees     Problem: Comorbidity Management  Goal: Maintenance of COPD Symptom Control  Outcome: Progressing  Intervention: Maintain COPD Symptom Control  Recent Flowsheet Documentation  Taken 2/22/2025 0945 by Jayshree Lowry RN  Medication Review/Management: medications reviewed  Goal: Blood Pressure in Desired Range  Outcome: Progressing  Intervention: Maintain Blood Pressure Management  Recent Flowsheet Documentation  Taken 2/22/2025 0945 by Jayshree Lowry RN  Medication Review/Management: medications reviewed     Problem: UTI (Urinary Tract Infection)  Goal: Improved Infection Symptoms  Outcome: Progressing     Problem: COPD (Chronic Obstructive Pulmonary Disease)  Goal: Optimal Chronic Illness Coping  Outcome: Progressing  Goal: Optimal Level of Functional [place]  Outcome: Progressing  Intervention: Optimize Functional Ability  Recent Flowsheet Documentation  Taken 2/22/2025 0945 by Jayshree Lowry RN  Activity Management:   activity adjusted per tolerance   activity encouraged   ambulated in room  Goal: Absence of Infection Signs and Symptoms  Outcome: Progressing  Goal: Improved Oral Intake  Outcome: Progressing  Goal: Effective Oxygenation and Ventilation  Outcome: Progressing  Intervention: Promote Airway Secretion Clearance  Recent Flowsheet Documentation  Taken 2/22/2025 0945 by Jayshree Lowry RN  Cough And Deep Breathing: done independently per patient  Activity Management:   activity adjusted per tolerance   activity encouraged   ambulated in room  Intervention: Optimize Oxygenation and Ventilation  Recent Flowsheet Documentation  Taken 2/22/2025 0945 by Jayshree Lowry  RN  Airway/Ventilation Management: oxygen therapy provided  Head of Bed (HOB) Positioning: HOB at 20-30 degrees

## 2025-02-22 NOTE — PROGRESS NOTES
[facility]    Medicine Progress Note - Hospitalist Service    Date of Admission:  2/19/2025    Assessment & Plan   Patient is a 59-year-old female with medical history significant for COPD, MS, hypertension, RLS, GERD, CKD stage II, anxiety, and tobacco use disorder who was admitted on 2/19/2025 for acute respiratory failure with hypoxia and acute COPD exacerbation.  Patient presented after a fall and intermittent dizziness and slurred speech.  She also reported cough, shortness of breath, and wheezing.    # Acute COPD exacerbation  # Acute respiratory failure with hypoxia  # Tobacco use disorder  Patient reported 1.5 months of cough, shortness of breath, and wheezing.  In the ED, she was hypoxic with O2 saturation of 86% on room air.  She was placed on 3 L of supplemental 20 via nasal cannula.  CT brain was negative.  CTA head and neck was unremarkable.  CT perfusion study was normal.  EKG showed sinus rhythm.  MRI brain for acute stroke causing negative, however showed nonspecific patchy and confluent white matter raising suspicion for demyelinating diseases.  Patient has history of MS.  Continue prednisone, complete total 5 days of treatment or steroids.  Continue breathing treatments  Supplemental oxygen, titrate to SpO2 of 88 to 92%  In situ spirometer  Continue Rocephin    # Probable CVA: Ruled out  Patient had MRI and CT which were all reassuring    # UTI: Patient with pansensitive E. coli  Continue Rocephin    ## Fall  # Dizziness  # slurred speech  Patient's presentation suspected to be due to UTI and possibly related to hypoxia from COPD exacerbation.  She sustained a laceration on the back of her head.  Continue Avonex  Treat COPD as above  PT and OT  Continue Lyrica and baclofen    Chronic pain syndrome:  Continue home oxycodone    # Prolonged QT HIV on [place] interval  Resolved    # ERON on CKD: Likely prerenal from poor oral intake.  Baseline creatinine around 0.68-0.7.  Presented  with creatinine of 1.13.  Has improved to 0.97  Monitor renal function  Avoid nephrotoxins  Renal dosing of medications.  # Hypertension:  Continue amlodipine    # RLS  Continue workup for 10 Requip and ramelteon     #Anxiety:   Continue effexor     #GERD  Continue nexium.           Diet: Combination Diet Regular Diet Adult    DVT Prophylaxis: Pneumatic Compression Devices and Ambulate every shift  Valladares Catheter: Not present  Lines: None     Cardiac Monitoring: None  Code Status: Full Code      Clinically Significant Risk Factors                   # Hypertension: Noted on problem list                # COPD: noted on problem list        Social Drivers of Health    Food Insecurity: High Risk (2/20/2025)    Food Insecurity     Within the past 12 months, did you worry that your food would run out before you got money to buy more?: Yes     Within the past 12 months, did the food you bought just not last and you didn t have money to get more?: Yes   Depression: At risk (9/4/2024)    PHQ-2     PHQ-2 Score: 6   Housing Stability: High Risk (2/20/2025)    Housing Stability     Do you have housing? : Yes     Are you worried about losing your housing?: Yes   Tobacco Use: High Risk (11/18/2024)    Patient History     Smoking Tobacco Use: Every Day     Smokeless Tobacco Use: Never   Transportation Needs: High Risk (2/20/2025)    Transportation Needs     Within the past 12 months, has lack of transportation kept you from medical appointments, getting your medicines, non-medical meetings or appointments, work, or from getting things that you need?: Yes   Physical Activity: Inactive (12/15/2022)    Exercise Vital Sign     Days of Exercise per Week: 0 days     Minutes of Exercise per Session: 0 min   Stress: Stress Concern Present (12/15/2022)    [employer] [place] [employer] - Occupational Stress Questionnaire     Feeling of Stress : Very much   Social Connections: Socially Isolated (12/15/2022)    Social Connection and  Isolation Panel [NHANES]     Frequency of Communication with Friends and Family: Never     Frequency of Social Gatherings with Friends and Family: Once a week     Attends Yazidi Services: More than 4 times per year     Active Member of Clubs or Organizations: No     Marital Status: Never           Disposition Plan     Medically Ready for Discharge: Anticipated Tomorrow             Cristino Morillo MD  Hospitalist Service  [facility]  Securely message with [employer] (more info)  Text page via [employer] Paging/Directory   ______________________________________________________________________    Interval History   No acute events overnight.  Continues to require supplemental oxygen via nasal cannula.  Reports shortness of breath is improved but not back to baseline.  Denies any fevers, chills, chest pain, nausea, vomiting, diarrhea, constipation, dysuria, hematuria, or any other new symptoms.    Physical Exam   Vital Signs: Temp: 98  F (36.7  C) Temp src: Oral BP: (!) 152/89 Pulse: 90   Resp: 18 SpO2: (!) 91 % O2 Device: None (Room air) Oxygen Delivery: 1 LPM  Weight: 140 lbs 0 oz    General: AAOx3, NAD, pleasant.  HEENT: NCAT, pupils are equal and round, anicteric, oral mucosa is moist.  Neck: Supple.   Cardiac: RRR.  No murmur. No rub or gallop.  Respiratory: Diffuse wheezes. No crackles, rales, or rhonchi  Abdomen: Soft, NT, ND, + bowel sounds  Extremity: No LE edema,    Neuro: AAO x3,   Psych: Calm and cooperative.       Medical Decision Making       >35 MINUTES SPENT BY ME on the date of service doing chart review, history, exam, documentation & further activities per the note.      Data         Imaging results reviewed over the past 24 hrs:   No results found for this or any previous visit (from the past 24 hours).

## 2025-02-22 NOTE — PLAN OF CARE
"For vital signs and complete assessments, please see documentation flowsheets.     [id number]  Pertinent assessments: Pt A&Ox4. SBA in room. On 3LNC. Afebrile. VSS. Denies nausea. C/o headache, given prn tylenol po.     Major Shift Events: When ambulating to bathroom IV was dislodged.    Treatment Plan: Monitor respiratory status & mentation. O2 support. Nebs. IV Rocephin. Pain management. Discharge TBD.    Bedside Nurse: Tre Rojas RN     Problem: Adult Inpatient Plan of Care  Goal: Plan of Care Review  Description: The Plan of Care Review/Shift note should be completed every shift.  The Outcome Evaluation is a brief statement about your assessment that the patient is improving, declining, or no change.  This information will be displayed automatically on your shift  note.  Outcome: Progressing  Flowsheets (Taken 2/22/2025 0654)  Outcome Evaluation: O2 support continued. On 3L NC. VSS. Pain controlled with po tylenol. SBA in room  Overall Patient Progress: improving  Goal: Patient-Specific Goal (Individualized)  Description: You can add care plan individualizations to a care plan. Examples of Individualization might be:  \"Parent requests to be called daily at 9am for status\", \"I have a hard time hearing out of my right ear\", or \"Do not touch me to wake me up as it startles  me\".  Outcome: Progressing  Goal: Absence of Hospital-Acquired Illness or Injury  Outcome: Progressing  Intervention: Identify and Manage Fall Risk  Recent Flowsheet Documentation  Taken 2/22/2025 0022 by Tre Rojas, RN  Safety Promotion/Fall Prevention:   activity supervised   lighting adjusted   nonskid shoes/slippers when out of bed   room near nurse's station   safety round/check completed   assistive device/personal items within reach   clutter free environment maintained   increase visualization of patient   room organization consistent   treat reversible contributory factors  Intervention: Prevent Skin Injury  Recent Flowsheet " Documentation  Taken 2/22/2025 0631 by Tre Rojas RN  Body Position: position changed independently  Taken 2/22/2025 0022 by Tre Rojas RN  Body Position: position changed independently  Intervention: Prevent and Manage VTE (Venous Thromboembolism) Risk  Recent Flowsheet Documentation  Taken 2/22/2025 0022 by Tre Rojas RN  VTE Prevention/Management: SCDs off (sequential compression devices)  Intervention: Prevent Infection  Recent Flowsheet Documentation  Taken 2/22/2025 0022 by Tre Rojas RN  Infection Prevention:   cohorting utilized   hand hygiene promoted   rest/sleep promoted   single patient room provided  Goal: Optimal Comfort and Wellbeing  Outcome: Progressing  Goal: Readiness for Transition of Care  Outcome: Progressing     Problem: Pain Chronic (Persistent)  Goal: Optimal Pain Control and Function  Outcome: Progressing  Intervention: Manage Persistent Pain  Recent Flowsheet Documentation  Taken 2/22/2025 0022 by Tre Rojas RN  Medication Review/Management: medications reviewed     Problem: Gas Exchange Impaired  Goal: Optimal Gas Exchange  Outcome: Progressing  Intervention: Optimize Oxygenation and Ventilation  Recent Flowsheet Documentation  Taken 2/22/2025 0022 by Tre Rojas RN  Head of Bed (HOB) Positioning: HOB lowered     Problem: Comorbidity Management  Goal: Maintenance of COPD Symptom Control  Outcome: Progressing  Intervention: Maintain COPD Symptom Control  Recent Flowsheet Documentation  Taken 2/22/2025 0022 by Tre Rojas RN  Medication Review/Management: medications reviewed  Goal: Blood Pressure in Desired Range  Outcome: Progressing  Intervention: Maintain Blood Pressure Management  Recent Flowsheet Documentation  Taken 2/22/2025 0022 by Tre Rojas RN  Medication Review/Management: medications reviewed     Problem: UTI (Urinary Tract Infection)  Goal: Improved Infection Symptoms  Outcome: Progressing     Problem: COPD (Chronic  Obstructive Pulmonary Disease)  Goal: Optimal Chronic Illness Coping  Outcome: Progressing  Goal: Optimal Level of Functional [place]  Outcome: Progressing  Intervention: Optimize Functional Ability  Recent Flowsheet Documentation  Taken 2/22/2025 0631 by Tre Rojas, RN  Activity Management:   ambulated to bathroom   back to bed  Goal: Absence of Infection Signs and Symptoms  Outcome: Progressing  Goal: Improved Oral Intake  Outcome: Progressing  Goal: Effective Oxygenation and Ventilation  Outcome: Progressing  Intervention: Promote Airway Secretion Clearance  Recent Flowsheet Documentation  Taken 2/22/2025 0631 by Tre Rojas, RN  Activity Management:   ambulated to bathroom   back to bed  Taken 2/22/2025 0022 by Tre Rojas, RN  Cough And Deep Breathing: done independently per patient  Intervention: Optimize Oxygenation and Ventilation  Recent Flowsheet Documentation  Taken 2/22/2025 0022 by Tre Rojas, RN  Head of Bed (HOB) Positioning: HOB lowered     Goal Outcome Evaluation:    Overall Patient Progress: improvingOverall Patient Progress: improving    Outcome Evaluation: O2 support continued. On 3L NC. VSS. Pain controlled with po tylenol. SBA in room

## 2025-02-22 NOTE — PROVIDER NOTIFICATION
"   02/21/25 2016   RCAT Assessment   Reason for Assessment COPD   Pulmonary Status 3   Surgical Status 0   Chest X-ray 2   Respiratory Pattern 0   Mental Status 0   Breath Sounds 0   Cough Effectiveness 0   Level of Activity 1   O2 Required for SpO2>=92% 1   Acuity Level (points) 7   Acuity Level  4   Re-eval Interval Guideline Every 3 days   Re-evaluation Date 02/24/25   $RT Consult Time Spent RCAT (30 minute increments) 1   Clinical Indications/Symptoms   Aerosol Therapy RCAT protocol   Broncho-pulmonary Hygiene Patient unable to deep breath and cough spontaneously   Volume Expansion Prevent atelectasis   Aerosol Therapy Plan   RT Treatment Nebulizer   Aerosol Treatment Frequency Acuity Level 4: PRN V1s-Edgbxczhrxii wheezing   Aerosol Therapy (SVN)   Respiratory Treatment Status (SVN) given   Patient Position HOB elevated   Posttreatment Assessment (SVN) breath sounds unchanged   Signs of Intolerance (SVN) none   Broncho-Pulmonary Hygiene Plan   Broncho-Pulmonary Hygiene Treatment Coughing techniques   Broncho-Pulm Hygiene Frequency Acuity Level 4: BID-Unable to deep breathe & cough spontaneously   Volume Expansion Plan   Volume Expansion Treatment Incentive Spirometer   Volume Expansion Frequency Acuity Level 4: BID-Prevention of atelectasis   Breath Sounds   Breath Sounds All Fields   All Lung Fields Breath Sounds Anterior:;clear     Plan to change Q6 to TID.     /85 (BP Location: Right arm)   Pulse 92   Temp 97.9  F (36.6  C) (Oral)   Resp 17   Ht 1.676 m (5' 6\")   Wt 63.5 kg (140 lb)   LMP 05/25/2017 (Exact Date)   SpO2 95%   BMI 22.60 kg/m      Kelly Benitez, RT    "

## 2025-02-22 NOTE — PROGRESS NOTES
Care Management Discharge Note    Discharge Disposition:  Home    Discharge Services:  None    Discharge DME:  None    Discharge Transportation:  Family/Friend    Private pay costs discussed: Not applicable    Does the patient's insurance plan have a 3 day qualifying hospital stay waiver?  No    PAS Confirmation Code:  N/A  Patient/family educated on Medicare website which has current facility and service quality ratings:  N/A    Education Provided on the Discharge Plan:  yes  Persons Notified of Discharge Plans: Pt  Patient/Family in Agreement with the Plan:  yes    Handoff Referral Completed: No, handoff not indicated or clinically appropriate    Additional Information:  Sw met with the pt per her request to provide resources.  The pt was given the [facility] Community Resource Guide. She said that she has also been in contact with the care team at her PCP's clinic.  She had no additional questions or concerns for Sabrina at this time.      ADRIENNE Alatorre, [facility]  Inpatient Care Coordination  [facility]  [phone]

## 2025-02-23 ENCOUNTER — DOCUMENTATION ONLY (OUTPATIENT)
Dept: MEDSURG UNIT | Facility: CLINIC | Age: 60
End: 2025-02-23
Payer: MEDICAID

## 2025-02-23 VITALS
SYSTOLIC BLOOD PRESSURE: 142 MMHG | TEMPERATURE: 97.6 F | DIASTOLIC BLOOD PRESSURE: 95 MMHG | BODY MASS INDEX: 22.5 KG/M2 | WEIGHT: 140 LBS | RESPIRATION RATE: 18 BRPM | HEART RATE: 87 BPM | HEIGHT: 66 IN | OXYGEN SATURATION: 90 %

## 2025-02-23 PROCEDURE — 99239 HOSP IP/OBS DSCHRG MGMT >30: CPT | Performed by: INTERNAL MEDICINE

## 2025-02-23 PROCEDURE — 999N000157 HC STATISTIC RCP TIME EA 10 MIN

## 2025-02-23 PROCEDURE — 250N000012 HC RX MED GY IP 250 OP 636 PS 637: Performed by: PHYSICIAN ASSISTANT

## 2025-02-23 PROCEDURE — 250N000011 HC RX IP 250 OP 636: Performed by: PHYSICIAN ASSISTANT

## 2025-02-23 PROCEDURE — 250N000013 HC RX MED GY IP 250 OP 250 PS 637: Performed by: PHYSICIAN ASSISTANT

## 2025-02-23 PROCEDURE — 94640 AIRWAY INHALATION TREATMENT: CPT | Mod: 76

## 2025-02-23 PROCEDURE — 250N000009 HC RX 250

## 2025-02-23 PROCEDURE — 250N000013 HC RX MED GY IP 250 OP 250 PS 637: Performed by: INTERNAL MEDICINE

## 2025-02-23 RX ORDER — DOXYCYCLINE 100 MG/1
100 CAPSULE ORAL 2 TIMES DAILY
Qty: 4 CAPSULE | Refills: 0 | Status: SHIPPED | OUTPATIENT
Start: 2025-02-23

## 2025-02-23 RX ORDER — PREDNISONE 20 MG/1
40 TABLET ORAL DAILY
Qty: 4 TABLET | Refills: 0 | Status: SHIPPED | OUTPATIENT
Start: 2025-02-23

## 2025-02-23 RX ORDER — PREDNISONE 20 MG/1
40 TABLET ORAL DAILY
Qty: 4 TABLET | Refills: 0 | Status: SHIPPED | OUTPATIENT
Start: 2025-02-23 | End: 2025-02-25

## 2025-02-23 RX ADMIN — PREGABALIN 75 MG: 75 CAPSULE ORAL at 08:30

## 2025-02-23 RX ADMIN — OXYCODONE HYDROCHLORIDE 5 MG: 5 TABLET ORAL at 03:42

## 2025-02-23 RX ADMIN — ACETAMINOPHEN 650 MG: 325 TABLET, FILM COATED ORAL at 03:40

## 2025-02-23 RX ADMIN — ONDANSETRON 4 MG: 4 TABLET, ORALLY DISINTEGRATING ORAL at 03:42

## 2025-02-23 RX ADMIN — OXYCODONE HYDROCHLORIDE 5 MG: 5 TABLET ORAL at 10:20

## 2025-02-23 RX ADMIN — VENLAFAXINE HYDROCHLORIDE 75 MG: 75 CAPSULE, EXTENDED RELEASE ORAL at 08:30

## 2025-02-23 RX ADMIN — IPRATROPIUM BROMIDE AND ALBUTEROL SULFATE 3 ML: .5; 3 SOLUTION RESPIRATORY (INHALATION) at 07:18

## 2025-02-23 RX ADMIN — BACLOFEN 20 MG: 10 TABLET ORAL at 08:29

## 2025-02-23 RX ADMIN — BACLOFEN 20 MG: 10 TABLET ORAL at 13:24

## 2025-02-23 RX ADMIN — IPRATROPIUM BROMIDE AND ALBUTEROL SULFATE 3 ML: .5; 3 SOLUTION RESPIRATORY (INHALATION) at 13:24

## 2025-02-23 RX ADMIN — PREDNISONE 40 MG: 20 TABLET ORAL at 08:29

## 2025-02-23 RX ADMIN — ROPINIROLE HYDROCHLORIDE 2 MG: 2 TABLET, FILM COATED ORAL at 08:30

## 2025-02-23 RX ADMIN — GUAIFENESIN AND CODEINE PHOSPHATE 5 ML: 100; 10 SOLUTION ORAL at 06:48

## 2025-02-23 RX ADMIN — PANTOPRAZOLE SODIUM 40 MG: 40 TABLET, DELAYED RELEASE ORAL at 06:48

## 2025-02-23 RX ADMIN — AMLODIPINE BESYLATE 2.5 MG: 2.5 TABLET ORAL at 08:30

## 2025-02-23 ASSESSMENT — ACTIVITIES OF DAILY LIVING (ADL)
ADLS_ACUITY_SCORE: 46

## 2025-02-23 NOTE — DISCHARGE SUMMARY
[facility]  Hospitalist Discharge Summary      Date of Admission:  2/19/2025  Date of Discharge:  2/23/2025  Discharging Provider: Cristino Morillo MD  Discharge Service: Hospitalist Service    Discharge Diagnoses   Principal Problem:    Acute respiratory failure with hypoxia (H)  Active Problems:    Ataxic gait    COPD with acute exacerbation (H)    Scalp laceration, initial encounter        Clinically Significant Risk Factors          Follow-ups Needed After Discharge   Follow-up Appointments       Follow-up and recommended labs and tests       Follow up with primary care provider, HERB RILEY, within 7 days for hospital follow- up.  No follow up labs or test are needed.            Additional follow-up instructions/to-do's for PCP : Continue to offer support with smoking cessation     Unresulted Labs Ordered in the Past 30 Days of this Admission       Date and Time Order Name Status Description    2/19/2025  3:29 PM Blood Culture Arm, Right Preliminary     2/19/2025  2:04 PM Blood Culture Peripheral Blood Preliminary         These results will be followed up by PCP    Discharge Disposition   Discharged to home  Condition at discharge: Stable    Hospital Course   Patient is a 59-year-old female with medical history significant for COPD, MS, hypertension, RLS, GERD, CKD stage II, anxiety, and tobacco use disorder who was admitted on 2/19/2025 for acute respiratory failure with hypoxia and acute COPD exacerbation.  Patient presented after a fall and intermittent dizziness and slurred speech.  She also reported cough, shortness of breath, and wheezing.    # Acute COPD exacerbation  # Acute respiratory failure with hypoxia  # Tobacco use disorder  Patient reported 1.5 months of cough, shortness of breath, and wheezing.  In the ED, she was hypoxic with O2 saturation of 86% on room air.  She was placed on 3 L of supplemental 20 via nasal cannula.  CT brain was negative.  CTA head and  neck was unremarkable.  CT perfusion study was normal.  EKG showed sinus rhythm.  MRI brain for acute stroke causing negative, however showed nonspecific patchy and confluent white matter raising suspicion for demyelinating diseases.  Patient has history of MS.  Continue prednisone, complete total 5 days of treatment or steroids.  Continue breathing treatments  Supplemental oxygen, titrate to SpO2 of 88 to 92%  In situ spirometer  Continue Rocephin    # Probable CVA: Ruled out  Patient had MRI and CT which were all reassuring    # UTI: Patient with pansensitive E. coli  Continue Rocephin    ## Fall  # Dizziness  # slurred speech  Patient's presentation suspected to be due to UTI and possibly related to hypoxia from COPD exacerbation.  She sustained a laceration on the back of her head.  Continue Avonex  Treat COPD as above  PT and OT  Continue Lyrica and baclofen    Chronic pain syndrome:  Continue home oxycodone    # Prolonged QT HIV on Mcnally interval  Resolved    # ERON on CKD: Likely prerenal from poor oral intake.  Baseline creatinine around 0.68-0.7.  Presented with creatinine of 1.13.  Has improved to 0.97  Monitor renal function  Avoid nephrotoxins  Renal dosing of medications.  # Hypertension:  Continue amlodipine    # RLS  Continue workup for 10 Requip and ramelteon     #Anxiety:   Continue effexor     #GERD  Continue nexium.     Consultations This Hospital Stay   PHYSICAL THERAPY ADULT IP CONSULT  CARE MANAGEMENT / SOCIAL WORK IP CONSULT  RESPIRATORY CARE IP CONSULT  SMOKING CESSATION PROGRAM IP CONSULT    Code Status   Full Code    Time Spent on this Encounter   Cristino PALMER MD, personally saw the patient today and spent greater than 30 minutes discharging this patient.       Cristino Morillo MD  [address] SURGICAL  [address] [place] [address]  Phone: [phone]  Fax:  [phone]  ______________________________________________________________________    Physical Exam   Vital Signs: Temp: 97.6  F (36.4  C) Temp src: Oral BP: (!) 142/95 Pulse: 87   Resp: 18 SpO2: (!) 90 % O2 Device: None (Room air) Oxygen Delivery: 2 LPM  Weight: 140 lbs 0 oz  General: AAOx3, NAD, pleasant.  HEENT: NCAT, pupils are equal and round, anicteric, oral mucosa is moist.  Neck: Supple.   Cardiac: RRR.  No murmur. No rub or gallop.  Respiratory: Fine wheezes. No crackles, rales, or rhonchi  Abdomen: Soft, NT, ND, + bowel sounds  Extremity: No LE edema,    Neuro: AAO x3,   Psych: Calm and cooperative.           Primary Care Physician   HERB RILEY    Discharge Orders      Primary Care - Care Coordination Referral      Reason for your hospital stay    Acute respiratory failure with hypoxia.   Acute COPD exacerbation.     Follow-up and recommended labs and tests     Follow up with primary care provider, HERB RILEY, within 7 days for hospital follow- up.  No follow up labs or test are needed.     Activity    Your activity upon discharge: activity as tolerated     Oxygen Order    Oxygen Documentation  I certify that this patient, Hina Yap has been under my care (or a nurse practitioner or physican's assistant working with me). This is the face-to-face encounter for oxygen medical necessity.      At the time of this encounter, I have reviewed the qualifying testing and have determined that supplemental oxygen is reasonable and necessary and is expected to improve the patient's condition in a home setting.         Patient has continued oxygen desaturation due to COPD J44.9.    If portability is ordered, is the patient mobile within the home? yes    Was this visit performed as a telehealth visit: No     Diet    Follow this diet upon discharge: Regular       Significant Results and Procedures   Results for orders placed or performed during the hospital encounter of 02/19/25    CT Head w/o Contrast    Narrative    EXAM: CT HEAD W/O CONTRAST  LOCATION: [facility]  DATE: 2/19/2025    INDICATION: Code Stroke to evaluate for potential thrombolysis and thrombectomy. Neurological symptoms.  COMPARISON: None.  TECHNIQUE: Routine CT Head without IV contrast. Multiplanar reformats. Dose reduction techniques were used.    FINDINGS: No evidence of hemorrhage. Background of nonspecific patchy white matter hypoattenuation presumably representing chronic small vessel ischemic change, similar compared to the prior. Scattered vascular calcifications. Basilar tip appears   slightly dense. No acute osseous abnormality.      Impression    IMPRESSION:  1.  No evidence of hemorrhage.  2.  Scattered nonspecific patchy white matter hypoattenuation presumably representing chronic small vessel ischemic change, similar compared to the prior.  3.  Basilar tip appears slightly dense, possibly atherosclerotic, although thrombosis not excluded. Recommend CTA correlation.    Findings were discussed by phone between Dr. Harris and Dr. Lorenzo at approximately 2:19 PM 2/19/2025.   CTA Head Neck with Contrast    Narrative    EXAM: CTA HEAD NECK W CONTRAST  LOCATION: [facility]  DATE: 2/19/2025    INDICATION: Code Stroke to evaluate for potential thrombolysis and thrombectomy. PLEASE READ IMMEDIATELY. Neurological symptoms.  COMPARISON: None.  CONTRAST: 67 mL Isovue 370  TECHNIQUE: Axial helical CT images of the head and neck vessels obtained during the arterial phase of intravenous contrast administration. Axial 2D reconstructed images and multiplanar 3D MIP reconstructed images of the head and neck vessels were   performed by the technologist. Dose reduction techniques were used. All stenosis measurements made according to NASCET criteria unless otherwise specified.    FINDINGS:     HEAD CTA:  No evidence of large vessel occlusion, high-grade stenosis, aneurysm, or  high-flow vascular malformation.    NECK CTA:  No evidence of large vessel occlusion, high-grade stenosis, or dissection.    NONVASCULAR STRUCTURES: Cervical spine degenerative change. Pulmonary emphysema. Presumed scattered pulmonary atelectasis. Small hypoattenuating right tonsillar lesion measuring about 9 mm, nonspecific.      Impression    IMPRESSION:     HEAD CTA:   1.  No evidence of large vessel occlusion or high-grade stenosis.    NECK CTA:  1.  No evidence of large vessel occlusion or high-grade stenosis.  2.  Small right tonsillar, possibly benign cyst. Direct visualization would be typically recommended.   CT Head Perfusion w Contrast - For Tier 2 Stroke    Narrative    EXAM: CT HEAD PERFUSION W CONTRAST  LOCATION: [facility]  DATE: 2/19/2025    INDICATION: Code Stroke to evaluate for potential thrombolysis and thrombectomy. Evaluate mismatch between penumbra and core infarct. READ IMMEDIATELY. Neurological symptoms.  COMPARISON: None.  TECHNIQUE: CT cerebral perfusion was performed utilizing a second contrast bolus. Perfusion data were post processed with generation of standard perfusion maps and estimation of ischemic/infarcted volumes utilizing standard threshold values. Dose   reduction techniques were used.   CONTRAST: 50 mL Isovue 370      Impression    IMPRESSION: Unremarkable CT perfusion. No convincing focal perfusion defect.   XR Chest 2 Views    Narrative    EXAM: XR CHEST 2 VIEWS  LOCATION: [facility]  DATE: 2/19/2025    INDICATION: Cough.  COMPARISON: 12/13/2023      Impression    IMPRESSION: Cardiac silhouette is within normal limits. Linear atelectasis is noted in the right midlung. Elevation of the right hemidiaphragm is more significant than on the previous study. Mild atelectasis along the right hemidiaphragm. Otherwise, the   lungs are clear. No significant bony abnormalities.   MR Brain w/o & w Contrast    Narrative    EXAM: MR BRAIN W/O  and W CONTRAST  LOCATION: [facility]  DATE: 2/19/2025    INDICATION: dizziness, slurred speech  COMPARISON: CT head perfusion 2/19/2025  CONTRAST: 6.5 mL Gadavist  TECHNIQUE: Routine multiplanar multisequence head MRI without and with intravenous contrast.    FINDINGS:  INTRACRANIAL CONTENTS: No acute or subacute infarct. No mass, acute hemorrhage, or extra-axial fluid collections. There are patchy and somewhat confluent predominantly periventricular but also subcortical white matter foci of FLAIR hyperintensity.   Several of these are oriented perpendicular to the corpus callosum. A few demonstrate intrinsic T1 hypointensity. Normal ventricles and sulci. Normal position of the cerebellar tonsils. No pathologic contrast enhancement.    SELLA: No abnormality accounting for technique.    OSSEOUS STRUCTURES/SOFT TISSUES: Normal marrow signal. The major intracranial vascular flow voids are maintained.     ORBITS: No abnormality accounting for technique.     SINUSES/MASTOIDS: Mucosal thickening primarily involving the ethmoid air cells. No middle ear or mastoid effusion.       Impression    IMPRESSION:  1.  Negative for acute intracranial abnormality.  2.  Nonspecific patchy and confluent white matter FLAIR hyperintensities. Appearance is atypical for chronic small vessel ischemic disease and raises suspicion for demyelinating disease.         Discharge Medications   Current Discharge Medication List        START taking these medications    Details   doxycycline hyclate (VIBRAMYCIN) 100 MG capsule Take 1 capsule (100 mg) by mouth 2 times daily.  Qty: 4 capsule, Refills: 0    Associated Diagnoses: COPD with acute exacerbation (H)      predniSONE (DELTASONE) 20 MG tablet Take 2 tablets (40 mg) by mouth daily for 2 days.  Qty: 4 tablet, Refills: 0    Associated Diagnoses: COPD with acute exacerbation (H)           CONTINUE these medications which have NOT CHANGED    Details   albuterol (PROAIR  HFA/PROVENTIL HFA/VENTOLIN HFA) 108 (90 Base) MCG/ACT inhaler Inhale 2 puffs into the lungs every 4 hours as needed for shortness of breath or wheezing  Qty: 18 g, Refills: 1    Comments: Pharmacy may dispense brand covered by insurance (Proair, or proventil or ventolin or generic albuterol inhaler)  Associated Diagnoses: Chronic obstructive pulmonary disease, unspecified COPD type (H)      amLODIPine (NORVASC) 2.5 MG tablet Take 1 tablet (2.5 mg) by mouth daily  Qty: 90 tablet, Refills: 3    Associated Diagnoses: Essential hypertension      amphetamine-dextroamphetamine (ADDERALL XR) 30 MG 24 hr capsule Take 1 capsule (30 mg) by mouth every morning. Prescribed by  Qty: 30 capsule, Refills: 0    Associated Diagnoses: Other fatigue; MS (multiple sclerosis) (H)      AVONEX PEN 30 MCG/0.5ML auto-injector kit Inject 30 mcg into the muscle every 7 days       baclofen (LIORESAL) 20 MG tablet Take 20 mg by mouth 4 times daily Per Neurologist      budesonide-formoterol (SYMBICORT) 80-4.5 MCG/ACT Inhaler Inhale 2 puffs into the lungs 2 times daily  Qty: 10.2 g, Refills: 11    Associated Diagnoses: Chronic obstructive pulmonary disease, unspecified COPD type (H)      cycloSPORINE (RESTASIS) 0.05 % ophthalmic emulsion Place 1 drop into both eyes 2 times daily      esomeprazole (NEXIUM) 40 MG DR capsule Take 1 capsule (40 mg) by mouth every morning (before breakfast) Take 30-60 minutes before eating.  Qty: 30 capsule, Refills: 11    Associated Diagnoses: Gastroesophageal reflux disease, unspecified whether esophagitis present      hydrocortisone 1 % CREA cream Place rectally 2 times daily as needed for itching      multivitamin (ONE-DAILY) tablet Take 1 tablet by mouth daily  Qty: 90 tablet, Refills: 3      naloxone (NARCAN) 4 MG/0.1ML nasal spray Spray 1 spray (4 mg) into one nostril alternating nostrils once as needed for opioid reversal. every 2-3 minutes until assistance arrives  Qty: 0.2 mL, Refills: 0    Associated  Diagnoses: Other chronic pain      oxyCODONE-acetaminophen (PERCOCET)  MG per tablet Take 1 tablet by mouth every 6 hours as needed for severe pain (Max of 5 tablets per day, for chroic pain.)      polyethylene glycol (MIRALAX) 17 GM/Dose powder Mix 1 capful with 6-8 ounces of clear liquid and take by mouth twice daily as needed for constipation. Decrease dose to once daily or once every 2-3 days to prevent constipation.  Qty: 527 g, Refills: 0      pregabalin (LYRICA) 75 MG capsule Take 1 capsule (75 mg) by mouth 2 times daily      promethazine (PHENERGAN) 25 MG tablet Take 25 mg by mouth every 6 hours as needed for nausea Takes with each Percocet dose      !! rOPINIRole (REQUIP) 2 MG tablet Take 4 mg by mouth at bedtime. And 2mg every morning.      !! rOPINIRole (REQUIP) 2 MG tablet Take 2 mg by mouth every morning And 4 mg at bedtime. Prescribed by neurologist      SPIRIVA RESPIMAT 2.5 MCG/ACT inhaler INHALE 2 PUFFS INTO THE LUNGS DAILY  Qty: 4 g, Refills: 1    Associated Diagnoses: Chronic obstructive pulmonary disease, unspecified COPD type (H)      !! venlafaxine (EFFEXOR XR) 75 MG 24 hr capsule Take 75 mg by mouth every morning. 1 capsule every morning and 2 capsules every evening      !! venlafaxine (EFFEXOR XR) 75 MG 24 hr capsule Take 150 mg by mouth every evening. 1 capsule every morning and 2 capsules every evening       !! - Potential duplicate medications found. Please discuss with provider.        Allergies   Allergies   Allergen Reactions    Sulfa Antibiotics Rash    Rosuvastatin     Adhesive Tape Rash     Reacts to adhesive on fentanyl patch, tapes, all kinds with prolonged duration    Wellbutrin [Bupropion Hydrobromide] Rash

## 2025-02-23 NOTE — PROGRESS NOTES
Physical Therapy Discharge Summary    Reason for therapy discharge:    Discharged to home.    Progress towards therapy goal(s). See goals on Care Plan in [facility] electronic health record for goal details.  Goals partially met.  Barriers to achieving goals:   discharge from facility.    Therapy recommendation(s):    Continued therapy is recommended.  Rationale/Recommendations:  PT recommends pt to use SPC (she owns one) for balance given gait impairment and OPPT for balance and gait; pt reports she will not follow up with either of these.

## 2025-02-23 NOTE — CARE PLAN
Patient has been assessed for Home Oxygen needs. Oxygen readings:    *Pulse oximetry (SpO2) = 90% on room air at rest while awake.    *SpO2 improved to 92% on 2liters/minute at rest.    *SpO2 = 86% on room air during activity/with exercise.    *SpO2 improved to 89% on 2liters/minute during activity/with exercise.

## 2025-02-23 NOTE — CARE PLAN
Discharge Note    Patient discharged to home via private vehicle  accompanied by friend.  IV: Discontinued  Prescriptions filled and given to patient/family.   Belongings reviewed and sent with patient.   Home medications returned to patient: NA  Equipment sent with: patient.   patient verbalizes understanding of discharge instructions. AVS given to patient.  Additional education completed? Oxygen Therapy prescribed

## 2025-02-23 NOTE — CARE PLAN
02/23/25 1000   Home Oxygen Assessment (RN/RT ONLY)   Does patient have oxygen at home? No   1. SpO2 on room air at rest while awake 90       Oxygen LPM at rest 2   3. SpO2 on room air during Activity/with exercise 86   4. SpO2 with oxygen during activity/with exercise 90       Oxygen LPM during activity/with exercise 2   Does patient qualify for Home O2? Yes

## 2025-02-23 NOTE — PLAN OF CARE
"Goal Outcome Evaluation:      Plan of Care Reviewed With: patient    Overall Patient Progress: improvingOverall Patient Progress: improving    Outcome Evaluation: A/Ox4. IV and PO abx given. Pt noted to be constipated. Prn stool softener given. Staples on back of head present. SBA, pt sometimes shaky due to MS w/ unstable gait. Dyspnea upon exertion. Cough noted, prn robitussin given. Reg diet. No oxygen supplement needed during shift. Denied any s/s of UTI. Might discharge tomorrow, pending oxygen reassessment. Complained of pain, prn oxy given.      Problem: Adult Inpatient Plan of Care  Goal: Plan of Care Review  Description: The Plan of Care Review/Shift note should be completed every shift.  The Outcome Evaluation is a brief statement about your assessment that the patient is improving, declining, or no change.  This information will be displayed automatically on your shift  note.  Outcome: Progressing  Flowsheets (Taken 2/22/2025 2218)  Outcome Evaluation: A/Ox4. IV and PO abx given. Pt noted to be constipated. Prn stool softener given. Staples on back of head present. SBA, pt sometimes shaky due to MS w/ unstable gait. Dyspnea upon exertion. Cough noted, prn robitussin given. Reg diet. No oxygen supplement needed during shift. Denied any s/s of UTI. Might discharge tomorrow, pending oxygen reassessment.  Plan of Care Reviewed With: patient  Overall Patient Progress: improving  Goal: Patient-Specific Goal (Individualized)  Description: You can add care plan individualizations to a care plan. Examples of Individualization might be:  \"Parent requests to be called daily at 9am for status\", \"I have a hard time hearing out of my right ear\", or \"Do not touch me to wake me up as it startles  me\".  Outcome: Progressing  Goal: Absence of Hospital-Acquired Illness or Injury  Outcome: Progressing  Intervention: Identify and Manage Fall Risk  Recent Flowsheet Documentation  Taken 2/22/2025 1959 by Medina Mcgraw, " RN  Safety Promotion/Fall Prevention:   activity supervised   nonskid shoes/slippers when out of bed   safety round/check completed  Intervention: Prevent Skin Injury  Recent Flowsheet Documentation  Taken 2/22/2025 1959 by Medina Mcgraw RN  Body Position: position changed independently  Intervention: Prevent Infection  Recent Flowsheet Documentation  Taken 2/22/2025 1959 by Medina Mcgraw RN  Infection Prevention:   cohorting utilized   personal protective equipment utilized   hand hygiene promoted   rest/sleep promoted   single patient room provided  Goal: Optimal Comfort and Wellbeing  Outcome: Progressing  Goal: Readiness for Transition of Care  Outcome: Progressing     Problem: Pain Chronic (Persistent)  Goal: Optimal Pain Control and Function  Outcome: Progressing  Intervention: Manage Persistent Pain  Recent Flowsheet Documentation  Taken 2/22/2025 1959 by Medina Mcgraw RN  Medication Review/Management: medications reviewed     Problem: Gas Exchange Impaired  Goal: Optimal Gas Exchange  Outcome: Progressing  Intervention: Optimize Oxygenation and Ventilation  Recent Flowsheet Documentation  Taken 2/22/2025 1959 by Medina Mcgraw RN  Head of Bed (HOB) Positioning: HOB at 45 degrees     Problem: Comorbidity Management  Goal: Maintenance of COPD Symptom Control  Outcome: Progressing  Intervention: Maintain COPD Symptom Control  Recent Flowsheet Documentation  Taken 2/22/2025 1959 by Medina Mcgraw RN  Medication Review/Management: medications reviewed  Goal: Blood Pressure in Desired Range  Outcome: Progressing  Intervention: Maintain Blood Pressure Management  Recent Flowsheet Documentation  Taken 2/22/2025 1959 by Medina Mcgraw RN  Medication Review/Management: medications reviewed     Problem: UTI (Urinary Tract Infection)  Goal: Improved Infection Symptoms  Outcome: Progressing     Problem: COPD (Chronic Obstructive Pulmonary Disease)  Goal: Optimal Chronic Illness Coping  Outcome:  Progressing  Goal: Optimal Level of Functional Aldie  Outcome: Progressing  Goal: Absence of Infection Signs and Symptoms  Outcome: Progressing  Goal: Improved Oral Intake  Outcome: Progressing  Goal: Effective Oxygenation and Ventilation  Outcome: Progressing  Intervention: Optimize Oxygenation and Ventilation  Recent Flowsheet Documentation  Taken 2/22/2025 1959 by Medina Mcgraw, RN  Head of Bed (HOB) Positioning: HOB at 45 degrees

## 2025-02-23 NOTE — PLAN OF CARE
"For vital signs and complete assessments, please see documentation flowsheets.     [id number]  Pertinent assessments:  Pt A&Ox4. SBA in room. On RA. Afebrile. VSS. C/o nausea, cough & pain. Given po tylenol, oxy, Robitussin AC & Zofran prn. PIV saline locked.    Major Shift Events: None    Treatment Plan: Monitor respiratory status. Nebs. IV Rocephin. Pain management. Discharge TBD.    Bedside Nurse: Tre Rojas RN     Problem: Adult Inpatient Plan of Care  Goal: Plan of Care Review  Description: The Plan of Care Review/Shift note should be completed every shift.  The Outcome Evaluation is a brief statement about your assessment that the patient is improving, declining, or no change.  This information will be displayed automatically on your shift  note.  Outcome: Progressing  Flowsheets (Taken 2/23/2025 0512)  Outcome Evaluation: On RA. VSS. Pain controlled with oxy & tylenol. PIV saline locked.  Plan of Care Reviewed With: patient  Overall Patient Progress: improving  Goal: Patient-Specific Goal (Individualized)  Description: You can add care plan individualizations to a care plan. Examples of Individualization might be:  \"Parent requests to be called daily at 9am for status\", \"I have a hard time hearing out of my right ear\", or \"Do not touch me to wake me up as it startles  me\".  Outcome: Progressing  Goal: Absence of Hospital-Acquired Illness or Injury  Outcome: Progressing  Intervention: Identify and Manage Fall Risk  Recent Flowsheet Documentation  Taken 2/23/2025 0012 by Tre Rojas, RN  Safety Promotion/Fall Prevention:   activity supervised   assistive device/personal items within reach   clutter free environment maintained   increase visualization of patient   nonskid shoes/slippers when out of bed   room near nurse's station   room organization consistent   safety round/check completed   treat reversible contributory factors  Intervention: Prevent Skin Injury  Recent Flowsheet " Documentation  Taken 2/23/2025 0012 by Tre Rojas RN  Body Position: position changed independently  Intervention: Prevent and Manage VTE (Venous Thromboembolism) Risk  Recent Flowsheet Documentation  Taken 2/23/2025 0012 by Tre Rojas RN  VTE Prevention/Management: SCDs off (sequential compression devices)  Intervention: Prevent Infection  Recent Flowsheet Documentation  Taken 2/23/2025 0012 by Tre Rojas RN  Infection Prevention:   cohorting utilized   hand hygiene promoted   rest/sleep promoted   single patient room provided  Goal: Optimal Comfort and Wellbeing  Outcome: Progressing  Goal: Readiness for Transition of Care  Outcome: Progressing     Problem: Pain Chronic (Persistent)  Goal: Optimal Pain Control and Function  Outcome: Progressing  Intervention: Manage Persistent Pain  Recent Flowsheet Documentation  Taken 2/23/2025 0012 by Tre Rojas RN  Medication Review/Management: medications reviewed     Problem: Gas Exchange Impaired  Goal: Optimal Gas Exchange  Outcome: Progressing  Intervention: Optimize Oxygenation and Ventilation  Recent Flowsheet Documentation  Taken 2/23/2025 0012 by Tre Rojas RN  Head of Bed (HOB) Positioning: HOB at 20-30 degrees     Problem: Comorbidity Management  Goal: Maintenance of COPD Symptom Control  Outcome: Progressing  Intervention: Maintain COPD Symptom Control  Recent Flowsheet Documentation  Taken 2/23/2025 0012 by Tre Rojas RN  Medication Review/Management: medications reviewed  Goal: Blood Pressure in Desired Range  Outcome: Progressing  Intervention: Maintain Blood Pressure Management  Recent Flowsheet Documentation  Taken 2/23/2025 0012 by Tre Rojas RN  Medication Review/Management: medications reviewed     Problem: UTI (Urinary Tract Infection)  Goal: Improved Infection Symptoms  Outcome: Progressing     Problem: COPD (Chronic Obstructive Pulmonary Disease)  Goal: Optimal Chronic Illness Coping  Outcome:  Progressing  Goal: Optimal Level of Functional [place]  Outcome: Progressing  Goal: Absence of Infection Signs and Symptoms  Outcome: Progressing  Goal: Improved Oral Intake  Outcome: Progressing  Goal: Effective Oxygenation and Ventilation  Outcome: Progressing  Intervention: Promote Airway Secretion Clearance  Recent Flowsheet Documentation  Taken 2/23/2025 0012 by Tre Rojas, RN  Cough And Deep Breathing: done independently per patient  Intervention: Optimize Oxygenation and Ventilation  Recent Flowsheet Documentation  Taken 2/23/2025 0012 by Tre Rojas, RN  Head of Bed (HOB) Positioning: HOB at 20-30 degrees     Goal Outcome Evaluation:      Plan of Care Reviewed With: patient    Overall Patient Progress: improvingOverall Patient Progress: improving    Outcome Evaluation: On RA. VSS. Pain controlled with oxy & tylenol. PIV saline locked.

## 2025-02-23 NOTE — PLAN OF CARE
"Goal Outcome Evaluation:      Plan of Care Reviewed With: patient    Overall Patient Progress: improvingOverall Patient Progress: improving    Outcome Evaluation: on room air @90%    To Do:  End of Shift Summary  For vital signs and complete assessments, please see documentation flowsheets.       Pertinent assessments:  Pt A&Ox4.  On RA.  VSS. denies nausea & pain. Given PRN oxy for pain, PIV discontinue, SBA in the room taking home oxygen    Major Shift Events: Discharge to home     Treatment Plan: doxycycline and prednisone PO     Problem: Adult Inpatient Plan of Care  Goal: Plan of Care Review  Description: The Plan of Care Review/Shift note should be completed every shift.  The Outcome Evaluation is a brief statement about your assessment that the patient is improving, declining, or no change.  This information will be displayed automatically on your shift  note.  Outcome: Progressing  Flowsheets (Taken 2/23/2025 1303)  Outcome Evaluation: on room air @90%  Plan of Care Reviewed With: patient  Overall Patient Progress: improving  Goal: Patient-Specific Goal (Individualized)  Description: You can add care plan individualizations to a care plan. Examples of Individualization might be:  \"Parent requests to be called daily at 9am for status\", \"I have a hard time hearing out of my right ear\", or \"Do not touch me to wake me up as it startles  me\".  Outcome: Progressing  Goal: Absence of Hospital-Acquired Illness or Injury  Outcome: Progressing  Intervention: Identify and Manage Fall Risk  Recent Flowsheet Documentation  Taken 2/23/2025 8555 by Marry Pritchett RN  Safety Promotion/Fall Prevention:   activity supervised   lighting adjusted   nonskid shoes/slippers when out of bed   room near nurse's station   safety round/check completed   assistive device/personal items within reach   clutter free environment maintained   increase visualization of patient   room organization consistent   treat reversible contributory " factors  Intervention: Prevent Skin Injury  Recent Flowsheet Documentation  Taken 2/23/2025 0830 by Marry Pritchett RN  Body Position: position changed independently  Intervention: Prevent and Manage VTE (Venous Thromboembolism) Risk  Recent Flowsheet Documentation  Taken 2/23/2025 0830 by Marry Pritchett RN  VTE Prevention/Management: SCDs off (sequential compression devices)  Intervention: Prevent Infection  Recent Flowsheet Documentation  Taken 2/23/2025 0830 by Marry Pritchett RN  Infection Prevention:   cohorting utilized   personal protective equipment utilized   hand hygiene promoted   rest/sleep promoted   single patient room provided  Goal: Optimal Comfort and Wellbeing  Outcome: Progressing  Goal: Readiness for Transition of Care  Outcome: Progressing     Problem: Pain Chronic (Persistent)  Goal: Optimal Pain Control and Function  Outcome: Progressing  Intervention: Manage Persistent Pain  Recent Flowsheet Documentation  Taken 2/23/2025 0830 by Marry Pritchett RN  Medication Review/Management: medications reviewed     Problem: Gas Exchange Impaired  Goal: Optimal Gas Exchange  Outcome: Progressing  Intervention: Optimize Oxygenation and Ventilation  Recent Flowsheet Documentation  Taken 2/23/2025 0830 by Marry Pritchett RN  Airway/Ventilation Management: oxygen therapy provided  Head of Bed (HOB) Positioning: HOB at 20-30 degrees     Problem: Comorbidity Management  Goal: Maintenance of COPD Symptom Control  Outcome: Progressing  Intervention: Maintain COPD Symptom Control  Recent Flowsheet Documentation  Taken 2/23/2025 0830 by Marry Pritchett RN  Medication Review/Management: medications reviewed  Goal: Blood Pressure in Desired Range  Outcome: Progressing  Intervention: Maintain Blood Pressure Management  Recent Flowsheet Documentation  Taken 2/23/2025 0830 by Marry Pritchett RN  Medication Review/Management: medications reviewed     Problem: UTI (Urinary Tract Infection)  Goal: Improved Infection  Symptoms  Outcome: Progressing     Problem: COPD (Chronic Obstructive Pulmonary Disease)  Goal: Optimal Chronic Illness Coping  Outcome: Progressing  Goal: Optimal Level of Functional [place]  Outcome: Progressing  Intervention: Optimize Functional Ability  Recent Flowsheet Documentation  Taken 2/23/2025 0830 by Marry Pritchett RN  Activity Management:   activity adjusted per tolerance   activity encouraged   ambulated in room  Goal: Absence of Infection Signs and Symptoms  Outcome: Progressing  Goal: Improved Oral Intake  Outcome: Progressing  Goal: Effective Oxygenation and Ventilation  Outcome: Progressing  Intervention: Promote Airway Secretion Clearance  Recent Flowsheet Documentation  Taken 2/23/2025 0830 by Marry Pritchett RN  Cough And Deep Breathing: done independently per patient  Activity Management:   activity adjusted per tolerance   activity encouraged   ambulated in room  Intervention: Optimize Oxygenation and Ventilation  Recent Flowsheet Documentation  Taken 2/23/2025 0830 by Marry Pritchett RN  Airway/Ventilation Management: oxygen therapy provided  Head of Bed (HOB) Positioning: HOB at 20-30 degrees

## 2025-02-24 ENCOUNTER — PATIENT OUTREACH (OUTPATIENT)
Dept: CARE COORDINATION | Facility: CLINIC | Age: 60
End: 2025-02-24
Payer: MEDICAID

## 2025-02-24 LAB
BACTERIA BLD CULT: NO GROWTH
BACTERIA BLD CULT: NO GROWTH

## 2025-02-24 ASSESSMENT — ACTIVITIES OF DAILY LIVING (ADL): DEPENDENT_IADLS:: INDEPENDENT

## 2025-02-24 NOTE — PROGRESS NOTES
"Clinic Care Coordination Contact  OUTREACH with Post Discharge Assessment    Referral Information:  Referral Source: IP Handoff  Primary Diagnosis: Respiratory Disorders - other    Chief Complaint   Patient presents with    Clinic Care Coordination - Post Hospital    Clinic Care Coordination - Initial     Universal Utilization: 77.5% Risk of Admission or ED Visit   Clinic Utilization  Difficulty keeping appointments:: No  Compliance Concerns: No  No-Show Concerns: No  No PCP office visit in Past Year: No  Utilization      No Show Count (past year)  0             ED Visits  2             Hospital Admissions  1                    Current as of: 2/24/2025  7:05 PM              Clinical Concerns:  Current Medical Concerns:    Patient Active Problem List   Diagnosis    MS (multiple sclerosis) (H)-[facility] clinic-Takes Adderall    Cardiomyopathy (H)-Cards wants to see her q2 years    Chronic obstructive pulmonary disease, unspecified COPD type (H)    Anxiety    Restless leg syndrome    Moderate episode of recurrent major depressive disorder (H)    Other chronic pain-sees pain clinic-opioids    CKD (chronic kidney disease) stage 2, GFR 60-89 ml/min    Insomnia, unspecified type    Prolonged Q-T interval on ECG    Essential hypertension    Nonrheumatic mitral valve regurgitation    S/P lumbar fusion    Itching    Urinary urgency    Falls frequently    Subclinical hypothyroidism    Ataxic gait    COPD with acute exacerbation (H)    Acute respiratory failure with hypoxia (H)    Scalp laceration, initial encounter      Finally quit smoking a month ago.  Not accepting the oxygen very well.   \"I was laying around for two months not moving around very much. Didn't want to go in, but went in after I fell. I'm short of breath when I move around because I'm trying to pack because I'm getting kicked out on 02/28\". Owner is selling the house.\" RNCC attempted to offer patient some resources such as the [facility] Recovery Housing " "hotline, patient then shares that the owner is \"Trying to help me look for other places to go. Everyone's calling me a hoarder. I just started therapy for that. Back in October went through everything for housing & nothing was going to be available for 6-12 months. I'm not going into a shelter. I will never survive in a shelter. I'm afraid people will take my medications & beat me up, & I cannot be in a shelter. I'd rather sleep in my car\".   RNCC then discussed & offered patient medical respite resource. Patient responded:   \"I don't know what to say right now because he has nowhere to go, his credit is bad, I don't know if I can leave him hanging. I have a hard time asking for help. Everything is not working right for me in my head. I don't know what I need right now. Everything is so difficult. Trying to get on the CADI waiver, but never heard anything back. I have to get the staples out of my head, but I don't have the time to come in for a follow up\".     \"I still drive, but a lot of places I won't go because I don't feel comfortable with the area. Supposed to have a procedure at the pain clinic which needs a person to be with you, I try to schedule things on his one and only day off\". RNCC discussed & reviewed medical transportation through her health insurance plan & patient acknowledged this resource.     Current Behavioral Concerns: patient speaking normal rate & tone of speech.     Education Provided to patient: Care Coordination role, clinic after hours, medications, appointments, After Visit Summary, community resources discussed/reviewed. Support provided.    Pain  Pain (GOAL):: No (chronic. working with [facility] pain clinic. no concerns.)  Health Maintenance Reviewed: Due/Overdue   Health Maintenance Due   Topic Date Due    LUNG CANCER SCREENING  08/25/2019    YEARLY PREVENTIVE VISIT  05/18/2024    COLORECTAL CANCER SCREENING  06/17/2024    COVID-19 Vaccine (5 - 2024-25 season) 09/01/2024    LIPID  " "10/04/2024    MICROALBUMIN  10/04/2024    URINE DRUG SCREEN  10/04/2024    PHQ-9  03/04/2025      Clinical Pathway: None    Transitions of Care Outreach  Most Recent Admission Date: 2/19/2025   Most Recent Admission Diagnosis: Ataxic gait - R26.0  COPD with acute exacerbation (H) - J44.1  Acute cystitis without hematuria - N30.00  Acute respiratory failure with hypoxia (H) - J96.01  Scalp laceration, initial encounter - S01.01XA   Most Recent Discharge Date: 2/23/2025   Most Recent Discharge Diagnosis: Acute respiratory failure with hypoxia (H) - J96.01  COPD with acute exacerbation (H) - J44.1  Ataxic gait - R26.0  Scalp laceration, initial encounter - S01.01XA  Acute cystitis without hematuria - N30.00  Chronic obstructive pulmonary disease, unspecified COPD type (H) - J44.9     Transitions of Care Assessment  Discharge Assessment  How are you doing now that you are home?: \"getting kicked out on 02/28\"  How are your symptoms? (Red Flag symptoms escalate to triage hotline per guidelines): Improved  Do you know how to contact your clinic care team if you have future questions or changes to your health status? : Yes  Does the patient have their discharge instructions? : Yes  Does the patient have questions regarding their discharge instructions? : No  Were you started on any new medications or were there changes to any of your previous medications? : Yes  Does the patient have all of their medications?: Yes  Do you have questions regarding any of your medications? : No  Do you have all of your needed medical supplies or equipment (DME)?  (i.e. oxygen tank, CPAP, cane, etc.): Yes         Post-op (Clinicians Only)  Did the patient have surgery or a procedure: No    Medication Management:  Medication review status: Medications reviewed and no changes reported per patient.           Functional Status:  Dependent ADLs:: Independent  Dependent IADLs:: Independent  Bed or wheelchair confined:: No  Mobility Status: " Independent  Fallen 2 or more times in the past year?: No  Any fall with injury in the past year?: Yes    Living Situation:  Current living arrangement:: I live in a private home, I live in a private home with spouse  Type of residence:: Private home - stairs    Lifestyle & Psychosocial Needs:    Social Drivers of Health     Food Insecurity: High Risk (2/20/2025)    Food Insecurity     Within the past 12 months, did you worry that your food would run out before you got money to buy more?: Yes     Within the past 12 months, did the food you bought just not last and you didn t have money to get more?: Yes   Depression: At risk (9/4/2024)    PHQ-2     PHQ-2 Score: 6   Housing Stability: High Risk (2/20/2025)    Housing Stability     Do you have housing? : Yes     Are you worried about losing your housing?: Yes   Tobacco Use: High Risk (11/18/2024)    Patient History     Smoking Tobacco Use: Every Day     Smokeless Tobacco Use: Never     Passive Exposure: Not on file   Financial Resource Strain: Low Risk  (2/20/2025)    Financial Resource Strain     Within the past 12 months, have you or your family members you live with been unable to get utilities (heat, electricity) when it was really needed?: No   Alcohol Use: Not At Risk (12/15/2022)    AUDIT-C     Frequency of Alcohol Consumption: Never     Average Number of Drinks: Patient does not drink     Frequency of Binge Drinking: Never   Transportation Needs: High Risk (2/20/2025)    Transportation Needs     Within the past 12 months, has lack of transportation kept you from medical appointments, getting your medicines, non-medical meetings or appointments, work, or from getting things that you need?: Yes   Physical Activity: Inactive (12/15/2022)    Exercise Vital Sign     Days of Exercise per Week: 0 days     Minutes of Exercise per Session: 0 min   Interpersonal Safety: Low Risk  (2/20/2025)    Interpersonal Safety     Do you feel physically and emotionally safe where  you currently live?: Yes     Within the past 12 months, have you been hit, slapped, kicked or otherwise physically hurt by someone?: No     Within the past 12 months, have you been humiliated or emotionally abused in other ways by your partner or ex-partner?: No   Stress: Stress Concern Present (12/15/2022)    [employer] - Occupational Stress Questionnaire     Feeling of Stress : Very much   Social Connections: Socially Isolated (12/15/2022)    Social Connection and Isolation Panel [NHANES]     Frequency of Communication with Friends and Family: Never     Frequency of Social Gatherings with Friends and Family: Once a week     Attends Taoist Services: More than 4 times per year     Active Member of Clubs or Organizations: No     Attends Club or Organization Meetings: Not on file     Marital Status: Never    Health Literacy: Not on file     Diet:: Regular  Inadequate nutrition (GOAL):: No  Tube Feeding: No  Inadequate activity/exercise (GOAL):: No  Significant changes in sleep pattern (GOAL): No  Transportation means:: Regular car  Taoist or spiritual beliefs that impact treatment:: No  Mental health DX:: Yes  Mental health DX how managed:: Medication, Outpatient Counseling  Mental health management concern (GOAL):: No  Chemical Dependency Status: No Current Concerns  Chemical Dependency Management:  (NA)  Informal Support system:: Children, Significant other      Resources and Interventions:  Current Resources:   Community Resources:  Mental Health, County Programs, DME  Supplies Currently Used at Home: Oxygen Tubing/Supplies  Equipment Currently Used at Home: canealisha  Employment Status: disabled  Advance Care Plan/Directive  Advanced Care Plans/Directives on file:: No  Discussed with patient/caregiver:: Declined Further Information    Referrals Placed: None     Care Plan:   Care Plan: Utilization       Problem: Increased risk of re-admission       Long-Range Goal:  I would like additional resources and support to manage my health and prevent future avoidable ED visits/hospital admissions       Start Date: 2/24/2025 Expected End Date: 8/29/2025    Note:     Barriers: diagnosis of multiple, chronic, complex medical conditions, provider availability, wait time to complete appointments, limited income, risk of losing housing, etc.   Strengths: motivated, engaged in Care Coordination, transportation available through medical insurance.   Patient expressed understanding of goal: Yes   Action steps to achieve this goal:  1. I will follow up with my providers as scheduled/recommended:   Primary Care Provider: Hospital follow up: [facility], 05/08/25, 07/17/25.   MTM: 05/08/25  Pain Clinic: [facility]  Mental Health Therapy: [facility]  2. I will discuss, review, schedule & complete recommended overdue health maintenance with my Primary Care Provider.   3. I will take my medications as prescribed.   4. I will request transportation through my health insurance plan if/as needed.   5. I will contact my care team with questions, concerns, support needs. I will use the clinic as a resource and I understand I can contact my clinic with 24/7 after hours services available. Care Coordinator will remain available as needed.                               Care Plan: Community Resources       Problem: Lack of stable housing       Long-Range Goal: Establish Stable Housing       Start Date: 2/24/2025 Expected End Date: 10/31/2025    Note:     Barriers: limited income, worried about significant other, was informed of need to vacate by 02/28/25, has a lot of belongings, application processing time.   Strengths: motivated, engaged in care coordination, current owner assisting to locate alternate housing options.    Patient expressed understanding of goal: yes   Action steps to achieve this goal:  1. I will reach out to [facility] Resource Line: #[phone] - option 1  2. I will notify my care team if I would  like to pursue our [facility] Medical Respite.   3. I will work with my care team to explore alternate housing resource options such as [address] 8 wait list, [email or web address], etc.     4. I will contact my care team with questions, concerns, support needs. I will use the clinic as a resource and I understand I can contact my clinic with 24/7 after hours services available. Care Coordinator will remain available as needed.                              Patient/Caregiver understanding: Patient/caregiver verbalized understanding and denies any additional questions or concerns at this time. RNCC engaged in AIDET communications during encounter.      Outreach Frequency: weekly, more frequently as needed  Future Appointments                In 2 months Sunshine Lunsford APRN CNP [facility] ADELINA Babin    In 4 months [facility]    In 4 months Sunshine Lunsford APRN CNP [facility] ADELINA Babin          Follow up Plan   Discharge Follow-Up  Discharge follow up appointment scheduled in alignment with recommended follow up timeframe or Transitions of Risk Category? (Low = within 30 days; Moderate= within 14 days; High= within 7 days): No  Patient's follow up appointment not scheduled: Patient accepted scheduling support. Appt scheduled/requested per protocol.    Future Appointments   Date Time Provider Department Center   5/8/2025  2:30 PM Sunshine Lunsford APRN CNP [facility]   7/17/2025  1:00 PM Loretta Chapa [facility]    7/17/2025  1:30 PM Sunshine Lunsford APRN CNP [facility]     Outpatient Plan as outlined on AVS reviewed with patient.    For any urgent concerns, please contact our 24 hour nurse triage line: [phone] ([phone])       RNCC will send clinic care coordination introduction letter & patient centered plan of care to patient via [employer]. Patient/caregiver was provided with writers  contact information and encouraged to call with questions, concerns, support needs. RNCC will remain available as needed. CHWCC will follow up with patient/caregiver again in 1 week.  RNCC will review chart in 6 weeks.     Medina Mack RN Care Coordinator  [facility]  Email: [email or web address]  Phone: [phone]    Finasteride Counseling:  I discussed with the patient the risks of use of finasteride including but not limited to decreased libido, decreased ejaculate volume, gynecomastia, and depression. Women should not handle medication.  All of the patient's questions and concerns were addressed. Finasteride Male Counseling: Finasteride Counseling:  I discussed with the patient the risks of use of finasteride including but not limited to decreased libido, decreased ejaculate volume, gynecomastia, and depression. Women should not handle medication.  All of the patient's questions and concerns were addressed.

## 2025-02-24 NOTE — LETTER
[facility] CARE COORDINATION  [address] [place] [address]     February 25, 2025    Hina Yap  [address]  [place] [address]      Dear Hina,    I am a clinic care coordinator who works with BENNETT ROUSSEAU CNP with the [facility]. I wanted to thank you for spending the time to talk with me.  Below is a description of clinic care coordination and how I can further assist you.       The clinic care coordination team is made up of a registered nurse, , financial resource worker and community health worker who understand the health care system. The goal of clinic care coordination is to help you manage your health and improve access to the health care system. Our team works alongside your provider to assist you in determining your health and social needs. We can help you obtain health care and community resources, providing you with necessary information and education. We can work with you through any barriers and develop a care plan that helps coordinate and strengthen the communication between you and your care team.  Our services are voluntary and are offered without charge to you personally.    Please feel free to contact me with any questions or concerns regarding care coordination and what we can offer.      We are focused on providing you with the highest-quality healthcare experience possible.    Sincerely,     Medina Mack RN Care Coordinator  [facility] [place]  Email: [email or web address]  Phone: [phone]     Enclosed: I have enclosed a copy of the Patient Centered Plan of Care. This has helpful information and goals that we have talked about. Please keep this in an easy to access place to use as needed.

## 2025-02-24 NOTE — LETTER
[facility]  Patient Centered Plan of Care  About Me:      Patient Name:  Hina Yap    YOB: 1965  Age:         59 year old   Tashi MRN:    [id number] Telephone Information:  Home Phone [phone]   Mobile [phone]       Address:  [address] [id number] Email address:  [email or web address]      Emergency Contact(s)    Name Relationship Lgl Grd Work Phone Home Phone Mobile Phone   1. BRITTANIE YAP Son    [phone]   2. MICHAEL BELLE Friend    [phone]   3. SUE YAP Mother No  [phone]            Primary language:  English     needed? No   [employer]:  [phone] op. 1  Other communication barriers:Utilization    Preferred Method of Communication:  Mail  Current living arrangement: I live in a private home; I live in a private home with spouse    Mobility Status/ Medical Equipment: Independent    Health Maintenance  Health Maintenance Reviewed: Due/Overdue   Health Maintenance Due   Topic Date Due    LUNG CANCER SCREENING  08/25/2019    YEARLY PREVENTIVE VISIT  05/18/2024    COLORECTAL CANCER SCREENING  06/17/2024    COVID-19 Vaccine (5 - 2024-25 season) 09/01/2024    LIPID  10/04/2024    MICROALBUMIN  10/04/2024    URINE DRUG SCREEN  10/04/2024    PHQ-9  03/04/2025      My Access Plan  Medical Emergency 911   Primary Clinic Line [facility] - [phone]   24 Hour Appointment Line [phone] or  [phone]) (toll-free)   24 Hour Nurse Line [phone] (toll-free)   Preferred Urgent Care [facility], [phone]     Preferred Hospital [facility]  [phone]     Preferred Pharmacy [id number] - [place], [place] - [address] AT [facility] [address]     Behavioral Health Crisis Line The National Suicide Prevention Lifeline at 1-747.924.8717 or Text/Call 308     My Care Team Members  Patient Care  Team         Relationship Specialty Notifications Start End    Sunshine Lunsford APRN CNP PCP - General Nurse Practitioner  12/1/15     Phone: [phone] Fax: [phone]         [address] [place] [address]    Sunshine Lunsford APRN CNP Assigned PCP   5/4/17     Phone: [phone] Fax: [phone] [address] [place] [address]    Reji Sandoval MD MD Dermatology  7/29/22     Phone: [phone] Pager: [phone] Fax: [phone]        [address] [place] [address]    Ivonne Gonzalez, [facility] Pharmacist Pharmacist  9/26/22     Phone: [phone] Fax: [phone]         [address] [address]    Marilyn Benavides APRN CNP Nurse Practitioner Nurse Practitioner Primary Care  1/30/23     Phone: [phone] Fax: [phone]         [address] [facility] [address]    Loretta Chapa, [facility] Pharmacist Pharmacist  1/11/24     Phone: [phone] [address] [address]    Loretta Chapa, [facility] Assigned MTM Pharmacist   8/23/24     Phone: [phone] [address] [address]    Bernie White, CRISTELA Community Health Worker Primary Care - CC Admissions 9/16/24     Phone: [phone]         Medina Mack, RN Lead Care Coordinator  Admissions 2/24/25     Phone: [phone]                   My Care Plans  Self Management and Treatment Plan    Care Plan  Care Plan: Utilization       Problem: Increased risk of re-admission       Long-Range Goal: I would like additional resources and support to manage my health and prevent future avoidable ED visits/hospital admissions       Start Date: 2/24/2025 Expected End Date: 8/29/2025    Note:     Barriers: diagnosis of multiple, chronic, complex medical conditions, provider availability, wait time to complete appointments, limited income, risk of losing housing, etc.   Strengths: motivated, engaged in Care  Coordination, transportation available through medical insurance.   Patient expressed understanding of goal: Yes   Action steps to achieve this goal:  1. I will follow up with my providers as scheduled/recommended:   Primary Care Provider: Hospital follow up: [facility], 05/08/25, 07/17/25.   MTM: 05/08/25  Pain Clinic: [facility]  Mental Health Therapy: [facility]  2. I will discuss, review, schedule & complete recommended overdue health maintenance with my Primary Care Provider.   3. I will take my medications as prescribed.   4. I will request transportation through my health insurance plan if/as needed.   5. I will contact my care team with questions, concerns, support needs. I will use the clinic as a resource and I understand I can contact my clinic with 24/7 after hours services available. Care Coordinator will remain available as needed.                               Care Plan: Community Resources       Problem: Lack of stable housing       Long-Range Goal: Establish Stable Housing       Start Date: 2/24/2025 Expected End Date: 10/31/2025    Note:     Barriers: limited income, worried about significant other, was informed of need to vacate by 02/28/25, has a lot of belongings, application processing time.   Strengths: motivated, engaged in care coordination, current owner assisting to locate alternate housing options.    Patient expressed understanding of goal: yes   Action steps to achieve this goal:  1. I will reach out to [facility] Housing Resource Line: #[phone] - option 1  2. I will notify my care team if I would like to pursue our [facility] Medical Respite.   3. I will work with my care team to explore alternate housing resource options such as [address] 8 wait list, [email or web address], etc.     4. I will contact my care team with questions, concerns, support needs. I will use the clinic as a resource and I understand I can contact my clinic with 24/7 after hours services available. Care Coordinator will remain  available as needed.                              Action Plans on File:            Depression          Advance Care Plans/Directives:   Advanced Care Plan/Directives on file: No    Discussed with patient/caregiver(s): Declined Further Information           My Medical and Care Information  Problem List   Patient Active Problem List   Diagnosis    MS (multiple sclerosis) (H)-[facility]-Takes Adderall    Cardiomyopathy (H)-Cards wants to see her q2 years    Chronic obstructive pulmonary disease, unspecified COPD type (H)    Anxiety    Restless leg syndrome    Moderate episode of recurrent major depressive disorder (H)    Other chronic pain-sees pain clinic-opioids    CKD (chronic kidney disease) stage 2, GFR 60-89 ml/min    Insomnia, unspecified type    Prolonged Q-T interval on ECG    Essential hypertension    Nonrheumatic mitral valve regurgitation    S/P lumbar fusion    Itching    Urinary urgency    Falls frequently    Subclinical hypothyroidism    Ataxic gait    COPD with acute exacerbation (H)    Acute respiratory failure with hypoxia (H)    Scalp laceration, initial encounter      Current Medications:  We reviewed your medications today. It will be important to review this with your provider at your next clinic visit.    Care Coordination Start Date: 2/24/2025   Frequency of Care Coordination: weekly, more frequently as needed     Form Last Updated: 02/25/2025

## 2025-03-03 ENCOUNTER — PATIENT OUTREACH (OUTPATIENT)
Dept: CARE COORDINATION | Facility: CLINIC | Age: 60
End: 2025-03-03
Payer: MEDICAID

## 2025-03-03 NOTE — PROGRESS NOTES
Clinic Care Coordination Contact  [facility]/Voicemail    Clinical Data: Care Coordinator Outreach    Outreach Documentation Number of Outreach Attempt   3/3/2025   3:21 PM 1       Left message on patient's voicemail with call back information and requested return call.    Plan: Care Coordinator will try to reach patient again in 5-10 business days.    Bernie SALCEDO, B.S. [facility] Care Coordination  [facility] Clinics:  [place], [place] and [place]  [phone]  [email or web address]

## (undated) DEVICE — RX SURGIFLO HEMOSTATIC MATRIX 8ML 2991

## (undated) DEVICE — Device

## (undated) DEVICE — MARKER SPHERES PASSIVE MEDT PACK 5 8801075

## (undated) DEVICE — CUSHION INSERT LG PRONE VIEW JACKSON TABLE

## (undated) DEVICE — SPONGE SURGIFOAM 100 1974

## (undated) DEVICE — DRAIN JACKSON PRATT CHANNEL 10FR RND SIL W/TROCAR JP-2227

## (undated) DEVICE — SU VICRYL 2-0 CT-1 18' J739D

## (undated) DEVICE — RX VISTASEAL FIBRIN SEALANT W/THROMBIN 10ML VST10

## (undated) DEVICE — LINEN DRAPE 54X72" 5467

## (undated) DEVICE — PACK SET-UP STD 9102

## (undated) DEVICE — TUBING SUCTION 12"X1/4" N612

## (undated) DEVICE — PREP DURAPREP 26ML APL 8630

## (undated) DEVICE — GLOVE PROTEXIS BLUE W/NEU-THERA 8.5  2D73EB85

## (undated) DEVICE — DRAIN JACKSON PRATT RESERVOIR 100ML SU130-1305

## (undated) DEVICE — STPL SKIN PROXIMATE 35 WIDE PMW35

## (undated) DEVICE — SOL NACL 0.9% INJ 250ML BAG 2B1322Q

## (undated) DEVICE — DRAPE MICROSCOPE OPMI ZEISS 48X118" 306071-0000-000

## (undated) DEVICE — PACK SMALL SPINE RIDGES

## (undated) DEVICE — SU STRATAFIX PDS PLUS 0 CT-1 18" SXPP1A401

## (undated) DEVICE — DEVICE DUST COLLECTOR BONE BOX S-3500

## (undated) DEVICE — GLOVE PROTEXIS MICRO 7.5  2D73PM75

## (undated) DEVICE — GOWN XLG DISP 9545

## (undated) DEVICE — SU WND CLOSURE VLOC 180 ABS 3-0 12" P-14 VLOCL0114

## (undated) DEVICE — ESU BIPOLAR SEALER AQUAMANTYS 6MM 23-112-1

## (undated) DEVICE — GLOVE PROTEXIS BLUE W/NEU-THERA 6.5  2D73EB65

## (undated) DEVICE — NDL 22GA 1.5"

## (undated) DEVICE — LINEN ORTHO ACL PACK 5447

## (undated) DEVICE — SPONGE COTTONOID 1/2X1" 80-1402

## (undated) DEVICE — SU VICRYL 0 CT-1 CR 8X18" J740D

## (undated) DEVICE — SUCTION MANIFOLD NEPTUNE 2 SYS 4 PORT 0702-020-000

## (undated) DEVICE — DECANTER BAG 2002S

## (undated) DEVICE — SUCTION FRAZIER 12FR W/OBTURATOR 33120

## (undated) DEVICE — NDL BLUNT 18GA 1" W/O FILTER 305181

## (undated) DEVICE — ESU GROUND PAD ADULT W/CORD E7507

## (undated) DEVICE — GOWN REINFORCED XXLG 9071

## (undated) DEVICE — ADH SKIN CLOSURE PREMIERPRO EXOFIN 1.0ML 3470

## (undated) DEVICE — GLOVE PROTEXIS W/NEU-THERA 8.5  2D73TE85

## (undated) DEVICE — DRSG TELFA ISLAND 4X10"

## (undated) DEVICE — DRSG KERLIX FLUFFS X5

## (undated) DEVICE — LINEN POUCH DBL 5427

## (undated) DEVICE — PEN MARKING SKIN W/LABELS 31145884

## (undated) DEVICE — ESU CLEANER TIP 31142717

## (undated) DEVICE — MIDAS REX DISSECTING TOOL TRI-FLUTED BURR 14MH30T

## (undated) DEVICE — GLOVE PROTEXIS W/NEU-THERA 6.5  2D73TE65

## (undated) DEVICE — MARKER SPHERES PASSIVE MEDT PACK 1 8801071

## (undated) DEVICE — CATH TRAY FOLEY SURESTEP 16FR DRAIN BAG STATOCK A899916

## (undated) DEVICE — CATH TRAY FOLEY COUDE SURESTEP 16FR W/DRN BAG LATEX A304416A

## (undated) RX ORDER — VANCOMYCIN HYDROCHLORIDE 1 G/20ML
INJECTION, POWDER, LYOPHILIZED, FOR SOLUTION INTRAVENOUS
Status: DISPENSED
Start: 2021-07-23

## (undated) RX ORDER — MEPERIDINE HYDROCHLORIDE 25 MG/ML
INJECTION INTRAMUSCULAR; INTRAVENOUS; SUBCUTANEOUS
Status: DISPENSED
Start: 2021-07-23

## (undated) RX ORDER — FENTANYL CITRATE 50 UG/ML
INJECTION, SOLUTION INTRAMUSCULAR; INTRAVENOUS
Status: DISPENSED
Start: 2021-07-23

## (undated) RX ORDER — BUPIVACAINE HYDROCHLORIDE 5 MG/ML
INJECTION, SOLUTION PERINEURAL
Status: DISPENSED
Start: 2019-06-17

## (undated) RX ORDER — PROPOFOL 10 MG/ML
INJECTION, EMULSION INTRAVENOUS
Status: DISPENSED
Start: 2021-07-23

## (undated) RX ORDER — EPHEDRINE SULFATE 50 MG/ML
INJECTION, SOLUTION INTRAMUSCULAR; INTRAVENOUS; SUBCUTANEOUS
Status: DISPENSED
Start: 2021-07-23

## (undated) RX ORDER — REGADENOSON 0.08 MG/ML
INJECTION, SOLUTION INTRAVENOUS
Status: DISPENSED
Start: 2021-07-12

## (undated) RX ORDER — DEXAMETHASONE SODIUM PHOSPHATE 4 MG/ML
INJECTION, SOLUTION INTRA-ARTICULAR; INTRALESIONAL; INTRAMUSCULAR; INTRAVENOUS; SOFT TISSUE
Status: DISPENSED
Start: 2021-07-23

## (undated) RX ORDER — ONDANSETRON 2 MG/ML
INJECTION INTRAMUSCULAR; INTRAVENOUS
Status: DISPENSED
Start: 2021-07-23

## (undated) RX ORDER — CEFAZOLIN SODIUM 1 G/3ML
INJECTION, POWDER, FOR SOLUTION INTRAMUSCULAR; INTRAVENOUS
Status: DISPENSED
Start: 2021-07-23

## (undated) RX ORDER — BUPIVACAINE HYDROCHLORIDE AND EPINEPHRINE 5; 5 MG/ML; UG/ML
INJECTION, SOLUTION EPIDURAL; INTRACAUDAL; PERINEURAL
Status: DISPENSED
Start: 2021-07-23

## (undated) RX ORDER — HYDROXYZINE HYDROCHLORIDE 25 MG/1
TABLET, FILM COATED ORAL
Status: DISPENSED
Start: 2021-07-23

## (undated) RX ORDER — FENTANYL CITRATE-0.9 % NACL/PF 10 MCG/ML
PLASTIC BAG, INJECTION (ML) INTRAVENOUS
Status: DISPENSED
Start: 2021-07-23

## (undated) RX ORDER — LIDOCAINE HYDROCHLORIDE 10 MG/ML
INJECTION, SOLUTION EPIDURAL; INFILTRATION; INTRACAUDAL; PERINEURAL
Status: DISPENSED
Start: 2021-07-23

## (undated) RX ORDER — CEFAZOLIN SODIUM 2 G/100ML
INJECTION, SOLUTION INTRAVENOUS
Status: DISPENSED
Start: 2021-07-23